# Patient Record
Sex: FEMALE | Race: BLACK OR AFRICAN AMERICAN | Employment: OTHER | URBAN - METROPOLITAN AREA
[De-identification: names, ages, dates, MRNs, and addresses within clinical notes are randomized per-mention and may not be internally consistent; named-entity substitution may affect disease eponyms.]

---

## 2019-01-07 ENCOUNTER — HOSPITAL ENCOUNTER (EMERGENCY)
Age: 69
Discharge: HOME | End: 2019-01-07
Attending: EMERGENCY MEDICINE | Admitting: EMERGENCY MEDICINE
Payer: MEDICARE

## 2019-01-07 VITALS
WEIGHT: 145 LBS | SYSTOLIC BLOOD PRESSURE: 134 MMHG | HEART RATE: 89 BPM | RESPIRATION RATE: 18 BRPM | DIASTOLIC BLOOD PRESSURE: 52 MMHG | TEMPERATURE: 98.3 F | HEIGHT: 60 IN | OXYGEN SATURATION: 99 % | BODY MASS INDEX: 28.47 KG/M2

## 2019-01-07 DIAGNOSIS — N18.6 END STAGE RENAL DISEASE (HCC): Primary | ICD-10-CM

## 2019-01-07 LAB
ALBUMIN SERPL-MCNC: 2.9 G/DL (ref 3.5–5)
ALBUMIN/GLOB SERPL: 0.5 {RATIO} (ref 1.1–2.2)
ALP SERPL-CCNC: 105 U/L (ref 45–117)
ALT SERPL-CCNC: 14 U/L (ref 12–78)
ANION GAP SERPL CALC-SCNC: 9 MMOL/L (ref 5–15)
AST SERPL-CCNC: 16 U/L (ref 15–37)
BASOPHILS # BLD: 0 K/UL (ref 0–0.1)
BASOPHILS NFR BLD: 0 % (ref 0–1)
BILIRUB SERPL-MCNC: 0.5 MG/DL (ref 0.2–1)
BUN SERPL-MCNC: 59 MG/DL (ref 6–20)
BUN/CREAT SERPL: 11 (ref 12–20)
CALCIUM SERPL-MCNC: 9.4 MG/DL (ref 8.5–10.1)
CHLORIDE SERPL-SCNC: 100 MMOL/L (ref 97–108)
CO2 SERPL-SCNC: 30 MMOL/L (ref 21–32)
CREAT SERPL-MCNC: 5.52 MG/DL (ref 0.55–1.02)
DIFFERENTIAL METHOD BLD: ABNORMAL
EOSINOPHIL # BLD: 0.5 K/UL (ref 0–0.4)
EOSINOPHIL NFR BLD: 7 % (ref 0–7)
ERYTHROCYTE [DISTWIDTH] IN BLOOD BY AUTOMATED COUNT: 16.8 % (ref 11.5–14.5)
GLOBULIN SER CALC-MCNC: 5.4 G/DL (ref 2–4)
GLUCOSE SERPL-MCNC: 123 MG/DL (ref 65–100)
HCT VFR BLD AUTO: 43.7 % (ref 35–47)
HGB BLD-MCNC: 13.3 G/DL (ref 11.5–16)
IMM GRANULOCYTES # BLD: 0 K/UL (ref 0–0.04)
IMM GRANULOCYTES NFR BLD AUTO: 0 % (ref 0–0.5)
LYMPHOCYTES # BLD: 1.6 K/UL (ref 0.8–3.5)
LYMPHOCYTES NFR BLD: 25 % (ref 12–49)
MCH RBC QN AUTO: 26.8 PG (ref 26–34)
MCHC RBC AUTO-ENTMCNC: 30.4 G/DL (ref 30–36.5)
MCV RBC AUTO: 87.9 FL (ref 80–99)
MONOCYTES # BLD: 0.5 K/UL (ref 0–1)
MONOCYTES NFR BLD: 7 % (ref 5–13)
NEUTS SEG # BLD: 3.9 K/UL (ref 1.8–8)
NEUTS SEG NFR BLD: 60 % (ref 32–75)
NRBC # BLD: 0 K/UL (ref 0–0.01)
NRBC BLD-RTO: 0 PER 100 WBC
PLATELET # BLD AUTO: 210 K/UL (ref 150–400)
PMV BLD AUTO: 10 FL (ref 8.9–12.9)
POTASSIUM SERPL-SCNC: 5.1 MMOL/L (ref 3.5–5.1)
PROT SERPL-MCNC: 8.3 G/DL (ref 6.4–8.2)
RBC # BLD AUTO: 4.97 M/UL (ref 3.8–5.2)
SODIUM SERPL-SCNC: 139 MMOL/L (ref 136–145)
WBC # BLD AUTO: 6.4 K/UL (ref 3.6–11)

## 2019-01-07 PROCEDURE — 36415 COLL VENOUS BLD VENIPUNCTURE: CPT

## 2019-01-07 PROCEDURE — 87077 CULTURE AEROBIC IDENTIFY: CPT

## 2019-01-07 PROCEDURE — 99283 EMERGENCY DEPT VISIT LOW MDM: CPT

## 2019-01-07 PROCEDURE — 87205 SMEAR GRAM STAIN: CPT

## 2019-01-07 PROCEDURE — 87186 SC STD MICRODIL/AGAR DIL: CPT

## 2019-01-07 PROCEDURE — 80053 COMPREHEN METABOLIC PANEL: CPT

## 2019-01-07 PROCEDURE — 93005 ELECTROCARDIOGRAM TRACING: CPT

## 2019-01-07 PROCEDURE — 85025 COMPLETE CBC W/AUTO DIFF WBC: CPT

## 2019-01-07 NOTE — ED NOTES
PIV removed intact. Plan of care accepted by pt and family. Pt left unit steady gait. Patient (s)  given copy of dc instructions and 0 script(s). Patient (s)  verbalized understanding of instructions and script (s). Patient given a current medication reconciliation form and verbalized understanding of their medications. Patient (s) verbalized understanding of the importance of discussing medications with  his or her physician or clinic they will be following up with. Patient alert and oriented and in no acute distress. Patient discharged home ambulatory with family.

## 2019-01-07 NOTE — ED PROVIDER NOTES
EMERGENCY DEPARTMENT HISTORY AND PHYSICAL EXAM      Date: 1/7/2019  Patient Name: Nicole Monk    History of Presenting Illness     Chief Complaint   Patient presents with    Vascular Access Problem     History Provided By: Patient and Patient's Daughter    HPI: Nicole Monk, 70 y.o. female with PMHx significant for HTN, CKD, and DM, presents herself w/ daughter to the ED with cc of possible clogged dialysis port x few days. Pt states she receives dialysis M/W/F. Pt states she last had dialysis on Friday (1/4/19), but pt's daughter states that pt has not. Pt's daughter reports that her family received a call from pt's nephrologist that pt is in need of dialysis as she did not receive dialysis due to clogged port. Pt additionally complains of ongoing draining surgical scar  x months. She notes of having a cardiac bypass 2 years ago. Her daughter states that her mother came from [place] for the weekend. Pt is compliant with all her medications. Pt specifically denies leg swelling and SOB. There are no other complaints, changes, or physical findings at this time. PCP: None    Past History     Past Medical History:  No past medical history on file. Past Surgical History:  No past surgical history on file. Family History:  No family history on file. Social History:  Social History     Tobacco Use    Smoking status: Never Smoker    Smokeless tobacco: Never Used   Substance Use Topics    Alcohol use: Not on file    Drug use: No       Allergies:  Not on File      Review of Systems   Review of Systems   Constitutional: Negative for chills and fever. HENT: Negative for congestion, rhinorrhea, sneezing and sore throat. Eyes: Negative for redness and visual disturbance. Respiratory: Negative for shortness of breath. Cardiovascular: Negative for leg swelling. Gastrointestinal: Negative for abdominal pain, nausea and vomiting. Genitourinary: Negative for difficulty urinating and frequency. Musculoskeletal: Negative for back pain, myalgias and neck stiffness. Skin: Negative for rash and wound. +draining surgical scar   Neurological: Negative for dizziness, syncope, weakness and headaches. Hematological: Negative for adenopathy. All other systems reviewed and are negative. Physical Exam   Physical Exam   Constitutional: She is oriented to person, place, and time. She appears well-developed and well-nourished. No distress. HENT:   Head: Normocephalic and atraumatic. Mouth/Throat: No oropharyngeal exudate. Eyes: Conjunctivae and EOM are normal. Pupils are equal, round, and reactive to light. No scleral icterus. Neck: Normal range of motion. Neck supple. Cardiovascular: Normal rate, regular rhythm, normal heart sounds and intact distal pulses. Exam reveals no gallop and no friction rub. No murmur heard. Pulmonary/Chest: Effort normal and breath sounds normal.   Well healed sternal scar. 2 small draining on lower sternal incision w/ mild purulent drainage. Abdominal: Soft. Bowel sounds are normal. There is no tenderness. There is no rebound and no guarding. Musculoskeletal: Normal range of motion. She exhibits edema (trace left hand). Dialysis fistula over the LUE w/ positive thrill. Neurological: She is alert and oriented to person, place, and time. She has normal reflexes. No cranial nerve deficit. She exhibits normal muscle tone. Coordination normal.   Skin: Skin is warm and dry. She is not diaphoretic. Psychiatric: She has a normal mood and affect.  Her behavior is normal. Judgment and thought content normal.     Diagnostic Study Results     Labs -     Recent Results (from the past 12 hour(s))   CBC WITH AUTOMATED DIFF    Collection Time: 01/07/19 10:30 AM   Result Value Ref Range    WBC 6.4 3.6 - 11.0 K/uL    RBC 4.97 3.80 - 5.20 M/uL    HGB 13.3 11.5 - 16.0 g/dL    HCT 43.7 35.0 - 47.0 %    MCV 87.9 80.0 - 99.0 FL    MCH 26.8 26.0 - 34.0 PG    MCHC 30.4 30.0 - 36.5 g/dL    RDW 16.8 (H) 11.5 - 14.5 %    PLATELET 528 530 - 095 K/uL    MPV 10.0 8.9 - 12.9 FL    NRBC 0.0 0  WBC    ABSOLUTE NRBC 0.00 0.00 - 0.01 K/uL    NEUTROPHILS 60 32 - 75 %    LYMPHOCYTES 25 12 - 49 %    MONOCYTES 7 5 - 13 %    EOSINOPHILS 7 0 - 7 %    BASOPHILS 0 0 - 1 %    IMMATURE GRANULOCYTES 0 0.0 - 0.5 %    ABS. NEUTROPHILS 3.9 1.8 - 8.0 K/UL    ABS. LYMPHOCYTES 1.6 0.8 - 3.5 K/UL    ABS. MONOCYTES 0.5 0.0 - 1.0 K/UL    ABS. EOSINOPHILS 0.5 (H) 0.0 - 0.4 K/UL    ABS. BASOPHILS 0.0 0.0 - 0.1 K/UL    ABS. IMM. GRANS. 0.0 0.00 - 0.04 K/UL    DF AUTOMATED     METABOLIC PANEL, COMPREHENSIVE    Collection Time: 01/07/19 10:30 AM   Result Value Ref Range    Sodium 139 136 - 145 mmol/L    Potassium 5.1 3.5 - 5.1 mmol/L    Chloride 100 97 - 108 mmol/L    CO2 30 21 - 32 mmol/L    Anion gap 9 5 - 15 mmol/L    Glucose 123 (H) 65 - 100 mg/dL    BUN 59 (H) 6 - 20 MG/DL    Creatinine 5.52 (H) 0.55 - 1.02 MG/DL    BUN/Creatinine ratio 11 (L) 12 - 20      GFR est AA 9 (L) >60 ml/min/1.73m2    GFR est non-AA 8 (L) >60 ml/min/1.73m2    Calcium 9.4 8.5 - 10.1 MG/DL    Bilirubin, total 0.5 0.2 - 1.0 MG/DL    ALT (SGPT) 14 12 - 78 U/L    AST (SGOT) 16 15 - 37 U/L    Alk. phosphatase 105 45 - 117 U/L    Protein, total 8.3 (H) 6.4 - 8.2 g/dL    Albumin 2.9 (L) 3.5 - 5.0 g/dL    Globulin 5.4 (H) 2.0 - 4.0 g/dL    A-G Ratio 0.5 (L) 1.1 - 2.2       Medical Decision Making   I am the first provider for this patient. I reviewed the vital signs, available nursing notes, past medical history, past surgical history, family history and social history. Vital Signs-Reviewed the patient's vital signs. Patient Vitals for the past 12 hrs:   Temp Pulse Resp BP SpO2   01/07/19 0913 98.3 °F (36.8 °C) 89 18 134/52 99 %       ED EKG interpretation: 10:28  Rhythm: normal sinus rhythm and 1st degree block; and regular . Rate (approx.): 60; Axis: normal; P wave: prolonged; QRS interval: normal ; ST/T wave: T wave inverted;  Other findings: abnormal ekg. This EKG was interpreted by Tia Jaeger MD,ED Provider. Records Reviewed: Nursing Notes and Old Medical Records    Provider Notes (Medical Decision Making):   DDx: electrolyte abnormality, volume overload, need for dialysis    ED Course:   Initial assessment performed. The patients presenting problems have been discussed, and they are in agreement with the care plan formulated and outlined with them. I have encouraged them to ask questions as they arise throughout their visit. MEDICATIONS GIVEN:  Medications - No data to display     Pt with thrill on exam and K+ of 5.1 without EKG changes. Will not dialyze today, but will discharge to [place] for her scheduled dialysis on Wednesday. PROGRESS NOTE:   11:43 AM  Pt has been re-examined by Tia Jaeger MD. Reviewed lab work w/ pt and family. There is no need for emergent dialysis at this time. Pt is to have dialysis as scheduled on Wednesday (1/9/19) at [place]. Will discharge pt soon. Critical Care Time: 0 min    Discharge Note:  11:45 AM  The pt is ready for discharge. The pt's signs, symptoms, diagnosis, and discharge instructions have been discussed and pt has conveyed their understanding. The pt is to follow up as recommended or return to ER should their symptoms worsen. Plan has been discussed and pt is in agreement. PLAN:  1. There are no discharge medications for this patient. 2.   Follow-up Information     Follow up With Specialties Details Why Contact Info    Your Dialysis facility  In 2 days      Your PCP and Kidney Doctor  Call      [facility] EMERGENCY DEPT Emergency Medicine  As needed, If symptoms worsen [place]  [phone]        Return to ED if worse     Diagnosis     Clinical Impression:   1. End stage renal disease ([facility] [address]        Attestations:   This note is prepared by [employer], acting as Scribe for Tia Jaeger MD.    Tia Jaeger MD: The scribe's documentation has been prepared under my direction and personally reviewed by me in its entirety. I confirm that the note above accurately reflects all work, treatment, procedures, and medical decision making performed by me.

## 2019-01-07 NOTE — DISCHARGE INSTRUCTIONS
Patient Education        Hemodialysis Vascular Access: Care Instructions  Your Care Instructions  Hemodialysis, or dialysis, is the use of a machine to remove wastes from your blood. You need it if your kidneys are not able to remove wastes on their own. A dialysis access is the place in your arm, or sometimes in your leg, where a doctor creates a blood vessel that carries a large flow of blood. When you have dialysis, two needles are placed in this blood vessel and are connected to the dialysis machine. Your blood flows out of one needle and into the machine to be cleaned. Then your cleaned blood flows back into your body through the other needle. Sometimes, a doctor makes a short-term access through a tube, called a catheter, placed in your neck, upper chest, or groin. Your doctor creates an access during a minor surgery. You need to take care of your access to keep it working and to prevent infection. Follow-up care is a key part of your treatment and safety. Be sure to make and go to all appointments, and call your doctor if you are having problems. It's also a good idea to know your test results and keep a list of the medicines you take. How can you care for yourself at home? · After your doctor creates an access, keep it dry for at least 2 days. · Squeeze a soft ball or other object as instructed after the access is placed. This will help blood flow through the access and help prevent blood clots. · After you have dialysis, check to see whether the access bleeds or swells. Let your doctor know if your arm bleeds or swells. · Do not lift anything heavy with the arm that has the access. · Do not bump your arm. · Do not wear tight clothing or jewelry over the access. · Do not sleep with your access arm under your body. · Have blood drawn or blood pressure taken from your other arm. · Keep the access clean and dry. · Do not put cream or lotion on or near the access.   When should you call for help?  Call your doctor now or seek immediate medical care if:    · You have signs of infection, such as:  ? Increased pain, swelling, warmth, or redness around the access. ? Red streaks leading from the access. ? Pus draining from the access. ? A fever.     · You do not feel a pulse in your access.    Watch closely for changes in your health, and be sure to contact your doctor if:    · You do not get better as expected. Where can you learn more? Go to http://nasra-rolf.info/. Enter L169 in the search box to learn more about \"Hemodialysis Vascular Access: Care Instructions. \"  Current as of: March 15, 2018  Content Version: 11.8  © 0123-5314 [employer]. Care instructions adapted under license by "[employer] (which disclaims liability or warranty for this information). If you have questions about a medical condition or this instruction, always ask your healthcare professional. [place] [address] any warranty or liability for your use of this information. Low Sodium Diet (2,000 Milligram): Care Instructions  Your Care Instructions    Too much sodium causes your body to hold on to extra water. This can raise your blood pressure and force your heart and kidneys to work harder. In very serious cases, this could cause you to be put in the hospital. It might even be life-threatening. By limiting sodium, you will feel better and lower your risk of serious problems. The most common source of sodium is salt. People get most of the salt in their diet from canned, prepared, and packaged foods. Fast food and restaurant meals also are very high in sodium. Your doctor will probably limit your sodium to less than 2,000 milligrams (mg) a day. This limit counts all the sodium in prepared and packaged foods and any salt you add to your food. Follow-up care is a key part of your treatment and safety.  Be sure to make and go to all appointments, and call your doctor if you are having problems. It's also a good idea to know your test results and keep a list of the medicines you take. How can you care for yourself at home? Read food labels  · Read labels on cans and food packages. The labels tell you how much sodium is in each serving. Make sure that you look at the serving size. If you eat more than the serving size, you have eaten more sodium. · Food labels also tell you the Percent Daily Value for sodium. Choose products with low Percent Daily Values for sodium. · Be aware that sodium can come in forms other than salt, including monosodium glutamate (MSG), sodium citrate, and sodium bicarbonate (baking soda). MSG is often added to Asian food. When you eat out, you can sometimes ask for food without MSG or added salt. Buy low-sodium foods  · Buy foods that are labeled \"unsalted\" (no salt added), \"sodium-free\" (less than 5 mg of sodium per serving), or \"low-sodium\" (less than 140 mg of sodium per serving). Foods labeled \"reduced-sodium\" and \"light sodium\" may still have too much sodium. Be sure to read the label to see how much sodium you are getting. · Buy fresh vegetables, or frozen vegetables without added sauces. Buy low-sodium versions of canned vegetables, soups, and other canned goods. Prepare low-sodium meals  · Cut back on the amount of salt you use in cooking. This will help you adjust to the taste. Do not add salt after cooking. One teaspoon of salt has about 2,300 mg of sodium. · Take the salt shaker off the table. · Flavor your food with garlic, lemon juice, onion, vinegar, herbs, and spices. Do not use soy sauce, lite soy sauce, steak sauce, onion salt, garlic salt, celery salt, mustard, or ketchup on your food. · Use low-sodium salad dressings, sauces, and ketchup. Or make your own salad dressings and sauces without adding salt. · Use less salt (or none) when recipes call for it.  You can often use half the salt a recipe calls for without losing flavor. Other foods such as rice, pasta, and grains do not need added salt. · Rinse canned vegetables, and cook them in fresh water. This removes some--but not all--of the salt. · Avoid water that is naturally high in sodium or that has been treated with water softeners, which add sodium. Call your local water company to find out the sodium content of your water supply. If you buy bottled water, read the label and choose a sodium-free brand. Avoid high-sodium foods  · Avoid eating:  ? Smoked, cured, salted, and canned meat, fish, and poultry. ? Ham, aden, hot dogs, and luncheon meats. ? Regular, hard, and processed cheese and regular peanut butter. ? Crackers with salted tops, and other salted snack foods such as pretzels, chips, and salted popcorn. ? Frozen prepared meals, unless labeled low-sodium. ? Canned and dried soups, broths, and bouillon, unless labeled sodium-free or low-sodium. ? Canned vegetables, unless labeled sodium-free or low-sodium. ? [place] fries, pizza, tacos, and other fast foods. ? Pickles, olives, ketchup, and other condiments, especially soy sauce, unless labeled sodium-free or low-sodium. Where can you learn more? Go to http://nasra-rolf.info/. Enter W340 in the search box to learn more about \"Low Sodium Diet (2,000 Milligram): Care Instructions. \"  Current as of: March 29, 2018  Content Version: 11.8  © 2822-6671 [employer]. Care instructions adapted under license by [employer] (which disclaims liability or warranty for this information). If you have questions about a medical condition or this instruction, always ask your healthcare professional. [place] [address] any warranty or liability for your use of this information.

## 2019-01-07 NOTE — ED NOTES
Pt here for evaluation of left arm AV graft. + bruit & Thrill noted. Pt traveling back to [address] [place] [address] and passing through. Emergency Department Nursing Plan of Care       The Nursing Plan of Care is developed from the Nursing assessment and Emergency Department Attending provider initial evaluation. The plan of care may be reviewed in the ED Provider note.     The Plan of Care was developed with the following considerations:   Patient / Family readiness to learn indicated by:verbalized understanding  Persons(s) to be included in education: patient and family  Barriers to Learning/Limitations:No    Signed     Brynn Ghosh RN    1/7/2019   10:10 AM

## 2019-01-07 NOTE — LETTER
1/9/2019 [place] [address] Mariam Almanzar 
[facility] [address] Dear Ms. Chandler Carpenter You were seen in the Emergency Department of [address] [place] [address] on 1/7/2019 and had lab and/or radiology tests performed. We would like to discuss these results with you . Please call the Emergency Department at your earliest convenience at [phone], to speak with one of our providers. The MRSA culture from your Emergency Department visit on 1/7/2019 was positive. If you have not improved or are worsening, please follow up with your primary care doctor or Emergency department as soon as possible. Please [facility] or health department. Your antibiotic may need to be changed. If you have any questions please contact the Emergency Department at [phone]. Sincerely, Gilmar Redman, [address] [place] [address] - [place] EMERGENCY DEPT 
[address] [place] [address] [phone]

## 2019-01-07 NOTE — PROGRESS NOTES
Spiritual Care Partner Volunteer visited patient in [place] [address] on Jan. 7, 2019.     Documented by:  MARGUERITE Sanchez, [facility], Chaplain TIGRE ODEN [facility] Paging Service  [phone])

## 2019-01-07 NOTE — ED TRIAGE NOTES
Pt's daughter explains that pt has dialysis M,W and F.  Pt's son reported to sister that pt did not have her dialysis Fri but pt states that she had a her full 3 hours. Pt's son reports that pt's ports is clogged. Pt's daughter brought pt to the ED for dialysis.

## 2019-01-09 LAB
BACTERIA SPEC CULT: ABNORMAL
GRAM STN SPEC: ABNORMAL
GRAM STN SPEC: ABNORMAL
SERVICE CMNT-IMP: ABNORMAL

## 2019-01-11 LAB
ATRIAL RATE: 60 BPM
CALCULATED P AXIS, ECG09: 63 DEGREES
CALCULATED R AXIS, ECG10: -14 DEGREES
CALCULATED T AXIS, ECG11: -81 DEGREES
DIAGNOSIS, 93000: NORMAL
P-R INTERVAL, ECG05: 214 MS
Q-T INTERVAL, ECG07: 474 MS
QRS DURATION, ECG06: 84 MS
QTC CALCULATION (BEZET), ECG08: 474 MS
VENTRICULAR RATE, ECG03: 60 BPM

## 2020-11-02 ENCOUNTER — HOSPITAL ENCOUNTER (OUTPATIENT)
Dept: LAB | Age: 70
Discharge: HOME | End: 2020-11-02

## 2020-11-02 LAB
ERYTHROCYTE [DISTWIDTH] IN BLOOD BY AUTOMATED COUNT: 14 % (ref 11.5–14.5)
HCT VFR BLD AUTO: 34.5 % (ref 35–47)
HGB BLD-MCNC: 10.8 G/DL (ref 11.5–16)
MCH RBC QN AUTO: 27.7 PG (ref 26–34)
MCHC RBC AUTO-ENTMCNC: 31.3 G/DL (ref 30–36.5)
MCV RBC AUTO: 88.5 FL (ref 80–99)
NRBC # BLD: 0 K/UL (ref 0–0.01)
NRBC BLD-RTO: 0 PER 100 WBC
PLATELET # BLD AUTO: 165 K/UL (ref 150–400)
PMV BLD AUTO: 11 FL (ref 8.9–12.9)
RBC # BLD AUTO: 3.9 M/UL (ref 3.8–5.2)
WBC # BLD AUTO: 7.6 K/UL (ref 3.6–11)

## 2020-11-02 PROCEDURE — 85027 COMPLETE CBC AUTOMATED: CPT

## 2021-02-09 ENCOUNTER — OFFICE VISIT (OUTPATIENT)
Dept: INTERNAL MEDICINE CLINIC | Age: 71
End: 2021-02-09
Payer: COMMERCIAL

## 2021-02-09 VITALS
SYSTOLIC BLOOD PRESSURE: 120 MMHG | WEIGHT: 150 LBS | TEMPERATURE: 97.8 F | DIASTOLIC BLOOD PRESSURE: 45 MMHG | RESPIRATION RATE: 18 BRPM | HEART RATE: 51 BPM | BODY MASS INDEX: 29.45 KG/M2 | OXYGEN SATURATION: 98 % | HEIGHT: 60 IN

## 2021-02-09 DIAGNOSIS — Z12.11 COLON CANCER SCREENING: ICD-10-CM

## 2021-02-09 DIAGNOSIS — R41.3 MEMORY IMPAIRMENT: ICD-10-CM

## 2021-02-09 DIAGNOSIS — Z99.2 CONTROLLED TYPE 2 DIABETES MELLITUS WITH CHRONIC KIDNEY DISEASE ON CHRONIC DIALYSIS, WITH LONG-TERM CURRENT USE OF INSULIN (HCC): ICD-10-CM

## 2021-02-09 DIAGNOSIS — I10 ESSENTIAL HYPERTENSION: ICD-10-CM

## 2021-02-09 DIAGNOSIS — E03.9 ACQUIRED HYPOTHYROIDISM: ICD-10-CM

## 2021-02-09 DIAGNOSIS — N18.6 CONTROLLED TYPE 2 DIABETES MELLITUS WITH CHRONIC KIDNEY DISEASE ON CHRONIC DIALYSIS, WITH LONG-TERM CURRENT USE OF INSULIN (HCC): ICD-10-CM

## 2021-02-09 DIAGNOSIS — E78.2 MIXED HYPERLIPIDEMIA: ICD-10-CM

## 2021-02-09 DIAGNOSIS — Z79.4 CONTROLLED TYPE 2 DIABETES MELLITUS WITH CHRONIC KIDNEY DISEASE ON CHRONIC DIALYSIS, WITH LONG-TERM CURRENT USE OF INSULIN (HCC): ICD-10-CM

## 2021-02-09 DIAGNOSIS — Z12.31 SCREENING MAMMOGRAM, ENCOUNTER FOR: ICD-10-CM

## 2021-02-09 DIAGNOSIS — E11.22 CONTROLLED TYPE 2 DIABETES MELLITUS WITH CHRONIC KIDNEY DISEASE ON CHRONIC DIALYSIS, WITH LONG-TERM CURRENT USE OF INSULIN (HCC): ICD-10-CM

## 2021-02-09 DIAGNOSIS — Z76.89 ESTABLISHING CARE WITH NEW DOCTOR, ENCOUNTER FOR: Primary | ICD-10-CM

## 2021-02-09 DIAGNOSIS — N18.6 ESRD (END STAGE RENAL DISEASE) (HCC): ICD-10-CM

## 2021-02-09 LAB
CHOLEST SERPL-MCNC: 127 MG/DL
GLUCOSE POC: 189 MG/DL
HBA1C MFR BLD HPLC: 8 %
HDLC SERPL-MCNC: 45 MG/DL
LDL CHOLESTEROL POC: 57 MG/DL
NON-HDL GOAL (POC): 82
TCHOL/HDL RATIO (POC): 1.3
TRIGL SERPL-MCNC: 128 MG/DL

## 2021-02-09 PROCEDURE — 3052F HG A1C>EQUAL 8.0%<EQUAL 9.0%: CPT | Performed by: NURSE PRACTITIONER

## 2021-02-09 PROCEDURE — 1101F PT FALLS ASSESS-DOCD LE1/YR: CPT | Performed by: NURSE PRACTITIONER

## 2021-02-09 PROCEDURE — G8427 DOCREV CUR MEDS BY ELIG CLIN: HCPCS | Performed by: NURSE PRACTITIONER

## 2021-02-09 PROCEDURE — 99204 OFFICE O/P NEW MOD 45 MIN: CPT | Performed by: NURSE PRACTITIONER

## 2021-02-09 PROCEDURE — G8536 NO DOC ELDER MAL SCRN: HCPCS | Performed by: NURSE PRACTITIONER

## 2021-02-09 PROCEDURE — G8400 PT W/DXA NO RESULTS DOC: HCPCS | Performed by: NURSE PRACTITIONER

## 2021-02-09 PROCEDURE — 82962 GLUCOSE BLOOD TEST: CPT | Performed by: NURSE PRACTITIONER

## 2021-02-09 PROCEDURE — G8419 CALC BMI OUT NRM PARAM NOF/U: HCPCS | Performed by: NURSE PRACTITIONER

## 2021-02-09 PROCEDURE — 2022F DILAT RTA XM EVC RTNOPTHY: CPT | Performed by: NURSE PRACTITIONER

## 2021-02-09 PROCEDURE — 3017F COLORECTAL CA SCREEN DOC REV: CPT | Performed by: NURSE PRACTITIONER

## 2021-02-09 PROCEDURE — 83036 HEMOGLOBIN GLYCOSYLATED A1C: CPT | Performed by: NURSE PRACTITIONER

## 2021-02-09 PROCEDURE — G9231 DOC ESRD DIA TRANS PREG: HCPCS | Performed by: NURSE PRACTITIONER

## 2021-02-09 PROCEDURE — G8432 DEP SCR NOT DOC, RNG: HCPCS | Performed by: NURSE PRACTITIONER

## 2021-02-09 PROCEDURE — 80061 LIPID PANEL: CPT | Performed by: NURSE PRACTITIONER

## 2021-02-09 PROCEDURE — G9899 SCRN MAM PERF RSLTS DOC: HCPCS | Performed by: NURSE PRACTITIONER

## 2021-02-09 PROCEDURE — 1090F PRES/ABSN URINE INCON ASSESS: CPT | Performed by: NURSE PRACTITIONER

## 2021-02-09 RX ORDER — ASPIRIN 81 MG/1
TABLET ORAL DAILY
COMMUNITY

## 2021-02-09 RX ORDER — INSULIN PUMP SYRINGE, 3 ML
EACH MISCELLANEOUS
Qty: 1 KIT | Refills: 0 | Status: SHIPPED | OUTPATIENT
Start: 2021-02-09 | End: 2022-01-20 | Stop reason: SDUPTHER

## 2021-02-09 RX ORDER — LANCETS
EACH MISCELLANEOUS
Qty: 1 EACH | Refills: 11 | Status: SHIPPED | OUTPATIENT
Start: 2021-02-09 | End: 2022-01-20 | Stop reason: SDUPTHER

## 2021-02-09 RX ORDER — PEN NEEDLE, DIABETIC 31 GX3/16"
1 NEEDLE, DISPOSABLE MISCELLANEOUS DAILY
Qty: 100 PEN NEEDLE | Refills: 11 | Status: SHIPPED | OUTPATIENT
Start: 2021-02-09 | End: 2022-11-03

## 2021-02-09 RX ORDER — BRIMONIDINE TARTRATE, TIMOLOL MALEATE 2; 5 MG/ML; MG/ML
1 SOLUTION/ DROPS OPHTHALMIC EVERY 12 HOURS
COMMUNITY

## 2021-02-09 RX ORDER — AMLODIPINE BESYLATE 5 MG/1
5 TABLET ORAL DAILY
COMMUNITY
End: 2022-10-19

## 2021-02-09 RX ORDER — METOPROLOL SUCCINATE 50 MG/1
TABLET, EXTENDED RELEASE ORAL DAILY
COMMUNITY
End: 2021-08-06

## 2021-02-09 RX ORDER — INSULIN LISPRO 200 [IU]/ML
INJECTION, SOLUTION SUBCUTANEOUS
COMMUNITY
End: 2021-05-11

## 2021-02-09 RX ORDER — INSULIN GLARGINE 100 [IU]/ML
10 INJECTION, SOLUTION SUBCUTANEOUS
Qty: 5 PEN | Refills: 1 | Status: SHIPPED | OUTPATIENT
Start: 2021-02-09 | End: 2022-10-19

## 2021-02-09 RX ORDER — ATORVASTATIN CALCIUM 40 MG/1
TABLET, FILM COATED ORAL DAILY
COMMUNITY
End: 2021-10-28 | Stop reason: SDUPTHER

## 2021-02-09 RX ORDER — NIFEDIPINE 30 MG/1
30 TABLET, FILM COATED, EXTENDED RELEASE ORAL DAILY
COMMUNITY
End: 2021-02-09

## 2021-02-09 RX ORDER — FAMOTIDINE 20 MG/1
20 TABLET, FILM COATED ORAL 2 TIMES DAILY
COMMUNITY
End: 2021-05-11 | Stop reason: SDUPTHER

## 2021-02-09 RX ORDER — LEVOTHYROXINE SODIUM 50 UG/1
TABLET ORAL
COMMUNITY
End: 2021-05-12 | Stop reason: SDUPTHER

## 2021-02-09 RX ORDER — ISOSORBIDE MONONITRATE 60 MG/1
60 TABLET, EXTENDED RELEASE ORAL DAILY
COMMUNITY
End: 2021-05-11

## 2021-02-09 RX ORDER — ISOPROPYL ALCOHOL 70 ML/100ML
1 SWAB TOPICAL 4 TIMES DAILY
Qty: 100 PAD | Refills: 11 | Status: SHIPPED | OUTPATIENT
Start: 2021-02-09 | End: 2022-10-03

## 2021-02-09 NOTE — PROGRESS NOTES
Chief Complaint   Patient presents with   [facility].    Request For New Medication     glucometer and supplies, pen needles, incontinence       1. Have you been to the ER, urgent care clinic since your last visit? Hospitalized since your last visit? No    2. Have you seen or consulted any other health care providers outside of the [address] [place] [address] since your last visit? Include any pap smears or colon screening.  No

## 2021-02-09 NOTE — PATIENT INSTRUCTIONS

## 2021-02-09 NOTE — PROGRESS NOTES
Subjective: (As above and below)     Chief Complaint   Patient presents with   • Establish Care   • Request For New Medication     glucometer and supplies, pen needles, incontinence     Sanna Steiner is a 70 y.o. year old female who presents for est care- moved from [place], lives w/ daughter  Moved bc her son whom lived w/ her in [place] passed      ESRD: dialysis via [facility] on [address] MWF via [facility]    Hypertension ROS:  taking medications as instructed, no medication side effects noted, no TIAs, no chest pain on exertion, no dyspnea on exertion, no swelling of ankles    Diabetic Review of Systems - insulin has been held due to no meter, has not taken in several months  Eye exam: scheduled soon      Depression: due to death of son, moving. Overall feels okay, is sleeping, eating, has support from family here      Hypothyroid: med adherent, had labs done thru dialysis, unsure if this included    Colonoscopy: due. Denies an bowel concerns    Hx of CABG: has established w/ cardiology    Memory: daughter notes some memory impairments, has had to double check doors, stoves. Pt forgets medications at times if not prompted        Reviewed PmHx, RxHx, FmHx, SocHx, AllgHx and updated in chart.  History reviewed. No pertinent family history.    Past Medical History:   Diagnosis Date   • Diabetes (HCC)    • Hypertension       Social History     Socioeconomic History   • Marital status:      Spouse name: Not on file   • Number of children: Not on file   • Years of education: Not on file   • Highest education level: Not on file   Tobacco Use   • Smoking status: Never Smoker   • Smokeless tobacco: Never Used   Substance and Sexual Activity   • Alcohol use: Not Currently   • Drug use: No   • Sexual activity: Never          Current Outpatient Medications   Medication Sig   • atorvastatin (LIPITOR) 40 mg tablet Take  by mouth daily.   • amLODIPine (NORVASC) 5 mg tablet Take 5 mg by mouth daily.   • aspirin delayed-release 81 mg  tablet Take  by mouth daily.  famotidine (PEPCID) 20 mg tablet Take 20 mg by mouth two (2) times a day.  metoprolol succinate (TOPROL-XL) 50 mg XL tablet Take  by mouth daily.  folic acid/vit B complex and C (DIALYVITE 800 PO) Take  by mouth.  levothyroxine (SYNTHROID) 50 mcg tablet Take  by mouth Daily (before breakfast).  insulin lispro (HumaLOG KwikPen Insulin) 200 unit/mL (3 mL) inpn by SubCUTAneous route.  brimonidine-timoloL (Combigan) 0.2-0.5 % drop ophthalmic solution 1 Drop every twelve (12) hours.  Blood-Glucose Meter monitoring kit Use as directed. Dx: E11.65 check sugar twice daily    lancets misc Check sugar twice daily. E11.65    glucose blood VI test strips (ASCENSIA AUTODISC VI, ONE TOUCH ULTRA TEST VI) strip Check sugar twice daily    alcohol swabs padm 1 Each by Apply Externally route four (4) times daily. For use with insulin and glucometer    insulin glargine (LANTUS,BASAGLAR) 100 unit/mL (3 mL) inpn 10 Units by SubCUTAneous route daily (with dinner).  Insulin Needles, Disposable, 32 gauge x 5/32\" ndle 1 Each by Does Not Apply route daily.  isosorbide mononitrate ER (IMDUR) 60 mg CR tablet Take 60 mg by mouth daily. No current facility-administered medications for this visit. Review of Systems:   Constitutional:    Negative for fever and chills, negative diaphoresis. HEENT:              Negative for neck pain and stiffness. Eyes:                  Negative for visual disturbance, itching, redness or discharge. Respiratory:        Negative for cough and shortness of breath. Cardiovascular:  Negative for chest pain and palpitations. Gastrointestinal: Negative for nausea, vomiting, abdominal pain, diarrhea or constipation. Genitourinary:     Negative for dysuria and frequency. +mostly anuric d/t ESRD  Musculoskeletal: Negative for falls, tenderness and swelling. Skin:                    Negative for rash, masses or lesions.    Neurological: Negative for dizzyness, seizure, loss of consciousness, weakness and numbness. Objective:     Vitals:    02/09/21 0859   BP: (!) 120/45   Pulse: (!) 51   Resp: 18   Temp: 97.8 °F (36.6 °C)   TempSrc: Temporal   SpO2: 98%   Weight: 150 lb (68 kg)   Height: 5' (1.524 m)     Results for orders placed or performed in visit on 02/09/21   AMB POC GLUCOSE BLOOD, BY GLUCOSE MONITORING DEVICE   Result Value Ref Range    Glucose  mg/dL   AMB POC HEMOGLOBIN A1C   Result Value Ref Range    Hemoglobin A1c (POC) 8.0 %   AMB POC LIPID PROFILE   Result Value Ref Range    Cholesterol (POC) 127     Triglycerides (POC) 128     HDL Cholesterol (POC) 45     LDL Cholesterol (POC) 57 MG/DL    Non-HDL Goal (POC) 82     TChol/HDL Ratio (POC) 1.3            Gen: Oriented to person, place and time and well-developed, well-nourished and in no distress. HEENT:    Head: normocephalic and atraumatic. Eyes:  EOM are normal. Pupils equal and round. Neck:  Normal range of motion. Neck supple. Cardiovascular: normal rate, regular rhythm and normal heart sounds. Pulmonary/Chest:  Effort normal and breath sounds normal.  No respiratory distress. No wheezes, no rales. Abdominal: soft, normal  bowel sounds. Musculoskeletal:  No edema, no tenderness. No calf tenderness or edema. Neurological:  Alert, oriented to person, place and time. Skin: skin is warm and dry. Assessment/ Plan:     Follow-up and Dispositions    · Return in about 3 months (around 5/9/2021). Will get recent labs from dialysis      1. Establishing care with new doctor, encounter for      2. ESRD (end stage renal disease) ([facility] [address]      3. Mixed hyperlipidemia    4. Essential hypertension      5.  Controlled type 2 diabetes mellitus with chronic kidney disease on chronic dialysis, with long-term current use of insulin (HCC)  Change to lantus from sliding scale  - AMB POC GLUCOSE BLOOD, BY GLUCOSE MONITORING DEVICE  - AMB POC HEMOGLOBIN A1C  - AMB POC LIPID PROFILE  - Blood-Glucose Meter monitoring kit; Use as directed. Dx: E11.65 check sugar twice daily  Dispense: 1 Kit; Refill: 0  - lancets misc; Check sugar twice daily. E11.65  Dispense: 1 Each; Refill: 11  - glucose blood VI test strips (ASCENSIA AUTODISC VI, ONE TOUCH ULTRA TEST VI) strip; Check sugar twice daily  Dispense: 100 Strip; Refill: 11  - alcohol swabs padm; 1 Each by Apply Externally route four (4) times daily. For use with insulin and glucometer  Dispense: 100 Pad; Refill: 11    6. Acquired hypothyroidism      7. Screening mammogram, encounter for    - [facility] MAMMO BI SCREENING INCL CAD; Future    8. Colon cancer screening    - COLOGUARD TEST (FECAL DNA COLORECTAL CANCER SCREENING)      9. Memory impairment    - REFERRAL TO NEUROLOGY          I have discussed the diagnosis with the patient and the intended plan as seen in the above orders. The patient has received an after-visit summary and questions were answered concerning future plans. Pt conveyed understanding of plan. Medication Side Effects and Warnings were discussed with patient: yes  Patient Labs were reviewed: yes  Patient Past Records were reviewed:  yes    Drake Aguirre.  Chao Murrell NP

## 2021-05-11 ENCOUNTER — OFFICE VISIT (OUTPATIENT)
Dept: INTERNAL MEDICINE CLINIC | Age: 71
End: 2021-05-11
Payer: MEDICARE

## 2021-05-11 VITALS
BODY MASS INDEX: 28.86 KG/M2 | HEIGHT: 60 IN | RESPIRATION RATE: 18 BRPM | TEMPERATURE: 97.6 F | HEART RATE: 58 BPM | SYSTOLIC BLOOD PRESSURE: 155 MMHG | DIASTOLIC BLOOD PRESSURE: 57 MMHG | OXYGEN SATURATION: 98 % | WEIGHT: 147 LBS

## 2021-05-11 DIAGNOSIS — Z79.4 TYPE 2 DIABETES MELLITUS WITH HYPERGLYCEMIA, WITH LONG-TERM CURRENT USE OF INSULIN (HCC): ICD-10-CM

## 2021-05-11 DIAGNOSIS — E78.2 MIXED HYPERLIPIDEMIA: ICD-10-CM

## 2021-05-11 DIAGNOSIS — I10 ESSENTIAL HYPERTENSION: Primary | ICD-10-CM

## 2021-05-11 DIAGNOSIS — N18.6 ESRD (END STAGE RENAL DISEASE) (HCC): ICD-10-CM

## 2021-05-11 DIAGNOSIS — E03.9 ACQUIRED HYPOTHYROIDISM: ICD-10-CM

## 2021-05-11 DIAGNOSIS — E11.65 TYPE 2 DIABETES MELLITUS WITH HYPERGLYCEMIA, WITH LONG-TERM CURRENT USE OF INSULIN (HCC): ICD-10-CM

## 2021-05-11 DIAGNOSIS — Z12.11 COLON CANCER SCREENING: ICD-10-CM

## 2021-05-11 DIAGNOSIS — H61.22 IMPACTED CERUMEN OF LEFT EAR: ICD-10-CM

## 2021-05-11 DIAGNOSIS — Z01.818 PREOP EXAMINATION: ICD-10-CM

## 2021-05-11 DIAGNOSIS — Z00.00 MEDICARE ANNUAL WELLNESS VISIT, SUBSEQUENT: ICD-10-CM

## 2021-05-11 LAB
GLUCOSE POC: 178 MG/DL
HBA1C MFR BLD HPLC: 8.1 %

## 2021-05-11 PROCEDURE — 82962 GLUCOSE BLOOD TEST: CPT | Performed by: NURSE PRACTITIONER

## 2021-05-11 PROCEDURE — G8427 DOCREV CUR MEDS BY ELIG CLIN: HCPCS | Performed by: NURSE PRACTITIONER

## 2021-05-11 PROCEDURE — G0439 PPPS, SUBSEQ VISIT: HCPCS | Performed by: NURSE PRACTITIONER

## 2021-05-11 PROCEDURE — G9899 SCRN MAM PERF RSLTS DOC: HCPCS | Performed by: NURSE PRACTITIONER

## 2021-05-11 PROCEDURE — 1101F PT FALLS ASSESS-DOCD LE1/YR: CPT | Performed by: NURSE PRACTITIONER

## 2021-05-11 PROCEDURE — 3017F COLORECTAL CA SCREEN DOC REV: CPT | Performed by: NURSE PRACTITIONER

## 2021-05-11 PROCEDURE — 2022F DILAT RTA XM EVC RTNOPTHY: CPT | Performed by: NURSE PRACTITIONER

## 2021-05-11 PROCEDURE — G8536 NO DOC ELDER MAL SCRN: HCPCS | Performed by: NURSE PRACTITIONER

## 2021-05-11 PROCEDURE — G8432 DEP SCR NOT DOC, RNG: HCPCS | Performed by: NURSE PRACTITIONER

## 2021-05-11 PROCEDURE — 1090F PRES/ABSN URINE INCON ASSESS: CPT | Performed by: NURSE PRACTITIONER

## 2021-05-11 PROCEDURE — G8400 PT W/DXA NO RESULTS DOC: HCPCS | Performed by: NURSE PRACTITIONER

## 2021-05-11 PROCEDURE — 99214 OFFICE O/P EST MOD 30 MIN: CPT | Performed by: NURSE PRACTITIONER

## 2021-05-11 PROCEDURE — 69209 REMOVE IMPACTED EAR WAX UNI: CPT | Performed by: NURSE PRACTITIONER

## 2021-05-11 PROCEDURE — G9231 DOC ESRD DIA TRANS PREG: HCPCS | Performed by: NURSE PRACTITIONER

## 2021-05-11 PROCEDURE — 83036 HEMOGLOBIN GLYCOSYLATED A1C: CPT | Performed by: NURSE PRACTITIONER

## 2021-05-11 PROCEDURE — G8419 CALC BMI OUT NRM PARAM NOF/U: HCPCS | Performed by: NURSE PRACTITIONER

## 2021-05-11 RX ORDER — FOLIC ACID/VIT B COMPLEX AND C 0.8 MG
1 TABLET ORAL DAILY
Qty: 90 TAB | Refills: 0 | Status: ON HOLD | OUTPATIENT
Start: 2021-05-11 | End: 2021-08-03

## 2021-05-11 RX ORDER — BRIMONIDINE TARTRATE, TIMOLOL MALEATE 2; 5 MG/ML; MG/ML
1 SOLUTION/ DROPS OPHTHALMIC EVERY 12 HOURS
Status: CANCELLED | OUTPATIENT
Start: 2021-05-11

## 2021-05-11 RX ORDER — ISOSORBIDE MONONITRATE 20 MG/1
20 TABLET ORAL 2 TIMES DAILY
Qty: 60 TAB | Refills: 1 | Status: SHIPPED | OUTPATIENT
Start: 2021-05-11 | End: 2021-08-06

## 2021-05-11 RX ORDER — FAMOTIDINE 20 MG/1
20 TABLET, FILM COATED ORAL
Qty: 60 TAB | Refills: 1 | Status: SHIPPED | OUTPATIENT
Start: 2021-05-11 | End: 2021-06-04

## 2021-05-11 RX ORDER — VIT B COMP NO.3/FOLIC/C/BIOTIN 1 MG-60 MG
1 TABLET ORAL DAILY
Qty: 90 TAB | Refills: 0 | Status: CANCELLED | OUTPATIENT
Start: 2021-05-11

## 2021-05-11 NOTE — PROGRESS NOTES
Chief Complaint   Patient presents with    Follow-up     3 month    Form Completion     eye surgery        1. Have you been to the ER, urgent care clinic since your last visit? Hospitalized since your last visit? No    2. Have you seen or consulted any other health care providers outside of the [address] [place] [address] since your last visit? Include any pap smears or colon screening.  No

## 2021-05-11 NOTE — PROGRESS NOTES
Subjective: (As above and below)     Chief Complaint   Patient presents with    Follow-up     3 month    Form Completion     eye surgery      John Yadav is a 66y.o. year old female who presents for     Hypertension ROS:  taking medications as instructed, no medication side effects noted, no TIAs, no chest pain on exertion, no dyspnea on exertion, no swelling of ankles  She has amlodipine and metoprolol, she does not have imdur    Diabetic Review of Systems - medication compliance: daughter admits to some missed doses of lantus, diabetic diet compliance: noncompliant some of the time, home glucose monitoring: is performed sporadically. Eye exam    Hyperlipidemia: tolerating statin    Hypothyroidism: adherent w/ synthroid    Mammo: ordered but not done  Colonoscopy: got cologuard but they state they made a mistake when opening the box and need it resent    Pre-op: she is having a procedure to L eye for glaucoma this month under local anesthesia    No hx of latex allergy or problems w/ anesthesia    ESRD: dialysis MWF    Hx of CABG, followed by cardiology- Q6mo- next appt 6/29      Reviewed PmHx, RxHx, FmHx, SocHx, AllgHx and updated in chart. History reviewed. No pertinent family history. Past Medical History:   Diagnosis Date    Diabetes ([facility] [address]     Hypertension       Social History     Socioeconomic History    Marital status:      Spouse name: Not on file    Number of children: Not on file    Years of education: Not on file    Highest education level: Not on file   Tobacco Use    Smoking status: Never Smoker    Smokeless tobacco: Never Used   Substance and Sexual Activity    Alcohol use: Not Currently    Drug use: No    Sexual activity: Never          Current Outpatient Medications   Medication Sig    famotidine (PEPCID) 20 mg tablet Take 1 Tab by mouth two (2) times daily as needed for Heartburn.     b complex-vitamin c-folic acid 0.8 mg (Dialyvite 800) 0.8 mg tab tablet Take 1 Tab by mouth daily.  isosorbide mononitrate (ISMO, MONOKET) 20 mg tablet Take 1 Tab by mouth two (2) times a day.  atorvastatin (LIPITOR) 40 mg tablet Take  by mouth daily.  amLODIPine (NORVASC) 5 mg tablet Take 5 mg by mouth daily.  aspirin delayed-release 81 mg tablet Take  by mouth daily.  metoprolol succinate (TOPROL-XL) 50 mg XL tablet Take  by mouth daily.  levothyroxine (SYNTHROID) 50 mcg tablet Take  by mouth Daily (before breakfast).  brimonidine-timoloL (Combigan) 0.2-0.5 % drop ophthalmic solution 1 Drop every twelve (12) hours.  insulin glargine (LANTUS,BASAGLAR) 100 unit/mL (3 mL) inpn 10 Units by SubCUTAneous route daily (with dinner).  Blood-Glucose Meter monitoring kit Use as directed. Dx: E11.65 check sugar twice daily    lancets misc Check sugar twice daily. E11.65    glucose blood VI test strips (ASCENSIA AUTODISC VI, ONE TOUCH ULTRA TEST VI) strip Check sugar twice daily    alcohol swabs padm 1 Each by Apply Externally route four (4) times daily. For use with insulin and glucometer    Insulin Needles, Disposable, 32 gauge x 5/32\" ndle 1 Each by Does Not Apply route daily. No current facility-administered medications for this visit. Review of Systems:   Constitutional:    Negative for fever and chills, negative diaphoresis. HEENT:              Negative for neck pain and stiffness. Eyes:                  Negative for visual disturbance, itching, redness or discharge. Respiratory:        Negative for cough and shortness of breath. Cardiovascular:  Negative for chest pain and palpitations. Gastrointestinal: Negative for nausea, vomiting, abdominal pain, diarrhea or constipation. Genitourinary:     Negative for dysuria and frequency. Musculoskeletal: Negative for falls, tenderness and swelling. Skin:                    Negative for rash, masses or lesions.    Neurological:       Negative for dizzyness, seizure, loss of consciousness, weakness and numbness. Objective:     Vitals:    05/11/21 0817 05/11/21 0856 05/11/21 0911   BP: (!) 164/59 (!) 155/57    Pulse: (!) 47 (!) 46 (!) 58   Resp: 18     Temp: 97.6 °F (36.4 °C)     TempSrc: Temporal     SpO2: 98%     Weight: 147 lb (66.7 kg)     Height: 5' (1.524 m)         Gen: Oriented to person, place and time and well-developed, well-nourished and in no distress. HEENT:    Head: normocephalic and atraumatic. Eyes:  EOM are normal. Pupils equal and round. Ears left ear impacted, R ear normal  Neck:  Normal range of motion. Neck supple. Cardiovascular: normal rate, regular rhythm and normal heart sounds. Pulmonary/Chest:  Effort normal and breath sounds normal.  No respiratory distress. No wheezes, no rales. Abdominal: soft, normal  bowel sounds. Musculoskeletal:  No edema, no tenderness. No calf tenderness or edema. Neurological:  Alert, oriented to person, place and time. Skin: skin is warm and dry. Diabetic foot exam:     Left Foot:   Visual Exam: normal    Pulse DP: 2+ (normal)   Filament test: normal sensation    Vibratory sensation: normal      Right Foot:   Visual Exam: normal    Pulse DP: 2+ (normal)   Filament test: normal sensation    Vibratory sensation: normal    Feet are dry and nails overgrown,she does not have a podiatrist here yet      Results for orders placed or performed in visit on 05/11/21   AMB POC HEMOGLOBIN A1C   Result Value Ref Range    Hemoglobin A1c (POC) 8.1 %   AMB POC GLUCOSE BLOOD, BY GLUCOSE MONITORING DEVICE   Result Value Ref Range    Glucose  MG/DL         Assessment/ Plan:     Follow-up and Dispositions    · Return in about 1 week (around 5/18/2021) for 1 week NV bp check and then 3 mo w/ me for DM. Spoke w/ nurse Kevin Cotton from Dr. Marie Uribe office (cardio), who states that she is supposed to be on imdur 20mg BID  Will refill    1. Essential hypertension  Resume imbudr, BP check in 1 week  - METABOLIC PANEL, COMPREHENSIVE;  Future  - CBC W/O DIFF; Future  - VITAMIN D, 25 HYDROXY; Future  - isosorbide mononitrate (ISMO, MONOKET) 20 mg tablet; Take 1 Tab by mouth two (2) times a day. Dispense: 60 Tab; Refill: 1    2. Type 2 diabetes mellitus with hyperglycemia, with long-term current use of insulin ([facility])  Discussed need to take lantus daily  - AMB POC HEMOGLOBIN A1C  - AMB POC GLUCOSE BLOOD, BY GLUCOSE MONITORING DEVICE  -  DIABETES FOOT EXAM  - METABOLIC PANEL, COMPREHENSIVE; Future  - CBC W/O DIFF; Future  - VITAMIN D, 25 HYDROXY; Future  - REFERRAL TO PODIATRY    3. Mixed hyperlipidemia      4. Acquired hypothyroidism    - TSH REFLEX TO T4; Future  - TSH REFLEX TO T4    5. ESRD (end stage renal disease) ([facility])    - METABOLIC PANEL, COMPREHENSIVE; Future  - CBC W/O DIFF; Future  - VITAMIN D, 25 HYDROXY; Future  - b complex-vitamin c-folic acid 0.8 mg (Dialyvite 800) 0.8 mg tab tablet; Take 1 Tab by mouth daily. Dispense: 90 Tab; Refill: 0    6. Impacted cerumen of left ear  Clear after, discussed avoiding qtips, use debrox  - REMOVAL IMPACTED CERUMEN IRRIGATION/LVG UNILAT    7. Colon cancer screening    - COLOGUARD TEST (FECAL DNA COLORECTAL CANCER SCREENING)    8. Medicare annual wellness visit, subsequent    9. Pre-op  Cleared after BP check next week      I have discussed the diagnosis with the patient and the intended plan as seen in the above orders. The patient has received an after-visit summary and questions were answered concerning future plans. Pt conveyed understanding of plan. Medication Side Effects and Warnings were discussed with patient: yes  Patient Labs were reviewed: yes  Patient Past Records were reviewed:  yes    Andrae Terry. Placido Ott NP     This is the Subsequent Medicare Annual Wellness Exam, performed 12 months or more after the Initial AWV or the last Subsequent AWV    I have reviewed the patient's medical history in detail and updated the computerized patient record.        Assessment/Plan   Education and counseling provided:  Are appropriate based on today's review and evaluation    1. Essential hypertension  -     METABOLIC PANEL, COMPREHENSIVE; Future  -     CBC W/O DIFF; Future  -     VITAMIN D, 25 HYDROXY; Future  -     isosorbide mononitrate (ISMO, MONOKET) 20 mg tablet; Take 1 Tab by mouth two (2) times a day., Normal, Disp-60 Tab, R-1  2. Type 2 diabetes mellitus with hyperglycemia, with long-term current use of insulin ([facility])  -     AMB POC HEMOGLOBIN A1C  -     AMB POC GLUCOSE BLOOD, BY GLUCOSE MONITORING DEVICE  -      DIABETES FOOT EXAM  -     METABOLIC PANEL, COMPREHENSIVE; Future  -     CBC W/O DIFF; Future  -     VITAMIN D, 25 HYDROXY; Future  -     REFERRAL TO PODIATRY  3. Mixed hyperlipidemia  4. Acquired hypothyroidism  -     TSH REFLEX TO T4; Future  5. ESRD (end stage renal disease) ([facility])  -     METABOLIC PANEL, COMPREHENSIVE; Future  -     CBC W/O DIFF; Future  -     VITAMIN D, 25 HYDROXY; Future  -     b complex-vitamin c-folic acid 0.8 mg (Dialyvite 800) 0.8 mg tab tablet; Take 1 Tab by mouth daily. , Normal, Disp-90 Tab, R-0  6. Impacted cerumen of left ear  -     REMOVAL IMPACTED CERUMEN IRRIGATION/LVG UNILAT  7. Colon cancer screening  -     COLOGUARD TEST (FECAL DNA COLORECTAL CANCER SCREENING)  8. Medicare annual wellness visit, subsequent  9.  Preop examination       Depression Risk Factor Screening     3 most recent PHQ Screens 2/9/2021   Little interest or pleasure in doing things Nearly every day   Feeling down, depressed, irritable, or hopeless Nearly every day   Total Score PHQ 2 6   Trouble falling or staying asleep, or sleeping too much Nearly every day   Feeling tired or having little energy Nearly every day   Poor appetite, weight loss, or overeating Not at all   Feeling bad about yourself - or that you are a failure or have let yourself or your family down Not at all   Trouble concentrating on things such as school, work, reading, or watching TV Not at all   Moving or speaking so slowly that other people could have noticed; or the opposite being so fidgety that others notice Not at all   Thoughts of being better off dead, or hurting yourself in some way Not at all   PHQ 9 Score 12   How difficult have these problems made it for you to do your work, take care of your home and get along with others Not difficult at all       Alcohol Risk Screen    Do you average more than 1 drink per night or more than 7 drinks a week:  No    On any one occasion in the past three months have you have had more than 3 drinks containing alcohol:  No        Functional Ability and Level of Safety    Hearing: Hearing is good. Activities of Daily Living: The home contains: no safety equipment. Patient does total self care      Ambulation: with no difficulty     Fall Risk:  Fall Risk Assessment, last 12 mths 2/9/2021   Able to walk? Yes   Fall in past 12 months? 0   Do you feel unsteady?  1   Are you worried about falling 1   Is the gait abnormal? 1      Abuse Screen:  Patient is not abused       Cognitive Screening    Has your family/caregiver stated any concerns about your memory: no     Cognitive Screening: Normal - based on H&P    Health Maintenance Due     Health Maintenance Due   Topic Date Due    Hepatitis C Screening  Never done    MICROALBUMIN Q1  Never done    Eye Exam Retinal or Dilated  Never done    COVID-19 Vaccine (1) Never done    DTaP/Tdap/Td series (1 - Tdap) Never done    Shingrix Vaccine Age 50> (1 of 2) Never done    Breast Cancer Screen Mammogram  Never done    Bone Densitometry (Dexa) Screening  Never done    Pneumococcal 65+ years (1 of 1 - PPSV23) Never done    Pneumococcal 65+ yrs at Risk Vaccine (1 of 2 - PCV13) Never done    Medicare Yearly Exam  Never done    Colorectal Cancer Screening Combo  05/31/2019       Patient Care Team   Patient Care Team:  Ciera Wong NP as PCP - General (Nurse Practitioner)  Ciera Wong NP as PCP - [facility] Provider    History   There is no problem list on file for this patient. Past Medical History:   Diagnosis Date    Diabetes ([address]     Hypertension       Past Surgical History:   Procedure Laterality Date    VT CARDIAC SURG PROCEDURE UNLIST       Current Outpatient Medications   Medication Sig Dispense Refill    famotidine (PEPCID) 20 mg tablet Take 1 Tab by mouth two (2) times daily as needed for Heartburn. 60 Tab 1    b complex-vitamin c-folic acid 0.8 mg (Dialyvite 800) 0.8 mg tab tablet Take 1 Tab by mouth daily. 90 Tab 0    isosorbide mononitrate (ISMO, MONOKET) 20 mg tablet Take 1 Tab by mouth two (2) times a day. 60 Tab 1    atorvastatin (LIPITOR) 40 mg tablet Take  by mouth daily.  amLODIPine (NORVASC) 5 mg tablet Take 5 mg by mouth daily.  aspirin delayed-release 81 mg tablet Take  by mouth daily.  metoprolol succinate (TOPROL-XL) 50 mg XL tablet Take  by mouth daily.  levothyroxine (SYNTHROID) 50 mcg tablet Take  by mouth Daily (before breakfast).  brimonidine-timoloL (Combigan) 0.2-0.5 % drop ophthalmic solution 1 Drop every twelve (12) hours.  insulin glargine (LANTUS,BASAGLAR) 100 unit/mL (3 mL) inpn 10 Units by SubCUTAneous route daily (with dinner). 5 Pen 1    Blood-Glucose Meter monitoring kit Use as directed. Dx: E11.65 check sugar twice daily 1 Kit 0    lancets misc Check sugar twice daily. E11.65 1 Each 11    glucose blood VI test strips (ASCENSIA AUTODISC VI, ONE TOUCH ULTRA TEST VI) strip Check sugar twice daily 100 Strip 11    alcohol swabs padm 1 Each by Apply Externally route four (4) times daily. For use with insulin and glucometer 100 Pad 11    Insulin Needles, Disposable, 32 gauge x 5/32\" ndle 1 Each by Does Not Apply route daily. 100 Pen Needle 11     No Known Allergies    History reviewed. No pertinent family history.   Social History     Tobacco Use    Smoking status: Never Smoker    Smokeless tobacco: Never Used   Substance Use Topics    Alcohol use: Not Currently         Nicki Yi, NP

## 2021-05-12 ENCOUNTER — TELEPHONE (OUTPATIENT)
Dept: INTERNAL MEDICINE CLINIC | Age: 71
End: 2021-05-12

## 2021-05-12 LAB
T4 SERPL-MCNC: 9 UG/DL (ref 4.5–12)
TSH SERPL DL<=0.005 MIU/L-ACNC: 12.4 UIU/ML (ref 0.45–4.5)

## 2021-05-12 RX ORDER — LEVOTHYROXINE SODIUM 50 UG/1
50 TABLET ORAL
Qty: 30 TAB | Refills: 1 | Status: SHIPPED | OUTPATIENT
Start: 2021-05-12 | End: 2021-06-04

## 2021-05-12 NOTE — TELEPHONE ENCOUNTER
----- Message from Jessica Casarez. Nubia Vizcaino NP sent at 5/12/2021 10:47 AM EDT -----  Her tsh is very high please see if she has missed any doses of her synthroid and verify she has the correct dose  ----- Message -----  From: Carissa Dickey  Sent: 5/11/2021   8:43 AM EDT  To: Jessica Casarez.  Nubia Vizcaino NP

## 2021-05-20 ENCOUNTER — CLINICAL SUPPORT (OUTPATIENT)
Dept: INTERNAL MEDICINE CLINIC | Age: 71
End: 2021-05-20
Payer: MEDICARE

## 2021-05-20 VITALS
OXYGEN SATURATION: 98 % | WEIGHT: 152 LBS | TEMPERATURE: 98.1 F | SYSTOLIC BLOOD PRESSURE: 152 MMHG | RESPIRATION RATE: 18 BRPM | DIASTOLIC BLOOD PRESSURE: 63 MMHG | HEIGHT: 60 IN | HEART RATE: 49 BPM | BODY MASS INDEX: 29.84 KG/M2

## 2021-05-20 DIAGNOSIS — Z01.30 BP CHECK: Primary | ICD-10-CM

## 2021-05-20 DIAGNOSIS — R00.1 BRADYCARDIA: ICD-10-CM

## 2021-05-20 PROCEDURE — 93000 ELECTROCARDIOGRAM COMPLETE: CPT | Performed by: NURSE PRACTITIONER

## 2021-05-20 NOTE — PROGRESS NOTES
Chief Complaint   Patient presents with    Blood Pressure Check       Visit Vitals  BP (!) 152/63 (BP 1 Location: Left arm, BP Patient Position: Sitting, BP Cuff Size: Adult)   Pulse (!) 49   Temp 98.1 °F (36.7 °C) (Temporal)   Resp 18   Ht 5' (1.524 m)   Wt 152 lb (68.9 kg)   SpO2 98%   BMI 29.69 kg/m²      Provider notified,  No changes at this time, pre op form will be faxed

## 2021-06-04 RX ORDER — FAMOTIDINE 20 MG/1
20 TABLET, FILM COATED ORAL
Qty: 60 TABLET | Refills: 1 | Status: SHIPPED | OUTPATIENT
Start: 2021-06-04 | End: 2021-07-05

## 2021-06-04 RX ORDER — LEVOTHYROXINE SODIUM 50 UG/1
TABLET ORAL
Qty: 30 TABLET | Refills: 1 | Status: SHIPPED | OUTPATIENT
Start: 2021-06-04 | End: 2021-07-05

## 2021-07-05 RX ORDER — LEVOTHYROXINE SODIUM 50 UG/1
TABLET ORAL
Qty: 30 TABLET | Refills: 1 | Status: SHIPPED | OUTPATIENT
Start: 2021-07-05 | End: 2021-12-16 | Stop reason: SDUPTHER

## 2021-07-05 RX ORDER — FAMOTIDINE 20 MG/1
20 TABLET, FILM COATED ORAL
Qty: 60 TABLET | Refills: 1 | Status: SHIPPED | OUTPATIENT
Start: 2021-07-05 | End: 2022-01-20 | Stop reason: SDUPTHER

## 2021-07-16 ENCOUNTER — APPOINTMENT (OUTPATIENT)
Dept: CT IMAGING | Age: 71
End: 2021-07-16
Attending: EMERGENCY MEDICINE
Payer: MEDICARE

## 2021-07-16 ENCOUNTER — HOSPITAL ENCOUNTER (INPATIENT)
Age: 71
LOS: 21 days | Discharge: NURSING, REHAB OR LONG-TERM CARE | DRG: 853 | End: 2021-08-06
Attending: INTERNAL MEDICINE | Admitting: GENERAL ACUTE CARE HOSPITAL
Payer: MEDICARE

## 2021-07-16 ENCOUNTER — HOSPITAL ENCOUNTER (EMERGENCY)
Age: 71
Discharge: ANOTHER HOSPITAL | End: 2021-07-16
Attending: EMERGENCY MEDICINE
Payer: MEDICARE

## 2021-07-16 ENCOUNTER — APPOINTMENT (OUTPATIENT)
Dept: GENERAL RADIOLOGY | Age: 71
DRG: 853 | End: 2021-07-16
Attending: GENERAL ACUTE CARE HOSPITAL
Payer: MEDICARE

## 2021-07-16 ENCOUNTER — APPOINTMENT (OUTPATIENT)
Dept: GENERAL RADIOLOGY | Age: 71
End: 2021-07-16
Attending: EMERGENCY MEDICINE
Payer: MEDICARE

## 2021-07-16 VITALS
OXYGEN SATURATION: 97 % | HEART RATE: 62 BPM | DIASTOLIC BLOOD PRESSURE: 50 MMHG | SYSTOLIC BLOOD PRESSURE: 175 MMHG | TEMPERATURE: 100.9 F | RESPIRATION RATE: 13 BRPM

## 2021-07-16 DIAGNOSIS — G93.40 ACUTE ENCEPHALOPATHY: ICD-10-CM

## 2021-07-16 DIAGNOSIS — R56.9 SEIZURE (HCC): ICD-10-CM

## 2021-07-16 DIAGNOSIS — N18.6 ESRD (END STAGE RENAL DISEASE) (HCC): ICD-10-CM

## 2021-07-16 DIAGNOSIS — H54.7 VISUAL IMPAIRMENT: ICD-10-CM

## 2021-07-16 DIAGNOSIS — R53.81 PHYSICAL DEBILITY: ICD-10-CM

## 2021-07-16 DIAGNOSIS — G93.40 ENCEPHALOPATHY: Primary | ICD-10-CM

## 2021-07-16 DIAGNOSIS — Z71.89 COUNSELING REGARDING ADVANCE CARE PLANNING AND GOALS OF CARE: ICD-10-CM

## 2021-07-16 LAB
ALBUMIN SERPL-MCNC: 4.2 G/DL (ref 3.5–5)
ALBUMIN/GLOB SERPL: 0.8 {RATIO} (ref 1.1–2.2)
ALP SERPL-CCNC: 97 U/L (ref 45–117)
ALT SERPL-CCNC: 19 U/L (ref 12–78)
ANION GAP SERPL CALC-SCNC: 8 MMOL/L (ref 5–15)
ARTERIAL PATENCY WRIST A: ABNORMAL
AST SERPL-CCNC: 39 U/L (ref 15–37)
BASE EXCESS BLDA CALC-SCNC: 8.4 MMOL/L
BASOPHILS # BLD: 0 K/UL (ref 0–0.1)
BASOPHILS NFR BLD: 0 % (ref 0–1)
BDY SITE: ABNORMAL
BILIRUB SERPL-MCNC: 1.1 MG/DL (ref 0.2–1)
BUN SERPL-MCNC: 6 MG/DL (ref 6–20)
BUN/CREAT SERPL: 2 (ref 12–20)
CALCIUM SERPL-MCNC: 10 MG/DL (ref 8.5–10.1)
CHLORIDE SERPL-SCNC: 98 MMOL/L (ref 97–108)
CO2 SERPL-SCNC: 34 MMOL/L (ref 21–32)
COVID-19 RAPID TEST, COVR: NOT DETECTED
CREAT SERPL-MCNC: 3.33 MG/DL (ref 0.55–1.02)
DIFFERENTIAL METHOD BLD: ABNORMAL
EOSINOPHIL # BLD: 0.1 K/UL (ref 0–0.4)
EOSINOPHIL NFR BLD: 1 % (ref 0–7)
ERYTHROCYTE [DISTWIDTH] IN BLOOD BY AUTOMATED COUNT: 16.8 % (ref 11.5–14.5)
ETHANOL SERPL-MCNC: <10 MG/DL
FLUAV RNA SPEC QL NAA+PROBE: NOT DETECTED
FLUBV RNA SPEC QL NAA+PROBE: NOT DETECTED
GLOBULIN SER CALC-MCNC: 5 G/DL (ref 2–4)
GLUCOSE BLD STRIP.AUTO-MCNC: 137 MG/DL (ref 65–117)
GLUCOSE SERPL-MCNC: 137 MG/DL (ref 65–100)
HCO3 BLDA-SCNC: 33 MMOL/L (ref 22–26)
HCT VFR BLD AUTO: 41.6 % (ref 35–47)
HGB BLD-MCNC: 12.9 G/DL (ref 11.5–16)
IMM GRANULOCYTES # BLD AUTO: 0 K/UL (ref 0–0.04)
IMM GRANULOCYTES NFR BLD AUTO: 0 % (ref 0–0.5)
LYMPHOCYTES # BLD: 1.1 K/UL (ref 0.8–3.5)
LYMPHOCYTES NFR BLD: 15 % (ref 12–49)
MCH RBC QN AUTO: 27.2 PG (ref 26–34)
MCHC RBC AUTO-ENTMCNC: 31 G/DL (ref 30–36.5)
MCV RBC AUTO: 87.8 FL (ref 80–99)
MONOCYTES # BLD: 0.4 K/UL (ref 0–1)
MONOCYTES NFR BLD: 6 % (ref 5–13)
NEUTS SEG # BLD: 5.7 K/UL (ref 1.8–8)
NEUTS SEG NFR BLD: 78 % (ref 32–75)
NRBC # BLD: 0 K/UL (ref 0–0.01)
NRBC BLD-RTO: 0 PER 100 WBC
PCO2 BLDA: 43 MMHG (ref 35–45)
PH BLDA: 7.5 [PH] (ref 7.35–7.45)
PLATELET # BLD AUTO: 183 K/UL (ref 150–400)
PMV BLD AUTO: 11.8 FL (ref 8.9–12.9)
PO2 BLDA: 62 MMHG (ref 80–100)
POTASSIUM SERPL-SCNC: 4.1 MMOL/L (ref 3.5–5.1)
PROT SERPL-MCNC: 9.2 G/DL (ref 6.4–8.2)
RBC # BLD AUTO: 4.74 M/UL (ref 3.8–5.2)
SAO2 % BLD: 93 % (ref 92–97)
SAO2% DEVICE SAO2% SENSOR NAME: ABNORMAL
SARS-COV-2, COV2: NOT DETECTED
SERVICE CMNT-IMP: ABNORMAL
SODIUM SERPL-SCNC: 140 MMOL/L (ref 136–145)
SOURCE, COVRS: NORMAL
SPECIMEN SITE: ABNORMAL
TSH SERPL DL<=0.05 MIU/L-ACNC: 2.64 UIU/ML (ref 0.36–3.74)
WBC # BLD AUTO: 7.3 K/UL (ref 3.6–11)

## 2021-07-16 PROCEDURE — 82962 GLUCOSE BLOOD TEST: CPT

## 2021-07-16 PROCEDURE — 84100 ASSAY OF PHOSPHORUS: CPT

## 2021-07-16 PROCEDURE — 70450 CT HEAD/BRAIN W/O DYE: CPT

## 2021-07-16 PROCEDURE — 99285 EMERGENCY DEPT VISIT HI MDM: CPT

## 2021-07-16 PROCEDURE — 85025 COMPLETE CBC W/AUTO DIFF WBC: CPT

## 2021-07-16 PROCEDURE — 84145 PROCALCITONIN (PCT): CPT

## 2021-07-16 PROCEDURE — 80053 COMPREHEN METABOLIC PANEL: CPT

## 2021-07-16 PROCEDURE — 82077 ASSAY SPEC XCP UR&BREATH IA: CPT

## 2021-07-16 PROCEDURE — 84443 ASSAY THYROID STIM HORMONE: CPT

## 2021-07-16 PROCEDURE — 87040 BLOOD CULTURE FOR BACTERIA: CPT

## 2021-07-16 PROCEDURE — 83735 ASSAY OF MAGNESIUM: CPT

## 2021-07-16 PROCEDURE — 65660000000 HC RM CCU STEPDOWN

## 2021-07-16 PROCEDURE — 36415 COLL VENOUS BLD VENIPUNCTURE: CPT

## 2021-07-16 PROCEDURE — 87636 SARSCOV2 & INF A&B AMP PRB: CPT

## 2021-07-16 PROCEDURE — 36600 WITHDRAWAL OF ARTERIAL BLOOD: CPT

## 2021-07-16 PROCEDURE — 87635 SARS-COV-2 COVID-19 AMP PRB: CPT

## 2021-07-16 PROCEDURE — 74176 CT ABD & PELVIS W/O CONTRAST: CPT

## 2021-07-16 PROCEDURE — 71045 X-RAY EXAM CHEST 1 VIEW: CPT

## 2021-07-16 PROCEDURE — 93005 ELECTROCARDIOGRAM TRACING: CPT

## 2021-07-16 PROCEDURE — 82803 BLOOD GASES ANY COMBINATION: CPT

## 2021-07-16 RX ORDER — VANCOMYCIN/0.9 % SOD CHLORIDE 1.5G/250ML
1500 PLASTIC BAG, INJECTION (ML) INTRAVENOUS
Status: COMPLETED | OUTPATIENT
Start: 2021-07-17 | End: 2021-07-17

## 2021-07-16 RX ORDER — ACETAMINOPHEN 650 MG/1
650 SUPPOSITORY RECTAL
Status: DISCONTINUED | OUTPATIENT
Start: 2021-07-16 | End: 2021-07-17 | Stop reason: SDUPTHER

## 2021-07-16 NOTE — ED NOTES
Emergency Department Nursing Plan of Care       The Nursing Plan of Care is developed from the Nursing assessment and Emergency Department Attending provider initial evaluation. The plan of care may be reviewed in the ED Provider note.     The Plan of Care was developed with the following considerations:   Patient / Family readiness to learn indicated by:verbalized understanding  Persons(s) to be included in education: family  Barriers to Learning/Limitations:No    Signed     Sobeida Mckeon RN    7/16/2021   12:37 PM

## 2021-07-16 NOTE — ED NOTES
Pt presents via EMS from dialysis center. Per EMS, the call came in as pt not responsive. Pt is awake upon arrival to ED. Pt does not answer in words only sounds used to designate either affirmations or negations. Assessment limited due to pt presentation. Pt presents repeatedly covering up her head, refusing to open her eyes or mouth. Pt arms are crossed tightly in front of her and she is not allowing ED staff to uncurl them. Pt also has her legs tucked up in a fetal position. Pt has her daughter at bedside who reports her mother usually walks and talks without complications. Daughter reports pt appeared more tired than usual this morning. According to EMS, pt received full dialysis treatment.  Side rails x 2 for pt safety

## 2021-07-16 NOTE — ED PROVIDER NOTES
EMERGENCY DEPARTMENT HISTORY AND PHYSICAL EXAM      Date: 7/16/2021  Patient Name: Cornell Suárez    History of Presenting Illness     Chief Complaint   Patient presents with    Altered mental status       History Provided By: Patient's Daughter and EMS    HPI: Cornell Suárez, 66 y.o. female with history of end-stage renal disease on MWF hemodialysis, hypertension, diabetes, coronary artery disease status post CABG, hypothyroidism presents to the ED with cc of altered mental status and encephalopathy. Patient comes by [facility] from dialysis for altered mental status. Patient was doing well and was at her baseline just prior to dialysis per daughter at bedside. She became confused and started complaining of a headache during dialysis and then became very confused shortly after dialysis, she did complete a full session. By the time patient arrives to our facility she is completely encephalopathic and appears very somnolent, she is not responding to questions and will only grunt or mumble when stimulated. Patient's daughter is at bedside and states this is an acute change for her. She was in her normal state of health earlier this morning and patient is normally ambulatory and conversant and able to care for self at home. Daughter denies any alcohol or drug use, denies any recent changes to her medications. Daughter denies any recent fevers, chills, recent illness. Remainder of history and review of systems limited given patient's altered mental status. There are no other complaints, changes, or physical findings at this time. PCP: Aranza Valencia, NP    No current facility-administered medications on file prior to encounter.      Current Outpatient Medications on File Prior to Encounter   Medication Sig Dispense Refill    levothyroxine (SYNTHROID) 50 mcg tablet TAKE 1 TABLET BY MOUTH EVERY DAY BEFORE BREAKFAST 30 Tablet 1    famotidine (PEPCID) 20 mg tablet TAKE 1 TAB BY MOUTH TWO (2) TIMES DAILY AS NEEDED FOR HEARTBURN. 60 Tablet 1    b complex-vitamin c-folic acid 0.8 mg (Dialyvite 800) 0.8 mg tab tablet Take 1 Tab by mouth daily. 90 Tab 0    atorvastatin (LIPITOR) 40 mg tablet Take  by mouth daily.  amLODIPine (NORVASC) 5 mg tablet Take 5 mg by mouth daily.  aspirin delayed-release 81 mg tablet Take  by mouth daily.  metoprolol succinate (TOPROL-XL) 50 mg XL tablet Take  by mouth daily.  Blood-Glucose Meter monitoring kit Use as directed. Dx: E11.65 check sugar twice daily 1 Kit 0    lancets misc Check sugar twice daily. E11.65 1 Each 11    glucose blood VI test strips (ASCENSIA AUTODISC VI, ONE TOUCH ULTRA TEST VI) strip Check sugar twice daily 100 Strip 11    alcohol swabs padm 1 Each by Apply Externally route four (4) times daily. For use with insulin and glucometer 100 Pad 11    insulin glargine (LANTUS,BASAGLAR) 100 unit/mL (3 mL) inpn 10 Units by SubCUTAneous route daily (with dinner). 5 Pen 1    Insulin Needles, Disposable, 32 gauge x 5/32\" ndle 1 Each by Does Not Apply route daily. 100 Pen Needle 11    isosorbide mononitrate (ISMO, MONOKET) 20 mg tablet Take 1 Tab by mouth two (2) times a day. (Patient not taking: Reported on 7/16/2021) 60 Tab 1    brimonidine-timoloL (Combigan) 0.2-0.5 % drop ophthalmic solution 1 Drop every twelve (12) hours. (Patient not taking: Reported on 7/16/2021)         Past History     Past Medical History:  Past Medical History:   Diagnosis Date    Diabetes ([address]     Hypertension        Past Surgical History:  Past Surgical History:   Procedure Laterality Date    CO CARDIAC SURG PROCEDURE UNLIST         Family History:  History reviewed. No pertinent family history.     Social History:  Social History     Tobacco Use    Smoking status: Never Smoker    Smokeless tobacco: Never Used   Vaping Use    Vaping Use: Never used   Substance Use Topics    Alcohol use: Not Currently    Drug use: Not Currently       Allergies:  No Known Allergies      Review of Systems   Review of Systems   Unable to perform ROS: Mental status change       Physical Exam   Physical Exam  Vitals and nursing note reviewed. Constitutional:       General: She is not in acute distress. Appearance: Normal appearance. Comments: Patient is somnolent, lying with eyes closed, will grunt or mumble in response to stimuli but is not conversant and does not follow commands. HENT:      Head: Normocephalic and atraumatic. Nose: Nose normal.      Mouth/Throat:      Mouth: Mucous membranes are moist.   Eyes:      Extraocular Movements: Extraocular movements intact. Pupils: Pupils are equal, round, and reactive to light. Cardiovascular:      Rate and Rhythm: Normal rate and regular rhythm. Heart sounds: No murmur heard. Pulmonary:      Effort: Pulmonary effort is normal. No respiratory distress. Breath sounds: Normal breath sounds. No wheezing. Abdominal:      General: There is no distension. Palpations: Abdomen is soft. Tenderness: There is no rebound. Comments: Patient grimaces with palpation of abdomen   Musculoskeletal:         General: No swelling or tenderness. Normal range of motion. Cervical back: Normal range of motion and neck supple. Right lower leg: No edema. Left lower leg: No edema. Skin:     General: Skin is warm and dry. Coloration: Skin is not pale. Findings: No erythema. Neurological:      Comments: Patient is lying with eyes closed, moans when stimulated and will occasionally say words such as \"ouch\" but she is not conversant, does not follow commands.          Diagnostic Study Results     Labs -     Recent Results (from the past 12 hour(s))   EKG, 12 LEAD, INITIAL    Collection Time: 07/16/21  2:23 PM   Result Value Ref Range    Ventricular Rate 58 BPM    Atrial Rate 58 BPM    P-R Interval 204 ms    QRS Duration 88 ms    Q-T Interval 408 ms    QTC Calculation (Bezet) 400 ms Calculated P Axis 63 degrees    Calculated R Axis -14 degrees    Calculated T Axis -140 degrees    Diagnosis       Sinus bradycardia  ST & T wave abnormality, consider inferolateral ischemia  Abnormal ECG  When compared with ECG of 07-JAN-2019 10:28,  Inverted T waves have replaced nonspecific T wave abnormality in   Anterolateral leads  QT has shortened     COVID-19 WITH INFLUENZA A/B    Collection Time: 07/16/21  4:14 PM   Result Value Ref Range    SARS-CoV-2 Not detected      Influenza A by PCR Not detected      Influenza B by PCR Not detected     CBC WITH AUTOMATED DIFF    Collection Time: 07/16/21  4:15 PM   Result Value Ref Range    WBC 7.3 3.6 - 11.0 K/uL    RBC 4.74 3.80 - 5.20 M/uL    HGB 12.9 11.5 - 16.0 g/dL    HCT 41.6 35.0 - 47.0 %    MCV 87.8 80.0 - 99.0 FL    MCH 27.2 26.0 - 34.0 PG    MCHC 31.0 30.0 - 36.5 g/dL    RDW 16.8 (H) 11.5 - 14.5 %    PLATELET 158 663 - 377 K/uL    MPV 11.8 8.9 - 12.9 FL    NRBC 0.0 0  WBC    ABSOLUTE NRBC 0.00 0.00 - 0.01 K/uL    NEUTROPHILS 78 (H) 32 - 75 %    LYMPHOCYTES 15 12 - 49 %    MONOCYTES 6 5 - 13 %    EOSINOPHILS 1 0 - 7 %    BASOPHILS 0 0 - 1 %    IMMATURE GRANULOCYTES 0 0.0 - 0.5 %    ABS. NEUTROPHILS 5.7 1.8 - 8.0 K/UL    ABS. LYMPHOCYTES 1.1 0.8 - 3.5 K/UL    ABS. MONOCYTES 0.4 0.0 - 1.0 K/UL    ABS. EOSINOPHILS 0.1 0.0 - 0.4 K/UL    ABS. BASOPHILS 0.0 0.0 - 0.1 K/UL    ABS. IMM.  GRANS. 0.0 0.00 - 0.04 K/UL    DF AUTOMATED     METABOLIC PANEL, COMPREHENSIVE    Collection Time: 07/16/21  4:15 PM   Result Value Ref Range    Sodium 140 136 - 145 mmol/L    Potassium 4.1 3.5 - 5.1 mmol/L    Chloride 98 97 - 108 mmol/L    CO2 34 (H) 21 - 32 mmol/L    Anion gap 8 5 - 15 mmol/L    Glucose 137 (H) 65 - 100 mg/dL    BUN 6 6 - 20 MG/DL    Creatinine 3.33 (H) 0.55 - 1.02 MG/DL    BUN/Creatinine ratio 2 (L) 12 - 20      GFR est AA 16 (L) >60 ml/min/1.73m2    GFR est non-AA 14 (L) >60 ml/min/1.73m2    Calcium 10.0 8.5 - 10.1 MG/DL    Bilirubin, total 1.1 (H) 0.2 - 1.0 MG/DL    ALT (SGPT) 19 12 - 78 U/L    AST (SGOT) 39 (H) 15 - 37 U/L    Alk. phosphatase 97 45 - 117 U/L    Protein, total 9.2 (H) 6.4 - 8.2 g/dL    Albumin 4.2 3.5 - 5.0 g/dL    Globulin 5.0 (H) 2.0 - 4.0 g/dL    A-G Ratio 0.8 (L) 1.1 - 2.2     ETHYL ALCOHOL    Collection Time: 07/16/21  4:15 PM   Result Value Ref Range    ALCOHOL(ETHYL),SERUM <10 <10 MG/DL   BLOOD GAS, ARTERIAL    Collection Time: 07/16/21  5:52 PM   Result Value Ref Range    pH 7.50 (H) 7.35 - 7.45      PCO2 43 35 - 45 mmHg    PO2 62 (L) 80 - 100 mmHg    O2 SAT 93 92 - 97 %    BICARBONATE 33 (H) 22 - 26 mmol/L    BASE EXCESS 8.4 mmol/L    O2 METHOD ROOM AIR      Sample source ARTERIAL      SITE RIGHT BRACHIAL      JOSE FRANCISCO'S TEST NOT APPLICABLE         Radiologic Studies -   CT ABD PELV WO CONT   Final Result   1. Circumferential rectal wall thickening is suspicious for malignancy. No bowel   obstruction. 2. No other acute abnormality in the abdomen or pelvis. Uterine fibroids are   noted. 3. Moderate cardiomegaly. CT HEAD WO CONT   Final Result   1. No acute intracranial hemorrhage. 2. 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 Age-indeterminate microvascular ischemic disease in the periventricular white   matter. XR CHEST PORT   Final Result   Mild cardiac silhouette enlargement. Mild diffuse interstitial   opacities can be seen with pulmonary edema or infection, including   atypical/viral etiologies. CT Results  (Last 48 hours)               07/16/21 1527  CT ABD PELV WO CONT Final result    Impression:  1. Circumferential rectal wall thickening is suspicious for malignancy. No bowel   obstruction. 2. No other acute abnormality in the abdomen or pelvis. Uterine fibroids are   noted. 3. Moderate cardiomegaly. Narrative:  EXAM:  CT abdomen pelvis without contrast       INDICATION: Abdominal pain       COMPARISON: None.        TECHNIQUE: Helical CT of the abdomen  and pelvis  without contrast. Coronal and   sagittal reformats are performed. CT dose reduction was achieved through use of   a standardized protocol tailored for this examination and automatic exposure   control for dose modulation. FINDINGS:    Solid organ evaluation is limited without contrast.        The visualized lung bases demonstrate no mass or consolidation. There is   moderate cardiomegaly. There is no pericardial or pleural effusion. There is no renal, ureteral, or bladder calculus. The kidneys are symmetric   without hydronephrosis. There is no perinephric fluid or fat stranding. The liver, spleen, pancreas, and adrenal glands are normal.  The gall bladder is   present  without intra- or extra-hepatic biliary dilatation. There is circumferential rectal wall thickening (series 3, image 86). There are   no dilated bowel loops. The appendix is normal.         There are no enlarged lymph nodes. There is no free fluid or free air. The   aorta tapers without aneurysm. There is aortoiliac atherosclerosis. The urinary bladder is unremarkable. Uterine fibroids are noted. There is no   adnexal mass. There are degenerative changes in the spine with multilevel Schmorl's nodes. There is no aggressive bony lesion. 07/16/21 1526  CT HEAD WO CONT Final result    Impression:  1. No acute intracranial hemorrhage. 2. 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 Age-indeterminate microvascular ischemic disease in the periventricular white   matter. Narrative:  EXAM:  CT head without contrast       INDICATION: Altered mental status       COMPARISON: None. TECHNIQUE: Axial noncontrast head CT from foramen magnum to vertex. Coronal and   sagittal reformatted images were obtained.  CT dose reduction was achieved   through use of a standardized protocol tailored for this examination and   automatic exposure control for dose modulation. FINDINGS:  There is diffuse age-related parenchymal volume loss. The ventricles   and sulci are age-appropriate without hydrocephalus. There is no mass effect or   midline shift. There is no intracranial hemorrhage or extra-axial fluid   collection. Scattered foci of low attenuation in the periventricular white   matter most likely represent age-indeterminate microvascular ischemic changes. 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 The basal cisterns are patent. The osseous structures are intact. A round radiodensity is noted superolateral   to the right globe. The visualized paranasal sinuses and mastoid air cells are   clear. CXR Results  (Last 48 hours)               07/16/21 1421  XR CHEST PORT Final result    Impression:  Mild cardiac silhouette enlargement. Mild diffuse interstitial   opacities can be seen with pulmonary edema or infection, including   atypical/viral etiologies. Narrative:  EXAM:  CR chest portable       INDICATION: Altered mental status       COMPARISON: None. TECHNIQUE: Portable AP semiupright chest view at 1413 hours       FINDINGS: There is mild cardiac silhouette enlargement. There are mild diffuse   interstitial opacities. There is no pleural effusion or pneumothorax. The bones   and upper abdomen are unremarkable. Medical Decision Making   I am the first provider for this patient. I reviewed the vital signs, available nursing notes, past medical history, past surgical history, family history and social history. Vital Signs-Reviewed the patient's vital signs.   Patient Vitals for the past 12 hrs:   Temp Pulse Resp BP SpO2   07/16/21 1330 -- -- -- (!) 172/68 --   07/16/21 1300 -- -- -- (!) 176/57 --   07/16/21 1230 -- -- -- (!) 171/61 --   07/16/21 1215 -- -- -- -- 97 %   07/16/21 1200 -- -- -- (!) 168/57 97 %   07/16/21 1159 98.6 °F (37 °C) -- -- -- -- 07/16/21 1148 -- (!) 57 18 (!) 189/67 97 %       Records Reviewed: Nursing Notes and Old Medical Records  I personally reviewed PCP office visit records from May 2021. Provider Notes (Medical Decision Making):   79-year-old female here with encephalopathy and altered mental status after dialysis session today. She is afebrile and vital signs are stable. Exam as above. She appears encephalopathic and does not follow any commands. Patient has been evaluated with laboratory evaluation which is largely unremarkable, no significant leukocytosis. Blood alcohol level is negative. Influenza negative, Covid negative. ABG negative for acute hypercapnia. CT imaging of head and abdomen pelvis negative for acute process. Chest x-ray with mild pulmonary edema versus atypical infection. Patient has no fever or white count and I will hold on treating with antibiotics at this time. On reassessment patient remains grossly encephalopathic. She is not producing urine as she has end-stage renal disease and cannot check for urinary tract infection or UDS. Patient is unable to return home given her encephalopathic state and acute altered mental status. I suspect this could be medication related but also consider CVA which would benefit from MRI. Patient withdraws to noxious stimuli and I do not feel she is in subclinical status epilepticus. We are unable to admit patient to this facility because she requires hemodialysis MWF, will arrange for transfer to another facility for work-up of her encephalopathy. ED Course:   Initial assessment performed. The patients presenting problems have been discussed, and they are in agreement with the care plan formulated and outlined with them. I have encouraged them to ask questions as they arise throughout their visit.     ED Course as of Jul 16 1837 Fri Jul 16, 2021   1434 EKG per my interpretation sinus bradycardia, rate 58 bpm, normal axis, nonspecific ST-T wave abnormality, no acute ischemic changes, no interval changes. [AK]      ED Course User Index  [AK] Demar Rosado MD         Cardiac Monitoring: The cardiac monitor revealed the following rhythm as interpreted by me: Normal Sinus Rhythm rate 65 bpm  The cardiac monitor was ordered secondary to the patient's reported complaint of her altered mental status and to monitor the patient for dysrhythmia. Heri Grady MD      Transfer Note:   Patient is being transferred to [facility]. Transfer was accepted by Dr. Jorge Bynum. The reasons for their transfer have been discussed with them and available family. They convey agreement and understanding for the need to be transferred as explained to them by me. Critical Care Documentation  I have spent 82 minutes of critical care time in evaluating and treating this patient. This includes time spent at bedside, time with family and decision makers, documentation, review of labs and imaging, and/or consultation with specialists. It does not include time spent on separately billed procedures. This patient presents with a critical illness or injury that acutely impairs one or more vital organ systems such that there is a high probability of imminent or life threatening deterioration in the patient's condition. This case involved decision making of high complexity to assess, manipulate, and support vital organ system failure and/or to prevent further life threatening deterioration of the patient's condition. Failure to initiate these interventions on an urgent basis would likely result in sudden, clinically significant or life threatening deterioration in the patient's condition. This case required my direct attention, intervention, and personal management for the time documented above. This time does not include separately billed interventions/procedures which are separately documented.      Abnormal findings supporting critical care: Acute encephalopathy after hemodialysis and end-stage renal disease patient  Interventions to support critical care: Frequent neuro reassessment, frequent airway reassessment, arranged for transfer to another facility for higher level of care  Failure to intervene may result in: CNS depression, respiratory depression, electrolyte abnormality, dehydration, missed diagnosis of seizure versus stroke versus infection. Boston Reid MD      Disposition:  Transfer to [facility]      Diagnosis     Clinical Impression:   1. Encephalopathy    2. ESRD (end stage renal disease) ([facility] [address]        Attestations:  I am the first and primary provider of record for this patient's ED encounter. I personally performed the services described above in this documentation. Boston Reid MD    Please note that this dictation was completed with Supercell, the computer voice recognition software. Quite often unanticipated grammatical, syntax, homophones, and other interpretive errors are inadvertently transcribed by the computer software. Please disregard these errors. Please excuse any errors that have escaped final proofreading. Thank you.

## 2021-07-16 NOTE — ED NOTES
Pt noted difficult stick. Labs sent and IV finally placed. Pt on monitor x 4 with family at bedside and side rails x 2 for safety. Pt continues to sleep soundly. Bladder scanner shows 2mL of urine. Provider to be made aware.

## 2021-07-17 LAB
ALBUMIN SERPL-MCNC: 4 G/DL (ref 3.5–5)
ALBUMIN/GLOB SERPL: 0.9 {RATIO} (ref 1.1–2.2)
ALP SERPL-CCNC: 93 U/L (ref 45–117)
ALT SERPL-CCNC: 12 U/L (ref 12–78)
ANION GAP SERPL CALC-SCNC: 7 MMOL/L (ref 5–15)
AST SERPL-CCNC: 12 U/L (ref 15–37)
BASOPHILS # BLD: 0 K/UL (ref 0–0.1)
BASOPHILS NFR BLD: 0 % (ref 0–1)
BILIRUB SERPL-MCNC: 1.1 MG/DL (ref 0.2–1)
BUN SERPL-MCNC: 9 MG/DL (ref 6–20)
BUN/CREAT SERPL: 2 (ref 12–20)
CALCIUM SERPL-MCNC: 9.4 MG/DL (ref 8.5–10.1)
CHLORIDE SERPL-SCNC: 99 MMOL/L (ref 97–108)
CO2 SERPL-SCNC: 31 MMOL/L (ref 21–32)
COMMENT, HOLDF: NORMAL
CREAT SERPL-MCNC: 4.01 MG/DL (ref 0.55–1.02)
DIFFERENTIAL METHOD BLD: ABNORMAL
EOSINOPHIL # BLD: 0 K/UL (ref 0–0.4)
EOSINOPHIL NFR BLD: 0 % (ref 0–7)
ERYTHROCYTE [DISTWIDTH] IN BLOOD BY AUTOMATED COUNT: 16.8 % (ref 11.5–14.5)
GLOBULIN SER CALC-MCNC: 4.4 G/DL (ref 2–4)
GLUCOSE BLD STRIP.AUTO-MCNC: 114 MG/DL (ref 65–117)
GLUCOSE BLD STRIP.AUTO-MCNC: 138 MG/DL (ref 65–117)
GLUCOSE BLD STRIP.AUTO-MCNC: 169 MG/DL (ref 65–117)
GLUCOSE SERPL-MCNC: 131 MG/DL (ref 65–100)
HCT VFR BLD AUTO: 40.4 % (ref 35–47)
HGB BLD-MCNC: 12.3 G/DL (ref 11.5–16)
IMM GRANULOCYTES # BLD AUTO: 0 K/UL (ref 0–0.04)
IMM GRANULOCYTES NFR BLD AUTO: 0 % (ref 0–0.5)
LYMPHOCYTES # BLD: 1.5 K/UL (ref 0.8–3.5)
LYMPHOCYTES NFR BLD: 21 % (ref 12–49)
MAGNESIUM SERPL-MCNC: 2.1 MG/DL (ref 1.6–2.4)
MCH RBC QN AUTO: 26.9 PG (ref 26–34)
MCHC RBC AUTO-ENTMCNC: 30.4 G/DL (ref 30–36.5)
MCV RBC AUTO: 88.2 FL (ref 80–99)
MONOCYTES # BLD: 0.5 K/UL (ref 0–1)
MONOCYTES NFR BLD: 7 % (ref 5–13)
NEUTS SEG # BLD: 5.2 K/UL (ref 1.8–8)
NEUTS SEG NFR BLD: 72 % (ref 32–75)
NRBC # BLD: 0 K/UL (ref 0–0.01)
NRBC BLD-RTO: 0 PER 100 WBC
PHOSPHATE SERPL-MCNC: 2.4 MG/DL (ref 2.6–4.7)
PLATELET # BLD AUTO: 147 K/UL (ref 150–400)
PMV BLD AUTO: 10.5 FL (ref 8.9–12.9)
POTASSIUM SERPL-SCNC: 3.4 MMOL/L (ref 3.5–5.1)
PROCALCITONIN SERPL-MCNC: 0.67 NG/ML
PROT SERPL-MCNC: 8.4 G/DL (ref 6.4–8.2)
RBC # BLD AUTO: 4.58 M/UL (ref 3.8–5.2)
SAMPLES BEING HELD,HOLD: NORMAL
SERVICE CMNT-IMP: ABNORMAL
SERVICE CMNT-IMP: ABNORMAL
SERVICE CMNT-IMP: NORMAL
SODIUM SERPL-SCNC: 137 MMOL/L (ref 136–145)
WBC # BLD AUTO: 7.3 K/UL (ref 3.6–11)

## 2021-07-17 PROCEDURE — 82962 GLUCOSE BLOOD TEST: CPT

## 2021-07-17 PROCEDURE — 87070 CULTURE OTHR SPECIMN AEROBIC: CPT

## 2021-07-17 PROCEDURE — 86617 LYME DISEASE ANTIBODY: CPT

## 2021-07-17 PROCEDURE — 74011250637 HC RX REV CODE- 250/637: Performed by: INTERNAL MEDICINE

## 2021-07-17 PROCEDURE — 87015 SPECIMEN INFECT AGNT CONCNTJ: CPT

## 2021-07-17 PROCEDURE — 87798 DETECT AGENT NOS DNA AMP: CPT

## 2021-07-17 PROCEDURE — 99223 1ST HOSP IP/OBS HIGH 75: CPT | Performed by: PSYCHIATRY & NEUROLOGY

## 2021-07-17 PROCEDURE — 74011250636 HC RX REV CODE- 250/636: Performed by: GENERAL ACUTE CARE HOSPITAL

## 2021-07-17 PROCEDURE — 74011250637 HC RX REV CODE- 250/637: Performed by: GENERAL ACUTE CARE HOSPITAL

## 2021-07-17 PROCEDURE — 74011000250 HC RX REV CODE- 250: Performed by: INTERNAL MEDICINE

## 2021-07-17 PROCEDURE — 74011000258 HC RX REV CODE- 258: Performed by: GENERAL ACUTE CARE HOSPITAL

## 2021-07-17 PROCEDURE — 86695 HERPES SIMPLEX TYPE 1 TEST: CPT

## 2021-07-17 PROCEDURE — 65660000000 HC RM CCU STEPDOWN

## 2021-07-17 RX ORDER — BRIMONIDINE TARTRATE 2 MG/ML
1 SOLUTION/ DROPS OPHTHALMIC 2 TIMES DAILY
Status: DISCONTINUED | OUTPATIENT
Start: 2021-07-17 | End: 2021-08-07 | Stop reason: HOSPADM

## 2021-07-17 RX ORDER — SODIUM CHLORIDE 0.9 % (FLUSH) 0.9 %
5-40 SYRINGE (ML) INJECTION AS NEEDED
Status: DISCONTINUED | OUTPATIENT
Start: 2021-07-17 | End: 2021-08-07 | Stop reason: HOSPADM

## 2021-07-17 RX ORDER — DEXTROSE 50 % IN WATER (D50W) INTRAVENOUS SYRINGE
12.5-25 AS NEEDED
Status: DISCONTINUED | OUTPATIENT
Start: 2021-07-17 | End: 2021-08-07 | Stop reason: HOSPADM

## 2021-07-17 RX ORDER — ONDANSETRON 2 MG/ML
4 INJECTION INTRAMUSCULAR; INTRAVENOUS
Status: DISCONTINUED | OUTPATIENT
Start: 2021-07-17 | End: 2021-08-07 | Stop reason: HOSPADM

## 2021-07-17 RX ORDER — SODIUM CHLORIDE 0.9 % (FLUSH) 0.9 %
5-40 SYRINGE (ML) INJECTION EVERY 8 HOURS
Status: DISCONTINUED | OUTPATIENT
Start: 2021-07-17 | End: 2021-08-07 | Stop reason: HOSPADM

## 2021-07-17 RX ORDER — MAGNESIUM SULFATE 100 %
4 CRYSTALS MISCELLANEOUS AS NEEDED
Status: DISCONTINUED | OUTPATIENT
Start: 2021-07-17 | End: 2021-08-07 | Stop reason: HOSPADM

## 2021-07-17 RX ORDER — PREDNISOLONE ACETATE 10 MG/ML
1 SUSPENSION/ DROPS OPHTHALMIC 3 TIMES DAILY
Status: DISCONTINUED | OUTPATIENT
Start: 2021-07-17 | End: 2021-08-07 | Stop reason: HOSPADM

## 2021-07-17 RX ORDER — INSULIN LISPRO 100 [IU]/ML
INJECTION, SOLUTION INTRAVENOUS; SUBCUTANEOUS EVERY 6 HOURS
Status: DISCONTINUED | OUTPATIENT
Start: 2021-07-17 | End: 2021-07-24

## 2021-07-17 RX ORDER — ACETAMINOPHEN 650 MG/1
650 SUPPOSITORY RECTAL
Status: DISCONTINUED | OUTPATIENT
Start: 2021-07-17 | End: 2021-08-07 | Stop reason: HOSPADM

## 2021-07-17 RX ORDER — ONDANSETRON 4 MG/1
4 TABLET, ORALLY DISINTEGRATING ORAL
Status: DISCONTINUED | OUTPATIENT
Start: 2021-07-17 | End: 2021-08-07 | Stop reason: HOSPADM

## 2021-07-17 RX ORDER — TIMOLOL MALEATE 2.5 MG/ML
1 SOLUTION/ DROPS OPHTHALMIC DAILY
Status: DISCONTINUED | OUTPATIENT
Start: 2021-07-18 | End: 2021-08-07 | Stop reason: HOSPADM

## 2021-07-17 RX ORDER — INSULIN LISPRO 100 [IU]/ML
INJECTION, SOLUTION INTRAVENOUS; SUBCUTANEOUS
Status: DISCONTINUED | OUTPATIENT
Start: 2021-07-17 | End: 2021-07-17

## 2021-07-17 RX ORDER — POLYETHYLENE GLYCOL 3350 17 G/17G
17 POWDER, FOR SOLUTION ORAL DAILY PRN
Status: DISCONTINUED | OUTPATIENT
Start: 2021-07-17 | End: 2021-07-25

## 2021-07-17 RX ORDER — HEPARIN SODIUM 5000 [USP'U]/ML
5000 INJECTION, SOLUTION INTRAVENOUS; SUBCUTANEOUS EVERY 8 HOURS
Status: DISCONTINUED | OUTPATIENT
Start: 2021-07-17 | End: 2021-07-31

## 2021-07-17 RX ORDER — HYDRALAZINE HYDROCHLORIDE 20 MG/ML
10 INJECTION INTRAMUSCULAR; INTRAVENOUS
Status: DISCONTINUED | OUTPATIENT
Start: 2021-07-17 | End: 2021-07-18

## 2021-07-17 RX ORDER — ACETAMINOPHEN 650 MG/1
650 SUPPOSITORY RECTAL
Status: DISCONTINUED | OUTPATIENT
Start: 2021-07-17 | End: 2021-07-17

## 2021-07-17 RX ORDER — LATANOPROST 50 UG/ML
1 SOLUTION/ DROPS OPHTHALMIC
Status: DISCONTINUED | OUTPATIENT
Start: 2021-07-17 | End: 2021-08-07 | Stop reason: HOSPADM

## 2021-07-17 RX ORDER — ACETAMINOPHEN 325 MG/1
650 TABLET ORAL
Status: DISCONTINUED | OUTPATIENT
Start: 2021-07-17 | End: 2021-08-07 | Stop reason: HOSPADM

## 2021-07-17 RX ADMIN — AMPICILLIN SODIUM 2 G: 2 INJECTION, POWDER, FOR SOLUTION INTRAVENOUS at 00:22

## 2021-07-17 RX ADMIN — Medication 10 ML: at 22:01

## 2021-07-17 RX ADMIN — ACETAMINOPHEN 650 MG: 650 SUPPOSITORY RECTAL at 15:25

## 2021-07-17 RX ADMIN — CEFEPIME HYDROCHLORIDE 2 G: 2 INJECTION, POWDER, FOR SOLUTION INTRAVENOUS at 00:21

## 2021-07-17 RX ADMIN — LATANOPROST 1 DROP: 50 SOLUTION OPHTHALMIC at 22:01

## 2021-07-17 RX ADMIN — BRIMONIDINE TARTRATE 1 DROP: 2 SOLUTION OPHTHALMIC at 18:29

## 2021-07-17 RX ADMIN — AMPICILLIN SODIUM 2 G: 2 INJECTION, POWDER, FOR SOLUTION INTRAVENOUS at 12:40

## 2021-07-17 RX ADMIN — PREDNISOLONE ACETATE 1 DROP: 10 SUSPENSION/ DROPS OPHTHALMIC at 22:02

## 2021-07-17 RX ADMIN — Medication 10 ML: at 08:00

## 2021-07-17 RX ADMIN — PREDNISOLONE ACETATE 1 DROP: 10 SUSPENSION/ DROPS OPHTHALMIC at 16:00

## 2021-07-17 RX ADMIN — VANCOMYCIN HYDROCHLORIDE 1500 MG: 10 INJECTION, POWDER, LYOPHILIZED, FOR SOLUTION INTRAVENOUS at 00:52

## 2021-07-17 RX ADMIN — ACETAMINOPHEN 650 MG: 650 SUPPOSITORY RECTAL at 00:28

## 2021-07-17 RX ADMIN — Medication 10 ML: at 14:00

## 2021-07-17 RX ADMIN — ACYCLOVIR SODIUM 600 MG: 50 INJECTION, SOLUTION INTRAVENOUS at 03:00

## 2021-07-17 NOTE — PROGRESS NOTES
Problem: Impaired Skin Integrity/Pressure Injury Treatment  Goal: *Improvement of Existing Pressure Injury  Outcome: Progressing Towards Goal  Goal: *Prevention of pressure injury  Description: Document Mick Scale and appropriate interventions in the flowsheet.   Outcome: Progressing Towards Goal  Note: Pressure Injury Interventions:  Sensory Interventions: Assess changes in LOC    Moisture Interventions: Absorbent underpads, Apply protective barrier, creams and emollients    Activity Interventions: Pressure redistribution bed/mattress(bed type)    Mobility Interventions: HOB 30 degrees or less

## 2021-07-17 NOTE — ED NOTES
TRANSFER - OUT REPORT:    Verbal report given to Estuardo Burnette RN (name) on Beverly Nicholas  being transferred to ED [facility], Room 226 (unit) for routine progression of care       Report consisted of patients Situation, Background, Assessment and   Recommendations(SBAR). Information from the following report(s) SBAR, ED Summary and Recent Results was reviewed with the receiving nurse. Lines:   Peripheral IV 07/16/21 Left Hand (Active)   Site Assessment Clean, dry, & intact 07/16/21 1610   Phlebitis Assessment 0 07/16/21 1610   Infiltration Assessment 0 07/16/21 1610   Dressing Status Clean, dry, & intact 07/16/21 1610   Dressing Type 4 X 4;Tape;Transparent 07/16/21 1610   Hub Color/Line Status Blue;Flushed;Patent 07/16/21 1610   Action Taken Blood drawn 07/16/21 1610   Alcohol Cap Used Yes 07/16/21 1610        Opportunity for questions and clarification was provided. Patient transported with:  Cardiac monitor, o2 monitor.

## 2021-07-17 NOTE — H&P
Hospitalist Admission Note    NAME: Omar Aguilar   :  1950   MRN:  [id number]     Date/Time:  2021 10:44 PM    Patient PCP: Sarah Woody, NP  ______________________________________________________________________  Given the patient's current clinical presentation, I have a high level of concern for decompensation if discharged from the emergency department. Complex decision making was performed, which includes reviewing the patient's available past medical records, laboratory results, and x-ray films. My assessment of this patient's clinical condition and my plan of care is as follows. Assessment / Plan:  Sepsis  Acute encephalopathy  ESRD on HD MWF  Admit to PCU  Anti-pyretics PRN - T 102  Given no reliable source of pt's significant encephalopathy and fever, there is a concern for meninigitis. Pt is immunocompromised. Have started her on the IV Cefepime, Vancomycin, Ampicillin and Acylovir. Repeating all labs now, including Procalcitonin  Send rapid COVID-19  Sending Blood CX  If another source of infection can be found, then would deescalate meningitis treatment. CXR results indicate there may be an atypical infectious process there, so repeat CXR has been ordered. Neurology Consult for LP evaluation in AM  Nephrology Consult  CT Abdo reveals rectal wall thickening that is suspicious for malignancy. More information regarding this, as in is the family aware of any such finding previously, can be done in the AM.    Head CT:    IMPRESSION  1. No acute intracranial hemorrhage. 2. 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 Age-indeterminate microvascular ischemic disease in the periventricular white  matter. CT Abdo/Pelv: IMPRESSION  1. Circumferential rectal wall thickening is suspicious for malignancy. No bowel  obstruction. 2. No other acute abnormality in the abdomen or pelvis. Uterine fibroids are  noted.   3. Moderate cardiomegaly. DMII  HTN  CAD  Have not resumed her home meds tonight as she cannot reliably take PO.   FS monitoring, SS    Code Status: Full  Surrogate Decision Maker: Daughter  DVT Prophylaxis: SCD  GI Prophylaxis: not indicated  Baseline: Independent      Subjective:   CHIEF COMPLAINT: AMS    HISTORY OF PRESENT ILLNESS:     Tiana Ivan is a 66 y.o.  AA female who presents with CC listed above. Pt is encephalopathic and unable to provide any history at this time. EMR review indicates that the pt was feeling well yesterday, appeared to be more drowsy this morning and then was altered during and after dialysis. As per ED Attending Note, pt complained of a headache. She did complete her HD session. She was then taken to [facility] and then transferred to [facility]. We were asked to admit for work up and evaluation of the above problems. Past Medical History:   Diagnosis Date    Diabetes ([address]     Hypertension         Past Surgical History:   Procedure Laterality Date    [facility] CARDIAC SURG PROCEDURE UNLIST         Social History     Tobacco Use    Smoking status: Never Smoker    Smokeless tobacco: Never Used   Substance Use Topics    Alcohol use: Not Currently        No family history on file. No Known Allergies     Prior to Admission medications    Medication Sig Start Date End Date Taking? Authorizing Provider   levothyroxine (SYNTHROID) 50 mcg tablet TAKE 1 TABLET BY MOUTH EVERY DAY BEFORE BREAKFAST 7/5/21   Carlos QUACH NP   famotidine (PEPCID) 20 mg tablet TAKE 1 TAB BY MOUTH TWO (2) TIMES DAILY AS NEEDED FOR HEARTBURN. 7/5/21   Faye Muhammad NP   b complex-vitamin c-folic acid 0.8 mg (Dialyvite 800) 0.8 mg tab tablet Take 1 Tab by mouth daily. 5/11/21   Faye Muhammad NP   isosorbide mononitrate (ISMO, MONOKET) 20 mg tablet Take 1 Tab by mouth two (2) times a day.   Patient not taking: Reported on 7/16/2021 5/11/21   Faye Muhammad NP   atorvastatin (LIPITOR) 40 mg tablet Take  by mouth daily. Provider, Historical   amLODIPine (NORVASC) 5 mg tablet Take 5 mg by mouth daily. Provider, Historical   aspirin delayed-release 81 mg tablet Take  by mouth daily. Provider, Historical   metoprolol succinate (TOPROL-XL) 50 mg XL tablet Take  by mouth daily. Provider, Historical   brimonidine-timoloL (Combigan) 0.2-0.5 % drop ophthalmic solution 1 Drop every twelve (12) hours. Patient not taking: Reported on 7/16/2021    Provider, Historical   Blood-Glucose Meter monitoring kit Use as directed. Dx: E11.65 check sugar twice daily 2/9/21   Austyn Goode, FRANCK   lancets misc Check sugar twice daily. E11.65 2/9/21   Austyn Goode NP   glucose blood VI test strips (ASCENSIA AUTODISC VI, ONE TOUCH ULTRA TEST VI) strip Check sugar twice daily 2/9/21   Austyn Goode NP   alcohol swabs padm 1 Each by Apply Externally route four (4) times daily. For use with insulin and glucometer 2/9/21   Jocelin QUACH NP   insulin glargine (LANTUS,BASAGLAR) 100 unit/mL (3 mL) inpn 10 Units by SubCUTAneous route daily (with dinner). 2/9/21   Austyn Goode NP   Insulin Needles, Disposable, 32 gauge x 5/32\" ndle 1 Each by Does Not Apply route daily. 2/9/21   Austyn Goode NP       REVIEW OF SYSTEMS:     I am not able to complete the review of systems because:    The patient is intubated and sedated    The patient has altered mental status due to his acute medical problems    The patient has baseline aphasia from prior stroke(s)    The patient has baseline dementia and is not reliable historian    The patient is in acute medical distress and unable to provide information           Total of 12 systems reviewed as follows:       POSITIVE= underlined text  Negative = text not underlined  General:  fever, chills, sweats, generalized weakness, weight loss/gain,      loss of appetite   Eyes:    blurred vision, eye pain, loss of vision, double vision  ENT: rhinorrhea, pharyngitis   Respiratory:   cough, sputum production, SOB, CHURCH, wheezing, pleuritic pain   Cardiology:   chest pain, palpitations, orthopnea, PND, edema, syncope   Gastrointestinal:  abdominal pain , N/V, diarrhea, dysphagia, constipation, bleeding   Genitourinary:  frequency, urgency, dysuria, hematuria, incontinence   Muskuloskeletal :  arthralgia, myalgia, back pain  Hematology:  easy bruising, nose or gum bleeding, lymphadenopathy   Dermatological: rash, ulceration, pruritis, color change / jaundice  Endocrine:   hot flashes or polydipsia   Neurological:  headache, dizziness, confusion, focal weakness, paresthesia,     Speech difficulties, memory loss, gait difficulty  Psychological: Feelings of anxiety, depression, agitation    Objective:   VITALS:    Visit Vitals  Ht 5' 2\" (1.575 m)   Wt 62.2 kg (137 lb 2 oz)   BMI 25.08 kg/m²       PHYSICAL EXAM:    General:    Uncooperative, ill appearing  HEENT: Atraumatic, anicteric sclerae, pink conjunctivae  Neck:  Supple, symmetrical  Lungs:   Clear to auscultation bilaterally. No Wheezing or Rhonchi. No rales. Chest wall:  No tenderness  No Accessory muscle use. Heart:   Regular  rhythm,  No  murmur   No edema  Abdomen:   Soft, non-tender. Not distended. Bowel sounds normal  Extremities: No cyanosis. No clubbing,      Skin turgor normal, Capillary refill normal, Radial dial pulse 2+    RUE fistula  Skin:     Not pale.   Not Jaundiced  No rashes   Psych:  No insight  Neurologic: Does not follow commands, grunts occasionally, withdraws to pain, moving all extremities     _______________________________________________________________________  Care Plan discussed with:    Comments   Patient x    Family      RN x    Care Manager                    Consultant:      _______________________________________________________________________  Expected  Disposition:   Home with Family    HH/PT/OT/RN    SNF/LTC    [facility] ________________________________________________________________________  TOTAL TIME:  54 Minutes    Critical Care Provided     Minutes non procedure based      Comments    x Reviewed previous records   >50% of visit spent in counseling and coordination of care  Discussion with patient and/or family and questions answered       ________________________________________________________________________  Signed: Carlos A Motta MD    Procedures: see electronic medical records for all procedures/Xrays and details which were not copied into this note but were reviewed prior to creation of Plan.     LAB DATA REVIEWED:    Recent Results (from the past 24 hour(s))   EKG, 12 LEAD, INITIAL    Collection Time: 07/16/21  2:23 PM   Result Value Ref Range    Ventricular Rate 58 BPM    Atrial Rate 58 BPM    P-R Interval 204 ms    QRS Duration 88 ms    Q-T Interval 408 ms    QTC Calculation (Bezet) 400 ms    Calculated P Axis 63 degrees    Calculated R Axis -14 degrees    Calculated T Axis -140 degrees    Diagnosis       Sinus bradycardia  ST & T wave abnormality, consider inferolateral ischemia  Abnormal ECG  When compared with ECG of 07-JAN-2019 10:28,  Inverted T waves have replaced nonspecific T wave abnormality in   Anterolateral leads  QT has shortened     COVID-19 WITH INFLUENZA A/B    Collection Time: 07/16/21  4:14 PM   Result Value Ref Range    SARS-CoV-2 Not detected      Influenza A by PCR Not detected      Influenza B by PCR Not detected     CBC WITH AUTOMATED DIFF    Collection Time: 07/16/21  4:15 PM   Result Value Ref Range    WBC 7.3 3.6 - 11.0 K/uL    RBC 4.74 3.80 - 5.20 M/uL    HGB 12.9 11.5 - 16.0 g/dL    HCT 41.6 35.0 - 47.0 %    MCV 87.8 80.0 - 99.0 FL    MCH 27.2 26.0 - 34.0 PG    MCHC 31.0 30.0 - 36.5 g/dL    RDW 16.8 (H) 11.5 - 14.5 %    PLATELET 607 369 - 314 K/uL    MPV 11.8 8.9 - 12.9 FL    NRBC 0.0 0  WBC    ABSOLUTE NRBC 0.00 0.00 - 0.01 K/uL    NEUTROPHILS 78 (H) 32 - 75 %    LYMPHOCYTES 15 12 - 49 %    MONOCYTES 6 5 - 13 %    EOSINOPHILS 1 0 - 7 %    BASOPHILS 0 0 - 1 %    IMMATURE GRANULOCYTES 0 0.0 - 0.5 %    ABS. NEUTROPHILS 5.7 1.8 - 8.0 K/UL    ABS. LYMPHOCYTES 1.1 0.8 - 3.5 K/UL    ABS. MONOCYTES 0.4 0.0 - 1.0 K/UL    ABS. EOSINOPHILS 0.1 0.0 - 0.4 K/UL    ABS. BASOPHILS 0.0 0.0 - 0.1 K/UL    ABS. IMM. GRANS. 0.0 0.00 - 0.04 K/UL    DF AUTOMATED     METABOLIC PANEL, COMPREHENSIVE    Collection Time: 07/16/21  4:15 PM   Result Value Ref Range    Sodium 140 136 - 145 mmol/L    Potassium 4.1 3.5 - 5.1 mmol/L    Chloride 98 97 - 108 mmol/L    CO2 34 (H) 21 - 32 mmol/L    Anion gap 8 5 - 15 mmol/L    Glucose 137 (H) 65 - 100 mg/dL    BUN 6 6 - 20 MG/DL    Creatinine 3.33 (H) 0.55 - 1.02 MG/DL    BUN/Creatinine ratio 2 (L) 12 - 20      GFR est AA 16 (L) >60 ml/min/1.73m2    GFR est non-AA 14 (L) >60 ml/min/1.73m2    Calcium 10.0 8.5 - 10.1 MG/DL    Bilirubin, total 1.1 (H) 0.2 - 1.0 MG/DL    ALT (SGPT) 19 12 - 78 U/L    AST (SGOT) 39 (H) 15 - 37 U/L    Alk.  phosphatase 97 45 - 117 U/L    Protein, total 9.2 (H) 6.4 - 8.2 g/dL    Albumin 4.2 3.5 - 5.0 g/dL    Globulin 5.0 (H) 2.0 - 4.0 g/dL    A-G Ratio 0.8 (L) 1.1 - 2.2     ETHYL ALCOHOL    Collection Time: 07/16/21  4:15 PM   Result Value Ref Range    ALCOHOL(ETHYL),SERUM <10 <10 MG/DL   BLOOD GAS, ARTERIAL    Collection Time: 07/16/21  5:52 PM   Result Value Ref Range    pH 7.50 (H) 7.35 - 7.45      PCO2 43 35 - 45 mmHg    PO2 62 (L) 80 - 100 mmHg    O2 SAT 93 92 - 97 %    BICARBONATE 33 (H) 22 - 26 mmol/L    BASE EXCESS 8.4 mmol/L    O2 METHOD ROOM AIR      Sample source ARTERIAL      SITE RIGHT BRACHIAL      JOSE FRANCISCO'S TEST NOT APPLICABLE     TSH 3RD GENERATION    Collection Time: 07/16/21  6:55 PM   Result Value Ref Range    TSH 2.64 0.36 - 3.74 uIU/mL   GLUCOSE, POC    Collection Time: 07/16/21  9:31 PM   Result Value Ref Range    Glucose (POC) 137 (H) 65 - 117 mg/dL    Performed by Carolin Medel RN

## 2021-07-17 NOTE — PROGRESS NOTES
-Please complete MRI History and Safety Screening Form   - Patient cannot be scanned until this form is completed and reviewed in MRI to ensure patient is SAFE and eligible for MRI. - CALL MRI when this has been successfully completed at [phone].

## 2021-07-17 NOTE — PROGRESS NOTES
End of Shift Note    Bedside shift change report given to Cyrus Wei (oncoming nurse) by Demetrio Li RN (offgoing nurse). Report included the following information SBAR, Kardex, ED Summary, Procedure Summary, Intake/Output, MAR, Recent Results and Cardiac Rhythm . Shift worked:  Day     Shift summary and any significant changes:    Transferred to back brito, 3 side rails up, not needed restraints, A/W MRI, Spinal puncture, and EEG   Concerns for physician to address: none   Zone phone for oncoming shift:       Activity:  Activity Level: Bed Rest  Number times ambulated in hallways past shift: 0  Number of times OOB to chair past shift: 0    Cardiac:   Cardiac Monitoring: Yes      Cardiac Rhythm: Sinus Rhythm    Access:   Current line(s): PIV and HD access     Genitourinary:   Urinary status: anuric    Respiratory:   O2 Device: None (Room air)  Chronic home O2 use?: NO  Incentive spirometer at bedside: NO     GI:  Last Bowel Movement Date:  (HA)  Current diet:  DIET NPO  Passing flatus: YES  Tolerating current diet: YES       Pain Management:   Patient states pain is manageable on current regimen: YES    Skin:  Mick Score: 15  Interventions: turn team, speciality bed, float heels, PT/OT consult and nutritional support     Patient Safety:  Fall Score:  Total Score: 2  Interventions: bed/chair alarm, assistive device (walker, cane, etc), gripper socks and pt to call before getting OOB  High Fall Risk: Yes    Length of Stay:  Expected LOS: - - -  Actual LOS: 1      Demetrio Li RN

## 2021-07-17 NOTE — CONSULTS
NEUROLOGY NOTE     Chief complaint: Altered mental status    Reason for Consult  I have been asked by Luisana Au MD to see the patient in neurological consultation to render advice and opinion regarding altered mental status    HPI  The patient is a 65-year-old woman was brought to the ER as the patient is encephalopathic. Patient started having symptom onset the day prior and became increasingly drowsy in the morning and then had altered mental status after dialysis. The patient did complain of headache. On presentation patient's temperature was 101.9 and has not spiked any temperature since last night. Patient's Covid test is negative. Creatinine is 3.33. 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 Patient has been started on empiric antibiotics for possible meningitis. ROS  A ten system review of constitutional, cardiovascular, respiratory, musculoskeletal, endocrine, skin, SHEENT, genitourinary, psychiatric and neurologic systems was obtained and is unremarkable except as stated in HPI     PMH  Past Medical History:   Diagnosis Date    Diabetes ([facility] [address]     Hypertension        FH  No family history on file.     SH  Social History     Socioeconomic History    Marital status:      Spouse name: Not on file    Number of children: Not on file    Years of education: Not on file    Highest education level: Not on file   Tobacco Use    Smoking status: Never Smoker    Smokeless tobacco: Never Used   Vaping Use    Vaping Use: Never used   Substance and Sexual Activity    Alcohol use: Not Currently    Drug use: Not Currently    Sexual activity: Yes     Partners: Female     Social Determinants of Health     Financial Resource Strain:     Difficulty of Paying Living Expenses:    Food Insecurity:     Worried About Running Out of Food in the Last Year:     [address] in the Last Year: Transportation Needs:     Lack of Transportation (Medical):  Lack of Transportation (Non-Medical):    Physical Activity:     Days of Exercise per Week:     Minutes of Exercise per Session:    Stress:     Feeling of Stress :    Social Connections:     Frequency of Communication with Friends and Family:     Frequency of Social Gatherings with Friends and Family:     Attends Nondenominational Services:     Active Member of Clubs or Organizations:     Attends Club or Organization Meetings:     Marital Status:        ALLERGIES  No Known Allergies    PHYSICAL EXAMINATION:   Patient Vitals for the past 24 hrs:   Temp Pulse Resp BP SpO2   07/17/21 1534 99.1 °F (37.3 °C) 66 18 (!) 169/62 97 %   07/17/21 1526 99.1 °F (37.3 °C) -- -- -- --   07/17/21 1239 99.7 °F (37.6 °C) 67 20 109/74 95 %   07/17/21 1200 -- 68 -- -- --   07/17/21 0800 -- 66 -- -- --   07/17/21 0724 98.5 °F (36.9 °C) 68 17 116/66 99 %   07/17/21 0400 -- 65 19 -- 98 %   07/17/21 0330 -- 63 18 (!) 141/52 --   07/17/21 0030 (!) 102.4 °F (39.1 °C) 63 15 (!) 153/67 98 %   07/16/21 2145 (!) 101.9 °F (38.8 °C) 61 18 (!) 172/79 98 %        General:   General appearance: Pt is in no acute distress   Distal pulses are preserved  Fundoscopic exam: attempted    Neurological Examination:   Mental Status: Stuporous. Non verbal. Grimaces to pain. Cranial Nerves: PERRL, Extraocular movements are full. Facial sensation intact. Facial movement intact. Motor: Cannot do formal strength testing. Symmetric. Normal tone. No atrophy. Sensation: Intact to pain    Skin: No significant bruising or lacerations.     LAB DATA REVIEWED:    Recent Results (from the past 24 hour(s))   BLOOD GAS, ARTERIAL    Collection Time: 07/16/21  5:52 PM   Result Value Ref Range    pH 7.50 (H) 7.35 - 7.45      PCO2 43 35 - 45 mmHg    PO2 62 (L) 80 - 100 mmHg    O2 SAT 93 92 - 97 %    BICARBONATE 33 (H) 22 - 26 mmol/L    BASE EXCESS 8.4 mmol/L    O2 METHOD ROOM AIR      Sample source ARTERIAL      SITE RIGHT BRACHIAL      JOSE FRANCISCO'S TEST NOT APPLICABLE     TSH 3RD GENERATION    Collection Time: 07/16/21  6:55 PM   Result Value Ref Range    TSH 2.64 0.36 - 3.74 uIU/mL   GLUCOSE, POC    Collection Time: 07/16/21  9:31 PM   Result Value Ref Range    Glucose (POC) 137 (H) 65 - 117 mg/dL    Performed by Samantha Mukherjee RN    COVID-19 RAPID TEST    Collection Time: 07/16/21 11:24 PM   Result Value Ref Range    Specimen source Please find results under separate order      COVID-19 rapid test Not detected NOTD     METABOLIC PANEL, COMPREHENSIVE    Collection Time: 07/16/21 11:45 PM   Result Value Ref Range    Sodium 137 136 - 145 mmol/L    Potassium 3.4 (L) 3.5 - 5.1 mmol/L    Chloride 99 97 - 108 mmol/L    CO2 31 21 - 32 mmol/L    Anion gap 7 5 - 15 mmol/L    Glucose 131 (H) 65 - 100 mg/dL    BUN 9 6 - 20 MG/DL    Creatinine 4.01 (H) 0.55 - 1.02 MG/DL    BUN/Creatinine ratio 2 (L) 12 - 20      GFR est AA 13 (L) >60 ml/min/1.73m2    GFR est non-AA 11 (L) >60 ml/min/1.73m2    Calcium 9.4 8.5 - 10.1 MG/DL    Bilirubin, total 1.1 (H) 0.2 - 1.0 MG/DL    ALT (SGPT) 12 12 - 78 U/L    AST (SGOT) 12 (L) 15 - 37 U/L    Alk.  phosphatase 93 45 - 117 U/L    Protein, total 8.4 (H) 6.4 - 8.2 g/dL    Albumin 4.0 3.5 - 5.0 g/dL    Globulin 4.4 (H) 2.0 - 4.0 g/dL    A-G Ratio 0.9 (L) 1.1 - 2.2     MAGNESIUM    Collection Time: 07/16/21 11:45 PM   Result Value Ref Range    Magnesium 2.1 1.6 - 2.4 mg/dL   PHOSPHORUS    Collection Time: 07/16/21 11:45 PM   Result Value Ref Range    Phosphorus 2.4 (L) 2.6 - 4.7 MG/DL   CBC WITH AUTOMATED DIFF    Collection Time: 07/16/21 11:45 PM   Result Value Ref Range    WBC 7.3 3.6 - 11.0 K/uL    RBC 4.58 3.80 - 5.20 M/uL    HGB 12.3 11.5 - 16.0 g/dL    HCT 40.4 35.0 - 47.0 %    MCV 88.2 80.0 - 99.0 FL    MCH 26.9 26.0 - 34.0 PG    MCHC 30.4 30.0 - 36.5 g/dL    RDW 16.8 (H) 11.5 - 14.5 %    PLATELET 213 (L) 180 - 400 K/uL    MPV 10.5 8.9 - 12.9 FL    NRBC 0.0 0  WBC    ABSOLUTE NRBC 0.00 0.00 - 0.01 K/uL    NEUTROPHILS 72 32 - 75 %    LYMPHOCYTES 21 12 - 49 %    MONOCYTES 7 5 - 13 %    EOSINOPHILS 0 0 - 7 %    BASOPHILS 0 0 - 1 %    IMMATURE GRANULOCYTES 0 0.0 - 0.5 %    ABS. NEUTROPHILS 5.2 1.8 - 8.0 K/UL    ABS. LYMPHOCYTES 1.5 0.8 - 3.5 K/UL    ABS. MONOCYTES 0.5 0.0 - 1.0 K/UL    ABS. EOSINOPHILS 0.0 0.0 - 0.4 K/UL    ABS. BASOPHILS 0.0 0.0 - 0.1 K/UL    ABS. IMM. GRANS. 0.0 0.00 - 0.04 K/UL    DF AUTOMATED     PROCALCITONIN    Collection Time: 21 11:45 PM   Result Value Ref Range    Procalcitonin 0.67 ng/mL   CULTURE, BLOOD    Collection Time: 21 11:45 PM    Specimen: Blood   Result Value Ref Range    Special Requests: NO SPECIAL REQUESTS      Culture result: NO GROWTH AFTER 8 HOURS     SAMPLES BEING HELD    Collection Time: 21 11:45 PM   Result Value Ref Range    SAMPLES BEING HELD PST     COMMENT        Add-on orders for these samples will be processed based on acceptable specimen integrity and analyte stability, which may vary by analyte. GLUCOSE, POC    Collection Time: 21  8:03 AM   Result Value Ref Range    Glucose (POC) 169 (H) 65 - 117 mg/dL    Performed by Oli Pagan RN    GLUCOSE, POC    Collection Time: 21 11:20 AM   Result Value Ref Range    Glucose (POC) 138 (H) 65 - 117 mg/dL    Performed by Michael Vásquez RN         Imaging review:  CT Head  Normal. No acute changes. HOME MEDS  Prior to Admission Medications   Prescriptions Last Dose Informant Patient Reported? Taking? Blood-Glucose Meter monitoring kit   No No   Sig: Use as directed. Dx: E11.65 check sugar twice daily   Insulin Needles, Disposable, 32 gauge x 32\" ndle   No No   Si Each by Does Not Apply route daily. alcohol swabs padm Not Taking at Unknown time  No No   Si Each by Apply Externally route four (4) times daily.  For use with insulin and glucometer   Patient not taking: Reported on 2021   amLODIPine (NORVASC) 5 mg tablet 2021 at Unknown time  Yes Yes   Sig: Take 5 mg by mouth daily. aspirin delayed-release 81 mg tablet 2021 at Unknown time  Yes Yes   Sig: Take  by mouth daily. atorvastatin (LIPITOR) 40 mg tablet 2021 at Unknown time  Yes Yes   Sig: Take  by mouth daily. b complex-vitamin c-folic acid 0.8 mg (Dialyvite 800) 0.8 mg tab tablet Not Taking at Unknown time  No No   Sig: Take 1 Tab by mouth daily. Patient not taking: Reported on 2021   brimonidine-timoloL (Combigan) 0.2-0.5 % drop ophthalmic solution 2021 at Unknown time  Yes Yes   Si Drop every twelve (12) hours. famotidine (PEPCID) 20 mg tablet 2021 at Unknown time  No Yes   Sig: TAKE 1 TAB BY MOUTH TWO (2) TIMES DAILY AS NEEDED FOR HEARTBURN.   glucose blood VI test strips (ASCENSIA AUTODISC VI, ONE TOUCH ULTRA TEST VI) strip   No No   Sig: Check sugar twice daily   insulin glargine (LANTUS,BASAGLAR) 100 unit/mL (3 mL) inpn   No No   Sig: 10 Units by SubCUTAneous route daily (with dinner). isosorbide mononitrate (ISMO, MONOKET) 20 mg tablet Not Taking at Unknown time  No No   Sig: Take 1 Tab by mouth two (2) times a day. Patient not taking: Reported on 2021   lancets misc   No No   Sig: Check sugar twice daily. E11.65   levothyroxine (SYNTHROID) 50 mcg tablet 2021 at Unknown time  No Yes   Sig: TAKE 1 TABLET BY MOUTH EVERY DAY BEFORE BREAKFAST   metoprolol succinate (TOPROL-XL) 50 mg XL tablet 2021 at Unknown time  Yes Yes   Sig: Take  by mouth daily.       Facility-Administered Medications: None       CURRENT MEDS  Current Facility-Administered Medications   Medication Dose Route Frequency    sodium chloride (NS) flush 5-40 mL  5-40 mL IntraVENous Q8H    insulin lispro (HUMALOG) injection   SubCUTAneous Q6H    [START ON 2021] acyclovir (ZOVIRAX) 300 mg in 0.9% sodium chloride 100 mL IVPB  5 mg/kg IntraVENous Q24H    brimonidine (ALPHAGAN) 0.2 % ophthalmic solution 1 Drop  1 Drop Both Eyes BID    [START ON 2021] timolol (TIMOPTIC) 0.25% ophthalmic solution  1 Drop Both Eyes DAILY    prednisoLONE acetate (PRED FORTE) 1 % ophthalmic suspension 1 Drop  1 Drop Right Eye TID    latanoprost (XALATAN) 0.005 % ophthalmic solution 1 Drop  1 Drop Both Eyes QHS    VANCOMYCIN INFORMATION NOTE   Other Rx Dosing/Monitoring    ampicillin (OMNIPEN) 2 g in 0.9% sodium chloride (MBP/ADV) 100 mL MBP  2 g IntraVENous Q12H    cefepime (MAXIPIME) 1 g in 0.9% sodium chloride (MBP/ADV) 50 mL MBP  1 g IntraVENous Q24H       IMPRESSION/RECOMMENDATIONS:  Svetlana Ryder is a 66 y.o. female who presents with altered mental status in the setting of fever. +neck stiffness. She possibily does have meningitis. Will proceed with LP    1. LP with IR  2. MRI brain  3. EEG  4. Cont abx. Thank you very much for this consultation.      Alejandro Duff MD  Neurologist

## 2021-07-17 NOTE — CONSULTS
..                              [address]  YOB: 1950          Assessment & Plan:   1. ESRD M/W/F  - NO IND FOR HD  - NEXT HD WILL BE ON Monday  - WILL CHECK CHART AND LABS IN AM  - PLEASE CALL US WITH ?'S  2. AMS  3. SEPSIS  4. ANEMIA  5. CA/P/PTH/VITD  6. H/O DM  7. H/O HTN       Subjective:   CC: ESRD M/W/F   HPI: PT OF OURS AT [facility]. RUNS M/W/F. RAN YESTERDAY. CAME IN WITH AMS AND FEVER. SHE COMPLETED HER TX YESTERDAY. CONCERN FOR MENINGITIS. CONCERN FOR MALIG BASED ON CT OF SCAN OF ABDOMEN. CONCERN FOR ATYPICAL PNA. ROS: PT IS UNABLE TO PROVIDE ANY HX AT THIS TIME  Current Facility-Administered Medications   Medication Dose Route Frequency    glucose chewable tablet 16 g  4 Tablet Oral PRN    dextrose (D50W) injection syrg 12.5-25 g  12.5-25 g IntraVENous PRN    glucagon (GLUCAGEN) injection 1 mg  1 mg IntraMUSCular PRN    sodium chloride (NS) flush 5-40 mL  5-40 mL IntraVENous Q8H    sodium chloride (NS) flush 5-40 mL  5-40 mL IntraVENous PRN    acetaminophen (TYLENOL) tablet 650 mg  650 mg Oral Q6H PRN    Or    acetaminophen (TYLENOL) suppository 650 mg  650 mg Rectal Q6H PRN    polyethylene glycol (MIRALAX) packet 17 g  17 g Oral DAILY PRN    ondansetron (ZOFRAN ODT) tablet 4 mg  4 mg Oral Q8H PRN    Or    ondansetron (ZOFRAN) injection 4 mg  4 mg IntraVENous Q6H PRN    insulin lispro (HUMALOG) injection   SubCUTAneous Q6H    VANCOMYCIN INFORMATION NOTE   Other Rx Dosing/Monitoring    [START ON 7/20/2021] acyclovir (ZOVIRAX) 250 mg in 0.9% sodium chloride 100 mL IVPB  5 mg/kg (Ideal) IntraVENous Once per day on Tue Thu Sat    ampicillin (OMNIPEN) 2 g in 0.9% sodium chloride (MBP/ADV) 100 mL MBP  2 g IntraVENous Q12H    cefepime (MAXIPIME) 1 g in 0.9% sodium chloride (MBP/ADV) 50 mL MBP  1 g IntraVENous Q24H          Objective:     Vitals:  Blood pressure 116/66, pulse 66, temperature 98.5 °F (36.9 °C), resp.  rate 17, height 5' 2\" (1.575 m), weight 62.2 kg (137 lb 2 oz), SpO2 99 %. Temp (24hrs), Av.5 °F (38.1 °C), Min:98.5 °F (36.9 °C), Max:102.4 °F (39.1 °C)      Intake and Output:  No intake/output data recorded. No intake/output data recorded. Physical Exam:                Patient is intubated: NO    Physical Examination:   GENERAL ASSESSMENT: AMS  HEENT:Nontraumatic   CHEST: SYMMETRIC LUNG EXP  HEART: DELPHINE  :Lopez: NO  NEURO: AMS          ECG/rhythm:    Data Review      No results for input(s): TNIPOC in the last 72 hours. No lab exists for component: ITNL   No results for input(s): CPK, CKMB, TROIQ in the last 72 hours. Recent Labs     21  2345 21  1615    140   K 3.4* 4.1   CL 99 98   CO2 31 34*   BUN 9 6   CREA 4.01* 3.33*   * 137*   PHOS 2.4*  --    MG 2.1  --    CA 9.4 10.0   ALB 4.0 4.2   WBC 7.3 7.3   HGB 12.3 12.9   HCT 40.4 41.6   * 183      No results for input(s): INR, PTP, APTT, INREXT in the last 72 hours. Needs: urine analysis, urine sodium, protein and creatinine  No results found for: MAGGI KINSEY        : Jeniffer Ibrahim MD  2021        [facility]:  [email or web address]  Mayra Donahue office:  [address] [place] [address], [address]  [place], [address] [facility]  Phone: [phone]  Fax :     [phone]    [facility] office:  [address] [facility], [address]  Phone - [phone]  Fax - [phone]

## 2021-07-17 NOTE — PROGRESS NOTES
Hospitalist Progress Note    NAME: Quincy Marmolejo   :  1950   MRN:  [id number]       Assessment / Plan:    Sepsis  Acute encephalopathy  ESRD on HD MWF  Anti-pyretics PRN - T 102  Given no reliable source of pt's significant encephalopathy and fever, there is a concern for meninigitis. Pt is immunocompromised. C/w  IV Cefepime, Vancomycin, Ampicillin and Acylovir. Procalcitonin 0.67   rapid COVID-19 negative   Blood CX negative to date  Neurology Consult appreciated, need LP with IR and MRI of the brain  Nephrology Consult  CT Abdo reveals rectal wall thickening that is suspicious for malignancy. CTS  consulted,    Head CT:    IMPRESSION  1. No acute intracranial hemorrhage. 2. 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 Age-indeterminate microvascular ischemic disease in the periventricular white  matter. CT Abdo/Pelv: IMPRESSION  1. Circumferential rectal wall thickening is suspicious for malignancy. No bowel  obstruction. 2. No other acute abnormality in the abdomen or pelvis. Uterine fibroids are  noted. 3. Moderate cardiomegaly. Patient still lethargic, keep n.p.o. Hypokalemia  Potassium 3.4, will replete  DMII  HTN  CAD  FS monitoring, SS   updated daughter   Eye surgery in may : glaucoma   Daughter that she has a cup in her R eye     Code Status: Full  Surrogate Decision Maker: Daughter  DVT Prophylaxis: SCD  GI Prophylaxis: not indicated  Baseline: Independent    25.0 - 29.9 Overweight / Body mass index is 25.08 kg/m². Estimated discharge date:   Barriers: Currently very sick    Code status: Full  Prophylaxis: Hep SQ  Recommended Disposition: TBD     Subjective:     Chief Complaint / Reason for Physician Visit  Follow-up sepsis. Patient spiked fever at 102.4, still lethargic  discussed with RN events overnight.      Review of Systems:  Symptom Y/N Comments  Symptom Y/N Comments   Fever/Chills    Chest Pain     Poor Appetite    Edema     Cough Abdominal Pain     Sputum    Joint Pain     SOB/CHURCH    Pruritis/Rash     Nausea/vomit    Tolerating PT/OT     Diarrhea    Tolerating Diet     Constipation    Other       Could NOT obtain due to:  Lethargic     Objective:     VITALS:   Last 24hrs VS reviewed since prior progress note. Most recent are:  Patient Vitals for the past 24 hrs:   Temp Pulse Resp BP SpO2   07/17/21 0800 -- 66 -- -- --   07/17/21 0724 98.5 °F (36.9 °C) 68 17 116/66 99 %   07/17/21 0400 -- 65 19 -- 98 %   07/17/21 0330 -- 63 18 (!) 141/52 --   07/17/21 0030 (!) 102.4 °F (39.1 °C) 63 15 (!) 153/67 98 %   07/16/21 2145 (!) 101.9 °F (38.8 °C) 61 18 (!) 172/79 98 %     No intake or output data in the 24 hours ending 07/17/21 0905     I had a face to face encounter and independently examined this patient on 7/17/2021, as outlined below:  PHYSICAL EXAM:  General: WD, WN. Alert, cooperative, no acute distress    EENT:  EOMI. Anicteric sclerae. MMM  Resp:  CTA bilaterally, no wheezing or rales. No accessory muscle use  CV:  Regular  rhythm,  No edema  GI:  Soft, Non distended, Non tender. +Bowel sounds  Neurologic:  Lethargic   psych:   Unable to assess   Skin:  Blister dorsal area left arm     Reviewed most current lab test results and cultures  YES  Reviewed most current radiology test results   YES  Review and summation of old records today    NO  Reviewed patient's current orders and MAR    YES  PMH/ reviewed - no change compared to H&P  ________________________________________________________________________  Care Plan discussed with:    Comments   Patient x    Family      RN x    Care Manager     Consultant                        Multidiciplinary team rounds were held today with , nursing, pharmacist and clinical coordinator. Patient's plan of care was discussed; medications were reviewed and discharge planning was addressed.      ________________________________________________________________________  Total NON critical care TIME:  40 Minutes    Total CRITICAL CARE TIME Spent:   Minutes non procedure based      Comments   >50% of visit spent in counseling and coordination of care     ________________________________________________________________________  Sin Bates MD     Procedures: see electronic medical records for all procedures/Xrays and details which were not copied into this note but were reviewed prior to creation of Plan. LABS:  I reviewed today's most current labs and imaging studies.   Pertinent labs include:  Recent Labs     07/16/21  2345 07/16/21  1615   WBC 7.3 7.3   HGB 12.3 12.9   HCT 40.4 41.6   * 183     Recent Labs     07/16/21  2345 07/16/21  1615    140   K 3.4* 4.1   CL 99 98   CO2 31 34*   * 137*   BUN 9 6   CREA 4.01* 3.33*   CA 9.4 10.0   MG 2.1  --    PHOS 2.4*  --    ALB 4.0 4.2   TBILI 1.1* 1.1*   ALT 12 19       Signed: Sin Bates MD

## 2021-07-17 NOTE — ED NOTES
DONY here to transport pt to [facility], report given. EMTALA completed and given to medics. Pt in no apparent distress.

## 2021-07-17 NOTE — PROGRESS NOTES
Patients family was contacted and patient's daughter stated patient had surgery May 26 on ian ight eye and drain cup was inserted behind eye. Patients daughter also stated she had  complained of  severe head pain prior to confusion. Pt is also blind.   [facility]  [phone]  Dr. Elana Shaikh  ( right eye)

## 2021-07-17 NOTE — ED NOTES
Spoke w/ pt daughter Carli Appiah confirms pt dialysis schedule is Monday, Wednesday, Friday and that she did successfully complete dialysis today. This information was relayed to Mely Ledezma RN @ [facility].

## 2021-07-17 NOTE — PROGRESS NOTES
1900- Bedside shift change report given to Pb Hayes RN (oncoming nurse) by Akshat Blackwood RN (offgoing nurse). Report included the following information SBAR, Kardex, ED Summary, Intake/Output, MAR, Recent Results, Med Rec Status and Cardiac Rhythm NSR.     2000- Pt known to have IV infiltrate in L hand from previous night, upon assessment the hand is swollen and blisters have formed. Will place wound care consult. 2030- Pt unable to answer questions for MRI screening form. 0130- Multiple RN's attempting to get labs on pt unsuccessfully. Pt is ESRD pt with fistula in upper R arm, old fistula in upper L arm. Pt also had IV infiltrate on 7/17 with abx, L hand swollen w/ blisters. Pt would benefit from central line. One IV remaining. 0700- End of Shift Note    Bedside shift change report given to Sena Duenas (oncoming nurse) by Gris Hoover RN (offgoing nurse). Report included the following information SBAR, Kardex, ED Summary, Intake/Output, MAR, Recent Results, Med Rec Status and Cardiac Rhythm NSR    Shift worked:  7p-7a     Shift summary and any significant changes:     Pt remains with AMS overnight. Pt w/ periods of screaming and inappropriate words. Wound care consult placed for L hand blisters from abx infiltrates. Concerns for physician to address:  Pt could benefit from central line, very limited access for PIV and lab draws.      Zone phone for oncoming shift:          Activity:  Activity Level: Bed Rest  Number times ambulated in hallways past shift: 0  Number of times OOB to chair past shift: 0    Cardiac:   Cardiac Monitoring: Yes      Cardiac Rhythm: Sinus Rhythm    Access:   Current line(s): PIV     Genitourinary:   Urinary status: anuric    Respiratory:   O2 Device: None (Room air)  Chronic home O2 use?: NO  Incentive spirometer at bedside: NO     GI:  Last Bowel Movement Date:  (HA)  Current diet:  DIET NPO  Passing flatus: YES  Tolerating current diet: YES       Pain Management: Patient states pain is manageable on current regimen: YES    Skin:  Mick Score: 15  Interventions: turn team, speciality bed, increase time out of bed and PT/OT consult    Patient Safety:  Fall Score:  Total Score: 2  Interventions: bed/chair alarm, assistive device (walker, cane, etc), gripper socks, pt to call before getting OOB and stay with me (per policy)  High Fall Risk: Yes    Length of Stay:  Expected LOS: - - -  Actual LOS: [facility], RN

## 2021-07-17 NOTE — PROGRESS NOTES
During 0000 assessment patient was found with IV infiltrate to left hand and wrist.  Pharmacy and charge nurse notified. Hand elevated and ice applied per protocol.

## 2021-07-17 NOTE — PROGRESS NOTES
0700 Bedside shift change report given to Cara (oncoming nurse) by Tyler Zaman RN (offgoing nurse). Report included the following information SBAR, Kardex, ED Summary, Intake/Output, MAR, Recent Results and Cardiac Rhythm NSR.     0930 Dr. Edgar Abreu at the bedside. Asked to placed on airborne precautions for R/O out for meningitis. Inform MD about left hand IV infiltrate due to vancomycin, cefepime, and acyclovir during night shift. MD placed order for airborne precautions. [address] Truong Victoria about new consult for  Acute encephalopathy, febrile, complained of HA during HD, rapid decline yesterday, no other clear source of infection - initiated abx for possible. Read [id number] Mes[address] Dr. Edgar Abreu about the daughter informing staff that patient had right eye surgery on may 26. Patient got a cuff placed behind the right eye. Spoke Dr. Edgar Abreu about information at the bedside. 1415 Updated daughter on plan of care. Inform this RN about patient's eye drops. [address] with Dr. Edgar Abreu at the bedside. Verbal order given to order patient eyes drops. Brimonidine, Latanoprost, Prednisolone, and Timolol. Verbal order given acetaminophen (TYLENOL) suppository 650 mg for fever prn. Orders placed. See MAR.        1518 TRANSFER - OUT REPORT:    Verbal report given to Aylin Fairchild (name) on [facility]  being transferred to pcu resp side(unit) for routine progression of care       Report consisted of patients Situation, Background, Assessment and   Recommendations(SBAR). Information from the following report(s) SBAR, Kardex, ED Summary, Intake/Output, MAR, Recent Results and Cardiac Rhythm NSR was reviewed with the receiving nurse. Lines:   Peripheral IV 07/17/21 Posterior; Left Forearm (Active)   Site Assessment Clean, dry, & intact 07/17/21 1239   Phlebitis Assessment 0 07/17/21 1239   Infiltration Assessment 0 07/17/21 1239   Dressing Status Clean, dry, & intact 07/17/21 1239   Dressing Type Tape;Transparent 07/17/21 [address]; Infusing 07/17/21 1239   Alcohol Cap Used Yes 07/17/21 1239       Peripheral IV 07/17/21 Anterior;Left;Proximal Forearm (Active)   Site Assessment Clean, dry, & intact 07/17/21 1239   Phlebitis Assessment 0 07/17/21 1239   Infiltration Assessment 0 07/17/21 1239   Dressing Status Clean, dry, & intact 07/17/21 1239   Dressing Type Tape;Transparent 07/17/21 1239   Hub Color/Line Status Blue;Flushed;Capped 07/17/21 1239   Alcohol Cap Used Yes 07/17/21 1239        Opportunity for questions and clarification was provided.       Patient transported with:   Registered Nurse

## 2021-07-18 ENCOUNTER — APPOINTMENT (OUTPATIENT)
Dept: CT IMAGING | Age: 71
DRG: 853 | End: 2021-07-18
Attending: INTERNAL MEDICINE
Payer: MEDICARE

## 2021-07-18 ENCOUNTER — APPOINTMENT (OUTPATIENT)
Dept: MRI IMAGING | Age: 71
DRG: 853 | End: 2021-07-18
Attending: INTERNAL MEDICINE
Payer: MEDICARE

## 2021-07-18 ENCOUNTER — APPOINTMENT (OUTPATIENT)
Dept: GENERAL RADIOLOGY | Age: 71
DRG: 853 | End: 2021-07-18
Attending: INTERNAL MEDICINE
Payer: MEDICARE

## 2021-07-18 LAB
ALBUMIN SERPL-MCNC: 4 G/DL (ref 3.5–5)
ALBUMIN/GLOB SERPL: 0.7 {RATIO} (ref 1.1–2.2)
ALP SERPL-CCNC: 90 U/L (ref 45–117)
ALT SERPL-CCNC: 18 U/L (ref 12–78)
AMMONIA PLAS-SCNC: 36 UMOL/L
ANION GAP SERPL CALC-SCNC: 11 MMOL/L (ref 5–15)
AST SERPL-CCNC: 43 U/L (ref 15–37)
BASOPHILS # BLD: 0 K/UL (ref 0–0.1)
BASOPHILS NFR BLD: 0 % (ref 0–1)
BILIRUB SERPL-MCNC: 1.6 MG/DL (ref 0.2–1)
BUN SERPL-MCNC: 28 MG/DL (ref 6–20)
BUN/CREAT SERPL: 4 (ref 12–20)
CALCIUM SERPL-MCNC: 10.1 MG/DL (ref 8.5–10.1)
CHLORIDE SERPL-SCNC: 100 MMOL/L (ref 97–108)
CK SERPL-CCNC: 114 U/L (ref 26–192)
CO2 SERPL-SCNC: 27 MMOL/L (ref 21–32)
COMMENT, HOLDF: NORMAL
CREAT SERPL-MCNC: 7.15 MG/DL (ref 0.55–1.02)
DIFFERENTIAL METHOD BLD: ABNORMAL
EOSINOPHIL # BLD: 0 K/UL (ref 0–0.4)
EOSINOPHIL NFR BLD: 0 % (ref 0–7)
ERYTHROCYTE [DISTWIDTH] IN BLOOD BY AUTOMATED COUNT: 17.1 % (ref 11.5–14.5)
GLOBULIN SER CALC-MCNC: 5.6 G/DL (ref 2–4)
GLUCOSE BLD STRIP.AUTO-MCNC: 104 MG/DL (ref 65–117)
GLUCOSE BLD STRIP.AUTO-MCNC: 113 MG/DL (ref 65–117)
GLUCOSE BLD STRIP.AUTO-MCNC: 131 MG/DL (ref 65–117)
GLUCOSE BLD STRIP.AUTO-MCNC: 132 MG/DL (ref 65–117)
GLUCOSE BLD STRIP.AUTO-MCNC: 206 MG/DL (ref 65–117)
GLUCOSE SERPL-MCNC: 102 MG/DL (ref 65–100)
HCT VFR BLD AUTO: 41 % (ref 35–47)
HGB BLD-MCNC: 12.4 G/DL (ref 11.5–16)
IMM GRANULOCYTES # BLD AUTO: 0 K/UL (ref 0–0.04)
IMM GRANULOCYTES NFR BLD AUTO: 0 % (ref 0–0.5)
LACTATE SERPL-SCNC: 5.9 MMOL/L (ref 0.4–2)
LYMPHOCYTES # BLD: 1.7 K/UL (ref 0.8–3.5)
LYMPHOCYTES NFR BLD: 17 % (ref 12–49)
MCH RBC QN AUTO: 26.7 PG (ref 26–34)
MCHC RBC AUTO-ENTMCNC: 30.2 G/DL (ref 30–36.5)
MCV RBC AUTO: 88.2 FL (ref 80–99)
MONOCYTES # BLD: 0.9 K/UL (ref 0–1)
MONOCYTES NFR BLD: 8 % (ref 5–13)
NEUTS SEG # BLD: 7.8 K/UL (ref 1.8–8)
NEUTS SEG NFR BLD: 75 % (ref 32–75)
NRBC # BLD: 0 K/UL (ref 0–0.01)
NRBC BLD-RTO: 0 PER 100 WBC
PLATELET # BLD AUTO: 194 K/UL (ref 150–400)
PMV BLD AUTO: 11.5 FL (ref 8.9–12.9)
POTASSIUM SERPL-SCNC: 4.7 MMOL/L (ref 3.5–5.1)
PROT SERPL-MCNC: 9.6 G/DL (ref 6.4–8.2)
RBC # BLD AUTO: 4.65 M/UL (ref 3.8–5.2)
SAMPLES BEING HELD,HOLD: NORMAL
SERVICE CMNT-IMP: ABNORMAL
SERVICE CMNT-IMP: NORMAL
SERVICE CMNT-IMP: NORMAL
SODIUM SERPL-SCNC: 138 MMOL/L (ref 136–145)
WBC # BLD AUTO: 10.4 K/UL (ref 3.6–11)

## 2021-07-18 PROCEDURE — 70551 MRI BRAIN STEM W/O DYE: CPT

## 2021-07-18 PROCEDURE — 71045 X-RAY EXAM CHEST 1 VIEW: CPT

## 2021-07-18 PROCEDURE — 74011000258 HC RX REV CODE- 258: Performed by: GENERAL ACUTE CARE HOSPITAL

## 2021-07-18 PROCEDURE — 36415 COLL VENOUS BLD VENIPUNCTURE: CPT

## 2021-07-18 PROCEDURE — 74011250636 HC RX REV CODE- 250/636: Performed by: INTERNAL MEDICINE

## 2021-07-18 PROCEDURE — 65660000000 HC RM CCU STEPDOWN

## 2021-07-18 PROCEDURE — 80053 COMPREHEN METABOLIC PANEL: CPT

## 2021-07-18 PROCEDURE — 74011000250 HC RX REV CODE- 250: Performed by: INTERNAL MEDICINE

## 2021-07-18 PROCEDURE — 74011250636 HC RX REV CODE- 250/636: Performed by: GENERAL ACUTE CARE HOSPITAL

## 2021-07-18 PROCEDURE — 82140 ASSAY OF AMMONIA: CPT

## 2021-07-18 PROCEDURE — 82550 ASSAY OF CK (CPK): CPT

## 2021-07-18 PROCEDURE — 74011000258 HC RX REV CODE- 258: Performed by: INTERNAL MEDICINE

## 2021-07-18 PROCEDURE — 74011636637 HC RX REV CODE- 636/637: Performed by: INTERNAL MEDICINE

## 2021-07-18 PROCEDURE — 82962 GLUCOSE BLOOD TEST: CPT

## 2021-07-18 PROCEDURE — 83605 ASSAY OF LACTIC ACID: CPT

## 2021-07-18 PROCEDURE — 85025 COMPLETE CBC W/AUTO DIFF WBC: CPT

## 2021-07-18 PROCEDURE — 99233 SBSQ HOSP IP/OBS HIGH 50: CPT | Performed by: PSYCHIATRY & NEUROLOGY

## 2021-07-18 RX ORDER — LORAZEPAM 2 MG/ML
1 INJECTION INTRAMUSCULAR
Status: DISCONTINUED | OUTPATIENT
Start: 2021-07-18 | End: 2021-07-22

## 2021-07-18 RX ORDER — LABETALOL HYDROCHLORIDE 5 MG/ML
10 INJECTION, SOLUTION INTRAVENOUS
Status: DISCONTINUED | OUTPATIENT
Start: 2021-07-18 | End: 2021-07-18

## 2021-07-18 RX ORDER — LABETALOL HYDROCHLORIDE 5 MG/ML
20 INJECTION, SOLUTION INTRAVENOUS
Status: DISCONTINUED | OUTPATIENT
Start: 2021-07-18 | End: 2021-08-07 | Stop reason: HOSPADM

## 2021-07-18 RX ORDER — SODIUM CHLORIDE 9 MG/ML
50 INJECTION, SOLUTION INTRAVENOUS CONTINUOUS
Status: DISCONTINUED | OUTPATIENT
Start: 2021-07-18 | End: 2021-07-19

## 2021-07-18 RX ADMIN — BRIMONIDINE TARTRATE 1 DROP: 2 SOLUTION OPHTHALMIC at 17:53

## 2021-07-18 RX ADMIN — AMPICILLIN SODIUM 2 G: 2 INJECTION, POWDER, FOR SOLUTION INTRAVENOUS at 12:51

## 2021-07-18 RX ADMIN — PREDNISOLONE ACETATE 1 DROP: 10 SUSPENSION/ DROPS OPHTHALMIC at 10:49

## 2021-07-18 RX ADMIN — CEFEPIME HYDROCHLORIDE 1 G: 1 INJECTION, POWDER, FOR SOLUTION INTRAMUSCULAR; INTRAVENOUS at 23:57

## 2021-07-18 RX ADMIN — Medication 10 ML: at 06:24

## 2021-07-18 RX ADMIN — Medication 10 ML: at 22:00

## 2021-07-18 RX ADMIN — TIMOLOL MALEATE 1 DROP: 2.5 SOLUTION/ DROPS OPHTHALMIC at 10:49

## 2021-07-18 RX ADMIN — CEFEPIME HYDROCHLORIDE 1 G: 1 INJECTION, POWDER, FOR SOLUTION INTRAMUSCULAR; INTRAVENOUS at 00:01

## 2021-07-18 RX ADMIN — AMPICILLIN SODIUM 2 G: 2 INJECTION, POWDER, FOR SOLUTION INTRAVENOUS at 23:57

## 2021-07-18 RX ADMIN — PREDNISOLONE ACETATE 1 DROP: 10 SUSPENSION/ DROPS OPHTHALMIC at 15:30

## 2021-07-18 RX ADMIN — LEVETIRACETAM 500 MG: 100 INJECTION, SOLUTION INTRAVENOUS at 17:52

## 2021-07-18 RX ADMIN — PREDNISOLONE ACETATE 1 DROP: 10 SUSPENSION/ DROPS OPHTHALMIC at 23:59

## 2021-07-18 RX ADMIN — SODIUM CHLORIDE 50 ML/HR: 9 INJECTION, SOLUTION INTRAVENOUS at 16:44

## 2021-07-18 RX ADMIN — HEPARIN SODIUM 5000 UNITS: 5000 INJECTION INTRAVENOUS; SUBCUTANEOUS at 15:30

## 2021-07-18 RX ADMIN — BRIMONIDINE TARTRATE 1 DROP: 2 SOLUTION OPHTHALMIC at 10:48

## 2021-07-18 RX ADMIN — HEPARIN SODIUM 5000 UNITS: 5000 INJECTION INTRAVENOUS; SUBCUTANEOUS at 23:58

## 2021-07-18 RX ADMIN — LATANOPROST 1 DROP: 50 SOLUTION OPHTHALMIC at 23:59

## 2021-07-18 RX ADMIN — HEPARIN SODIUM 5000 UNITS: 5000 INJECTION INTRAVENOUS; SUBCUTANEOUS at 00:00

## 2021-07-18 RX ADMIN — ACYCLOVIR SODIUM 300 MG: 50 INJECTION, SOLUTION INTRAVENOUS at 04:22

## 2021-07-18 RX ADMIN — INSULIN LISPRO 3 UNITS: 100 INJECTION, SOLUTION INTRAVENOUS; SUBCUTANEOUS at 06:19

## 2021-07-18 RX ADMIN — AMPICILLIN SODIUM 2 G: 2 INJECTION, POWDER, FOR SOLUTION INTRAVENOUS at 00:01

## 2021-07-18 NOTE — PROGRESS NOTES
Hospitalist Progress Note    NAME: Corrinne Lins   :  1950   MRN:  [id number]       Assessment / Plan:  Rapid response : seizure activity   Rapid response was called by the nurse for seizure-like activity with generalized tonicoclonic movement associated with unresponsiveness and hypoxia. Per nursing it lasted few minutes. By the time of my evaluation, the seizure had resolved and patient was just having abnormal movement on her lower extremity but was awake and vocalizing . patient was put on nonrebreather with improvement of her sats to 100%  Vital signs showed normal blood pressure  On neurological exam, pupils were not reactive to light, was not  following command  Ordered a stat vbg  lactic acid and ammonia level  Labs reviewed showed normal CO2, elevated lactic acid at 5.9 most likely due to seizure activity. Will hold off on IVF bolus , lactic acid should trend down    Caution with fluid overload as patient is end-stage renal disease on hemodialysis  Will start low rate maintenance IVF   Her ammonia level was slightly elevated, CPK was within normal limit  As needed Ativan ordered, seizure precaution. EEG ordered  Spoke with neurology who will evaluate the patient  Update: Patient was evaluated by neurology, who started the patient on Keppra     I have spent critical care time involved in lab review, consultations with specialist, family decision-making, and documentation. During this entire length of time I was immediately available to the patient. The reason for providing this level of medical care for this critically ill patient was due to a critical illness that impaired one or more vital organ systems such that there was a high probability of imminent or life threatening deterioration in the patients condition.  This care involved high complexity decision making to assess, manipulate, and support vital system functions, to treat this degreee vital organ system failure and to prevent further life threatening deterioration of the patients condition. Sepsis  Acute encephalopathy  ESRD on HD MWF  Anti-pyretics PRN - T 102  Given no reliable source of pt's significant encephalopathy and fever, there is a concern for meninigitis. Pt is immunocompromised. C/w  IV Cefepime, Vancomycin, Ampicillin and Acylovir. Procalcitonin 0.67   rapid COVID-19 negative   Blood CX negative to date  Neurology Consult appreciated, need LP with IR and MRI of the brain  Nephrology Consult  CT Abdo reveals rectal wall thickening that is suspicious for malignancy. CTS  consulted,    Head CT:    IMPRESSION  1. No acute intracranial hemorrhage. 2. 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 Age-indeterminate microvascular ischemic disease in the periventricular white  matter. CT Abdo/Pelv: IMPRESSION  1. Circumferential rectal wall thickening is suspicious for malignancy. No bowel  obstruction. 2. No other acute abnormality in the abdomen or pelvis. Uterine fibroids are  noted. 3. Moderate cardiomegaly. Patient still lethargic, keep n.p.o. we will start maintenance fluid if okay with nephrology  MRI of the brain showed:  Encephalomalacia and white matter disease. No acute intracranial abnormality    Hypokalemia  Resolved    DMII  HTN  CAD  FS monitoring, SS    Eye surgery in may : glaucoma   Daughter that she has a cup in her R eye   Will call ophthalmologist on Monday to discuss more about type of right eye surgery    Code Status: Full  Surrogate Decision Maker: Daughter  DVT Prophylaxis: SCD  GI Prophylaxis: not indicated  Baseline: Independent    25.0 - 29.9 Overweight / Body mass index is 25.08 kg/m². Estimated discharge date: July 22  Barriers: Currently very sick    Code status: Full  Prophylaxis: Hep SQ  Recommended Disposition: TBD     Subjective:     Chief Complaint / Reason for Physician Visit  Rapid response for seizure-like activity.   Patient had an episode of generalized tonicoclonic seizures lasted few minutes with unresponsiveness    discussed with RN events overnight. Review of Systems:  Symptom Y/N Comments  Symptom Y/N Comments   Fever/Chills    Chest Pain     Poor Appetite    Edema     Cough    Abdominal Pain     Sputum    Joint Pain     SOB/CHURCH    Pruritis/Rash     Nausea/vomit    Tolerating PT/OT     Diarrhea    Tolerating Diet     Constipation    Other       Could NOT obtain due to:  Lethargic     Objective:     VITALS:   Last 24hrs VS reviewed since prior progress note. Most recent are:  Patient Vitals for the past 24 hrs:   Temp Pulse Resp BP SpO2   07/18/21 0845 -- 99 27 124/87 97 %   07/18/21 0826 -- (!) 104 27 -- (!) 59 %   07/18/21 0756 97.9 °F (36.6 °C) 98 27 (!) 153/96 99 %   07/18/21 0448 98.1 °F (36.7 °C) 84 23 (!) 163/67 100 %   07/17/21 2250 98.3 °F (36.8 °C) 73 25 (!) 172/54 99 %   07/17/21 1924 98.6 °F (37 °C) 65 22 (!) 164/65 99 %   07/17/21 1534 99.1 °F (37.3 °C) 66 18 (!) 169/62 97 %   07/17/21 1526 99.1 °F (37.3 °C) -- -- -- --   07/17/21 1239 99.7 °F (37.6 °C) 67 20 109/74 95 %   07/17/21 1200 -- 68 -- -- --       Intake/Output Summary (Last 24 hours) at 7/18/2021 0945  Last data filed at 7/17/2021 1534  Gross per 24 hour   Intake 0 ml   Output --   Net 0 ml        I had a face to face encounter and independently examined this patient on 7/18/2021, as outlined below:  PHYSICAL EXAM:  General: WD, WN. Awake but not following commands cooperative, no acute distress    EENT:  EOMI. Anicteric sclerae. MMM  Eye : Right eye surgery   resp:  CTA bilaterally, no wheezing or rales. No accessory muscle use  CV:  Regular  rhythm,  No edema  GI:  Soft, Non distended, Non tender. +Bowel sounds  Neurologic:  Not following commands, jerking movement on her lower extremity and right upper extremity  Not following commands, bilateral pupil not reactive  psych:   Unable to assess   Skin:  No rashes.   No jaundice    Reviewed most current lab test results and cultures YES  Reviewed most current radiology test results   YES  Review and summation of old records today    NO  Reviewed patient's current orders and MAR    YES  PMH/SH reviewed - no change compared to H&P  ________________________________________________________________________  Care Plan discussed with:    Comments   Patient x    Family      RN x    Care Manager     Consultant                        Multidiciplinary team rounds were held today with , nursing, pharmacist and clinical coordinator. Patient's plan of care was discussed; medications were reviewed and discharge planning was addressed. ________________________________________________________________________  Total NON critical care TIME: Minutes    Total CRITICAL CARE TIME Spent: 65  Minutes non procedure based      Comments   >50% of visit spent in counseling and coordination of care     ________________________________________________________________________  Liz Fields MD     Procedures: see electronic medical records for all procedures/Xrays and details which were not copied into this note but were reviewed prior to creation of Plan. LABS:  I reviewed today's most current labs and imaging studies.   Pertinent labs include:  Recent Labs     07/18/21  0907 07/16/21  2345 07/16/21  1615   WBC 10.4 7.3 7.3   HGB 12.4 12.3 12.9   HCT 41.0 40.4 41.6    147* 183     Recent Labs     07/16/21  2345 07/16/21  1615    140   K 3.4* 4.1   CL 99 98   CO2 31 34*   * 137*   BUN 9 6   CREA 4.01* 3.33*   CA 9.4 10.0   MG 2.1  --    PHOS 2.4*  --    ALB 4.0 4.2   TBILI 1.1* 1.1*   ALT 12 19       Signed: Liz Fields MD

## 2021-07-18 NOTE — PROGRESS NOTES
Spiritual Care Assessment/Progress Note  [facility]      NAME: Prince Cooney      MRN: [id number]  AGE: 66 y.o. SEX: female  Shinto Affiliation: Unknown   Language: English     7/18/2021     Total Time (in minutes): 11     Spiritual Assessment begun in [facility] PROGRESSIVE CARE through conversation with:         []Patient        [] Family    [] Friend(s)        Reason for Consult: Rapid response team     Spiritual beliefs: (Please include comment if needed)     [] Identifies with a nicolas tradition:         [] Supported by a nicolas community:            [] Claims no spiritual orientation:           [] Seeking spiritual identity:                [] Adheres to an individual form of spirituality:           [x] Not able to assess:                           Identified resources for coping:      [] Prayer                               [] Music                  [] Guided Imagery     [] Family/friends                 [] Pet visits     [] Devotional reading                         [x] Unknown     [] Other:                                              Interventions offered during this visit: (See comments for more details)    Patient Interventions: Crisis, Initial visit           Plan of Care:     [x] Support spiritual and/or cultural needs    [] Support AMD and/or advance care planning process      [] Support grieving process   [] Coordinate Rites and/or Rituals    [] Coordination with community clergy   [] No spiritual needs identified at this time   [] Detailed Plan of Care below (See Comments)  [] Make referral to Music Therapy  [] Make referral to Pet Therapy     [] Make referral to Addiction services  [] Make referral to [facility]  [] Make referral to Spiritual Care Partner  [] No future visits requested        [x] Follow up upon further referrals     Comments:   Responded to RRT on PCU. No family/friends present.  Patient is on strict isolation precautions and was being evaluated by clinical staff.  Unable to assess for spiritual needs or concerns at this time.        Franklin Nayak, MPS, [address] [facility], Staff [address] [place] [address] Avenue    [address] Paging Service  [phone]

## 2021-07-18 NOTE — PROGRESS NOTES
End of Shift Note    Bedside shift change report given to Terrell (oncoming nurse) by Bobby Nieto RN (offgoing nurse). Report included the following information SBAR, Intake/Output, MAR and Recent Results    Shift worked:  [id number]     Shift summary and any significant changes:    Initiated RRT for patient for seizure-like activity which spontaneously resolved prior to arrival of RRT nurse (see separate note filed at 6386). MRI of the brain completed showing encephalomalacia. On bedside exam has occasional downward gaze deviation, weak cough. She continues to be minimally responsive. Her lactic acid level is elevated, possibly due to ?muscle tremors/jerking  vs. ?infection. She is being treated empirically for bacterial/viral meningitis. LP planned for tomorrow. No IV boluses given for elevated lactate due to incomplete HD treatment on Friday and risk for overload. Started on maintenance fluids and IV Keppra per Neurology recommendations. PRN Ativan for breakthrough seizures. CVC to be placed by Anesthesia due to need for reliable access and labs. Concerns for physician to address:  Central line     Zone phone for oncoming shift:   n/a       Activity:  Activity Level: Bed Rest  Number times ambulated in hallways past shift: 0  Number of times OOB to chair past shift: 0    Cardiac:   Cardiac Monitoring: Yes      Cardiac Rhythm: Sinus Rhythm    Access:   Current line(s): PIV     Genitourinary:   Urinary status: anuric    Respiratory:   O2 Device: None (Room air)  Chronic home O2 use?: NO  Incentive spirometer at bedside: NO     GI:  Last Bowel Movement Date:  (HA)  Current diet:  DIET NPO  Passing flatus: YES  Tolerating current diet: NO       Pain Management:   Patient states pain is manageable on current regimen: N/A    Skin:  Mick Score: 15  Interventions: float heels, limit briefs and internal/external urinary devices    Patient Safety:  Fall Score:  Total Score: 2  Interventions: bed/chair alarm  High Fall Risk: Yes    Length of Stay:  Expected LOS: - - -  Actual LOS: 2      Darlyn Ross RN

## 2021-07-18 NOTE — PROGRESS NOTES
Spiritual Care Assessment/Progress Note  [facility]      NAME: Marcial Pritchett      MRN: [id number]  AGE: 66 y.o. SEX: female  Hindu Affiliation: Unknown   Language: English     7/18/2021     Total Time (in minutes): 10     Spiritual Assessment begun in [facility] PROGRESSIVE CARE through conversation with:         []Patient        [] Family    [] Friend(s)        Reason for Consult: Rapid response team     Spiritual beliefs: (Please include comment if needed)     [] Identifies with a nicolas tradition:         [] Supported by a nicolas community:            [] Claims no spiritual orientation:           [] Seeking spiritual identity:                [] Adheres to an individual form of spirituality:           [x] Not able to assess:                           Identified resources for coping:      [] Prayer                               [] Music                  [] Guided Imagery     [] Family/friends                 [] Pet visits     [] Devotional reading                         [x] Unknown     [] Other:                                            Interventions offered during this visit: (See comments for more details)    Patient Interventions: Crisis           Plan of Care:     [x] Support spiritual and/or cultural needs    [] Support AMD and/or advance care planning process      [] Support grieving process   [] Coordinate Rites and/or Rituals    [] Coordination with community clergy   [] No spiritual needs identified at this time   [] Detailed Plan of Care below (See Comments)  [] Make referral to Music Therapy  [] Make referral to Pet Therapy     [] Make referral to Addiction services  [] Make referral to [facility]  [] Make referral to Spiritual Care Partner  [] No future visits requested        [x] Follow up upon further referrals     Comments:   Responded to RRT in PCU. No family/friends present. Patient is on strict isolation precautions. She was being evaluated by clinical staff.   Unable to assess for spiritual needs/concerns at this time.      MARGUERITE Concepcion, [facility], Staff [address]    [address] Paging Service  [phone]

## 2021-07-18 NOTE — CONSULTS
NEUROLOGY NOTE     Chief complaint: Altered mental status    SUBJECTIVE:  Pt did have an episode of worsening MS and jerking and stiffness. Ativan helped. HPI  The patient is a 27-year-old woman was brought to the ER as the patient is encephalopathic. Patient started having symptom onset the day prior and became increasingly drowsy in the morning and then had altered mental status after dialysis. The patient did complain of headache. On presentation patient's temperature was 101.9 and has not spiked any temperature since last night. Patient's Covid test is negative. Creatinine is 3.33. 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 Patient has been started on empiric antibiotics for possible meningitis. ROS  A ten system review of constitutional, cardiovascular, respiratory, musculoskeletal, endocrine, skin, SHEENT, genitourinary, psychiatric and neurologic systems was obtained and is unremarkable except as stated in HPI     PMH  Past Medical History:   Diagnosis Date    Diabetes ([facility] [address]     Hypertension        FH  No family history on file. SH  Social History     Socioeconomic History    Marital status:      Spouse name: Not on file    Number of children: Not on file    Years of education: Not on file    Highest education level: Not on file   Tobacco Use    Smoking status: Never Smoker    Smokeless tobacco: Never Used   Vaping Use    Vaping Use: Never used   Substance and Sexual Activity    Alcohol use: Not Currently    Drug use: Not Currently    Sexual activity: Yes     Partners: Female     Social Determinants of Health     Financial Resource Strain:     Difficulty of Paying Living Expenses:    Food Insecurity:     Worried About Running Out of Food in the Last Year:     [address] in the Last Year:    Transportation Needs:     Lack of Transportation (Medical):      Lack of Transportation (Non-Medical):    Physical Activity:     Days of Exercise per Week:     Minutes of Exercise per Session:    Stress:     Feeling of Stress :    Social Connections:     Frequency of Communication with Friends and Family:     Frequency of Social Gatherings with Friends and Family:     Attends Gnosticist Services:     Active Member of Clubs or Organizations:     Attends Club or Organization Meetings:     Marital Status:        ALLERGIES  No Known Allergies    PHYSICAL EXAMINATION:   Patient Vitals for the past 24 hrs:   Temp Pulse Resp BP SpO2   07/18/21 1300 98.3 °F (36.8 °C) (!) 102 20 (!) 149/94 --   07/18/21 1030 -- 78 27 116/78 100 %   07/18/21 0845 -- 99 27 124/87 97 %   07/18/21 0826 -- (!) 104 27 -- (!) 59 %   07/18/21 0756 97.9 °F (36.6 °C) 98 27 (!) 153/96 99 %   07/18/21 0448 98.1 °F (36.7 °C) 84 23 (!) 163/67 100 %   07/17/21 2250 98.3 °F (36.8 °C) 73 25 (!) 172/54 99 %   07/17/21 1924 98.6 °F (37 °C) 65 22 (!) 164/65 99 %   07/17/21 1534 99.1 °F (37.3 °C) 66 18 (!) 169/62 97 %   07/17/21 1526 99.1 °F (37.3 °C) -- -- -- --        General:   General appearance: Pt is in no acute distress   Distal pulses are preserved  Fundoscopic exam: attempted    Neurological Examination:   Mental Status: Stuporous. Non verbal. Grimaces to pain. Cranial Nerves: PERRL, Extraocular movements are full. Facial sensation intact. Facial movement intact. Motor: Cannot do formal strength testing. Symmetric. Normal tone. No atrophy. Sensation: Intact to pain    Skin: No significant bruising or lacerations.     LAB DATA REVIEWED:    Recent Results (from the past 24 hour(s))   GLUCOSE, POC    Collection Time: 07/17/21  5:00 PM   Result Value Ref Range    Glucose (POC) 114 65 - 117 mg/dL    Performed by Bettye GIL    GLUCOSE, POC    Collection Time: 07/18/21 12:09 AM   Result Value Ref Range    Glucose (POC) 131 (H) 65 - 117 mg/dL    Performed by PRAKASH Browne    Collection Time: 07/18/21  6:03 AM   Result Value Ref Range    Glucose (POC) 206 (H) 65 - 117 mg/dL    Performed by Sabine Hanson    CBC WITH AUTOMATED DIFF    Collection Time: 07/18/21  9:07 AM   Result Value Ref Range    WBC 10.4 3.6 - 11.0 K/uL    RBC 4.65 3.80 - 5.20 M/uL    HGB 12.4 11.5 - 16.0 g/dL    HCT 41.0 35.0 - 47.0 %    MCV 88.2 80.0 - 99.0 FL    MCH 26.7 26.0 - 34.0 PG    MCHC 30.2 30.0 - 36.5 g/dL    RDW 17.1 (H) 11.5 - 14.5 %    PLATELET 825 183 - 599 K/uL    MPV 11.5 8.9 - 12.9 FL    NRBC 0.0 0  WBC    ABSOLUTE NRBC 0.00 0.00 - 0.01 K/uL    NEUTROPHILS 75 32 - 75 %    LYMPHOCYTES 17 12 - 49 %    MONOCYTES 8 5 - 13 %    EOSINOPHILS 0 0 - 7 %    BASOPHILS 0 0 - 1 %    IMMATURE GRANULOCYTES 0 0.0 - 0.5 %    ABS. NEUTROPHILS 7.8 1.8 - 8.0 K/UL    ABS. LYMPHOCYTES 1.7 0.8 - 3.5 K/UL    ABS. MONOCYTES 0.9 0.0 - 1.0 K/UL    ABS. EOSINOPHILS 0.0 0.0 - 0.4 K/UL    ABS. BASOPHILS 0.0 0.0 - 0.1 K/UL    ABS. IMM. GRANS. 0.0 0.00 - 0.04 K/UL    DF AUTOMATED     AMMONIA    Collection Time: 07/18/21  9:07 AM   Result Value Ref Range    Ammonia 36 (H) <00 UMOL/L   METABOLIC PANEL, COMPREHENSIVE    Collection Time: 07/18/21  9:07 AM   Result Value Ref Range    Sodium 138 136 - 145 mmol/L    Potassium 4.7 3.5 - 5.1 mmol/L    Chloride 100 97 - 108 mmol/L    CO2 27 21 - 32 mmol/L    Anion gap 11 5 - 15 mmol/L    Glucose 102 (H) 65 - 100 mg/dL    BUN 28 (H) 6 - 20 MG/DL    Creatinine 7.15 (H) 0.55 - 1.02 MG/DL    BUN/Creatinine ratio 4 (L) 12 - 20      GFR est AA 7 (L) >60 ml/min/1.73m2    GFR est non-AA 6 (L) >60 ml/min/1.73m2    Calcium 10.1 8.5 - 10.1 MG/DL    Bilirubin, total 1.6 (H) 0.2 - 1.0 MG/DL    ALT (SGPT) 18 12 - 78 U/L    AST (SGOT) 43 (H) 15 - 37 U/L    Alk.  phosphatase 90 45 - 117 U/L    Protein, total 9.6 (H) 6.4 - 8.2 g/dL    Albumin 4.0 3.5 - 5.0 g/dL    Globulin 5.6 (H) 2.0 - 4.0 g/dL    A-G Ratio 0.7 (L) 1.1 - 2.2     LACTIC ACID    Collection Time: 07/18/21  9:07 AM   Result Value Ref Range    Lactic acid 5.9 (HH) 0.4 - 2.0 MMOL/L   CK    Collection Time: 21  9:07 AM   Result Value Ref Range     26 - 192 U/L   SAMPLES BEING HELD    Collection Time: 21  9:07 AM   Result Value Ref Range    SAMPLES BEING HELD  PST     COMMENT        Add-on orders for these samples will be processed based on acceptable specimen integrity and analyte stability, which may vary by analyte. Imaging review:  CT Head  Normal. No acute changes. HOME MEDS  Prior to Admission Medications   Prescriptions Last Dose Informant Patient Reported? Taking? Blood-Glucose Meter monitoring kit   No No   Sig: Use as directed. Dx: E11.65 check sugar twice daily   Insulin Needles, Disposable, 32 gauge x \" ndle   No No   Si Each by Does Not Apply route daily. alcohol swabs padm Not Taking at Unknown time  No No   Si Each by Apply Externally route four (4) times daily. For use with insulin and glucometer   Patient not taking: Reported on 2021   amLODIPine (NORVASC) 5 mg tablet 2021 at Unknown time  Yes Yes   Sig: Take 5 mg by mouth daily. aspirin delayed-release 81 mg tablet 2021 at Unknown time  Yes Yes   Sig: Take  by mouth daily. atorvastatin (LIPITOR) 40 mg tablet 2021 at Unknown time  Yes Yes   Sig: Take  by mouth daily. b complex-vitamin c-folic acid 0.8 mg (Dialyvite 800) 0.8 mg tab tablet Not Taking at Unknown time  No No   Sig: Take 1 Tab by mouth daily. Patient not taking: Reported on 2021   brimonidine-timoloL (Combigan) 0.2-0.5 % drop ophthalmic solution 2021 at Unknown time  Yes Yes   Si Drop every twelve (12) hours.    famotidine (PEPCID) 20 mg tablet 2021 at Unknown time  No Yes   Sig: TAKE 1 TAB BY MOUTH TWO (2) TIMES DAILY AS NEEDED FOR HEARTBURN.   glucose blood VI test strips (ASCENSIA AUTODISC VI, ONE TOUCH ULTRA TEST VI) strip   No No   Sig: Check sugar twice daily   insulin glargine (LANTUS,BASAGLAR) 100 unit/mL (3 mL) inpn   No No   Sig: 10 Units by SubCUTAneous route daily (with dinner). isosorbide mononitrate (ISMO, MONOKET) 20 mg tablet Not Taking at Unknown time  No No   Sig: Take 1 Tab by mouth two (2) times a day. Patient not taking: Reported on 7/16/2021   lancets misc   No No   Sig: Check sugar twice daily. E11.65   levothyroxine (SYNTHROID) 50 mcg tablet 7/16/2021 at Unknown time  No Yes   Sig: TAKE 1 TABLET BY MOUTH EVERY DAY BEFORE BREAKFAST   metoprolol succinate (TOPROL-XL) 50 mg XL tablet 7/16/2021 at Unknown time  Yes Yes   Sig: Take  by mouth daily. Facility-Administered Medications: None       CURRENT MEDS  Current Facility-Administered Medications   Medication Dose Route Frequency    [Held by provider] sodium chloride 0.9 % bolus infusion 500 mL  500 mL IntraVENous ONCE    sodium chloride (NS) flush 5-40 mL  5-40 mL IntraVENous Q8H    insulin lispro (HUMALOG) injection   SubCUTAneous Q6H    acyclovir (ZOVIRAX) 300 mg in 0.9% sodium chloride 100 mL IVPB  5 mg/kg IntraVENous Q24H    brimonidine (ALPHAGAN) 0.2 % ophthalmic solution 1 Drop  1 Drop Both Eyes BID    timolol (TIMOPTIC) 0.25% ophthalmic solution  1 Drop Both Eyes DAILY    prednisoLONE acetate (PRED FORTE) 1 % ophthalmic suspension 1 Drop  1 Drop Right Eye TID    latanoprost (XALATAN) 0.005 % ophthalmic solution 1 Drop  1 Drop Both Eyes QHS    heparin (porcine) injection 5,000 Units  5,000 Units SubCUTAneous Q8H    VANCOMYCIN INFORMATION NOTE   Other Rx Dosing/Monitoring    ampicillin (OMNIPEN) 2 g in 0.9% sodium chloride (MBP/ADV) 100 mL MBP  2 g IntraVENous Q12H    cefepime (MAXIPIME) 1 g in 0.9% sodium chloride (MBP/ADV) 50 mL MBP  1 g IntraVENous Q24H       IMPRESSION/RECOMMENDATIONS:  Marcia Macias is a 66 y.o. female who presents with altered mental status in the setting of fever. +neck stiffness. She possibily does have meningitis. Will proceed with LP. She did have an episode of jerking and stiffness. ?seizure. Will start keppra.  The present jerking on the right side of body is not rhythmic in etiology. 1. LP with IR  2. MRI brain  3. EEG  4.  Cont abx.  5. Start keppra 500 mg iv bid      Marv Starr MD  Neurologist

## 2021-07-18 NOTE — PROGRESS NOTES
At approximately 0826 patient's saturation dropped to 28% with good pleth and HR was tachy in 110's. Patient was not responsive or vocalizing and RUE was rigid and jerking. This RN responded and applied NRB 15L. Oral suction established. Called MD and called PICC. Current IV access appears to be infiltrated. Called RRT. Seizure-like activity resolved prior to RRT arrival and patient began vocalizing and writhing, and HR returned to baseline in 80's. O2 saturation recovered to 100%. NRB removed. PIV established. Labs drawn and PRN Ativan ordered by RRT RN.

## 2021-07-18 NOTE — PROGRESS NOTES
Received message from patient's nurse stating:  Pt minorly hypertensive, 172/54 last BP. Was 595 systolic at start of shift. Can I get something PRN for her? Discussion / orders:     Ordered hydralazine 10 mg IV every 6 hours as needed for systolic blood pressure more than 897 or diastolic blood pressure more than 100           Please note that this note was dictated using Dragon computer voice recognition software. Quite often unanticipated grammatical, syntax, homophones, and other interpretive errors are inadvertently transcribed by the computer software. Please disregard these errors. Please excuse any errors that have escaped final proofreading.

## 2021-07-18 NOTE — PROGRESS NOTES
-Please complete MRI History and Safety Screening Form   - Patient cannot be scanned until this form is completed and reviewed in MRI to ensure patient is SAFE and eligible for MRI. - CALL MRI when this has been successfully completed at [phone].

## 2021-07-18 NOTE — PROGRESS NOTES
RAPID RESPONSE TEAM    Responded to overhead rapid response to 2250 for seizurelike activity    On arrival patient is in bed laying on left side, making various sounds, with generalized shaking/jerking movements noted. , oxygen saturation 98%, skin warm to touch. No incontinence noted. Patient withdraws BUE to sternal rub. Pupils sluggish and minimally reactive to light. +visual threat +cough/gag +nuchal rigidity    Dr. Cristiana Méndez at bedside. STAT CBC/CMP/AMMONIA/VBG/LA/CK collected during new PIV placement in L-AC. NS 500cc bolus, Ativan 1 mg q4h prn and labetalol 20 mg q4h prn orders placed. Neurology following. LP, MRI BRAIN and EEG pending      Patient Vitals for the past 4 hrs:   Temp Pulse Resp BP SpO2   07/18/21 0845 -- 99 27 124/87 97 %   07/18/21 0826 -- (!) 104 27 -- (!) 59 %   07/18/21 0756 97.9 °F (36.6 °C) 98 27 (!) 153/96 99 %       Patient to remain in 2250.      Ricardo Willson RN  RRT, [id number]

## 2021-07-19 ENCOUNTER — APPOINTMENT (OUTPATIENT)
Dept: GENERAL RADIOLOGY | Age: 71
DRG: 853 | End: 2021-07-19
Attending: INTERNAL MEDICINE
Payer: MEDICARE

## 2021-07-19 LAB
AMMONIA PLAS-SCNC: <10 UMOL/L
ANION GAP SERPL CALC-SCNC: 12 MMOL/L (ref 5–15)
APPEARANCE CSF: CLEAR
APPEARANCE CSF: CLEAR
ATRIAL RATE: 58 BPM
BASOPHILS # BLD: 0 K/UL (ref 0–0.1)
BASOPHILS NFR BLD: 0 % (ref 0–1)
BUN SERPL-MCNC: 39 MG/DL (ref 6–20)
BUN/CREAT SERPL: 5 (ref 12–20)
CALCIUM SERPL-MCNC: 9.4 MG/DL (ref 8.5–10.1)
CALCULATED P AXIS, ECG09: 63 DEGREES
CALCULATED R AXIS, ECG10: -14 DEGREES
CALCULATED T AXIS, ECG11: -140 DEGREES
CHLORIDE SERPL-SCNC: 103 MMOL/L (ref 97–108)
CO2 SERPL-SCNC: 24 MMOL/L (ref 21–32)
COLOR CSF: COLORLESS
COLOR CSF: COLORLESS
CREAT SERPL-MCNC: 7.83 MG/DL (ref 0.55–1.02)
CRYPTOC AG CSF QL IA: NEGATIVE
CRYPTOCOCCUS NEOFORMANS/GATTII, CRNEOG: NOT DETECTED
CYTOMEGALOVIRUS, CYMEG: NOT DETECTED
DIAGNOSIS, 93000: NORMAL
DIFFERENTIAL METHOD BLD: ABNORMAL
ENTEROVIRUS, ENTVIR: NOT DETECTED
EOSINOPHIL # BLD: 0 K/UL (ref 0–0.4)
EOSINOPHIL NFR BLD: 0 % (ref 0–7)
ERYTHROCYTE [DISTWIDTH] IN BLOOD BY AUTOMATED COUNT: 16.8 % (ref 11.5–14.5)
ESCHERICHIA COLI K1, ECK1: NOT DETECTED
GLUCOSE BLD STRIP.AUTO-MCNC: 129 MG/DL (ref 65–117)
GLUCOSE BLD STRIP.AUTO-MCNC: 76 MG/DL (ref 65–117)
GLUCOSE BLD STRIP.AUTO-MCNC: 88 MG/DL (ref 65–117)
GLUCOSE CSF-MCNC: 79 MG/DL (ref 40–70)
GLUCOSE SERPL-MCNC: 122 MG/DL (ref 65–100)
HAEMOPHILUS INFLUENZAE, HAEFLU: NOT DETECTED
HCT VFR BLD AUTO: 36.8 % (ref 35–47)
HERPES SIMPLEX VIRUS 2, HSIMV2: NOT DETECTED
HGB BLD-MCNC: 11.1 G/DL (ref 11.5–16)
HSV1 DNA CSF QL NAA+PROBE: NOT DETECTED
HUMAN HERPESVIRUS 6, HUHV6: NOT DETECTED
HUMAN PARECHOVIRUS, HUPARV: NOT DETECTED
IMM GRANULOCYTES # BLD AUTO: 0 K/UL (ref 0–0.04)
IMM GRANULOCYTES NFR BLD AUTO: 0 % (ref 0–0.5)
LACTATE SERPL-SCNC: 1.3 MMOL/L (ref 0.4–2)
LISTERIA MONOCYTOGENES, LISMON: NOT DETECTED
LYMPHOCYTES # BLD: 1.6 K/UL (ref 0.8–3.5)
LYMPHOCYTES NFR BLD: 12 % (ref 12–49)
MCH RBC QN AUTO: 26.4 PG (ref 26–34)
MCHC RBC AUTO-ENTMCNC: 30.2 G/DL (ref 30–36.5)
MCV RBC AUTO: 87.4 FL (ref 80–99)
MONOCYTES # BLD: 1.1 K/UL (ref 0–1)
MONOCYTES NFR BLD: 8 % (ref 5–13)
NEISSERIA MENINGITIDIS, NEIMEN: NOT DETECTED
NEUTS SEG # BLD: 10.5 K/UL (ref 1.8–8)
NEUTS SEG NFR BLD: 80 % (ref 32–75)
NRBC # BLD: 0 K/UL (ref 0–0.01)
NRBC BLD-RTO: 0 PER 100 WBC
P-R INTERVAL, ECG05: 204 MS
PLATELET # BLD AUTO: 157 K/UL (ref 150–400)
PMV BLD AUTO: 12.1 FL (ref 8.9–12.9)
POTASSIUM SERPL-SCNC: 3.6 MMOL/L (ref 3.5–5.1)
PROT CSF-MCNC: 47 MG/DL (ref 15–45)
Q-T INTERVAL, ECG07: 408 MS
QRS DURATION, ECG06: 88 MS
QTC CALCULATION (BEZET), ECG08: 400 MS
RBC # BLD AUTO: 4.21 M/UL (ref 3.8–5.2)
RBC # CSF: 1 /CU MM
RBC # CSF: 108 /CU MM
SERVICE CMNT-IMP: ABNORMAL
SERVICE CMNT-IMP: NORMAL
SERVICE CMNT-IMP: NORMAL
SODIUM SERPL-SCNC: 139 MMOL/L (ref 136–145)
STREPTOCOCCUS AGALACTIAE, SAGA: NOT DETECTED
STREPTOCOCCUS PNEUMONIAE, STRPNE: NOT DETECTED
TUBE # CSF: 1
TUBE # CSF: 2
TUBE # CSF: 2
TUBE # CSF: 4
VARICELLA ZOSTER VIRUS, VAZOVI: NOT DETECTED
VENTRICULAR RATE, ECG03: 58 BPM
WBC # BLD AUTO: 13.2 K/UL (ref 3.6–11)
WBC # CSF: 2 /CU MM (ref 0–5)
WBC # CSF: 2 /CU MM (ref 0–5)

## 2021-07-19 PROCEDURE — 77003 FLUOROGUIDE FOR SPINE INJECT: CPT

## 2021-07-19 PROCEDURE — 87327 CRYPTOCOCCUS NEOFORM AG IA: CPT

## 2021-07-19 PROCEDURE — 80048 BASIC METABOLIC PNL TOTAL CA: CPT

## 2021-07-19 PROCEDURE — 5A1D70Z PERFORMANCE OF URINARY FILTRATION, INTERMITTENT, LESS THAN 6 HOURS PER DAY: ICD-10-PCS | Performed by: STUDENT IN AN ORGANIZED HEALTH CARE EDUCATION/TRAINING PROGRAM

## 2021-07-19 PROCEDURE — 74011000258 HC RX REV CODE- 258: Performed by: GENERAL ACUTE CARE HOSPITAL

## 2021-07-19 PROCEDURE — 90935 HEMODIALYSIS ONE EVALUATION: CPT

## 2021-07-19 PROCEDURE — 95816 EEG AWAKE AND DROWSY: CPT | Performed by: PSYCHIATRY & NEUROLOGY

## 2021-07-19 PROCEDURE — 82140 ASSAY OF AMMONIA: CPT

## 2021-07-19 PROCEDURE — 83605 ASSAY OF LACTIC ACID: CPT

## 2021-07-19 PROCEDURE — 2709999900 HC NON-CHARGEABLE SUPPLY

## 2021-07-19 PROCEDURE — 74011250636 HC RX REV CODE- 250/636: Performed by: GENERAL ACUTE CARE HOSPITAL

## 2021-07-19 PROCEDURE — 87483 CNS DNA AMP PROBE TYPE 12-25: CPT

## 2021-07-19 PROCEDURE — 89050 BODY FLUID CELL COUNT: CPT

## 2021-07-19 PROCEDURE — 009U3ZX DRAINAGE OF SPINAL CANAL, PERCUTANEOUS APPROACH, DIAGNOSTIC: ICD-10-PCS | Performed by: RADIOLOGY

## 2021-07-19 PROCEDURE — 65660000000 HC RM CCU STEPDOWN

## 2021-07-19 PROCEDURE — 85025 COMPLETE CBC W/AUTO DIFF WBC: CPT

## 2021-07-19 PROCEDURE — 74011250636 HC RX REV CODE- 250/636: Performed by: INTERNAL MEDICINE

## 2021-07-19 PROCEDURE — 74011000258 HC RX REV CODE- 258: Performed by: INTERNAL MEDICINE

## 2021-07-19 PROCEDURE — 82962 GLUCOSE BLOOD TEST: CPT

## 2021-07-19 PROCEDURE — 82945 GLUCOSE OTHER FLUID: CPT

## 2021-07-19 PROCEDURE — 36415 COLL VENOUS BLD VENIPUNCTURE: CPT

## 2021-07-19 PROCEDURE — 84157 ASSAY OF PROTEIN OTHER: CPT

## 2021-07-19 PROCEDURE — 99233 SBSQ HOSP IP/OBS HIGH 50: CPT | Performed by: PSYCHIATRY & NEUROLOGY

## 2021-07-19 RX ORDER — HYDRALAZINE HYDROCHLORIDE 20 MG/ML
20 INJECTION INTRAMUSCULAR; INTRAVENOUS
Status: DISCONTINUED | OUTPATIENT
Start: 2021-07-19 | End: 2021-08-07 | Stop reason: HOSPADM

## 2021-07-19 RX ORDER — HYDRALAZINE HYDROCHLORIDE 20 MG/ML
20 INJECTION INTRAMUSCULAR; INTRAVENOUS ONCE
Status: DISPENSED | OUTPATIENT
Start: 2021-07-19 | End: 2021-07-19

## 2021-07-19 RX ADMIN — LATANOPROST 1 DROP: 50 SOLUTION OPHTHALMIC at 23:41

## 2021-07-19 RX ADMIN — BRIMONIDINE TARTRATE 1 DROP: 2 SOLUTION OPHTHALMIC at 17:13

## 2021-07-19 RX ADMIN — AMPICILLIN SODIUM 2 G: 2 INJECTION, POWDER, FOR SOLUTION INTRAVENOUS at 13:35

## 2021-07-19 RX ADMIN — TIMOLOL MALEATE 1 DROP: 2.5 SOLUTION/ DROPS OPHTHALMIC at 08:02

## 2021-07-19 RX ADMIN — Medication 10 ML: at 23:41

## 2021-07-19 RX ADMIN — ACYCLOVIR SODIUM 300 MG: 50 INJECTION, SOLUTION INTRAVENOUS at 03:50

## 2021-07-19 RX ADMIN — CEFEPIME HYDROCHLORIDE 1 G: 1 INJECTION, POWDER, FOR SOLUTION INTRAMUSCULAR; INTRAVENOUS at 23:36

## 2021-07-19 RX ADMIN — VANCOMYCIN HYDROCHLORIDE 500 MG: 500 INJECTION, POWDER, LYOPHILIZED, FOR SOLUTION INTRAVENOUS at 19:54

## 2021-07-19 RX ADMIN — HEPARIN SODIUM 5000 UNITS: 5000 INJECTION INTRAVENOUS; SUBCUTANEOUS at 15:11

## 2021-07-19 RX ADMIN — LEVETIRACETAM 500 MG: 100 INJECTION, SOLUTION INTRAVENOUS at 05:45

## 2021-07-19 RX ADMIN — BRIMONIDINE TARTRATE 1 DROP: 2 SOLUTION OPHTHALMIC at 08:02

## 2021-07-19 RX ADMIN — Medication 10 ML: at 05:45

## 2021-07-19 RX ADMIN — AMPICILLIN SODIUM 2 G: 2 INJECTION, POWDER, FOR SOLUTION INTRAVENOUS at 23:38

## 2021-07-19 RX ADMIN — HEPARIN SODIUM 5000 UNITS: 5000 INJECTION INTRAVENOUS; SUBCUTANEOUS at 23:41

## 2021-07-19 RX ADMIN — PREDNISOLONE ACETATE 1 DROP: 10 SUSPENSION/ DROPS OPHTHALMIC at 08:00

## 2021-07-19 RX ADMIN — Medication 10 ML: at 13:37

## 2021-07-19 RX ADMIN — SODIUM CHLORIDE 50 ML/HR: 9 INJECTION, SOLUTION INTRAVENOUS at 00:04

## 2021-07-19 RX ADMIN — PREDNISOLONE ACETATE 1 DROP: 10 SUSPENSION/ DROPS OPHTHALMIC at 17:13

## 2021-07-19 RX ADMIN — PREDNISOLONE ACETATE 1 DROP: 10 SUSPENSION/ DROPS OPHTHALMIC at 23:42

## 2021-07-19 NOTE — WOUND CARE
Wound care Nurse consult: consult placed for \"Abx infiltrate in R hand- blisters forming\". Patient is a 67 y/o female adx for sepsis, MRSA and airborne precautions. Patient off floor for lumbar puncture for suspected meningitis. Past Medical History:   Diagnosis Date    Diabetes ([facility] [address]     Hypertension      Patient's nurse states that Vancomycin was running in left hand when it infiltrated and she has swelling and serous blisters to left hand. Patient now has a central line. Recommend:    Left hand IV infiltrate sight: keep hand elevated above the heart to reduce edema. Cleanse with wound cleanser spray, gently wipe clean with 4x4. Cover blister/bullae with Xeroform gauze, dry 4x4's/ABD pad and secure with justin.     Garland Tracey RN, [employer]

## 2021-07-19 NOTE — PROGRESS NOTES
1900- Bedside shift change report given to Jaylin Marti RN (oncoming nurse) by Ila Dixon RN (offgoing nurse). Report included the following information SBAR, Kardex, ED Summary, Intake/Output, MAR, Recent Results, Med Rec Status and Cardiac Rhythm NSR.    2000- This RN and Alis Amin RN witnessed telephone consent from daughter for placement of CVC. Anesthesia bedside for placement. Trip lument LIJ placed, blood return from all ports. CXR order entered. 0230- CXR report not optimal positioning. Reached out to NP Purveyor on how to proceed. 0400- Spoke to Dr. Tova Lane on utilization of CVC, was told this line is acceptable to use. 0700- End of Shift Note    Bedside shift change report given to Aristides Bhatt (oncoming nurse) by Tata Patiño RN (offgoing nurse). Report included the following information SBAR, Kardex, ED Summary, Intake/Output, MAR, Recent Results, Med Rec Status and Cardiac Rhythm NSR    Shift worked:  7p-7a     Shift summary and any significant changes:     Pt minimally responsive to stimuli, does withdraw to pain. Pt is able to protect airway. Pt is still AMS, has not been able to verbalize anything.  CVC placed and acceptable to use     Concerns for physician to address:       Zone phone for oncoming shift:          Activity:  Activity Level: Bed Rest  Number times ambulated in hallways past shift: 0  Number of times OOB to chair past shift: 0    Cardiac:   Cardiac Monitoring: Yes      Cardiac Rhythm: Sinus Rhythm    Access:   Current line(s): central line     Genitourinary:   Urinary status: anuric    Respiratory:   O2 Device: None (Room air)  Chronic home O2 use?: NO  Incentive spirometer at bedside: NO     GI:  Last Bowel Movement Date:  (HA)  Current diet:  DIET NPO  Passing flatus: YES  Tolerating current diet: YES       Pain Management:   Patient states pain is manageable on current regimen: YES    Skin:  Mick Score: 15  Interventions: turn team, speciality bed, increase time out of bed and PT/OT consult    Patient Safety:  Fall Score:  Total Score: 2  Interventions: bed/chair alarm, assistive device (walker, cane, etc), pt to call before getting OOB and stay with me (per policy)  High Fall Risk: Yes    Length of Stay:  Expected LOS: - - -  Actual LOS: 81 Daquan Raya RN

## 2021-07-19 NOTE — PROGRESS NOTES
Lumbar puncture was completed by Dr. Dimitri Rodriguez and collected fluid was taken to the lab for testing. Please keep the patient recumbent for one hour to avoid headache.

## 2021-07-19 NOTE — PROGRESS NOTES
Hospitalist Progress Note    NAME: Prince Cooney   :  1950   MRN:  [id number]       Assessment / Plan:     Sepsis  Acute encephalopathy concern for meningoencephalitis  ESRD on HD MWF  New onset seizure disorder   Anti-pyretics PRN - T 102  Given no reliable source of pt's significant encephalopathy and fever, there is a concern for meninigitis. Pt is immunocompromised. C/w  IV Cefepime, Vancomycin, Ampicillin and Acylovir. Procalcitonin 0.67   rapid COVID-19 negative   Blood CX negative to date  Neurology Consult appreciated, need LP with IR and MRI of the brain  Nephrology Consult  CT Abdo reveals rectal wall thickening that is suspicious for malignancy. CTS  consulted,    Head CT:    IMPRESSION  1. No acute intracranial hemorrhage. 2. 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 Age-indeterminate microvascular ischemic disease in the periventricular white  matter. CT Abdo/Pelv: IMPRESSION  1. Circumferential rectal wall thickening is suspicious for malignancy. No bowel  obstruction. 2. No other acute abnormality in the abdomen or pelvis. Uterine fibroids are  noted. 3. Moderate cardiomegaly. Patient still lethargic, keep n.p.o. we will start maintenance fluid if okay with nephrology  MRI of the brain showed:  Encephalomalacia and white matter disease. No acute intracranial abnormality      Patient is afebrile, white blood cell count elevated 13 K  Patient still altered  EEG done today , results reviewed  Lactic acid was elevated on 0 718 after seizure activity, down to within normal limit  Continue with IV antibiotics  LP done today, follow-up meningitis panel    Rapid response : seizure activity on   Rapid response was called by the nurse for seizure-like activity with generalized tonicoclonic movement associated with unresponsiveness and hypoxia. Per nursing it lasted few minutes.   By the time of my evaluation, the seizure had resolved and patient was just having abnormal movement on her lower extremity but was awake and vocalizing . patient was put on nonrebreather with improvement of her sats to 100%  Vital signs showed normal blood pressure  On neurological exam, pupils were not reactive to light, was not  following command  Ordered a stat vbg  lactic acid and ammonia level  Labs reviewed showed normal CO2, elevated lactic acid at 5.9 most likely due to seizure activity. Will hold off on IVF bolus , lactic acid should trend down    Caution with fluid overload as patient is end-stage renal disease on hemodialysis  Will start low rate maintenance IVF   Her ammonia level was slightly elevated, CPK was within normal limit  As needed Ativan ordered, seizure precaution. EEG ordered  Spoke with neurology who will evaluate the patient  Update: Patient was evaluated by neurology, who started the patient on [address]  Patient is on Keppra renally dosed 5oomg daily    Difficult IV access: Central line placed on 07/18  Hypokalemia  Resolved    DMII  HTN  CAD  FS monitoring, SS    Eye surgery in may : glaucoma   Daughter that she has a cup in her R eye   Ophthalmologist is Dr. Rina Payne at [facility]    Code Status: Full  Surrogate Decision Maker: Daughter  DVT Prophylaxis: SCD  GI Prophylaxis: not indicated  Baseline: Independent    25.0 - 29.9 Overweight / Body mass index is 25.08 kg/m². Estimated discharge date: July 22  Barriers: Currently very sick  Updated daughter at bedside on 07/19  Updated daughter over the phone on 07/17 and 07/18  Code status: Full  Prophylaxis: Hep SQ  Recommended Disposition: TBD     Subjective:     Chief Complaint / Reason for Physician Visit  Follow-up encephalopathy  Altered this morning, still having jerking movement on the right side of her body  discussed with RN events overnight.      Review of Systems:  Symptom Y/N Comments  Symptom Y/N Comments   Fever/Chills    Chest Pain     Poor Appetite    Edema     Cough Abdominal Pain     Sputum    Joint Pain     SOB/CHURCH    Pruritis/Rash     Nausea/vomit    Tolerating PT/OT     Diarrhea    Tolerating Diet     Constipation    Other       Could NOT obtain due to:  Lethargic     Objective:     VITALS:   Last 24hrs VS reviewed since prior progress note. Most recent are:  Patient Vitals for the past 24 hrs:   Temp Pulse Resp BP SpO2   07/19/21 0800 -- 84 -- -- --   07/19/21 0735 98.3 °F (36.8 °C) 76 20 (!) 172/71 100 %   07/19/21 0303 98.2 °F (36.8 °C) 66 15 (!) 156/65 100 %   07/19/21 0004 98.7 °F (37.1 °C) 72 19 138/80 100 %   07/18/21 2000 98.3 °F (36.8 °C) 76 20 (!) 169/66 100 %   07/18/21 1600 -- 75 25 (!) 156/79 100 %   07/18/21 1403 -- 77 22 124/77 94 %   07/18/21 1300 98.3 °F (36.8 °C) (!) 102 20 (!) 149/94 --   07/18/21 1030 -- 78 27 116/78 100 %   07/18/21 0845 -- 99 27 124/87 97 %     No intake or output data in the 24 hours ending 07/19/21 0832     I had a face to face encounter and independently examined this patient on 7/19/2021, as outlined below:  PHYSICAL EXAM:  General: WD, WN. Awake but not following commands cooperative, no acute distress    EENT:  EOMI. Anicteric sclerae. MMM  Eye : Right eye surgery   resp:  CTA bilaterally, no wheezing or rales. No accessory muscle use  CV:  Regular  rhythm,  No edema  GI:  Soft, Non distended, Non tender. +Bowel sounds  Neurologic:  Not following commands, jerking movement on her lower extremity and right upper extremity  Not following commands, bilateral pupil not reactive  psych:   Unable to assess   Skin:  No rashes.   No jaundice    Reviewed most current lab test results and cultures  YES  Reviewed most current radiology test results   YES  Review and summation of old records today    NO  Reviewed patient's current orders and MAR    YES  PMH/SH reviewed - no change compared to H&P  ________________________________________________________________________  Care Plan discussed with:    Comments   Patient x    Family  x daughter   RN x    Care Manager     Consultant                       x Multidiciplinary team rounds were held today with , nursing, pharmacist and clinical coordinator. Patient's plan of care was discussed; medications were reviewed and discharge planning was addressed. ________________________________________________________________________  Total NON critical care TIME:45 Minutes    Total CRITICAL CARE TIME Spent: Minutes non procedure based      Comments   >50% of visit spent in counseling and coordination of care     ________________________________________________________________________  Gladis Mendiola MD     Procedures: see electronic medical records for all procedures/Xrays and details which were not copied into this note but were reviewed prior to creation of Plan. LABS:  I reviewed today's most current labs and imaging studies. Pertinent labs include:  Recent Labs     07/19/21 0437 07/18/21  0907 07/16/21  2345   WBC 13.2* 10.4 7.3   HGB 11.1* 12.4 12.3   HCT 36.8 41.0 40.4    194 147*     Recent Labs     07/19/21  0437 07/18/21  0907 07/16/21  2345 07/16/21  1615 07/16/21  1615    138 137   < > 140   K 3.6 4.7 3.4*   < > 4.1    100 99   < > 98   CO2 24 27 31   < > 34*   * 102* 131*   < > 137*   BUN 39* 28* 9   < > 6   CREA 7.83* 7.15* 4.01*   < > 3.33*   CA 9.4 10.1 9.4   < > 10.0   MG  --   --  2.1  --   --    PHOS  --   --  2.4*  --   --    ALB  --  4.0 4.0  --  4.2   TBILI  --  1.6* 1.1*  --  1.1*   ALT  --  18 12  --  19    < > = values in this interval not displayed.        Signed: Gladis Mendiola MD

## 2021-07-19 NOTE — PROGRESS NOTES
NEUROLOGY NOTE     Chief complaint: Altered mental status    SUBJECTIVE:  Pt continues to be stuporous. HPI  The patient is a 78-year-old woman was brought to the ER as the patient is encephalopathic. Patient started having symptom onset the day prior and became increasingly drowsy in the morning and then had altered mental status after dialysis. The patient did complain of headache. On presentation patient's temperature was 101.9 and has not spiked any temperature since last night. Patient's Covid test is negative. Creatinine is 3.33. 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 Patient has been started on empiric antibiotics for possible meningitis. ROS  A ten system review of constitutional, cardiovascular, respiratory, musculoskeletal, endocrine, skin, SHEENT, genitourinary, psychiatric and neurologic systems was obtained and is unremarkable except as stated in HPI     PMH  Past Medical History:   Diagnosis Date    Diabetes ([facility] [address]     Hypertension        FH  No family history on file. SH  Social History     Socioeconomic History    Marital status:      Spouse name: Not on file    Number of children: Not on file    Years of education: Not on file    Highest education level: Not on file   Tobacco Use    Smoking status: Never Smoker    Smokeless tobacco: Never Used   Vaping Use    Vaping Use: Never used   Substance and Sexual Activity    Alcohol use: Not Currently    Drug use: Not Currently    Sexual activity: Yes     Partners: Female     Social Determinants of Health     Financial Resource Strain:     Difficulty of Paying Living Expenses:    Food Insecurity:     Worried About Running Out of Food in the Last Year:     [address] in the Last Year:    Transportation Needs:     Lack of Transportation (Medical):      Lack of Transportation (Non-Medical):    Physical Activity:     Days of Exercise per Week:     Minutes of Exercise per Session:    Stress:     Feeling of Stress :    Social Connections:     Frequency of Communication with Friends and Family:     Frequency of Social Gatherings with Friends and Family:     Attends Shinto Services:     Active Member of Clubs or Organizations:     Attends Club or Organization Meetings:     Marital Status:        ALLERGIES  No Known Allergies    PHYSICAL EXAMINATION:   Patient Vitals for the past 24 hrs:   Temp Pulse Resp BP SpO2   07/19/21 1326 -- 70 23 (!) 171/66 100 %   07/19/21 1200 -- (!) 55 -- -- --   07/19/21 1105 98.3 °F (36.8 °C) 65 23 (!) 161/59 99 %   07/19/21 1101 -- 65 25 (!) 161/59 99 %   07/19/21 0800 -- 84 -- -- --   07/19/21 0735 98.3 °F (36.8 °C) 76 20 (!) 172/71 100 %   07/19/21 0303 98.2 °F (36.8 °C) 66 15 (!) 156/65 100 %   07/19/21 0004 98.7 °F (37.1 °C) 72 19 138/80 100 %   07/18/21 2000 98.3 °F (36.8 °C) 76 20 (!) 169/66 100 %   07/18/21 1600 -- 75 25 (!) 156/79 100 %        General:   General appearance: Pt is in no acute distress   Distal pulses are preserved    Neurological Examination:   Mental Status: Stuporous. Non verbal. Grimaces to pain. Cranial Nerves: PERRL, Extraocular movements are full. Facial sensation intact. Facial movement intact. Motor: Cannot do formal strength testing. Symmetric. Normal tone. No atrophy. Sensation: Intact to pain    Skin: No significant bruising or lacerations.     LAB DATA REVIEWED:    Recent Results (from the past 24 hour(s))   GLUCOSE, POC    Collection Time: 07/18/21  5:51 PM   Result Value Ref Range    Glucose (POC) 113 65 - 117 mg/dL    Performed by Russel Villatoro (KRYSTLE RN)    GLUCOSE, POC    Collection Time: 07/18/21 11:56 PM   Result Value Ref Range    Glucose (POC) 104 65 - 117 mg/dL    Performed by Willie Go RN    LACTIC ACID    Collection Time: 07/19/21  4:37 AM   Result Value Ref Range    Lactic acid 1.3 0.4 - 2.0 MMOL/L   CBC WITH AUTOMATED DIFF Collection Time: 07/19/21  4:37 AM   Result Value Ref Range    WBC 13.2 (H) 3.6 - 11.0 K/uL    RBC 4.21 3.80 - 5.20 M/uL    HGB 11.1 (L) 11.5 - 16.0 g/dL    HCT 36.8 35.0 - 47.0 %    MCV 87.4 80.0 - 99.0 FL    MCH 26.4 26.0 - 34.0 PG    MCHC 30.2 30.0 - 36.5 g/dL    RDW 16.8 (H) 11.5 - 14.5 %    PLATELET 766 079 - 260 K/uL    MPV 12.1 8.9 - 12.9 FL    NRBC 0.0 0  WBC    ABSOLUTE NRBC 0.00 0.00 - 0.01 K/uL    NEUTROPHILS 80 (H) 32 - 75 %    LYMPHOCYTES 12 12 - 49 %    MONOCYTES 8 5 - 13 %    EOSINOPHILS 0 0 - 7 %    BASOPHILS 0 0 - 1 %    IMMATURE GRANULOCYTES 0 0.0 - 0.5 %    ABS. NEUTROPHILS 10.5 (H) 1.8 - 8.0 K/UL    ABS. LYMPHOCYTES 1.6 0.8 - 3.5 K/UL    ABS. MONOCYTES 1.1 (H) 0.0 - 1.0 K/UL    ABS. EOSINOPHILS 0.0 0.0 - 0.4 K/UL    ABS. BASOPHILS 0.0 0.0 - 0.1 K/UL    ABS. IMM. GRANS. 0.0 0.00 - 0.04 K/UL    DF AUTOMATED     METABOLIC PANEL, BASIC    Collection Time: 07/19/21  4:37 AM   Result Value Ref Range    Sodium 139 136 - 145 mmol/L    Potassium 3.6 3.5 - 5.1 mmol/L    Chloride 103 97 - 108 mmol/L    CO2 24 21 - 32 mmol/L    Anion gap 12 5 - 15 mmol/L    Glucose 122 (H) 65 - 100 mg/dL    BUN 39 (H) 6 - 20 MG/DL    Creatinine 7.83 (H) 0.55 - 1.02 MG/DL    BUN/Creatinine ratio 5 (L) 12 - 20      GFR est AA 6 (L) >60 ml/min/1.73m2    GFR est non-AA 5 (L) >60 ml/min/1.73m2    Calcium 9.4 8.5 - 10.1 MG/DL   AMMONIA    Collection Time: 07/19/21  4:37 AM   Result Value Ref Range    Ammonia <10 <32 UMOL/L   GLUCOSE, POC    Collection Time: 07/19/21  6:24 AM   Result Value Ref Range    Glucose (POC) 129 (H) 65 - 117 mg/dL    Performed by Kaela Kim RN         Imaging review:  CT Head  Normal. No acute changes. MRI brain  Encephalomalacia and white matter disease. No acute intracranial abnormality    EEG  Triphasic waves    HOME MEDS  Prior to Admission Medications   Prescriptions Last Dose Informant Patient Reported? Taking? Blood-Glucose Meter monitoring kit   No No   Sig: Use as directed.  Dx: E11.65 check sugar twice daily   Insulin Needles, Disposable, 32 gauge x \" ndle   No No   Si Each by Does Not Apply route daily. alcohol swabs padm Not Taking at Unknown time  No No   Si Each by Apply Externally route four (4) times daily. For use with insulin and glucometer   Patient not taking: Reported on 2021   amLODIPine (NORVASC) 5 mg tablet 2021 at Unknown time  Yes Yes   Sig: Take 5 mg by mouth daily. aspirin delayed-release 81 mg tablet 2021 at Unknown time  Yes Yes   Sig: Take  by mouth daily. atorvastatin (LIPITOR) 40 mg tablet 2021 at Unknown time  Yes Yes   Sig: Take  by mouth daily. b complex-vitamin c-folic acid 0.8 mg (Dialyvite 800) 0.8 mg tab tablet Not Taking at Unknown time  No No   Sig: Take 1 Tab by mouth daily. Patient not taking: Reported on 2021   brimonidine-timoloL (Combigan) 0.2-0.5 % drop ophthalmic solution 2021 at Unknown time  Yes Yes   Si Drop every twelve (12) hours. famotidine (PEPCID) 20 mg tablet 2021 at Unknown time  No Yes   Sig: TAKE 1 TAB BY MOUTH TWO (2) TIMES DAILY AS NEEDED FOR HEARTBURN.   glucose blood VI test strips (ASCENSIA AUTODISC VI, ONE TOUCH ULTRA TEST VI) strip   No No   Sig: Check sugar twice daily   insulin glargine (LANTUS,BASAGLAR) 100 unit/mL (3 mL) inpn   No No   Sig: 10 Units by SubCUTAneous route daily (with dinner). isosorbide mononitrate (ISMO, MONOKET) 20 mg tablet Not Taking at Unknown time  No No   Sig: Take 1 Tab by mouth two (2) times a day. Patient not taking: Reported on 2021   lancets misc   No No   Sig: Check sugar twice daily. E11.65   levothyroxine (SYNTHROID) 50 mcg tablet 2021 at Unknown time  No Yes   Sig: TAKE 1 TABLET BY MOUTH EVERY DAY BEFORE BREAKFAST   metoprolol succinate (TOPROL-XL) 50 mg XL tablet 2021 at Unknown time  Yes Yes   Sig: Take  by mouth daily.       Facility-Administered Medications: None       CURRENT MEDS  Current Facility-Administered Medications   Medication Dose Route Frequency    hydrALAZINE (APRESOLINE) 20 mg/mL injection 20 mg  20 mg IntraVENous ONCE    levETIRAcetam (KEPPRA) 500 mg in 0.9% sodium chloride 100 mL IVPB  500 mg IntraVENous Q12H    0.9% sodium chloride infusion  50 mL/hr IntraVENous CONTINUOUS    sodium chloride (NS) flush 5-40 mL  5-40 mL IntraVENous Q8H    insulin lispro (HUMALOG) injection   SubCUTAneous Q6H    acyclovir (ZOVIRAX) 300 mg in 0.9% sodium chloride 100 mL IVPB  5 mg/kg IntraVENous Q24H    brimonidine (ALPHAGAN) 0.2 % ophthalmic solution 1 Drop  1 Drop Both Eyes BID    timolol (TIMOPTIC) 0.25% ophthalmic solution  1 Drop Both Eyes DAILY    prednisoLONE acetate (PRED FORTE) 1 % ophthalmic suspension 1 Drop  1 Drop Right Eye TID    latanoprost (XALATAN) 0.005 % ophthalmic solution 1 Drop  1 Drop Both Eyes QHS    heparin (porcine) injection 5,000 Units  5,000 Units SubCUTAneous Q8H    VANCOMYCIN INFORMATION NOTE   Other Rx Dosing/Monitoring    ampicillin (OMNIPEN) 2 g in 0.9% sodium chloride (MBP/ADV) 100 mL MBP  2 g IntraVENous Q12H    cefepime (MAXIPIME) 1 g in 0.9% sodium chloride (MBP/ADV) 50 mL MBP  1 g IntraVENous Q24H       IMPRESSION/RECOMMENDATIONS:  Derik Crow is a 66 y.o. female who presents with altered mental status in the setting of fever. +neck stiffness. LP is negative for LP. Will wait for meningitis panel to result. Will order anti TPO antibodies. Cont keppra for possible seizures. 1.  Meningitis panel is pending. 2.  Cont abx.   3.  Decrease Keppra to 500 mg daily (renal dosing)      Meme Mitchell MD  Neurologist

## 2021-07-19 NOTE — PROGRESS NOTES
TRANSFER - IN REPORT:    Verbal report received from Lukasz Limon RN on Marcia Macias  being received from [facility](unit) for ordered procedure      Report consisted of patients Situation, Background, Assessment and   Recommendations(SBAR). Information from the following report(s) Kardex was reviewed with the receiving nurse. Opportunity for questions and clarification was provided. Assessment completed upon patients arrival to unit and care assumed.

## 2021-07-19 NOTE — PROCEDURES
Central Line Procedure Note    Indication: Inadequate venous access    Risks, benefits, alternatives explained and family agrees to proceed. Patient positioned in Trendelenburg. 7-Step Sterility Protocol followed. (cap, mask sterile gown, sterile gloves, large sterile sheet, hand hygiene, 2% chlorhexidine for cutaneous antisepsis)  5 mL 1% Lidocaine placed at insertion site. Right IJ cannulated but unable to advance guidewire. Left internal jugular cannulated x 1 attempt(s) utilizing the Seldinger technique. Catheter secured & Biopatch applied. Sterile Tegaderm placed. CXR pending.     Care turned over to covering Attending MD.

## 2021-07-19 NOTE — PROGRESS NOTES
TRANSFER - OUT REPORT:    Verbal report given to JUAN FRANCISCO Bragg on Marcia Macias  being transferred to [facility](unit) for ordered procedure       Report consisted of patients Situation, Background, Assessment and   Recommendations(SBAR). Information from the following report(s) Kardex was reviewed with the receiving nurse. Opportunity for questions and clarification was provided.       Patient transported with:   China Rider at bedside

## 2021-07-19 NOTE — PROGRESS NOTES
Nephrology Progress Note  Piter Perez     [email or web address]  Phone - [phone]   Patient: Bahman Quintero    YOB: 1950        Date- 7/19/2021   Admit Date: 7/16/2021  CC: Follow up for esrd        IMPRESSION & PLAN:    ESRD- [facility]- davita laburnum  · AMS  · SEPSIS  · ANEMIA  · CA/P/PTH/VITD  · DM  ·  HTN    PLAN-   Hd today   Prn hydralazine   Resume po meds. norvasc when able to    Stop ivf   Plan for LP noted     Subjective: Interval History:   -  Patient is confused. She is under meningitis isolation  Patient seen from out side of the room  D/w nurse    Objective:   Vitals:    07/19/21 0004 07/19/21 0303 07/19/21 0735 07/19/21 0800   BP: 138/80 (!) 156/65 (!) 172/71    Pulse: 72 66 76 84   Resp: 19 15 20    Temp: 98.7 °F (37.1 °C) 98.2 °F (36.8 °C) 98.3 °F (36.8 °C)    SpO2: 100% 100% 100%    Weight:       Height:          No intake/output data recorded. Last 3 Recorded Weights in this Encounter    07/16/21 2236   Weight: 62.2 kg (137 lb 2 oz)      Physical exam:   GEN: NAD  RESP: No wheezing  NEURO: Can't access due to patient's current condition   PSYCH: Can't access due to patient's current condition       Chart reviewed. Pertinent Notes reviewed. Data Review :  Recent Labs     07/19/21 0437 07/18/21  0907 07/16/21  2345    138 137   K 3.6 4.7 3.4*    100 99   CO2 24 27 31   BUN 39* 28* 9   CREA 7.83* 7.15* 4.01*   * 102* 131*   CA 9.4 10.1 9.4   MG  --   --  2.1   PHOS  --   --  2.4*     Recent Labs     07/19/21  0437 07/18/21  0907 07/16/21  2345   WBC 13.2* 10.4 7.3   HGB 11.1* 12.4 12.3   HCT 36.8 41.0 40.4    194 147*     No results for input(s): FE, TIBC, PSAT, FERR in the last 72 hours.    Medication list  reviewed  Current Facility-Administered Medications   Medication Dose Route Frequency    LORazepam (ATIVAN) injection 1 mg  1 mg IntraVENous Q4H PRN    labetaloL (NORMODYNE;TRANDATE) injection 20 mg  20 mg IntraVENous Q4H PRN    levETIRAcetam (KEPPRA) 500 mg in 0.9% sodium chloride 100 mL IVPB  500 mg IntraVENous Q12H    0.9% sodium chloride infusion  50 mL/hr IntraVENous CONTINUOUS    glucose chewable tablet 16 g  4 Tablet Oral PRN    dextrose (D50W) injection syrg 12.5-25 g  12.5-25 g IntraVENous PRN    glucagon (GLUCAGEN) injection 1 mg  1 mg IntraMUSCular PRN    sodium chloride (NS) flush 5-40 mL  5-40 mL IntraVENous Q8H    sodium chloride (NS) flush 5-40 mL  5-40 mL IntraVENous PRN    acetaminophen (TYLENOL) tablet 650 mg  650 mg Oral Q6H PRN    polyethylene glycol (MIRALAX) packet 17 g  17 g Oral DAILY PRN    ondansetron (ZOFRAN ODT) tablet 4 mg  4 mg Oral Q8H PRN    Or    ondansetron (ZOFRAN) injection 4 mg  4 mg IntraVENous Q6H PRN    insulin lispro (HUMALOG) injection   SubCUTAneous Q6H    acyclovir (ZOVIRAX) 300 mg in 0.9% sodium chloride 100 mL IVPB  5 mg/kg IntraVENous Q24H    acetaminophen (TYLENOL) suppository 650 mg  650 mg Rectal Q4H PRN    brimonidine (ALPHAGAN) 0.2 % ophthalmic solution 1 Drop  1 Drop Both Eyes BID    timolol (TIMOPTIC) 0.25% ophthalmic solution  1 Drop Both Eyes DAILY    prednisoLONE acetate (PRED FORTE) 1 % ophthalmic suspension 1 Drop  1 Drop Right Eye TID    latanoprost (XALATAN) 0.005 % ophthalmic solution 1 Drop  1 Drop Both Eyes QHS    heparin (porcine) injection 5,000 Units  5,000 Units SubCUTAneous Q8H    VANCOMYCIN INFORMATION NOTE   Other Rx Dosing/Monitoring    ampicillin (OMNIPEN) 2 g in 0.9% sodium chloride (MBP/ADV) 100 mL MBP  2 g IntraVENous Q12H    cefepime (MAXIPIME) 1 g in 0.9% sodium chloride (MBP/ADV) 50 mL MBP  1 g IntraVENous Q24H          Javi Hernández MD              [facility]  [facility], [address]  [place], [address]  Phone - [phone]               Fax - [phone]

## 2021-07-19 NOTE — PROCEDURES
[facility]  [phone]        Vitals   Pre   Post   Assessment   Pre   Post     Temp  Temp: 97 °F (36.1 °C) (07/19/21 1345)  98.2 ax LOC  Does not respond to pain No change   HR   Pulse (Heart Rate): 69 (07/19/21 1400) 91 Lungs   Fine crackles LL  no change   B/P   BP: (!) 164/87 (07/19/21 1400) 122/57 Cardiac   Irreg rate & rhyt  no change   Resp   Resp Rate: 23 (07/19/21 1400) 20 Skin   Warm, dry & intact  no change   Pain level  Pain Intensity 1: 0 (07/19/21 1105) 0 Edema    None noted   No change   Orders:    Duration:   Start:  1089 Procedure Start Time: 0662 End:  0823 Procedure End Time: 1300 Total:   3.0   Dialyzer:   Dialyzer/Set Up Inspection: Kaleen Finely (07/19/21 1345)   K Bath:   Dialysate K (mEq/L): 3 (07/19/21 1345)   Ca Bath:   Dialysate CA (mEq/L): 2.5 (07/19/21 1345)   Na/Bicarb:   Dialysate NA (mEq/L): 140 (07/19/21 1345)   Target Fluid Removal:   Goal/Amount of Fluid to Remove (mL): 1000 mL (07/19/21 1345)   Access  AVG   Type & Location:   Right upper AVG   Labs     Obtained/Reviewed   Critical Results Called   Date when labs were drawn-  Hgb-    HGB   Date Value Ref Range Status   07/19/2021 11.1 (L) 11.5 - 16.0 g/dL Final     K-    Potassium   Date Value Ref Range Status   07/19/2021 3.6 3.5 - 5.1 mmol/L Final     Comment:     INVESTIGATED PER DELTA CHECK PROTOCOL     Ca-   Calcium   Date Value Ref Range Status   07/19/2021 9.4 8.5 - 10.1 MG/DL Final     Bun-   BUN   Date Value Ref Range Status   07/19/2021 39 (H) 6 - 20 MG/DL Final     Creat-   Creatinine   Date Value Ref Range Status   07/19/2021 7.83 (H) 0.55 - 1.02 MG/DL Final        Medications/ Blood Products Given     Name   Dose   Route and Time                     Blood Volume Processed (BVP):    64.0 Net Fluid   Removed:  1500ml   Comments RN reviewed LPN assessment and completed RN assessment. RN completed patient assessment. RN reviewed technicians vital signs and procedure note. Tx completed. Reviewed by JUAN FRANCISCO Barboza Handler  Time Out Done: 56  Primary Nurse Rpt Pre:JUAN FRANCISCO Farias  Primary Nurse Rpt Post:JUAN FRANCISCO Farias  Pt Education:access care  Care Plan:continue HD  Tx Summary:NANCI AVG skin CDI. No s/s of infection. + B/T. No issues with cannulation or hemostasis. Running well at . I arrived to pt's room non responsive to pain stimuli. Consent signed & on file. SBAR received from Primary RN. 1345: Pt cannulated with 49D needles per policy & without issue. Labs drawn per request/ order. VSS. Dialysis Tx initiated. 1345: Pt resting quietly. lines secure and visible. Venous pressure runs high. Site aspirates and flushes well.  1400: Pt. Stable,lines secure  1415: Pt. Stable,lines secure  1430: pt. Resting well. Lines secure  1445: pt. Stable, lines secure  1500: pt. Stable  1515: Pt. Stable, lines secure and visible  1530: pt. Stable, lines secire  1545: pt. Jean Marie. Hd well  1600: pt. Stable, lines secure  1615: pt, stable, lines secure  1630: pt. Stable, venous pressure increasing. Flushed w/100ns  1776: Tx ended. VSS. All possible blood returned to patient. Hemostasis achieved without issue. Bed locked and in the lowest position, call bell and belongings in reach. SBAR given to Primary, RN. Patient is stable at time of their/ my departure. All Dialysis related medications have been reviewed. · Admiting Diagnosis:AMS  · SEPSIS  ANEMIA  Pt's previous clinic- [facility]  Consent signed - Informed Consent Verified: Yes (07/19/21 1345)  [facility] Consent - yes  Hepatitis Status- neg 06/28/21 imm 07/07/21  Machine #- Machine Number: [id number] (07/19/21 1345)  Telemetry status- yes  Pre-dialysis wt. -

## 2021-07-19 NOTE — PROGRESS NOTES
0700- report received from Varinder, 1 [address]- patient taken down for lumbar puncture    1320- patient back in room from LP     1837- PerfectServed Dr. Weinstein Born to inform of blood sugar of 76 and concern of it continuing to drop due to patient not eating    1900- End of Shift Note    Bedside shift change report given to JUAN FRANCISCO Reeder (oncoming nurse) by Cecy Garay RN (offgoing nurse). Report included the following information SBAR, Kardex, Med Rec Status and Cardiac Rhythm NSR    Shift worked:  7a-7p     Shift summary and any significant changes:     patient responds to pain. EEG and LP completed. Dialysis removed 1.5L      Concerns for physician to address:       Zone phone for oncoming shift:          Activity:  Activity Level: Bed Rest  Number times ambulated in hallways past shift: 0  Number of times OOB to chair past shift: 0    Cardiac:   Cardiac Monitoring: Yes      Cardiac Rhythm: Sinus Rhythm    Access:   Current line(s): central line     Genitourinary:   Urinary status: incontinent and anuric    Respiratory:   O2 Device: None (Room air)  Chronic home O2 use?: NO  Incentive spirometer at bedside: NO     GI:  Last Bowel Movement Date: 07/18/21  Current diet:  DIET NPO  Passing flatus: YES  Tolerating current diet: NO       Pain Management:   Patient states pain is manageable on current regimen: YES    Skin:  Mick Score: 14  Interventions: PT/OT consult, limit briefs and nutritional support     Patient Safety:  Fall Score:  Total Score: 4  Interventions: bed/chair alarm and pt to call before getting OOB  High Fall Risk: Yes    Length of Stay:  Expected LOS: 4d 19h  Actual LOS: 3      Cecy Garay RN

## 2021-07-19 NOTE — PROCEDURES
Patient Name: Kurtis Mane  : 1950  Age: 66 y.o. Ordering physician: No ref. provider found  Date of EE2021  EEG procedure number: [id number]  Diagnosis: seizure  Interpreting physician: Nabil Enamorado MD    ELECTROENCEPHALOGRAM REPORT     PROCEDURE: EEG. CLINICAL INDICATION: The patient is a 66 y.o. female with a history of possible seizures. EEG to rule out seizures, rule out stroke, rule out cortical abnormality. EEG CLASSIFICATION: Abnormal     DESCRIPTION OF THE RECORD:   The background of this recording contains a posteriorly-located occipital alpha rhythm of 4 Hz that attenuates with eye opening. Throughout the recording, there were no clear areas of focal slowing. Generalized triphasic waves seen. Hyperventilation was not performed. Photic stimulation produced no response. During the recording the patient did not achieve stage II sleep    INTERPRETATION:   Abnormal. Moderate diffuse cerebral dysfunction. Presence of triphasic waves points to a hepatic/renal encephalopathy. No seizures. Clinical correlation recommended.       Nabil Enamorado MD  Neurologist

## 2021-07-20 LAB
ANION GAP SERPL CALC-SCNC: 12 MMOL/L (ref 5–15)
BASOPHILS # BLD: 0 K/UL (ref 0–0.1)
BASOPHILS NFR BLD: 0 % (ref 0–1)
BUN SERPL-MCNC: 20 MG/DL (ref 6–20)
BUN/CREAT SERPL: 4 (ref 12–20)
CALCIUM SERPL-MCNC: 9 MG/DL (ref 8.5–10.1)
CHLORIDE SERPL-SCNC: 98 MMOL/L (ref 97–108)
CO2 SERPL-SCNC: 28 MMOL/L (ref 21–32)
CREAT SERPL-MCNC: 4.75 MG/DL (ref 0.55–1.02)
DIFFERENTIAL METHOD BLD: ABNORMAL
EOSINOPHIL # BLD: 0.2 K/UL (ref 0–0.4)
EOSINOPHIL NFR BLD: 2 % (ref 0–7)
ERYTHROCYTE [DISTWIDTH] IN BLOOD BY AUTOMATED COUNT: 16.9 % (ref 11.5–14.5)
GLUCOSE BLD STRIP.AUTO-MCNC: 100 MG/DL (ref 65–117)
GLUCOSE BLD STRIP.AUTO-MCNC: 100 MG/DL (ref 65–117)
GLUCOSE BLD STRIP.AUTO-MCNC: 113 MG/DL (ref 65–117)
GLUCOSE BLD STRIP.AUTO-MCNC: 117 MG/DL (ref 65–117)
GLUCOSE SERPL-MCNC: 82 MG/DL (ref 65–100)
HCT VFR BLD AUTO: 35.2 % (ref 35–47)
HGB BLD-MCNC: 11 G/DL (ref 11.5–16)
IMM GRANULOCYTES # BLD AUTO: 0 K/UL (ref 0–0.04)
IMM GRANULOCYTES NFR BLD AUTO: 0 % (ref 0–0.5)
LYMPHOCYTES # BLD: 2 K/UL (ref 0.8–3.5)
LYMPHOCYTES NFR BLD: 21 % (ref 12–49)
MCH RBC QN AUTO: 26.7 PG (ref 26–34)
MCHC RBC AUTO-ENTMCNC: 31.3 G/DL (ref 30–36.5)
MCV RBC AUTO: 85.4 FL (ref 80–99)
MONOCYTES # BLD: 1 K/UL (ref 0–1)
MONOCYTES NFR BLD: 11 % (ref 5–13)
NEUTS SEG # BLD: 6.4 K/UL (ref 1.8–8)
NEUTS SEG NFR BLD: 66 % (ref 32–75)
NRBC # BLD: 0 K/UL (ref 0–0.01)
NRBC BLD-RTO: 0 PER 100 WBC
PLATELET # BLD AUTO: 157 K/UL (ref 150–400)
PMV BLD AUTO: 11.6 FL (ref 8.9–12.9)
POTASSIUM SERPL-SCNC: 3.2 MMOL/L (ref 3.5–5.1)
RBC # BLD AUTO: 4.12 M/UL (ref 3.8–5.2)
SERVICE CMNT-IMP: NORMAL
SODIUM SERPL-SCNC: 138 MMOL/L (ref 136–145)
WBC # BLD AUTO: 9.6 K/UL (ref 3.6–11)

## 2021-07-20 PROCEDURE — 85025 COMPLETE CBC W/AUTO DIFF WBC: CPT

## 2021-07-20 PROCEDURE — 82962 GLUCOSE BLOOD TEST: CPT

## 2021-07-20 PROCEDURE — 74011250636 HC RX REV CODE- 250/636: Performed by: INTERNAL MEDICINE

## 2021-07-20 PROCEDURE — 74011000258 HC RX REV CODE- 258: Performed by: INTERNAL MEDICINE

## 2021-07-20 PROCEDURE — 80048 BASIC METABOLIC PNL TOTAL CA: CPT

## 2021-07-20 PROCEDURE — 36415 COLL VENOUS BLD VENIPUNCTURE: CPT

## 2021-07-20 PROCEDURE — 74011000250 HC RX REV CODE- 250: Performed by: INTERNAL MEDICINE

## 2021-07-20 PROCEDURE — 99233 SBSQ HOSP IP/OBS HIGH 50: CPT | Performed by: PSYCHIATRY & NEUROLOGY

## 2021-07-20 PROCEDURE — 74011250636 HC RX REV CODE- 250/636: Performed by: PSYCHIATRY & NEUROLOGY

## 2021-07-20 PROCEDURE — 74011000258 HC RX REV CODE- 258: Performed by: GENERAL ACUTE CARE HOSPITAL

## 2021-07-20 PROCEDURE — 65660000000 HC RM CCU STEPDOWN

## 2021-07-20 PROCEDURE — 74011250636 HC RX REV CODE- 250/636: Performed by: GENERAL ACUTE CARE HOSPITAL

## 2021-07-20 PROCEDURE — 74011000258 HC RX REV CODE- 258: Performed by: PSYCHIATRY & NEUROLOGY

## 2021-07-20 RX ORDER — LABETALOL HYDROCHLORIDE 5 MG/ML
10 INJECTION, SOLUTION INTRAVENOUS 3 TIMES DAILY
Status: DISCONTINUED | OUTPATIENT
Start: 2021-07-20 | End: 2021-07-26

## 2021-07-20 RX ORDER — POTASSIUM CHLORIDE 7.45 MG/ML
10 INJECTION INTRAVENOUS ONCE
Status: COMPLETED | OUTPATIENT
Start: 2021-07-20 | End: 2021-07-20

## 2021-07-20 RX ORDER — POTASSIUM CHLORIDE 29.8 MG/ML
20 INJECTION INTRAVENOUS ONCE
Status: COMPLETED | OUTPATIENT
Start: 2021-07-20 | End: 2021-07-20

## 2021-07-20 RX ORDER — POTASSIUM CHLORIDE 7.45 MG/ML
10 INJECTION INTRAVENOUS
Status: DISCONTINUED | OUTPATIENT
Start: 2021-07-20 | End: 2021-07-20

## 2021-07-20 RX ADMIN — POTASSIUM CHLORIDE 20 MEQ: 400 INJECTION, SOLUTION INTRAVENOUS at 13:06

## 2021-07-20 RX ADMIN — LORAZEPAM 1 MG: 2 INJECTION INTRAMUSCULAR; INTRAVENOUS at 17:47

## 2021-07-20 RX ADMIN — PREDNISOLONE ACETATE 1 DROP: 10 SUSPENSION/ DROPS OPHTHALMIC at 15:33

## 2021-07-20 RX ADMIN — PREDNISOLONE ACETATE 1 DROP: 10 SUSPENSION/ DROPS OPHTHALMIC at 21:45

## 2021-07-20 RX ADMIN — CEFEPIME HYDROCHLORIDE 1 G: 1 INJECTION, POWDER, FOR SOLUTION INTRAMUSCULAR; INTRAVENOUS at 22:26

## 2021-07-20 RX ADMIN — LABETALOL HYDROCHLORIDE 10 MG: 5 INJECTION INTRAVENOUS at 12:13

## 2021-07-20 RX ADMIN — BRIMONIDINE TARTRATE 1 DROP: 2 SOLUTION OPHTHALMIC at 17:49

## 2021-07-20 RX ADMIN — BRIMONIDINE TARTRATE 1 DROP: 2 SOLUTION OPHTHALMIC at 09:10

## 2021-07-20 RX ADMIN — POTASSIUM CHLORIDE 10 MEQ: 10 INJECTION, SOLUTION INTRAVENOUS at 16:15

## 2021-07-20 RX ADMIN — Medication 30 ML: at 21:46

## 2021-07-20 RX ADMIN — TIMOLOL MALEATE 1 DROP: 2.5 SOLUTION/ DROPS OPHTHALMIC at 09:10

## 2021-07-20 RX ADMIN — LATANOPROST 1 DROP: 50 SOLUTION OPHTHALMIC at 21:45

## 2021-07-20 RX ADMIN — AMPICILLIN SODIUM 2 G: 2 INJECTION, POWDER, FOR SOLUTION INTRAVENOUS at 12:12

## 2021-07-20 RX ADMIN — HYDRALAZINE HYDROCHLORIDE 20 MG: 20 INJECTION INTRAMUSCULAR; INTRAVENOUS at 03:26

## 2021-07-20 RX ADMIN — HEPARIN SODIUM 5000 UNITS: 5000 INJECTION INTRAVENOUS; SUBCUTANEOUS at 15:29

## 2021-07-20 RX ADMIN — HYDRALAZINE HYDROCHLORIDE 20 MG: 20 INJECTION INTRAMUSCULAR; INTRAVENOUS at 18:12

## 2021-07-20 RX ADMIN — HEPARIN SODIUM 5000 UNITS: 5000 INJECTION INTRAVENOUS; SUBCUTANEOUS at 09:09

## 2021-07-20 RX ADMIN — AMPICILLIN SODIUM 2 G: 2 INJECTION, POWDER, FOR SOLUTION INTRAVENOUS at 23:11

## 2021-07-20 RX ADMIN — ACYCLOVIR SODIUM 300 MG: 50 INJECTION, SOLUTION INTRAVENOUS at 01:11

## 2021-07-20 RX ADMIN — LABETALOL HYDROCHLORIDE 10 MG: 5 INJECTION INTRAVENOUS at 21:47

## 2021-07-20 RX ADMIN — Medication 10 ML: at 06:45

## 2021-07-20 RX ADMIN — HEPARIN SODIUM 5000 UNITS: 5000 INJECTION INTRAVENOUS; SUBCUTANEOUS at 22:29

## 2021-07-20 RX ADMIN — LABETALOL HYDROCHLORIDE 10 MG: 5 INJECTION INTRAVENOUS at 15:28

## 2021-07-20 RX ADMIN — PREDNISOLONE ACETATE 1 DROP: 10 SUSPENSION/ DROPS OPHTHALMIC at 09:10

## 2021-07-20 RX ADMIN — LEVETIRACETAM 500 MG: 100 INJECTION, SOLUTION INTRAVENOUS at 10:50

## 2021-07-20 NOTE — PROGRESS NOTES
Transition of Care Plan:    RUR: 18  Disposition: Home with [facility]  Follow up appointments: PCP, OP HD Fatoumata Pérez MWF at 6.30 AM  DME needed:Pt has access to cane and RW  Transportation at Discharge: Daughter will transport   Letha Mccollumr or means to access home:      Yes  IM Medicare letter:NA  Caregiver Contact: Daughter Magalene Prader [phone]  Discharge Caregiver contacted prior to discharge? Yes    Reason for Admission:  Acute Encephalopathy                    RUR Score:   18                 Plan for utilizing home health:      Yes if needed    PCP: First and Last name:  Jennifer Vance NP     Name of Practice:    Are you a current patient: Yes/No: Yes    Approximate date of last visit: 1 month ago   Can you participate in a virtual visit with your PCP:                     Current Advanced Directive/Advance Care Plan: Full Code      Healthcare Decision Maker:   Click here to complete [address] including selection of the Healthcare Decision Maker Relationship (ie \"Primary\")                             Transition of Care Plan:        Home with [address] Management Interventions  PCP Verified by CM: Yes  Mode of Transport at Discharge: Self  Transition of Care Consult (CM Consult): Home Health, Discharge Planning (Pt had previous IP rehab experiance)  Discharge Durable Medical Equipment: No (Pt owns cane and RW)  Current Support Network: Lives with Caregiver (Pt is living with her daughter, family is very supportive.)  Confirm Follow Up Transport: Family  Discharge Location  Discharge Placement: Home with home health    Pharmacy- [facility] [address]      CM spoke to pt's daughter Gregg Hughes and completed CM intial assessment. Pt is living with her daughter in a single level home has 4 stps. Pt is dx with DM and she is on OP HD. Pt need assistance with ADLs and IADLs.     Advance Care Planning     General Advance Care Planning (ACP) Conversation      Date of Conversation: 7/16/2021  Conducted with: Patient with [address]:     Click here to complete [address] including [address] Relationship (ie \"Primary\")      Content/Action Overview:   DECLINED ACP conversation - will revisit periodically   Reviewed DNR/DNI and patient elects Full Code (Attempt Resuscitation)      Length of Voluntary ACP Conversation in minutes:  12 minutes      Calin Vargas MSW  ED Case Manager   Ext [phone]

## 2021-07-20 NOTE — PROGRESS NOTES
1500- Report given to Lucas Gilbert RN by off going nurse. 1747- Pt having jerking/twitching movements in RUE and RLE. PRN ativan given. Movements stopped. Dr. Chiqui Poon made aware. 1900- Report given to oncoming nurse by Lucas Gilbert RN.

## 2021-07-20 NOTE — PROGRESS NOTES
NEUROLOGY NOTE     Chief complaint: Altered mental status    SUBJECTIVE:  Pt continues to be stuporous. Meningitis panel is negative    HPI  The patient is a 78-year-old woman was brought to the ER as the patient is encephalopathic. Patient started having symptom onset the day prior and became increasingly drowsy in the morning and then had altered mental status after dialysis. The patient did complain of headache. On presentation patient's temperature was 101.9 and has not spiked any temperature since last night. Patient's Covid test is negative. Creatinine is 3.33. 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 Patient has been started on empiric antibiotics for possible meningitis. ROS  A ten system review of constitutional, cardiovascular, respiratory, musculoskeletal, endocrine, skin, SHEENT, genitourinary, psychiatric and neurologic systems was obtained and is unremarkable except as stated in HPI     PMH  Past Medical History:   Diagnosis Date    Diabetes ([facility] [address]     Hypertension        FH  No family history on file. SH  Social History     Socioeconomic History    Marital status:      Spouse name: Not on file    Number of children: Not on file    Years of education: Not on file    Highest education level: Not on file   Tobacco Use    Smoking status: Never Smoker    Smokeless tobacco: Never Used   Vaping Use    Vaping Use: Never used   Substance and Sexual Activity    Alcohol use: Not Currently    Drug use: Not Currently    Sexual activity: Yes     Partners: Female     Social Determinants of Health     Financial Resource Strain:     Difficulty of Paying Living Expenses:    Food Insecurity:     Worried About Running Out of Food in the Last Year:     [address] in the Last Year:    Transportation Needs:     Lack of Transportation (Medical):      Lack of Transportation (Non-Medical):    Physical Activity:     Days of Exercise per Week:     Minutes of Exercise per Session:    Stress:     Feeling of Stress :    Social Connections:     Frequency of Communication with Friends and Family:     Frequency of Social Gatherings with Friends and Family:     Attends Yazidism Services:     Active Member of Clubs or Organizations:     Attends Club or Organization Meetings:     Marital Status:        ALLERGIES  No Known Allergies    PHYSICAL EXAMINATION:   Patient Vitals for the past 24 hrs:   Temp Pulse Resp BP SpO2   07/20/21 1103 98.1 °F (36.7 °C) 91 18 (!) 170/62 99 %   07/20/21 0900 -- 96 21 (!) 152/68 100 %   07/20/21 0800 99.4 °F (37.4 °C) (!) 104 27 (!) 165/85 100 %   07/20/21 0400 98 °F (36.7 °C) 88 (!) 33 (!) 111/52 99 %   07/19/21 2300 97.8 °F (36.6 °C) 77 17 (!) 155/62 100 %   07/19/21 1900 98.3 °F (36.8 °C) 81 16 (!) 158/69 100 %   07/19/21 1700 -- 86 17 (!) 153/77 100 %   07/19/21 1645 98.4 °F (36.9 °C) 91 14 (!) 122/57 96 %   07/19/21 1630 -- 94 22 (!) 141/110 100 %   07/19/21 1615 -- 87 16 (!) 153/51 100 %   07/19/21 1600 -- 87 16 (!) 150/60 100 %   07/19/21 1545 -- 83 14 135/64 100 %   07/19/21 1530 -- 78 14 (!) 157/64 100 %   07/19/21 1515 -- 84 18 (!) 179/71 100 %   07/19/21 1500 -- 79 12 (!) 169/63 100 %        General:   General appearance: Pt is in no acute distress   Distal pulses are preserved    Neurological Examination:   Mental Status: Stuporous. Non verbal. Grimaces to pain. Cranial Nerves: PERRL, Extraocular movements are full. Facial sensation intact. Facial movement intact. Motor: Cannot do formal strength testing. Symmetric. Normal tone. No atrophy. Sensation: Intact to pain    Skin: No significant bruising or lacerations.     LAB DATA REVIEWED:    Recent Results (from the past 24 hour(s))   GLUCOSE, POC    Collection Time: 07/19/21  5:57 PM   Result Value Ref Range    Glucose (POC) 76 65 - 117 mg/dL    Performed by Mackenzie Haq RN GLUCOSE, POC    Collection Time: 07/19/21 11:34 PM   Result Value Ref Range    Glucose (POC) 88 65 - 117 mg/dL    Performed by [facility]    CBC WITH AUTOMATED DIFF    Collection Time: 07/20/21  3:20 AM   Result Value Ref Range    WBC 9.6 3.6 - 11.0 K/uL    RBC 4.12 3.80 - 5.20 M/uL    HGB 11.0 (L) 11.5 - 16.0 g/dL    HCT 35.2 35.0 - 47.0 %    MCV 85.4 80.0 - 99.0 FL    MCH 26.7 26.0 - 34.0 PG    MCHC 31.3 30.0 - 36.5 g/dL    RDW 16.9 (H) 11.5 - 14.5 %    PLATELET 240 935 - 974 K/uL    MPV 11.6 8.9 - 12.9 FL    NRBC 0.0 0  WBC    ABSOLUTE NRBC 0.00 0.00 - 0.01 K/uL    NEUTROPHILS 66 32 - 75 %    LYMPHOCYTES 21 12 - 49 %    MONOCYTES 11 5 - 13 %    EOSINOPHILS 2 0 - 7 %    BASOPHILS 0 0 - 1 %    IMMATURE GRANULOCYTES 0 0.0 - 0.5 %    ABS. NEUTROPHILS 6.4 1.8 - 8.0 K/UL    ABS. LYMPHOCYTES 2.0 0.8 - 3.5 K/UL    ABS. MONOCYTES 1.0 0.0 - 1.0 K/UL    ABS. EOSINOPHILS 0.2 0.0 - 0.4 K/UL    ABS. BASOPHILS 0.0 0.0 - 0.1 K/UL    ABS. IMM.  GRANS. 0.0 0.00 - 0.04 K/UL    DF AUTOMATED     METABOLIC PANEL, BASIC    Collection Time: 07/20/21  3:20 AM   Result Value Ref Range    Sodium 138 136 - 145 mmol/L    Potassium 3.2 (L) 3.5 - 5.1 mmol/L    Chloride 98 97 - 108 mmol/L    CO2 28 21 - 32 mmol/L    Anion gap 12 5 - 15 mmol/L    Glucose 82 65 - 100 mg/dL    BUN 20 6 - 20 MG/DL    Creatinine 4.75 (H) 0.55 - 1.02 MG/DL    BUN/Creatinine ratio 4 (L) 12 - 20      GFR est AA 11 (L) >60 ml/min/1.73m2    GFR est non-AA 9 (L) >60 ml/min/1.73m2    Calcium 9.0 8.5 - 10.1 MG/DL   GLUCOSE, POC    Collection Time: 07/20/21  6:26 AM   Result Value Ref Range    Glucose (POC) 100 65 - 117 mg/dL    [facility]    GLUCOSE, POC    Collection Time: 07/20/21  7:48 AM   Result Value Ref Range    Glucose (POC) 117 65 - 117 mg/dL    Performed by Maynor Immaculata PCT    GLUCOSE, POC    Collection Time: 07/20/21 11:12 AM   Result Value Ref Range    Glucose (POC) 100 65 - 117 mg/dL    Performed by Maynor Immaculata PCT Imaging review:  CT Head  Normal. No acute changes. MRI brain  Encephalomalacia and white matter disease. No acute intracranial abnormality    EEG  Triphasic waves    HOME MEDS  Prior to Admission Medications   Prescriptions Last Dose Informant Patient Reported? Taking? Blood-Glucose Meter monitoring kit   No No   Sig: Use as directed. Dx: E11.65 check sugar twice daily   Insulin Needles, Disposable, 32 gauge x 5/32\" ndle   No No   Si Each by Does Not Apply route daily. alcohol swabs padm Not Taking at Unknown time  No No   Si Each by Apply Externally route four (4) times daily. For use with insulin and glucometer   Patient not taking: Reported on 2021   amLODIPine (NORVASC) 5 mg tablet 2021 at Unknown time  Yes Yes   Sig: Take 5 mg by mouth daily. aspirin delayed-release 81 mg tablet 2021 at Unknown time  Yes Yes   Sig: Take  by mouth daily. atorvastatin (LIPITOR) 40 mg tablet 2021 at Unknown time  Yes Yes   Sig: Take  by mouth daily. b complex-vitamin c-folic acid 0.8 mg (Dialyvite 800) 0.8 mg tab tablet Not Taking at Unknown time  No No   Sig: Take 1 Tab by mouth daily. Patient not taking: Reported on 2021   brimonidine-timoloL (Combigan) 0.2-0.5 % drop ophthalmic solution 2021 at Unknown time  Yes Yes   Si Drop every twelve (12) hours. famotidine (PEPCID) 20 mg tablet 2021 at Unknown time  No Yes   Sig: TAKE 1 TAB BY MOUTH TWO (2) TIMES DAILY AS NEEDED FOR HEARTBURN.   glucose blood VI test strips (ASCENSIA AUTODISC VI, ONE TOUCH ULTRA TEST VI) strip   No No   Sig: Check sugar twice daily   insulin glargine (LANTUS,BASAGLAR) 100 unit/mL (3 mL) inpn   No No   Sig: 10 Units by SubCUTAneous route daily (with dinner). isosorbide mononitrate (ISMO, MONOKET) 20 mg tablet Not Taking at Unknown time  No No   Sig: Take 1 Tab by mouth two (2) times a day. Patient not taking: Reported on 2021   lancets misc   No No   Sig: Check sugar twice daily. E11.65   levothyroxine (SYNTHROID) 50 mcg tablet 7/16/2021 at Unknown time  No Yes   Sig: TAKE 1 TABLET BY MOUTH EVERY DAY BEFORE BREAKFAST   metoprolol succinate (TOPROL-XL) 50 mg XL tablet 7/16/2021 at Unknown time  Yes Yes   Sig: Take  by mouth daily. Facility-Administered Medications: None       CURRENT MEDS  Current Facility-Administered Medications   Medication Dose Route Frequency    [START ON 7/21/2021] VANCOMYCIN INFORMATION NOTE   Other ONCE    labetaloL (NORMODYNE;TRANDATE) injection 10 mg  10 mg IntraVENous TID    potassium chloride 20 mEq in 50 ml IVPB  20 mEq IntraVENous ONCE    potassium chloride 10 mEq in 100 ml IVPB  10 mEq IntraVENous ONCE    levETIRAcetam (KEPPRA) 500 mg in 0.9% sodium chloride 100 mL IVPB  500 mg IntraVENous DAILY    sodium chloride (NS) flush 5-40 mL  5-40 mL IntraVENous Q8H    insulin lispro (HUMALOG) injection   SubCUTAneous Q6H    acyclovir (ZOVIRAX) 300 mg in 0.9% sodium chloride 100 mL IVPB  5 mg/kg IntraVENous Q24H    brimonidine (ALPHAGAN) 0.2 % ophthalmic solution 1 Drop  1 Drop Both Eyes BID    timolol (TIMOPTIC) 0.25% ophthalmic solution  1 Drop Both Eyes DAILY    prednisoLONE acetate (PRED FORTE) 1 % ophthalmic suspension 1 Drop  1 Drop Right Eye TID    latanoprost (XALATAN) 0.005 % ophthalmic solution 1 Drop  1 Drop Both Eyes QHS    heparin (porcine) injection 5,000 Units  5,000 Units SubCUTAneous Q8H    VANCOMYCIN INFORMATION NOTE   Other Rx Dosing/Monitoring    ampicillin (OMNIPEN) 2 g in 0.9% sodium chloride (MBP/ADV) 100 mL MBP  2 g IntraVENous Q12H    cefepime (MAXIPIME) 1 g in 0.9% sodium chloride (MBP/ADV) 50 mL MBP  1 g IntraVENous Q24H       IMPRESSION/RECOMMENDATIONS:  Anny Wise is a 66 y.o. female who presents with altered mental status in the setting of fever. +neck stiffness. LP is negative for meningitis. Unclear etiology. Could not find a neurological etiology. 1.  Meningitis panel is negative. Can d/c abx for meningitis. 2. Cont Keppra to 500 mg daily (renal dosing)  3. Will order anti TPO antibodies.        Carlos Moore MD  Neurologist

## 2021-07-20 NOTE — PROGRESS NOTES
Comprehensive Nutrition Assessment    Type and Reason for Visit: Reassess    Nutrition Recommendations/Plan:  Diet advancement as medically feasible     Nutrition Assessment:     Chart reviewed; medically noted for acute encephalopathy, new seizure like activity, and rule out meningitis. PMH ESRD on HD, DM, and HTN. Patient remains NPO at this time. Minimally responsive per notes. If unable to advance diet next few days, would consider NGT placement and TF for nutrition support. Will continue to follow progress and provide further recommendations pending plan of care. Estimated Daily Nutrient Needs:  Energy (kcal): 1410 (BMR 1085 x 1. 3AF); Weight Used for Energy Requirements: Current  Protein (g): 62-74g (1.0-1.2 g/kg bw); Weight Used for Protein Requirements: Current  Fluid (ml/day): 1400 ml/day; Method Used for Fluid Requirements: 1 ml/kcal    Nutrition Related Findings:    K+ 3.2, -[id number]  1-2+ edema  BM 7/19  Acyclovir, Cefepime, Humalog, keppra       Wounds:    None       Current Nutrition Therapies:  DIET NPO    Anthropometric Measures:  · Height:  5' 2\" (157.5 cm)  · Current Body Wt:  62.5 kg (137 lb 12.6 oz)    · BMI Category: Overweight (BMI 25.0-29. 9)       Nutrition Diagnosis:   · Inadequate protein-energy intake related to cognitive or neurological impairment as evidenced by NPO or clear liquid status due to medical condition    Nutrition Interventions:   Food and/or Nutrient Delivery: Start oral diet  Nutrition Education and Counseling: No recommendations at this time  Coordination of Nutrition Care: No recommendation at this time    Goals:  Diet advanced vs nutrition support next 1-3 days       Nutrition Monitoring and Evaluation:   Behavioral-Environmental Outcomes: None identified  Food/Nutrient Intake Outcomes: Diet advancement/tolerance  Physical Signs/Symptoms Outcomes: Biochemical data, Chewing or swallowing, Hemodynamic status, Weight    Discharge Planning:     Too soon to determine     Electronically signed by Kt Nunes RD on 7/20/2021 at 11:32 AM    Contact: ext [phone]

## 2021-07-20 NOTE — PROGRESS NOTES
1900- Bedside shift change report given to Cristina Butterfield RN (oncoming nurse) by Nicki Artis RN (offgoing nurse). Report included the following information SBAR, Kardex, ED Summary, Intake/Output, MAR, Recent Results, Med Rec Status and Cardiac Rhythm NSR.     0700- End of Shift Note    Bedside shift change report given to Vadim Piedra (oncoming nurse) by Екатерина Ziegler RN (offgoing nurse). Report included the following information SBAR, Kardex, ED Summary, Intake/Output, MAR, Recent Results, Med Rec Status and Cardiac Rhythm NSR    Shift worked:  7p-7a     Shift summary and any significant changes:     Pt remains minimally responsive overnight. Responsive to deep tissue pain and has cough/gag. No other changes. Concerns for physician to address:       Zone phone for oncoming shift:          Activity:  Activity Level: Bed Rest  Number times ambulated in hallways past shift: 0  Number of times OOB to chair past shift: 0    Cardiac:   Cardiac Monitoring: Yes      Cardiac Rhythm: Sinus Rhythm    Access:   Current line(s): central line     Genitourinary:   Urinary status: anuric    Respiratory:   O2 Device: None (Room air)  Chronic home O2 use?: NO  Incentive spirometer at bedside: NO     GI:  Last Bowel Movement Date: 07/19/21  Current diet:  DIET NPO  Passing flatus: YES  Tolerating current diet: YES       Pain Management:   Patient states pain is manageable on current regimen: YES    Skin:  Mick Score: 14  Interventions: turn team, speciality bed, increase time out of bed and PT/OT consult    Patient Safety:  Fall Score:  Total Score: 4  Interventions: bed/chair alarm, assistive device (walker, cane, etc), gripper socks, pt to call before getting OOB and stay with me (per policy)  High Fall Risk: Yes    Length of Stay:  Expected LOS: 4d 19h  Actual LOS: [address] JUAN FRANCISCO Garzon

## 2021-07-20 NOTE — PROGRESS NOTES
Nephrology Progress Note  Piter Perez     [facility]. [facility]  Phone - [phone]   Patient: Dell Fothergill    YOB: 1950        Date- 7/20/2021   Admit Date: 7/16/2021  CC: Follow up for end-stage renal disease        IMPRESSION & PLAN:    ESRD- mwf- davita laburnum  · Hypokalemia   · AMS  · SEPSIS  · ANEMIA  · CA/P/PTH/VITD  · DM  ·  HTN    PLAN-   No dialysis today   IV labetalol 10 mg every 8 hours   KCl 30 M EQ IV   Change to 4K bath for dialysis tomorrow   Prn hydralazine   Discussed with the nurse     Subjective: Interval History:   -  Patient is confused. She is under meningitis isolation  Patient seen from out side of the room  She had dialysis done yesterday  Blood pressure is on high side  D/w nurse    Objective:   Vitals:    07/19/21 2300 07/20/21 0400 07/20/21 0800 07/20/21 0900   BP: (!) 155/62 (!) 111/52 (!) 165/85 (!) 152/68   Pulse: 77 88 (!) 104 96   Resp: 17 (!) 33 27 21   Temp: 97.8 °F (36.6 °C) 98 °F (36.7 °C) 99.4 °F (37.4 °C)    TempSrc:       SpO2: 100% 99% 100% 100%   Weight:       Height:          07/19 0701 - 07/20 0700  In: 0   Out: 1400   Last 3 Recorded Weights in this Encounter    07/16/21 2236 07/19/21 1334   Weight: 62.2 kg (137 lb 2 oz) 62.5 kg (137 lb 12.6 oz)      Physical exam:   GEN: NAD  RESP: No wheezing  NEURO: Can't access due to patient's current condition   PSYCH: Can't access due to patient's current condition       Chart reviewed. Pertinent Notes reviewed. Data Review :  Recent Labs     07/20/21  0320 07/19/21  0437 07/18/21  0907    139 138   K 3.2* 3.6 4.7   CL 98 103 100   CO2 28 24 27   BUN 20 39* 28*   CREA 4.75* 7.83* 7.15*   GLU 82 122* 102*   CA 9.0 9.4 10.1     Recent Labs     07/20/21  0320 07/19/21  0437 07/18/21  0907   WBC 9.6 13.2* 10.4   HGB 11.0* 11.1* 12.4   HCT 35.2 36.8 41.0    157 194     No results for input(s): FE, TIBC, PSAT, FERR in the last 72 hours.    Medication list reviewed  Current Facility-Administered Medications   Medication Dose Route Frequency    [START ON 7/21/2021] VANCOMYCIN INFORMATION NOTE   Other ONCE    labetaloL (NORMODYNE;TRANDATE) injection 10 mg  10 mg IntraVENous TID    potassium chloride 10 mEq in 100 ml IVPB  10 mEq IntraVENous Q1H    hydrALAZINE (APRESOLINE) 20 mg/mL injection 20 mg  20 mg IntraVENous Q6H PRN    levETIRAcetam (KEPPRA) 500 mg in 0.9% sodium chloride 100 mL IVPB  500 mg IntraVENous DAILY    LORazepam (ATIVAN) injection 1 mg  1 mg IntraVENous Q4H PRN    labetaloL (NORMODYNE;TRANDATE) injection 20 mg  20 mg IntraVENous Q4H PRN    glucose chewable tablet 16 g  4 Tablet Oral PRN    dextrose (D50W) injection syrg 12.5-25 g  12.5-25 g IntraVENous PRN    glucagon (GLUCAGEN) injection 1 mg  1 mg IntraMUSCular PRN    sodium chloride (NS) flush 5-40 mL  5-40 mL IntraVENous Q8H    sodium chloride (NS) flush 5-40 mL  5-40 mL IntraVENous PRN    acetaminophen (TYLENOL) tablet 650 mg  650 mg Oral Q6H PRN    polyethylene glycol (MIRALAX) packet 17 g  17 g Oral DAILY PRN    ondansetron (ZOFRAN ODT) tablet 4 mg  4 mg Oral Q8H PRN    Or    ondansetron (ZOFRAN) injection 4 mg  4 mg IntraVENous Q6H PRN    insulin lispro (HUMALOG) injection   SubCUTAneous Q6H    acyclovir (ZOVIRAX) 300 mg in 0.9% sodium chloride 100 mL IVPB  5 mg/kg IntraVENous Q24H    acetaminophen (TYLENOL) suppository 650 mg  650 mg Rectal Q4H PRN    brimonidine (ALPHAGAN) 0.2 % ophthalmic solution 1 Drop  1 Drop Both Eyes BID    timolol (TIMOPTIC) 0.25% ophthalmic solution  1 Drop Both Eyes DAILY    prednisoLONE acetate (PRED FORTE) 1 % ophthalmic suspension 1 Drop  1 Drop Right Eye TID    latanoprost (XALATAN) 0.005 % ophthalmic solution 1 Drop  1 Drop Both Eyes QHS    heparin (porcine) injection 5,000 Units  5,000 Units SubCUTAneous Q8H    VANCOMYCIN INFORMATION NOTE   Other Rx Dosing/Monitoring    ampicillin (OMNIPEN) 2 g in 0.9% sodium chloride (MBP/ADV) 100 mL MBP  2 g IntraVENous Q12H    cefepime (MAXIPIME) 1 g in 0.9% sodium chloride (MBP/ADV) 50 mL MBP  1 g IntraVENous Q24H          Tabitha Salgado MD              [facility]  [facility], [address]  [place], [address]  Phone - [phone]               Fax - [phone]

## 2021-07-20 NOTE — PROGRESS NOTES
RAPID RESPONSE TEAM- Follow Up     Rounded on patient due to recent rapid response for seizure like activity. Neurology following; EEG today showed moderate diffuse cerebral dysfunction; triphasic waves concern for hepatic/renal encephalopathy, no seizures. R/o meningitis; LP results pending. Positive cough and gag. SpO2 stable. Withdrawals from pain in all extremities but does not respond to verbal stimuli. BP stable. Spoke to primary [employer]; no acute concerns at this time. Last BG 76; primary RN to recheck. No RRT interventions currently indicated. Please call with any questions or concerns.   Simon Navarro RN  Ext. [phone]    Visit Vitals  BP (!) 158/69 (BP 1 Location: Left leg, BP Patient Position: At rest)   Pulse 81   Temp 98.3 °F (36.8 °C)   Resp 16   Ht 5' 2\" (1.575 m)   Wt 62.5 kg (137 lb 12.6 oz)   SpO2 100%   BMI 25.20 kg/m²

## 2021-07-20 NOTE — PROGRESS NOTES
Physical Therapy Screening:    An [facility] screening referral was triggered for physical therapy based on results obtained during the nursing admission assessment. The patients chart was reviewed and the patient is appropriate for a skilled therapy evaluation if there is a decline in functional mobility from baseline. Please order a consult for physical therapy if you are in agreement and would like an evaluation to be completed. Thank you.     Leigha Bueno, PT

## 2021-07-21 LAB
ANION GAP SERPL CALC-SCNC: 15 MMOL/L (ref 5–15)
BASOPHILS # BLD: 0 K/UL (ref 0–0.1)
BASOPHILS NFR BLD: 0 % (ref 0–1)
BUN SERPL-MCNC: 34 MG/DL (ref 6–20)
BUN/CREAT SERPL: 5 (ref 12–20)
CALCIUM SERPL-MCNC: 8.5 MG/DL (ref 8.5–10.1)
CHLORIDE SERPL-SCNC: 102 MMOL/L (ref 97–108)
CO2 SERPL-SCNC: 24 MMOL/L (ref 21–32)
CREAT SERPL-MCNC: 6.58 MG/DL (ref 0.55–1.02)
DATE LAST DOSE: ABNORMAL
DIFFERENTIAL METHOD BLD: ABNORMAL
EOSINOPHIL # BLD: 0.1 K/UL (ref 0–0.4)
EOSINOPHIL NFR BLD: 2 % (ref 0–7)
ERYTHROCYTE [DISTWIDTH] IN BLOOD BY AUTOMATED COUNT: 17.4 % (ref 11.5–14.5)
GLUCOSE BLD STRIP.AUTO-MCNC: 87 MG/DL (ref 65–117)
GLUCOSE BLD STRIP.AUTO-MCNC: 89 MG/DL (ref 65–117)
GLUCOSE BLD STRIP.AUTO-MCNC: 90 MG/DL (ref 65–117)
GLUCOSE BLD STRIP.AUTO-MCNC: 92 MG/DL (ref 65–117)
GLUCOSE BLD STRIP.AUTO-MCNC: 97 MG/DL (ref 65–117)
GLUCOSE SERPL-MCNC: 102 MG/DL (ref 65–100)
HCT VFR BLD AUTO: 32 % (ref 35–47)
HGB BLD-MCNC: 9.8 G/DL (ref 11.5–16)
IMM GRANULOCYTES # BLD AUTO: 0 K/UL (ref 0–0.04)
IMM GRANULOCYTES NFR BLD AUTO: 1 % (ref 0–0.5)
LYMPHOCYTES # BLD: 1.5 K/UL (ref 0.8–3.5)
LYMPHOCYTES NFR BLD: 20 % (ref 12–49)
MCH RBC QN AUTO: 26.4 PG (ref 26–34)
MCHC RBC AUTO-ENTMCNC: 30.6 G/DL (ref 30–36.5)
MCV RBC AUTO: 86.3 FL (ref 80–99)
MONOCYTES # BLD: 0.9 K/UL (ref 0–1)
MONOCYTES NFR BLD: 12 % (ref 5–13)
NEUTS SEG # BLD: 4.9 K/UL (ref 1.8–8)
NEUTS SEG NFR BLD: 65 % (ref 32–75)
NRBC # BLD: 0 K/UL (ref 0–0.01)
NRBC BLD-RTO: 0 PER 100 WBC
PLATELET # BLD AUTO: 153 K/UL (ref 150–400)
PMV BLD AUTO: 11.7 FL (ref 8.9–12.9)
POTASSIUM SERPL-SCNC: 3.7 MMOL/L (ref 3.5–5.1)
RBC # BLD AUTO: 3.71 M/UL (ref 3.8–5.2)
REPORTED DOSE,DOSE: ABNORMAL UNITS
REPORTED DOSE/TIME,TMG: ABNORMAL
SERVICE CMNT-IMP: NORMAL
SODIUM SERPL-SCNC: 141 MMOL/L (ref 136–145)
VANCOMYCIN TROUGH SERPL-MCNC: 22.6 UG/ML (ref 5–10)
WBC # BLD AUTO: 7.4 K/UL (ref 3.6–11)

## 2021-07-21 PROCEDURE — 80202 ASSAY OF VANCOMYCIN: CPT

## 2021-07-21 PROCEDURE — 82962 GLUCOSE BLOOD TEST: CPT

## 2021-07-21 PROCEDURE — 90935 HEMODIALYSIS ONE EVALUATION: CPT

## 2021-07-21 PROCEDURE — 86376 MICROSOMAL ANTIBODY EACH: CPT

## 2021-07-21 PROCEDURE — 74011000250 HC RX REV CODE- 250: Performed by: INTERNAL MEDICINE

## 2021-07-21 PROCEDURE — 74011000258 HC RX REV CODE- 258: Performed by: PSYCHIATRY & NEUROLOGY

## 2021-07-21 PROCEDURE — 99233 SBSQ HOSP IP/OBS HIGH 50: CPT | Performed by: PSYCHIATRY & NEUROLOGY

## 2021-07-21 PROCEDURE — 74011000258 HC RX REV CODE- 258: Performed by: INTERNAL MEDICINE

## 2021-07-21 PROCEDURE — 74011250636 HC RX REV CODE- 250/636: Performed by: INTERNAL MEDICINE

## 2021-07-21 PROCEDURE — 65660000000 HC RM CCU STEPDOWN

## 2021-07-21 PROCEDURE — 80048 BASIC METABOLIC PNL TOTAL CA: CPT

## 2021-07-21 PROCEDURE — 85025 COMPLETE CBC W/AUTO DIFF WBC: CPT

## 2021-07-21 PROCEDURE — 36415 COLL VENOUS BLD VENIPUNCTURE: CPT

## 2021-07-21 PROCEDURE — 74011250636 HC RX REV CODE- 250/636: Performed by: PSYCHIATRY & NEUROLOGY

## 2021-07-21 RX ADMIN — PREDNISOLONE ACETATE 1 DROP: 10 SUSPENSION/ DROPS OPHTHALMIC at 21:26

## 2021-07-21 RX ADMIN — TIMOLOL MALEATE 1 DROP: 2.5 SOLUTION/ DROPS OPHTHALMIC at 09:16

## 2021-07-21 RX ADMIN — LABETALOL HYDROCHLORIDE 10 MG: 5 INJECTION INTRAVENOUS at 21:39

## 2021-07-21 RX ADMIN — BRIMONIDINE TARTRATE 1 DROP: 2 SOLUTION OPHTHALMIC at 17:41

## 2021-07-21 RX ADMIN — Medication 10 ML: at 06:05

## 2021-07-21 RX ADMIN — PREDNISOLONE ACETATE 1 DROP: 10 SUSPENSION/ DROPS OPHTHALMIC at 15:32

## 2021-07-21 RX ADMIN — BRIMONIDINE TARTRATE 1 DROP: 2 SOLUTION OPHTHALMIC at 09:16

## 2021-07-21 RX ADMIN — ACYCLOVIR SODIUM 300 MG: 50 INJECTION, SOLUTION INTRAVENOUS at 00:11

## 2021-07-21 RX ADMIN — LEVETIRACETAM 500 MG: 100 INJECTION, SOLUTION INTRAVENOUS at 09:00

## 2021-07-21 RX ADMIN — Medication 10 ML: at 21:40

## 2021-07-21 RX ADMIN — HEPARIN SODIUM 5000 UNITS: 5000 INJECTION INTRAVENOUS; SUBCUTANEOUS at 23:49

## 2021-07-21 RX ADMIN — PREDNISOLONE ACETATE 1 DROP: 10 SUSPENSION/ DROPS OPHTHALMIC at 09:16

## 2021-07-21 RX ADMIN — LATANOPROST 1 DROP: 50 SOLUTION OPHTHALMIC at 21:26

## 2021-07-21 RX ADMIN — HEPARIN SODIUM 5000 UNITS: 5000 INJECTION INTRAVENOUS; SUBCUTANEOUS at 15:32

## 2021-07-21 RX ADMIN — LORAZEPAM 1 MG: 2 INJECTION INTRAMUSCULAR; INTRAVENOUS at 02:11

## 2021-07-21 RX ADMIN — LABETALOL HYDROCHLORIDE 10 MG: 5 INJECTION INTRAVENOUS at 09:13

## 2021-07-21 RX ADMIN — HEPARIN SODIUM 5000 UNITS: 5000 INJECTION INTRAVENOUS; SUBCUTANEOUS at 07:20

## 2021-07-21 RX ADMIN — EPOETIN ALFA-EPBX 4000 UNITS: 4000 INJECTION, SOLUTION INTRAVENOUS; SUBCUTANEOUS at 21:38

## 2021-07-21 RX ADMIN — LABETALOL HYDROCHLORIDE 10 MG: 5 INJECTION INTRAVENOUS at 15:33

## 2021-07-21 NOTE — PROGRESS NOTES
End of Shift Note     Bedside shift change report given to Al (oncoming nurse) by Cristino Martin RN (offgoing nurse). Report included the following information SBAR, Intake/Output, MAR and Recent Results     Shift worked:  [id number]      Shift summary and any significant changes:     Neurological status not improving. No drips. No adverse events. Family updated at bedside. HD this AM with 1 L off. Concerns for physician to address:  Central line      Zone phone for oncoming shift:   n/a         Activity:  Activity Level: Bed Rest  Number times ambulated in hallways past shift: 0  Number of times OOB to chair past shift: 0     Cardiac:   Cardiac Monitoring: Yes      Cardiac Rhythm: Sinus Rhythm     Access:   Current line(s): CVC      Genitourinary:   Urinary status: anuric     Respiratory:   O2 Device: None (Room air)  Chronic home O2 use?: NO  Incentive spirometer at bedside: NO  GI:  Last Bowel Movement Date:  (HA)  Current diet:  DIET NPO  Passing flatus: YES  Tolerating current diet: NO     Pain Management:   Patient states pain is manageable on current regimen: N/A     Skin:  Mick Score: 15  Interventions: float heels, limit briefs and internal/external urinary devices    Patient Safety:  Fall Score: Total Score: 2  Interventions: bed/chair alarm  High Fall Risk:  Yes     Length of Stay:  Expected LOS: - - -  Actual LOS: 2        Cristino Martin RN

## 2021-07-21 NOTE — PROGRESS NOTES
Pharmacy Levothyroxine IV Dosing Protocol    PTA Levothyroxine PO dose: 50 mcg daily - last dose given 7/16     Approved by [facility], [facility] and [facility]:  Seven (7) day hold unless patient meets listed exception  75% bioavailability will be used when converting patients to and from IV/PO levothyroxine therapy per 2014 Guidelines for Treatment of Hypothyroidism recommendation   Patients will be changed to PO as soon as enteral route is available  Following 7-day gary period, daily IV levothyroxine therapy will commence unless specifically stated to start earlier from a prescriber     Impression/Plan:   Levothyroxine 37.5 mcg IV ordered to start 7/23.       Thank you,  Julieth Guerrero, PHARMD

## 2021-07-21 NOTE — PROGRESS NOTES
Hospitalist Progress Note    NAME: Dell Fothergill   :  1950   MRN:  [id number]       Assessment / Plan:  Sepsis unknown source POA  Acute encephalopathy   Anti-pyretics PRN  No fevers last 48hrs  Fever 102F on admission  Meningitis panel negative, DC empiric coverage for meningitis  Appreciate neuro consult  No clear source of patient's encephalopathy so far  procal 0.67  Covid testing negative  S/p LP, clear CSF, no growth on cultures, procedure   As per daughter patient able to walk and carry on conversation prior to admission and had been in normal state of health until HD prior to admission where she became unresponsive    MRI brain:  Encephalomalacia and white matter disease.  No acute intracranial abnormality    Possible seizures  -no seizure captured on EEG, triphasic waves point to hepatic/renal encephalopathy as per neuro  -cont keppra    ESRD  -appreciate nephro following  -HD yesterday     Incidental finding of rectal wall thickening on CT A/P  -appreciate CRS eval  -patient can f/u outpatient, no intervention needed during this admission     Difficult IV access: Central line placed on   Hypokalemia  Resolved    DMII  HTN  CAD  FS monitoring, SS     Eye surgery in may : glaucoma   Daughter that she has a cup in her R eye   Ophthalmologist is Dr. Chris Wilson at [facility]     Code Status: Full  Will ask palliative care consult re discussion of goals of care    Surrogate Decision Maker: Daughter  DVT Prophylaxis: SCD  GI Prophylaxis: not indicated  Baseline: Independent     25.0 - 29.9 Overweight / Body mass index is 25.08 kg/m².     Estimated discharge date:   Barriers: Currently very sick  Updated daughter at bedside on   Updated daughter over the phone on  and   Code status: Full  Prophylaxis: Hep SQ  Recommended Disposition: TBD     Subjective:     Chief Complaint / Reason for Physician Visit  Pt remains obtunded, discussed care with daughter over the phone    Discussed with RN events overnight. Review of Systems:  Symptom Y/N Comments  Symptom Y/N Comments   Fever/Chills    Chest Pain     Poor Appetite    Edema     Cough    Abdominal Pain     Sputum    Joint Pain     SOB/CHURCH    Pruritis/Rash     Nausea/vomit    Tolerating PT/OT     Diarrhea    Tolerating Diet     Constipation    Other       Could NOT obtain due to: AMS     Objective:     VITALS:   Last 24hrs VS reviewed since prior progress note.  Most recent are:  Patient Vitals for the past 24 hrs:   Temp Pulse Resp BP SpO2   07/21/21 1037 -- 60 20 (!) 92/36 95 %   07/21/21 1034 98.2 °F (36.8 °C) (!) 59 18 (!) 92/36 99 %   07/21/21 1030 -- 65 20 (!) 92/40 100 %   07/21/21 0815 -- 77 18 (!) 168/55 100 %   07/21/21 0810 98.2 °F (36.8 °C) 76 16 (!) 168/54 100 %   07/21/21 0800 -- 83 13 (!) 156/52 100 %   07/21/21 0745 -- 72 18 (!) 140/49 99 %   07/21/21 0730 -- 69 20 (!) 160/54 100 %   07/21/21 0715 -- 76 20 (!) 146/56 99 %   07/21/21 0700 -- 73 18 (!) 153/58 100 %   07/21/21 0645 -- 76 20 (!) 154/53 100 %   07/21/21 0630 -- 78 18 (!) 152/63 99 %   07/21/21 0615 -- 74 20 (!) 155/80 100 %   07/21/21 0600 -- 73 20 (!) 150/64 99 %   07/21/21 0545 -- 70 18 (!) 126/57 100 %   07/21/21 0530 -- 73 20 (!) 140/63 99 %   07/21/21 0510 -- 68 22 (!) 121/48 100 %   07/21/21 0505 98.5 °F (36.9 °C) 67 22 (!) 128/48 100 %   07/21/21 0239 98.8 °F (37.1 °C) 75 25 (!) 114/45 99 %   07/20/21 2244 98.5 °F (36.9 °C) 72 22 (!) 111/46 99 %   07/20/21 1935 98.8 °F (37.1 °C) 76 21 (!) 114/52 98 %   07/20/21 1900 -- 75 22 (!) 105/40 99 %   07/20/21 1800 -- 63 16 (!) 162/57 99 %   07/20/21 1700 -- 68 22 (!) 157/64 99 %   07/20/21 1600 -- 71 22 (!) 190/62 99 %   07/20/21 1528 98.6 °F (37 °C) 80 24 (!) 163/61 98 %       Intake/Output Summary (Last 24 hours) at 7/21/2021 1207  Last data filed at 7/21/2021 0810  Gross per 24 hour   Intake 150 ml   Output 1000 ml   Net -850 ml        I had a face to face encounter and independently examined this patient on 7/21/2021, as outlined below:  PHYSICAL EXAM:  General: Adult  AA female, stuperous, minimally responsive  EENT:  EOMI. Anicteric sclerae. MMM  Resp:  CTA bilaterally, no wheezing or rales. No accessory muscle use  CV:  Regular  rhythm,  No edema  GI:  Soft, Non distended, Non tender. +Bowel sounds  Neurologic:  Stuperous, non verbal, PERRL, localizes to noxious stimulus  Psych:   deferred  Skin:  No rashes. No jaundice    Reviewed most current lab test results and cultures  YES  Reviewed most current radiology test results   YES  Review and summation of old records today    NO  Reviewed patient's current orders and MAR    YES  PMH/SH reviewed - no change compared to H&P  ________________________________________________________________________  Care Plan discussed with:    Comments   Patient     Family      RN x    Care Manager x    Consultant                       x Multidiciplinary team rounds were held today with , nursing, pharmacist and clinical coordinator. Patient's plan of care was discussed; medications were reviewed and discharge planning was addressed. ________________________________________________________________________  Total NON critical care TIME:  40   Minutes    Total CRITICAL CARE TIME Spent:   Minutes non procedure based      Comments   >50% of visit spent in counseling and coordination of care     ________________________________________________________________________  Ximena Cancer, DO     Procedures: see electronic medical records for all procedures/Xrays and details which were not copied into this note but were reviewed prior to creation of Plan. LABS:  I reviewed today's most current labs and imaging studies.   Pertinent labs include:  Recent Labs     07/21/21  0440 07/20/21  0320 07/19/21  0437   WBC 7.4 9.6 13.2*   HGB 9.8* 11.0* 11.1*   HCT 32.0* 35.2 36.8    157 157     Recent Labs     07/21/21  0440 07/20/21  0320 07/19/21  0437  138 139   K 3.7 3.2* 3.6    98 103   CO2 24 28 24   * 82 122*   BUN 34* 20 39*   CREA 6.58* 4.75* 7.83*   CA 8.5 9.0 9.4       Signed: Melina Singh DO

## 2021-07-21 NOTE — PROGRESS NOTES
Pharmacy Antimicrobial Kinetic Dosing    Indication for Antimicrobials: Acute encephalopathy      Current Regimen of Each Antimicrobial:  Vancomycin 500 mg IV after HD (Start Date ; Day # 6    Previous Antimicrobial Therapy:  Acyclovir 300 mg IV every 24 hours (Start Date ; Day # 5  Ampicillin 2 g IV every 12 hours (Start Date ; Day # 5  Cefepime 1 g IV every 24 hours (Start Date ; Day # 5    Goal Level: VANCOMYCIN TROUGH GOAL RANGE  Vancomycin Trough: 20 - 25 mcg/mL    Date Dose & Interval Measured (mcg/mL) Extrapolated (mcg/mL)    500 mg after HD 22.6                  Date & time of next level: Wednesday morning    Significant Positive Cultures:    Blood: NG - pending    Conditions for Dosing Consideration: Hemodialysis    Labs:  Recent Labs     21   CREA 6.58* 4.75* 7.83*   BUN 34* 20 39*     Recent Labs     21  0907   WBC 7.4 9.6 13.2* 10.4     Temp (24hrs), Av.6 °F (37 °C), Min:98.1 °F (36.7 °C), Max:98.8 °F (37.1 °C)    Creatinine Clearance (mL/min):   CrCl (Ideal Body Weight): 6.1   If actual weight < IBW: CrCl (Actual Body Weight) 7.6    Impression/Plan:   Will continue current regimen as appropriate for indication and renal function. Vancomycin level within the goal range. Continue with the current dose. Antimicrobial stop date pending     Pharmacy will follow daily and adjust medications as appropriate for renal function and/or serum levels.     Thank you,  Madiha Arenas, SUSIE    Vancomycin Dosing Document    Documents located on pharmacy Teams site: Clinical Practice -> Antimicrobial Stewardship -> Antibiotics_Vancomycin     Aminoglycoside Dosing Document    Documents located on pharmacy Teams site: Clinical Practice -> Antimicrobial Stewardship -> Antibiotics_Aminoglycosides

## 2021-07-21 NOTE — PROGRESS NOTES
Problem: Impaired Skin Integrity/Pressure Injury Treatment  Goal: *Improvement of Existing Pressure Injury  Outcome: Progressing Towards Goal     Problem: Impaired Skin Integrity/Pressure Injury Treatment  Goal: *Prevention of pressure injury  Description: Document Mick Scale and appropriate interventions in the flowsheet. Outcome: Progressing Towards Goal  Note: Pressure Injury Interventions:  Sensory Interventions: Assess changes in LOC, Assess need for specialty bed, Float heels    Moisture Interventions: Absorbent underpads, Apply protective barrier, creams and emollients    Activity Interventions: Assess need for specialty bed    Mobility Interventions: Assess need for specialty bed, Float heels    Nutrition Interventions: Document food/fluid/supplement intake    Friction and Shear Interventions: Apply protective barrier, creams and emollients                Problem: Falls - Risk of  Goal: *Absence of Falls  Description: Document Stalin Fall Risk and appropriate interventions in the flowsheet. Outcome: Progressing Towards Goal  Note: Fall Risk Interventions:  Mobility Interventions: Bed/chair exit alarm, Communicate number of staff needed for ambulation/transfer    Mentation Interventions: Bed/chair exit alarm, Adequate sleep, hydration, pain control, Room close to nurse's station    Medication Interventions: Bed/chair exit alarm, Evaluate medications/consider consulting pharmacy    Elimination Interventions: Call light in reach, Bed/chair exit alarm    History of Falls Interventions: Bed/chair exit alarm, Evaluate medications/consider consulting pharmacy         Problem: Pressure Injury - Risk of  Goal: *Prevention of pressure injury  Description: Document Mick Scale and appropriate interventions in the flowsheet.   Outcome: Progressing Towards Goal  Note: Pressure Injury Interventions:  Sensory Interventions: Assess changes in LOC, Assess need for specialty bed, Float heels    Moisture Interventions: Absorbent underpads, Apply protective barrier, creams and emollients    Activity Interventions: Assess need for specialty bed    Mobility Interventions: Assess need for specialty bed, Float heels    Nutrition Interventions: Document food/fluid/supplement intake    Friction and Shear Interventions: Apply protective barrier, creams and emollients

## 2021-07-21 NOTE — CONSULTS
Consult    Patient: Marcial Pritchett MRN: [id number]  SSN: [id number]    YOB: 1950  Age: 66 y.o. Sex: female      Subjective: Marcial Pritchett is a 66 y.o. female who is being seen for rectal wall thickening on CT scan. Patient has altered mental status (?meningitis) so unable to obtain any history      Past Medical History:   Diagnosis Date    Diabetes ([address]     Hypertension      Past Surgical History:   Procedure Laterality Date    [place] CARDIAC SURG PROCEDURE UNLIST        No family history on file.   Social History     Tobacco Use    Smoking status: Never Smoker    Smokeless tobacco: Never Used   Substance Use Topics    Alcohol use: Not Currently      Current Facility-Administered Medications   Medication Dose Route Frequency Provider Last Rate Last Admin    labetaloL (NORMODYNE;TRANDATE) injection 10 mg  10 mg IntraVENous TID Alexandra Almaguer MD   10 mg at 07/20/21 2147    hydrALAZINE (APRESOLINE) 20 mg/mL injection 20 mg  20 mg IntraVENous Q6H PRN Alexandra Almaguer MD   20 mg at 07/20/21 1812    levETIRAcetam (KEPPRA) 500 mg in 0.9% sodium chloride 100 mL IVPB  500 mg IntraVENous DAILY Marlon Gonzalez MD   500 mg at 07/20/21 1050    LORazepam (ATIVAN) injection 1 mg  1 mg IntraVENous Q4H PRN Roberta West MD   1 mg at 07/21/21 0211    labetaloL (NORMODYNE;TRANDATE) injection 20 mg  20 mg IntraVENous Q4H PRN Roberta West MD        glucose chewable tablet 16 g  4 Tablet Oral PRN Joseph Martinez MD        dextrose (D50W) injection syrg 12.5-25 g  12.5-25 g IntraVENous PRN Joseph Martinez MD        glucagon (GLUCAGEN) injection 1 mg  1 mg IntraMUSCular PRN Joseph Martinez MD        sodium chloride (NS) flush 5-40 mL  5-40 mL IntraVENous Q8H Joseph Martinez MD   10 mL at 07/21/21 4071    sodium chloride (NS) flush 5-40 mL  5-40 mL IntraVENous PRN Joseph Martinez MD        acetaminophen (TYLENOL) tablet 650 mg  650 mg Oral Q6H PRN Joseph Martinez MD        polyethylene glycol (MIRALAX) packet 17 g  17 g Oral DAILY PRN Carmen Ac MD        ondansetron (ZOFRAN ODT) tablet 4 mg  4 mg Oral Q8H PRN Carmen Ac MD        Or    ondansetron [facility] injection 4 mg  4 mg IntraVENous Q6H PRN Carmen Ac MD        insulin lispro (HUMALOG) injection   SubCUTAneous Q6H Barbara Esquivel MD   3 Units at 07/18/21 3929    acetaminophen (TYLENOL) suppository 650 mg  650 mg Rectal Q4H PRN Barbara Esquivel MD   650 mg at 07/17/21 1525    brimonidine (ALPHAGAN) 0.2 % ophthalmic solution 1 Drop  1 Drop Both Eyes BID Barbara Esquivel MD   1 Drop at 07/20/21 1749    timolol (TIMOPTIC) 0.25% ophthalmic solution  1 Drop Both Eyes DAILY Barbara Esquivel MD   1 Drop at 07/20/21 0910    prednisoLONE acetate (PRED FORTE) 1 % ophthalmic suspension 1 Drop  1 Drop Right Eye TID Barbara Esquivel MD   1 Drop at 07/20/21 2145    latanoprost (XALATAN) 0.005 % ophthalmic solution 1 Drop  1 Drop Both Eyes QHS Barbara Esquivel MD   1 Drop at 07/20/21 2145    heparin (porcine) injection 5,000 Units  5,000 Units SubCUTAneous Q8H Barbara Esquivel MD   5,000 Units at 07/21/21 0720    VANCOMYCIN INFORMATION NOTE   Other Rx Dosing/Monitoring Carmen Ac MD            No Known Allergies    Review of Systems:  Review of systems not obtained due to patient factors. Objective:     Vitals:    07/21/21 0630 07/21/21 0645 07/21/21 0700 07/21/21 0715   BP: (!) 152/63 (!) 154/53 (!) 153/58 (!) 146/56   Pulse: 78 76 73 76   Resp: 18 20 18 20   Temp:       TempSrc:       SpO2: 99% 100% 100% 99%   Weight:       Height:            Physical Exam:  General:  Alert, cooperative, no distress, appears stated age. Eyes:  Conjunctivae/corneas clear. PERRL, EOMs intact. Fundi benign   Ears:  Normal TMs and external ear canals both ears. Nose: Nares normal. Septum midline. Mucosa normal. No drainage or sinus tenderness.    Mouth/Throat: Lips, mucosa, and tongue normal. Teeth and gums normal.   Neck: Supple, symmetrical, trachea midline, no adenopathy, thyroid: no enlargment/tenderness/nodules, no carotid bruit and no JVD. Back:   Symmetric, no curvature. ROM normal. No CVA tenderness. Lungs:   Clear to auscultation bilaterally. Heart:  Regular rate and rhythm, S1, S2 normal, no murmur, click, rub or gallop. Abdomen:   Soft, non-tender. Bowel sounds normal. No masses,  No organomegaly. Extremities: Extremities normal, atraumatic, no cyanosis or edema. Pulses: 2+ and symmetric all extremities. Skin: Skin color, texture, turgor normal. No rashes or lesions   Lymph nodes: Cervical, supraclavicular, and axillary nodes normal.   Neurologic: CNII-XII intact. Normal strength, sensation and reflexes throughout. Rectal exam - large amount of firm stool in rectum. Unable to feel any masses    Assessment:     Hospital Problems  Date Reviewed: 5/20/2021        Codes Class Noted POA    Acute encephalopathy ICD-10-CM: G93.40  ICD-9-CM: 348.30  7/16/2021 Unknown              Plan:     71F with acute encephalopathy. The rectal findings on CT do not seem to be causing any acute issues (no evidence of obstruction or massive bleeding). Recommend follow up as outpatient when she recovers from this event.         Signed By: Deirdre Alfonso MD     July 21, 2021

## 2021-07-21 NOTE — DIALYSIS
[facility]  [phone]    Vitals   Pre   Post   Assessment   Pre   Post     Temp  Temp: 98.5 °F (36.9 °C) (07/21/21 0505)  98.2 LOC   A&O x 1 to name A&O x 1 to name   HR   Pulse (Heart Rate): 73 (resting, eyes closed) (07/21/21 0700) 76 Lungs   congestion  congestion   B/P   BP: (!) 153/58 (07/21/21 0700) 168/54 Cardiac   regular  regular   Resp   Resp Rate: 18 (07/21/21 0700) 16 Skin   warm  warm   Pain level  Pain Intensity 1: 0 (07/20/21 0400) No pain Edema  generalized     generalized   Orders:    Duration:   Start:   0510 End:   0810 Total:   3 hr   Dialyzer:   Dialyzer/Set Up Inspection: Randy Siddiqi ([id number]) (07/21/21 0505)   K Bath:   Dialysate K (mEq/L): 4 (07/21/21 0505)   Ca Bath:   Dialysate CA (mEq/L): 2.5 (07/21/21 0505)   Na/Bicarb:   Dialysate NA (mEq/L): 140 (07/21/21 0505)   Target Fluid Removal:   Goal/Amount of Fluid to Remove (mL): 1000 mL (07/21/21 0505)   Access     Type & Location:   NANCI AVF patent, B&T positive, cannulated with 15 g needles x 2   Labs     Obtained/Reviewed   Critical Results Called   Date when labs were drawn-  Hgb-    HGB   Date Value Ref Range Status   07/21/2021 9.8 (L) 11.5 - 16.0 g/dL Final     K-    Potassium   Date Value Ref Range Status   07/21/2021 3.7 3.5 - 5.1 mmol/L Final     Ca-   Calcium   Date Value Ref Range Status   07/21/2021 8.5 8.5 - 10.1 MG/DL Final     Bun-   BUN   Date Value Ref Range Status   07/21/2021 34 (H) 6 - 20 MG/DL Final     Creat-   Creatinine   Date Value Ref Range Status   07/21/2021 6.58 (H) 0.55 - 1.02 MG/DL Final     Comment:     INVESTIGATED PER DELTA CHECK PROTOCOL        Medications/ Blood Products Given     Name   Dose   Route and Time                     Blood Volume Processed (BVP):    73 L Net Fluid   Removed:  1000 ml   Comments   Time Out Done:  Yes, 0505  Primary Nurse Rpt Pre: Rome Mendenhall RN  Primary Nurse Rpt Kelli Gardner RN  Pt Education: yes, r/t dialysis process  Care Plan: continue HD as ordered  Tx Summary: tiolerated tx well, at end, all blood in circuit returned with 300 ml NS, NANCI AVF patent, B&T positive, bleeding stopped at both sites, pressure dressing in place. Admiting Diagnosis: renal failure  Pt's previous clinic-  Consent signed - Informed Consent Verified: Yes (07/19/21 7814)  Treita Consent -   Hepatitis Status- 06/28/21 Negative   07/07/21  Immune  Machine #- Machine Number: [id number] (07/21/21 9542)  Telemetry status- bedside  Pre-dialysis wt. - Pre-Dialysis Weight: 63.6 kg (140 lb 3.4 oz) (07/21/21 7840)

## 2021-07-21 NOTE — PROGRESS NOTES
Nephrology Progress Note  Piter Perez     [email or web address]. [email or web address]  Phone - [phone]   Patient: Marcial Pritchett    YOB: 1950        Date- 7/21/2021   Admit Date: 7/16/2021  CC: Follow up for end-stage renal disease        IMPRESSION & PLAN:    ESRD- mwf- davita laburnum  · AMS  · SEPSIS  · ANEMIA  · CA/P/PTH/VITD  · DM  ·  HTN    PLAN-   Hemodialysis today   Epogen for anemia    Prn hydralazine   Resume po meds. norvasc when able to      Subjective: Interval History:   -Blood pressure is improved  She is confused not able to answer any questions    Objective:   Vitals:    07/21/21 0715 07/21/21 0730 07/21/21 0745 07/21/21 0800   BP: (!) 146/56 (!) (P) 160/54 (!) (P) 140/49 (!) 156/52   Pulse: 76 (P) 69 (P) 72 83   Resp: 20 (P) 20 (P) 18 13   Temp:       TempSrc:       SpO2: 99% (P) 100% (P) 99% 100%   Weight:       Height:          07/20 0701 - 07/21 0700  In: 250 [I.V.:250]  Out: -   Last 3 Recorded Weights in this Encounter    07/16/21 2236 07/19/21 1334 07/21/21 0243   Weight: 62.2 kg (137 lb 2 oz) 62.5 kg (137 lb 12.6 oz) 62.5 kg (137 lb 12.6 oz)      Physical exam:   GEN: Confused  RESP: CTA  b/l, no  wheezing,   CVS: RRR,S1,S2   ABDO:  soft , non tender, No mass  NEURO: Can't access due to patient's current condition     EXT: Right arm AV fistula present        Chart reviewed. Pertinent Notes reviewed. Data Review :  Recent Labs     07/21/21  0440 07/20/21  0320 07/19/21  0437    138 139   K 3.7 3.2* 3.6    98 103   CO2 24 28 24   BUN 34* 20 39*   CREA 6.58* 4.75* 7.83*   * 82 122*   CA 8.5 9.0 9.4     Recent Labs     07/21/21  0440 07/20/21  0320 07/19/21  0437   WBC 7.4 9.6 13.2*   HGB 9.8* 11.0* 11.1*   HCT 32.0* 35.2 36.8    157 157     No results for input(s): FE, TIBC, PSAT, FERR in the last 72 hours.    Medication list  reviewed  Current Facility-Administered Medications   Medication Dose Route Frequency    labetaloL (NORMODYNE;TRANDATE) injection 10 mg  10 mg IntraVENous TID    hydrALAZINE (APRESOLINE) 20 mg/mL injection 20 mg  20 mg IntraVENous Q6H PRN    levETIRAcetam (KEPPRA) 500 mg in 0.9% sodium chloride 100 mL IVPB  500 mg IntraVENous DAILY    LORazepam (ATIVAN) injection 1 mg  1 mg IntraVENous Q4H PRN    labetaloL (NORMODYNE;TRANDATE) injection 20 mg  20 mg IntraVENous Q4H PRN    glucose chewable tablet 16 g  4 Tablet Oral PRN    dextrose (D50W) injection syrg 12.5-25 g  12.5-25 g IntraVENous PRN    glucagon (GLUCAGEN) injection 1 mg  1 mg IntraMUSCular PRN    sodium chloride (NS) flush 5-40 mL  5-40 mL IntraVENous Q8H    sodium chloride (NS) flush 5-40 mL  5-40 mL IntraVENous PRN    acetaminophen (TYLENOL) tablet 650 mg  650 mg Oral Q6H PRN    polyethylene glycol (MIRALAX) packet 17 g  17 g Oral DAILY PRN    ondansetron (ZOFRAN ODT) tablet 4 mg  4 mg Oral Q8H PRN    Or    ondansetron (ZOFRAN) injection 4 mg  4 mg IntraVENous Q6H PRN    insulin lispro (HUMALOG) injection   SubCUTAneous Q6H    acetaminophen (TYLENOL) suppository 650 mg  650 mg Rectal Q4H PRN    brimonidine (ALPHAGAN) 0.2 % ophthalmic solution 1 Drop  1 Drop Both Eyes BID    timolol (TIMOPTIC) 0.25% ophthalmic solution  1 Drop Both Eyes DAILY    prednisoLONE acetate (PRED FORTE) 1 % ophthalmic suspension 1 Drop  1 Drop Right Eye TID    latanoprost (XALATAN) 0.005 % ophthalmic solution 1 Drop  1 Drop Both Eyes QHS    heparin (porcine) injection 5,000 Units  5,000 Units SubCUTAneous Q8H    VANCOMYCIN INFORMATION NOTE   Other Rx Dosing/Monitoring          Magda Childers MD              [facility]  Formerly [facility], [address]  [place], [address]  Phone - [phone]               Fax - [phone]

## 2021-07-21 NOTE — PROGRESS NOTES
Hospitalist Progress Note    NAME: Laurence Dixon   :  1950   MRN:  [id number]       Assessment / Plan:  Sepsis  Acute encephalopathy   Anti-pyretics PRN - T 102  Meningitis panel negative, DC empiric coverage for meningitis  Appreciate neuro consult  No clear source of patient's encephalopathy so far  procal 0.67  Covid testing negative  S/p LP, clear CSF, no growth on cultures, procedure     MRI brain:  Encephalomalacia and white matter disease. No acute intracranial abnormality    Possible seizures  -no seizure captured on EEG, triphasic waves point to hepatic/renal encephalopathy as per neuro  -cont keppra    ESRD  -appreciate nephro following  -HD yesterday     Incidental finding of rectal wall thickening on CT A/P  -appreciate CRS eval  -patient can f/u outpatient, no intervention needed during this admission     Difficult IV access: Central line placed on   Hypokalemia  Resolved    DMII  HTN  CAD  FS monitoring, SS     Eye surgery in may : glaucoma   Daughter that she has a cup in her R eye   Ophthalmologist is  [facility]     Code Status: Full  Surrogate Decision Maker: Daughter  DVT Prophylaxis: SCD  GI Prophylaxis: not indicated  Baseline: Independent     25.0 - 29.9 Overweight / Body mass index is 25.08 kg/m².     Estimated discharge date:   Barriers: Currently very sick  Updated daughter at bedside on   Updated daughter over the phone on  and   Code status: Full  Prophylaxis: Hep SQ  Recommended Disposition: TBD     Subjective:     Chief Complaint / Reason for Physician Visit  Pt stuperous, can't give history    Discussed with RN events overnight.      Review of Systems:  Symptom Y/N Comments  Symptom Y/N Comments   Fever/Chills    Chest Pain     Poor Appetite    Edema     Cough    Abdominal Pain     Sputum    Joint Pain     SOB/CHURCH    Pruritis/Rash     Nausea/vomit    Tolerating PT/OT     Diarrhea    Tolerating Diet     Constipation The patient called back to let Dr Willingham office know she is unable to keep her appt today.  She has a new transportation service and they went to the wrong house.  She is rescheduled for 10/22, and on the wait list, however, she is diabetic and she is wondering if she could possibly get in sooner.    Please call her back to discuss.     Other       Could NOT obtain due to: AMS     Objective:     VITALS:   Last 24hrs VS reviewed since prior progress note. Most recent are:  Patient Vitals for the past 24 hrs:   Temp Pulse Resp BP SpO2   07/20/21 2244 98.5 °F (36.9 °C) 72 22 (!) 111/46 99 %   07/20/21 1935 98.8 °F (37.1 °C) 76 21 (!) 114/52 98 %   07/20/21 1900 -- 75 22 (!) 105/40 99 %   07/20/21 1800 -- 63 16 (!) 162/57 99 %   07/20/21 1700 -- 68 22 (!) 157/64 99 %   07/20/21 1600 -- 71 22 (!) 190/62 99 %   07/20/21 1528 98.6 °F (37 °C) 80 24 (!) 163/61 98 %   07/20/21 1103 98.1 °F (36.7 °C) 91 18 (!) 170/62 99 %   07/20/21 0900 -- 96 21 (!) 152/68 100 %   07/20/21 0800 99.4 °F (37.4 °C) (!) 104 27 (!) 165/85 100 %   07/20/21 0400 98 °F (36.7 °C) 88 (!) 33 (!) 111/52 99 %       Intake/Output Summary (Last 24 hours) at 7/20/2021 2326  Last data filed at 7/20/2021 1803  Gross per 24 hour   Intake 250 ml   Output --   Net 250 ml        I had a face to face encounter and independently examined this patient on 7/20/2021, as outlined below:  PHYSICAL EXAM:  General: Adult  AA female, stuperous, minimally responsive  EENT:  EOMI. Anicteric sclerae. MMM  Resp:  CTA bilaterally, no wheezing or rales. No accessory muscle use  CV:  Regular  rhythm,  No edema  GI:  Soft, Non distended, Non tender. +Bowel sounds  Neurologic:  Stuperous, non verbal, PERRL, localizes to noxious stimulus  Psych:   deferred  Skin:  No rashes.   No jaundice    Reviewed most current lab test results and cultures  YES  Reviewed most current radiology test results   YES  Review and summation of old records today    NO  Reviewed patient's current orders and MAR    YES  PMH/SH reviewed - no change compared to H&P  ________________________________________________________________________  Care Plan discussed with:    Comments   Patient     Family      RN x    Care Manager x    Consultant                       x Multidiciplinary team rounds were held today with , nursing, pharmacist and clinical coordinator. Patient's plan of care was discussed; medications were reviewed and discharge planning was addressed. ________________________________________________________________________  Total NON critical care TIME:  40   Minutes    Total CRITICAL CARE TIME Spent:   Minutes non procedure based      Comments   >50% of visit spent in counseling and coordination of care     ________________________________________________________________________  Aby Prior, DO     Procedures: see electronic medical records for all procedures/Xrays and details which were not copied into this note but were reviewed prior to creation of Plan. LABS:  I reviewed today's most current labs and imaging studies.   Pertinent labs include:  Recent Labs     07/20/21  0320 07/19/21  0437 07/18/21  0907   WBC 9.6 13.2* 10.4   HGB 11.0* 11.1* 12.4   HCT 35.2 36.8 41.0    157 194     Recent Labs     07/20/21  0320 07/19/21  0437 07/18/21  0907    139 138   K 3.2* 3.6 4.7   CL 98 103 100   CO2 28 24 27   GLU 82 122* 102*   BUN 20 39* 28*   CREA 4.75* 7.83* 7.15*   CA 9.0 9.4 10.1   ALB  --   --  4.0   TBILI  --   --  1.6*   ALT  --   --  18       Signed: Aby Prior, DO

## 2021-07-21 NOTE — PROGRESS NOTES
NEUROLOGY NOTE     Chief complaint: Altered mental status    SUBJECTIVE:  Pt continues to be stuporous. Meningitis panel is negative    HPI  The patient is a 69-year-old woman was brought to the ER as the patient is encephalopathic. Patient started having symptom onset the day prior and became increasingly drowsy in the morning and then had altered mental status after dialysis. The patient did complain of headache. On presentation patient's temperature was 101.9 and has not spiked any temperature since last night. Patient's Covid test is negative. Creatinine is 3.33. 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 Patient has been started on empiric antibiotics for possible meningitis. ROS  A ten system review of constitutional, cardiovascular, respiratory, musculoskeletal, endocrine, skin, SHEENT, genitourinary, psychiatric and neurologic systems was obtained and is unremarkable except as stated in HPI     PMH  Past Medical History:   Diagnosis Date    Diabetes ([facility] [address]     Hypertension        FH  No family history on file. SH  Social History     Socioeconomic History    Marital status:      Spouse name: Not on file    Number of children: Not on file    Years of education: Not on file    Highest education level: Not on file   Tobacco Use    Smoking status: Never Smoker    Smokeless tobacco: Never Used   Vaping Use    Vaping Use: Never used   Substance and Sexual Activity    Alcohol use: Not Currently    Drug use: Not Currently    Sexual activity: Yes     Partners: Female     Social Determinants of Health     Financial Resource Strain:     Difficulty of Paying Living Expenses:    Food Insecurity:     Worried About Running Out of Food in the Last Year:     [address] in the Last Year:    Transportation Needs:     Lack of Transportation (Medical):      Lack of Transportation (Non-Medical):    Physical Activity:     Days of Exercise per Week:     Minutes of Exercise per Session:    Stress:     Feeling of Stress :    Social Connections:     Frequency of Communication with Friends and Family:     Frequency of Social Gatherings with Friends and Family:     Attends Faith Services:     Active Member of Clubs or Organizations:     Attends Club or Organization Meetings:     Marital Status:        ALLERGIES  No Known Allergies    PHYSICAL EXAMINATION:   Patient Vitals for the past 24 hrs:   Temp Pulse Resp BP SpO2   07/21/21 1037 -- 60 20 (!) 92/36 95 %   07/21/21 1034 98.2 °F (36.8 °C) (!) 59 18 (!) 92/36 99 %   07/21/21 1030 -- 65 20 (!) 92/40 100 %   07/21/21 0815 -- 77 18 (!) 168/55 100 %   07/21/21 0810 98.2 °F (36.8 °C) 76 16 (!) 168/54 100 %   07/21/21 0800 -- 83 13 (!) 156/52 100 %   07/21/21 0745 -- 72 18 (!) 140/49 99 %   07/21/21 0730 -- 69 20 (!) 160/54 100 %   07/21/21 0715 -- 76 20 (!) 146/56 99 %   07/21/21 0700 -- 73 18 (!) 153/58 100 %   07/21/21 0645 -- 76 20 (!) 154/53 100 %   07/21/21 0630 -- 78 18 (!) 152/63 99 %   07/21/21 0615 -- 74 20 (!) 155/80 100 %   07/21/21 0600 -- 73 20 (!) 150/64 99 %   07/21/21 0545 -- 70 18 (!) 126/57 100 %   07/21/21 0530 -- 73 20 (!) 140/63 99 %   07/21/21 0510 -- 68 22 (!) 121/48 100 %   07/21/21 0505 98.5 °F (36.9 °C) 67 22 (!) 128/48 100 %   07/21/21 0239 98.8 °F (37.1 °C) 75 25 (!) 114/45 99 %   07/20/21 2244 98.5 °F (36.9 °C) 72 22 (!) 111/46 99 %   07/20/21 1935 98.8 °F (37.1 °C) 76 21 (!) 114/52 98 %   07/20/21 1900 -- 75 22 (!) 105/40 99 %   07/20/21 1800 -- 63 16 (!) 162/57 99 %   07/20/21 1700 -- 68 22 (!) 157/64 99 %   07/20/21 1600 -- 71 22 (!) 190/62 99 %   07/20/21 1528 98.6 °F (37 °C) 80 24 (!) 163/61 98 %        General:   General appearance: Pt is in no acute distress   Distal pulses are preserved    Neurological Examination:   Mental Status: Stuporous. Non verbal. Grimaces to pain.       Cranial Nerves: PERRL, Extraocular movements are full. Facial sensation intact. Facial movement intact. Motor: Cannot do formal strength testing. Symmetric. Normal tone. No atrophy. Sensation: Intact to pain    Skin: No significant bruising or lacerations. LAB DATA REVIEWED:    Recent Results (from the past 24 hour(s))   GLUCOSE, POC    Collection Time: 07/20/21  5:49 PM   Result Value Ref Range    Glucose (POC) 113 65 - 117 mg/dL    Performed by Carrol GIL    GLUCOSE, POC    Collection Time: 07/21/21 12:01 AM   Result Value Ref Range    Glucose (POC) 90 65 - 117 mg/dL    Performed by Rachel Ballesteros, TROUGH    Collection Time: 07/21/21  4:40 AM   Result Value Ref Range    Vancomycin,trough 22.6 (HH) 5.0 - 10.0 ug/mL    Reported dose date NOT PROVIDED      Reported dose time: NOT PROVIDED      Reported dose: NOT PROVIDED UNITS   CBC WITH AUTOMATED DIFF    Collection Time: 07/21/21  4:40 AM   Result Value Ref Range    WBC 7.4 3.6 - 11.0 K/uL    RBC 3.71 (L) 3.80 - 5.20 M/uL    HGB 9.8 (L) 11.5 - 16.0 g/dL    HCT 32.0 (L) 35.0 - 47.0 %    MCV 86.3 80.0 - 99.0 FL    MCH 26.4 26.0 - 34.0 PG    MCHC 30.6 30.0 - 36.5 g/dL    RDW 17.4 (H) 11.5 - 14.5 %    PLATELET 977 270 - 691 K/uL    MPV 11.7 8.9 - 12.9 FL    NRBC 0.0 0  WBC    ABSOLUTE NRBC 0.00 0.00 - 0.01 K/uL    NEUTROPHILS 65 32 - 75 %    LYMPHOCYTES 20 12 - 49 %    MONOCYTES 12 5 - 13 %    EOSINOPHILS 2 0 - 7 %    BASOPHILS 0 0 - 1 %    IMMATURE GRANULOCYTES 1 (H) 0.0 - 0.5 %    ABS. NEUTROPHILS 4.9 1.8 - 8.0 K/UL    ABS. LYMPHOCYTES 1.5 0.8 - 3.5 K/UL    ABS. MONOCYTES 0.9 0.0 - 1.0 K/UL    ABS. EOSINOPHILS 0.1 0.0 - 0.4 K/UL    ABS. BASOPHILS 0.0 0.0 - 0.1 K/UL    ABS. IMM.  GRANS. 0.0 0.00 - 0.04 K/UL    DF AUTOMATED     METABOLIC PANEL, BASIC    Collection Time: 07/21/21  4:40 AM   Result Value Ref Range    Sodium 141 136 - 145 mmol/L    Potassium 3.7 3.5 - 5.1 mmol/L    Chloride 102 97 - 108 mmol/L    CO2 24 21 - 32 mmol/L    Anion gap 15 5 - 15 mmol/L    Glucose 102 (H) 65 - 100 mg/dL    BUN 34 (H) 6 - 20 MG/DL    Creatinine 6.58 (H) 0.55 - 1.02 MG/DL    BUN/Creatinine ratio 5 (L) 12 - 20      GFR est AA 8 (L) >60 ml/min/1.73m2    GFR est non-AA 6 (L) >60 ml/min/1.73m2    Calcium 8.5 8.5 - 10.1 MG/DL   GLUCOSE, POC    Collection Time: 21  6:02 AM   Result Value Ref Range    Glucose (POC) 97 65 - 117 mg/dL    Performed by Kavitha Rudolph RN    GLUCOSE, POC    Collection Time: 21 11:54 AM   Result Value Ref Range    Glucose (POC) 89 65 - 117 mg/dL    Performed by Raquel Banegas PCT         Imaging review:  CT Head  Normal. No acute changes. MRI brain  Encephalomalacia and white matter disease. No acute intracranial abnormality    EEG  Triphasic waves    HOME MEDS  Prior to Admission Medications   Prescriptions Last Dose Informant Patient Reported? Taking? Blood-Glucose Meter monitoring kit   No No   Sig: Use as directed. Dx: E11.65 check sugar twice daily   Insulin Needles, Disposable, 32 gauge x \" ndle   No No   Si Each by Does Not Apply route daily. alcohol swabs padm Not Taking at Unknown time  No No   Si Each by Apply Externally route four (4) times daily. For use with insulin and glucometer   Patient not taking: Reported on 2021   amLODIPine (NORVASC) 5 mg tablet 2021 at Unknown time  Yes Yes   Sig: Take 5 mg by mouth daily. aspirin delayed-release 81 mg tablet 2021 at Unknown time  Yes Yes   Sig: Take  by mouth daily. atorvastatin (LIPITOR) 40 mg tablet 2021 at Unknown time  Yes Yes   Sig: Take  by mouth daily. b complex-vitamin c-folic acid 0.8 mg (Dialyvite 800) 0.8 mg tab tablet Not Taking at Unknown time  No No   Sig: Take 1 Tab by mouth daily. Patient not taking: Reported on 2021   brimonidine-timoloL (Combigan) 0.2-0.5 % drop ophthalmic solution 2021 at Unknown time  Yes Yes   Si Drop every twelve (12) hours.    famotidine (PEPCID) 20 mg tablet 2021 at Unknown time  No Yes Sig: TAKE 1 TAB BY MOUTH TWO (2) TIMES DAILY AS NEEDED FOR HEARTBURN.   glucose blood VI test strips (ASCENSIA AUTODISC VI, ONE TOUCH ULTRA TEST VI) strip   No No   Sig: Check sugar twice daily   insulin glargine (LANTUS,BASAGLAR) 100 unit/mL (3 mL) inpn   No No   Sig: 10 Units by SubCUTAneous route daily (with dinner). isosorbide mononitrate (ISMO, MONOKET) 20 mg tablet Not Taking at Unknown time  No No   Sig: Take 1 Tab by mouth two (2) times a day. Patient not taking: Reported on 7/16/2021   lancets misc   No No   Sig: Check sugar twice daily. E11.65   levothyroxine (SYNTHROID) 50 mcg tablet 7/16/2021 at Unknown time  No Yes   Sig: TAKE 1 TABLET BY MOUTH EVERY DAY BEFORE BREAKFAST   metoprolol succinate (TOPROL-XL) 50 mg XL tablet 7/16/2021 at Unknown time  Yes Yes   Sig: Take  by mouth daily.       Facility-Administered Medications: None       CURRENT MEDS  Current Facility-Administered Medications   Medication Dose Route Frequency    epoetin bia-epbx (RETACRIT) injection 4,000 Units  4,000 Units SubCUTAneous Q MON, WED & FRI    levothyroxine (SYNTHROID) injection 25 mcg  25 mcg IntraVENous Q24H    labetaloL (NORMODYNE;TRANDATE) injection 10 mg  10 mg IntraVENous TID    levETIRAcetam (KEPPRA) 500 mg in 0.9% sodium chloride 100 mL IVPB  500 mg IntraVENous DAILY    sodium chloride (NS) flush 5-40 mL  5-40 mL IntraVENous Q8H    insulin lispro (HUMALOG) injection   SubCUTAneous Q6H    brimonidine (ALPHAGAN) 0.2 % ophthalmic solution 1 Drop  1 Drop Both Eyes BID    timolol (TIMOPTIC) 0.25% ophthalmic solution  1 Drop Both Eyes DAILY    prednisoLONE acetate (PRED FORTE) 1 % ophthalmic suspension 1 Drop  1 Drop Right Eye TID    latanoprost (XALATAN) 0.005 % ophthalmic solution 1 Drop  1 Drop Both Eyes QHS    heparin (porcine) injection 5,000 Units  5,000 Units SubCUTAneous Q8H       IMPRESSION/RECOMMENDATIONS:  Lex Glez is a 66 y.o. female who presents with altered mental status in the setting of fever. +neck stiffness. LP is negative for meningitis. Unclear etiology. Could not find a neurological etiology. 1.  Meningitis panel is negative. 2. Cont Keppra to 500 mg daily (renal dosing)  3.  Anti TPO antibodies  - pending      Naresh Mcclellan MD  Neurologist

## 2021-07-22 PROBLEM — H54.7 VISUAL IMPAIRMENT: Status: ACTIVE | Noted: 2021-07-22

## 2021-07-22 PROBLEM — R53.81 PHYSICAL DEBILITY: Status: ACTIVE | Noted: 2021-07-22

## 2021-07-22 PROBLEM — Z71.89 COUNSELING REGARDING ADVANCE CARE PLANNING AND GOALS OF CARE: Status: ACTIVE | Noted: 2021-07-22

## 2021-07-22 LAB
ANION GAP SERPL CALC-SCNC: 12 MMOL/L (ref 5–15)
BUN SERPL-MCNC: 21 MG/DL (ref 6–20)
BUN/CREAT SERPL: 5 (ref 12–20)
CALCIUM SERPL-MCNC: 9.2 MG/DL (ref 8.5–10.1)
CHLORIDE SERPL-SCNC: 99 MMOL/L (ref 97–108)
CO2 SERPL-SCNC: 28 MMOL/L (ref 21–32)
CREAT SERPL-MCNC: 4.53 MG/DL (ref 0.55–1.02)
EBV DNA SPEC QL NAA+PROBE: NEGATIVE
GLUCOSE BLD STRIP.AUTO-MCNC: 102 MG/DL (ref 65–117)
GLUCOSE BLD STRIP.AUTO-MCNC: 103 MG/DL (ref 65–117)
GLUCOSE BLD STRIP.AUTO-MCNC: 87 MG/DL (ref 65–117)
GLUCOSE BLD STRIP.AUTO-MCNC: 92 MG/DL (ref 65–117)
GLUCOSE SERPL-MCNC: 98 MG/DL (ref 65–100)
POTASSIUM SERPL-SCNC: 3.9 MMOL/L (ref 3.5–5.1)
SERVICE CMNT-IMP: NORMAL
SODIUM SERPL-SCNC: 139 MMOL/L (ref 136–145)
SPECIMEN SOURCE: NORMAL
THYROPEROXIDASE AB SERPL-ACNC: 24 IU/ML (ref 0–34)

## 2021-07-22 PROCEDURE — 74011250636 HC RX REV CODE- 250/636: Performed by: INTERNAL MEDICINE

## 2021-07-22 PROCEDURE — 74011250636 HC RX REV CODE- 250/636: Performed by: PSYCHIATRY & NEUROLOGY

## 2021-07-22 PROCEDURE — 82962 GLUCOSE BLOOD TEST: CPT

## 2021-07-22 PROCEDURE — 80048 BASIC METABOLIC PNL TOTAL CA: CPT

## 2021-07-22 PROCEDURE — 65660000000 HC RM CCU STEPDOWN

## 2021-07-22 PROCEDURE — 74011000250 HC RX REV CODE- 250: Performed by: INTERNAL MEDICINE

## 2021-07-22 PROCEDURE — 36415 COLL VENOUS BLD VENIPUNCTURE: CPT

## 2021-07-22 PROCEDURE — 99223 1ST HOSP IP/OBS HIGH 75: CPT | Performed by: NURSE PRACTITIONER

## 2021-07-22 PROCEDURE — 74011000258 HC RX REV CODE- 258: Performed by: PSYCHIATRY & NEUROLOGY

## 2021-07-22 RX ADMIN — HEPARIN SODIUM 5000 UNITS: 5000 INJECTION INTRAVENOUS; SUBCUTANEOUS at 06:29

## 2021-07-22 RX ADMIN — LABETALOL HYDROCHLORIDE 10 MG: 5 INJECTION INTRAVENOUS at 16:52

## 2021-07-22 RX ADMIN — LORAZEPAM 1 MG: 2 INJECTION INTRAMUSCULAR; INTRAVENOUS at 04:58

## 2021-07-22 RX ADMIN — LEVETIRACETAM 500 MG: 100 INJECTION, SOLUTION INTRAVENOUS at 08:51

## 2021-07-22 RX ADMIN — HEPARIN SODIUM 5000 UNITS: 5000 INJECTION INTRAVENOUS; SUBCUTANEOUS at 16:51

## 2021-07-22 RX ADMIN — Medication 10 ML: at 23:46

## 2021-07-22 RX ADMIN — BRIMONIDINE TARTRATE 1 DROP: 2 SOLUTION OPHTHALMIC at 17:04

## 2021-07-22 RX ADMIN — BRIMONIDINE TARTRATE 1 DROP: 2 SOLUTION OPHTHALMIC at 08:15

## 2021-07-22 RX ADMIN — LABETALOL HYDROCHLORIDE 10 MG: 5 INJECTION INTRAVENOUS at 08:15

## 2021-07-22 RX ADMIN — PREDNISOLONE ACETATE 1 DROP: 10 SUSPENSION/ DROPS OPHTHALMIC at 17:03

## 2021-07-22 RX ADMIN — Medication 10 ML: at 16:52

## 2021-07-22 RX ADMIN — HYDRALAZINE HYDROCHLORIDE 20 MG: 20 INJECTION INTRAMUSCULAR; INTRAVENOUS at 03:17

## 2021-07-22 RX ADMIN — LATANOPROST 1 DROP: 50 SOLUTION OPHTHALMIC at 22:03

## 2021-07-22 RX ADMIN — PREDNISOLONE ACETATE 1 DROP: 10 SUSPENSION/ DROPS OPHTHALMIC at 22:04

## 2021-07-22 RX ADMIN — PREDNISOLONE ACETATE 1 DROP: 10 SUSPENSION/ DROPS OPHTHALMIC at 08:17

## 2021-07-22 RX ADMIN — HEPARIN SODIUM 5000 UNITS: 5000 INJECTION INTRAVENOUS; SUBCUTANEOUS at 22:02

## 2021-07-22 RX ADMIN — TIMOLOL MALEATE 1 DROP: 2.5 SOLUTION/ DROPS OPHTHALMIC at 08:17

## 2021-07-22 RX ADMIN — Medication 30 ML: at 05:50

## 2021-07-22 RX ADMIN — LABETALOL HYDROCHLORIDE 10 MG: 5 INJECTION INTRAVENOUS at 22:09

## 2021-07-22 NOTE — PROGRESS NOTES
Participated in Palliative Care IDT for this pt in 2250. CH will remain available to provide pastoral support through compassionate presence in the hope to gain rapport and assess any other support needs that may arise for this pt. Gibson Sutherland MDiv.  Staff   Request  Support/Spiritual Care Services via [address] [place] [address]

## 2021-07-22 NOTE — PROGRESS NOTES
Problem: Impaired Skin Integrity/Pressure Injury Treatment  Goal: *Prevention of pressure injury  Description: Document Mick Scale and appropriate interventions in the flowsheet. Outcome: Progressing Towards Goal  Note: Pressure Injury Interventions:  Sensory Interventions: Assess changes in LOC, Float heels, Keep linens dry and wrinkle-free, Maintain/enhance activity level, Minimize linen layers    Moisture Interventions: Absorbent underpads    Activity Interventions: Increase time out of bed, Pressure redistribution bed/mattress(bed type)    Mobility Interventions: Assess need for specialty bed    Nutrition Interventions: Document food/fluid/supplement intake    Friction and Shear Interventions: Lift sheet                Problem: Falls - Risk of  Goal: *Absence of Falls  Description: Document Stalin Fall Risk and appropriate interventions in the flowsheet.   Outcome: Progressing Towards Goal  Note: Fall Risk Interventions:  Mobility Interventions: Bed/chair exit alarm    Mentation Interventions: Bed/chair exit alarm, Toileting rounds    Medication Interventions: Bed/chair exit alarm, Assess postural VS orthostatic hypotension    Elimination Interventions: Call light in reach, Bed/chair exit alarm    History of Falls Interventions: Bed/chair exit alarm

## 2021-07-22 NOTE — PROGRESS NOTES
1300: Family at bedside, updated on patients condition today. Requesting to speak to neurologist regarding patients status. Messaged MD to notify. 1620: NOted patient to have foam around sides of mouth. No changes in neuro status, patient remains altered/lethargic with minimal responsiveness. MD notified. End of Shift Note    Bedside shift change report given to Joshua De La Paz (oncoming nurse) by Kelvin Yan RN (offgoing nurse). Report included the following information SBAR, Kardex, Intake/Output, MAR and Recent Results    Shift worked:  7a-7p     Shift summary and any significant changes:    Patient lethargic throughout shift, seems to be more responsive to voice throughout. Palliative care following. Concerns for physician to address:       Zone phone for oncoming shift:          Activity:  Activity Level: Bed Rest  Number times ambulated in hallways past shift: 0  Number of times OOB to chair past shift: 0    Cardiac:   Cardiac Monitoring: Yes      Cardiac Rhythm: Sinus Rhythm    Access:   Current line(s): central line     Genitourinary:   Urinary status: anuric    Respiratory:   O2 Device: None (Room air)  Chronic home O2 use?: NO  Incentive spirometer at bedside: NO     GI:  Last Bowel Movement Date: 07/22/21  Current diet:  DIET NPO  Passing flatus: YES  Tolerating current diet: NO       Pain Management:   Patient states pain is manageable on current regimen: YES    Skin:  Mick Score: 10  Interventions: float heels    Patient Safety:  Fall Score:  Total Score: 3  Interventions: bed/chair alarm  High Fall Risk: Yes    Length of Stay:  Expected LOS: 4d 19h  Actual LOS: Gunjan Montano 130, RN

## 2021-07-22 NOTE — CONSULTS
Palliative Medicine Consult  Colin: [phone])    Patient Name: Kurtis Mane  YOB: 1950    Date of Initial Consult: 7/22/21  Reason for Consult: care decisions  Requesting Provider: Lula Sutton MD   Primary Care Physician: Alona Bassett., NP     SUMMARY:   Kurtis Mane is a 66 y.o. with a past history of end-stage renal disease on MWF hemodialysis, hypertension, diabetes, coronary artery disease status post CABG, hypothyroidism, who was admitted on 7/16/2021 from dialysis with a diagnosis of acute encephalopathy. Current medical issues leading to Palliative Medicine involvement include: continued unresponsiveness of unknown etiology. CT Head Normal. No acute changes. MRI brain shows Encephalomalacia and white matter disease. No acute intracranial abnormality, meningitis panel is neg. Psychosocial: was independent PTA   PALLIATIVE DIAGNOSES:   1. Altered mental status etiology unknown  2. Dysphagia   3. Physical debility   4. Sepsis   5. ESRD on HD  6. Visually impaired  7. Goals of care   PLAN:   1. Prior to visit, I completed an extensive review of patient's medical records, including medical documentation, vital signs, MARs, and results of various labs and other diagnostics. I also spoke with patient's nurse Naida Samaniego,  and : per Laura White all neurological etiologies have been ruled out. 2. Met with dtr and other family members in pt's room:  Family was eager to show me that pt is responding to them, she opened her eyes on command after a bit of coaxing, turned her head towards their calling, and verbalized (more or less grunting) in response to their encouraging her. We talked about how we don't know at this time the cause of her AMS, however, the fact that she is improving is encouraging.   We talked about different causes including anoxia (doubtful), infection, medications, etc.  She did get a dose of 1mg IV ativan early this am, given her renal function and lack of exposure to sedatives, this is a high dose and could be affecting her mental status. Family is encouraged by her improvement today so we agreed to reassess daily. We also discussed code status, why it would not be a good idea to do CPR on her in her current condition, if they decided to make her DNR now, her code status could be changed back to full code if she improves; family will think about this. 1. Discontinue ativan (done by ). 2. Consider stopping Keppra if pt remains sedated. 3. Will meet with family tomorrow to reassess. 3. Initial consult note routed to primary continuity provider and/or primary health care team members  4. Communicated plan of care with: Palliative [facility] Team     GOALS OF CARE / TREATMENT PREFERENCES:     GOALS OF CARE:  Patient/Health Care Proxy Stated Goals: Prolong life    TREATMENT PREFERENCES:   Code Status: Full Code    Advance Care Planning:  [x] [facility] Interdisciplinary Team has updated the ACP Navigator with Health Care Decision Maker and Patient Capacity      Primary Decision Maker: Rosie Evans - Daughter - [phone]    Primary Decision Maker (Active): Kiah Chen - [phone]  Advance Care Planning 7/22/2021   Patient's Healthcare Decision Maker is: Legal Next of Kin   Confirm Advance Directive None   Patient Would Like to Complete Advance Directive Unable     Medical Interventions: Full interventions             Other:    As far as possible, the palliative care team has discussed with patient / health care proxy about goals of care / treatment preferences for patient.      HISTORY:     History obtained from: chart, family    CHIEF COMPLAINT: AMS    HPI/SUBJECTIVE:    The patient is:   [] Verbal and participatory  [x] Non-participatory due to: unresponsive    7/16:  BIBA from dialysis for AMS: family reports pt was well and at baseline prior to dialysis, complained of a headache and became altered during dialysis and her treatment was completed despite these changes. Clinical Pain Assessment (nonverbal scale for severity on nonverbal patients):   Clinical Pain Assessment  Severity: 0     Activity (Movement): Lying quietly, normal position    Duration: for how long has pt been experiencing pain (e.g., 2 days, 1 month, years)  Frequency: how often pain is an issue (e.g., several times per day, once every few days, constant)     FUNCTIONAL ASSESSMENT:     Palliative Performance Scale (PPS):  PPS: 10       PSYCHOSOCIAL/SPIRITUAL SCREENING:     Palliative IDT has assessed this patient for cultural preferences / practices and a referral made as appropriate to needs (Cultural Services, Patient Advocacy, Ethics, etc.)    Any spiritual / Moravian concerns:  [] Yes /  [x] No    Caregiver Burnout:  [] Yes /  [x] No /  [] No Caregiver Present      Anticipatory grief assessment:   [x] Normal  / [] Maladaptive       ESAS Anxiety: Anxiety: 0    ESAS Depression:   unable to assess due to pt factors       REVIEW OF SYSTEMS:     Positive and pertinent negative findings in ROS are noted above in HPI. The following systems were [x] reviewed / [] unable to be reviewed as noted in HPI  Other findings are noted below. Systems: constitutional, ears/nose/mouth/throat, respiratory, gastrointestinal, genitourinary, musculoskeletal, integumentary, neurologic, psychiatric, endocrine. Positive findings noted below. Modified ESAS Completed by: provider           Pain: 0   Anxiety: 0       Dyspnea: 0           Stool Occurrence(s): 1        PHYSICAL EXAM:     From RN flowsheet:  Wt Readings from Last 3 Encounters:   07/22/21 136 lb 7.4 oz (61.9 kg)   05/20/21 152 lb (68.9 kg)   05/11/21 147 lb (66.7 kg)     Blood pressure (!) 130/48, pulse 65, temperature 99 °F (37.2 °C), resp. rate 24, height 5' 2\" (1.575 m), weight 136 lb 7.4 oz (61.9 kg), SpO2 98 %.     Pain Scale 1: Adult Nonverbal Pain Scale  Pain Intensity 1: 0 Last bowel movement, if known:     Constitutional: unresponsive for me on initial visit, however, responsive to family, opened eyes, verbalized  Eyes: pupils equal, anicteric  ENMT: no nasal discharge, moist mucous membranes  Cardiovascular: regular rhythm, distal pulses intact  Respiratory: breathing not labored, symmetric  Gastrointestinal: soft non-tender, +bowel sounds  Musculoskeletal: left arm flaccid  Skin: warm, dry  Neurologic: not following commands, not moving all extremities,withdraws feet to stim  Psychiatric: unable to assess due to pt factors     HISTORY:     Active Problems:    Acute encephalopathy (7/16/2021)      Past Medical History:   Diagnosis Date    Diabetes ([facility] [address]     Hypertension       Past Surgical History:   Procedure Laterality Date    [place] CARDIAC SURG PROCEDURE UNLIST        No family history on file. History reviewed, no pertinent family history.   Social History     Tobacco Use    Smoking status: Never Smoker    Smokeless tobacco: Never Used   Substance Use Topics    Alcohol use: Not Currently     No Known Allergies   Current Facility-Administered Medications   Medication Dose Route Frequency    epoetin bia-epbx (RETACRIT) injection 4,000 Units  4,000 Units SubCUTAneous Q MON, WED & FRI    [START ON 7/23/2021] levothyroxine (SYNTHROID) injection 37.5 mcg  37.5 mcg IntraVENous Q24H    labetaloL (NORMODYNE;TRANDATE) injection 10 mg  10 mg IntraVENous TID    hydrALAZINE (APRESOLINE) 20 mg/mL injection 20 mg  20 mg IntraVENous Q6H PRN    levETIRAcetam (KEPPRA) 500 mg in 0.9% sodium chloride 100 mL IVPB  500 mg IntraVENous DAILY    labetaloL (NORMODYNE;TRANDATE) injection 20 mg  20 mg IntraVENous Q4H PRN    glucose chewable tablet 16 g  4 Tablet Oral PRN    dextrose (D50W) injection syrg 12.5-25 g  12.5-25 g IntraVENous PRN    glucagon (GLUCAGEN) injection 1 mg  1 mg IntraMUSCular PRN    sodium chloride (NS) flush 5-40 mL  5-40 mL IntraVENous Q8H    sodium chloride (NS) flush 5-40 mL  5-40 mL IntraVENous PRN    acetaminophen (TYLENOL) tablet 650 mg  650 mg Oral Q6H PRN    polyethylene glycol (MIRALAX) packet 17 g  17 g Oral DAILY PRN    ondansetron (ZOFRAN ODT) tablet 4 mg  4 mg Oral Q8H PRN    Or    ondansetron (ZOFRAN) injection 4 mg  4 mg IntraVENous Q6H PRN    insulin lispro (HUMALOG) injection   SubCUTAneous Q6H    acetaminophen (TYLENOL) suppository 650 mg  650 mg Rectal Q4H PRN    brimonidine (ALPHAGAN) 0.2 % ophthalmic solution 1 Drop  1 Drop Both Eyes BID    timolol (TIMOPTIC) 0.25% ophthalmic solution  1 Drop Both Eyes DAILY    prednisoLONE acetate (PRED FORTE) 1 % ophthalmic suspension 1 Drop  1 Drop Right Eye TID    latanoprost (XALATAN) 0.005 % ophthalmic solution 1 Drop  1 Drop Both Eyes QHS    heparin (porcine) injection 5,000 Units  5,000 Units SubCUTAneous Q8H          LAB AND IMAGING FINDINGS:     Lab Results   Component Value Date/Time    WBC 7.4 07/21/2021 04:40 AM    HGB 9.8 (L) 07/21/2021 04:40 AM    PLATELET 467 78/02/5006 04:40 AM     Lab Results   Component Value Date/Time    Sodium 139 07/22/2021 03:20 AM    Potassium 3.9 07/22/2021 03:20 AM    Chloride 99 07/22/2021 03:20 AM    CO2 28 07/22/2021 03:20 AM    BUN 21 (H) 07/22/2021 03:20 AM    Creatinine 4.53 (H) 07/22/2021 03:20 AM    Calcium 9.2 07/22/2021 03:20 AM    Magnesium 2.1 07/16/2021 11:45 PM    Phosphorus 2.4 (L) 07/16/2021 11:45 PM      Lab Results   Component Value Date/Time    Alk.  phosphatase 90 07/18/2021 09:07 AM    Protein, total 9.6 (H) 07/18/2021 09:07 AM    Albumin 4.0 07/18/2021 09:07 AM    Globulin 5.6 (H) 07/18/2021 09:07 AM     No results found for: INR, PTMR, PTP, PT1, PT2, APTT, INREXT, INREXT   No results found for: IRON, FE, TIBC, IBCT, PSAT, FERR   Lab Results   Component Value Date/Time    pH 7.50 (H) 07/16/2021 05:52 PM    PCO2 43 07/16/2021 05:52 PM    PO2 62 (L) 07/16/2021 05:52 PM     No components found for: [place]   Lab Results   Component Value Date/Time  07/18/2021 09:07 AM                Total time: 75  Counseling / coordination time, spent as noted above: 65  > 50% counseling / coordination?: y  Prolonged service was provided for  []30 min   []75 min in face to face time in the presence of the patient, spent as noted above. Time Start:   Time End:   Note: this can only be billed with 30320 (initial) or 82353 (follow up). If multiple start / stop times, list each separately.

## 2021-07-22 NOTE — PROGRESS NOTES
Spiritual Care Assessment/Progress Note  [facility]      NAME: Dianne Swift      MRN: [id number]  AGE: 66 y.o. SEX: female  Pentecostal Affiliation: Unknown   Language: English     7/22/2021     Total Time (in minutes): 7     Spiritual Assessment begun in [facility] PROGRESSIVE CARE through conversation with:         []Patient        [] Family    [] Friend(s)        Reason for Consult: Palliative Care, Initial/Spiritual Assessment     Spiritual beliefs: (Please include comment if needed)     [] Identifies with a nicolas tradition:         [] Supported by a nicolas community:            [] Claims no spiritual orientation:           [] Seeking spiritual identity:                [] Adheres to an individual form of spirituality:           [x] Not able to assess:                           Identified resources for coping:      [] Prayer                               [] Music                  [] Guided Imagery     [] Family/friends                 [] Pet visits     [] Devotional reading                         [x] Unknown     [] Other:                                             Interventions offered during this visit: (See comments for more details)    Patient Interventions: Crisis           Plan of Care:     [] Support spiritual and/or cultural needs    [] Support AMD and/or advance care planning process      [] Support grieving process   [] Coordinate Rites and/or Rituals    [] Coordination with community clergy   [] No spiritual needs identified at this time   [] Detailed Plan of Care below (See Comments)  [] Make referral to Music Therapy  [] Make referral to Pet Therapy     [] Make referral to Addiction services  [] Make referral to [facility]  [] Make referral to Spiritual Care Partner  [] No future visits requested        [] Follow up upon further referrals     Comments: Attempted Palliative Initial Spiritual Assessment for this pt in [facility] 0820. Pt was unable to be assessed at this time.   No family/friends present at time of visit. Contact Spiritual Care Services for any spiritual or emotional support needs. Do Pearson MDiv.  Staff   Request  Support/Spiritual Care Services via [facility]

## 2021-07-22 NOTE — PROGRESS NOTES
Nephrology Progress Note  Piter Perez     [email or web address]  Phone - [phone]   Patient: Anu River    YOB: 1950        Date- 7/22/2021   Admit Date: 7/16/2021  CC: Follow up for end-stage renal disease        IMPRESSION & PLAN:    ESRD- mwf- davita laburnum  · AMS  · SEPSIS  · ANEMIA  · CA/P/PTH/VITD  · DM  ·  HTN    PLAN-   Hemodialysis tomorrow   Epogen for anemia    Prn hydralazine   Resume po meds. norvasc when able to      Subjective: Interval History:   -Remains confused  -No issues overnight    Objective:   Vitals:    07/22/21 0800 07/22/21 0900 07/22/21 1034 07/22/21 1100   BP: (!) 134/49 (!) 143/49 (!) 135/56 (!) 139/52   Pulse: 70 67 67 65   Resp: 24 29 26 20   Temp:   98.7 °F (37.1 °C)    TempSrc:       SpO2: 98% 100% 99% 98%   Weight:       Height:          07/21 0701 - 07/22 0700  In: -   Out: 1000   Last 3 Recorded Weights in this Encounter    07/19/21 1334 07/21/21 0243 07/22/21 0548   Weight: 62.5 kg (137 lb 12.6 oz) 62.5 kg (137 lb 12.6 oz) 61.9 kg (136 lb 7.4 oz)      Physical exam:   GEN: Confused  RESP: CTA  b/l, no  wheezing,   CVS: RRR,S1,S2   ABDO:  soft , non tender, No mass  NEURO: Can't access due to patient's current condition   EXT: Right arm AV fistula present        Chart reviewed. Pertinent Notes reviewed. Data Review :  Recent Labs     07/22/21  0320 07/21/21  0440 07/20/21  0320    141 138   K 3.9 3.7 3.2*   CL 99 102 98   CO2 28 24 28   BUN 21* 34* 20   CREA 4.53* 6.58* 4.75*   GLU 98 102* 82   CA 9.2 8.5 9.0     Recent Labs     07/21/21  0440 07/20/21  0320   WBC 7.4 9.6   HGB 9.8* 11.0*   HCT 32.0* 35.2    157     No results for input(s): FE, TIBC, PSAT, FERR in the last 72 hours.    Medication list  reviewed  Current Facility-Administered Medications   Medication Dose Route Frequency    epoetin bia-epbx (RETACRIT) injection 4,000 Units  4,000 Units SubCUTAneous Q MON, WED & FRI    [START ON 7/23/2021] levothyroxine (SYNTHROID) injection 37.5 mcg  37.5 mcg IntraVENous Q24H    labetaloL (NORMODYNE;TRANDATE) injection 10 mg  10 mg IntraVENous TID    hydrALAZINE (APRESOLINE) 20 mg/mL injection 20 mg  20 mg IntraVENous Q6H PRN    levETIRAcetam (KEPPRA) 500 mg in 0.9% sodium chloride 100 mL IVPB  500 mg IntraVENous DAILY    LORazepam (ATIVAN) injection 1 mg  1 mg IntraVENous Q4H PRN    labetaloL (NORMODYNE;TRANDATE) injection 20 mg  20 mg IntraVENous Q4H PRN    glucose chewable tablet 16 g  4 Tablet Oral PRN    dextrose (D50W) injection syrg 12.5-25 g  12.5-25 g IntraVENous PRN    glucagon (GLUCAGEN) injection 1 mg  1 mg IntraMUSCular PRN    sodium chloride (NS) flush 5-40 mL  5-40 mL IntraVENous Q8H    sodium chloride (NS) flush 5-40 mL  5-40 mL IntraVENous PRN    acetaminophen (TYLENOL) tablet 650 mg  650 mg Oral Q6H PRN    polyethylene glycol (MIRALAX) packet 17 g  17 g Oral DAILY PRN    ondansetron (ZOFRAN ODT) tablet 4 mg  4 mg Oral Q8H PRN    Or    ondansetron (ZOFRAN) injection 4 mg  4 mg IntraVENous Q6H PRN    insulin lispro (HUMALOG) injection   SubCUTAneous Q6H    acetaminophen (TYLENOL) suppository 650 mg  650 mg Rectal Q4H PRN    brimonidine (ALPHAGAN) 0.2 % ophthalmic solution 1 Drop  1 Drop Both Eyes BID    timolol (TIMOPTIC) 0.25% ophthalmic solution  1 Drop Both Eyes DAILY    prednisoLONE acetate (PRED FORTE) 1 % ophthalmic suspension 1 Drop  1 Drop Right Eye TID    latanoprost (XALATAN) 0.005 % ophthalmic solution 1 Drop  1 Drop Both Eyes QHS    heparin (porcine) injection 5,000 Units  5,000 Units SubCUTAneous Q8H          MD [facility]  [facility] [address]  [place], [address]  Phone - [phone]               Fax - [phone]

## 2021-07-22 NOTE — PROGRESS NOTES
Hospitalist Progress Note    NAME: Verónica Freedman   :  1950   MRN:  [id number]       Assessment / Plan:  Sepsis unknown source POA  Acute encephalopathy   Anti-pyretics PRN  Febrile to 102 on admission but no fevers since  Started on empiric coverage for meningitis given, fevers/AMS/neck stiffness  -meningitis panel negative and abx discontinued  Appreciate neuro consult  -slight improvement in encephalopathy, arouses a little to loud voice/shaking  procal 0.67  Covid testing negative  S/p LP, clear CSF, no growth on cultures, procedure   As per daughter patient able to walk and carry on conversation prior to admission and had been in normal state of health until HD prior to admission where she became unresponsive  -still no clear explanation for encephalopathy  -elevated tbili and triphasic waves on EEG (?hepatic/renal encephalopathy)  -check abd US    MRI brain:  Encephalomalacia and white matter disease.  No acute intracranial abnormality    Possible seizures  -no seizure captured on EEG, triphasic waves point to hepatic/renal encephalopathy as per neuro  -cont keppra for now  -DC PRN ativan, avoid sedatives as able    ESRD  -appreciate nephro following  -HD per nephro    Incidental finding of rectal wall thickening on CT A/P  -appreciate CRS eval  -patient can f/u outpatient, no intervention needed during this admission     Difficult IV access: Central line placed on   Hypokalemia  Resolved    DMII  HTN  CAD  FS monitoring, SS     Eye surgery in may : glaucoma   Daughter that she has a cup in her R eye   Ophthalmologist is Dr. Tomas Palacios at [facility]     Code Status: Full  Appreciate palliative team following    Surrogate Decision Maker: Daughter  DVT Prophylaxis: SCD  GI Prophylaxis: not indicated  Baseline: Independent     25.0 - 29.9 Overweight / Body mass index is 25.08 kg/m².     Estimated discharge date:   Barriers: Currently very sick  Updated daughter at bedside on 07/19  Updated daughter over the phone on 07/17 and 07/18  Code status: Full  Prophylaxis: Hep SQ  Recommended Disposition: TBD     Subjective:     Chief Complaint / Reason for Physician Visit  Slight improvement in neuro status, still somnolent, arouses a little more readily to loud voice/noxious stimulus/shaking    Discussed with RN events overnight. Review of Systems:  Symptom Y/N Comments  Symptom Y/N Comments   Fever/Chills    Chest Pain     Poor Appetite    Edema     Cough    Abdominal Pain     Sputum    Joint Pain     SOB/CHURCH    Pruritis/Rash     Nausea/vomit    Tolerating PT/OT     Diarrhea    Tolerating Diet     Constipation    Other       Could NOT obtain due to: AMS     Objective:     VITALS:   Last 24hrs VS reviewed since prior progress note. Most recent are:  Patient Vitals for the past 24 hrs:   Temp Pulse Resp BP SpO2   07/22/21 1525 99 °F (37.2 °C) 65 24 (!) 130/48 98 %   07/22/21 1400 -- 71 27 (!) 140/73 98 %   07/22/21 1300 -- 72 22 (!) 121/99 99 %   07/22/21 1200 -- 63 22 (!) 142/50 95 %   07/22/21 1100 -- 65 20 (!) 139/52 98 %   07/22/21 1034 98.7 °F (37.1 °C) 67 26 (!) 135/56 99 %   07/22/21 1000 -- 63 20 (!) 123/45 98 %   07/22/21 0900 -- 67 29 (!) 143/49 100 %   07/22/21 0800 -- 70 24 (!) 134/49 98 %   07/22/21 0721 98.6 °F (37 °C) 68 19 (!) 126/91 98 %   07/22/21 0333 98.7 °F (37.1 °C) 73 26 105/64 99 %   07/22/21 0317 -- -- -- (!) 178/75 --   07/21/21 2311 98.7 °F (37.1 °C) 64 21 106/64 97 %   07/21/21 1901 98.9 °F (37.2 °C) 69 24 (!) 157/66 98 %       Intake/Output Summary (Last 24 hours) at 7/22/2021 1719  Last data filed at 7/22/2021 9715  Gross per 24 hour   Intake 0 ml   Output --   Net 0 ml        I had a face to face encounter and independently examined this patient on 7/22/2021, as outlined below:  PHYSICAL EXAM:  General: Adult  AA female, stuperous, minimally responsive  EENT:  EOMI. Anicteric sclerae. MMM  Resp:  CTA bilaterally, no wheezing or rales.   No accessory muscle use  CV:  Regular  rhythm,  No edema  GI:  Soft, Non distended, Non tender. +Bowel sounds  Neurologic:  Stuperous, non verbal, PERRL, localizes to noxious stimulus  Psych:   deferred  Skin:  No rashes. No jaundice    Reviewed most current lab test results and cultures  YES  Reviewed most current radiology test results   YES  Review and summation of old records today    NO  Reviewed patient's current orders and MAR    YES  PMH/SH reviewed - no change compared to H&P  ________________________________________________________________________  Care Plan discussed with:    Comments   Patient     Family      RN x    Care Manager x    Consultant                       x Multidiciplinary team rounds were held today with , nursing, pharmacist and clinical coordinator. Patient's plan of care was discussed; medications were reviewed and discharge planning was addressed. ________________________________________________________________________  Total NON critical care TIME:  35   Minutes    Total CRITICAL CARE TIME Spent:   Minutes non procedure based      Comments   >50% of visit spent in counseling and coordination of care     ________________________________________________________________________  Belkys Mann,      Procedures: see electronic medical records for all procedures/Xrays and details which were not copied into this note but were reviewed prior to creation of Plan. LABS:  I reviewed today's most current labs and imaging studies.   Pertinent labs include:  Recent Labs     07/21/21  0440 07/20/21  0320   WBC 7.4 9.6   HGB 9.8* 11.0*   HCT 32.0* 35.2    157     Recent Labs     07/22/21  0320 07/21/21  0440 07/20/21  0320    141 138   K 3.9 3.7 3.2*   CL 99 102 98   CO2 28 24 28   GLU 98 102* 82   BUN 21* 34* 20   CREA 4.53* 6.58* 4.75*   CA 9.2 8.5 9.0       Signed: Belkys Mann DO

## 2021-07-23 ENCOUNTER — APPOINTMENT (OUTPATIENT)
Dept: ULTRASOUND IMAGING | Age: 71
DRG: 853 | End: 2021-07-23
Attending: INTERNAL MEDICINE
Payer: MEDICARE

## 2021-07-23 LAB
ALBUMIN SERPL-MCNC: 2.7 G/DL (ref 3.5–5)
ALBUMIN/GLOB SERPL: 0.6 {RATIO} (ref 1.1–2.2)
ALP SERPL-CCNC: 61 U/L (ref 45–117)
ALT SERPL-CCNC: 11 U/L (ref 12–78)
ANION GAP SERPL CALC-SCNC: 9 MMOL/L (ref 5–15)
AST SERPL-CCNC: 24 U/L (ref 15–37)
B BURGDOR IGG PATRN CSF IB-IMP: NEGATIVE
B BURGDOR IGM PATRN CSF IB-IMP: NEGATIVE
B BURGDOR18KD IGG CSF QL IB: NORMAL
B BURGDOR23KD IGG CSF QL IB: NORMAL
B BURGDOR23KD IGM CSF QL IB: NORMAL
B BURGDOR28KD IGG CSF QL IB: NORMAL
B BURGDOR30KD IGG CSF QL IB: NORMAL
B BURGDOR39KD IGG CSF QL IB: NORMAL
B BURGDOR39KD IGM CSF QL IB: NORMAL
B BURGDOR41KD IGG CSF QL IB: NORMAL
B BURGDOR41KD IGM CSF QL IB: NORMAL
B BURGDOR45KD IGG CSF QL IB: NORMAL
B BURGDOR58KD IGG CSF QL IB: NORMAL
B BURGDOR66KD IGG CSF QL IB: NORMAL
B BURGDOR93KD IGG CSF QL IB: NORMAL
BACTERIA SPEC CULT: NORMAL
BASOPHILS # BLD: 0 K/UL (ref 0–0.1)
BASOPHILS NFR BLD: 0 % (ref 0–1)
BILIRUB SERPL-MCNC: 0.7 MG/DL (ref 0.2–1)
BUN SERPL-MCNC: 35 MG/DL (ref 6–20)
BUN/CREAT SERPL: 5 (ref 12–20)
CALCIUM SERPL-MCNC: 8.7 MG/DL (ref 8.5–10.1)
CHLORIDE SERPL-SCNC: 102 MMOL/L (ref 97–108)
CO2 SERPL-SCNC: 28 MMOL/L (ref 21–32)
COMMENT, HOLDF: NORMAL
CREAT SERPL-MCNC: 6.49 MG/DL (ref 0.55–1.02)
DIFFERENTIAL METHOD BLD: ABNORMAL
EOSINOPHIL # BLD: 0.4 K/UL (ref 0–0.4)
EOSINOPHIL NFR BLD: 5 % (ref 0–7)
ERYTHROCYTE [DISTWIDTH] IN BLOOD BY AUTOMATED COUNT: 18.3 % (ref 11.5–14.5)
GLOBULIN SER CALC-MCNC: 4.5 G/DL (ref 2–4)
GLUCOSE BLD STRIP.AUTO-MCNC: 78 MG/DL (ref 65–117)
GLUCOSE BLD STRIP.AUTO-MCNC: 86 MG/DL (ref 65–117)
GLUCOSE SERPL-MCNC: 81 MG/DL (ref 65–100)
HCT VFR BLD AUTO: 32.9 % (ref 35–47)
HGB BLD-MCNC: 10.2 G/DL (ref 11.5–16)
IMM GRANULOCYTES # BLD AUTO: 0.1 K/UL (ref 0–0.04)
IMM GRANULOCYTES NFR BLD AUTO: 1 % (ref 0–0.5)
LYMPHOCYTES # BLD: 2.1 K/UL (ref 0.8–3.5)
LYMPHOCYTES NFR BLD: 27 % (ref 12–49)
MCH RBC QN AUTO: 27.3 PG (ref 26–34)
MCHC RBC AUTO-ENTMCNC: 31 G/DL (ref 30–36.5)
MCV RBC AUTO: 88.2 FL (ref 80–99)
MONOCYTES # BLD: 1.1 K/UL (ref 0–1)
MONOCYTES NFR BLD: 14 % (ref 5–13)
NEUTS SEG # BLD: 4.2 K/UL (ref 1.8–8)
NEUTS SEG NFR BLD: 53 % (ref 32–75)
NRBC # BLD: 0 K/UL (ref 0–0.01)
NRBC BLD-RTO: 0 PER 100 WBC
PLATELET # BLD AUTO: 159 K/UL (ref 150–400)
PMV BLD AUTO: 11.8 FL (ref 8.9–12.9)
POTASSIUM SERPL-SCNC: 4.6 MMOL/L (ref 3.5–5.1)
PROT SERPL-MCNC: 7.2 G/DL (ref 6.4–8.2)
RBC # BLD AUTO: 3.73 M/UL (ref 3.8–5.2)
SAMPLES BEING HELD,HOLD: NORMAL
SERVICE CMNT-IMP: NORMAL
SODIUM SERPL-SCNC: 139 MMOL/L (ref 136–145)
WBC # BLD AUTO: 7.8 K/UL (ref 3.6–11)

## 2021-07-23 PROCEDURE — 82962 GLUCOSE BLOOD TEST: CPT

## 2021-07-23 PROCEDURE — 74011000250 HC RX REV CODE- 250: Performed by: INTERNAL MEDICINE

## 2021-07-23 PROCEDURE — 80053 COMPREHEN METABOLIC PANEL: CPT

## 2021-07-23 PROCEDURE — 99233 SBSQ HOSP IP/OBS HIGH 50: CPT | Performed by: NURSE PRACTITIONER

## 2021-07-23 PROCEDURE — 36415 COLL VENOUS BLD VENIPUNCTURE: CPT

## 2021-07-23 PROCEDURE — 65660000000 HC RM CCU STEPDOWN

## 2021-07-23 PROCEDURE — 85025 COMPLETE CBC W/AUTO DIFF WBC: CPT

## 2021-07-23 PROCEDURE — 74011250636 HC RX REV CODE- 250/636: Performed by: INTERNAL MEDICINE

## 2021-07-23 PROCEDURE — 90935 HEMODIALYSIS ONE EVALUATION: CPT

## 2021-07-23 RX ADMIN — HEPARIN SODIUM 5000 UNITS: 5000 INJECTION INTRAVENOUS; SUBCUTANEOUS at 06:01

## 2021-07-23 RX ADMIN — HEPARIN SODIUM 5000 UNITS: 5000 INJECTION INTRAVENOUS; SUBCUTANEOUS at 14:48

## 2021-07-23 RX ADMIN — BRIMONIDINE TARTRATE 1 DROP: 2 SOLUTION OPHTHALMIC at 08:41

## 2021-07-23 RX ADMIN — TIMOLOL MALEATE 1 DROP: 2.5 SOLUTION/ DROPS OPHTHALMIC at 08:42

## 2021-07-23 RX ADMIN — LATANOPROST 1 DROP: 50 SOLUTION OPHTHALMIC at 23:01

## 2021-07-23 RX ADMIN — Medication 10 ML: at 14:48

## 2021-07-23 RX ADMIN — Medication 10 ML: at 23:04

## 2021-07-23 RX ADMIN — LABETALOL HYDROCHLORIDE 10 MG: 5 INJECTION INTRAVENOUS at 16:22

## 2021-07-23 RX ADMIN — LEVOTHYROXINE SODIUM ANHYDROUS 37.5 MCG: 100 INJECTION, POWDER, LYOPHILIZED, FOR SOLUTION INTRAVENOUS at 14:49

## 2021-07-23 RX ADMIN — LABETALOL HYDROCHLORIDE 10 MG: 5 INJECTION INTRAVENOUS at 23:02

## 2021-07-23 RX ADMIN — PREDNISOLONE ACETATE 1 DROP: 10 SUSPENSION/ DROPS OPHTHALMIC at 23:02

## 2021-07-23 RX ADMIN — PREDNISOLONE ACETATE 1 DROP: 10 SUSPENSION/ DROPS OPHTHALMIC at 08:42

## 2021-07-23 RX ADMIN — Medication 10 ML: at 05:51

## 2021-07-23 NOTE — PROGRESS NOTES
Nephrology Progress Note  Piter Perez     www. [facility]  Phone - [phone]   Patient: Kurtis Mane    YOB: 1950        Date- 7/23/2021   Admit Date: 7/16/2021  CC: Follow up for ESRD       IMPRESSION & PLAN:    ESRD- [facility]- davita laburnum  · AMS  · SEPSIS  · ANEMIA  · CA/P/PTH/VITD  · DM  ·  HTN    PLAN-   Seen on hd  today   Epogen for anemia    REMOVE 1 kg fluid today  D/W HD NURSE   Subjective: Interval History:   -seen on hd  She is not following any command    Objective:   Vitals:    07/23/21 0746 07/23/21 0900 07/23/21 0915 07/23/21 0930   BP: 105/76 (!) 143/57 (P) 90/71 (!) (P) 155/65   Pulse: 75 63 (P) 62 (P) 66   Resp: 25 24 (P) 21 (P) 20   Temp: 98.7 °F (37.1 °C) 98.7 °F (37.1 °C)     TempSrc:  Axillary     SpO2: 99% 98% (P) 98% (P) 98%   Weight:       Height:          No intake/output data recorded. Last 3 Recorded Weights in this Encounter    07/21/21 0243 07/22/21 0548 07/23/21 0550   Weight: 62.5 kg (137 lb 12.6 oz) 61.9 kg (136 lb 7.4 oz) 60.7 kg (133 lb 13.1 oz)      Physical exam:   GEN: Confused  RESP:CLEAR  b/l, no  wheezing,   CVS: RRR, S1,S2  ABDO:  soft , non tender, No mass  NEURO: Can't access due to patient's current condition   EXT: Right arm AV fistula present  NO EDEMA        Chart reviewed. Pertinent Notes reviewed. Data Review :  Recent Labs     07/23/21  0157 07/22/21  0320 07/21/21  0440    139 141   K 4.6 3.9 3.7    99 102   CO2 28 28 24   BUN 35* 21* 34*   CREA 6.49* 4.53* 6.58*   GLU 81 98 102*   CA 8.7 9.2 8.5     Recent Labs     07/23/21  0157 07/21/21  0440   WBC 7.8 7.4   HGB 10.2* 9.8*   HCT 32.9* 32.0*    153     No results for input(s): FE, TIBC, PSAT, FERR in the last 72 hours.    Medication list  reviewed  Current Facility-Administered Medications   Medication Dose Route Frequency    epoetin bia-epbx (RETACRIT) injection 4,000 Units  4,000 Units SubCUTAneous Q MON, WED & FRI    levothyroxine (SYNTHROID) injection 37.5 mcg  37.5 mcg IntraVENous Q24H    labetaloL (NORMODYNE;TRANDATE) injection 10 mg  10 mg IntraVENous TID    hydrALAZINE (APRESOLINE) 20 mg/mL injection 20 mg  20 mg IntraVENous Q6H PRN    levETIRAcetam (KEPPRA) 500 mg in 0.9% sodium chloride 100 mL IVPB  500 mg IntraVENous DAILY    labetaloL (NORMODYNE;TRANDATE) injection 20 mg  20 mg IntraVENous Q4H PRN    glucose chewable tablet 16 g  4 Tablet Oral PRN    dextrose (D50W) injection syrg 12.5-25 g  12.5-25 g IntraVENous PRN    glucagon (GLUCAGEN) injection 1 mg  1 mg IntraMUSCular PRN    sodium chloride (NS) flush 5-40 mL  5-40 mL IntraVENous Q8H    sodium chloride (NS) flush 5-40 mL  5-40 mL IntraVENous PRN    acetaminophen (TYLENOL) tablet 650 mg  650 mg Oral Q6H PRN    polyethylene glycol (MIRALAX) packet 17 g  17 g Oral DAILY PRN    ondansetron (ZOFRAN ODT) tablet 4 mg  4 mg Oral Q8H PRN    Or    ondansetron (ZOFRAN) injection 4 mg  4 mg IntraVENous Q6H PRN    insulin lispro (HUMALOG) injection   SubCUTAneous Q6H    acetaminophen (TYLENOL) suppository 650 mg  650 mg Rectal Q4H PRN    brimonidine (ALPHAGAN) 0.2 % ophthalmic solution 1 Drop  1 Drop Both Eyes BID    timolol (TIMOPTIC) 0.25% ophthalmic solution  1 Drop Both Eyes DAILY    prednisoLONE acetate (PRED FORTE) 1 % ophthalmic suspension 1 Drop  1 Drop Right Eye TID    latanoprost (XALATAN) 0.005 % ophthalmic solution 1 Drop  1 Drop Both Eyes QHS    heparin (porcine) injection 5,000 Units  5,000 Units SubCUTAneous Q8H          MD [facility]  [facility] [place] [address], [address]  [place], [address]  Phone - [phone]               Fax - [phone]

## 2021-07-23 NOTE — PROGRESS NOTES
Palliative Medicine Consult  Colin: [phone]    Patient Name: Bina Wheeler  YOB: 1950    Date of Initial Consult: 7/22/21  Reason for Consult: care decisions  Requesting Provider: Fabián Robins MD   Primary Care Physician: Mena Lagos, FRANCK     SUMMARY:   Bina Wheeler is a 66 y.o. with a past history of end-stage renal disease on MWF hemodialysis, hypertension, diabetes, coronary artery disease status post CABG, hypothyroidism, who was admitted on 7/16/2021 from dialysis with a diagnosis of acute encephalopathy. Current medical issues leading to Palliative Medicine involvement include: continued unresponsiveness of unknown etiology. CT Head Normal. No acute changes. MRI brain shows Encephalomalacia and white matter disease. No acute intracranial abnormality, meningitis panel is neg. Psychosocial: was independent PTA   PALLIATIVE DIAGNOSES:   1. Altered mental status etiology unknown  2. Dysphagia   3. Physical debility   4. Sepsis   5. ESRD on HD  6. Visually impaired  7. Goals of care   PLAN:   1. Prior to visit, I completed a  review of patient's medical records, including medical documentation, vital signs, MARs, and results of various labs and other diagnostics. I also spoke with patient's nurse Lidia Chaparro. 2. Met with dtr and patient (who was on HD):  Pt is awake, alert, tries to speak but unintelligible. Does not like to be touched. Discussed with dtr that pt is better today, but has a ways to go, plan is to continue reducing sedating medications, give more time for her to recover. Again discussed code status with family, they want \"to try CPR one time, then stop. \"  I tried to explain that there is no such thing as one time, however, I don't think they understand. 3. Will check back on Monday. 4. Initial consult note routed to primary continuity provider and/or primary health care team members  5.  Communicated plan of care with: Palliative IDT, Hospital Health Care Team     GOALS OF CARE / TREATMENT PREFERENCES:     GOALS OF CARE:  Patient/Health Care Proxy Stated Goals: Prolong life    TREATMENT PREFERENCES:   Code Status: Full Code    Advance Care Planning:  [x] [facility] Interdisciplinary Team has updated the ACP Navigator with Health Care Decision Maker and Patient Capacity      Primary Decision Maker: Brisa Loredo - [phone]    Primary Decision Maker (Active): Renny Matos - [phone]  Advance Care Planning 7/22/2021   Patient's Healthcare Decision Maker is: Legal Next of Kin   Confirm Advance Directive None   Patient Would Like to Complete Advance Directive Unable     Medical Interventions: Full interventions             Other:    As far as possible, the palliative care team has discussed with patient / health care proxy about goals of care / treatment preferences for patient. HISTORY:     History obtained from: chart, family    CHIEF COMPLAINT: AMS    HPI/SUBJECTIVE:    The patient is:   [] Verbal and participatory  [x] Non-participatory due to: unresponsive    7/16:  BIBA from dialysis for AMS: family reports pt was well and at baseline prior to dialysis, complained of a headache and became altered during dialysis and her treatment was completed despite these changes.     Clinical Pain Assessment (nonverbal scale for severity on nonverbal patients):   Clinical Pain Assessment  Severity: 0     Activity (Movement): Lying quietly, normal position    Duration: for how long has pt been experiencing pain (e.g., 2 days, 1 month, years)  Frequency: how often pain is an issue (e.g., several times per day, once every few days, constant)     FUNCTIONAL ASSESSMENT:     Palliative Performance Scale (PPS):  PPS: 10       PSYCHOSOCIAL/SPIRITUAL SCREENING:     Palliative IDT has assessed this patient for cultural preferences / practices and a referral made as appropriate to needs (Cultural Services, Patient Advocacy, Ethics, etc.)    Any spiritual / Rastafari concerns:  [] Yes /  [x] No    Caregiver Burnout:  [] Yes /  [x] No /  [] No Caregiver Present      Anticipatory grief assessment:   [x] Normal  / [] Maladaptive       ESAS Anxiety: Anxiety: 0    ESAS Depression:   unable to assess due to pt factors       REVIEW OF SYSTEMS:     Positive and pertinent negative findings in ROS are noted above in HPI. The following systems were [x] reviewed / [] unable to be reviewed as noted in HPI  Other findings are noted below. Systems: constitutional, ears/nose/mouth/throat, respiratory, gastrointestinal, genitourinary, musculoskeletal, integumentary, neurologic, psychiatric, endocrine. Positive findings noted below. Modified ESAS Completed by: provider           Pain: 0   Anxiety: 0       Dyspnea: 0           Stool Occurrence(s): 1        PHYSICAL EXAM:     From RN flowsheet:  Wt Readings from Last 3 Encounters:   07/23/21 133 lb 13.1 oz (60.7 kg)   05/20/21 152 lb (68.9 kg)   05/11/21 147 lb (66.7 kg)     Blood pressure (!) (P) 141/53, pulse (!) (P) 59, temperature 98.7 °F (37.1 °C), temperature source Axillary, resp. rate (P) 16, height 5' 2\" (1.575 m), weight 133 lb 13.1 oz (60.7 kg), SpO2 (P) 99 %.     Pain Scale 1: Numeric (0 - 10)  Pain Intensity 1: 0                 Last bowel movement, if known:     Constitutional: awake, alert, agitated when touched, calm when left alone  Eyes: pupils equal, anicteric  ENMT: no nasal discharge, moist mucous membranes  Cardiovascular: regular rhythm, distal pulses intact  Respiratory: breathing not labored, symmetric  Gastrointestinal: soft non-tender, +bowel sounds  Musculoskeletal: left arm flaccid  Skin: warm, dry  Neurologic: not following commands,  moving all extremities   Psychiatric: unable to assess due to pt factors     HISTORY:     Active Problems:    Acute encephalopathy (7/16/2021)      Visual impairment (7/22/2021)      Physical debility (7/22/2021)      Counseling regarding advance care planning and goals of care (7/22/2021)      Past Medical History:   Diagnosis Date    Diabetes ([facility] [address]     Hypertension       Past Surgical History:   Procedure Laterality Date    [place] CARDIAC SURG PROCEDURE UNLIST        No family history on file. History reviewed, no pertinent family history.   Social History     Tobacco Use    Smoking status: Never Smoker    Smokeless tobacco: Never Used   Substance Use Topics    Alcohol use: Not Currently     No Known Allergies   Current Facility-Administered Medications   Medication Dose Route Frequency    epoetin bia-epbx (RETACRIT) injection 4,000 Units  4,000 Units SubCUTAneous Q MON, WED & FRI    levothyroxine (SYNTHROID) injection 37.5 mcg  37.5 mcg IntraVENous Q24H    labetaloL (NORMODYNE;TRANDATE) injection 10 mg  10 mg IntraVENous TID    hydrALAZINE (APRESOLINE) 20 mg/mL injection 20 mg  20 mg IntraVENous Q6H PRN    levETIRAcetam (KEPPRA) 500 mg in 0.9% sodium chloride 100 mL IVPB  500 mg IntraVENous DAILY    labetaloL (NORMODYNE;TRANDATE) injection 20 mg  20 mg IntraVENous Q4H PRN    glucose chewable tablet 16 g  4 Tablet Oral PRN    dextrose (D50W) injection syrg 12.5-25 g  12.5-25 g IntraVENous PRN    glucagon (GLUCAGEN) injection 1 mg  1 mg IntraMUSCular PRN    sodium chloride (NS) flush 5-40 mL  5-40 mL IntraVENous Q8H    sodium chloride (NS) flush 5-40 mL  5-40 mL IntraVENous PRN    acetaminophen (TYLENOL) tablet 650 mg  650 mg Oral Q6H PRN    polyethylene glycol (MIRALAX) packet 17 g  17 g Oral DAILY PRN    ondansetron (ZOFRAN ODT) tablet 4 mg  4 mg Oral Q8H PRN    Or    ondansetron (ZOFRAN) injection 4 mg  4 mg IntraVENous Q6H PRN    insulin lispro (HUMALOG) injection   SubCUTAneous Q6H    acetaminophen (TYLENOL) suppository 650 mg  650 mg Rectal Q4H PRN    brimonidine (ALPHAGAN) 0.2 % ophthalmic solution 1 Drop  1 Drop Both Eyes BID    timolol (TIMOPTIC) 0.25% ophthalmic solution  1 Drop Both Eyes DAILY    prednisoLONE acetate (PRED FORTE) 1 % ophthalmic suspension 1 Drop  1 Drop Right Eye TID    latanoprost (XALATAN) 0.005 % ophthalmic solution 1 Drop  1 Drop Both Eyes QHS    heparin (porcine) injection 5,000 Units  5,000 Units SubCUTAneous Q8H          LAB AND IMAGING FINDINGS:     Lab Results   Component Value Date/Time    WBC 7.8 07/23/2021 01:57 AM    HGB 10.2 (L) 07/23/2021 01:57 AM    PLATELET 622 21/30/0345 01:57 AM     Lab Results   Component Value Date/Time    Sodium 139 07/23/2021 01:57 AM    Potassium 4.6 07/23/2021 01:57 AM    Chloride 102 07/23/2021 01:57 AM    CO2 28 07/23/2021 01:57 AM    BUN 35 (H) 07/23/2021 01:57 AM    Creatinine 6.49 (H) 07/23/2021 01:57 AM    Calcium 8.7 07/23/2021 01:57 AM    Magnesium 2.1 07/16/2021 11:45 PM    Phosphorus 2.4 (L) 07/16/2021 11:45 PM      Lab Results   Component Value Date/Time    Alk. phosphatase 61 07/23/2021 01:57 AM    Protein, total 7.2 07/23/2021 01:57 AM    Albumin 2.7 (L) 07/23/2021 01:57 AM    Globulin 4.5 (H) 07/23/2021 01:57 AM     No results found for: INR, PTMR, PTP, PT1, PT2, APTT, INREXT, INREXT   No results found for: IRON, FE, TIBC, IBCT, PSAT, FERR   Lab Results   Component Value Date/Time    pH 7.50 (H) 07/16/2021 05:52 PM    PCO2 43 07/16/2021 05:52 PM    PO2 62 (L) 07/16/2021 05:52 PM     No components found for: [place]   Lab Results   Component Value Date/Time     07/18/2021 09:07 AM                Total time: 40  Counseling / coordination time, spent as noted above: 35  > 50% counseling / coordination?: y  Prolonged service was provided for  []30 min   []75 min in face to face time in the presence of the patient, spent as noted above. Time Start:   Time End:   Note: this can only be billed with 11684 (initial) or 79396 (follow up). If multiple start / stop times, list each separately.

## 2021-07-23 NOTE — PROCEDURES
[facility]  [phone]    Vitals   Pre   Post   Assessment   Pre   Post     Temp  Temp: 98.7 °F (37.1 °C) (07/23/21 0900)  97.5 LOC  A & O x 1 No change   HR   Pulse (Heart Rate): 63 (07/23/21 0900)  Lungs   clear  no change   B/P   BP: (!) 143/57 (07/23/21 0900)  Cardiac   irreg rate & rhy  No change   Resp   Resp Rate: 24 (07/23/21 0900)  Skin   Warm dry & intact  No change   Pain level  Pain Intensity 1: 0 (07/22/21 1945)  Edema  none     No change   Orders:    Duration:   Start:  0900 Procedure Start Time: 1442 End:  1200 Procedure End Time: 1300 Total:   3.0   Dialyzer:   Dialyzer/Set Up Inspection: Revaclear (07/23/21 0900)   K Bath:   Dialysate K (mEq/L): 4 (07/23/21 0900)   Ca Bath:   Dialysate CA (mEq/L): 2.5 (07/23/21 0900)   Na/Bicarb:   Dialysate NA (mEq/L): 138 (07/23/21 0900)   Target Fluid Removal:   Goal/Amount of Fluid to Remove (mL): 1000 mL (07/23/21 0900)   Access  AVG   Type & Location:   Right upper arm AVG   Labs     Obtained/Reviewed   Critical Results Called   Date when labs were drawn-  Hgb-    HGB   Date Value Ref Range Status   07/23/2021 10.2 (L) 11.5 - 16.0 g/dL Final     K-    Potassium   Date Value Ref Range Status   07/23/2021 4.6 3.5 - 5.1 mmol/L Final     Ca-   Calcium   Date Value Ref Range Status   07/23/2021 8.7 8.5 - 10.1 MG/DL Final     Bun-   BUN   Date Value Ref Range Status   07/23/2021 35 (H) 6 - 20 MG/DL Final     Creat-   Creatinine   Date Value Ref Range Status   07/23/2021 6.49 (H) 0.55 - 1.02 MG/DL Final     Comment:     INVESTIGATED PER DELTA CHECK PROTOCOL   RN completed patient assessment. RN reviewed technicians vital signs and procedure note. Tx completed. Reviewed by JUAN FRANCISCO Gleason     Medications/ Blood Products Given     Name   Dose   Route and Time     none                Blood Volume Processed (BVP):    68.0 Net Fluid   Removed:  1000ml   Comments RN reviewed LPN assessment and completed RN assessment. RN completed patient assessment. RN reviewed technicians vital signs and procedure note. Tx completed. Reviewed by RN C. Wyline Rubinstein  Time Out Done: 0830  Primary Nurse Rpt Pre:Jase Mcnally, RN  Primary Nurse Rpt Jon Diaz, JUAN FRANCISCO  Pt Education:access care  Care Plan:continue hd  Tx Summary:DOM AVF: skin CDI. No s/s of infection. + B/T. No issues with cannulation or hemostasis. Running well at . I arrived to pt's room A&Ox4. Consent signed & on file. SBAR received from Primary RN. 0800: Pt cannulated with 26B needles per policy & without issue. Labs drawn per request/ order. VSS. Dialysis Tx initiated. 0815: pt.stable, lines secure  0830: pt. Stable, lines secure and visible  0845: pt. Stable, lines secure  0900: Pt resting quietly. 0915: pt. Resting well. Lines secure  0930: Dr. Claribel Cage rounding changed to 3k bath. 0945: pt resting well.   1000: pt. Stable, lines secure  1015: pt. Stable, lines secure  1030: Pt. Resting well. Lines secure  1045: pt. Stable, lines secure and visible  1100: pt. Stable, lines secure  1115: pt.stable, lines secure and visible  1130: Tx ended. VSS. All possible blood returned to patient. Hemostasis achieved without issue. Bed locked and in the lowest position, call bell and belongings in reach. SBAR given to Primary, RN. Patient is stable at time of their/ my departure. All Dialysis related medications have been reviewed. Admiting Diagnosis:Sepsis  Pt's previous clinic- [facility]  Consent signed - Informed Consent Verified: Yes (07/23/21 0900)  [facility] Consent - yes  Hepatitis Status-  06/28/21 Negative   07/07/21  Immune  Machine #- Machine Number: [id number] (07/23/21 0900)  Telemetry status-yes  Pre-dialysis wt. - Pre-Dialysis Weight: 63.6 kg (140 lb 3.4 oz) (07/21/21 0505)

## 2021-07-23 NOTE — PROGRESS NOTES
Comprehensive Nutrition Assessment    Type and Reason for Visit: Reassess    Nutrition Recommendations/Plan:   · NGT placement and initiation of nutrition support - if there are concerns regarding patient pulling tube out, consider DHT placement with bridle  · Jevity 1.5 @ 20 mL/hr and advance as tolerated by 10mL q 8 hours to goal rate of 40 mL/hr + Prosource BID + 50 mL water flushes q 4 hours (1560 kcal, 91g protein, 1030 mL water, 207g CHO)    Nutrition Assessment:     Chart reviewed; medically noted for acute encephalopathy. PMH ESRD on HD, DM, and HTN. Patient has remained NPO ~7 days. Discussed during PCU rounds. Patient slightly more alert but still not following commands. Not appropriate for SLP consult. Plan to place NGT and start enteral feedings for nutrition support. TF recommendations provided above to meet 100% of estimated kcal/protein needs. K+ WNL; patient getting HD today. Will monitor lytes and adjust TF as needed. Estimated Daily Nutrient Needs:  Energy (kcal): 1382 kcal (BMR 1063 x 1. 3AF); Weight Used for Energy Requirements: Current  Protein (g): 78-90g (1.3-1.5 g/kg bw); Weight Used for Protein Requirements: Current  Fluid (ml/day): 1400 mL; Method Used for Fluid Requirements: 1 ml/kcal    Nutrition Related Findings:    K+ 4.6, BG 78-86-81-87  BM 7/23  Humalog, Labetalol, Keppra, Synthroid       Wounds:    None       Current Nutrition Therapies:  DIET NPO    Anthropometric Measures:  · Height:  5' 2\" (157.5 cm)  · Current Body Wt:  60.7 kg (133 lb 13.1 oz)     · BMI Category:  Normal weight (BMI 18.5-24. 9)       Nutrition Diagnosis:   · Inadequate protein-energy intake related to cognitive or neurological impairment as evidenced by NPO or clear liquid status due to medical condition    Nutrition Interventions:   Food and/or Nutrient Delivery: Start tube feeding  Nutrition Education and Counseling: No recommendations at this time  Coordination of Nutrition Care: No recommendation at this time    Goals:  TF started and advanced to goal rate next 2-4 days       Nutrition Monitoring and Evaluation:   Behavioral-Environmental Outcomes: None identified  Food/Nutrient Intake Outcomes: Enteral nutrition intake/tolerance  Physical Signs/Symptoms Outcomes: Biochemical data, Chewing or swallowing, Weight, Hemodynamic status    Discharge Planning:     Too soon to determine     Electronically signed by Kathleen Olivarez RD on 7/23/2021 at 11:58 AM    Contact: ext [phone]

## 2021-07-23 NOTE — PROGRESS NOTES
TRANSFER - IN REPORT:    Verbal report received from Ondina Almonte RN on [facility]  being received from PCU (unit) for ordered procedure      Report consisted of patients Situation, Background, Assessment and   Recommendations(SBAR). Information from the following report(s) Kardex was reviewed with the receiving nurse. Opportunity for questions and clarification was provided. Assessment completed upon patients arrival to unit and care assumed.

## 2021-07-23 NOTE — PROGRESS NOTES
1445: Dr. Tate Hinduism and palliative care team at bedside discussing nutrition options for patient. Patient is more alert throughout shift  today and following some commands, but majority of the time patient is uncooperative. Patients daughter at bedside as well. Speech consult placed to attempt PO intake for patient. 1530: SLP attempted to see patient for swallow eval but patient agitated so unable to assess. 1620: Attempting to give patient eyedrops, patient yelling and verbally abusive and combative, swinging and cussing for me to get out of her room. MD notified. 1715:  Ultrasound tech at bedside, patient remains combative and verbally abusive, unable to do ultrasound at this time. 1730: PCT and RN attempted to get patients blood sugar. Patient remains combative, cussing, and yelling. MD notified. 1809: Tele reported patient had 19 beats of vtach. Patient appears asymptomatic, resting quietly when staff is not present, but immediately becomes agitated when staff enters room. MD notified of vtach. End of Shift Note    Bedside shift change report given to Joshua De La Paz (oncoming nurse) by Cherry Orellana RN (offgoing nurse). Report included the following information SBAR, Kardex, Intake/Output, MAR and Recent Results    Shift worked:  7a-7p     Shift summary and any significant changes:    See above. Dialysis done today. Patient more alert today but now agitated and combative. Ativan order dc yesterday as patient was stuporous during admission. May need NGT or TPN for nutrition. May need some sort of sedative for patient to allow care. Concerns for physician to address:  [facility]   Zone phone for oncoming shift:          Activity:  Activity Level: Bed Rest  Number times ambulated in hallways past shift: 0  Number of times OOB to chair past shift: 0    Cardiac:   Cardiac Monitoring: Yes      Cardiac Rhythm: Sinus Rhythm    Access:   Current line(s): central line     Genitourinary:   Urinary status: anuric    Respiratory:   O2 Device: None (Room air)  Chronic home O2 use?: NO  Incentive spirometer at bedside: NO     GI:  Last Bowel Movement Date: 07/23/21  Current diet:  DIET NPO  ADULT TUBE FEEDING Nasogastric; Standard with Fiber; Delivery Method: Continuous; Continuous Initial Rate (mL/hr): 20; Continuous Advance Tube Feeding: Yes; Advancement Volume (mL/hr): 10; Advancement Frequency: Q 8 hours; Continuous Goal Rate (mL. .. Passing flatus: YES  Tolerating current diet: NO       Pain Management:   Patient states pain is manageable on current regimen: N/A    Skin:  Mick Score: 11  Interventions: float heels    Patient Safety:  Fall Score:  Total Score: 3  Interventions: bed/chair alarm  High Fall Risk: Yes    Length of Stay:  Expected LOS: 4d 19h  Actual LOS: [address], RN

## 2021-07-23 NOTE — PROGRESS NOTES
Speech path   Asked to evaluate this patient for po but she was agitated and refusing po. We will reeval Monday. Explained to her daughter that her agitation puts her at increased risk to aspirate. If she is calmer over the weekend please complete the STAND. She may be able to tolerate po.    Tay Sanchez, SLP sores in mouth as per father

## 2021-07-23 NOTE — PROGRESS NOTES
Bedside shift change report given to [address] [place] [address] (oncoming nurse) by Keiry Mari RN (offgoing nurse).   Report included the following information SBAR, Kardex, Recent Results and Cardiac Rhythm NSR

## 2021-07-24 LAB
ANION GAP SERPL CALC-SCNC: 9 MMOL/L (ref 5–15)
BASOPHILS # BLD: 0 K/UL (ref 0–0.1)
BASOPHILS NFR BLD: 0 % (ref 0–1)
BUN SERPL-MCNC: 21 MG/DL (ref 6–20)
BUN/CREAT SERPL: 5 (ref 12–20)
CALCIUM SERPL-MCNC: 9.3 MG/DL (ref 8.5–10.1)
CHLORIDE SERPL-SCNC: 97 MMOL/L (ref 97–108)
CO2 SERPL-SCNC: 32 MMOL/L (ref 21–32)
CREAT SERPL-MCNC: 4.29 MG/DL (ref 0.55–1.02)
DIFFERENTIAL METHOD BLD: ABNORMAL
EOSINOPHIL # BLD: 0.6 K/UL (ref 0–0.4)
EOSINOPHIL NFR BLD: 8 % (ref 0–7)
ERYTHROCYTE [DISTWIDTH] IN BLOOD BY AUTOMATED COUNT: 18.4 % (ref 11.5–14.5)
GLUCOSE BLD STRIP.AUTO-MCNC: 127 MG/DL (ref 65–117)
GLUCOSE BLD STRIP.AUTO-MCNC: 147 MG/DL (ref 65–117)
GLUCOSE BLD STRIP.AUTO-MCNC: 202 MG/DL (ref 65–117)
GLUCOSE BLD STRIP.AUTO-MCNC: 206 MG/DL (ref 65–117)
GLUCOSE BLD STRIP.AUTO-MCNC: 60 MG/DL (ref 65–117)
GLUCOSE BLD STRIP.AUTO-MCNC: 68 MG/DL (ref 65–117)
GLUCOSE BLD STRIP.AUTO-MCNC: 69 MG/DL (ref 65–117)
GLUCOSE BLD STRIP.AUTO-MCNC: 76 MG/DL (ref 65–117)
GLUCOSE SERPL-MCNC: 63 MG/DL (ref 65–100)
HCT VFR BLD AUTO: 36.8 % (ref 35–47)
HERPES SIMPLEX VIRUS, CSF, UHSPT: NORMAL
HGB BLD-MCNC: 11.2 G/DL (ref 11.5–16)
IMM GRANULOCYTES # BLD AUTO: 0.1 K/UL (ref 0–0.04)
IMM GRANULOCYTES NFR BLD AUTO: 1 % (ref 0–0.5)
LYMPHOCYTES # BLD: 2.3 K/UL (ref 0.8–3.5)
LYMPHOCYTES NFR BLD: 29 % (ref 12–49)
MCH RBC QN AUTO: 27.3 PG (ref 26–34)
MCHC RBC AUTO-ENTMCNC: 30.4 G/DL (ref 30–36.5)
MCV RBC AUTO: 89.5 FL (ref 80–99)
MONOCYTES # BLD: 1.2 K/UL (ref 0–1)
MONOCYTES NFR BLD: 16 % (ref 5–13)
NEUTS SEG # BLD: 3.7 K/UL (ref 1.8–8)
NEUTS SEG NFR BLD: 46 % (ref 32–75)
NRBC # BLD: 0 K/UL (ref 0–0.01)
NRBC BLD-RTO: 0 PER 100 WBC
PLATELET # BLD AUTO: 149 K/UL (ref 150–400)
PMV BLD AUTO: 10.9 FL (ref 8.9–12.9)
POTASSIUM SERPL-SCNC: 3.7 MMOL/L (ref 3.5–5.1)
RBC # BLD AUTO: 4.11 M/UL (ref 3.8–5.2)
SERVICE CMNT-IMP: ABNORMAL
SERVICE CMNT-IMP: NORMAL
SODIUM SERPL-SCNC: 138 MMOL/L (ref 136–145)
WBC # BLD AUTO: 7.9 K/UL (ref 3.6–11)

## 2021-07-24 PROCEDURE — 74011000258 HC RX REV CODE- 258: Performed by: PSYCHIATRY & NEUROLOGY

## 2021-07-24 PROCEDURE — 65660000000 HC RM CCU STEPDOWN

## 2021-07-24 PROCEDURE — 74011250636 HC RX REV CODE- 250/636: Performed by: PSYCHIATRY & NEUROLOGY

## 2021-07-24 PROCEDURE — 80048 BASIC METABOLIC PNL TOTAL CA: CPT

## 2021-07-24 PROCEDURE — 82962 GLUCOSE BLOOD TEST: CPT

## 2021-07-24 PROCEDURE — 85025 COMPLETE CBC W/AUTO DIFF WBC: CPT

## 2021-07-24 PROCEDURE — 74011000250 HC RX REV CODE- 250: Performed by: INTERNAL MEDICINE

## 2021-07-24 PROCEDURE — 74011250636 HC RX REV CODE- 250/636: Performed by: INTERNAL MEDICINE

## 2021-07-24 PROCEDURE — 74011000250 HC RX REV CODE- 250: Performed by: GENERAL ACUTE CARE HOSPITAL

## 2021-07-24 PROCEDURE — 74011636637 HC RX REV CODE- 636/637: Performed by: INTERNAL MEDICINE

## 2021-07-24 PROCEDURE — 36415 COLL VENOUS BLD VENIPUNCTURE: CPT

## 2021-07-24 RX ORDER — INSULIN LISPRO 100 [IU]/ML
INJECTION, SOLUTION INTRAVENOUS; SUBCUTANEOUS
Status: DISCONTINUED | OUTPATIENT
Start: 2021-07-24 | End: 2021-08-03

## 2021-07-24 RX ORDER — HEPARIN 100 UNIT/ML
300 SYRINGE INTRAVENOUS AS NEEDED
Status: DISCONTINUED | OUTPATIENT
Start: 2021-07-24 | End: 2021-08-07 | Stop reason: HOSPADM

## 2021-07-24 RX ADMIN — HEPARIN SODIUM 5000 UNITS: 5000 INJECTION INTRAVENOUS; SUBCUTANEOUS at 06:05

## 2021-07-24 RX ADMIN — BRIMONIDINE TARTRATE 1 DROP: 2 SOLUTION OPHTHALMIC at 17:02

## 2021-07-24 RX ADMIN — DEXTROSE MONOHYDRATE 12.5 G: 25 INJECTION, SOLUTION INTRAVENOUS at 05:32

## 2021-07-24 RX ADMIN — Medication 10 ML: at 16:52

## 2021-07-24 RX ADMIN — Medication 300 UNITS: at 10:15

## 2021-07-24 RX ADMIN — BRIMONIDINE TARTRATE 1 DROP: 2 SOLUTION OPHTHALMIC at 08:44

## 2021-07-24 RX ADMIN — INSULIN LISPRO 3 UNITS: 100 INJECTION, SOLUTION INTRAVENOUS; SUBCUTANEOUS at 17:01

## 2021-07-24 RX ADMIN — ALTEPLASE 2 MG: 2.2 INJECTION, POWDER, LYOPHILIZED, FOR SOLUTION INTRAVENOUS at 13:59

## 2021-07-24 RX ADMIN — DEXTROSE MONOHYDRATE 25 G: 25 INJECTION, SOLUTION INTRAVENOUS at 21:42

## 2021-07-24 RX ADMIN — PREDNISOLONE ACETATE 1 DROP: 10 SUSPENSION/ DROPS OPHTHALMIC at 08:44

## 2021-07-24 RX ADMIN — HEPARIN SODIUM 5000 UNITS: 5000 INJECTION INTRAVENOUS; SUBCUTANEOUS at 23:17

## 2021-07-24 RX ADMIN — DEXTROSE MONOHYDRATE 12 G: 25 INJECTION, SOLUTION INTRAVENOUS at 01:24

## 2021-07-24 RX ADMIN — TIMOLOL MALEATE 1 DROP: 2.5 SOLUTION/ DROPS OPHTHALMIC at 08:44

## 2021-07-24 RX ADMIN — LEVETIRACETAM 500 MG: 100 INJECTION, SOLUTION INTRAVENOUS at 09:09

## 2021-07-24 RX ADMIN — LEVOTHYROXINE SODIUM ANHYDROUS 37.5 MCG: 100 INJECTION, POWDER, LYOPHILIZED, FOR SOLUTION INTRAVENOUS at 09:09

## 2021-07-24 RX ADMIN — LATANOPROST 1 DROP: 50 SOLUTION OPHTHALMIC at 21:52

## 2021-07-24 RX ADMIN — PREDNISOLONE ACETATE 1 DROP: 10 SUSPENSION/ DROPS OPHTHALMIC at 16:52

## 2021-07-24 RX ADMIN — PREDNISOLONE ACETATE 1 DROP: 10 SUSPENSION/ DROPS OPHTHALMIC at 21:53

## 2021-07-24 RX ADMIN — HEPARIN SODIUM 5000 UNITS: 5000 INJECTION INTRAVENOUS; SUBCUTANEOUS at 16:49

## 2021-07-24 RX ADMIN — Medication 10 ML: at 21:57

## 2021-07-24 RX ADMIN — Medication 10 ML: at 06:09

## 2021-07-24 NOTE — PROGRESS NOTES
Lt I J triple lumen Central line has  positive blood return from white lumen only. No blood return form blue and brown lumen. Both lumens declotted using Cathflo. Instilled 1mg Cathflo into each port with dwell time of 2 hours    . Both ports now have positive blood return. Notified to primary nurse. Darnell Washburn 50. [id number] [facility] Nurse, Vascular Access Team

## 2021-07-24 NOTE — PROGRESS NOTES
Hospitalist Progress Note    NAME: Verónica Freedman   :  1950   MRN:  [id number]       Assessment / Plan:  Sepsis unknown source POA  Acute encephalopathy   -Febrile and altered mental status on admission, some neck stiffness noted and patient was started on empiric coverage for meningitis  -Appreciate neurology consult  -Also had lumbar puncture, CSF was clear, cultures negative  -Antibiotics discontinued  -Patient now agitated more awake  -EEG captured no seizures, triphasic waves suggest possible hepatic/renal encephalopathy  -T bili 1.6, trending down  -Abdomen soft, nontender  -Check abdominal ultrasound for completeness  -Covid testing negative  -Per daughter, patient went to dialysis prior to admission in her normal state of health, able to carry on a conversation, ambulatory but became less responsive during dialysis  -Patient more awake, alert. Now agitated  -passed bedside swallow, start soft diet    MRI brain:  Encephalomalacia and white matter disease.  No acute intracranial abnormality    ?seizures  -no seizure captured on EEG, triphasic waves point to hepatic/renal encephalopathy as per neuro  -on lose dose keppra, hold and observe    Diarrhea  -some loose/watery stools last 24hrs  -no leukocytosis  -monitor    ESRD  -appreciate nephro following  -HD per nephro    Incidental finding of rectal wall thickening on CT A/P  -appreciate CRS eval  -patient can f/u outpatient, no intervention needed during this admission     Difficult IV access: Central line placed on   Hypokalemia  Resolved    DMII  HTN  CAD  FS monitoring, SS     Eye surgery in may : glaucoma   Daughter that she has a cup in her R eye   Ophthalmologist is  [facility]     Code Status: Full  Appreciate palliative team following    Surrogate Decision Maker: Daughter  DVT Prophylaxis: SCD  GI Prophylaxis: not indicated  Baseline: Independent     25.0 - 29.9 Overweight / Body mass index is 25.08 kg/m².     Estimated discharge date: July 26  Barriers: Currently very sick  Updated daughter at bedside on 07/19  Updated daughter over the phone on 07/17 and 07/18  Code status: Full  Prophylaxis: Hep SQ  Recommended Disposition: TBD     Subjective:     Chief Complaint / Reason for Physician Visit  More awake and less agitated today, follows some commands    Discussed with RN events overnight. Review of Systems:  Symptom Y/N Comments  Symptom Y/N Comments   Fever/Chills    Chest Pain     Poor Appetite    Edema     Cough    Abdominal Pain     Sputum    Joint Pain     SOB/CHURCH    Pruritis/Rash     Nausea/vomit    Tolerating PT/OT     Diarrhea    Tolerating Diet     Constipation    Other       Could NOT obtain due to: AMS     Objective:     VITALS:   Last 24hrs VS reviewed since prior progress note. Most recent are:  Patient Vitals for the past 24 hrs:   Temp Pulse Resp BP SpO2   07/24/21 1023 98.1 °F (36.7 °C) 60 19 (!) 130/37 100 %   07/24/21 1021 -- 60 19 -- 100 %   07/24/21 0843 -- 62 11 (!) 137/46 --   07/24/21 0751 -- (!) 55 13 -- 99 %   07/24/21 0715 98.5 °F (36.9 °C) 63 12 (!) 106/45 97 %   07/24/21 0315 98.5 °F (36.9 °C) (!) 57 19 (!) 137/52 98 %   07/23/21 2300 98.3 °F (36.8 °C) (!) 59 17 (!) 134/53 99 %   07/23/21 2000 -- 61 16 (!) 141/52 100 %   07/23/21 1500 98.4 °F (36.9 °C) 76 21 (!) 160/48 99 %       Intake/Output Summary (Last 24 hours) at 7/24/2021 1203  Last data filed at 7/24/2021 6345  Gross per 24 hour   Intake 100 ml   Output --   Net 100 ml        I had a face to face encounter and independently examined this patient on 7/24/2021, as outlined below:  PHYSICAL EXAM:  General: Adult  AA female, awake, alert  EENT:  EOMI. Anicteric sclerae. MMM  Resp:  CTA bilaterally, no wheezing or rales. No accessory muscle use  CV:  Regular  rhythm,  No edema  GI:  Soft, Non distended, Non tender. +Bowel sounds  Neurologic:  Awake, alert, oriented x 1, moves all extremities and follows some commands.  No obvious focal deficits   Psych:   deferred  Skin:  No rashes. No jaundice    Reviewed most current lab test results and cultures  YES  Reviewed most current radiology test results   YES  Review and summation of old records today    NO  Reviewed patient's current orders and MAR    YES  PMH/SH reviewed - no change compared to H&P  ________________________________________________________________________  Care Plan discussed with:    Comments   Patient     Family      RN x    Care Manager x    Consultant                       x Multidiciplinary team rounds were held today with , nursing, pharmacist and clinical coordinator. Patient's plan of care was discussed; medications were reviewed and discharge planning was addressed. ________________________________________________________________________  Total NON critical care TIME:  35   Minutes    Total CRITICAL CARE TIME Spent:   Minutes non procedure based      Comments   >50% of visit spent in counseling and coordination of care     ________________________________________________________________________  Mikey Hy, DO     Procedures: see electronic medical records for all procedures/Xrays and details which were not copied into this note but were reviewed prior to creation of Plan. LABS:  I reviewed today's most current labs and imaging studies.   Pertinent labs include:  Recent Labs     07/24/21 0529 07/23/21  0157   WBC 7.9 7.8   HGB 11.2* 10.2*   HCT 36.8 32.9*   * 159     Recent Labs     07/24/21  0529 07/23/21  0157 07/22/21  0320    139 139   K 3.7 4.6 3.9   CL 97 102 99   CO2 32 28 28   GLU 63* 81 98   BUN 21* 35* 21*   CREA 4.29* 6.49* 4.53*   CA 9.3 8.7 9.2   ALB  --  2.7*  --    TBILI  --  0.7  --    ALT  --  11*  --        Signed: Mikey Pagan, DO

## 2021-07-24 NOTE — PROGRESS NOTES
End of Shift Note      01:19 Glucose 60     01:24 12g Dextrose given     01:40: Repeat glucose 206     05:28 Glucose 68    05:32 12 g Dextrose given     06:03 repeat glucose 147       Bedside shift change report given to Beatriz Stone, [facility] (oncoming nurse) by Kirby Fry RN (offgoing nurse). Report included the following information SBAR, Kardex, Recent Results and Cardiac Rhythm NSR     Shift worked:  7p - 7a      Shift summary and any significant changes:    Periodic agitation and combative, but calm and cooperative most of the night. Able to give all meds, except 2300 heparin. Pt remain NPO   Concerns for physician to address: AMA      Zone phone for oncoming shift:          Activity:  Activity Level: Bed Rest  Number times ambulated in hallways past shift: 0  Number of times OOB to chair past shift: 0    Cardiac:   Cardiac Monitoring: Yes      Cardiac Rhythm: Sinus Rhythm    Access:   Current line(s): central line     Genitourinary:   Urinary status: anuric    Respiratory:   O2 Device: None (Room air)  Chronic home O2 use?: NO  Incentive spirometer at bedside: NO     GI:  Last Bowel Movement Date: 07/23/21  Current diet:  DIET NPO  ADULT TUBE FEEDING Nasogastric; Standard with Fiber; Delivery Method: Continuous; Continuous Initial Rate (mL/hr): 20; Continuous Advance Tube Feeding: Yes; Advancement Volume (mL/hr): 10; Advancement Frequency: Q 8 hours; Continuous Goal Rate (mL. .. Passing flatus: YES  Tolerating current diet: NPO        Pain Management:   Patient states pain is manageable on current regimen: YES    Skin:  Mick Score: 11  Interventions: turn team and limit briefs    Patient Safety:  Fall Score:  Total Score: 3  Interventions: bed/chair alarm and pt to call before getting OOB  High Fall Risk: Yes    Length of Stay:  Expected LOS: 4d 19h  Actual LOS: [address], [place]

## 2021-07-24 NOTE — PROGRESS NOTES
Prefect serve with  Dr. Chato Wolf as follows:   7/24/21 11:35 AM   [phone] Hospital or Facility: [facility] From: Glorious Phoenix RE: [place] 1950 RM: [id number] Patient needs a order of cath flow, heparin was unsuccessful in to lumens. Per PICC team Cathflow 2mg total ? Need Callback: NO CALLBACK REQ PCU  Read 11:52 AM   0'\"   7/24/21 4:33 PM   Patient is having loose foul stools just FYI I do see in the [facility] she receive IV antibiotics during this abd so may not technically qualify for CDiff check  Read 4:33 PM   0'\"   7/24/21 5:10 PM   Patient last HR 60 /98 would you like me to give or hold labetalol ? Read 5:10 PM   0'\"   7/24/21 5:11 PM   hold it thx. can send stool for c diff    5:30pm Spoke with Dr. Chato Wolf, per MD hold off on C-Diff test cont. To monitor    End of Shift Note        Bedside shift change report given to Al (oncoming nurse) by Glorious Phoenix (offgoing nurse).   Report included the following information SBAR, Kardex, Intake/Output and MAR    Shift worked: 0700 - 1900     Shift summary and any significant changes:    Patient A & O 1, confused but following commands    0900 Labetalol held due to HR/ BP MD aware     1015 Heparin given for not blood return in lumen on left IJ, unsuccessful     1200 Swallow test completed  Diet ordered, patient is a feed    1359 CathFlow given by PICC team    1650 labetalol held per MD     Concerns for physician to address:  Loose stools cont to monitor MD aware      Zone phone for oncoming shift:   [phone]       Activity:  Activity Level: Bed Rest  Number times ambulated in hallways past shift: 0  Number of times OOB to chair past shift: 0    Cardiac:   Cardiac Monitoring: Yes      Cardiac Rhythm: Sinus Rhythm    Access:   Current line(s): central line and HD access     Genitourinary:   Urinary status: anuric    Respiratory:   O2 Device: None (Room air)  Chronic home O2 use?: NO  Incentive spirometer at bedside: NO     GI:  Last Bowel Movement Date: 07/24/21 (2nd stool)  Current diet:  ADULT DIET Dysphagia - Soft & Bite Sized; 4 carb choices (60 gm/meal); Low Fat/Low Chol/High Fiber/MELISSA  ADULT ORAL NUTRITION SUPPLEMENT Breakfast, Lunch, Dinner; Renal Supplement  Passing flatus: YES  Tolerating current diet: YES       Pain Management:   Patient states pain is manageable on current regimen: YES    Skin:  Mick Score: 12  Interventions: turn team and PT/OT consult    Patient Safety:  Fall Score:  Total Score: 3  Interventions: pt to call before getting OOB  High Fall Risk: Yes    Length of Stay:  Expected LOS: 4d 19h  Actual LOS: 8      Alex Easton

## 2021-07-24 NOTE — PROGRESS NOTES
Hospitalist Progress Note    NAME: Dianne Swift   :  1950   MRN:  [id number]       Assessment / Plan:  Sepsis unknown source POA  Acute encephalopathy   -Febrile and altered mental status on admission, some neck stiffness noted and patient was started on empiric coverage for meningitis  -Appreciate neurology consult  -Also had lumbar puncture, CSF was clear, cultures negative  -Antibiotics discontinued  -Patient now agitated more awake  -EEG captured no seizures, triphasic waves suggest possible hepatic/renal encephalopathy  -T bili 1.6, trending down  -Abdomen soft, nontender  -Check abdominal ultrasound for completeness  -Covid testing negative  -Per daughter, patient went to dialysis prior to admission in her normal state of health, able to carry on a conversation, ambulatory but became less responsive during dialysis  -Patient more awake, alert. Now agitated    MRI brain:  Encephalomalacia and white matter disease.  No acute intracranial abnormality    Possible seizures  -no seizure captured on EEG, triphasic waves point to hepatic/renal encephalopathy as per neuro  -cont keppra for now  -DC PRN ativan, avoid sedatives as able    ESRD  -appreciate nephro following  -HD per nephro    Incidental finding of rectal wall thickening on CT A/P  -appreciate CRS eval  -patient can f/u outpatient, no intervention needed during this admission     Difficult IV access: Central line placed on   Hypokalemia  Resolved    DMII  HTN  CAD  FS monitoring, SS     Eye surgery in may : glaucoma   Daughter that she has a cup in her R eye   Ophthalmologist is Dr. April Segura at [facility]     Code Status: Full  Appreciate palliative team following    Surrogate Decision Maker: Daughter  DVT Prophylaxis: SCD  GI Prophylaxis: not indicated  Baseline: Independent     25.0 - 29.9 Overweight / Body mass index is 25.08 kg/m².     Estimated discharge date:   Barriers: Currently very sick  Updated daughter at bedside on 07/19  Updated daughter over the phone on 07/17 and 07/18  Code status: Full  Prophylaxis: Hep SQ  Recommended Disposition: TBD     Subjective:     Chief Complaint / Reason for Physician Visit  Awake, not alert, agitated    Discussed with RN events overnight. Review of Systems:  Symptom Y/N Comments  Symptom Y/N Comments   Fever/Chills    Chest Pain     Poor Appetite    Edema     Cough    Abdominal Pain     Sputum    Joint Pain     SOB/CHURCH    Pruritis/Rash     Nausea/vomit    Tolerating PT/OT     Diarrhea    Tolerating Diet     Constipation    Other       Could NOT obtain due to: AMS     Objective:     VITALS:   Last 24hrs VS reviewed since prior progress note.  Most recent are:  Patient Vitals for the past 24 hrs:   Temp Pulse Resp BP SpO2   07/23/21 1500 98.4 °F (36.9 °C) 76 21 (!) 160/48 99 %   07/23/21 1200 97.5 °F (36.4 °C) 83 20 (!) 132/47 99 %   07/23/21 1145 -- 78 19 (!) 125/36 98 %   07/23/21 1130 -- 85 20 (!) 140/48 99 %   07/23/21 1115 97.5 °F (36.4 °C) 72 20 (!) 148/48 97 %   07/23/21 1100 -- 76 20 137/61 99 %   07/23/21 1045 -- 68 19 (!) 146/71 99 %   07/23/21 1032 -- 64 23 (!) 152/66 99 %   07/23/21 1031 -- 63 19 (!) 152/66 100 %   07/23/21 1015 -- 67 20 (!) 156/60 99 %   07/23/21 1000 -- 64 16 (!) 153/55 99 %   07/23/21 0945 -- (!) 59 16 (!) 141/53 99 %   07/23/21 0930 -- 66 20 (!) 155/65 98 %   07/23/21 0915 -- 62 21 90/71 98 %   07/23/21 0900 98.7 °F (37.1 °C) 63 24 (!) 143/57 98 %   07/23/21 0746 98.7 °F (37.1 °C) 75 25 105/76 99 %   07/23/21 0400 98.5 °F (36.9 °C) 64 15 136/79 95 %   07/22/21 2314 98.8 °F (37.1 °C) 63 21 (!) 123/41 99 %   07/22/21 2215 -- 63 24 (!) 130/42 98 %   07/22/21 2200 -- 66 22 (!) 104/49 97 %       Intake/Output Summary (Last 24 hours) at 7/23/2021 2148  Last data filed at 7/23/2021 1654  Gross per 24 hour   Intake 0 ml   Output 1000 ml   Net -1000 ml        I had a face to face encounter and independently examined this patient on 7/23/2021, as outlined below:  PHYSICAL EXAM:  General: Adult  AA female, stuperous, minimally responsive  EENT:  EOMI. Anicteric sclerae. MMM  Resp:  CTA bilaterally, no wheezing or rales. No accessory muscle use  CV:  Regular  rhythm,  No edema  GI:  Soft, Non distended, Non tender. +Bowel sounds  Neurologic:  Stuperous, non verbal, PERRL, localizes to noxious stimulus  Psych:   deferred  Skin:  No rashes. No jaundice    Reviewed most current lab test results and cultures  YES  Reviewed most current radiology test results   YES  Review and summation of old records today    NO  Reviewed patient's current orders and MAR    YES  PMH/SH reviewed - no change compared to H&P  ________________________________________________________________________  Care Plan discussed with:    Comments   Patient     Family      RN x    Care Manager x    Consultant                       x Multidiciplinary team rounds were held today with , nursing, pharmacist and clinical coordinator. Patient's plan of care was discussed; medications were reviewed and discharge planning was addressed. ________________________________________________________________________  Total NON critical care TIME:  35   Minutes    Total CRITICAL CARE TIME Spent:   Minutes non procedure based      Comments   >50% of visit spent in counseling and coordination of care     ________________________________________________________________________  Joseph Bars, DO     Procedures: see electronic medical records for all procedures/Xrays and details which were not copied into this note but were reviewed prior to creation of Plan. LABS:  I reviewed today's most current labs and imaging studies.   Pertinent labs include:  Recent Labs     07/23/21  0157 07/21/21  0440   WBC 7.8 7.4   HGB 10.2* 9.8*   HCT 32.9* 32.0*    153     Recent Labs     07/23/21  0157 07/22/21  0320 07/21/21  0440    139 141   K 4.6 3.9 3.7    99 102   CO2 28 28 24   GLU 81 98 102*   BUN 35* 21* 34*   CREA 6.49* 4.53* 6.58*   CA 8.7 9.2 8.5   ALB 2.7*  --   --    TBILI 0.7  --   --    ALT 11*  --   --        Signed: Geo Otoole DO

## 2021-07-25 LAB
ANION GAP SERPL CALC-SCNC: 9 MMOL/L (ref 5–15)
BASOPHILS # BLD: 0 K/UL (ref 0–0.1)
BASOPHILS NFR BLD: 0 % (ref 0–1)
BUN SERPL-MCNC: 31 MG/DL (ref 6–20)
BUN/CREAT SERPL: 5 (ref 12–20)
CALCIUM SERPL-MCNC: 8.7 MG/DL (ref 8.5–10.1)
CHLORIDE SERPL-SCNC: 97 MMOL/L (ref 97–108)
CO2 SERPL-SCNC: 32 MMOL/L (ref 21–32)
CREAT SERPL-MCNC: 5.71 MG/DL (ref 0.55–1.02)
DIFFERENTIAL METHOD BLD: ABNORMAL
EOSINOPHIL # BLD: 0.5 K/UL (ref 0–0.4)
EOSINOPHIL NFR BLD: 6 % (ref 0–7)
ERYTHROCYTE [DISTWIDTH] IN BLOOD BY AUTOMATED COUNT: 18.6 % (ref 11.5–14.5)
GLUCOSE BLD STRIP.AUTO-MCNC: 121 MG/DL (ref 65–117)
GLUCOSE BLD STRIP.AUTO-MCNC: 138 MG/DL (ref 65–117)
GLUCOSE BLD STRIP.AUTO-MCNC: 142 MG/DL (ref 65–117)
GLUCOSE BLD STRIP.AUTO-MCNC: 74 MG/DL (ref 65–117)
GLUCOSE SERPL-MCNC: 80 MG/DL (ref 65–100)
HCT VFR BLD AUTO: 35.5 % (ref 35–47)
HGB BLD-MCNC: 11 G/DL (ref 11.5–16)
IMM GRANULOCYTES # BLD AUTO: 0.1 K/UL (ref 0–0.04)
IMM GRANULOCYTES NFR BLD AUTO: 1 % (ref 0–0.5)
LYMPHOCYTES # BLD: 2.3 K/UL (ref 0.8–3.5)
LYMPHOCYTES NFR BLD: 31 % (ref 12–49)
MCH RBC QN AUTO: 27.4 PG (ref 26–34)
MCHC RBC AUTO-ENTMCNC: 31 G/DL (ref 30–36.5)
MCV RBC AUTO: 88.5 FL (ref 80–99)
MONOCYTES # BLD: 1.2 K/UL (ref 0–1)
MONOCYTES NFR BLD: 16 % (ref 5–13)
NEUTS SEG # BLD: 3.4 K/UL (ref 1.8–8)
NEUTS SEG NFR BLD: 46 % (ref 32–75)
NRBC # BLD: 0 K/UL (ref 0–0.01)
NRBC BLD-RTO: 0 PER 100 WBC
PLATELET # BLD AUTO: 153 K/UL (ref 150–400)
PMV BLD AUTO: 12 FL (ref 8.9–12.9)
POTASSIUM SERPL-SCNC: 3.4 MMOL/L (ref 3.5–5.1)
RBC # BLD AUTO: 4.01 M/UL (ref 3.8–5.2)
SERVICE CMNT-IMP: ABNORMAL
SERVICE CMNT-IMP: NORMAL
SODIUM SERPL-SCNC: 138 MMOL/L (ref 136–145)
WBC # BLD AUTO: 7.5 K/UL (ref 3.6–11)

## 2021-07-25 PROCEDURE — 85025 COMPLETE CBC W/AUTO DIFF WBC: CPT

## 2021-07-25 PROCEDURE — 74011250636 HC RX REV CODE- 250/636: Performed by: INTERNAL MEDICINE

## 2021-07-25 PROCEDURE — 82962 GLUCOSE BLOOD TEST: CPT

## 2021-07-25 PROCEDURE — 36415 COLL VENOUS BLD VENIPUNCTURE: CPT

## 2021-07-25 PROCEDURE — 74011000250 HC RX REV CODE- 250: Performed by: INTERNAL MEDICINE

## 2021-07-25 PROCEDURE — 65660000000 HC RM CCU STEPDOWN

## 2021-07-25 PROCEDURE — 80048 BASIC METABOLIC PNL TOTAL CA: CPT

## 2021-07-25 PROCEDURE — 74011250637 HC RX REV CODE- 250/637: Performed by: NURSE PRACTITIONER

## 2021-07-25 RX ORDER — LEVOTHYROXINE SODIUM 50 UG/1
50 TABLET ORAL
Status: DISCONTINUED | OUTPATIENT
Start: 2021-07-26 | End: 2021-08-07 | Stop reason: HOSPADM

## 2021-07-25 RX ADMIN — PREDNISOLONE ACETATE 1 DROP: 10 SUSPENSION/ DROPS OPHTHALMIC at 22:24

## 2021-07-25 RX ADMIN — LATANOPROST 1 DROP: 50 SOLUTION OPHTHALMIC at 22:24

## 2021-07-25 RX ADMIN — POTASSIUM BICARBONATE 10 MEQ: 391 TABLET, EFFERVESCENT ORAL at 06:37

## 2021-07-25 RX ADMIN — HEPARIN SODIUM 5000 UNITS: 5000 INJECTION INTRAVENOUS; SUBCUTANEOUS at 22:24

## 2021-07-25 RX ADMIN — Medication 10 ML: at 06:37

## 2021-07-25 RX ADMIN — Medication 10 ML: at 17:12

## 2021-07-25 RX ADMIN — HEPARIN SODIUM 5000 UNITS: 5000 INJECTION INTRAVENOUS; SUBCUTANEOUS at 06:37

## 2021-07-25 RX ADMIN — HEPARIN SODIUM 5000 UNITS: 5000 INJECTION INTRAVENOUS; SUBCUTANEOUS at 17:09

## 2021-07-25 RX ADMIN — TIMOLOL MALEATE 1 DROP: 2.5 SOLUTION/ DROPS OPHTHALMIC at 09:33

## 2021-07-25 RX ADMIN — Medication 10 ML: at 22:27

## 2021-07-25 RX ADMIN — BRIMONIDINE TARTRATE 1 DROP: 2 SOLUTION OPHTHALMIC at 09:31

## 2021-07-25 RX ADMIN — PREDNISOLONE ACETATE 1 DROP: 10 SUSPENSION/ DROPS OPHTHALMIC at 09:34

## 2021-07-25 RX ADMIN — LEVOTHYROXINE SODIUM ANHYDROUS 37.5 MCG: 100 INJECTION, POWDER, LYOPHILIZED, FOR SOLUTION INTRAVENOUS at 09:58

## 2021-07-25 RX ADMIN — PREDNISOLONE ACETATE 1 DROP: 10 SUSPENSION/ DROPS OPHTHALMIC at 17:08

## 2021-07-25 RX ADMIN — BRIMONIDINE TARTRATE 1 DROP: 2 SOLUTION OPHTHALMIC at 17:13

## 2021-07-25 NOTE — PROGRESS NOTES
Pharmacy IV to PO Conversion Program    Medication: levothyroxine  Indication: hypothroidism      Impression/Plan: diet ordered, PO potassium taken this AM  Change IV to PO, dose 50 mcg      Thanks  Fide Shay, [address]

## 2021-07-25 NOTE — PROGRESS NOTES
Hospitalist Progress Note    NAME: Svetlana Ryder   :  1950   MRN:  [id number]       Assessment / Plan:  Sepsis unknown source POA  Acute encephalopathy   -Febrile and altered mental status on admission, some neck stiffness noted and patient was started on empiric coverage for meningitis  -Appreciate neurology consult  -Also had lumbar puncture, CSF was clear, cultures negative  -Antibiotics discontinued  -Patient now agitated more awake  -EEG captured no seizures, triphasic waves suggest possible hepatic/renal encephalopathy  -T bili 1.6, normalized  -watery diarrhea, check C diff, enteric panel  -Covid testing negative  -encephalopathy improving, more alert awake  -passed bedside swallow, start soft diet    MRI brain:  Encephalomalacia and white matter disease.  No acute intracranial abnormality    ?seizures  -no seizure captured on EEG, triphasic waves point to hepatic/renal encephalopathy as per neuro  -on lose dose keppra, hold and observe    Diarrhea  -some loose/watery stools last 24hrs  -no leukocytosis  -monitor    ESRD  -appreciate nephro following  -HD per nephro    Incidental finding of rectal wall thickening on CT A/P  -appreciate CRS eval  -patient can f/u outpatient, no intervention needed during this admission     Difficult IV access: Central line placed on   Hypokalemia  Resolved    DMII  HTN  CAD  FS monitoring, SS     Eye surgery in may : glaucoma      Code Status: Full  Appreciate palliative team following    Surrogate Decision Maker: Daughter  DVT Prophylaxis: SCD  GI Prophylaxis: not indicated  Baseline: Independent     25.0 - 29.9 Overweight / Body mass index is 25.08 kg/m².     Estimated discharge date:   Barriers: Currently very sick  Updated daughter at bedside on   Updated daughter over the phone on  and   Code status: Full  Prophylaxis: Hep SQ  Recommended Disposition: TBD     Subjective:     Chief Complaint / Reason for Physician Visit  More awake, alert, still mildly confused. No abd pain. Denies n/v. +watery diarrhea    Discussed with RN events overnight. Review of Systems:  Symptom Y/N Comments  Symptom Y/N Comments   Fever/Chills n   Chest Pain n    Poor Appetite n   Edema     Cough n   Abdominal Pain n    Sputum n   Joint Pain     SOB/CHURCH n   Pruritis/Rash     Nausea/vomit n   Tolerating PT/OT     Diarrhea y   Tolerating Diet     Constipation n   Other       Could NOT obtain due to: AMS     Objective:     VITALS:   Last 24hrs VS reviewed since prior progress note. Most recent are:  Patient Vitals for the past 24 hrs:   Temp Pulse Resp BP SpO2   07/25/21 0924 -- 64 21 (!) 95/37 --   07/25/21 0709 99.1 °F (37.3 °C) (!) 58 13 (!) 129/49 99 %   07/25/21 0309 98.1 °F (36.7 °C) (!) 57 12 (!) 132/54 97 %   07/25/21 0000 98.3 °F (36.8 °C) 60 19 (!) 122/53 100 %   07/24/21 2306 97.6 °F (36.4 °C) (!) 56 16 (!) 90/36 96 %   07/24/21 1916 98.5 °F (36.9 °C) 63 17 (!) 126/57 100 %   07/24/21 1700 -- 60 21 (!) 117/98 100 %   07/24/21 1654 -- 61 14 (!) 144/46 99 %   07/24/21 1615 -- (!) 58 14 -- 100 %   07/24/21 1614 99.1 °F (37.3 °C) 70 18 (!) 140/58 100 %   07/24/21 1023 98.1 °F (36.7 °C) 60 19 (!) 130/37 100 %   07/24/21 1021 -- 60 19 -- 100 %       Intake/Output Summary (Last 24 hours) at 7/25/2021 0955  Last data filed at 7/25/2021 0836  Gross per 24 hour   Intake 680 ml   Output --   Net 680 ml        I had a face to face encounter and independently examined this patient on 7/25/2021, as outlined below:  PHYSICAL EXAM:  General: Adult  AA female, awake, alert  EENT:  EOMI. Anicteric sclerae. MMM  Resp:  CTA bilaterally, no wheezing or rales. No accessory muscle use  CV:  Regular  rhythm,  No edema  GI:  Soft, Non distended, Non tender. +Bowel sounds  Neurologic:  Awake, alert, oriented x 1, moves all extremities and follows some commands. No obvious focal deficits   Psych:   deferred  Skin:  No rashes.   No jaundice    Reviewed most current lab test results and cultures  YES  Reviewed most current radiology test results   YES  Review and summation of old records today    NO  Reviewed patient's current orders and MAR    YES  PMH/SH reviewed - no change compared to H&P  ________________________________________________________________________  Care Plan discussed with:    Comments   Patient     Family      RN x    Care Manager x    Consultant                       x Multidiciplinary team rounds were held today with , nursing, pharmacist and clinical coordinator. Patient's plan of care was discussed; medications were reviewed and discharge planning was addressed. ________________________________________________________________________  Total NON critical care TIME:  30  Minutes    Total CRITICAL CARE TIME Spent:   Minutes non procedure based      Comments   >50% of visit spent in counseling and coordination of care x    ________________________________________________________________________  Caitlin Thomas DO     Procedures: see electronic medical records for all procedures/Xrays and details which were not copied into this note but were reviewed prior to creation of Plan. LABS:  I reviewed today's most current labs and imaging studies.   Pertinent labs include:  Recent Labs     07/25/21 0403 07/24/21 0529 07/23/21  0157   WBC 7.5 7.9 7.8   HGB 11.0* 11.2* 10.2*   HCT 35.5 36.8 32.9*    149* 159     Recent Labs     07/25/21 0403 07/24/21  0529 07/23/21  0157    138 139   K 3.4* 3.7 4.6   CL 97 97 102   CO2 32 32 28   GLU 80 63* 81   BUN 31* 21* 35*   CREA 5.71* 4.29* 6.49*   CA 8.7 9.3 8.7   ALB  --   --  2.7*   TBILI  --   --  0.7   ALT  --   --  11*       Signed: Caitlin Thomas DO

## 2021-07-25 NOTE — ROUTINE PROCESS
Bedside and Verbal shift change report given to AL  (oncoming nurse) by Beatriz Stone (offgoing nurse). Report included the following information SBAR, Kardex, Intake/Output, MAR, Recent Results, Med Rec Status, Cardiac Rhythm Sinus Rhythm and Quality Measures. 2126:   [facility]    Patient with hypoglycemic episode(s) at 2126 (time) on 7/24/21 (date). BG value(s) pre-treatment 71    Was patient symptomatic?  [] yes, [] no  Patient was treated with the following rescue medications/treatments: [x] D50                [] Glucose tablets                [] Glucagon                [] 4oz juice                [] 6oz reg soda                [] 8oz low fat milk  BG value post-treatment: 127  Once BG treated and value greater than 80mg/dl, pt was provided with the following:  [] snack  [] meal  Name of MD notified:None  The following orders were received: [facility]

## 2021-07-25 NOTE — PROGRESS NOTES
Perfect serve as follows with Dr. Jet Bolden     7/25/21 10:05 AM   Patient received potassium bicarb 10 mEq at 0637am from night shift did you still want her to have the 40 mEq you just ordered as well ? Read 10:06 AM      7/25/21 10:06 AM   ok hx, will cancel didnt see that     7/25/21 10:09 AM   but she only had 10meq so wasn't sure if you wanted more  Read 10:30 AM     7/25/21 10:31 AM   ok will look    7/25/21 11:24 AM   Hi Dr. Jet Bolden patient last two BP 99/44 map 62, we have been taking her BPs in her leg. Her left arm has old fissula and right arm has new fisula  Read 11:47 AM     7/25/21 11:26 AM   I checked her left arm 112/42 , held her beta blocker today, she is a dialysis patient so what BPs are we comfortable with ? Read 11:47 AM     5:35PM Nursing Supervior Jeanne informed Jerrod Slater RN that per Infection Disease patient does not meet C-Diff protocol and request that MD discontinue order. Jerrod Slater RN will forward this information to Dr. Jet Boldne. Perfect serve as follows with Dr. Jet Bolden  7/25/21 5:42 PM   Hi Dr. Jet Bolden, Completed the new C-Diff form, spoke with nursing supervisors and they forward the form results to Infections Disease (ID). Per ID they request you cancel the C-Diff order. Unread     6:46PM  Spoke with Dr. Jet Bolden he requested contact information for ID    Perfect serve as followers:  7/25/21 6:50 PM   Infection Control on-call can be reached via NGRAIN. The oncall today is Uday Wing  Read 6:50 PM      7/25/21 6:51 PM   got it thanks ? ?     7/25/21 6:53 PM   Your welcome  Unread       End of Shift Note    Bedside shift change report given to Al (oncoming nurse) by Daija Oconnell (offgoing nurse).   Report included the following information SBAR, Kardex, Intake/Output and MAR    Shift worked:  0700 - 1900     Shift summary and any significant changes:     above     Concerns for physician to address:  above     Zone phone for oncoming shift:   [phone] Activity:  Activity Level: Bed Rest  Number times ambulated in hallways past shift: 0  Number of times OOB to chair past shift: 0    Cardiac:   Cardiac Monitoring: Yes      Cardiac Rhythm: Sinus Jabari    Access:   Current line(s): central line and HD access     Genitourinary:   Urinary status: anuric    Respiratory:   O2 Device: None (Room air)  Chronic home O2 use?: NO  Incentive spirometer at bedside: NO     GI:  Last Bowel Movement Date: 07/25/21  Current diet:  ADULT DIET Dysphagia - Soft & Bite Sized; 4 carb choices (60 gm/meal); Low Fat/Low Chol/High Fiber/MELISSA  ADULT ORAL NUTRITION SUPPLEMENT Breakfast, Lunch, Dinner; Renal Supplement  Passing flatus: YES  Tolerating current diet: YES       Pain Management:   Patient states pain is manageable on current regimen: NO    Skin:  Mick Score: 13  Interventions: speciality bed, float heels and PT/OT consult    Patient Safety:  Fall Score:  Total Score: 3  Interventions: bed/chair alarm and gripper socks  High Fall Risk: Yes    Length of Stay:  Expected LOS: 4d 19h  Actual LOS: 9      [facility]

## 2021-07-25 NOTE — PROGRESS NOTES
Received notification from bedside RN about patient with regards to: K+ 3.4  VS: /54, HR 57, RR 12, O2 sat 97% on RA    Intervention given: Effer K+ 10 meq x 1 dose ordered

## 2021-07-26 ENCOUNTER — APPOINTMENT (OUTPATIENT)
Dept: ULTRASOUND IMAGING | Age: 71
DRG: 853 | End: 2021-07-26
Attending: NURSE PRACTITIONER
Payer: MEDICARE

## 2021-07-26 LAB
ANION GAP SERPL CALC-SCNC: 9 MMOL/L (ref 5–15)
BACTERIA SPEC CULT: NORMAL
BASOPHILS # BLD: 0 K/UL (ref 0–0.1)
BASOPHILS NFR BLD: 0 % (ref 0–1)
BUN SERPL-MCNC: 36 MG/DL (ref 6–20)
BUN/CREAT SERPL: 5 (ref 12–20)
CALCIUM SERPL-MCNC: 8.2 MG/DL (ref 8.5–10.1)
CHLORIDE SERPL-SCNC: 98 MMOL/L (ref 97–108)
CO2 SERPL-SCNC: 29 MMOL/L (ref 21–32)
CREAT SERPL-MCNC: 7.38 MG/DL (ref 0.55–1.02)
DIFFERENTIAL METHOD BLD: ABNORMAL
EOSINOPHIL # BLD: 0.5 K/UL (ref 0–0.4)
EOSINOPHIL NFR BLD: 7 % (ref 0–7)
ERYTHROCYTE [DISTWIDTH] IN BLOOD BY AUTOMATED COUNT: 18.3 % (ref 11.5–14.5)
GLUCOSE BLD STRIP.AUTO-MCNC: 198 MG/DL (ref 65–117)
GLUCOSE BLD STRIP.AUTO-MCNC: 83 MG/DL (ref 65–117)
GLUCOSE BLD STRIP.AUTO-MCNC: 87 MG/DL (ref 65–117)
GLUCOSE BLD STRIP.AUTO-MCNC: 91 MG/DL (ref 65–117)
GLUCOSE SERPL-MCNC: 83 MG/DL (ref 65–100)
GRAM STN SPEC: NORMAL
GRAM STN SPEC: NORMAL
HCT VFR BLD AUTO: 33 % (ref 35–47)
HGB BLD-MCNC: 10 G/DL (ref 11.5–16)
IMM GRANULOCYTES # BLD AUTO: 0.1 K/UL (ref 0–0.04)
IMM GRANULOCYTES NFR BLD AUTO: 1 % (ref 0–0.5)
INR PPP: 1.1 (ref 0.9–1.1)
LYMPHOCYTES # BLD: 2.7 K/UL (ref 0.8–3.5)
LYMPHOCYTES NFR BLD: 35 % (ref 12–49)
MCH RBC QN AUTO: 27.1 PG (ref 26–34)
MCHC RBC AUTO-ENTMCNC: 30.3 G/DL (ref 30–36.5)
MCV RBC AUTO: 89.4 FL (ref 80–99)
MONOCYTES # BLD: 1.1 K/UL (ref 0–1)
MONOCYTES NFR BLD: 14 % (ref 5–13)
NEUTS SEG # BLD: 3.4 K/UL (ref 1.8–8)
NEUTS SEG NFR BLD: 43 % (ref 32–75)
NRBC # BLD: 0 K/UL (ref 0–0.01)
NRBC BLD-RTO: 0 PER 100 WBC
PLATELET # BLD AUTO: 146 K/UL (ref 150–400)
PMV BLD AUTO: 12 FL (ref 8.9–12.9)
POTASSIUM SERPL-SCNC: 4 MMOL/L (ref 3.5–5.1)
PROTHROMBIN TIME: 11.8 SEC (ref 9–11.1)
RBC # BLD AUTO: 3.69 M/UL (ref 3.8–5.2)
SERVICE CMNT-IMP: ABNORMAL
SERVICE CMNT-IMP: NORMAL
SODIUM SERPL-SCNC: 136 MMOL/L (ref 136–145)
WBC # BLD AUTO: 7.8 K/UL (ref 3.6–11)

## 2021-07-26 PROCEDURE — 92610 EVALUATE SWALLOWING FUNCTION: CPT

## 2021-07-26 PROCEDURE — 97530 THERAPEUTIC ACTIVITIES: CPT

## 2021-07-26 PROCEDURE — 74011250636 HC RX REV CODE- 250/636: Performed by: INTERNAL MEDICINE

## 2021-07-26 PROCEDURE — 76981 USE PARENCHYMA: CPT

## 2021-07-26 PROCEDURE — 82962 GLUCOSE BLOOD TEST: CPT

## 2021-07-26 PROCEDURE — 65660000000 HC RM CCU STEPDOWN

## 2021-07-26 PROCEDURE — 80048 BASIC METABOLIC PNL TOTAL CA: CPT

## 2021-07-26 PROCEDURE — 85025 COMPLETE CBC W/AUTO DIFF WBC: CPT

## 2021-07-26 PROCEDURE — 87209 SMEAR COMPLEX STAIN: CPT

## 2021-07-26 PROCEDURE — 74011250637 HC RX REV CODE- 250/637: Performed by: INTERNAL MEDICINE

## 2021-07-26 PROCEDURE — 85610 PROTHROMBIN TIME: CPT

## 2021-07-26 PROCEDURE — 97162 PT EVAL MOD COMPLEX 30 MIN: CPT

## 2021-07-26 PROCEDURE — 90935 HEMODIALYSIS ONE EVALUATION: CPT

## 2021-07-26 PROCEDURE — 36415 COLL VENOUS BLD VENIPUNCTURE: CPT

## 2021-07-26 PROCEDURE — 76982 USE 1ST TARGET LESION: CPT

## 2021-07-26 PROCEDURE — 74011636637 HC RX REV CODE- 636/637: Performed by: INTERNAL MEDICINE

## 2021-07-26 RX ADMIN — INSULIN LISPRO 2 UNITS: 100 INJECTION, SOLUTION INTRAVENOUS; SUBCUTANEOUS at 17:34

## 2021-07-26 RX ADMIN — BRIMONIDINE TARTRATE 1 DROP: 2 SOLUTION OPHTHALMIC at 17:35

## 2021-07-26 RX ADMIN — BRIMONIDINE TARTRATE 1 DROP: 2 SOLUTION OPHTHALMIC at 11:27

## 2021-07-26 RX ADMIN — LEVOTHYROXINE SODIUM 50 MCG: 0.05 TABLET ORAL at 06:45

## 2021-07-26 RX ADMIN — Medication 10 ML: at 21:33

## 2021-07-26 RX ADMIN — TIMOLOL MALEATE 1 DROP: 2.5 SOLUTION/ DROPS OPHTHALMIC at 11:28

## 2021-07-26 RX ADMIN — EPOETIN ALFA-EPBX 4000 UNITS: 4000 INJECTION, SOLUTION INTRAVENOUS; SUBCUTANEOUS at 21:27

## 2021-07-26 RX ADMIN — Medication: at 17:34

## 2021-07-26 RX ADMIN — HEPARIN SODIUM 5000 UNITS: 5000 INJECTION INTRAVENOUS; SUBCUTANEOUS at 06:45

## 2021-07-26 RX ADMIN — PREDNISOLONE ACETATE 1 DROP: 10 SUSPENSION/ DROPS OPHTHALMIC at 17:35

## 2021-07-26 RX ADMIN — HEPARIN SODIUM 5000 UNITS: 5000 INJECTION INTRAVENOUS; SUBCUTANEOUS at 15:46

## 2021-07-26 RX ADMIN — Medication 10 ML: at 15:46

## 2021-07-26 RX ADMIN — Medication: at 21:33

## 2021-07-26 RX ADMIN — PREDNISOLONE ACETATE 1 DROP: 10 SUSPENSION/ DROPS OPHTHALMIC at 11:28

## 2021-07-26 RX ADMIN — HEPARIN SODIUM 5000 UNITS: 5000 INJECTION INTRAVENOUS; SUBCUTANEOUS at 23:46

## 2021-07-26 RX ADMIN — LATANOPROST 1 DROP: 50 SOLUTION OPHTHALMIC at 21:27

## 2021-07-26 RX ADMIN — PREDNISOLONE ACETATE 1 DROP: 10 SUSPENSION/ DROPS OPHTHALMIC at 21:27

## 2021-07-26 RX ADMIN — Medication 10 ML: at 06:45

## 2021-07-26 NOTE — PROGRESS NOTES
Problem: Impaired Skin Integrity/Pressure Injury Treatment  Goal: *Improvement of Existing Pressure Injury  Outcome: Progressing Towards Goal  Goal: *Prevention of pressure injury  Description: Document Mick Scale and appropriate interventions in the flowsheet. Outcome: Progressing Towards Goal  Note: Pressure Injury Interventions:  Sensory Interventions: Assess changes in LOC    Moisture Interventions: Absorbent underpads    Activity Interventions: Assess need for specialty bed, PT/OT evaluation    Mobility Interventions: Assess need for specialty bed, Float heels, HOB 30 degrees or less    Nutrition Interventions: Document food/fluid/supplement intake    Friction and Shear Interventions: Apply protective barrier, creams and emollients                Problem: Patient Education: Go to Patient Education Activity  Goal: Patient/Family Education  Outcome: Progressing Towards Goal     Problem: Falls - Risk of  Goal: *Absence of Falls  Description: Document Stalin Fall Risk and appropriate interventions in the flowsheet. Outcome: Progressing Towards Goal  Note: Fall Risk Interventions:  Mobility Interventions: Bed/chair exit alarm    Mentation Interventions: Adequate sleep, hydration, pain control    Medication Interventions: Bed/chair exit alarm    Elimination Interventions:  Toileting schedule/hourly rounds    History of Falls Interventions: Bed/chair exit alarm         Problem: Patient Education: Go to Patient Education Activity  Goal: Patient/Family Education  Outcome: Progressing Towards Goal     Problem: Altered Thought Process (Adult/Pediatric)  Goal: *STG: Participates in treatment plan  Outcome: Progressing Towards Goal  Goal: *STG: Remains safe in hospital  Outcome: Progressing Towards Goal  Goal: *STG: Seeks staff when feelings of anxiety and fear arise  Outcome: Progressing Towards Goal  Goal: *STG: Complies with medication therapy  Outcome: Progressing Towards Goal  Goal: *STG: Attends activities and groups  Outcome: Progressing Towards Goal  Goal: *STG: Decreased delusional thinking  Outcome: Progressing Towards Goal  Goal: *STG: Decreased hallucinations  Outcome: Progressing Towards Goal  Goal: *STG: Absence of lethality  Outcome: Progressing Towards Goal  Goal: *STG: Demonstrates ability to understand and use improved judgment in daily activities and relationships  Outcome: Progressing Towards Goal  Goal: *LTG: Returns to baseline functioning  Outcome: Progressing Towards Goal  Goal: Interventions  Outcome: Progressing Towards Goal     Problem: Patient Education: Go to Patient Education Activity  Goal: Patient/Family Education  Outcome: Progressing Towards Goal     Problem: Non-Violent Restraints  Goal: Removal from restraints as soon as assessed to be safe  Outcome: Progressing Towards Goal  Goal: No harm/injury to patient while restraints in use  Outcome: Progressing Towards Goal  Goal: Patient's dignity will be maintained  Outcome: Progressing Towards Goal  Goal: Patient Interventions  Outcome: Progressing Towards Goal     Problem: Risk for Spread of Infection  Goal: Prevent transmission of infectious organism to others  Description: Prevent the transmission of infectious organisms to other patients, staff members, and visitors. Outcome: Progressing Towards Goal     Problem: Patient Education:  Go to Education Activity  Goal: Patient/Family Education  Outcome: Progressing Towards Goal     Problem: Pressure Injury - Risk of  Goal: *Prevention of pressure injury  Description: Document Mick Scale and appropriate interventions in the flowsheet.   Outcome: Progressing Towards Goal     Problem: Patient Education: Go to Patient Education Activity  Goal: Patient/Family Education  Outcome: Progressing Towards Goal     Problem: Infection - Risk of, Central Venous Catheter-Associated Bloodstream Infection  Goal: *Absence of infection signs and symptoms  Outcome: Progressing Towards Goal     Problem: Patient Education: Go to Patient Education Activity  Goal: Patient/Family Education  Outcome: Progressing Towards Goal

## 2021-07-26 NOTE — PROGRESS NOTES
1400: Patient working with PT, ambulating in room. Able to ambulate to stretcher to go [facility] for ultrasound.

## 2021-07-26 NOTE — PROGRESS NOTES
End of Shift Note    Bedside shift change report given to Leticia Cruz 43 (oncoming nurse) by Rupinder Vega RN (offgoing nurse). Report included the following information SBAR, Kardex, Intake/Output, MAR and Recent Results    Shift worked:  7a-7p     Shift summary and any significant changes:    Patient A&Ox2 today. Dialysis completed. Ultrasound elastography done today. Diet advanced by SLP. Patient worked with PT, ambulated in room. Did have loose BM today in toilet, unable to obtain stool sample. C diff order previously [facility] this am.     Concerns for physician to address: follow up with family regarding HH vs Rehab at [facility]. Zone phone for oncoming shift:     [phone]     Activity:  Activity Level: Up with Assistance  Number times ambulated in hallways past shift: 0  Number of times OOB to chair past shift: 0    Cardiac:   Cardiac Monitoring: Yes      Cardiac Rhythm: Sinus Rhythm    Access:   Current line(s): central line     Genitourinary:   Urinary status: anuric    Respiratory:   O2 Device: None (Room air)  Chronic home O2 use?: NO  Incentive spirometer at bedside: NO     GI:  Last Bowel Movement Date: 07/26/21  Current diet:  ADULT ORAL NUTRITION SUPPLEMENT Breakfast, Lunch, Dinner; Renal Supplement  ADULT DIET Regular; 4 carb choices (60 gm/meal); Low Fat/Low Chol/High Fiber/MELISSA  Passing flatus: YES  Tolerating current diet: YES       Pain Management:   Patient states pain is manageable on current regimen: YES    Skin:  Mick Score: 17  Interventions: increase time out of bed and PT/OT consult    Patient Safety:  Fall Score:  Total Score: 4  Interventions: bed/chair alarm and assistive device (walker, cane, etc)  High Fall Risk: Yes    Length of Stay:  Expected LOS: 4d 19h  Actual LOS: [address]

## 2021-07-26 NOTE — DIALYSIS
[facility] Ac[facility]  [phone]    Vitals   Pre   Post   Assessment   Pre   Post     Temp  Temp: 98.3 °F (36.8 °C) (07/26/21 0810) 98.1  LOC  AxO x2 person & place AxO x2 person & place   HR   Pulse (Heart Rate): 60 (07/26/21 0810) 63 Lungs   Unlabored, RA Unlabored, RA    B/P   BP: (!) 160/72 (07/26/21 0810) 113/48 Cardiac   Bedside Tele Bedside Tele    Resp   Resp Rate: 10 (07/26/21 0810) 14 Skin   Warm, dry Warm, dry    Pain level  Pain Intensity 1: 0 (07/26/21 0740) 0 Edema  None     None   Orders:    Duration:   Start:   0810 End:   1110 Total:   3 hrs   Dialyzer:   Dialyzer/Set Up Inspection: Revaclear (07/26/21 0810)   K Bath:   Dialysate K (mEq/L): 4 (07/26/21 0810)   Ca Bath:   Dialysate CA (mEq/L): 2.5 (07/26/21 0810)   Na/Bicarb:   Dialysate NA (mEq/L): 138 (07/26/21 0810)   Target Fluid Removal:   Goal/Amount of Fluid to Remove (mL): 1000 mL (07/26/21 0810)   Access     Type & Location:   NANCI AVF with no signs or symptoms of infection. +Bruit/+thrill present. Cleansed with CleanPrep per policy & procedure. Cannulated with two 15 gauge needles. +aspiration/+flush x2.   Labs     Obtained/Reviewed   Critical Results Called   Date when labs were drawn-  Hgb-    HGB   Date Value Ref Range Status   07/26/2021 10.0 (L) 11.5 - 16.0 g/dL Final     K-    Potassium   Date Value Ref Range Status   07/26/2021 4.0 3.5 - 5.1 mmol/L Final     Comment:     Sample is hemolyzed (hemoglobin is  likely >50 mg/dL) and thus the  potassium, AST, ammonia, and  phosphorus results may be adversely  affected. If the clinical situation  warrants, recommend recollection of  a new specimen with attention to  preventing hemolysis.        Ca-   Calcium   Date Value Ref Range Status   07/26/2021 8.2 (L) 8.5 - 10.1 MG/DL Final     Bun-   BUN   Date Value Ref Range Status   07/26/2021 36 (H) 6 - 20 MG/DL Final     Creat-   Creatinine   Date Value Ref Range Status   07/26/2021 7.38 (H) 0.55 - 1.02 MG/DL Final Medications/ Blood Products Given     Name   Dose   Route and Time     None                Blood Volume Processed (BVP):    66.5 L Net Fluid   Removed:  1500 ml   Comments   Time Out Done: 9086  Primary Nurse Rpt Pre: SANDI Arizmendi RN  Primary Nurse Rpt Post: SANDI Arizmendi RN  Pt Education: AVF infection prevention & control. Care Plan: Continue current hemodialysis plan of care. Tx Summary: 0810 Hemodialysis initiated. All dialysis medications reviewed. 1010 Dr. Luis Alfredo Frank in to see pt. Increased net fluid removal to 1500 ml.  1110 Completed hemodialysis. All possible blood returned. Both lines flushed with normal saline. Hemostasis achieved <20 minutes. Gauze dressing & paper tape applied, CDI. +Bruit/+thrill present. SBAR report given to primary RN. Admiting Diagnosis: sepsis, acute encephalopathy  Pt's [facility]- [facility]  Consent signed - Informed Consent Verified: Yes (07/26/21 0810)  Hepatitis Status- Ag Negative on 6/28/21, Ab 81 Immune on 7/7/21 obtained from Physician Mauro Marion #- Machine Number: [id number] (07/26/21 6354)  Telemetry status- Bedside Tele  Pre-dialysis wt. - Pre-Dialysis Weight: 63.6 kg (140 lb 3.4 oz) (07/21/21 4772)

## 2021-07-26 NOTE — PROGRESS NOTES
Problem: Infection - Risk of, Central Venous Catheter-Associated Bloodstream Infection  Goal: *Absence of infection signs and symptoms  Outcome: Progressing Towards Goal     Problem: Impaired Skin Integrity/Pressure Injury Treatment  Goal: *Improvement of Existing Pressure Injury  Outcome: Progressing Towards Goal

## 2021-07-26 NOTE — PROGRESS NOTES
Problem: Mobility Impaired (Adult and Pediatric)  Goal: *Acute Goals and Plan of Care (Insert Text)  Description: FUNCTIONAL STATUS PRIOR TO ADMISSION: Patient was modified independent using a rollator for functional mobility. Independent with dressing and bathing. Used rollator when going to HD.    HOME SUPPORT PRIOR TO ADMISSION: The patient lived with family who assisted with cooking and meals. 1 story home with 4 steps and bilateral rails. Physical Therapy Goals  Initiated 7/26/2021  1. Patient will move from supine to sit and sit to supine , scoot up and down, and roll side to side in bed with modified independence within 7 day(s). 2.  Patient will transfer from bed to chair and chair to bed with supervision/set-up using the least restrictive device within 7 day(s). 3.  Patient will perform sit to stand with modified independence within 7 day(s). 4.  Patient will ambulate with supervision/set-up for 250 feet with the least restrictive device within 7 day(s). 5.  Patient will ascend/descend 4 stairs with 1 handrail(s) with minimal assistance/contact guard assist within 7 day(s). Outcome: Progressing Towards Goal  PHYSICAL THERAPY EVALUATION  Patient: Lorri James (66 y.o. female)  Date: 7/26/2021  Primary Diagnosis: Acute encephalopathy [G93.40]       Precautions:  Bed Alarm, Fall (legally blind)    ASSESSMENT  Based on the objective data described below, the patient presents with generalized weakness, decreased standing balance with frequent lob backwards, decreased mobility skills with increased risk for falls at this time. She was able to come to sitting on the edge of bed with very little assistance but needed extra time. Sitting balance was good and midline. Sit to stand needed additional assistance to stabilize balance as she was losing in posteriorly initially. Gait assessed initially with hand hold assistance, then switched to a RW due to poor standing balance.  Gait with RW was min a vs mod a without it. Pattern was slow, frequent lob posteriorly, with narrow base of support foot placement. Transferred onto [facility] chair for US procedure. Expect patient to be able to return home with family assistance and [facility] PT/OT. Current Level of Function Impacting Discharge (mobility/balance): cg supine to sit; min a sit to stand; min a bed to chair with RW and to ambulate 30 feet. Functional Outcome Measure: The patient scored 50/100 on the Barthel outcome measure which is indicative of 50% functional impairment. Other factors to consider for discharge: modified independent with rollator prior level of function; supportive family; ESRD/HD MWF     Patient will benefit from skilled therapy intervention to address the above noted impairments. PLAN :  Recommendations and Planned Interventions: bed mobility training, transfer training, gait training, therapeutic exercises, orthotic/prosthetic training, patient and family training/education and therapeutic activities      Frequency/Duration: Patient will be followed by physical therapy:  4 times a week to address goals. Recommendation for discharge: (in order for the patient to meet his/her long term goals)  Physical therapy at least 2 days/week in the home AND ensure assist and/or supervision for safety with mobility and ADLs. This discharge recommendation:  Has not yet been discussed the attending provider and/or case management    IF patient discharges home will need the following DME: wheelchair       SUBJECTIVE:   Patient stated I feel better, but I'm tired from dialysis.     OBJECTIVE DATA SUMMARY:   HISTORY:    Past Medical History:   Diagnosis Date    Diabetes ([facility] [place] [address]     Hypertension      Past Surgical History:   Procedure Laterality Date    [place] CARDIAC SURG PROCEDURE UNLIST         Personal factors and/or comorbidities impacting plan of care: dm, HD 3 x week    Home Situation  Home Environment: Private residence  # Steps to Enter: 4  Rails to Enter: Yes  Hand Rails : Right  Wheelchair Ramp: No  One/Two Story Residence: One story  Living Alone: No  Support Systems: Child(russell)  Patient Expects to be Discharged to[de-identified] House  Current DME Used/Available at Home: Walker, rollator, Cane, straight  Tub or Shower Type: Tub/Shower combination    EXAMINATION/PRESENTATION/DECISION MAKING:   Critical Behavior:  Neurologic State: Alert, Confused  Orientation Level: Oriented to person, Disoriented to place, Oriented to time, Disoriented to situation  Cognition: Decreased attention/concentration, Follows commands  Safety/Judgement: Decreased insight into deficits, Fall prevention, Awareness of environment  Hearing: Auditory  Auditory Impairment: None  Skin:  Skin dry  Edema: mild B feet  Range Of Motion:  AROM: Within functional limits           PROM: Within functional limits           Strength:    Strength: Generally decreased, functional                    Tone & Sensation:   Tone: Normal              Sensation:  (NT)               Coordination:  Coordination: Within functional limits  Vision:   Acuity:  (poor vision/legal blindness)  Functional Mobility:  Bed Mobility:  Rolling: Stand-by assistance (Rail)  Supine to Sit: Contact guard assistance     Scooting: Contact guard assistance  Transfers:  Sit to Stand: Minimum assistance  Stand to Sit: Contact guard assistance        Bed to Chair: Minimum assistance; Adaptive equipment; Additional time (RW)              Balance:   Sitting: Intact  Standing: Impaired  Standing - Static: Constant support; Fair  Standing - Dynamic : Poor;Constant support  Ambulation/Gait Training:  Distance (ft): 40 Feet (ft)  Assistive Device: Gait belt;Walker, rolling  Ambulation - Level of Assistance: Moderate assistance;Minimal assistance (mod a no RW; min a w/RW)     Gait Description (WDL): Exceptions to WDL  Gait Abnormalities: Altered arm swing;Decreased step clearance; Path deviations;Trunk sway increased; Step to gait        Base of Support: Widened     Speed/Ramona: Slow  Step Length: Right shortened;Left shortened        Interventions: Verbal cues; Safety awareness training; Tactile cues        Functional Measure:  Barthel Index:    Bathin  Bladder: 5  Bowels: 5  Groomin  Dressin  Feedin  Mobility: 10 (less than 150 feet)  Stairs: 0  Toilet Use: 5  Transfer (Bed to Chair and Back): 10  Total: 50/100       The Barthel ADL Index: Guidelines  1. The index should be used as a record of what a patient does, not as a record of what a patient could do. 2. The main aim is to establish degree of independence from any help, physical or verbal, however minor and for whatever reason. 3. The need for supervision renders the patient not independent. 4. A patient's performance should be established using the best available evidence. Asking the patient, friends/relatives and nurses are the usual sources, but direct observation and common sense are also important. However direct testing is not needed. 5. Usually the patient's performance over the preceding 24-48 hours is important, but occasionally longer periods will be relevant. 6. Middle categories imply that the patient supplies over 50 per cent of the effort. 7. Use of aids to be independent is allowed. Chaya Fritz., Barthel, DPedroW. (2426). Functional evaluation: the Barthel Index. [address] [facility] (14)2. Gabi Farooq shannon Pablo, DARINEL, Stu Kay., Indigo [address]., [place], [address] [place] [address] (). Measuring the change indisability after inpatient rehabilitation; comparison of the responsiveness of the Barthel Index and Functional Marion Measure. Journal of Neurology, Neurosurgery, and Psychiatry, 66(4), 242-410. Korina Medina, N.J.A, ANTIONE Quiroga, & Uzma Benitez, MPedroA. (2004.) Assessment of post-stroke quality of life in cost-effectiveness studies: The usefulness of the Barthel Index and the EuroQoL-5D.  Quality of Life Research, 15, 364-81            Physical Therapy Evaluation Charge Determination   History Examination Presentation Decision-Making   HIGH Complexity :3+ comorbidities / personal factors will impact the outcome/ POC  HIGH Complexity : 4+ Standardized tests and measures addressing body structure, function, activity limitation and / or participation in recreation  MEDIUM Complexity : Evolving with changing characteristics  Other outcome measures Barthel  MEDIUM      Based on the above components, the patient evaluation is determined to be of the following complexity level: MEDIUM    Pain Rating:  No complaints    Activity Tolerance:   Fair, SpO2 stable on RA and requires rest breaks    After treatment patient left in no apparent distress:   being transported by gurney to [address] [facility]:   The patients plan of care was discussed with: Registered nurse. Fall prevention education was provided and the patient/caregiver indicated understanding., Patient/family have participated as able in goal setting and plan of care. and Patient/family agree to work toward stated goals and plan of care.     Thank you for this referral.  Merlin Dias, PT   Time Calculation: 20 mins

## 2021-07-26 NOTE — WOUND CARE
Wound care consult for the skin breakdown to the skin under the breasts and to the buttocks caused by moisture.    Pt. Examined as nursing turned her for brief exam.   Orders written for this skin issue - Desitin cream.   Gabby Philippe RN, BSN, [facility]

## 2021-07-26 NOTE — PROGRESS NOTES
End of Shift Note    Bedside shift change report given to Severo Howell (oncoming nurse) by Parker Cadena RN (offgoing nurse). Report included the following information SBAR, Kardex, Intake/Output, MAR, Recent Results and Cardiac Rhythm sinus jabari    Shift worked:  7p-7a     Shift summary and any significant changes:     alert, taking PO no difficulty, incontinent of loose stool x1 overnight. Concerns for physician to address:  needs PT     Zone phone for oncoming shift:   [phone]       Activity:  Activity Level: Bed Rest  Number times ambulated in hallways past shift: 0  Number of times OOB to chair past shift: 0    Cardiac:   Cardiac Monitoring: Yes      Cardiac Rhythm: Sinus Jabari    Access:   Current line(s): central line, HD access     Genitourinary:   Urinary status: anuric    Respiratory:   O2 Device: None (Room air)  Chronic home O2 use?: NO  Incentive spirometer at bedside: NO     GI:  Last Bowel Movement Date: 07/26/21  Current diet:  ADULT DIET Dysphagia - Soft & Bite Sized; 4 carb choices (60 gm/meal); Low Fat/Low Chol/High Fiber/MELISSA  ADULT ORAL NUTRITION SUPPLEMENT Breakfast, Lunch, Dinner; Renal Supplement  Passing flatus: YES  Tolerating current diet: YES       Pain Management:   Patient states pain is manageable on current regimen: N/A    Skin:  Mick Score: 13  Interventions: turn team, float heels, increase time out of bed, PT/OT consult, limit briefs and nutritional support     Patient Safety:  Fall Score:  Total Score: 3  Interventions: bed/chair alarm, gripper socks, pt to call before getting OOB and stay with me (per policy)  High Fall Risk: Yes    Length of Stay:  Expected LOS: 4d 19h  Actual LOS: Va Qiu RN

## 2021-07-26 NOTE — PROGRESS NOTES
Comprehensive Nutrition Assessment    Type and Reason for Visit: Reassess    Nutrition Recommendations/Plan:   Continue current diet  Continue nepro TID    Nutrition Assessment:     Chart reviewed; patient passed bedside swallow screen over the weekend. Diet advanced and ONS started TID. NGT/TF never placed/started. Good intake noted yesterday. Patient sleeping and receiving HD at time of attempted visit. K+ WNL; no phos. BG under control. GI consulted for diarrhea/possible c-diff. Mental status has improved. Continue current nutrition plan of care; encourage intake of meals/supplements. Patient Vitals for the past 168 hrs:   % Diet Eaten   07/25/21 1832 51 - 75%   07/25/21 1124 76 - 100%   07/25/21 0836 76 - 100%   07/24/21 1825 26 - 50%   07/24/21 1430 26 - 50%     Estimated Daily Nutrient Needs:  Energy (kcal): 1525 kcal (25 kcal/kg); Weight Used for Energy Requirements: Current  Protein (g): 79-92g (1.3-1.5 g/kg bw); Weight Used for Protein Requirements: Current  Fluid (ml/day): 1500 mL; Method Used for Fluid Requirements: 1 ml/kcal    Nutrition Related Findings:    K+ 4.0, BG 11--121-138  BM 7/26  Humalog, Synthroid       Wounds:    Wound consult pending      Current Nutrition Therapies:  ADULT DIET Dysphagia - Soft & Bite Sized; 4 carb choices (60 gm/meal); Low Fat/Low Chol/High Fiber/MELISSA  ADULT ORAL NUTRITION SUPPLEMENT Breakfast, Lunch, Dinner; Renal Supplement    Anthropometric Measures:  · Height:  5' 2\" (157.5 cm)  · Current Body Wt:  61.5 kg (135 lb 9.3 oz)   · BMI Category:  Normal weight (BMI 18.5-24. 9)       Nutrition Diagnosis:   · Inadequate protein-energy intake related to cognitive or neurological impairment as evidenced by NPO or clear liquid status due to medical condition  Previous diagnosis resolved; mental status improving, diet advanced, and tolerating PO    Nutrition Interventions:   Food and/or Nutrient Delivery: Continue current diet, Continue oral nutrition supplement  Nutrition Education and Counseling: No recommendations at this time  Coordination of Nutrition Care: No recommendation at this time    Goals:  PO intake >50% of meals/supplement next 3-5 days       Nutrition Monitoring and Evaluation:   Behavioral-Environmental Outcomes: None identified  Food/Nutrient Intake Outcomes: Food and nutrient intake, Supplement intake  Physical Signs/Symptoms Outcomes: Biochemical data, Weight, Diarrhea    Discharge Planning:    Continue current diet, Continue oral nutrition supplement     Electronically signed by Adelita Winter RD on 7/26/2021 at 10:49 AM    Contact: ext [phone]

## 2021-07-26 NOTE — PROGRESS NOTES
Transition of Care Plan:     RUR: 18  Disposition: Home with [facility]  Follow up appointments: PCP, OP HD [facility] MWF at 6.30 AM  DME needed:Pt has access to cane and RW  Transportation at Discharge: Daughter will transport   [address] or means to access home:      Yes  IM Medicare letter:NA  Caregiver Contact: Daughter Nicky Moran [phone]  Discharge Caregiver contacted prior to discharge? Yes      Message left for daughter to return call re dcp. Anabell Stevenson  RN BSN CRM        [phone]

## 2021-07-26 NOTE — PROGRESS NOTES
PT consult acknowledged. Patient was on HD earlier this morning. PT will see in the afternoon for assessment.      Anali Garay, PT

## 2021-07-26 NOTE — PROGRESS NOTES
Nephrology Progress Note  Piter Perez     [email or web address]. [email or web address]  Phone - [phone]   Patient: Mushtaq Foster    YOB: 1950        Date- 7/26/2021   Admit Date: 7/16/2021  CC: Follow up for ESRD       IMPRESSION & PLAN:    ESRD- [facility]- [facility]  · AMS  · SEPSIS  · ANEMIA  · CA/P/PTH/VITD  · DM  ·  HTN    PLAN-   SEEN ON HD   Epogen for anemia    REMOVE 1.5 KG fluid today  D/W HD NURSE   Subjective: Interval History:   -seen on hd  She awake, alert  bp stable  She is taking po welll    Objective:   Vitals:    07/26/21 0915 07/26/21 0930 07/26/21 0945 07/26/21 1000   BP: (!) 171/64 (!) 165/62 (!) 173/63 (!) 144/56   Pulse: (!) 58 (!) 58 62 (!) 58   Resp: 14 13 14 23   Temp:       TempSrc:       SpO2:       Weight:       Height:          07/25 0701 - 07/26 0700  In: 600 [P.O.:600]  Out: -   Last 3 Recorded Weights in this Encounter    07/24/21 0600 07/25/21 0625 07/26/21 0457   Weight: 60.4 kg (133 lb 2.5 oz) 59.8 kg (131 lb 13.4 oz) 61.5 kg (135 lb 9.3 oz)      Physical exam:   GEN:  NAD  NECK:  Supple, no thyromegaly  RESP: CTA  b/l, no  wheezing,   CVS: RRR,S1,S2   ABDO:  soft , non tender, No mass  NEURO: non focal, normal speech  EXT:no Edema +nt     EXT: Right arm AV fistula present        Chart reviewed. Pertinent Notes reviewed. Data Review :  Recent Labs     07/26/21  0440 07/25/21  0403 07/24/21  0529    138 138   K 4.0 3.4* 3.7   CL 98 97 97   CO2 29 32 32   BUN 36* 31* 21*   CREA 7.38* 5.71* 4.29*   GLU 83 80 63*   CA 8.2* 8.7 9.3     Recent Labs     07/26/21  0440 07/25/21  0403 07/24/21  0529   WBC 7.8 7.5 7.9   HGB 10.0* 11.0* 11.2*   HCT 33.0* 35.5 36.8   * 153 149*     No results for input(s): FE, TIBC, PSAT, FERR in the last 72 hours.    Medication list  reviewed  Current Facility-Administered Medications   Medication Dose Route Frequency    levothyroxine (SYNTHROID) tablet 50 mcg  50 mcg Oral 6am    heparin (porcine) pf 300 Units  300 Units InterCATHeter PRN    insulin lispro (HUMALOG) injection   SubCUTAneous AC&HS    epoetin bia-epbx (RETACRIT) injection 4,000 Units  4,000 Units SubCUTAneous Q MON, WED & FRI    [Held by provider] labetaloL (NORMODYNE;TRANDATE) injection 10 mg  10 mg IntraVENous TID    hydrALAZINE (APRESOLINE) 20 mg/mL injection 20 mg  20 mg IntraVENous Q6H PRN    [Held by provider] levETIRAcetam (KEPPRA) 500 mg in 0.9% sodium chloride 100 mL IVPB  500 mg IntraVENous DAILY    labetaloL (NORMODYNE;TRANDATE) injection 20 mg  20 mg IntraVENous Q4H PRN    glucose chewable tablet 16 g  4 Tablet Oral PRN    dextrose (D50W) injection syrg 12.5-25 g  12.5-25 g IntraVENous PRN    glucagon (GLUCAGEN) injection 1 mg  1 mg IntraMUSCular PRN    sodium chloride (NS) flush 5-40 mL  5-40 mL IntraVENous Q8H    sodium chloride (NS) flush 5-40 mL  5-40 mL IntraVENous PRN    acetaminophen (TYLENOL) tablet 650 mg  650 mg Oral Q6H PRN    ondansetron (ZOFRAN ODT) tablet 4 mg  4 mg Oral Q8H PRN    Or    ondansetron (ZOFRAN) injection 4 mg  4 mg IntraVENous Q6H PRN    acetaminophen (TYLENOL) suppository 650 mg  650 mg Rectal Q4H PRN    brimonidine (ALPHAGAN) 0.2 % ophthalmic solution 1 Drop  1 Drop Both Eyes BID    timolol (TIMOPTIC) 0.25% ophthalmic solution  1 Drop Both Eyes DAILY    prednisoLONE acetate (PRED FORTE) 1 % ophthalmic suspension 1 Drop  1 Drop Right Eye TID    latanoprost (XALATAN) 0.005 % ophthalmic solution 1 Drop  1 Drop Both Eyes QHS    heparin (porcine) injection 5,000 Units  5,000 Units SubCUTAneous Q8H          MD [facility]  [facility], [address]  [place], [address]  Phone - [phone]               Fax - [phone]

## 2021-07-26 NOTE — PROGRESS NOTES
Speech pathology note  Attempted to see patient x2 this morning, however patient initially on HD then PO being held for US at 1300 today. SLP will follow for swallowing evaluation as patient cleared for PO intake. Thank you. Addendum [phone]: Attempted to see patient for swallowing evaluation again, however she is off the floor for testing.     Spencer Mendez., CCC-SLP

## 2021-07-26 NOTE — PROGRESS NOTES
Orders received, chart reviewed and patient evaluated by physical therapy. Pending progression with skilled acute physical therapy, recommend:  Physical therapy at least 2 days/week in the home AND ensure assist and/or supervision for safety with mobility and ADLs. Recommend with nursing OOB to chair 3x/day and walking daily with 1 person assist and walker. Thank you for completing as able in order to maintain patient strength, endurance and independence. Full evaluation to follow.      Mychal Chawla, PT

## 2021-07-26 NOTE — CONSULTS
GI CONSULTATION NOTE  Bhavana Del Castillo NP  [phone] NP in-hospital cell phone M-F until 4:30  After 5pm or on weekends, please call  for physician on call    NAME: Marcial Pritchett   :  1950   MRN:  [id number]   Attending:  Dr. Liz Taylor  Primary GI:  Dr. Maikol Lemon   Date/Time:  2021 11:32 AM  Assessment:   ? Hepatic encephalopathy   Diarrhea  · Alert and oriented x 3 today  · No longer complaining of diarrhea   · 2021: CT abd/pel showed circumferential rectal wall thickening is suspicious for malignancy. No bowel obstruction. CRS consulted for these findings. · EEG- triphasic waves point to hepatic/renal encephalopathy as per neuro  · Ammonia elevated on admission now WNL     Sepsis  ESRD on HD   · Treatment per primary team    Plan:   · US with elastography ordered   · Check coags   · Follow labs   · Appreciate CRS input; rectal findings on CT do not seem to be causing any acute issues. Recommend outpatient follow-up once she recovers from this event   · Symptomatic care per primary team  Plan discussed with Dr. Madeleine Noguera:     HISTORY OF PRESENT ILLNESS:     Marcial Pritchett is an 66 y.o.  female who we are asked to see for complaint of encephalopathy. Patient presented to [facility] for complaints of headache and AMS after HD. GI was consulted for new onset diarrhea and possible hepatic encephalopathy. Patient is no longer complaining of diarrhea and she is alert and oriented x 3. She does not have any complaints at this time. Currently on HD. Past Medical History:   Diagnosis Date    Diabetes ([address]     Hypertension       Past Surgical History:   Procedure Laterality Date    DE CARDIAC SURG PROCEDURE UNLIST       Social History     Tobacco Use    Smoking status: Never Smoker    Smokeless tobacco: Never Used   Substance Use Topics    Alcohol use: Not Currently      No family history on file.    No Known Allergies Prior to Admission medications    Medication Sig Start Date End Date Taking? Authorizing Provider   levothyroxine (SYNTHROID) 50 mcg tablet TAKE 1 TABLET BY MOUTH EVERY DAY BEFORE BREAKFAST 7/5/21  Yes Rosalio Brock NP   famotidine (PEPCID) 20 mg tablet TAKE 1 TAB BY MOUTH TWO (2) TIMES DAILY AS NEEDED FOR HEARTBURN. 7/5/21  Yes Rosalio Brock NP   atorvastatin (LIPITOR) 40 mg tablet Take  by mouth daily. Yes Provider, Historical   amLODIPine (NORVASC) 5 mg tablet Take 5 mg by mouth daily. Yes Provider, Historical   aspirin delayed-release 81 mg tablet Take  by mouth daily. Yes Provider, Historical   metoprolol succinate (TOPROL-XL) 50 mg XL tablet Take  by mouth daily. Yes Provider, Historical   brimonidine-timoloL (Combigan) 0.2-0.5 % drop ophthalmic solution 1 Drop every twelve (12) hours. Yes Provider, Historical   b complex-vitamin c-folic acid 0.8 mg (Dialyvite 800) 0.8 mg tab tablet Take 1 Tab by mouth daily. Patient not taking: Reported on 7/17/2021 5/11/21   Rosalio Brock NP   isosorbide mononitrate (ISMO, MONOKET) 20 mg tablet Take 1 Tab by mouth two (2) times a day. Patient not taking: Reported on 7/16/2021 5/11/21   Rosalio Brock NP   Blood-Glucose Meter monitoring kit Use as directed. Dx: E11.65 check sugar twice daily 2/9/21   Rosalio Brock NP   lancets misc Check sugar twice daily. E11.65 2/9/21   Rosalio Brock NP   glucose blood VI test strips (ASCENSIA AUTODISC VI, ONE TOUCH ULTRA TEST VI) strip Check sugar twice daily 2/9/21   Rosalio Brock NP   alcohol swabs padm 1 Each by Apply Externally route four (4) times daily. For use with insulin and glucometer  Patient not taking: Reported on 7/17/2021 2/9/21   Rosalio Brock NP   insulin glargine (LANTUS,BASAGLAR) 100 unit/mL (3 mL) inpn 10 Units by SubCUTAneous route daily (with dinner).  2/9/21   Rosalio Brock NP   Insulin Needles, Disposable, 32 gauge x 5/32\" ndle 1 Each by Does Not Apply route daily. 2/9/21   Jerman Sanders, NP       Patient Active Problem List   Diagnosis Code    Acute encephalopathy G93.40    Visual impairment H54.7    Physical debility R53.81    Counseling regarding advance care planning and goals of care Z71.89       REVIEW OF SYSTEMS:    Constitutional: negative fever, negative chills, negative weight loss  Eyes:   negative visual changes  ENT:   negative sore throat, tongue or lip swelling   Respiratory:  negative cough, negative dyspnea  Cards:  negative for chest pain, palpitations, lower extremity edema  GI:   See HPI  :  negative for frequency, dysuria  Integument:  negative for rash and pruritus  Heme:  negative for easy bruising and gum/nose bleeding  Musculoskel: negative for myalgias,  back pain and muscle weakness  Neuro: negative for headaches, dizziness, vertigo  Psych: negative for feelings of anxiety, depression     Objective:   VITALS:    Visit Vitals  BP (!) 113/48 (BP 1 Location: Left upper arm, BP Patient Position: At rest)   Pulse 63   Temp 98.1 °F (36.7 °C) (Oral)   Resp 14   Ht 5' 2\" (1.575 m)   Wt 61.5 kg (135 lb 9.3 oz)   SpO2 98%   BMI 24.80 kg/m²       PHYSICAL EXAM:   General:           Pleasant AA female. NAD    Head:               Normocephalic, without obvious abnormality, atraumatic. Eyes:               Conjunctivae clear and pale, anicteric sclerae. Pupils are equal  Nose:               Nares normal. No drainage or sinus tenderness. Throat:             Lips, mucosa, and tongue normal.  No Thrush  Neck:               Supple, symmetrical,  no adenopathy, thyroid: non tender  Back:               Symmetric,  No CVA tenderness. Lungs:             CTA bilaterally. No wheezing/rhonchi/rales. Chest wall:      No tenderness or deformity. No Accessory muscle use. Heart:              Regular rate and rhythm,  no murmur, rub or gallop. Abdomen:        Soft, non-tender. Not distended.   Bowel sounds normal. No masses  Extremities:     Atraumatic, No cyanosis. No edema. No clubbing  Skin:                Texture, turgor normal. No rashes/lesions/jaundice  Psych:             Good insight. Not depressed. Not anxious or agitated. Neurologic:      EOMs intact. No facial asymmetry. No aphasia or slurred speech. Normal                        strength, A/O X 3. LAB DATA REVIEWED:    Recent Results (from the past 24 hour(s))   GLUCOSE, POC    Collection Time: 07/25/21  5:04 PM   Result Value Ref Range    Glucose (POC) 121 (H) 65 - 117 mg/dL    Performed by Shalini Wan (Float)    GLUCOSE, POC    Collection Time: 07/25/21  9:02 PM   Result Value Ref Range    Glucose (POC) 142 (H) 65 - 117 mg/dL    Performed by Ligia Manriquez PCT    CBC WITH AUTOMATED DIFF    Collection Time: 07/26/21  4:40 AM   Result Value Ref Range    WBC 7.8 3.6 - 11.0 K/uL    RBC 3.69 (L) 3.80 - 5.20 M/uL    HGB 10.0 (L) 11.5 - 16.0 g/dL    HCT 33.0 (L) 35.0 - 47.0 %    MCV 89.4 80.0 - 99.0 FL    MCH 27.1 26.0 - 34.0 PG    MCHC 30.3 30.0 - 36.5 g/dL    RDW 18.3 (H) 11.5 - 14.5 %    PLATELET 044 (L) 890 - 400 K/uL    MPV 12.0 8.9 - 12.9 FL    NRBC 0.0 0  WBC    ABSOLUTE NRBC 0.00 0.00 - 0.01 K/uL    NEUTROPHILS 43 32 - 75 %    LYMPHOCYTES 35 12 - 49 %    MONOCYTES 14 (H) 5 - 13 %    EOSINOPHILS 7 0 - 7 %    BASOPHILS 0 0 - 1 %    IMMATURE GRANULOCYTES 1 (H) 0.0 - 0.5 %    ABS. NEUTROPHILS 3.4 1.8 - 8.0 K/UL    ABS. LYMPHOCYTES 2.7 0.8 - 3.5 K/UL    ABS. MONOCYTES 1.1 (H) 0.0 - 1.0 K/UL    ABS. EOSINOPHILS 0.5 (H) 0.0 - 0.4 K/UL    ABS. BASOPHILS 0.0 0.0 - 0.1 K/UL    ABS. IMM.  GRANS. 0.1 (H) 0.00 - 0.04 K/UL    DF AUTOMATED     METABOLIC PANEL, BASIC    Collection Time: 07/26/21  4:40 AM   Result Value Ref Range    Sodium 136 136 - 145 mmol/L    Potassium 4.0 3.5 - 5.1 mmol/L    Chloride 98 97 - 108 mmol/L    CO2 29 21 - 32 mmol/L    Anion gap 9 5 - 15 mmol/L    Glucose 83 65 - 100 mg/dL    BUN 36 (H) 6 - 20 MG/DL    Creatinine 7.38 (H) 0.55 - 1.02 MG/DL    BUN/Creatinine ratio 5 (L) 12 - 20      GFR est AA 7 (L) >60 ml/min/1.73m2    GFR est non-AA 5 (L) >60 ml/min/1.73m2    Calcium 8.2 (L) 8.5 - 10.1 MG/DL   GLUCOSE, POC    Collection Time: 07/26/21  7:42 AM   Result Value Ref Range    Glucose (POC) 87 65 - 117 mg/dL    Performed by Fatou RESENDIZ (Float)    GLUCOSE, POC    Collection Time: 07/26/21 10:58 AM   Result Value Ref Range    Glucose (POC) 83 65 - 117 mg/dL    Performed by Shelley Bonilla (Float)        IMAGING RESULTS:  I have personally reviewed the imaging reports      Total time spent with patient: 50 minutes ________________________________________________________________________  Care Plan discussed with:  Patient x   Family     RN x              Consultant:       CT  7/26/2021:  ________________________________________________________________________    ___________________________________________________  Consulting Provider: Rickey Holland NP      7/26/2021  11:32 AM

## 2021-07-26 NOTE — PROGRESS NOTES
Problem: Dysphagia (Adult)  Goal: *Acute Goals and Plan of Care (Insert Text)  Description: Speech pathology goals  Initiated 7/26/2021  1. Patient will tolerate regular/thin liquid diet with no overt s/s aspiration within 7 days  Outcome: Progressing Towards Goal     SPEECH LANGUAGE PATHOLOGY BEDSIDE SWALLOW EVALUATION  Patient: Marcial Pritchett (66 y.o. female)  Date: 7/26/2021  Primary Diagnosis: Acute encephalopathy [G93.40]        Precautions:   Bed Alarm, Fall (legally blind)    ASSESSMENT :  Based on the objective data described below, the patient presents with [facility] oral/pharyngeal swallow, and no overt s/s aspiration observed. Note patient with AMS prior, however mental status has improved today. Patient will benefit from skilled intervention to address the above impairments. Patients rehabilitation potential is considered to be Good     PLAN :  Recommendations and Planned Interventions:  -Regular/thin liquid diet  -SLP to follow x1-2 for diet tolerance  Frequency/Duration: Patient will be followed by speech-language pathology 2 times a week to address goals. Discharge Recommendations: None     SUBJECTIVE:   Patient stated That's real good.     OBJECTIVE:     Past Medical History:   Diagnosis Date    Diabetes ([facility] [address]     Hypertension      Past Surgical History:   Procedure Laterality Date    [place] CARDIAC SURG PROCEDURE UNLIST       Prior Level of Function/Home Situation:   Home Situation  Home Environment: Private residence  # Steps to Enter: 4  Rails to Enter: Yes  Hand Rails : Right  Wheelchair Ramp: No  One/Two Story Residence: One story  Living Alone: No  Support Systems: Child(russell)  Patient Expects to be Discharged to[de-identified] House  Current DME Used/Available at Home: Geryl Aldo, rollator, Cane, straight  Tub or Shower Type: Tub/Shower combination  Diet prior to admission: regular/thin  Current Diet:  Soft and bite sized/thin liquids   Cognitive and Communication Status:  Neurologic State: Alert, Confused  Orientation Level: Oriented to person, Disoriented to place, Oriented to time, Disoriented to situation  Cognition: Decreased attention/concentration, Follows commands  Perception: Appears intact  Perseveration: Perseverates during conversation  Safety/Judgement: Decreased insight into deficits, Fall prevention, Awareness of environment  Oral Assessment:  Oral Assessment  Labial: No impairment  Dentition: Natural  Oral Hygiene: moist oral mucosa  P.O. Trials:  Patient Position: upright in bed  Vocal quality prior to P.O.: No impairment  Consistency Presented: Thin liquid; Solid  How Presented: Self-fed/presented;SLP-fed/presented;Straw;Successive swallows     Bolus Acceptance: No impairment  Bolus Formation/Control: No impairment     Propulsion: No impairment  Oral Residue: None  Initiation of Swallow: No impairment  Laryngeal Elevation: Functional  Aspiration Signs/Symptoms: None  Pharyngeal Phase Characteristics: No impairment, issues, or problems              Oral Phase Severity: No impairment  Pharyngeal Phase Severity : No impairment    NOMS:   The NOMS functional outcome measure was used to quantify this patient's level of swallowing impairment. Based on the NOMS, the patient was determined to be at level 7 for swallow function       NOMS Swallowing Levels:  Level 1 (CN): NPO  Level 2 (CM): NPO but takes consistency in therapy  Level 3 (CL): Takes less than 50% of nutrition p.o. and continues with nonoral feedings; and/or safe with mod cues; and/or max diet restriction  Level 4 (CK): Safe swallow but needs mod cues; and/or mod diet restriction; and/or still requires some nonoral feeding/supplements  Level 5 (CJ): Safe swallow with min diet restriction; and/or needs min cues  Level 6 (CI): Independent with p.o.; rare cues; usually self cues; may need to avoid some foods or needs extra time  Level 7 ([address] [place] [address]): Independent for all p.o.  CAMILLA. (2003).  National Outcomes Measurement System (NOMS): Adult Speech-Language Pathology User's Guide. Pain:  Pain Scale 1: Numeric (0 - 10)  Pain Intensity 1: 0       After treatment:   Patient left in no apparent distress in bed, Call bell within reach and Nursing notified    COMMUNICATION/EDUCATION:     The patient's plan of care including recommendations, planned interventions, and recommended diet changes were discussed with: Registered nurse. Patient/family have participated as able in goal setting and plan of care. Patient/family agree to work toward stated goals and plan of care.     Thank you for this referral.  BRODIE Olivas  Time Calculation: 15 mins

## 2021-07-26 NOTE — PROGRESS NOTES
1745: Spoke to patients daughter who called to follow up after talking to CM. CM reported patient may be dc tomorrow and that home health would be set up. Daughter reports concerns for patient coming home as family is planning to be out of the country and returning next Tuesday, therefore patient would not have supervision. Family asking if rehab would be an option for patient upon discharge rather than home. Left voicemail with CM to review options for patients discharge.

## 2021-07-26 NOTE — PROGRESS NOTES
10:00 AM Spoke with Dr. Joe Valenzuela and 2605 MARY Davis NP, with GI. C diff testing not recommended at this time. Spoke with Dr. Chato Wolf regarding the above. Dr. Chato Wolf states to dc cdiff orders and continue to monitor. Primary RN, oDrie Villa, updated on POC.

## 2021-07-27 LAB
ALBUMIN SERPL-MCNC: 2.9 G/DL (ref 3.5–5)
ALBUMIN/GLOB SERPL: 0.7 {RATIO} (ref 1.1–2.2)
ALP SERPL-CCNC: 59 U/L (ref 45–117)
ALT SERPL-CCNC: 14 U/L (ref 12–78)
ANION GAP SERPL CALC-SCNC: 6 MMOL/L (ref 5–15)
AST SERPL-CCNC: 17 U/L (ref 15–37)
BILIRUB DIRECT SERPL-MCNC: 0.2 MG/DL (ref 0–0.2)
BILIRUB SERPL-MCNC: 0.7 MG/DL (ref 0.2–1)
BUN SERPL-MCNC: 22 MG/DL (ref 6–20)
BUN/CREAT SERPL: 4 (ref 12–20)
CALCIUM SERPL-MCNC: 8.5 MG/DL (ref 8.5–10.1)
CHLORIDE SERPL-SCNC: 97 MMOL/L (ref 97–108)
CO2 SERPL-SCNC: 33 MMOL/L (ref 21–32)
CREAT SERPL-MCNC: 5.12 MG/DL (ref 0.55–1.02)
GLOBULIN SER CALC-MCNC: 4.2 G/DL (ref 2–4)
GLUCOSE BLD STRIP.AUTO-MCNC: 103 MG/DL (ref 65–117)
GLUCOSE BLD STRIP.AUTO-MCNC: 191 MG/DL (ref 65–117)
GLUCOSE BLD STRIP.AUTO-MCNC: 264 MG/DL (ref 65–117)
GLUCOSE BLD STRIP.AUTO-MCNC: 92 MG/DL (ref 65–117)
GLUCOSE SERPL-MCNC: 82 MG/DL (ref 65–100)
POTASSIUM SERPL-SCNC: 3.9 MMOL/L (ref 3.5–5.1)
PROT SERPL-MCNC: 7.1 G/DL (ref 6.4–8.2)
SERVICE CMNT-IMP: ABNORMAL
SERVICE CMNT-IMP: ABNORMAL
SERVICE CMNT-IMP: NORMAL
SERVICE CMNT-IMP: NORMAL
SODIUM SERPL-SCNC: 136 MMOL/L (ref 136–145)

## 2021-07-27 PROCEDURE — 74011636637 HC RX REV CODE- 636/637: Performed by: INTERNAL MEDICINE

## 2021-07-27 PROCEDURE — 65660000000 HC RM CCU STEPDOWN

## 2021-07-27 PROCEDURE — 36415 COLL VENOUS BLD VENIPUNCTURE: CPT

## 2021-07-27 PROCEDURE — 97530 THERAPEUTIC ACTIVITIES: CPT | Performed by: OCCUPATIONAL THERAPIST

## 2021-07-27 PROCEDURE — 80048 BASIC METABOLIC PNL TOTAL CA: CPT

## 2021-07-27 PROCEDURE — 97530 THERAPEUTIC ACTIVITIES: CPT

## 2021-07-27 PROCEDURE — 74011250637 HC RX REV CODE- 250/637: Performed by: INTERNAL MEDICINE

## 2021-07-27 PROCEDURE — 80076 HEPATIC FUNCTION PANEL: CPT

## 2021-07-27 PROCEDURE — 82962 GLUCOSE BLOOD TEST: CPT

## 2021-07-27 PROCEDURE — 97535 SELF CARE MNGMENT TRAINING: CPT | Performed by: OCCUPATIONAL THERAPIST

## 2021-07-27 PROCEDURE — 97165 OT EVAL LOW COMPLEX 30 MIN: CPT | Performed by: OCCUPATIONAL THERAPIST

## 2021-07-27 PROCEDURE — 74011250636 HC RX REV CODE- 250/636: Performed by: INTERNAL MEDICINE

## 2021-07-27 PROCEDURE — 92526 ORAL FUNCTION THERAPY: CPT

## 2021-07-27 RX ADMIN — Medication 10 ML: at 06:34

## 2021-07-27 RX ADMIN — BRIMONIDINE TARTRATE 1 DROP: 2 SOLUTION OPHTHALMIC at 17:49

## 2021-07-27 RX ADMIN — PREDNISOLONE ACETATE 1 DROP: 10 SUSPENSION/ DROPS OPHTHALMIC at 09:44

## 2021-07-27 RX ADMIN — Medication: at 09:44

## 2021-07-27 RX ADMIN — LATANOPROST 1 DROP: 50 SOLUTION OPHTHALMIC at 22:07

## 2021-07-27 RX ADMIN — INSULIN LISPRO 5 UNITS: 100 INJECTION, SOLUTION INTRAVENOUS; SUBCUTANEOUS at 12:07

## 2021-07-27 RX ADMIN — HEPARIN SODIUM 5000 UNITS: 5000 INJECTION INTRAVENOUS; SUBCUTANEOUS at 22:07

## 2021-07-27 RX ADMIN — BRIMONIDINE TARTRATE 1 DROP: 2 SOLUTION OPHTHALMIC at 09:44

## 2021-07-27 RX ADMIN — PREDNISOLONE ACETATE 1 DROP: 10 SUSPENSION/ DROPS OPHTHALMIC at 22:07

## 2021-07-27 RX ADMIN — TIMOLOL MALEATE 1 DROP: 2.5 SOLUTION/ DROPS OPHTHALMIC at 09:44

## 2021-07-27 RX ADMIN — LEVOTHYROXINE SODIUM 50 MCG: 0.05 TABLET ORAL at 06:30

## 2021-07-27 RX ADMIN — Medication: at 22:07

## 2021-07-27 RX ADMIN — HEPARIN SODIUM 5000 UNITS: 5000 INJECTION INTRAVENOUS; SUBCUTANEOUS at 06:32

## 2021-07-27 RX ADMIN — Medication 10 ML: at 16:06

## 2021-07-27 RX ADMIN — PREDNISOLONE ACETATE 1 DROP: 10 SUSPENSION/ DROPS OPHTHALMIC at 16:06

## 2021-07-27 RX ADMIN — Medication: at 16:06

## 2021-07-27 RX ADMIN — HEPARIN SODIUM 5000 UNITS: 5000 INJECTION INTRAVENOUS; SUBCUTANEOUS at 16:09

## 2021-07-27 NOTE — PROGRESS NOTES
Nephrology Progress Note  Piter Perez     www. [email or web address]  Phone - [phone]   Patient: Prince Cooney    YOB: 1950        Date- 7/27/2021   Admit Date: 7/16/2021  CC: Follow up for ESRD       IMPRESSION & PLAN:    ESRD- [facility]- davita laburnum  · AMS  · SEPSIS  · ANEMIA  · CA/P/PTH/VITD  · DM  ·  HTN    PLAN-   NO DIALYSIS TODAY   Epogen for anemia    Continue hd MWF   Subjective: Interval History:   -s/p hd yesterday  bp stable    No c/o sob,  No c/o chest pain,   No c/o nausea or vomiting, No c/o  fever. Objective:   Vitals:    07/26/21 2300 07/27/21 0400 07/27/21 0502 07/27/21 0722   BP: (!) 133/53 (!) 140/54  (!) 144/54   Pulse: (!) 59 64  64   Resp: 16 17 19   Temp: 99 °F (37.2 °C) 98.9 °F (37.2 °C)  99.3 °F (37.4 °C)   TempSrc:       SpO2: 100% 97%  100%   Weight:   58.8 kg (129 lb 10.1 oz)    Height:          07/26 0701 - 07/27 0700  In: 240 [P.O.:240]  Out: 1500   Last 3 Recorded Weights in this Encounter    07/25/21 0625 07/26/21 0457 07/27/21 0502   Weight: 59.8 kg (131 lb 13.4 oz) 61.5 kg (135 lb 9.3 oz) 58.8 kg (129 lb 10.1 oz)      Physical exam:   GEN:  NAD  NECK:  Supple, no thyromegaly  RESP: clear  b/l, no  wheezing,   CVS: RRR,S1,S2   ABDO:  soft , non tender  NEURO: non focal, normal speech  EXT:no Edema +nt     EXT: Right arm AV fistula present        Chart reviewed. Pertinent Notes reviewed. Data Review :  Recent Labs     07/27/21  0457 07/26/21  0440 07/25/21  0403    136 138   K 3.9 4.0 3.4*   CL 97 98 97   CO2 33* 29 32   BUN 22* 36* 31*   CREA 5.12* 7.38* 5.71*   GLU 82 83 80   CA 8.5 8.2* 8.7     Recent Labs     07/26/21  0440 07/25/21  0403   WBC 7.8 7.5   HGB 10.0* 11.0*   HCT 33.0* 35.5   * 153     No results for input(s): FE, TIBC, PSAT, FERR in the last 72 hours.    Medication list  reviewed  Current Facility-Administered Medications   Medication Dose Route Frequency    zinc oxide-cod liver oil (DESITIN) 40 % paste   Topical TID    levothyroxine (SYNTHROID) tablet 50 mcg  50 mcg Oral 6am    heparin (porcine) pf 300 Units  300 Units InterCATHeter PRN    insulin lispro (HUMALOG) injection   SubCUTAneous AC&HS    epoetin bia-epbx (RETACRIT) injection 4,000 Units  4,000 Units SubCUTAneous Q MON, WED & FRI    hydrALAZINE (APRESOLINE) 20 mg/mL injection 20 mg  20 mg IntraVENous Q6H PRN    labetaloL (NORMODYNE;TRANDATE) injection 20 mg  20 mg IntraVENous Q4H PRN    glucose chewable tablet 16 g  4 Tablet Oral PRN    dextrose (D50W) injection syrg 12.5-25 g  12.5-25 g IntraVENous PRN    glucagon (GLUCAGEN) injection 1 mg  1 mg IntraMUSCular PRN    sodium chloride (NS) flush 5-40 mL  5-40 mL IntraVENous Q8H    sodium chloride (NS) flush 5-40 mL  5-40 mL IntraVENous PRN    acetaminophen (TYLENOL) tablet 650 mg  650 mg Oral Q6H PRN    ondansetron (ZOFRAN ODT) tablet 4 mg  4 mg Oral Q8H PRN    Or    ondansetron (ZOFRAN) injection 4 mg  4 mg IntraVENous Q6H PRN    acetaminophen (TYLENOL) suppository 650 mg  650 mg Rectal Q4H PRN    brimonidine (ALPHAGAN) 0.2 % ophthalmic solution 1 Drop  1 Drop Both Eyes BID    timolol (TIMOPTIC) 0.25% ophthalmic solution  1 Drop Both Eyes DAILY    prednisoLONE acetate (PRED FORTE) 1 % ophthalmic suspension 1 Drop  1 Drop Right Eye TID    latanoprost (XALATAN) 0.005 % ophthalmic solution 1 Drop  1 Drop Both Eyes QHS    heparin (porcine) injection 5,000 Units  5,000 Units SubCUTAneous Q8H          Fabricio Province, [address] [facility]  [facility]  [place], [address]  Phone - [phone]               Fax - [phone]

## 2021-07-27 NOTE — PROGRESS NOTES
End of Shift Note    Bedside shift change report given to Severo Howell (oncoming nurse) by Parker Cadena RN (offgoing nurse). Report included the following information SBAR, Kardex, Intake/Output, MAR, Recent Results and Cardiac Rhythm NSR    Shift worked:  7p-7a     Shift summary and any significant changes:     rested well     Concerns for physician to address:  SNF vs [address] phone for oncoming shift:   [phone]       Activity:  Activity Level: Up with Assistance  Number times ambulated in hallways past shift: 0  Number of times OOB to chair past shift: 0    Cardiac:   Cardiac Monitoring: Yes      Cardiac Rhythm: Sinus Rhythm    Access:   Current line(s): central line     Genitourinary:   Urinary status: anuric    Respiratory:   O2 Device: None (Room air)  Chronic home O2 use?: NO  Incentive spirometer at bedside: NO     GI:  Last Bowel Movement Date: 07/26/21  Current diet:  ADULT ORAL NUTRITION SUPPLEMENT Breakfast, Lunch, Dinner; Renal Supplement  ADULT DIET Regular; 4 carb choices (60 gm/meal); Low Fat/Low Chol/High Fiber/MELISSA  Passing flatus: YES  Tolerating current diet: YES       Pain Management:   Patient states pain is manageable on current regimen: YES    Skin:  Mick Score: 17  Interventions: increase time out of bed, PT/OT consult, limit briefs and nutritional support     Patient Safety:  Fall Score:  Total Score: 4  Interventions: bed/chair alarm, assistive device (walker, cane, etc), gripper socks, pt to call before getting OOB and stay with me (per policy)  High Fall Risk: Yes    Length of Stay:  Expected LOS: 4d 19h  Actual LOS: 11      Parker Cadena RN

## 2021-07-27 NOTE — PROGRESS NOTES
1430: Consulted PICC team to assess patients central line as 2 of the 3 ports do not have blood return. Spoke to Dr. Sharon Rowe about the line as well. PICC RN and MD agreed the line is not placed correctly according to the Xray. At this time, Dr. Sharon Rowe would like to leave the line in place as patient has limited access, but not to use the line at this time. End of Shift Note    Bedside shift change report given to [facility] (oncoming nurse) by Ramirez Huntley RN (offgoing nurse). Report included the following information SBAR, Kardex, Intake/Output, MAR and Med Rec Status    Shift worked:  7a-7p     Shift summary and any significant changes:    Patients orientation level fluctuated today from A&Ox1-3. Worked with PT/OT. Sat up in chair for few hours. 1 BM today. Hold on use of central line at this time d/t incorrect placement. Concerns for physician to address:  dc placement     [facility] phone for oncoming shift:          Activity:  Activity Level: Up with Assistance  Number times ambulated in hallways past shift: 0  Number of times OOB to chair past shift: 1    Cardiac:   Cardiac Monitoring: Yes      Cardiac Rhythm: Sinus Rhythm    Access:   Current line(s): central line     Genitourinary:   Urinary status: anuric    Respiratory:   O2 Device: None (Room air)  Chronic home O2 use?: NO  Incentive spirometer at bedside: NO     GI:  Last Bowel Movement Date: 07/27/21  Current diet:  ADULT ORAL NUTRITION SUPPLEMENT Breakfast, Lunch, Dinner; Renal Supplement  ADULT DIET Regular; 4 carb choices (60 gm/meal); Low Fat/Low Chol/High Fiber/MELISSA  Passing flatus: YES  Tolerating current diet: YES       Pain Management:   Patient states pain is manageable on current regimen: YES    Skin:  Mick Score: 17  Interventions: increase time out of bed and PT/OT consult    Patient Safety:  Fall Score:  Total Score: 4  Interventions: bed/chair alarm  High Fall Risk: Yes    Length of Stay:  Expected LOS: 4d 19h  Actual LOS: [address], RN

## 2021-07-27 NOTE — PROGRESS NOTES
Problem: Dysphagia (Adult)  Goal: *Acute Goals and Plan of Care (Insert Text)  Description: Speech pathology goals  Initiated 7/26/2021  1. Patient will tolerate regular/thin liquid diet with no overt s/s aspiration within 7 days  Outcome: Progressing Towards Goal     SPEECH LANGUAGE PATHOLOGY DYSPHAGIA TREATMENT/DISCHARGE  Patient: Leah Messer (66 y.o. female)  Date: 7/27/2021  Diagnosis: Acute encephalopathy [G93.40] <principal problem not specified>       Precautions: Bed Alarm, Fall (legally blind)    ASSESSMENT:  Patient is being followed by SLP and initial evaluation on 7/26 revealed WFL oral/pharyngeal swallow and Regular/Thin Liquids was recommended. On this date, patient observed with breakfast tray. Patient tolerated sequential sips of thin liquid via straw and bites of solid without signs of aspiration. Patient and RN reported patient did well with dinner. Due to patient's functional oral/pharyngeal swallow function, continue to recommend Regular/Thin Liquids with general aspiration precautions outlined below. Patient and RN were updated on SLP session. No further acute SLP needs at this time. PLAN:  -- Continue to recommend Regular/Thin Liquids  -- While Alert/Awake  -- Sitting Upright  -- Small Bites/Sips  -- No further acute SLP needs at this time    Patient will be discharged from acute skilled speech therapy at this time. Rationale for discharge:  Goals achieved    Discharge Recommendations:  None     SUBJECTIVE:   Different SLP completed initial evaluation yet patient stated I think I told you I'm going to [address] [place] [address]; Patient is confused.      OBJECTIVE:   Cognitive and Communication Status:  Neurologic State: Alert, Confused  Orientation Level: Oriented to person, Disoriented to place, Disoriented to situation, Disoriented to time  Cognition: Follows commands, Decreased attention/concentration    Perception: Appears intact    Perseveration: Perseverates during conversation Safety/Judgement: Decreased insight into deficits, Decreased awareness of environment    Dysphagia Treatment:    P.O. Trials:  Patient Position: Upright in bed  Vocal quality prior to P.O.: No impairment  Consistency Presented: Thin liquid; Solid  How Presented: SLP-fed/presented;Straw;Successive swallows;Spoon     Bolus Acceptance: No impairment  Bolus Formation/Control: No impairment     Propulsion: No impairment  Oral Residue: None  Initiation of Swallow: No impairment  Laryngeal Elevation: Functional  Aspiration Signs/Symptoms: None  Pharyngeal Phase Characteristics: No impairment, issues, or problems            Oral Phase Severity: No impairment  Pharyngeal Phase Severity : No impairment    NOMS:   The NOMS functional outcome measure was used to quantify this patient's level of swallowing impairment. Based on the NOMS, the patient was determined to be at level 7 for swallow function     NOMS Swallowing Levels:  Level 1 (CN): NPO  Level 2 (CM): NPO but takes consistency in therapy  Level 3 (CL): Takes less than 50% of nutrition p.o. and continues with nonoral feedings; and/or safe with mod cues; and/or max diet restriction  Level 4 (CK): Safe swallow but needs mod cues; and/or mod diet restriction; and/or still requires some nonoral feeding/supplements  Level 5 (CJ): Safe swallow with min diet restriction; and/or needs min cues  Level 6 (CI): Independent with p.o.; rare cues; usually self cues; may need to avoid some foods or needs extra time  Level 7 ([address] [place] [address]): Independent for all p.o.  CAMILLA. (2003). National Outcomes Measurement System (NOMS): Adult Speech-Language Pathology User's Guide. Pain:  Pain Scale 1: Numeric (0 - 10)  Pain Intensity 1: 0       After treatment:   Patient left in no apparent distress in bed, Call bell within reach, and Nursing notified    COMMUNICATION/EDUCATION:   Patient was educated regarding her deficit(s) of WFL swallow.   She demonstrated fair understanding due to mental status. The patient's plan of care including recommendations, planned interventions, and recommended diet changes were discussed with: Registered nurse.      BRODIE Soni  Time Calculation: 15 mins

## 2021-07-27 NOTE — PROGRESS NOTES
Hospitalist Progress Note    NAME: Lex Glez   :  1950   MRN:  [id number]       Assessment / Plan:  Sepsis unknown source POA  Acute encephalopathy   -Febrile and altered mental status on admission, some neck stiffness noted and patient was started on empiric coverage for meningitis  -Appreciate neurology consult  -ammonia mildly elevated 38 on admision, normalized  -Also had lumbar puncture, CSF was clear, cultures negative  -Antibiotics discontinued  -Patient now agitated more awake  -EEG captured no seizures, triphasic waves suggest possible hepatic/renal encephalopathy  -T bili 1.6, normalized, LFTs normal   -watery diarrhea, check C diff, enteric panel  -Covid testing negative  -encephalopathy improving, more alert awake  -passed bedside swallow, regular diet  -appreciate GI consult  -Ultrasound with elastography's reveals mild stages of liver fibrosis. Outpatient follow-up with GI as per the recommendations. CT a/p  (admission):  1. Circumferential rectal wall thickening is suspicious for malignancy. No bowel  obstruction. 2. No other acute abnormality in the abdomen or pelvis. Uterine fibroids are  noted. 3. Moderate cardiomegaly. MRI brain:  Encephalomalacia and white matter disease. No acute intracranial abnormality    ?seizures  -no seizure captured on EEG, triphasic waves point to hepatic/renal encephalopathy as per neuro  -on lose dose keppra, hold and observe    Diarrhea  -some loose/watery stools last 24hrs  -no leukocytosis  -monitor    ESRD  -appreciate nephro following  -HD per nephro    Incidental finding of rectal wall thickening on CT A/P  -appreciate CRS eval  -patient can f/u outpatient, no intervention needed during this admission     Difficult IV access: Central line placed on   Hypokalemia  Resolved    DMII  HTN  CAD  FS monitoring, SS     Eye surgery in may : glaucoma    Disposition-patient is medically stable for discharge, pending placement to SNF.   Case management notified.     Code Status: Full  Appreciate palliative team following    Surrogate Decision Maker: Daughter  DVT Prophylaxis: SCD  GI Prophylaxis: not indicated  Baseline: Independent     25.0 - 29.9 Overweight / Body mass index is 25.08 kg/m².     Estimated discharge date: July 26  Barriers: Currently very sick  Updated daughter at bedside on 07/19  Updated daughter over the phone on 07/17 and 07/18  Code status: Full  Prophylaxis: Hep SQ  Recommended Disposition: TBD     Subjective:     Chief Complaint / Reason for Physician Visit  Patient seen today, awake alert but feeling tired having no energy. Afebrile, no new complaints today. Discussed with RN events overnight. Review of Systems:  Symptom Y/N Comments  Symptom Y/N Comments   Fever/Chills n   Chest Pain n    Poor Appetite n   Edema     Cough n   Abdominal Pain n    Sputum n   Joint Pain     SOB/CHURCH n   Pruritis/Rash     Nausea/vomit n   Tolerating PT/OT     Diarrhea y   Tolerating Diet     Constipation n   Other       Could NOT obtain due to:      Objective:     VITALS:   Last 24hrs VS reviewed since prior progress note. Most recent are:  Patient Vitals for the past 24 hrs:   Temp Pulse Resp BP SpO2   07/27/21 1132 98.4 °F (36.9 °C) 65 12 (!) 113/47 95 %   07/27/21 0722 99.3 °F (37.4 °C) 64 19 (!) 144/54 100 %   07/27/21 0400 98.9 °F (37.2 °C) 64 17 (!) 140/54 97 %   07/26/21 2300 99 °F (37.2 °C) (!) 59 16 (!) 133/53 100 %   07/26/21 2000 98.7 °F (37.1 °C) (!) 59 14 (!) 120/47 98 %   07/26/21 1600 -- 61 15 (!) 113/41 100 %   07/26/21 1544 98 °F (36.7 °C) 64 12 100/86 100 %   07/26/21 1353 -- 62 -- 132/63 --       Intake/Output Summary (Last 24 hours) at 7/27/2021 1327  Last data filed at 7/27/2021 0936  Gross per 24 hour   Intake 360 ml   Output --   Net 360 ml        I had a face to face encounter and independently examined this patient on 7/27/2021, as outlined below:  PHYSICAL EXAM:  General: Adult  AA female, awake, alert  EENT:  EOMI. Anicteric sclerae. MMM  Resp:  CTA bilaterally, no wheezing or rales. No accessory muscle use  CV:  Regular  rhythm,  No edema  GI:  Soft, Non distended, Non tender. +Bowel sounds  Neurologic:  Awake, alert, oriented x 1, moves all extremities and follows some commands. No obvious focal deficits   Psych:   deferred  Skin:  No rashes. No jaundice    Reviewed most current lab test results and cultures  YES  Reviewed most current radiology test results   YES  Review and summation of old records today    NO  Reviewed patient's current orders and MAR    YES  PMH/SH reviewed - no change compared to H&P  ________________________________________________________________________  Care Plan discussed with:    Comments   Patient x    Family      RN x    Care Manager x    Consultant                       x Multidiciplinary team rounds were held today with , nursing, pharmacist and clinical coordinator. Patient's plan of care was discussed; medications were reviewed and discharge planning was addressed. ________________________________________________________________________  Total NON critical care TIME:  36  Minutes    Total CRITICAL CARE TIME Spent:   Minutes non procedure based      Comments   >50% of visit spent in counseling and coordination of care x    ________________________________________________________________________  Juan Hill MD     Procedures: see electronic medical records for all procedures/Xrays and details which were not copied into this note but were reviewed prior to creation of Plan. LABS:  I reviewed today's most current labs and imaging studies.   Pertinent labs include:  Recent Labs     07/26/21  0440 07/25/21  0403   WBC 7.8 7.5   HGB 10.0* 11.0*   HCT 33.0* 35.5   * 153     Recent Labs     07/27/21  0457 07/26/21  1255 07/26/21  0440 07/25/21  0403     --  136 138   K 3.9  --  4.0 3.4*   CL 97  --  98 97   CO2 33*  --  29 32   GLU 82  --  83 80   BUN 22* --  36* 31*   CREA 5.12*  --  7.38* 5.71*   CA 8.5  --  8.2* 8.7   ALB 2.9*  --   --   --    TBILI 0.7  --   --   --    ALT 14  --   --   --    INR  --  1.1  --   --        Signed: Ashwin Silva MD

## 2021-07-27 NOTE — PROGRESS NOTES
Transition of Care Plan:     RUR: 18  Disposition: SNF  Follow up appointments: PCP, OP HD Fatoumata Beto MWF at 6.30 AM  DME needed:Pt has access to cane and RW  Transportation at [address] will transport   [address] or means to access home:      Yes  IM Medicare letter:NA  Caregiver Contact: Daughter Magalene Prader [phone]  Discharge Caregiver contacted prior to [facility] with daughter re dcp and they are looking at short term skilled nursing facility. She is at work presently however will be by the hospital when she gets off from work to look at the list of snf left in the patient's room for them to review. Anabell Stevenson  RN BSN CRM        [phone]

## 2021-07-27 NOTE — PROGRESS NOTES
Problem: Mobility Impaired (Adult and Pediatric)  Goal: *Acute Goals and Plan of Care (Insert Text)  Description: FUNCTIONAL STATUS PRIOR TO ADMISSION: Patient was modified independent using a rollator for functional mobility. Independent with dressing and bathing. Used rollator when going to HD.    HOME SUPPORT PRIOR TO ADMISSION: The patient lived with family who assisted with cooking and meals. 1 story home with 4 steps and bilateral rails. Physical Therapy Goals  Initiated 7/26/2021  1. Patient will move from supine to sit and sit to supine , scoot up and down, and roll side to side in bed with modified independence within 7 day(s). 2.  Patient will transfer from bed to chair and chair to bed with supervision/set-up using the least restrictive device within 7 day(s). 3.  Patient will perform sit to stand with modified independence within 7 day(s). 4.  Patient will ambulate with supervision/set-up for 250 feet with the least restrictive device within 7 day(s). 5.  Patient will ascend/descend 4 stairs with 1 handrail(s) with minimal assistance/contact guard assist within 7 day(s). Outcome: Progressing Towards Goal   PHYSICAL THERAPY TREATMENT  Patient: Lynnann Aase (66 y.o. female)  Date: 7/27/2021  Diagnosis: Acute encephalopathy [G93.40] <principal problem not specified>       Precautions: Bed Alarm, Fall (legally blind)  Chart, physical therapy assessment, plan of care and goals were reviewed. ASSESSMENT  Patient continues with skilled PT services and is progressing towards goals. Standing balance is improved with no posterior losses of balance as noted yesterday. She continues to need guidance due to blindness in unfamiliar setting. Transferred out of bed with light contact assist and cues. Ambulated to bathroom toilet using RW and then to brito door and back. Frequent cues needed for steering walker and orientation of patient to room space.  Vision is greatly impaired with inability to make out bed, toilet and doorways. She was left in bedside chair when the session ended. Demonstrated understanding and ability to use call bell for notifying her nurse if needed. Would benefit from short-term stay in rehab prior to returning home due to weakness, decreased standing balance, limited activity tolerance and decreased mobility skills. Current Level of Function Impacting Discharge (mobility/balance): cg supine to sit with extra time needed; cg sit to stand and cg/min a bed to chair transfers; cg/min a ambulation with RW 15+30 feet. Other factors to consider for discharge: severe blindness; high risk for falls; modified independent with rollator walker for mobility and ADLs. PLAN :  Patient continues to benefit from skilled intervention to address the above impairments. Continue treatment per established plan of care. to address goals. Recommendation for discharge: (in order for the patient to meet his/her long term goals)  Therapy up to 5 days/week in SNF setting    This discharge recommendation:  Has not yet been discussed the attending provider and/or case management    IF patient discharges home will need the following DME: patient owns DME required for discharge       SUBJECTIVE:   Patient stated I'm feeling more tired today.     OBJECTIVE DATA SUMMARY:   Critical Behavior:  Neurologic State: Alert, Confused  Orientation Level: Oriented to person, Disoriented to place, Disoriented to situation, Disoriented to time  Cognition: Follows commands, Decreased attention/concentration  Safety/Judgement: Decreased insight into deficits, Decreased awareness of environment  Functional Mobility Training:  Bed Mobility:  Rolling: Supervision; Additional time (cues for motor planning)  Supine to Sit: Additional time;Contact guard assistance     Scooting: Contact guard assistance;Stand-by assistance        Transfers:  Sit to Stand: Contact guard assistance  Stand to Sit: Contact guard assistance Bed to Chair: Contact guard assistance;Minimum assistance (intermittent steering/cues due to blindness)                    Balance:  Sitting: Intact  Standing: Impaired  Standing - Static: Good;Constant support  Standing - Dynamic : Fair;Constant support  Ambulation/Gait Training:  Distance (ft): 45 Feet (ft) (15 + 30)  Assistive Device: Gait belt;Walker, rolling  Ambulation - Level of Assistance: Contact guard assistance;Minimal assistance        Gait Abnormalities: Path deviations; Step to gait        Base of Support: Widened     Speed/Ramona: Pace decreased (<100 feet/min)  Step Length: Right shortened;Left shortened        Interventions: Verbal cues; Tactile cues; Safety awareness training     Pain Rating:  No complaints during session    Activity Tolerance:   Fair, SpO2 stable on RA, and requires frequent rest breaks    After treatment patient left in no apparent distress:   Sitting in chair and Call bell within reach    COMMUNICATION/COLLABORATION:   The patients plan of care was discussed with: Occupational therapist and Registered nurse.      Nicky Kamara, PT   Time Calculation: 19 mins

## 2021-07-27 NOTE — PROGRESS NOTES
Problem: Impaired Skin Integrity/Pressure Injury Treatment  Goal: *Improvement of Existing Pressure Injury  Outcome: Progressing Towards Goal  Goal: *Prevention of pressure injury  Description: Document Mick Scale and appropriate interventions in the flowsheet. Outcome: Progressing Towards Goal  Note: Pressure Injury Interventions:  Sensory Interventions: Assess changes in LOC, Discuss PT/OT consult with provider, Keep linens dry and wrinkle-free, Maintain/enhance activity level, Minimize linen layers, Pressure redistribution bed/mattress (bed type)    Moisture Interventions: Absorbent underpads, Apply protective barrier, creams and emollients, Check for incontinence Q2 hours and as needed, Limit adult briefs, Maintain skin hydration (lotion/cream), Minimize layers, Offer toileting Q_hr    Activity Interventions: Increase time out of bed, Pressure redistribution bed/mattress(bed type)    Mobility Interventions: Float heels, HOB 30 degrees or less, Pressure redistribution bed/mattress (bed type), PT/OT evaluation    Nutrition Interventions: Document food/fluid/supplement intake, Offer support with meals,snacks and hydration    Friction and Shear Interventions: Apply protective barrier, creams and emollients, Foam dressings/transparent film/skin sealants, HOB 30 degrees or less, Minimize layers                Problem: Patient Education: Go to Patient Education Activity  Goal: Patient/Family Education  Outcome: Progressing Towards Goal     Problem: Falls - Risk of  Goal: *Absence of Falls  Description: Document Stalin Fall Risk and appropriate interventions in the flowsheet.   Outcome: Progressing Towards Goal  Note: Fall Risk Interventions:  Mobility Interventions: Bed/chair exit alarm, Communicate number of staff needed for ambulation/transfer, OT consult for ADLs, Patient to call before getting OOB, PT Consult for mobility concerns, PT Consult for assist device competence, Strengthening exercises (ROM-active/passive), Utilize walker, cane, or other assistive device    Mentation Interventions: Adequate sleep, hydration, pain control, Bed/chair exit alarm, Door open when patient unattended, Increase mobility, More frequent rounding, Reorient patient, Toileting rounds, Update white board    Medication Interventions: Bed/chair exit alarm, Patient to call before getting OOB, Teach patient to arise slowly    Elimination Interventions: Bed/chair exit alarm, Call light in reach, Patient to call for help with toileting needs, Stay With Me (per policy), Toileting schedule/hourly rounds    History of Falls Interventions: Bed/chair exit alarm         Problem: Patient Education: Go to Patient Education Activity  Goal: Patient/Family Education  Outcome: Progressing Towards Goal     Problem: Altered Thought Process (Adult/Pediatric)  Goal: *STG: Participates in treatment plan  Outcome: Progressing Towards Goal  Goal: *STG: Remains safe in hospital  Outcome: Progressing Towards Goal  Goal: *STG: Seeks staff when feelings of anxiety and fear arise  Outcome: Progressing Towards Goal  Goal: *STG: Complies with medication therapy  Outcome: Progressing Towards Goal  Goal: *STG: Attends activities and groups  Outcome: Progressing Towards Goal  Goal: *STG: Decreased delusional thinking  Outcome: Progressing Towards Goal  Goal: *STG: Decreased hallucinations  Outcome: Progressing Towards Goal  Goal: *STG: Absence of lethality  Outcome: Progressing Towards Goal  Goal: *STG: Demonstrates ability to understand and use improved judgment in daily activities and relationships  Outcome: Progressing Towards Goal  Goal: *LTG: Returns to baseline functioning  Outcome: Progressing Towards Goal  Goal: Interventions  Outcome: Progressing Towards Goal     Problem: Patient Education: Go to Patient Education Activity  Goal: Patient/Family Education  Outcome: Progressing Towards Goal     Problem: Non-Violent Restraints  Goal: Removal from restraints as soon as assessed to be safe  Outcome: Progressing Towards Goal  Goal: No harm/injury to patient while restraints in use  Outcome: Progressing Towards Goal  Goal: Patient's dignity will be maintained  Outcome: Progressing Towards Goal  Goal: Patient Interventions  Outcome: Progressing Towards Goal     Problem: Risk for Spread of Infection  Goal: Prevent transmission of infectious organism to others  Description: Prevent the transmission of infectious organisms to other patients, staff members, and visitors. Outcome: Progressing Towards Goal     Problem: Patient Education:  Go to Education Activity  Goal: Patient/Family Education  Outcome: Progressing Towards Goal     Problem: Pressure Injury - Risk of  Goal: *Prevention of pressure injury  Description: Document Mick Scale and appropriate interventions in the flowsheet.   Outcome: Progressing Towards Goal     Problem: Patient Education: Go to Patient Education Activity  Goal: Patient/Family Education  Outcome: Progressing Towards Goal     Problem: Infection - Risk of, Central Venous Catheter-Associated Bloodstream Infection  Goal: *Absence of infection signs and symptoms  Outcome: Progressing Towards Goal     Problem: Patient Education: Go to Patient Education Activity  Goal: Patient/Family Education  Outcome: Progressing Towards Goal     Problem: Patient Education: Go to Patient Education Activity  Goal: Patient/Family Education  Outcome: Progressing Towards Goal

## 2021-07-27 NOTE — PROGRESS NOTES
GI PROGRESS NOTE  Khari Hong NP  [phone] NP in-hospital cell phone M-F until 4:30  After 5pm or on weekends, please call  for physician on call    NAME:Sanna Steiner :1950 OBM:[id number]   ATTG: Dr. Kalyan Nazario   PCP: Lauren Duval NP  Date/Time:  2021 11:26 AM   Primary GI: Dr. Fausto Riley  Reason for following: Encephalopathy     Assessment:     Encephalopathy- improving    Diarrhea- improving   · Alert and oriented x 3 today  · No longer complaining of diarrhea; nursing reports soft stools    · 2021: CT abd/pel showed circumferential rectal wall thickening is suspicious for malignancy. No bowel obstruction. CRS consulted for these findings. · EEG- triphasic waves point to hepatic/renal encephalopathy as per neuro  · Ammonia elevated on admission now WNL   · INR 1.1  · Elastography:  No significant hepatic fibrosis. Measurement indicates a stage of normal to mild fibrosis, Metavir score F1     Sepsis  ESRD on HD   · Treatment per primary team       Plan:     · Elastography shows normal to mild fibrosis, encephalopathy less likely hepatic and more likely metabolic in nature   · Encephalopathy seems to be improving   · Appreciate CRS input; rectal findings on CT do not seem to be causing any acute issues. If diarrhea returns, would re-consult CRS  · Symptomatic care per primary team   · Nothing further to add from a GI standpoint. GI signing-off. Please call with any questions. Thank you for this consult. *Plan of care discussed with Dr. Fausto Riley      Subjective:   Discussed with RN events overnight. Patient on bedside commode with nursing at bedside. No new complaints. Reports feeling well.   Nursing states stools are soft and formed     Review of Systems:  Symptom Y/N Comments  Symptom Y/N Comments   Fever/Chills n   Chest Pain n    Cough n   Headaches n    Sputum n   Joint Pain n    SOB/CHURCH n   Pruritis/Rash n    Tolerating Diet y   Other       Could NOT obtain due to:      Objective:   VITALS:   Last 24hrs VS reviewed since prior progress note. Most recent are:  Visit Vitals  BP (!) 144/54 (BP 1 Location: Left upper arm, BP Patient Position: At rest)   Pulse 64   Temp 99.3 °F (37.4 °C)   Resp 19   Ht 5' 2\" (1.575 m)   Wt 58.8 kg (129 lb 10.1 oz)   SpO2 100%   BMI 23.71 kg/m²       Intake/Output Summary (Last 24 hours) at 7/27/2021 1126  Last data filed at 7/27/2021 0936  Gross per 24 hour   Intake 360 ml   Output --   Net 360 ml     PHYSICAL EXAM:  General: Pleasant elderly AA female. NAD    HEENT: NC, Atraumatic. Anicteric sclerae. Lungs:  CTA Bilaterally. No Wheezing/Rhonchi/Rales. Heart:  Regular rate rhythm,  No murmur, No Rubs, No Gallops  Abdomen: Soft, Non distended, Non tender. +Bowel sounds, no HSM  Extremities: No c/c/e  Neurologic:  Alert and oriented X 3. No acute neurological distress   Psych:   Good insight. Not anxious nor agitated. Lab and Radiology Data Reviewed: (see below)    Medications Reviewed: (see below)  PMH/SH reviewed - no change compared to H&P  ________________________________________________________________________  Total time spent with patient: 20 minutes ________________________________________________________________________  Care Plan discussed with:  Patient x   Family     RN x              Consultant:       Giancarlo Barber NP     Procedures: see electronic medical records for all procedures/Xrays and details which were not copied into this note but were reviewed prior to creation of Plan.       LABS:  Recent Labs     07/26/21  0440 07/25/21  0403   WBC 7.8 7.5   HGB 10.0* 11.0*   HCT 33.0* 35.5   * 153     Recent Labs     07/27/21  0457 07/26/21  0440 07/25/21  0403    136 138   K 3.9 4.0 3.4*   CL 97 98 97   CO2 33* 29 32   BUN 22* 36* 31*   CREA 5.12* 7.38* 5.71*   GLU 82 83 80   CA 8.5 8.2* 8.7     Recent Labs     07/27/21 0457   AP 59   TP 7.1   ALB 2.9*   GLOB 4.2*     Recent Labs     07/26/21  1255 INR 1.1   PTP 11.8*      No results for input(s): FE, TIBC, PSAT, FERR in the last 72 hours. No results found for: FOL, RBCF  No results for input(s): PH, PCO2, PO2 in the last 72 hours. No results for input(s): CPK, CKMB in the last 72 hours.     No lab exists for component: TROPONINI  No results found for: COLOR, APPRN, SPGRU, REFSG, FABY, PROTU, GLUCU, KETU, BILU, UROU, ZO, LEUKU, GLUKE, EPSU, BACTU, WBCU, RBCU, CASTS, UCRY    MEDICATIONS:  Current Facility-Administered Medications   Medication Dose Route Frequency    zinc oxide-cod liver oil (DESITIN) 40 % paste   Topical TID    levothyroxine (SYNTHROID) tablet 50 mcg  50 mcg Oral 6am    heparin (porcine) pf 300 Units  300 Units InterCATHeter PRN    insulin lispro (HUMALOG) injection   SubCUTAneous AC&HS    epoetin bia-epbx (RETACRIT) injection 4,000 Units  4,000 Units SubCUTAneous Q MON, WED & FRI    hydrALAZINE (APRESOLINE) 20 mg/mL injection 20 mg  20 mg IntraVENous Q6H PRN    labetaloL (NORMODYNE;TRANDATE) injection 20 mg  20 mg IntraVENous Q4H PRN    glucose chewable tablet 16 g  4 Tablet Oral PRN    dextrose (D50W) injection syrg 12.5-25 g  12.5-25 g IntraVENous PRN    glucagon (GLUCAGEN) injection 1 mg  1 mg IntraMUSCular PRN    sodium chloride (NS) flush 5-40 mL  5-40 mL IntraVENous Q8H    sodium chloride (NS) flush 5-40 mL  5-40 mL IntraVENous PRN    acetaminophen (TYLENOL) tablet 650 mg  650 mg Oral Q6H PRN    ondansetron (ZOFRAN ODT) tablet 4 mg  4 mg Oral Q8H PRN    Or    ondansetron (ZOFRAN) injection 4 mg  4 mg IntraVENous Q6H PRN    acetaminophen (TYLENOL) suppository 650 mg  650 mg Rectal Q4H PRN    brimonidine (ALPHAGAN) 0.2 % ophthalmic solution 1 Drop  1 Drop Both Eyes BID    timolol (TIMOPTIC) 0.25% ophthalmic solution  1 Drop Both Eyes DAILY    prednisoLONE acetate (PRED FORTE) 1 % ophthalmic suspension 1 Drop  1 Drop Right Eye TID    latanoprost (XALATAN) 0.005 % ophthalmic solution 1 Drop  1 Drop Both Eyes QHS    heparin (porcine) injection 5,000 Units  5,000 Units SubCUTAneous Q8H

## 2021-07-27 NOTE — PROGRESS NOTES
Problem: Impaired Skin Integrity/Pressure Injury Treatment  Goal: *Improvement of Existing Pressure Injury  Outcome: Progressing Towards Goal     Problem: Falls - Risk of  Goal: *Absence of Falls  Description: Document Stalin Fall Risk and appropriate interventions in the flowsheet.   Outcome: Progressing Towards Goal  Note: Fall Risk Interventions:  Mobility Interventions: Bed/chair exit alarm, Communicate number of staff needed for ambulation/transfer, OT consult for ADLs, Patient to call before getting OOB, PT Consult for mobility concerns, PT Consult for assist device competence, Utilize walker, cane, or other assistive device    Mentation Interventions: Adequate sleep, hydration, pain control, Bed/chair exit alarm, Door open when patient unattended, Increase mobility, More frequent rounding, Reorient patient    Medication Interventions: Bed/chair exit alarm, Patient to call before getting OOB    Elimination Interventions: Bed/chair exit alarm, Call light in reach, Patient to call for help with toileting needs, Toileting schedule/hourly rounds, Stay With Me (per policy)    History of Falls Interventions: Bed/chair exit alarm, Consult care management for discharge planning, Door open when patient unattended         Problem: Infection - Risk of, Central Venous Catheter-Associated Bloodstream Infection  Goal: *Absence of infection signs and symptoms  Outcome: Progressing Towards Goal

## 2021-07-27 NOTE — ACP (ADVANCE CARE PLANNING)
Primary Decision Maker: Angelica Mcdaniels Daughter - [phone]    Secondary Decision Maker: Stacy Otoole - Daughter - [phone]  Advance Care Planning 2021   Patient's Healthcare Decision Maker is: Named in scanned ACP document   Confirm Advance Directive Yes, on file   Patient Would Like to Complete Advance Directive -     Patient was seen by LCSW to discuss her goals of care for the future and to assign mPOA as patient has five surviving children. (Two are ). LCSW contacted Steph Nurse via phone to see if she is coming to the hospital today, she won't be present until after 6 pm.     Patient is sitting up in her chair in room, but appears tired, somewhat sleepy at times, not able to participate consistently in a way to facilitate informed consent in terms of medical decision making. She is oriented X 3, able to tell me about her family and names of all her children but not able to discuss her wishes for the future in terms of her health care goals. Patient able to name the above named children as her medical decision makers. Her daughter Steph Nurse is the primary mPOA, only local child. All others live in other states ([place] for several of them). After much thought and naming all her children, she named her daughter Kash Schreiber as secondary mPOA. Patient wasn't able to remember Kimi's phone number so this was obtained from Loco. LCSW did not discuss the health care portion of AMD with patient due to her lethargy and difficulty with making concrete decisions. Patient remains Full Code at this time, patient voiced ambivalence in terms of what she would want at EOL. \"I'm not sure\". Her daughter Loco noted that she has spoken to her siblings and that they would want an attempt made at CPR, but they would not want patient to be on life support \"long term\".  Explained to Loco that with all of patient's health issues, it is likely that the outcome would be poor and that she would not have the same quality of life that she has now. Donnie Johnson does understand this. Copies of AMD left in room for family, copy placed in chart for scanning.

## 2021-07-27 NOTE — PROGRESS NOTES
Problem: Self Care Deficits Care Plan (Adult)  Goal: *Acute Goals and Plan of Care (Insert Text)  Description:   FUNCTIONAL STATUS PRIOR TO ADMISSION: Patient was modified independent using a rollator for functional mobility. Patient was modified independent for basic and instrumental ADLs. Patient questionable historian secondary to mild confusion. HOME SUPPORT: The patient lived with family. Occupational Therapy Goals  Initiated 7/27/2021  1. Patient will perform grooming standing at sink with supervision/set-up within 7 day(s). 2.  Patient will perform sponge bath with supervision/set-up within 7 day(s). 3.  Patient will perform lower body dressing with supervision/set-up within 7 day(s). 4.  Patient will perform toilet transfers with supervision/set-up within 7 day(s). 5.  Patient will perform all aspects of toileting with supervision/set-up within 7 day(s). Outcome: Not Met   OCCUPATIONAL THERAPY EVALUATION  Patient: Steph Moon (66 y.o. female)  Date: 7/27/2021  Primary Diagnosis: Acute encephalopathy [G93.40]        Precautions: Bed Alarm, Fall (legally blind)    ASSESSMENT  Based on the objective data described below, the patient presents with decreased independence in self-care and functional mobility secondary to confusion, general weakness, impaired balance, visual impairment, and decreased activity tolerance. Patient is functioning below her mod I baseline for self-care and functional mobility, now completing self-care with set-up/supervision to min assist and functional mobility with supervision to contact guard assist using RW. Patient received supine in bed and agreeable for therapy. Patient oriented to self-only this date, unable to recall date of birth or location without options. Multimodal cueing was provided for safety awareness and device management while using RW in room.  Patient tolerated session fair with rest breaks between tasks and was left sitting in chair with all needs met. Patient would benefit from skilled OT services during acute hospital stay. Anticipate patient would benefit from SNF rehab at discharge, pending progress. Current Level of Function Impacting Discharge (ADLs/self-care): set-up/supervision to min assist for self-care, supervision to contact guard assist for functional mobility    Functional Outcome Measure: The patient scored 50 on the Barthel Index outcome measure which is indicative of 50% functional impairment in ADLs. Other factors to consider for discharge: fall risk, legally blind      Patient will benefit from skilled therapy intervention to address the above noted impairments. PLAN :  Recommendations and Planned Interventions: self care training, functional mobility training, therapeutic exercise, balance training, therapeutic activities, endurance activities, patient education, home safety training and family training/education    Frequency/Duration: Patient will be followed by occupational therapy 4 times a week to address goals. Recommendation for discharge: (in order for the patient to meet his/her long term goals)  Therapy up to 5 days/week in SNF setting    This discharge recommendation:  Has not yet been discussed the attending provider and/or case management    IF patient discharges home will need the following DME: TBD pending progress       SUBJECTIVE:   Patient stated I do my own dressing and bathing at home.     OBJECTIVE DATA SUMMARY:   HISTORY:   Past Medical History:   Diagnosis Date    Diabetes ([address]     Hypertension      Past Surgical History:   Procedure Laterality Date    MI CARDIAC SURG PROCEDURE UNLIST         Expanded or extensive additional review of patient history:     Home Situation  Home Environment: Private residence  # Steps to Enter: 4  Rails to Enter: Yes  Hand Rails : Right  Wheelchair Ramp: No  One/Two Story Residence: One story  Living Alone: No  Support Systems: Child(russell)  Patient Expects to be Discharged to[de-identified] House  Current DME Used/Available at Home: Kelley Asper, rollator, Cane, straight  Tub or Shower Type: Tub/Shower combination    Hand dominance: Right    EXAMINATION OF PERFORMANCE DEFICITS:  Cognitive/Behavioral Status:  Neurologic State: Alert;Confused  Orientation Level: Oriented to person;Disoriented to place; Disoriented to situation;Disoriented to time  Cognition: Follows commands;Decreased attention/concentration  Perception: Appears intact  Perseveration: No perseveration noted  Safety/Judgement: Decreased awareness of environment;Decreased insight into deficits    Hearing: Auditory  Auditory Impairment: None    Vision/Perceptual:    Acuity: Impaired near vision; Impaired far vision (poor vision/legally blind)      Range of Motion:  AROM: Within functional limits  PROM: Within functional limits    Strength:  Strength: Generally decreased, functional    Coordination:  Coordination: Generally decreased, functional  Fine Motor Skills-Upper: Left Intact; Right Intact    Gross Motor Skills-Upper: Left Intact; Right Intact    Tone & Sensation:  Tone: Normal  Sensation: Intact    Balance:  Sitting: Intact  Standing: Impaired  Standing - Static: Good;Constant support  Standing - Dynamic : Fair;Constant support    Functional Mobility and Transfers for ADLs:  Bed Mobility:  Rolling: Supervision; Additional time (cues for motor planning)  Supine to Sit: Additional time;Contact guard assistance  Scooting: Contact guard assistance;Stand-by assistance    Transfers:  Sit to Stand: Contact guard assistance  Stand to Sit: Contact guard assistance  Bed to Chair: Contact guard assistance;Minimum assistance (intermittent steering/cues due to blindness)  Bathroom Mobility: Contact guard assistance  Toilet Transfer : Contact guard assistance    ADL Assessment:  Feeding: Setup;Supervision  Oral Facial Hygiene/Grooming: Contact guard assistance  Bathing: Minimum assistance  Upper Body Dressing: Setup;Supervision  Lower Body Dressing: Contact guard assistance  Toileting: Minimum assistance    ADL Intervention and task modifications:    Grooming  Position Performed: Standing (at sink)  Washing Hands: Contact guard assistance  Cues: Tactile cues provided;Verbal cues provided    Lower Body Dressing Assistance  Socks: Contact guard assistance  Leg Crossed Method Used: Yes  Position Performed: Seated edge of bed  Cues: Doff;Don;Tactile cues provided;Verbal cues provided    Cognitive Retraining  Safety/Judgement: Decreased awareness of environment;Decreased insight into deficits     Functional Measure:  Barthel Index:    Bathin  Bladder: 5  Bowels: 5  Groomin  Dressin  Feedin  Mobility: 10  Stairs: 0  Toilet Use: 5  Transfer (Bed to Chair and Back): 10  Total: 50/100        The Barthel ADL Index: Guidelines  1. The index should be used as a record of what a patient does, not as a record of what a patient could do. 2. The main aim is to establish degree of independence from any help, physical or verbal, however minor and for whatever reason. 3. The need for supervision renders the patient not independent. 4. A patient's performance should be established using the best available evidence. Asking the patient, friends/relatives and nurses are the usual sources, but direct observation and common sense are also important. However direct testing is not needed. 5. Usually the patient's performance over the preceding 24-48 hours is important, but occasionally longer periods will be relevant. 6. Middle categories imply that the patient supplies over 50 per cent of the effort. 7. Use of aids to be independent is allowed. Byron Moncada., Barthel, D.W. ([id number]). Functional evaluation: the Barthel Index. [address] [facility] (14)2. Yusuf Blanca shannon DARINEL Shah, Lila Jeong., Jacek Lagunas., [place], [facility] ().  Measuring the change indisability after inpatient rehabilitation; comparison of the responsiveness of the Barthel Index and Functional [place] Measure. Journal of Neurology, Neurosurgery, and Psychiatry, 66(4), 332-770. KATARINA Uribe, ANTIONE Quiroga, & Tameka Burt M.A. (2004.) Assessment of post-stroke quality of life in cost-effectiveness studies: The usefulness of the Barthel Index and the EuroQoL-5D. Quality of Life Research, 13, 871-97      Based on the above components, the patient evaluation is determined to be of the following complexity level: LOW   Pain Rating:  Patient c/o back pain. Activity Tolerance:   Fair, SpO2 stable on RA, and requires rest breaks    After treatment patient left in no apparent distress:    Sitting in chair and Call bell within reach    COMMUNICATION/EDUCATION:   The patients plan of care was discussed with: Physical therapist and Registered nurse. Home safety education was provided and the patient/caregiver indicated understanding., Patient/family have participated as able in goal setting and plan of care. , and Patient/family agree to work toward stated goals and plan of care. This patients plan of care is appropriate for delegation to \[facility] [place] [facility]\"".     Thank you for this referral.  Sindi Landeros OTR/L  Time Calculation: 33 mins

## 2021-07-28 ENCOUNTER — APPOINTMENT (OUTPATIENT)
Dept: GENERAL RADIOLOGY | Age: 71
DRG: 853 | End: 2021-07-28
Attending: STUDENT IN AN ORGANIZED HEALTH CARE EDUCATION/TRAINING PROGRAM
Payer: MEDICARE

## 2021-07-28 ENCOUNTER — APPOINTMENT (OUTPATIENT)
Dept: CT IMAGING | Age: 71
DRG: 853 | End: 2021-07-28
Attending: STUDENT IN AN ORGANIZED HEALTH CARE EDUCATION/TRAINING PROGRAM
Payer: MEDICARE

## 2021-07-28 LAB
ALBUMIN SERPL-MCNC: 2.7 G/DL (ref 3.5–5)
ALBUMIN SERPL-MCNC: 2.7 G/DL (ref 3.5–5)
ALBUMIN/GLOB SERPL: 0.6 {RATIO} (ref 1.1–2.2)
ALP SERPL-CCNC: 66 U/L (ref 45–117)
ALT SERPL-CCNC: 12 U/L (ref 12–78)
ANION GAP SERPL CALC-SCNC: 7 MMOL/L (ref 5–15)
ANION GAP SERPL CALC-SCNC: 8 MMOL/L (ref 5–15)
AST SERPL-CCNC: 11 U/L (ref 15–37)
BASOPHILS # BLD: 0 K/UL (ref 0–0.1)
BASOPHILS NFR BLD: 0 % (ref 0–1)
BILIRUB SERPL-MCNC: 0.8 MG/DL (ref 0.2–1)
BUN SERPL-MCNC: 24 MG/DL (ref 6–20)
BUN SERPL-MCNC: 45 MG/DL (ref 6–20)
BUN/CREAT SERPL: 5 (ref 12–20)
BUN/CREAT SERPL: 6 (ref 12–20)
CALCIUM SERPL-MCNC: 8.9 MG/DL (ref 8.5–10.1)
CALCIUM SERPL-MCNC: 8.9 MG/DL (ref 8.5–10.1)
CHLORIDE SERPL-SCNC: 94 MMOL/L (ref 97–108)
CHLORIDE SERPL-SCNC: 97 MMOL/L (ref 97–108)
CO2 SERPL-SCNC: 30 MMOL/L (ref 21–32)
CO2 SERPL-SCNC: 31 MMOL/L (ref 21–32)
CREAT SERPL-MCNC: 4.79 MG/DL (ref 0.55–1.02)
CREAT SERPL-MCNC: 7.49 MG/DL (ref 0.55–1.02)
DIFFERENTIAL METHOD BLD: ABNORMAL
EOSINOPHIL # BLD: 0 K/UL (ref 0–0.4)
EOSINOPHIL NFR BLD: 0 % (ref 0–7)
ERYTHROCYTE [DISTWIDTH] IN BLOOD BY AUTOMATED COUNT: 18.3 % (ref 11.5–14.5)
ERYTHROCYTE [DISTWIDTH] IN BLOOD BY AUTOMATED COUNT: 18.5 % (ref 11.5–14.5)
GLOBULIN SER CALC-MCNC: 4.6 G/DL (ref 2–4)
GLUCOSE BLD STRIP.AUTO-MCNC: 105 MG/DL (ref 65–117)
GLUCOSE BLD STRIP.AUTO-MCNC: 127 MG/DL (ref 65–117)
GLUCOSE BLD STRIP.AUTO-MCNC: 167 MG/DL (ref 65–117)
GLUCOSE BLD STRIP.AUTO-MCNC: 173 MG/DL (ref 65–117)
GLUCOSE SERPL-MCNC: 156 MG/DL (ref 65–100)
GLUCOSE SERPL-MCNC: 192 MG/DL (ref 65–100)
HCT VFR BLD AUTO: 35.1 % (ref 35–47)
HCT VFR BLD AUTO: 35.1 % (ref 35–47)
HGB BLD-MCNC: 10.8 G/DL (ref 11.5–16)
HGB BLD-MCNC: 10.9 G/DL (ref 11.5–16)
IMM GRANULOCYTES # BLD AUTO: 0 K/UL (ref 0–0.04)
IMM GRANULOCYTES NFR BLD AUTO: 0 % (ref 0–0.5)
LACTATE SERPL-SCNC: 1.5 MMOL/L (ref 0.4–2)
LYMPHOCYTES # BLD: 0.7 K/UL (ref 0.8–3.5)
LYMPHOCYTES NFR BLD: 5 % (ref 12–49)
MCH RBC QN AUTO: 27.1 PG (ref 26–34)
MCH RBC QN AUTO: 27.4 PG (ref 26–34)
MCHC RBC AUTO-ENTMCNC: 30.8 G/DL (ref 30–36.5)
MCHC RBC AUTO-ENTMCNC: 31.1 G/DL (ref 30–36.5)
MCV RBC AUTO: 88.2 FL (ref 80–99)
MCV RBC AUTO: 88.2 FL (ref 80–99)
MONOCYTES # BLD: 0.7 K/UL (ref 0–1)
MONOCYTES NFR BLD: 5 % (ref 5–13)
NEUTS SEG # BLD: 12.3 K/UL (ref 1.8–8)
NEUTS SEG NFR BLD: 90 % (ref 32–75)
NRBC # BLD: 0 K/UL (ref 0–0.01)
NRBC # BLD: 0 K/UL (ref 0–0.01)
NRBC BLD-RTO: 0 PER 100 WBC
NRBC BLD-RTO: 0 PER 100 WBC
PHOSPHATE SERPL-MCNC: 2.4 MG/DL (ref 2.6–4.7)
PLATELET # BLD AUTO: 121 K/UL (ref 150–400)
PLATELET # BLD AUTO: 150 K/UL (ref 150–400)
PLATELET COMMENTS,PCOM: ABNORMAL
PMV BLD AUTO: 12.1 FL (ref 8.9–12.9)
PMV BLD AUTO: 12.7 FL (ref 8.9–12.9)
POTASSIUM SERPL-SCNC: 4.2 MMOL/L (ref 3.5–5.1)
POTASSIUM SERPL-SCNC: 4.3 MMOL/L (ref 3.5–5.1)
PROT SERPL-MCNC: 7.3 G/DL (ref 6.4–8.2)
RBC # BLD AUTO: 3.98 M/UL (ref 3.8–5.2)
RBC # BLD AUTO: 3.98 M/UL (ref 3.8–5.2)
RBC MORPH BLD: ABNORMAL
SERVICE CMNT-IMP: ABNORMAL
SERVICE CMNT-IMP: NORMAL
SODIUM SERPL-SCNC: 133 MMOL/L (ref 136–145)
SODIUM SERPL-SCNC: 134 MMOL/L (ref 136–145)
WBC # BLD AUTO: 13.7 K/UL (ref 3.6–11)
WBC # BLD AUTO: 14.4 K/UL (ref 3.6–11)

## 2021-07-28 PROCEDURE — 85025 COMPLETE CBC W/AUTO DIFF WBC: CPT

## 2021-07-28 PROCEDURE — 74018 RADEX ABDOMEN 1 VIEW: CPT

## 2021-07-28 PROCEDURE — 74011250636 HC RX REV CODE- 250/636: Performed by: INTERNAL MEDICINE

## 2021-07-28 PROCEDURE — C9113 INJ PANTOPRAZOLE SODIUM, VIA: HCPCS | Performed by: STUDENT IN AN ORGANIZED HEALTH CARE EDUCATION/TRAINING PROGRAM

## 2021-07-28 PROCEDURE — 74011250637 HC RX REV CODE- 250/637: Performed by: INTERNAL MEDICINE

## 2021-07-28 PROCEDURE — 74011636637 HC RX REV CODE- 636/637: Performed by: INTERNAL MEDICINE

## 2021-07-28 PROCEDURE — 82962 GLUCOSE BLOOD TEST: CPT

## 2021-07-28 PROCEDURE — 90935 HEMODIALYSIS ONE EVALUATION: CPT

## 2021-07-28 PROCEDURE — 80053 COMPREHEN METABOLIC PANEL: CPT

## 2021-07-28 PROCEDURE — 97116 GAIT TRAINING THERAPY: CPT

## 2021-07-28 PROCEDURE — 83605 ASSAY OF LACTIC ACID: CPT

## 2021-07-28 PROCEDURE — 71045 X-RAY EXAM CHEST 1 VIEW: CPT

## 2021-07-28 PROCEDURE — 74011000258 HC RX REV CODE- 258: Performed by: STUDENT IN AN ORGANIZED HEALTH CARE EDUCATION/TRAINING PROGRAM

## 2021-07-28 PROCEDURE — 85027 COMPLETE CBC AUTOMATED: CPT

## 2021-07-28 PROCEDURE — 74011250637 HC RX REV CODE- 250/637: Performed by: NURSE PRACTITIONER

## 2021-07-28 PROCEDURE — 36415 COLL VENOUS BLD VENIPUNCTURE: CPT

## 2021-07-28 PROCEDURE — 74011250636 HC RX REV CODE- 250/636: Performed by: GENERAL ACUTE CARE HOSPITAL

## 2021-07-28 PROCEDURE — 97530 THERAPEUTIC ACTIVITIES: CPT

## 2021-07-28 PROCEDURE — 87040 BLOOD CULTURE FOR BACTERIA: CPT

## 2021-07-28 PROCEDURE — 74011250636 HC RX REV CODE- 250/636: Performed by: STUDENT IN AN ORGANIZED HEALTH CARE EDUCATION/TRAINING PROGRAM

## 2021-07-28 PROCEDURE — 65660000000 HC RM CCU STEPDOWN

## 2021-07-28 PROCEDURE — 74011250637 HC RX REV CODE- 250/637: Performed by: GENERAL ACUTE CARE HOSPITAL

## 2021-07-28 PROCEDURE — 74011250637 HC RX REV CODE- 250/637: Performed by: STUDENT IN AN ORGANIZED HEALTH CARE EDUCATION/TRAINING PROGRAM

## 2021-07-28 PROCEDURE — 74011000250 HC RX REV CODE- 250: Performed by: STUDENT IN AN ORGANIZED HEALTH CARE EDUCATION/TRAINING PROGRAM

## 2021-07-28 PROCEDURE — 74176 CT ABD & PELVIS W/O CONTRAST: CPT

## 2021-07-28 PROCEDURE — 80069 RENAL FUNCTION PANEL: CPT

## 2021-07-28 RX ORDER — PANTOPRAZOLE SODIUM 40 MG/1
40 TABLET, DELAYED RELEASE ORAL
Status: DISCONTINUED | OUTPATIENT
Start: 2021-07-29 | End: 2021-07-28

## 2021-07-28 RX ORDER — SIMETHICONE 80 MG
80 TABLET,CHEWABLE ORAL
Status: DISCONTINUED | OUTPATIENT
Start: 2021-07-28 | End: 2021-08-07 | Stop reason: HOSPADM

## 2021-07-28 RX ORDER — MORPHINE SULFATE 2 MG/ML
1 INJECTION, SOLUTION INTRAMUSCULAR; INTRAVENOUS ONCE
Status: COMPLETED | OUTPATIENT
Start: 2021-07-28 | End: 2021-07-28

## 2021-07-28 RX ADMIN — INSULIN LISPRO 2 UNITS: 100 INJECTION, SOLUTION INTRAVENOUS; SUBCUTANEOUS at 12:34

## 2021-07-28 RX ADMIN — PREDNISOLONE ACETATE 1 DROP: 10 SUSPENSION/ DROPS OPHTHALMIC at 16:00

## 2021-07-28 RX ADMIN — Medication: at 14:06

## 2021-07-28 RX ADMIN — ONDANSETRON 4 MG: 2 INJECTION INTRAMUSCULAR; INTRAVENOUS at 18:12

## 2021-07-28 RX ADMIN — HEPARIN SODIUM 5000 UNITS: 5000 INJECTION INTRAVENOUS; SUBCUTANEOUS at 06:34

## 2021-07-28 RX ADMIN — SODIUM CHLORIDE 40 MG: 9 INJECTION, SOLUTION INTRAMUSCULAR; INTRAVENOUS; SUBCUTANEOUS at 19:11

## 2021-07-28 RX ADMIN — ACETAMINOPHEN 650 MG: 650 SUPPOSITORY RECTAL at 19:11

## 2021-07-28 RX ADMIN — ACETAMINOPHEN 650 MG: 325 TABLET ORAL at 16:17

## 2021-07-28 RX ADMIN — PIPERACILLIN AND TAZOBACTAM 3.38 G: 3; .375 INJECTION, POWDER, LYOPHILIZED, FOR SOLUTION INTRAVENOUS at 20:27

## 2021-07-28 RX ADMIN — BRIMONIDINE TARTRATE 1 DROP: 2 SOLUTION OPHTHALMIC at 09:24

## 2021-07-28 RX ADMIN — TIMOLOL MALEATE 1 DROP: 2.5 SOLUTION/ DROPS OPHTHALMIC at 09:24

## 2021-07-28 RX ADMIN — Medication: at 20:28

## 2021-07-28 RX ADMIN — Medication: at 09:26

## 2021-07-28 RX ADMIN — Medication 10 ML: at 22:34

## 2021-07-28 RX ADMIN — SIMETHICONE 80 MG: 80 TABLET, CHEWABLE ORAL at 06:34

## 2021-07-28 RX ADMIN — ALUMINUM HYDROXIDE AND MAGNESIUM HYDROXIDE 40 ML: 200; 200 SUSPENSION ORAL at 16:17

## 2021-07-28 RX ADMIN — Medication 10 ML: at 14:03

## 2021-07-28 RX ADMIN — HEPARIN SODIUM 5000 UNITS: 5000 INJECTION INTRAVENOUS; SUBCUTANEOUS at 23:48

## 2021-07-28 RX ADMIN — LATANOPROST 1 DROP: 50 SOLUTION OPHTHALMIC at 20:28

## 2021-07-28 RX ADMIN — Medication 10 ML: at 06:34

## 2021-07-28 RX ADMIN — PREDNISOLONE ACETATE 1 DROP: 10 SUSPENSION/ DROPS OPHTHALMIC at 22:33

## 2021-07-28 RX ADMIN — BRIMONIDINE TARTRATE 1 DROP: 2 SOLUTION OPHTHALMIC at 18:00

## 2021-07-28 RX ADMIN — ACETAMINOPHEN 650 MG: 650 SUPPOSITORY RECTAL at 23:48

## 2021-07-28 RX ADMIN — ACETAMINOPHEN 650 MG: 325 TABLET ORAL at 09:24

## 2021-07-28 RX ADMIN — LEVOTHYROXINE SODIUM 50 MCG: 0.05 TABLET ORAL at 06:34

## 2021-07-28 RX ADMIN — PREDNISOLONE ACETATE 1 DROP: 10 SUSPENSION/ DROPS OPHTHALMIC at 09:24

## 2021-07-28 RX ADMIN — MORPHINE SULFATE 1 MG: 2 INJECTION, SOLUTION INTRAMUSCULAR; INTRAVENOUS at 14:51

## 2021-07-28 RX ADMIN — HEPARIN SODIUM 5000 UNITS: 5000 INJECTION INTRAVENOUS; SUBCUTANEOUS at 14:05

## 2021-07-28 NOTE — PROGRESS NOTES
Occupational Therapy note:    Chart reviewed. Per RN, patient transferring [facility] for HD when attempting to initiate OT tx session. Will defer and continue to follow.     Jennifer Vega OTR/L

## 2021-07-28 NOTE — PROCEDURES
[facility]  [phone]    Vitals   Pre   Post   Assessment   Pre   Post     Temp  Temp: 98.2 °F (36.8 °C) (07/28/21 1523)  101.3, Axilla LOC  A&Ox3 - periodic confusion A&Ox2- more frequent confusion   HR   Pulse (Heart Rate): 72 (07/28/21 1523) 90 Lungs   Dim bases Dim bases   B/P   BP: (!) 169/79 (07/28/21 1523) 140/89 Cardiac   Tele, NSR  Tele, NSR   Resp   Resp Rate: 16 (07/28/21 1523) 16 Skin   Stage 2 sacrum Stage 2 sacrum   Pain level  8/10 abd pain, already had pain medication 3/10 after Zofran and vomiting Edema  none   none   Orders:    Duration:   Start:   0322 End:   3174 Total:   [id number]   Dialyzer:   Dialyzer/Set Up Inspection: Kristen Schreiber (07/28/21 1523)   K Bath:   Dialysate K (mEq/L): 4 (07/28/21 1523)   Ca Bath:   Dialysate CA (mEq/L): 2.5 (07/28/21 1523)   Na/Bicarb:   Dialysate NA (mEq/L): 140 (07/28/21 1523)   Target Fluid Removal:   Goal/Amount of Fluid to Remove (mL): 1000 mL (07/28/21 1523)   Access     Type & Location:   NANCI AVG: skin CDI. No s/s of infection. + B/T. No issues with cannulation or hemostasis. Running well at -400.     Labs     Obtained/Reviewed   Critical Results Called   Date when labs were drawn-  Hgb-    HGB   Date Value Ref Range Status   07/28/2021 10.8 (L) 11.5 - 16.0 g/dL Final     K-    Potassium   Date Value Ref Range Status   07/27/2021 3.9 3.5 - 5.1 mmol/L Final     Ca-   Calcium   Date Value Ref Range Status   07/27/2021 8.5 8.5 - 10.1 MG/DL Final     Bun-   BUN   Date Value Ref Range Status   07/27/2021 22 (H) 6 - 20 MG/DL Final     Creat-   Creatinine   Date Value Ref Range Status   07/27/2021 5.12 (H) 0.55 - 1.02 MG/DL Final     Comment:     INVESTIGATED PER DELTA CHECK PROTOCOL        Medications/ Blood Products Given     Name   Dose   Route and Time     None ordered for HD                Blood Volume Processed (BVP):    46.9L Net Fluid   Removed:  +450ml   Comments   Time Out Done: 1520  Primary Nurse Rpt Pre: Lobo Flakes Dimitri Sanchez, RN  Primary Nurse Rpt Post: Marylene Antes, RN  Pt Education: keep access arm still  Care Plan: ongoing; staying at higher level of care  Tx Summary:  Pt arrived to HD suite A&Ox3. Consent signed & on file. SBAR received from Primary RN.  5722: Pt cannulated with 61K needles per policy & without issue. Labs drawn per request/ order. VSS. Dialysis Tx initiated. Patient complaining continuously of severe abdominal pain. Called primary RN after treatment initiation to inform of need for more medication. Patient in such pain with intermittent confusion, that HD RN had to hold her access hand continuously to prevent infiltration of needles. 1600: BP drop 82/22. UF off, patient laid back, 200ml NS bolus given. MD informed; new order to not pull fluid and give bolus as needed. 1603: /69; Patient states she feels better, but abdominal pain persists. 1615: VSS. Primary RNs at bedside to admin PO Medications for stomach/ pain. Patient was sat up to take medications. It was noted at that time that patient's dressing over L triple lumen CVC was off and site was open to air. HD RNs cleaned site and placed a new CVC dressing for infection control purposes. 1715: What appeared to be GI cocktail that as given previously came back up. Patient was helped with clean up. VSS. Patient states she is ok but Abdomen still hurts. 1730: VSS. Patient began vomiting copious amounts of chunky, foul smelling, tan colored emesis. Emesis bag provided. Patient states that her stomach feels much better. Patient care had been transferred to [facility]; That Primary RN was informed and notified Hospitalist.   1800: After Patient was cleaned up; Venous pressures were climbing and a small knot formed over venous site. Patient has infiltrated her venous needle. 1805: Tx ended 15min early related to infiltration. Primary RN from PCU arrived with Zofran to help nausea. Zofran administered IV. VSS. Patient visibly shivering. Axillary Temp now 101.3. All possible blood returned to patient. Hemostasis achieved without issue. Hospitalist decided to continue PCU level care. SBAR given to Primary, RN including complications and new fever. Patient was transferred via HD RNs x2. Patient is in care of original PCU RN at time of my departure. All Dialysis related medications have been reviewed. Admiting Diagnosis: Acute Encephalopathy  Pt's previous [facility]- [facility]  Consent signed - Informed Consent Verified: Yes (07/28/21 1523)  [facility] Consent - on file  Hepatitis Status- Neg Ag 6/28/21; 81Ab 7/7/21  Machine #- Machine Number: [id number] (07/28/21 2803)  Telemetry status- Tele, NS  Pre-dialysis wt. - Pre-Dialysis Weight: 63.6 kg (140 lb 3.4 oz) (07/21/21 1101)

## 2021-07-28 NOTE — PROGRESS NOTES
Problem: Mobility Impaired (Adult and Pediatric)  Goal: *Acute Goals and Plan of Care (Insert Text)  Description: FUNCTIONAL STATUS PRIOR TO ADMISSION: Patient was modified independent using a rollator for functional mobility. Independent with dressing and bathing. Used rollator when going to HD.    HOME SUPPORT PRIOR TO ADMISSION: The patient lived with family who assisted with cooking and meals. 1 story home with 4 steps and bilateral rails. Physical Therapy Goals  Initiated 7/26/2021  1. Patient will move from supine to sit and sit to supine , scoot up and down, and roll side to side in bed with modified independence within 7 day(s). 2.  Patient will transfer from bed to chair and chair to bed with supervision/set-up using the least restrictive device within 7 day(s). 3.  Patient will perform sit to stand with modified independence within 7 day(s). 4.  Patient will ambulate with supervision/set-up for 250 feet with the least restrictive device within 7 day(s). 5.  Patient will ascend/descend 4 stairs with 1 handrail(s) with minimal assistance/contact guard assist within 7 day(s). Outcome: Progressing Towards Goal  PHYSICAL THERAPY TREATMENT  Patient: Steph Moon (66 y.o. female)  Date: 7/28/2021  Diagnosis: Acute encephalopathy [G93.40] <principal problem not specified>       Precautions: Bed Alarm, Fall (legally blind)  Chart, physical therapy assessment, plan of care and goals were reviewed. ASSESSMENT  Patient continues with skilled PT services and is progressing towards goals. She was sleeping in bed when PT arrived but easy to wake up. Agreed to therapy. Provided warm up exercises to BLEs with good tolerance and patient participation. She needed assistance to come to the edge of bed with tactile and manual guidance due to blindness. Progressed ambulation to 85 feet in room using RW and min a for steering walker due to blindness. Gait speed and step lengths improved.  Transferred to chair, heated breakfast tray and set up with manual cues to orient patient to items on tray. Will need SNF level rehab due to still functioning well below independent baseline with rollator. Current Level of Function Impacting Discharge (mobility/balance): min a supine to sit; cg sit to standing; min a ambulation with RW. Other factors to consider for discharge: severe blindness; modified independent with rollator PLOF; supportive family          PLAN :  Patient continues to benefit from skilled intervention to address the above impairments. Continue treatment per established plan of care. to address goals. Recommendation for discharge: (in order for the patient to meet his/her long term goals)  Therapy up to 5 days/week in SNF setting    This discharge recommendation:  Has been made in collaboration with the attending provider and/or case management    IF patient discharges home will need the following DME: owns all needed DME       SUBJECTIVE:   Patient stated  Thank ou very much.     OBJECTIVE DATA SUMMARY:   Critical Behavior:  Neurologic State: Alert, Confused  Orientation Level: Oriented to person, Oriented to place, Oriented to time, Disoriented to situation  Cognition: Appropriate decision making, Appropriate for age attention/concentration, Appropriate safety awareness, Follows commands  Safety/Judgement: Decreased awareness of environment, Decreased insight into deficits  Functional Mobility Training:  Bed Mobility:  Rolling: Contact guard assistance  Supine to Sit: Minimum assistance; Additional time (log roll technique)     Scooting: Contact guard assistance        Transfers:  Sit to Stand: Contact guard assistance  Stand to Sit: Contact guard assistance        Bed to Chair: Minimum assistance; Adaptive equipment (RW)                    Balance:  Sitting: Intact  Standing: Impaired  Standing - Static: Good;Occasional  Standing - Dynamic : Fair;Constant support  Ambulation/Gait Training:  Distance (ft): 85 Feet (ft)  Assistive Device: Gait belt;Walker, rolling  Ambulation - Level of Assistance: Minimal assistance (85)        Gait Abnormalities: Path deviations;Decreased step clearance        Base of Support: Widened     Speed/Ramona: Pace decreased (<100 feet/min)  Step Length: Right shortened;Left shortened        Interventions: Verbal cues; Tactile cues;Manual cues (for steering due to blindness)           Therapeutic Exercises: Ankle pumps, heel slides and hip abduction    Pain Rating:  No complaints this session    Activity Tolerance:   Fair, SpO2 stable on RA, and requires rest breaks    After treatment patient left in no apparent distress:   Sitting in chair and Call bell within reach    COMMUNICATION/COLLABORATION:   The patients plan of care was discussed with: Registered nurse.      Kelley Jeffries, PT   Time Calculation: 25 mins

## 2021-07-28 NOTE — PROGRESS NOTES
Hospitalist Progress Note    NAME: Lorri James   :  1950   MRN:  [id number]       Assessment / Plan:  Sepsis unknown source POA  Acute encephalopathy   -Febrile and altered mental status on admission, some neck stiffness noted and patient was started on empiric coverage for meningitis  -Appreciate neurology consult  -ammonia mildly elevated 38 on admision, normalized  -Also had lumbar puncture, CSF was clear, cultures negative  -Antibiotics discontinued  -Patient now agitated more awake  -EEG captured no seizures, triphasic waves suggest possible hepatic/renal encephalopathy  -T bili 1.6, normalized, LFTs normal   -watery diarrhea, check C diff, enteric panel  -Covid testing negative  -encephalopathy improving, more alert awake  -passed bedside swallow, regular diet  -appreciate GI consult  -Ultrasound with elastography's reveals mild stages of liver fibrosis. Outpatient follow-up with GI as per the recommendations. CT a/p  (admission):  1. Circumferential rectal wall thickening is suspicious for malignancy. No bowel  obstruction. 2. No other acute abnormality in the abdomen or pelvis. Uterine fibroids are  noted. 3. Moderate cardiomegaly. MRI brain:  Encephalomalacia and white matter disease. No acute intracranial abnormality    ?seizures  -no seizure captured on EEG, triphasic waves point to hepatic/renal encephalopathy as per neuro  -on lose dose keppra, hold and observe    Diarrhea  -some loose/watery stools last 24hrs  -no leukocytosis  -monitor    ESRD  -appreciate nephro following  -HD per nephro    Incidental finding of rectal wall thickening on CT A/P  -appreciate CRS eval  -patient can f/u outpatient, no intervention needed during this admission     Difficult IV access: Central line placed on   Hypokalemia  Resolved    DMII  HTN  CAD  FS monitoring, SS     Eye surgery in may : glaucoma    Disposition-patient is medically stable for discharge, pending placement to SNF.   Case management on [facility] with pending insurance authorization.     Code Status: Full  Appreciate palliative team following    Surrogate Decision Maker: Daughter  DVT Prophylaxis: SCD  GI Prophylaxis: not indicated  Baseline: Independent     25.0 - 29.9 Overweight / Body mass index is 25.08 kg/m².     Estimated discharge date: July 26  Barriers: Currently very sick  Updated daughter at bedside on 07/19  Updated daughter over the phone on 07/17 and 07/18  Code status: Full  Prophylaxis: Hep SQ  Recommended Disposition: TBD     Subjective:     Chief Complaint / Reason for Physician Visit  Patient seen today, awake and alert, no complaints. No acute events overnight. Discussed with RN events overnight. Review of Systems:  Symptom Y/N Comments  Symptom Y/N Comments   Fever/Chills n   Chest Pain n    Poor Appetite n   Edema     Cough n   Abdominal Pain n    Sputum n   Joint Pain     SOB/CHURCH n   Pruritis/Rash     Nausea/vomit n   Tolerating PT/OT     Diarrhea n   Tolerating Diet     Constipation n   Other       Could NOT obtain due to:      Objective:     VITALS:   Last 24hrs VS reviewed since prior progress note. Most recent are:  Patient Vitals for the past 24 hrs:   Temp Pulse Resp BP SpO2   07/28/21 1026 98 °F (36.7 °C) 64 18 (!) 134/91 --   07/28/21 0726 98.2 °F (36.8 °C) 67 9 (!) 142/66 --   07/28/21 0401 99.3 °F (37.4 °C) 71 16 (!) 115/43 94 %   07/27/21 2327 99.9 °F (37.7 °C) 74 18 (!) 140/57 95 %   07/27/21 1912 98.4 °F (36.9 °C) 69 19 (!) 133/59 94 %   07/27/21 1508 100 °F (37.8 °C) 75 12 (!) 112/46 95 %       Intake/Output Summary (Last 24 hours) at 7/28/2021 1323  Last data filed at 7/28/2021 1026  Gross per 24 hour   Intake 160 ml   Output --   Net 160 ml        I had a face to face encounter and independently examined this patient on 7/28/2021, as outlined below:  PHYSICAL EXAM:  General: Adult  AA female, awake, alert  EENT:  EOMI. Anicteric sclerae.  MMM  Resp:  CTA bilaterally, no wheezing or rales. No accessory muscle use  CV:  Regular  rhythm,  No edema  GI:  Soft, Non distended, Non tender. +Bowel sounds  Neurologic:  Awake, alert, oriented x 2, moves all extremities and follows some commands. No obvious focal deficits   Psych:   deferred  Skin:  No rashes. No jaundice    Reviewed most current lab test results and cultures  YES  Reviewed most current radiology test results   YES  Review and summation of old records today    NO  Reviewed patient's current orders and MAR    YES  PMH/SH reviewed - no change compared to H&P  ________________________________________________________________________  Care Plan discussed with:    Comments   Patient x    Family      RN x    Care Manager x    Consultant                       x Multidiciplinary team rounds were held today with , nursing, pharmacist and clinical coordinator. Patient's plan of care was discussed; medications were reviewed and discharge planning was addressed. ________________________________________________________________________  Total NON critical care TIME:  36  Minutes    Total CRITICAL CARE TIME Spent:   Minutes non procedure based      Comments   >50% of visit spent in counseling and coordination of care x    ________________________________________________________________________  Sabino Allred MD     Procedures: see electronic medical records for all procedures/Xrays and details which were not copied into this note but were reviewed prior to creation of Plan. LABS:  I reviewed today's most current labs and imaging studies.   Pertinent labs include:  Recent Labs     07/26/21  0440   WBC 7.8   HGB 10.0*   HCT 33.0*   *     Recent Labs     07/27/21  0457 07/26/21  1255 07/26/21  4830     --  136   K 3.9  --  4.0   CL 97  --  98   CO2 33*  --  29   GLU 82  --  83   BUN 22*  --  36*   CREA 5.12*  --  7.38*   CA 8.5  --  8.2*   ALB 2.9*  --   --    TBILI 0.7  --   --    ALT 14  --   --    INR  --  1.1 --        Signed: Anabel Mayes MD

## 2021-07-28 NOTE — PROGRESS NOTES
TRANSFER - IN REPORT:    Verbal report received from Karsten Turner on [place]  being received from  PCU for ordered procedure      Report consisted of patients Situation, Background, Assessment and   Recommendations(SBAR). Information from the following report(s) SBAR, Kardex and Cardiac Rhythm NS-SB was reviewed with the receiving nurse. Opportunity for questions and clarification was provided. Assessment completed upon patients arrival to unit and care assumed.

## 2021-07-28 NOTE — PROGRESS NOTES
Pt c/o abdominal cramping described as \"gas pain\". Paged nocturnist for tums or simethicone    End of Shift Note    Bedside shift change report given to Sarthak Hackett RN (oncoming nurse) by Mike Fong RN (offgoing nurse). Report included the following information SBAR, Kardex, Intake/Output, MAR, Recent Results and Cardiac Rhythm NSR    Shift worked:  7p-7a     Shift summary and any significant changes:     c/o gas pain this AM     Concerns for physician to address:  pull L IJ? Access? Zone phone for oncoming shift:   [phone]       Activity:  Activity Level: Up with Assistance  Number times ambulated in hallways past shift: 0  Number of times OOB to chair past shift: 0    Cardiac:   Cardiac Monitoring: Yes      Cardiac Rhythm: Sinus Rhythm    Access:   Current line(s): central line  (not in use)    Genitourinary:   Urinary status: anuric    Respiratory:   O2 Device: None (Room air)  Chronic home O2 use?: NO  Incentive spirometer at bedside: NO     GI:  Last Bowel Movement Date: 07/27/21  Current diet:  ADULT ORAL NUTRITION SUPPLEMENT Breakfast, Lunch, Dinner; Renal Supplement  ADULT DIET Regular; 4 carb choices (60 gm/meal); Low Fat/Low Chol/High Fiber/MELISSA  Passing flatus: YES  Tolerating current diet: YES       Pain Management:   Patient states pain is manageable on current regimen: YES    Skin:  Mick Score: 18  Interventions: float heels, increase time out of bed, PT/OT consult and limit briefs    Patient Safety:  Fall Score:  Total Score: 4  Interventions: bed/chair alarm, assistive device (walker, cane, etc), gripper socks, pt to call before getting OOB and stay with me (per policy)  High Fall Risk: Yes    Length of Stay:  Expected LOS: 4d 19h  Actual LOS: 12      Mike Fong RN

## 2021-07-28 NOTE — PROGRESS NOTES
Nephrology Progress Note  Piter Perez     [email or web address]  Phone - [phone]   Patient: Lynnann Aase    YOB: 1950        Date- 7/28/2021   Admit Date: 7/16/2021  CC: Follow up for esrd       IMPRESSION & PLAN:    ESRD- [facility]- davita laburnum  · AMS  · SEPSIS  · ANEMIA  · CA/P/PTH/VITD  · DM  ·  HTN    PLAN-   DIALYSIS  TODAY   Epogen for anemia    Continue hd MWF   Subjective: Interval History:   -s/p hd yesterday  bp stable    No c/o sob,  No c/o chest pain,   No c/o nausea or vomiting, No c/o  fever. Objective:   Vitals:    07/28/21 0401 07/28/21 0700 07/28/21 0726 07/28/21 1026   BP: (!) 115/43  (!) 142/66 (!) 134/91   Pulse: 71  67 64   Resp: 16  9 18   Temp: 99.3 °F (37.4 °C)  98.2 °F (36.8 °C) 98 °F (36.7 °C)   TempSrc:       SpO2: 94%      Weight:  59.4 kg (130 lb 15.3 oz)     Height:          07/27 0701 - 07/28 0700  In: 180 [P.O.:180]  Out: -   Last 3 Recorded Weights in this Encounter    07/26/21 0457 07/27/21 0502 07/28/21 0700   Weight: 61.5 kg (135 lb 9.3 oz) 58.8 kg (129 lb 10.1 oz) 59.4 kg (130 lb 15.3 oz)      Physical exam:   GEN:  NAD  NECK:  Supple, no thyromegaly  RESP: CLEAR  b/l, no  wheezing,   CVS: RRR,S1,S2   ABDO:  Soft, NT  NEURO: non focal, normal speech  EXT:no Edema +nt     EXT: Right arm AV fistula present        Chart reviewed. Pertinent Notes reviewed. Data Review :  Recent Labs     07/27/21  0457 07/26/21  0440    136   K 3.9 4.0   CL 97 98   CO2 33* 29   BUN 22* 36*   CREA 5.12* 7.38*   GLU 82 83   CA 8.5 8.2*     Recent Labs     07/26/21  0440   WBC 7.8   HGB 10.0*   HCT 33.0*   *     No results for input(s): FE, TIBC, PSAT, FERR in the last 72 hours.    Medication list  reviewed  Current Facility-Administered Medications   Medication Dose Route Frequency    simethicone (MYLICON) tablet 80 mg  80 mg Oral QID PRN    zinc oxide-cod liver oil (DESITIN) 40 % paste   Topical TID    levothyroxine (SYNTHROID) tablet 50 mcg  50 mcg Oral 6am    heparin (porcine) pf 300 Units  300 Units InterCATHeter PRN    insulin lispro (HUMALOG) injection   SubCUTAneous AC&HS    epoetin bia-epbx (RETACRIT) injection 4,000 Units  4,000 Units SubCUTAneous Q MON, WED & FRI    hydrALAZINE (APRESOLINE) 20 mg/mL injection 20 mg  20 mg IntraVENous Q6H PRN    labetaloL (NORMODYNE;TRANDATE) injection 20 mg  20 mg IntraVENous Q4H PRN    glucose chewable tablet 16 g  4 Tablet Oral PRN    dextrose (D50W) injection syrg 12.5-25 g  12.5-25 g IntraVENous PRN    glucagon (GLUCAGEN) injection 1 mg  1 mg IntraMUSCular PRN    sodium chloride (NS) flush 5-40 mL  5-40 mL IntraVENous Q8H    sodium chloride (NS) flush 5-40 mL  5-40 mL IntraVENous PRN    acetaminophen (TYLENOL) tablet 650 mg  650 mg Oral Q6H PRN    ondansetron (ZOFRAN ODT) tablet 4 mg  4 mg Oral Q8H PRN    Or    ondansetron (ZOFRAN) injection 4 mg  4 mg IntraVENous Q6H PRN    acetaminophen (TYLENOL) suppository 650 mg  650 mg Rectal Q4H PRN    brimonidine (ALPHAGAN) 0.2 % ophthalmic solution 1 Drop  1 Drop Both Eyes BID    timolol (TIMOPTIC) 0.25% ophthalmic solution  1 Drop Both Eyes DAILY    prednisoLONE acetate (PRED FORTE) 1 % ophthalmic suspension 1 Drop  1 Drop Right Eye TID    latanoprost (XALATAN) 0.005 % ophthalmic solution 1 Drop  1 Drop Both Eyes QHS    heparin (porcine) injection 5,000 Units  5,000 Units SubCUTAneous Q8H          Jailene Shirley MD              [facility]  [facility] / [place] [facility] [place] [address], [address]  [place], [address]  Phone - [phone]               Fax - [phone]

## 2021-07-28 NOTE — PROGRESS NOTES
TRANSFER - IN REPORT:    Verbal report received from [employer], [address] (name) on Laurence Dixon  being received from PCU (unit) for routine progression of care      Report consisted of patients Situation, Background, Assessment and   Recommendations(SBAR). Information from the following report(s) SBAR, ED Summary and MAR was reviewed with the receiving nurse. Opportunity for questions and clarification was provided. Assessment completed upon patients arrival to unit and care assumed. Per RN - patient currently in dialysis, will be expected to transfer to this unit afterwards. 1745 - Patient had change in condition while in dialysis and China Tillman MD and pt. will be going back to PCU per MD for another night and to be re-assessed for step down in the am.     Dialysis nurse and PCU RN notified.

## 2021-07-28 NOTE — PROGRESS NOTES
Problem: Impaired Skin Integrity/Pressure Injury Treatment  Goal: *Improvement of Existing Pressure Injury  Outcome: Progressing Towards Goal  Goal: *Prevention of pressure injury  Description: Document Mick Scale and appropriate interventions in the flowsheet.   Outcome: Progressing Towards Goal  Note: Pressure Injury Interventions:  Sensory Interventions: Assess changes in LOC, Check visual cues for pain, Discuss PT/OT consult with provider, Float heels, Keep linens dry and wrinkle-free, Maintain/enhance activity level, Minimize linen layers, Pressure redistribution bed/mattress (bed type)    Moisture Interventions: Absorbent underpads, Apply protective barrier, creams and emollients, Check for incontinence Q2 hours and as needed, Limit adult briefs, Minimize layers, Maintain skin hydration (lotion/cream)    Activity Interventions: Increase time out of bed, Pressure redistribution bed/mattress(bed type), PT/OT evaluation    Mobility Interventions: Float heels, HOB 30 degrees or less, PT/OT evaluation, Pressure redistribution bed/mattress (bed type)    Nutrition Interventions: Document food/fluid/supplement intake, Discuss nutritional consult with provider, Offer support with meals,snacks and hydration    Friction and Shear Interventions: Apply protective barrier, creams and emollients, HOB 30 degrees or less

## 2021-07-28 NOTE — PROGRESS NOTES
Transition of Care Plan:     RUR: 18  Disposition: SNF  Follow up appointments: PCP, OP HD Ronaldo Salgado MWF at 6.30 AM  DME needed:Pt has access to cane and RW  Transportation at [address] will transport   Fayrene Natalie or means to access home:      Yes  IM Medicare letter:NA  Caregiver Contact: Daughter Leanne Jane [phone]  Discharge Caregiver contacted prior to [address] [facility] has accepted patient and they are pursuing authorization today and will keep me updated. FOC obtained and placed with medical records. Anabell Stevenson  RN BSN CRM        [phone]

## 2021-07-28 NOTE — PROGRESS NOTES
HD TRANSFER - OUT REPORT:    Verbal report given to JUAN FRANCISCO Bales on Maria T Lassiter being transferred to PCU for routine progression of care       Report consisted of patient's Situation, Background, Assessment and   Recommendations(SBAR). Information from the following report(s) SBAR, Procedure Summary, Intake/Output, MAR and Recent Results was reviewed with the receiving nurse. Method:  $$ Method: Hemodialysis (07/28/21 1523)    Fluid Removed  NET Fluid Removed (mL): -440 ml (07/28/21 1805)     Patient response to treatment:  Unchanged from a dialysis standpoint    End Time  Hemodialysis End Time: 1481 (07/28/21 1805)  If not documented, dialysis nurse to update post-dialysis row in HD/Filtration flowsheet     Medications /Volume expansion agents or Fluid boluses administered during treatment? yes    Post-dialysis medication administration due?  yes  Remind nurse to administer post-HD medication upon return to unit. Fistula hemostasis? yes    Line heparinization? no    Lines: NANCI AVG    Opportunity for questions and clarification was provided.       Patient transported with: Registered Nurse

## 2021-07-28 NOTE — PROGRESS NOTES
Received message from patient's nurse stating:    Pt c/o gas pain/abdominal cramping         Discussion / orders:    Simethicone 80 mg po qid prn intestinal gas           Please note that this note was dictated using Dragon computer voice recognition software. Quite often unanticipated grammatical, syntax, homophones, and other interpretive errors are inadvertently transcribed by the computer software. Please disregard these errors. Please excuse any errors that have escaped final proofreading.

## 2021-07-28 NOTE — PROGRESS NOTES
0700 Bedside shift change report given to Luke Cornelius RN (oncoming nurse) by Lissett Jones RN (offgoing nurse). Report included the following information SBAR, Kardex, ED Summary, Intake/Output, MAR, Recent Results and Cardiac Rhythm NSR.     1020 Updated daughter Tommie Wright) on the plan of care. 3200 Ashley Joshua Cure about removing left IJ due to incorrect placement. Per MD \" keep it and removed when she get dc. \"    Jesse Severin Dr. Lytle Cure about patient having abdomen pain. Patient states \"i have sharp pain. \" MD order Gi cocktail, one time order of morphine, and Protonix 40 mg daily. 1500 Patient off floor for dialysis at this time. 1000 [facility] Josiane Cure about patient still having abdomen pain. MD ordered KUB and CT abdomen. [address] Josiane Sommers about making patient NPO due to vomiting/abdomen pains. MD agreed. Placed order at this time. 1812 Given Zofran at this time due to patient vomiting while on dialysis. 31 75 62 Notifted by Daniel Morrow RN  Patient is coming back to PCU due to vomiting and stomach pains. [address] Dr. Josiane Sommers about patient having 102.5 oral temp. MD order lactic acid, cbc, blood culture, and bmp. Asked MD for CXR to rule out aspiration. MD agreed. Order placed. Advise MD that I need to use L IJ for antibiotics and blood work. Per MD ok to use at this time. 1900 Bedside shift change report given to Zoe Sandra (oncoming nurse) by Tiffany Nielsen RN (offgoing nurse). Report included the following information SBAR, Kardex, ED Summary, Intake/Output, MAR, Recent Results and Cardiac Rhythm NSR.

## 2021-07-28 NOTE — PROGRESS NOTES
Transition of Care Plan:     RUR: 18  Disposition: SNF  Follow up appointments: PCP, OP HD Randa Cho MWF at 6.30 AM  DME needed:Pt has access to cane and RW  Transportation at [address] transport   [address] or means to access home:      Yes  IM Medicare letter:NA  Caregiver Contact: Daughter Juliocesar Hoyos [phone]  Discharge Caregiver contacted prior to [facility] with daughter re dcp. She has chosen [facility]. Referral sent to them via cc link and will await their response. Anabell Stevenson  RN BSN CRM        [phone]

## 2021-07-29 ENCOUNTER — ANESTHESIA (OUTPATIENT)
Dept: SURGERY | Age: 71
DRG: 853 | End: 2021-07-29
Payer: MEDICARE

## 2021-07-29 ENCOUNTER — ANESTHESIA EVENT (OUTPATIENT)
Dept: SURGERY | Age: 71
DRG: 853 | End: 2021-07-29
Payer: MEDICARE

## 2021-07-29 PROBLEM — N18.6 END STAGE RENAL DISEASE (HCC): Status: ACTIVE | Noted: 2021-07-29

## 2021-07-29 PROBLEM — K35.21 ACUTE APPENDICITIS WITH GENERALIZED PERITONITIS, ABSCESS, AND GANGRENE: Status: ACTIVE | Noted: 2021-07-29

## 2021-07-29 PROBLEM — K35.30 ACUTE APPENDICITIS WITH LOCALIZED PERITONITIS: Status: ACTIVE | Noted: 2021-07-29

## 2021-07-29 PROBLEM — K35.891 ACUTE APPENDICITIS WITH GENERALIZED PERITONITIS, ABSCESS, AND GANGRENE: Status: ACTIVE | Noted: 2021-07-29

## 2021-07-29 LAB
GLUCOSE BLD STRIP.AUTO-MCNC: 159 MG/DL (ref 65–117)
GLUCOSE BLD STRIP.AUTO-MCNC: 179 MG/DL (ref 65–117)
GLUCOSE BLD STRIP.AUTO-MCNC: 226 MG/DL (ref 65–117)
GLUCOSE BLD STRIP.AUTO-MCNC: 74 MG/DL (ref 65–117)
O+P SPEC MICRO: NORMAL
O+P STL CONC: NORMAL
SERVICE CMNT-IMP: ABNORMAL
SERVICE CMNT-IMP: NORMAL
SPECIMEN SOURCE: NORMAL

## 2021-07-29 PROCEDURE — 74011250636 HC RX REV CODE- 250/636

## 2021-07-29 PROCEDURE — 77030008756 HC TU IRR SUC STRY -B: Performed by: SURGERY

## 2021-07-29 PROCEDURE — 77030009963 HC RELD STPLR ECR J&J -C: Performed by: SURGERY

## 2021-07-29 PROCEDURE — 77030019908 HC STETH ESOPH SIMS -A: Performed by: ANESTHESIOLOGY

## 2021-07-29 PROCEDURE — 82962 GLUCOSE BLOOD TEST: CPT

## 2021-07-29 PROCEDURE — 74011000250 HC RX REV CODE- 250: Performed by: NURSE ANESTHETIST, CERTIFIED REGISTERED

## 2021-07-29 PROCEDURE — 74011636637 HC RX REV CODE- 636/637: Performed by: INTERNAL MEDICINE

## 2021-07-29 PROCEDURE — 88304 TISSUE EXAM BY PATHOLOGIST: CPT

## 2021-07-29 PROCEDURE — 77030013567 HC DRN WND RESERV BARD -A: Performed by: SURGERY

## 2021-07-29 PROCEDURE — 77030020829: Performed by: SURGERY

## 2021-07-29 PROCEDURE — 74011000250 HC RX REV CODE- 250: Performed by: SURGERY

## 2021-07-29 PROCEDURE — 77030002895 HC DEV VASC CLOSR COVD -B: Performed by: SURGERY

## 2021-07-29 PROCEDURE — 65660000000 HC RM CCU STEPDOWN

## 2021-07-29 PROCEDURE — 0DTJ4ZZ RESECTION OF APPENDIX, PERCUTANEOUS ENDOSCOPIC APPROACH: ICD-10-PCS | Performed by: SURGERY

## 2021-07-29 PROCEDURE — 77030027876 HC STPLR ENDOSC FLX PWR J&J -G1: Performed by: SURGERY

## 2021-07-29 PROCEDURE — 76060000034 HC ANESTHESIA 1.5 TO 2 HR: Performed by: SURGERY

## 2021-07-29 PROCEDURE — 77030036731 HC STPLR ENDOSC J&J -F: Performed by: SURGERY

## 2021-07-29 PROCEDURE — 74011000258 HC RX REV CODE- 258: Performed by: STUDENT IN AN ORGANIZED HEALTH CARE EDUCATION/TRAINING PROGRAM

## 2021-07-29 PROCEDURE — 77030008606 HC TRCR ENDOSC KII AMR -B: Performed by: SURGERY

## 2021-07-29 PROCEDURE — 77030034628 HC LIGASURE LAP SEAL DIV MD COVD -F: Performed by: SURGERY

## 2021-07-29 PROCEDURE — 74011250636 HC RX REV CODE- 250/636: Performed by: ANESTHESIOLOGY

## 2021-07-29 PROCEDURE — 44970 LAPAROSCOPY APPENDECTOMY: CPT | Performed by: SURGERY

## 2021-07-29 PROCEDURE — 77030008684 HC TU ET CUF COVD -B: Performed by: ANESTHESIOLOGY

## 2021-07-29 PROCEDURE — 77030003029 HC SUT VCRL J&J -B: Performed by: SURGERY

## 2021-07-29 PROCEDURE — 99223 1ST HOSP IP/OBS HIGH 75: CPT | Performed by: SURGERY

## 2021-07-29 PROCEDURE — 77030022703 HC LIGASURE  BLNT LAPSCP SEAL COVD -E: Performed by: SURGERY

## 2021-07-29 PROCEDURE — 77030012407 HC DRN WND BARD -B: Performed by: SURGERY

## 2021-07-29 PROCEDURE — 77030008771 HC TU NG SALEM SUMP -A: Performed by: ANESTHESIOLOGY

## 2021-07-29 PROCEDURE — 77030012770 HC TRCR OPT FX AMR -B: Performed by: SURGERY

## 2021-07-29 PROCEDURE — 77030026438 HC STYL ET INTUB CARD -A: Performed by: ANESTHESIOLOGY

## 2021-07-29 PROCEDURE — 74011250636 HC RX REV CODE- 250/636: Performed by: SURGERY

## 2021-07-29 PROCEDURE — 77030002933 HC SUT MCRYL J&J -A: Performed by: SURGERY

## 2021-07-29 PROCEDURE — 74011250636 HC RX REV CODE- 250/636: Performed by: NURSE ANESTHETIST, CERTIFIED REGISTERED

## 2021-07-29 PROCEDURE — 77030031139 HC SUT VCRL2 J&J -A: Performed by: SURGERY

## 2021-07-29 PROCEDURE — P9047 ALBUMIN (HUMAN), 25%, 50ML: HCPCS | Performed by: NURSE PRACTITIONER

## 2021-07-29 PROCEDURE — 2709999900 HC NON-CHARGEABLE SUPPLY: Performed by: SURGERY

## 2021-07-29 PROCEDURE — 76210000006 HC OR PH I REC 0.5 TO 1 HR: Performed by: SURGERY

## 2021-07-29 PROCEDURE — 74011250636 HC RX REV CODE- 250/636: Performed by: STUDENT IN AN ORGANIZED HEALTH CARE EDUCATION/TRAINING PROGRAM

## 2021-07-29 PROCEDURE — 77030002916 HC SUT ETHLN J&J -A: Performed by: SURGERY

## 2021-07-29 PROCEDURE — 77030033162 HC PCH SPEC RTVR ENDOSC AMR -B: Performed by: SURGERY

## 2021-07-29 PROCEDURE — 76010000153 HC OR TIME 1.5 TO 2 HR: Performed by: SURGERY

## 2021-07-29 PROCEDURE — 74011250636 HC RX REV CODE- 250/636: Performed by: NURSE PRACTITIONER

## 2021-07-29 RX ORDER — DIPHENHYDRAMINE HYDROCHLORIDE 50 MG/ML
12.5 INJECTION, SOLUTION INTRAMUSCULAR; INTRAVENOUS AS NEEDED
Status: DISCONTINUED | OUTPATIENT
Start: 2021-07-29 | End: 2021-07-29 | Stop reason: HOSPADM

## 2021-07-29 RX ORDER — HYDROCODONE BITARTRATE AND ACETAMINOPHEN 5; 325 MG/1; MG/1
1 TABLET ORAL
Status: DISCONTINUED | OUTPATIENT
Start: 2021-07-29 | End: 2021-08-07 | Stop reason: HOSPADM

## 2021-07-29 RX ORDER — NEOSTIGMINE METHYLSULFATE 1 MG/ML
INJECTION, SOLUTION INTRAVENOUS AS NEEDED
Status: DISCONTINUED | OUTPATIENT
Start: 2021-07-29 | End: 2021-07-29 | Stop reason: HOSPADM

## 2021-07-29 RX ORDER — BUPIVACAINE HYDROCHLORIDE AND EPINEPHRINE 2.5; 5 MG/ML; UG/ML
INJECTION, SOLUTION EPIDURAL; INFILTRATION; INTRACAUDAL; PERINEURAL AS NEEDED
Status: DISCONTINUED | OUTPATIENT
Start: 2021-07-29 | End: 2021-07-29 | Stop reason: HOSPADM

## 2021-07-29 RX ORDER — FENTANYL CITRATE 50 UG/ML
INJECTION, SOLUTION INTRAMUSCULAR; INTRAVENOUS AS NEEDED
Status: DISCONTINUED | OUTPATIENT
Start: 2021-07-29 | End: 2021-07-29 | Stop reason: HOSPADM

## 2021-07-29 RX ORDER — OXYCODONE AND ACETAMINOPHEN 5; 325 MG/1; MG/1
1 TABLET ORAL AS NEEDED
Status: DISCONTINUED | OUTPATIENT
Start: 2021-07-29 | End: 2021-07-29 | Stop reason: HOSPADM

## 2021-07-29 RX ORDER — HYDROCODONE BITARTRATE AND ACETAMINOPHEN 10; 325 MG/1; MG/1
1 TABLET ORAL
Status: DISCONTINUED | OUTPATIENT
Start: 2021-07-29 | End: 2021-08-07 | Stop reason: HOSPADM

## 2021-07-29 RX ORDER — FENTANYL CITRATE 50 UG/ML
25 INJECTION, SOLUTION INTRAMUSCULAR; INTRAVENOUS
Status: DISCONTINUED | OUTPATIENT
Start: 2021-07-29 | End: 2021-07-29 | Stop reason: HOSPADM

## 2021-07-29 RX ORDER — MORPHINE SULFATE 2 MG/ML
2 INJECTION, SOLUTION INTRAMUSCULAR; INTRAVENOUS
Status: DISCONTINUED | OUTPATIENT
Start: 2021-07-29 | End: 2021-07-29 | Stop reason: HOSPADM

## 2021-07-29 RX ORDER — SODIUM CHLORIDE 0.9 % (FLUSH) 0.9 %
5-40 SYRINGE (ML) INJECTION AS NEEDED
Status: DISCONTINUED | OUTPATIENT
Start: 2021-07-29 | End: 2021-07-29 | Stop reason: HOSPADM

## 2021-07-29 RX ORDER — ETOMIDATE 2 MG/ML
INJECTION INTRAVENOUS AS NEEDED
Status: DISCONTINUED | OUTPATIENT
Start: 2021-07-29 | End: 2021-07-29 | Stop reason: HOSPADM

## 2021-07-29 RX ORDER — HYDROMORPHONE HYDROCHLORIDE 1 MG/ML
0.5 INJECTION, SOLUTION INTRAMUSCULAR; INTRAVENOUS; SUBCUTANEOUS
Status: DISCONTINUED | OUTPATIENT
Start: 2021-07-29 | End: 2021-08-07 | Stop reason: HOSPADM

## 2021-07-29 RX ORDER — ONDANSETRON 2 MG/ML
INJECTION INTRAMUSCULAR; INTRAVENOUS AS NEEDED
Status: DISCONTINUED | OUTPATIENT
Start: 2021-07-29 | End: 2021-07-29 | Stop reason: HOSPADM

## 2021-07-29 RX ORDER — HYDROMORPHONE HYDROCHLORIDE 1 MG/ML
0.2 INJECTION, SOLUTION INTRAMUSCULAR; INTRAVENOUS; SUBCUTANEOUS
Status: DISCONTINUED | OUTPATIENT
Start: 2021-07-29 | End: 2021-07-29 | Stop reason: HOSPADM

## 2021-07-29 RX ORDER — SODIUM CHLORIDE 0.9 % (FLUSH) 0.9 %
5-40 SYRINGE (ML) INJECTION EVERY 8 HOURS
Status: DISCONTINUED | OUTPATIENT
Start: 2021-07-29 | End: 2021-07-29 | Stop reason: HOSPADM

## 2021-07-29 RX ORDER — GLYCOPYRROLATE 0.2 MG/ML
INJECTION INTRAMUSCULAR; INTRAVENOUS AS NEEDED
Status: DISCONTINUED | OUTPATIENT
Start: 2021-07-29 | End: 2021-07-29 | Stop reason: HOSPADM

## 2021-07-29 RX ORDER — MIDAZOLAM HYDROCHLORIDE 1 MG/ML
0.5 INJECTION, SOLUTION INTRAMUSCULAR; INTRAVENOUS
Status: DISCONTINUED | OUTPATIENT
Start: 2021-07-29 | End: 2021-07-29 | Stop reason: HOSPADM

## 2021-07-29 RX ORDER — SUCCINYLCHOLINE CHLORIDE 20 MG/ML
INJECTION INTRAMUSCULAR; INTRAVENOUS AS NEEDED
Status: DISCONTINUED | OUTPATIENT
Start: 2021-07-29 | End: 2021-07-29 | Stop reason: HOSPADM

## 2021-07-29 RX ORDER — SODIUM CHLORIDE, SODIUM LACTATE, POTASSIUM CHLORIDE, CALCIUM CHLORIDE 600; 310; 30; 20 MG/100ML; MG/100ML; MG/100ML; MG/100ML
75 INJECTION, SOLUTION INTRAVENOUS CONTINUOUS
Status: DISCONTINUED | OUTPATIENT
Start: 2021-07-29 | End: 2021-07-29 | Stop reason: HOSPADM

## 2021-07-29 RX ORDER — ONDANSETRON 2 MG/ML
4 INJECTION INTRAMUSCULAR; INTRAVENOUS AS NEEDED
Status: DISCONTINUED | OUTPATIENT
Start: 2021-07-29 | End: 2021-07-29 | Stop reason: HOSPADM

## 2021-07-29 RX ORDER — SODIUM CHLORIDE 9 MG/ML
INJECTION, SOLUTION INTRAVENOUS
Status: DISCONTINUED | OUTPATIENT
Start: 2021-07-29 | End: 2021-07-29 | Stop reason: HOSPADM

## 2021-07-29 RX ORDER — SODIUM CHLORIDE 9 MG/ML
50 INJECTION, SOLUTION INTRAVENOUS CONTINUOUS
Status: DISCONTINUED | OUTPATIENT
Start: 2021-07-29 | End: 2021-07-29 | Stop reason: HOSPADM

## 2021-07-29 RX ORDER — LIDOCAINE HYDROCHLORIDE 20 MG/ML
INJECTION, SOLUTION EPIDURAL; INFILTRATION; INTRACAUDAL; PERINEURAL AS NEEDED
Status: DISCONTINUED | OUTPATIENT
Start: 2021-07-29 | End: 2021-07-29 | Stop reason: HOSPADM

## 2021-07-29 RX ORDER — ROCURONIUM BROMIDE 10 MG/ML
INJECTION, SOLUTION INTRAVENOUS AS NEEDED
Status: DISCONTINUED | OUTPATIENT
Start: 2021-07-29 | End: 2021-07-29 | Stop reason: HOSPADM

## 2021-07-29 RX ORDER — ALBUMIN HUMAN 250 G/1000ML
25 SOLUTION INTRAVENOUS ONCE
Status: COMPLETED | OUTPATIENT
Start: 2021-07-29 | End: 2021-07-29

## 2021-07-29 RX ORDER — FENTANYL CITRATE 50 UG/ML
INJECTION, SOLUTION INTRAMUSCULAR; INTRAVENOUS
Status: COMPLETED
Start: 2021-07-29 | End: 2021-07-29

## 2021-07-29 RX ADMIN — FENTANYL CITRATE 25 MCG: 50 INJECTION, SOLUTION INTRAMUSCULAR; INTRAVENOUS at 12:00

## 2021-07-29 RX ADMIN — ROCURONIUM BROMIDE 10 MG: 10 INJECTION INTRAVENOUS at 10:03

## 2021-07-29 RX ADMIN — Medication: at 17:00

## 2021-07-29 RX ADMIN — FENTANYL CITRATE 25 MCG: 50 INJECTION, SOLUTION INTRAMUSCULAR; INTRAVENOUS at 11:53

## 2021-07-29 RX ADMIN — FENTANYL CITRATE 25 MCG: 50 INJECTION, SOLUTION INTRAMUSCULAR; INTRAVENOUS at 12:13

## 2021-07-29 RX ADMIN — PREDNISOLONE ACETATE 1 DROP: 10 SUSPENSION/ DROPS OPHTHALMIC at 21:32

## 2021-07-29 RX ADMIN — SODIUM CHLORIDE 250 ML: 9 INJECTION, SOLUTION INTRAVENOUS at 01:02

## 2021-07-29 RX ADMIN — PIPERACILLIN AND TAZOBACTAM 3.38 G: 3; .375 INJECTION, POWDER, LYOPHILIZED, FOR SOLUTION INTRAVENOUS at 10:16

## 2021-07-29 RX ADMIN — LATANOPROST 1 DROP: 50 SOLUTION OPHTHALMIC at 20:57

## 2021-07-29 RX ADMIN — ONDANSETRON HYDROCHLORIDE 4 MG: 2 INJECTION, SOLUTION INTRAMUSCULAR; INTRAVENOUS at 11:00

## 2021-07-29 RX ADMIN — FENTANYL CITRATE 50 MCG: 50 INJECTION, SOLUTION INTRAMUSCULAR; INTRAVENOUS at 10:03

## 2021-07-29 RX ADMIN — LIDOCAINE HYDROCHLORIDE 60 MG: 20 INJECTION, SOLUTION EPIDURAL; INFILTRATION; INTRACAUDAL; PERINEURAL at 10:03

## 2021-07-29 RX ADMIN — ROCURONIUM BROMIDE 15 MG: 10 INJECTION INTRAVENOUS at 10:23

## 2021-07-29 RX ADMIN — HYDROMORPHONE HYDROCHLORIDE 0.5 MG: 1 INJECTION, SOLUTION INTRAMUSCULAR; INTRAVENOUS; SUBCUTANEOUS at 20:55

## 2021-07-29 RX ADMIN — SUCCINYLCHOLINE CHLORIDE 140 MG: 20 INJECTION, SOLUTION INTRAMUSCULAR; INTRAVENOUS at 10:03

## 2021-07-29 RX ADMIN — Medication 10 ML: at 14:00

## 2021-07-29 RX ADMIN — ETOMIDATE 20 MCG: 2 INJECTION, SOLUTION INTRAVENOUS at 10:03

## 2021-07-29 RX ADMIN — GLYCOPYRROLATE 0.4 MG: 0.2 INJECTION, SOLUTION INTRAMUSCULAR; INTRAVENOUS at 11:07

## 2021-07-29 RX ADMIN — FENTANYL CITRATE 50 MCG: 50 INJECTION, SOLUTION INTRAMUSCULAR; INTRAVENOUS at 10:15

## 2021-07-29 RX ADMIN — ALBUMIN (HUMAN) 25 G: 0.25 INJECTION, SOLUTION INTRAVENOUS at 01:20

## 2021-07-29 RX ADMIN — Medication 10 ML: at 22:00

## 2021-07-29 RX ADMIN — NEOSTIGMINE METHYLSULFATE 3 MG: 1 INJECTION, SOLUTION INTRAVENOUS at 11:07

## 2021-07-29 RX ADMIN — HYDROMORPHONE HYDROCHLORIDE 0.5 MG: 1 INJECTION, SOLUTION INTRAMUSCULAR; INTRAVENOUS; SUBCUTANEOUS at 17:06

## 2021-07-29 RX ADMIN — Medication: at 21:33

## 2021-07-29 RX ADMIN — INSULIN LISPRO 3 UNITS: 100 INJECTION, SOLUTION INTRAVENOUS; SUBCUTANEOUS at 16:57

## 2021-07-29 RX ADMIN — SODIUM CHLORIDE: 900 INJECTION, SOLUTION INTRAVENOUS at 09:30

## 2021-07-29 RX ADMIN — BRIMONIDINE TARTRATE 1 DROP: 2 SOLUTION OPHTHALMIC at 17:01

## 2021-07-29 RX ADMIN — PIPERACILLIN AND TAZOBACTAM 3.38 G: 3; .375 INJECTION, POWDER, LYOPHILIZED, FOR SOLUTION INTRAVENOUS at 21:32

## 2021-07-29 RX ADMIN — PREDNISOLONE ACETATE 1 DROP: 10 SUSPENSION/ DROPS OPHTHALMIC at 17:01

## 2021-07-29 NOTE — PERIOP NOTES
Handoff Report from Operating Room to PACU    Report received from Ghanshyam Heaton CRNA and Tiana Goodrich, JUAN FRANCISCO regarding Dell Fothergill. Surgeon(s):  Jean Johnson MD  And Procedure(s) (LRB):  APPENDECTOMY LAPAROSCOPIC (N/A)  confirmed   with allergies, drains and dressings discussed. Anesthesia type, drugs, patient history, complications, estimated blood loss, vital signs, intake and output, and last pain medication, lines, reversal medications and temperature were reviewed.

## 2021-07-29 NOTE — PROGRESS NOTES
Transition of Care Plan:     RUR: 18  Disposition: SNF  Follow up appointments: PCP, OP HD Sabiha Mcguire MWF at 6.30 AM  DME needed:Pt has access to cane and RW  Transportation at [address] will transport   [address] or means to access home:      Yes  IM Medicare letter:NA  Caregiver Contact: Facundo Witt Do [phone]  Discharge Caregiver contacted prior to [facility] call from [facility] from [facility] and she states they received auth . Informed her patient had surgery today and will not be coming today. Per daughter states patient has been vaccinated with both shots. Informed liason at [facility] of this. Contact number in case you cannot reach daughter, Imani Echevarria, from Friday of this week to Tuesday, she states you can reach her sister in [place] with information on their mother. Her sister name is Bryan Cano and her number is [phone]. She has a friend that she states is ok to get in touch with her and her name is Liberty Mueller and her number is [phone]. Anabell Stevenson  RN BSN CRM        [phone]

## 2021-07-29 NOTE — ANESTHESIA POSTPROCEDURE EVALUATION
Procedure(s):  APPENDECTOMY LAPAROSCOPIC.     general    Anesthesia Post Evaluation      Multimodal analgesia: multimodal analgesia used between 6 hours prior to anesthesia start to PACU discharge  Patient location during evaluation: PACU  Patient participation: complete - patient participated  Level of consciousness: awake and alert  Pain management: adequate  Airway patency: patent  Anesthetic complications: no  Cardiovascular status: acceptable  Respiratory status: acceptable  Hydration status: acceptable  Comments: Seen, no complaints   Post anesthesia nausea and vomiting:  none  Final Post Anesthesia Temperature Assessment:  Normothermia (36.0-37.5 degrees C)      INITIAL Post-op Vital signs:   Vitals Value Taken Time   /48 07/29/21 1200   Temp 36.4 °C (97.5 °F) 07/29/21 1137   Pulse 74 07/29/21 1215   Resp 11 07/29/21 1215   SpO2 100 % 07/29/21 1215

## 2021-07-29 NOTE — CONSULTS
[address]     Subjective: Damien Leray is a 66 y.o. female who presents for evaluation of abdominal pain. Patient has been hospitalized for almost 2 weeks primarily admitted with altered mental status and encephalopathy. Patient has multiple medical issues including end-stage renal disease, on Monday Wednesday Friday hemodialysis, has hypothyroidism, hypertension and diabetes mellitus. CT scan abdomen and pelvis on admission was unremarkable regarding the appendix but there was some rectal thickening seen by colorectal surgery with no specific findings. Patient had increasing abdominal pain as her mental status improved although still not normal, and CT scan late last night showed inflammatory changes around the appendix and ascending colon possible appendicitis. Patient is a very poor historian given her altered mental status. Past Medical History:   Diagnosis Date    Diabetes ([facility] [address]     Hypertension      Past Surgical History:   Procedure Laterality Date    MI CARDIAC SURG PROCEDURE UNLIST        No family history on file. Social History     Tobacco Use    Smoking status: Never Smoker    Smokeless tobacco: Never Used   Substance Use Topics    Alcohol use: Not Currently      Prior to Admission medications    Medication Sig Start Date End Date Taking? Authorizing Provider   levothyroxine (SYNTHROID) 50 mcg tablet TAKE 1 TABLET BY MOUTH EVERY DAY BEFORE BREAKFAST 7/5/21  Yes Emilia James., FRANCK   famotidine (PEPCID) 20 mg tablet TAKE 1 TAB BY MOUTH TWO (2) TIMES DAILY AS NEEDED FOR HEARTBURN. 7/5/21  Yes Emilia Georges NP   atorvastatin (LIPITOR) 40 mg tablet Take  by mouth daily. Yes Provider, Historical   amLODIPine (NORVASC) 5 mg tablet Take 5 mg by mouth daily. Yes Provider, Historical   aspirin delayed-release 81 mg tablet Take  by mouth daily. Yes Provider, Historical   metoprolol succinate (TOPROL-XL) 50 mg XL tablet Take  by mouth daily.    Yes Provider, Historical   brimonidine-timoloL (Combigan) 0.2-0.5 % drop ophthalmic solution 1 Drop every twelve (12) hours. Yes Provider, Historical   b complex-vitamin c-folic acid 0.8 mg (Dialyvite 800) 0.8 mg tab tablet Take 1 Tab by mouth daily. Patient not taking: Reported on 7/17/2021 5/11/21   Antonella Moody, FRANCK   isosorbide mononitrate (ISMO, MONOKET) 20 mg tablet Take 1 Tab by mouth two (2) times a day. Patient not taking: Reported on 7/16/2021 5/11/21   Antonella Moody, NP   Blood-Glucose Meter monitoring kit Use as directed. Dx: E11.65 check sugar twice daily 2/9/21   Antonella Moody, FRANCK   lancets misc Check sugar twice daily. E11.65 2/9/21   Antonella Moody, FRANCK   glucose blood VI test strips (ASCENSIA AUTODISC VI, ONE TOUCH ULTRA TEST VI) strip Check sugar twice daily 2/9/21   Antonella Diane., FRANCK   alcohol swabs padm 1 Each by Apply Externally route four (4) times daily. For use with insulin and glucometer  Patient not taking: Reported on 7/17/2021 2/9/21   Antonella Moody, FRANCK   insulin glargine (LANTUS,BASAGLAR) 100 unit/mL (3 mL) inpn 10 Units by SubCUTAneous route daily (with dinner). 2/9/21   Antonella Moody, FRANCK   Insulin Needles, Disposable, 32 gauge x 5/32\" ndle 1 Each by Does Not Apply route daily. 2/9/21   Antonella Moody, NP      No Known Allergies    Review of Systems   Unable to perform ROS: Mental status change   Constitutional:        Difficult historian and unreliable exam given mental status changes and patient confusion   Gastrointestinal: Positive for abdominal pain.        Objective:     Patient Vitals for the past 8 hrs:   BP Temp Pulse Resp SpO2 Weight   07/29/21 0726 (!) 133/57 99.1 °F (37.3 °C) 70 18 97 % --   07/29/21 0630 (!) 119/51 -- 68 -- 95 % --   07/29/21 0530 (!) 109/50 99.4 °F (37.4 °C) 70 -- -- 58.6 kg (129 lb 3 oz)   07/29/21 0330 (!) 101/47 99.4 °F (37.4 °C) 73 18 90 % --   07/29/21 0200 101/70 -- 83 -- -- --   07/29/21 0107 (!) 94/49 -- -- -- -- --       Temp (24hrs), Av °F (37.8 °C), Min:98 °F (36.7 °C), Max:102.5 °F (39.2 °C)      Physical Exam  Vitals and nursing note reviewed. Exam conducted with a chaperone present (Nurse practitioner Elise Kent present during exam and multiple discussions with the family). Constitutional:       Comments: Patient chronically ill-appearing   Cardiovascular:      Rate and Rhythm: Normal rate. Pulmonary:      Effort: Pulmonary effort is normal.   Abdominal:      Comments: Possible low midline scar difficult to tell may be just skin changes, abdomen diffusely tender by patient's response but otherwise soft most of the tenderness in this in the right lower abdomen, could be early peritoneal signs   Neurological:      Mental Status: She is alert. Comments: Patient will open her eyes and verbalizes somewhat coherently but mostly confused, mumbling, apparently was obtunded previously some may be somewhat improved now but still unreliable neurologic exam and consent         Assessment:     Active Problems:    Acute encephalopathy (2021)      Visual impairment (2021)      Physical debility (2021)      Counseling regarding advance care planning and goals of care (2021)      Acute appendicitis with localized peritonitis (2021)      End stage renal disease ([facility] [address] (2021)        Plan:     I had multiple discussions with the patient, 2 daughters, including Karla Alcantara, discussed possible appendicitis and options/recommendations for surgical intervention laparoscopy possible laparotomy possible appendix or other intestinal removal if patient/family wanted everything done but with the expectations that given her current medical condition that there certainly was increased risk of general morbidity mortality and failure to improve even postoperatively including risk of bleeding infection abscess and other poor outcome.   Patient/family also understood options of antibiotic therapy alone to see if this would help her improve understanding she may not, and patient deferred to the daughters and daughters after multiple discussions decided to proceed with surgical intervention as above. Will type and screen patient also. Patient may need to be on ventilator postoperatively in the intensive care unit expectations realistically reviewed with patient/daughter that she may not recover overall from all of this. 2. Discussed aspects of surgical intervention, methods, risks including by not limited to infection, bleeding, hematoma, and perforation of the intestines or solid organs,  reoperation ,  and other risk not limited to above,  and the risks of general anesthetic.   The patient understands the risks; any and all questions were answered to the patient's satisfaction and pt/daughter concurred and wanted to proceed with surgery    FACE TO FACE time including review of any indicated imaging, discussion with patient, and other providers, exam and discussion with patient:  66        Minutes    Signed By: Sully Diaz MD   [facility]    July 29, 2021

## 2021-07-29 NOTE — PROGRESS NOTES
200 Pt off floor to CT.    2240 Spoke with Daughter Joe Castellanos) to give update on pt.    0012 Notified NP Purveyor: Pt admitted with Sepsis and Acute encephalopathy. She had some abd pain during day shift, she hasn't complained of any pain for me. She was given a GI cocktail and morphine during the day. A KUB and CT of the abd were performed. She has also been febrile since 1805. We have been treating the fevers with Tylenol suppositories and Ice packs, it is gradually coming down. Her BP have been Fluctuating throughout the day especially during dialysis which was stopped d/t pt vomiting episode. She has now become hypotensive and current BP is 86/41. What do you recommend? 0028 Dr. Umair Montoya called in regards to pt CT results. CT shows consistent with acute appendicitis. There is no associated abscess or drainable fluid collection. Jose NP Purveyor notified that pt has appendicitis and surgery needs to be consulted. NP confirmed that she with call Surgery and place orders    NP placed orders to hold heparin and pt to be NPO. Albumin and 250ml bolus ordered for hypotension. 5614 Verbal report given to Steve Morgan RN(name) on [facility]  being transferred to OR(unit) for ordered procedure       Report consisted of patients Situation, Background, Assessment and   Recommendations(SBAR). Information from the following report(s) SBAR, Kardex, ED Summary, Intake/Output, MAR and Recent Results was reviewed with the receiving nurse. Informed that CVC has been confirmed via CXR that the catheter tip is in the region of the mid left brachiocephalic  vein. Consider advancing per radiology. White port has been in use (flushed/ positive blood return). [address] pt daughter Joe Castellanos) to give her and update on pt night and to be expecting a call from surgeon in regards to pt needing surgery this morning.          End of Shift Note    Bedside shift change report given to Marija Morales RN (oncoming nurse) by Nimesh Juan (offgoing nurse). Report included the following information SBAR, Kardex, ED Summary, Intake/Output, MAR and Recent Results    Shift worked:  [id number]     Shift summary and any significant changes:     See above note, Pt receiving tylenol/ice packs for elevated temps. CHG bath completed and CVC dressing changed. Concerns for physician to address:  Pt to receive appendectomy this morning     Zone phone for oncoming shift:          Activity:  Activity Level: Up with Assistance  Number times ambulated in hallways past shift: 0  Number of times OOB to chair past shift: 0    Cardiac:   Cardiac Monitoring: Yes      Cardiac Rhythm: Sinus Rhythm    Access:   Current line(s): central line     Genitourinary:   Urinary status: anuric    Respiratory:   O2 Device: None (Room air)  Chronic home O2 use?: NO  Incentive spirometer at bedside: NO     GI:  Last Bowel Movement Date: 07/27/21  Current diet:  DIET NPO  Passing flatus: YES  Tolerating current diet: YES       Pain Management:   Patient states pain is manageable on current regimen: YES    Skin:  Mick Score: 19  Interventions: float heels, increase time out of bed and PT/OT consult    Patient Safety:  Fall Score:  Total Score: 4  Interventions: bed/chair alarm, gripper socks, pt to call before getting OOB and stay with me (per policy)  High Fall Risk: Yes    Length of Stay:  Expected LOS: 4d 19h  Actual LOS: 2115 [employer]

## 2021-07-29 NOTE — PERIOP NOTES
TRANSFER - OUT REPORT:    Verbal report given to Amanda Chavis RN (name) on Ish Gilbert  being transferred to PCU (unit) for routine post - op       Report consisted of patients Situation, Background, Assessment and   Recommendations(SBAR). Information from the following report(s) SBAR, Kardex, OR Summary, Procedure Summary, Intake/Output, MAR and Cardiac Rhythm sinus rhythm was reviewed with the receiving nurse. Lines:   Triple Lumen CVC Triple Lumen 07/18/21 Anterior; Left Neck (Active)   Central Line Being Utilized No 07/29/21 0330   Criteria for Appropriate Use Limited/no vessel suitable for conventional peripheral access 07/29/21 0911   Site Assessment Clean, dry, & intact 07/29/21 1132   Infiltration Assessment 0 07/29/21 1132   Affected Extremity/Extremities Pulses palpable;Color distal to insertion site pink (or appropriate for race) 07/29/21 1132   Date of Last Dressing Change 07/29/21 07/29/21 1132   Dressing Status Clean, dry, & intact 07/29/21 1132   Dressing Type Disk with Chlorhexadine gluconate (CHG); Transparent 07/29/21 1132   Action Taken Open ports on tubing capped 07/29/21 0330   Proximal Hub Color/Line Status White; Infusing;Flushed;Patent 07/29/21 0911   Positive Blood Return (Medial Site) Yes 07/29/21 0911   Medial Hub Color/Line Status Blue;Capped 07/29/21 0911   Positive Blood Return (Lateral Site) No 07/29/21 0330   Distal Hub Color/Line Status Brown;Capped 07/29/21 0911   Positive Blood Return (Site #3) No 07/29/21 0330   Alcohol Cap Used Yes 07/29/21 0330        Opportunity for questions and clarification was provided.       Patient transported with:   Monitor  O2 @ 3 liters  Registered Nurse  [facility]

## 2021-07-29 NOTE — PROGRESS NOTES
Chart reviewed. Patient now off the floor for appendectomy. Will follow up tomorrow.     Gino Huerta, PT

## 2021-07-29 NOTE — PROGRESS NOTES
Received message from patient's nurse stating:    Pt admitted with Sepsis and Acute encephalopathy. She had some abd pain during day shift, she hasn't complained of any pain for me. She was given a GI cocktail and morphine during the day. A KUB and CT of the abd were performed. She has also been febrile since 1805. We have been treating the fevers with Tylenol suppositories and Ice packs, it is gradually coming down. Her BP have been Fluctuating throughout the day especially during dialysis which was stopped d/t pt vomiting episode. She has now become hypotensive and current BP is 86/41. Also CT of abdomen shows acute appendicitis         Discussion / orders:    Patient Vitals for the past 4 hrs:   Temp Pulse Resp BP SpO2   07/28/21 2356 (!) 100.6 °F (38.1 °C) 79 18 (!) 86/41 97 %   07/28/21 2100 (!) 100.6 °F (38.1 °C) -- -- -- --            · Normal saline 250 mL IV bolus  · Recent albumin level 2.7  · Albumin 25% 25 g IV x1  · Paged on-call general surgeon, Dr. Damon Conley with and discussed patient status and CT of abdomen results  Per Dr. Caitlin Ramires recommendation:  · NPO effective now  · Hold heparin subcu  · Continue Zosyn antibiotic  · He will pass on report to Dr. Darryl Pagan who will see patient 1st thing in the morning for surgery. Please note that this note was dictated using Dragon computer voice recognition software. Quite often unanticipated grammatical, syntax, homophones, and other interpretive errors are inadvertently transcribed by the computer software. Please disregard these errors. Please excuse any errors that have escaped final proofreading.

## 2021-07-29 NOTE — PERIOP NOTES
TRANSFER - IN REPORT:    Verbal report received from [facility] on Darroll Jean Pierre  being received from [phone] for ordered procedure      Report consisted of patients Situation, Background, Assessment and   Recommendations(SBAR). Information from the following report(s) SBAR, ED Summary, Procedure Summary, Intake/Output and MAR was reviewed with the receiving nurse. Opportunity for questions and clarification was provided. Assessment completed upon patients arrival to unit and care assumed.

## 2021-07-29 NOTE — PROGRESS NOTES
Piter Ana         NAME:Sanna Gaspar  LSU:[id number]   :1950                 Pt is off the floor for surgery  She had hd yesterday  Plan to continue hd MWF    D/w floor RN            Fabricio Morin MD  [facility]  [facility]  [facility] [address], [place]  [place], [address] [place]  Phone - [phone]         Fax - [phone] [facility] Office  [address] [place] [address]  Phone - [phone]        Fax - [phone]     [email or web address]

## 2021-07-29 NOTE — ANESTHESIA PREPROCEDURE EVALUATION
Relevant Problems   No relevant active problems       Anesthetic History   No history of anesthetic complications            Review of Systems / Medical History  Patient summary reviewed, nursing notes reviewed and pertinent labs reviewed    Pulmonary  Within defined limits                 Neuro/Psych   Within defined limits           Cardiovascular    Hypertension              Exercise tolerance: >4 METS     GI/Hepatic/Renal         Renal disease: dialysis and ESRD       Endo/Other    Diabetes: using insulin  Hypothyroidism       Other Findings   Comments: acute appendicitis         Physical Exam    Airway  Mallampati: III  TM Distance: 4 - 6 cm  Neck ROM: normal range of motion   Mouth opening: Normal     Cardiovascular          Murmur: Grade 1     Dental    Dentition: Poor dentition     Pulmonary  Breath sounds clear to auscultation               Abdominal  GI exam deferred       Other Findings            Anesthetic Plan    ASA: 4, emergent  Anesthesia type: general    Monitoring Plan: BIS        Anesthetic plan and risks discussed with: Patient

## 2021-07-29 NOTE — PROGRESS NOTES
Hospitalist Progress Note    NAME: Yola Smith   :  1950   MRN:  [id number]       Assessment / Plan:    Acute abdominal pain  Acute appendicitis perforated with generalized peritonitis  Right lower quadrant abdomen abscess  -Patient started having abdominal pain last evening, ordered a CT of the abdomen which shows acute appendicitis. -General surgery was consulted, patient was kept n.p.o. yesterday, got surgery today. -S/p laparoscopic appendicectomy with drainage of the right lower quadrant abscess and abdominal washout-.  -General surgery on board-appreciate input. -CT abdomen/pelvis-findings suggestive of acute appendicitis. -Continue Zosyn. -WBC 14.3, will check it again tomorrow in the a.m. Sepsis unknown source POA  Acute encephalopathy   -Febrile and altered mental status on admission, some neck stiffness noted and patient was started on empiric coverage for meningitis  -Appreciate neurology consult  -ammonia mildly elevated 38 on admision, normalized  -Also had lumbar puncture, CSF was clear, cultures negative  -Antibiotics discontinued  -Patient now agitated more awake  -EEG captured no seizures, triphasic waves suggest possible hepatic/renal encephalopathy  -T bili 1.6, normalized, LFTs normal   -Covid testing negative  -encephalopathy improving, more alert awake  -passed bedside swallow, regular diet  -appreciate GI consult  -Ultrasound with elastography's reveals mild stages of liver fibrosis. Outpatient follow-up with GI as per the recommendations. CT a/p  (admission):  1. Circumferential rectal wall thickening is suspicious for malignancy. No bowel  obstruction. 2. No other acute abnormality in the abdomen or pelvis. Uterine fibroids are  noted. 3. Moderate cardiomegaly. MRI brain:  Encephalomalacia and white matter disease.  No acute intracranial abnormality    ?seizures  -no seizure captured on EEG, triphasic waves point to hepatic/renal encephalopathy as per neuro  -on lose dose keppra, hold and observe    Diarrhea  -Resolved. ESRD  -appreciate nephro following  -HD per nephro    Incidental finding of rectal wall thickening on CT A/P  -appreciate CRS eval  -patient can f/u outpatient, no intervention needed during this admission     Difficult IV access: Central line placed on 07/18  Hypokalemia  Resolved    DMII  HTN  CAD  FS monitoring, SS     Eye surgery in may : glaucoma       Code Status: Full  Appreciate palliative team following    Surrogate Decision Maker: Daughter  DVT Prophylaxis: SCD  GI Prophylaxis: not indicated  Baseline: Independent     25.0 - 29.9 Overweight / Body mass index is 25.08 kg/m².     Estimated discharge date: July 26  Barriers: Currently very sick  Updated daughter at bedside on 07/19  Updated daughter over the phone on 07/17 and 07/18  Code status: Full  Prophylaxis: Hep SQ  Recommended Disposition: TBD     Subjective:     Chief Complaint / Reason for Physician Visit  Patient seen today complaining of the abdominal pain, awake and alert, no complaints. Has a low-grade fever today and high-grade last evening. No acute events overnight. Discussed with RN events overnight. Objective:     VITALS:   Last 24hrs VS reviewed since prior progress note.  Most recent are:  Patient Vitals for the past 24 hrs:   Temp Pulse Resp BP SpO2   07/29/21 1251 97.6 °F (36.4 °C) 68 15 (!) 142/54 100 %   07/29/21 1230 97.5 °F (36.4 °C) 73 15 (!) 128/53 100 %   07/29/21 1215 -- 74 11 (!) 141/50 100 %   07/29/21 1200 -- 73 15 (!) 141/48 100 %   07/29/21 1150 -- 75 17 (!) 155/60 100 %   07/29/21 1145 -- 75 12 (!) 127/58 95 %   07/29/21 1140 -- 80 12 (!) 149/56 100 %   07/29/21 1137 97.5 °F (36.4 °C) 78 13 (!) 142/52 100 %   07/29/21 1135 -- 80 17 (!) 142/52 --   07/29/21 1133 -- -- -- (!) 148/79 --   07/29/21 0903 99.7 °F (37.6 °C) 70 18 (!) 126/53 95 %   07/29/21 0726 99.1 °F (37.3 °C) 70 18 (!) 133/57 97 %   07/29/21 0630 -- 68 -- (!) 119/51 95 % 07/29/21 0530 99.4 °F (37.4 °C) 70 -- (!) 109/50 --   07/29/21 0330 99.4 °F (37.4 °C) 73 18 (!) 101/47 90 %   07/29/21 0200 -- 83 -- 101/70 --   07/29/21 0107 -- -- -- (!) 94/49 --   07/29/21 0050 (!) 100.5 °F (38.1 °C) -- -- -- --   07/28/21 2356 (!) 100.6 °F (38.1 °C) 79 18 (!) 86/41 97 %   07/28/21 2100 (!) 100.6 °F (38.1 °C) -- -- -- --   07/28/21 2022 (!) 102 °F (38.9 °C) 77 18 (!) 110/51 92 %   07/28/21 1844 (!) 102.5 °F (39.2 °C) 87 16 (!) 110/41 94 %   07/28/21 1805 (!) 101.3 °F (38.5 °C) 90 16 (!) 140/89 --   07/28/21 1745 -- 95 16 (!) 182/102 --   07/28/21 1730 -- 89 16 (!) 154/74 --   07/28/21 1715 -- 83 16 117/80 --   07/28/21 1700 -- 81 16 (!) 142/60 --   07/28/21 1645 -- 75 16 (!) 137/58 --   07/28/21 1630 -- 73 16 137/65 --   07/28/21 1615 -- 69 16 134/64 --   07/28/21 1603 -- 86 16 107/69 --   07/28/21 1600 -- 69 16 (!) 82/22 --   07/28/21 1545 -- 71 16 (!) 141/69 --   07/28/21 1530 -- 74 16 (!) 159/62 --   07/28/21 1523 98.2 °F (36.8 °C) 72 16 (!) 169/79 --   07/28/21 1424 98.4 °F (36.9 °C) 74 17 (!) 139/57 95 %       Intake/Output Summary (Last 24 hours) at 7/29/2021 1358  Last data filed at 7/29/2021 1116  Gross per 24 hour   Intake 500 ml   Output -415 ml   Net 915 ml        I had a face to face encounter and independently examined this patient on 7/29/2021, as outlined below:  PHYSICAL EXAM:  General: Adult  AA female, awake, alert  EENT:  EOMI. Anicteric sclerae. MMM  Resp:  CTA bilaterally, no wheezing or rales. No accessory muscle use  CV:  Regular  rhythm,  No edema  GI:  Soft,  diffused tenderness on palpation. +Bowel sounds  Neurologic:  Awake, alert, oriented x 2, moves all extremities and follows some commands. No obvious focal deficits   Psych:   deferred  Skin:  No rashes.   No jaundice    Reviewed most current lab test results and cultures  YES  Reviewed most current radiology test results   YES  Review and summation of old records today    NO  Reviewed patient's current orders and MAR    YES  PMH/SH reviewed - no change compared to H&P  ________________________________________________________________________  Care Plan discussed with:    Comments   Patient x    Family      RN x    Care Manager x    Consultant                       x Multidiciplinary team rounds were held today with , nursing, pharmacist and clinical coordinator. Patient's plan of care was discussed; medications were reviewed and discharge planning was addressed. ________________________________________________________________________  Total NON critical care TIME:  36  Minutes    Total CRITICAL CARE TIME Spent:   Minutes non procedure based      Comments   >50% of visit spent in counseling and coordination of care x    ________________________________________________________________________  Marycarmen Ramirez MD     Procedures: see electronic medical records for all procedures/Xrays and details which were not copied into this note but were reviewed prior to creation of Plan. LABS:  I reviewed today's most current labs and imaging studies.   Pertinent labs include:  Recent Labs     07/28/21 1850 07/28/21  1533   WBC 13.7* 14.4*   HGB 10.9* 10.8*   HCT 35.1 35.1   * 150     Recent Labs     07/28/21 1850 07/28/21  1538 07/27/21  0457   * 133* 136   K 4.2 4.3 3.9   CL 97 94* 97   CO2 30 31 33*   * 192* 82   BUN 24* 45* 22*   CREA 4.79* 7.49* 5.12*   CA 8.9 8.9 8.5   PHOS  --  2.4*  --    ALB 2.7* 2.7* 2.9*   TBILI 0.8  --  0.7   ALT 12  --  14       Signed: Marycarmen Ramirez MD

## 2021-07-29 NOTE — OP NOTES
FULL Operative Note    Patient: Lorri aJmes MRN: [id number]  SSN: [id number]    YOB: 1950  Age: 66 y.o. Sex: female      Date of Surgery: 7/29/2021     Preoperative Diagnosis: acute appendicitis     Postoperative Diagnosis: acute appendicitis perforated with generalized peritonitis and right lower quadrant abdomen abscess    Surgeon(s) and Role:     Anabella Escobar MD - Primary    Surgical Staff: Circ-1: Romaine Farm  Scrub Tech-1: Elisabet Jackson  Surg Asst-1: [facility]     Anesthesia: General     Procedure: Procedure(s):  APPENDECTOMY LAPAROSCOPIC with drainage right lower quadrant abdominal abscess and abdominal washout    FINDINGS: Perforated mid to proximal body appendicitis with generalized peritonitis and right lower quadrant abdominal walled off abscess    Indications: The patient was admitted to the hospital with altered mental status several weeks ago but has now developed appendicitis  Discussed with the patient and daughter is caregiver  the risk of surgery including infection, hematoma, bleeding, recurrence or persistence of symptoms or and other risks listed in H and P,  and the risks of general anesthetic including Myocardial Infarction, Cerebrovascular Accident, sudden death, or even reaction to anesthetic medications. The patient understands the risk that the procedure could be an open procedure. The patient understands the risks, any and all questions were answered to the patient's satisfaction, and they freely signed the consent for operation. Procedure in Detail: Patient was under general anesthesia received IV Zosyn abdomen prepped and draped with ChloraPrep and site  verification and consent reviewed with the operative team. In addition tap block and local injection of Marcaine was performed around the trocar sites and under direct visualization.  A 5 mm nonbloody trocar was placed in the infraumbilical area with the 5 mm, 30 degree laparoscope into the peritoneum without difficulty or injury under direct air insufflation. The liver appeared normal. There was generalized pus throughout the pelvis. A 5 mm nonbladed trocar was placed in the left lower abdomen and a 12 mm nonbladed trocar in the right upper abdomen all under direct visualization and later an additional 5 mm trocar for retraction was placed in the left mid abdomen. All trochars placed identically under direct visualization without injury. The visualized pus in the pelvis and peritoneum was irrigated and aspirated away and using the 5 mm LigaSure and gentle retraction the omentum was taken off the right lower abdomen uncovering an abscess and purulent debris around the appendix. The appendix clearly was perforated and divided in the mid to proximal body and the mid and distal aspects of the appendix were resected bluntly and with Hydro dissection and with use of the 5 mm LigaSure removing the entire appendix mid body and distally as much as could be noted but the appendix was clearly already perforated and gangrenous in the perforated disconnected area. What appeared to be the proximal aspect of the appendix was piecemeal removed back to what appeared to be healthy base at the cecum which was ligated and divided using the laparoscopic ELI 45 blue cartridge at the cecum but this was very inflammatory and out of respect and concern of further dissection and potential injury to the cecum and terminal ileum and this 67-year-old patient with multiple medical issues, the appendix was divided as close to the cecum as could be safely and visually performed without further injury and this portion of the resection was all sent with the original appendix specimen.  There was no further visible appendiceal stump or abscess and the operative sites including pelvis were irrigated with copious amounts of saline and evacuated and a 19 round channel drain placed extending from the pelvis to the right lower abdomen periappendiceal area exiting the left mid trocar site and secured to the skin using nylon suture. No evidence of complications on inspection throughout the abdomen no evidence of perforation or intestinal injury,and 12 m trocar site closed with 0 Vicryl suture and suture passer full-thickness under direct visualization without injury. Carbon dioxide evacuated subcutaneous tissue irrigated with saline and skin closed with 4-0 Monocryl subcuticular suture and dressed with Dermabond and patient awakened and taken to recovery in stable condition. I reviewed the operative findings with the patient's daughter Jess Valdivia  and expectations of patient may well appear sicker over the next few days but at risk for abscess and complications. Estimated Blood Loss:  *20 mL    Tourniquet Time: * No tourniquets in log *      Implants: * No implants in log *            Specimens:   ID Type Source Tests Collected by Time Destination   1 : appendix Preservative Appendix  Latrice Lewis MD 7/29/2021 1043 Pathology           Drains: 19 channel drain pelvis right lower abdomen                Complications: None    Counts: Sponge and needle counts were correct times two.     Dionicio Cortes MD

## 2021-07-30 ENCOUNTER — APPOINTMENT (OUTPATIENT)
Dept: INTERVENTIONAL RADIOLOGY/VASCULAR | Age: 71
DRG: 853 | End: 2021-07-30
Attending: INTERNAL MEDICINE
Payer: MEDICARE

## 2021-07-30 ENCOUNTER — APPOINTMENT (OUTPATIENT)
Dept: GENERAL RADIOLOGY | Age: 71
DRG: 853 | End: 2021-07-30
Attending: STUDENT IN AN ORGANIZED HEALTH CARE EDUCATION/TRAINING PROGRAM
Payer: MEDICARE

## 2021-07-30 ENCOUNTER — APPOINTMENT (OUTPATIENT)
Dept: CT IMAGING | Age: 71
DRG: 853 | End: 2021-07-30
Attending: STUDENT IN AN ORGANIZED HEALTH CARE EDUCATION/TRAINING PROGRAM
Payer: MEDICARE

## 2021-07-30 LAB
ABO + RH BLD: NORMAL
ALBUMIN SERPL-MCNC: 2.1 G/DL (ref 3.5–5)
ALBUMIN SERPL-MCNC: 2.3 G/DL (ref 3.5–5)
ALBUMIN/GLOB SERPL: 0.4 {RATIO} (ref 1.1–2.2)
ALBUMIN/GLOB SERPL: 0.5 {RATIO} (ref 1.1–2.2)
ALP SERPL-CCNC: 67 U/L (ref 45–117)
ALP SERPL-CCNC: 70 U/L (ref 45–117)
ALT SERPL-CCNC: 10 U/L (ref 12–78)
ALT SERPL-CCNC: 9 U/L (ref 12–78)
ANION GAP SERPL CALC-SCNC: 6 MMOL/L (ref 5–15)
ANION GAP SERPL CALC-SCNC: 8 MMOL/L (ref 5–15)
AST SERPL-CCNC: 14 U/L (ref 15–37)
AST SERPL-CCNC: 14 U/L (ref 15–37)
BASOPHILS # BLD: 0 K/UL (ref 0–0.1)
BASOPHILS # BLD: 0 K/UL (ref 0–0.1)
BASOPHILS NFR BLD: 0 % (ref 0–1)
BASOPHILS NFR BLD: 0 % (ref 0–1)
BILIRUB SERPL-MCNC: 0.8 MG/DL (ref 0.2–1)
BILIRUB SERPL-MCNC: 1.4 MG/DL (ref 0.2–1)
BLOOD GROUP ANTIBODIES SERPL: NORMAL
BUN SERPL-MCNC: 44 MG/DL (ref 6–20)
BUN SERPL-MCNC: 48 MG/DL (ref 6–20)
BUN/CREAT SERPL: 6 (ref 12–20)
BUN/CREAT SERPL: 6 (ref 12–20)
CALCIUM SERPL-MCNC: 8.6 MG/DL (ref 8.5–10.1)
CALCIUM SERPL-MCNC: 8.7 MG/DL (ref 8.5–10.1)
CHLORIDE SERPL-SCNC: 100 MMOL/L (ref 97–108)
CHLORIDE SERPL-SCNC: 101 MMOL/L (ref 97–108)
CO2 SERPL-SCNC: 27 MMOL/L (ref 21–32)
CO2 SERPL-SCNC: 29 MMOL/L (ref 21–32)
CREAT SERPL-MCNC: 7.02 MG/DL (ref 0.55–1.02)
CREAT SERPL-MCNC: 7.7 MG/DL (ref 0.55–1.02)
DIFFERENTIAL METHOD BLD: ABNORMAL
DIFFERENTIAL METHOD BLD: ABNORMAL
EOSINOPHIL # BLD: 0.1 K/UL (ref 0–0.4)
EOSINOPHIL # BLD: 0.1 K/UL (ref 0–0.4)
EOSINOPHIL NFR BLD: 1 % (ref 0–7)
EOSINOPHIL NFR BLD: 1 % (ref 0–7)
ERYTHROCYTE [DISTWIDTH] IN BLOOD BY AUTOMATED COUNT: 18.7 % (ref 11.5–14.5)
ERYTHROCYTE [DISTWIDTH] IN BLOOD BY AUTOMATED COUNT: 18.9 % (ref 11.5–14.5)
GLOBULIN SER CALC-MCNC: 4.7 G/DL (ref 2–4)
GLOBULIN SER CALC-MCNC: 4.7 G/DL (ref 2–4)
GLUCOSE BLD STRIP.AUTO-MCNC: 126 MG/DL (ref 65–117)
GLUCOSE BLD STRIP.AUTO-MCNC: 139 MG/DL (ref 65–117)
GLUCOSE SERPL-MCNC: 108 MG/DL (ref 65–100)
GLUCOSE SERPL-MCNC: 79 MG/DL (ref 65–100)
HCT VFR BLD AUTO: 30.7 % (ref 35–47)
HCT VFR BLD AUTO: 31.1 % (ref 35–47)
HGB BLD-MCNC: 9.4 G/DL (ref 11.5–16)
HGB BLD-MCNC: 9.6 G/DL (ref 11.5–16)
IMM GRANULOCYTES # BLD AUTO: 0.1 K/UL (ref 0–0.04)
IMM GRANULOCYTES # BLD AUTO: 0.1 K/UL (ref 0–0.04)
IMM GRANULOCYTES NFR BLD AUTO: 1 % (ref 0–0.5)
IMM GRANULOCYTES NFR BLD AUTO: 1 % (ref 0–0.5)
LACTATE SERPL-SCNC: 2.4 MMOL/L (ref 0.4–2)
LYMPHOCYTES # BLD: 1.7 K/UL (ref 0.8–3.5)
LYMPHOCYTES # BLD: 1.9 K/UL (ref 0.8–3.5)
LYMPHOCYTES NFR BLD: 10 % (ref 12–49)
LYMPHOCYTES NFR BLD: 13 % (ref 12–49)
MCH RBC QN AUTO: 27.1 PG (ref 26–34)
MCH RBC QN AUTO: 27.3 PG (ref 26–34)
MCHC RBC AUTO-ENTMCNC: 30.6 G/DL (ref 30–36.5)
MCHC RBC AUTO-ENTMCNC: 30.9 G/DL (ref 30–36.5)
MCV RBC AUTO: 88.4 FL (ref 80–99)
MCV RBC AUTO: 88.5 FL (ref 80–99)
MONOCYTES # BLD: 1 K/UL (ref 0–1)
MONOCYTES # BLD: 1.2 K/UL (ref 0–1)
MONOCYTES NFR BLD: 7 % (ref 5–13)
MONOCYTES NFR BLD: 7 % (ref 5–13)
NEUTS SEG # BLD: 11.6 K/UL (ref 1.8–8)
NEUTS SEG # BLD: 13.3 K/UL (ref 1.8–8)
NEUTS SEG NFR BLD: 78 % (ref 32–75)
NEUTS SEG NFR BLD: 81 % (ref 32–75)
NRBC # BLD: 0 K/UL (ref 0–0.01)
NRBC # BLD: 0 K/UL (ref 0–0.01)
NRBC BLD-RTO: 0 PER 100 WBC
NRBC BLD-RTO: 0 PER 100 WBC
PLATELET # BLD AUTO: 126 K/UL (ref 150–400)
PLATELET # BLD AUTO: 127 K/UL (ref 150–400)
PMV BLD AUTO: 12.1 FL (ref 8.9–12.9)
PMV BLD AUTO: 12.3 FL (ref 8.9–12.9)
POTASSIUM SERPL-SCNC: 5.2 MMOL/L (ref 3.5–5.1)
POTASSIUM SERPL-SCNC: 5.9 MMOL/L (ref 3.5–5.1)
PROT SERPL-MCNC: 6.8 G/DL (ref 6.4–8.2)
PROT SERPL-MCNC: 7 G/DL (ref 6.4–8.2)
RBC # BLD AUTO: 3.47 M/UL (ref 3.8–5.2)
RBC # BLD AUTO: 3.52 M/UL (ref 3.8–5.2)
SERVICE CMNT-IMP: ABNORMAL
SERVICE CMNT-IMP: ABNORMAL
SODIUM SERPL-SCNC: 135 MMOL/L (ref 136–145)
SODIUM SERPL-SCNC: 136 MMOL/L (ref 136–145)
SPECIMEN EXP DATE BLD: NORMAL
WBC # BLD AUTO: 14.6 K/UL (ref 3.6–11)
WBC # BLD AUTO: 16.5 K/UL (ref 3.6–11)

## 2021-07-30 PROCEDURE — 71045 X-RAY EXAM CHEST 1 VIEW: CPT

## 2021-07-30 PROCEDURE — 99024 POSTOP FOLLOW-UP VISIT: CPT | Performed by: SURGERY

## 2021-07-30 PROCEDURE — 80053 COMPREHEN METABOLIC PANEL: CPT

## 2021-07-30 PROCEDURE — 77010033678 HC OXYGEN DAILY

## 2021-07-30 PROCEDURE — C1892 INTRO/SHEATH,FIXED,PEEL-AWAY: HCPCS

## 2021-07-30 PROCEDURE — 95816 EEG AWAKE AND DROWSY: CPT | Performed by: STUDENT IN AN ORGANIZED HEALTH CARE EDUCATION/TRAINING PROGRAM

## 2021-07-30 PROCEDURE — 82962 GLUCOSE BLOOD TEST: CPT

## 2021-07-30 PROCEDURE — 65660000000 HC RM CCU STEPDOWN

## 2021-07-30 PROCEDURE — 97530 THERAPEUTIC ACTIVITIES: CPT

## 2021-07-30 PROCEDURE — 36415 COLL VENOUS BLD VENIPUNCTURE: CPT

## 2021-07-30 PROCEDURE — 74011250636 HC RX REV CODE- 250/636: Performed by: STUDENT IN AN ORGANIZED HEALTH CARE EDUCATION/TRAINING PROGRAM

## 2021-07-30 PROCEDURE — 97164 PT RE-EVAL EST PLAN CARE: CPT

## 2021-07-30 PROCEDURE — 76937 US GUIDE VASCULAR ACCESS: CPT

## 2021-07-30 PROCEDURE — C1752 CATH,HEMODIALYSIS,SHORT-TERM: HCPCS

## 2021-07-30 PROCEDURE — 02HV33Z INSERTION OF INFUSION DEVICE INTO SUPERIOR VENA CAVA, PERCUTANEOUS APPROACH: ICD-10-PCS | Performed by: STUDENT IN AN ORGANIZED HEALTH CARE EDUCATION/TRAINING PROGRAM

## 2021-07-30 PROCEDURE — 74011250636 HC RX REV CODE- 250/636: Performed by: SURGERY

## 2021-07-30 PROCEDURE — 77030002996 HC SUT SLK J&J -A

## 2021-07-30 PROCEDURE — 97168 OT RE-EVAL EST PLAN CARE: CPT | Performed by: OCCUPATIONAL THERAPIST

## 2021-07-30 PROCEDURE — 97530 THERAPEUTIC ACTIVITIES: CPT | Performed by: OCCUPATIONAL THERAPIST

## 2021-07-30 PROCEDURE — C1769 GUIDE WIRE: HCPCS

## 2021-07-30 PROCEDURE — 95819 EEG AWAKE AND ASLEEP: CPT | Performed by: PSYCHIATRY & NEUROLOGY

## 2021-07-30 PROCEDURE — 2709999900 HC NON-CHARGEABLE SUPPLY

## 2021-07-30 PROCEDURE — 74011000250 HC RX REV CODE- 250: Performed by: STUDENT IN AN ORGANIZED HEALTH CARE EDUCATION/TRAINING PROGRAM

## 2021-07-30 PROCEDURE — 85025 COMPLETE CBC W/AUTO DIFF WBC: CPT

## 2021-07-30 PROCEDURE — C9113 INJ PANTOPRAZOLE SODIUM, VIA: HCPCS | Performed by: STUDENT IN AN ORGANIZED HEALTH CARE EDUCATION/TRAINING PROGRAM

## 2021-07-30 PROCEDURE — 74011000258 HC RX REV CODE- 258: Performed by: STUDENT IN AN ORGANIZED HEALTH CARE EDUCATION/TRAINING PROGRAM

## 2021-07-30 PROCEDURE — 83605 ASSAY OF LACTIC ACID: CPT

## 2021-07-30 PROCEDURE — 86901 BLOOD TYPING SEROLOGIC RH(D): CPT

## 2021-07-30 PROCEDURE — 74011250636 HC RX REV CODE- 250/636: Performed by: INTERNAL MEDICINE

## 2021-07-30 RX ORDER — LORAZEPAM 2 MG/ML
2 INJECTION INTRAMUSCULAR
Status: DISCONTINUED | OUTPATIENT
Start: 2021-07-30 | End: 2021-08-07 | Stop reason: HOSPADM

## 2021-07-30 RX ORDER — NOREPINEPHRINE BITARTRATE/D5W 8 MG/250ML
.5-16 PLASTIC BAG, INJECTION (ML) INTRAVENOUS
Status: DISCONTINUED | OUTPATIENT
Start: 2021-07-30 | End: 2021-08-01

## 2021-07-30 RX ORDER — HEPARIN SODIUM 200 [USP'U]/100ML
400 INJECTION, SOLUTION INTRAVENOUS ONCE
Status: DISPENSED | OUTPATIENT
Start: 2021-07-30 | End: 2021-07-30

## 2021-07-30 RX ORDER — LORAZEPAM 2 MG/ML
2 INJECTION INTRAMUSCULAR ONCE
Status: COMPLETED | OUTPATIENT
Start: 2021-07-30 | End: 2021-07-30

## 2021-07-30 RX ORDER — LIDOCAINE HYDROCHLORIDE 20 MG/ML
18 INJECTION, SOLUTION INFILTRATION; PERINEURAL ONCE
Status: COMPLETED | OUTPATIENT
Start: 2021-07-30 | End: 2021-07-30

## 2021-07-30 RX ORDER — FLUCONAZOLE 2 MG/ML
200 INJECTION, SOLUTION INTRAVENOUS EVERY 24 HOURS
Status: DISCONTINUED | OUTPATIENT
Start: 2021-07-30 | End: 2021-08-04

## 2021-07-30 RX ORDER — HEPARIN 100 UNIT/ML
300 SYRINGE INTRAVENOUS ONCE
Status: COMPLETED | OUTPATIENT
Start: 2021-07-30 | End: 2021-07-30

## 2021-07-30 RX ORDER — LORAZEPAM 2 MG/ML
2 INJECTION INTRAMUSCULAR ONCE
Status: DISCONTINUED | OUTPATIENT
Start: 2021-07-30 | End: 2021-07-30 | Stop reason: SDUPTHER

## 2021-07-30 RX ORDER — SODIUM CHLORIDE 9 MG/ML
50 INJECTION, SOLUTION INTRAVENOUS CONTINUOUS
Status: DISCONTINUED | OUTPATIENT
Start: 2021-07-30 | End: 2021-07-31

## 2021-07-30 RX ADMIN — NOREPINEPHRINE BITARTRATE 4 MCG/MIN: 1 INJECTION, SOLUTION, CONCENTRATE INTRAVENOUS at 21:21

## 2021-07-30 RX ADMIN — PIPERACILLIN AND TAZOBACTAM 3.38 G: 3; .375 INJECTION, POWDER, LYOPHILIZED, FOR SOLUTION INTRAVENOUS at 10:48

## 2021-07-30 RX ADMIN — LIDOCAINE HYDROCHLORIDE 360 MG: 20 INJECTION, SOLUTION INFILTRATION; PERINEURAL at 16:35

## 2021-07-30 RX ADMIN — NOREPINEPHRINE BITARTRATE 5 MCG/MIN: 1 INJECTION, SOLUTION, CONCENTRATE INTRAVENOUS at 20:05

## 2021-07-30 RX ADMIN — SODIUM CHLORIDE 50 ML/HR: 9 INJECTION, SOLUTION INTRAVENOUS at 11:01

## 2021-07-30 RX ADMIN — PREDNISOLONE ACETATE 1 DROP: 10 SUSPENSION/ DROPS OPHTHALMIC at 11:01

## 2021-07-30 RX ADMIN — Medication 10 ML: at 17:49

## 2021-07-30 RX ADMIN — NOREPINEPHRINE BITARTRATE 3 MCG/MIN: 1 INJECTION, SOLUTION, CONCENTRATE INTRAVENOUS at 22:36

## 2021-07-30 RX ADMIN — BRIMONIDINE TARTRATE 1 DROP: 2 SOLUTION OPHTHALMIC at 11:36

## 2021-07-30 RX ADMIN — TIMOLOL MALEATE 1 DROP: 2.5 SOLUTION/ DROPS OPHTHALMIC at 11:01

## 2021-07-30 RX ADMIN — NOREPINEPHRINE BITARTRATE 2 MCG/MIN: 1 INJECTION, SOLUTION, CONCENTRATE INTRAVENOUS at 23:38

## 2021-07-30 RX ADMIN — LATANOPROST 1 DROP: 50 SOLUTION OPHTHALMIC at 20:13

## 2021-07-30 RX ADMIN — EPOETIN ALFA-EPBX 4000 UNITS: 4000 INJECTION, SOLUTION INTRAVENOUS; SUBCUTANEOUS at 21:23

## 2021-07-30 RX ADMIN — NOREPINEPHRINE BITARTRATE 3 MCG/MIN: 1 INJECTION, SOLUTION, CONCENTRATE INTRAVENOUS at 20:42

## 2021-07-30 RX ADMIN — LORAZEPAM 2 MG: 2 INJECTION INTRAMUSCULAR; INTRAVENOUS at 14:47

## 2021-07-30 RX ADMIN — LORAZEPAM 2 MG: 2 INJECTION INTRAMUSCULAR; INTRAVENOUS at 16:05

## 2021-07-30 RX ADMIN — Medication: at 11:37

## 2021-07-30 RX ADMIN — HYDROMORPHONE HYDROCHLORIDE 0.5 MG: 1 INJECTION, SOLUTION INTRAMUSCULAR; INTRAVENOUS; SUBCUTANEOUS at 13:06

## 2021-07-30 RX ADMIN — PIPERACILLIN AND TAZOBACTAM 3.38 G: 3; .375 INJECTION, POWDER, LYOPHILIZED, FOR SOLUTION INTRAVENOUS at 18:42

## 2021-07-30 RX ADMIN — Medication 10 ML: at 22:00

## 2021-07-30 RX ADMIN — SODIUM CHLORIDE 250 ML: 9 INJECTION, SOLUTION INTRAVENOUS at 14:05

## 2021-07-30 RX ADMIN — PREDNISOLONE ACETATE 1 DROP: 10 SUSPENSION/ DROPS OPHTHALMIC at 21:25

## 2021-07-30 RX ADMIN — HYDROMORPHONE HYDROCHLORIDE 0.5 MG: 1 INJECTION, SOLUTION INTRAMUSCULAR; INTRAVENOUS; SUBCUTANEOUS at 03:29

## 2021-07-30 RX ADMIN — NOREPINEPHRINE BITARTRATE 4 MCG/MIN: 1 INJECTION, SOLUTION, CONCENTRATE INTRAVENOUS at 20:12

## 2021-07-30 RX ADMIN — SODIUM CHLORIDE, PRESERVATIVE FREE 300 UNITS: 5 INJECTION INTRAVENOUS at 16:56

## 2021-07-30 RX ADMIN — LEVETIRACETAM 500 MG: 100 INJECTION, SOLUTION INTRAVENOUS at 15:56

## 2021-07-30 RX ADMIN — NOREPINEPHRINE BITARTRATE 4 MCG/MIN: 1 INJECTION, SOLUTION, CONCENTRATE INTRAVENOUS at 15:46

## 2021-07-30 RX ADMIN — Medication: at 17:51

## 2021-07-30 RX ADMIN — SODIUM CHLORIDE 40 MG: 9 INJECTION, SOLUTION INTRAMUSCULAR; INTRAVENOUS; SUBCUTANEOUS at 09:00

## 2021-07-30 RX ADMIN — FLUCONAZOLE 200 MG: 200 INJECTION, SOLUTION INTRAVENOUS at 11:36

## 2021-07-30 RX ADMIN — Medication: at 21:26

## 2021-07-30 NOTE — PROGRESS NOTES
Bedside and Verbal shift change report given to trent (oncoming nurse) by Lia (offgoing nurse). Report included the following information SBAR, Kardex, Intake/Output, Recent Results and Quality Measures. Rapid response in IR for hypotension and seizures. Eventually put on a vasopressor and IV keppra. IR placed a kadie and another IJ.

## 2021-07-30 NOTE — PROGRESS NOTES
Problem: Mobility Impaired (Adult and Pediatric)  Goal: *Acute Goals and Plan of Care (Insert Text)  Description: FUNCTIONAL STATUS PRIOR TO ADMISSION: Patient was modified independent using a rollator for functional mobility. Independent with dressing and bathing. Used rollator when going to HD.    HOME SUPPORT PRIOR TO ADMISSION: The patient lived with family who assisted with cooking and meals. 1 story home with 4 steps and bilateral rails. Physical Therapy Goals  Revised 7/30/2021  1. Patient will move from supine to sit and sit to supine , scoot up and down, and roll side to side in bed with minimal assistance/contact guard assist within 7 day(s). 2.  Patient will transfer from bed to chair and chair to bed with minimal assistance/contact guard assist using the least restrictive device within 7 day(s). 3.  Patient will perform sit to stand with minimal assistance/contact guard assist within 7 day(s). 4.  Patient will ambulate with minimal assistance/contact guard assist for 105 feet with the least restrictive device within 7 day(s). Physical Therapy Goals  Initiated 7/26/2021  1. Patient will move from supine to sit and sit to supine , scoot up and down, and roll side to side in bed with modified independence within 7 day(s). 2.  Patient will transfer from bed to chair and chair to bed with supervision/set-up using the least restrictive device within 7 day(s). 3.  Patient will perform sit to stand with modified independence within 7 day(s). 4.  Patient will ambulate with supervision/set-up for 250 feet with the least restrictive device within 7 day(s). 5.  Patient will ascend/descend 4 stairs with 1 handrail(s) with minimal assistance/contact guard assist within 7 day(s). Outcome: Not Met  PHYSICAL THERAPY REEVALUATION  Patient:  Prince Cooney (89 y.o. female)  Date: 7/30/2021  Primary Diagnosis: Acute encephalopathy [G93.40]  Procedure(s) (LRB):  APPENDECTOMY LAPAROSCOPIC (N/A) 1 Day Post-Op   Precautions:  Bed Alarm, Fall, Contact (MRSA)      ASSESSMENT  Based on the objective data described below, the patient presents with generalized weakness, post op abdominal pain, confusion but pleasant/cooperative, decreased activity tolerance and decreased mobility skill below pre-surgery levels. She was able to be assisted to sitting on edge of bed with fair sitting balance; needed assist to stand and side step toward head of bed and then assisted back into bed and repositioned for comfort. Will need SNF level rehab upon discharge. Current Level of Function Impacting Discharge (mobility/balance): max a x 2 supine to sit(log roll), mod a sit to stand and to sidestep; total assist sit to supine(log roll)    Functional Outcome Measure: The patient scored 20/100 on the Barthel outcome measure which is indicative of 80% functional impairment and severe dependence for ADLs and mobility. Other factors to consider for discharge: modified independent PLOF with rollator; lives with family. Patient will benefit from skilled therapy intervention to address the above noted impairments. PLAN :  Recommendations and Planned Interventions: bed mobility training, transfer training, gait training, therapeutic exercises, neuromuscular re-education, patient and family training/education, and therapeutic activities      Frequency/Duration: Patient will be followed by physical therapy:  4 times a week to address goals.     Recommendation for discharge: (in order for the patient to meet his/her long term goals)  Therapy up to 5 days/week in SNF setting    This discharge recommendation:  Has been made in collaboration with the attending provider and/or case management    Equipment recommendations for successful discharge (if) home: bedside commode and wheelchair       OBJECTIVE DATA SUMMARY:   HISTORY:    Past Medical History:   Diagnosis Date    Diabetes ([facility] [address]     Hypertension      Past Surgical History: Procedure Laterality Date      Jadiel  course since last seen and reason for reevaluation: developed appendicitis with peritonitis; s/p lap appendectomy yesterday. Personal factors and/or comorbidities impacting plan of care: dm, htn, increased fall risk, blindness    Home Situation  Home Environment: Private residence  # Steps to Enter: 4  Rails to Enter: Yes  Hand Rails : Right  Wheelchair Ramp: No  One/Two Story Residence: One story  Living Alone: No  Support Systems: Child(russell)  Patient Expects to be Discharged to[de-identified] House  Current DME Used/Available at Home: Jolaine Russ, rollator, Cane, straight  Tub or Shower Type: Tub/Shower combination    EXAMINATION/PRESENTATION/DECISION MAKING:   Critical Behavior:  Neurologic State: Drowsy, Confused  Orientation Level: Disoriented to person  Cognition: Follows commands  Safety/Judgement: Fall prevention, Decreased awareness of need for safety, Decreased insight into deficits  Hearing: Auditory  Auditory Impairment: None  Skin:  dressings CDI on abdomen  Edema: none  Range Of Motion:  AROM: Generally decreased, functional           PROM: Within functional limits           Strength:    Strength: Generally decreased, functional                    Tone & Sensation:   Tone: Normal              Sensation:  (unable to accurately test)               Coordination:  Coordination: Generally decreased, functional (weak)  Vision:   Acuity: Impaired near vision (poor vision, legally blind)  Functional Mobility:  Bed Mobility:  Rolling: Maximum assistance; Additional time;Assist x2 (log roll)  Supine to Sit: Maximum assistance; Additional time;Assist x2;Total assistance (log roll)  Sit to Supine: Total assistance; Additional time;Assist x2  Scooting: Total assistance  Transfers:  Sit to Stand:  Moderate assistance  Stand to Sit: Moderate assistance                       Balance:   Sitting: Impaired  Sitting - Static: Fair (occasional)  Sitting - Dynamic: Fair (occasional)  Standing: Impaired  Standing - Static: Constant support; Fair  Standing - Dynamic : Poor  Ambulation/Gait Training:              Gait Description (WDL):  (side stepped to L toward head of bed)     Functional Measure:  Barthel Index:    Bathin  Bladder: 5  Bowels: 5  Groomin  Dressin  Feedin  Mobility: 0  Stairs: 0  Toilet Use: 0  Transfer (Bed to Chair and Back): 5  Total: 20/100       The Barthel ADL Index: Guidelines  1. The index should be used as a record of what a patient does, not as a record of what a patient could do. 2. The main aim is to establish degree of independence from any help, physical or verbal, however minor and for whatever reason. 3. The need for supervision renders the patient not independent. 4. A patient's performance should be established using the best available evidence. Asking the patient, friends/relatives and nurses are the usual sources, but direct observation and common sense are also important. However direct testing is not needed. 5. Usually the patient's performance over the preceding 24-48 hours is important, but occasionally longer periods will be relevant. 6. Middle categories imply that the patient supplies over 50 per cent of the effort. 7. Use of aids to be independent is allowed. Pleasant [employer]., Barthel, D.W. (0938). Functional evaluation: the Barthel Index. [address] [facility] (14)2. DARINEL Sawant, [employer] Ladonna Thornton., [place], [address] [place] [address] (). Measuring the change indisability after inpatient rehabilitation; comparison of the responsiveness of the Barthel Index and Functional Colstrip Measure. Journal of Neurology, Neurosurgery, and Psychiatry, 66(4), 195-488. CARISSA Taylor.A, ANTIONE Quiroga, & Dinesh Peraza MPedroA. (2004.) Assessment of post-stroke quality of life in cost-effectiveness studies: The usefulness of the Barthel Index and the EuroQoL-5D.  [facility], 13, 714-61 Pain Rating:  No pain at rest in abdomen; 6/10 on FACE scale during transfers. Activity Tolerance:   Poor, SpO2 stable on RA, and limited by pain and confusion    After treatment patient left in no apparent distress:   Supine in bed, Heels elevated for pressure relief, Call bell within reach, Bed / chair alarm activated, Side rails x 3, and bed in semi-durate    COMMUNICATION/EDUCATION:   The patients plan of care was discussed with: Occupational therapist and Registered nurse. Fall prevention education was provided and the patient/caregiver indicated understanding., Patient/family have participated as able in goal setting and plan of care. , and Patient/family agree to work toward stated goals and plan of care.     Thank you for this referral.  Merlin Dias, PT   Time Calculation: 18 mins

## 2021-07-30 NOTE — PROGRESS NOTES
Hospitalist Progress Note    NAME: Lynnann Aase   :  1950   MRN:  [id number]       Assessment / Plan:    Rapid code secondary to hypovolemic shock  Acute seizure  -Gave 500 bolus of fluid, blood pressure still low with map around 50. started on Levophed. -Consulted intensivist-appreciate recommendations.  -Currently the patient is in the IR recovery getting the Liam catheter dialysis along with a new central line. -Brief episode of seizure, gave Ativan 2 mg.  -Reconsulted neurology.  -On Keppra 500 mg twice daily. - EEG stat  - Ct head order and pending     Acute abdominal pain  Acute appendicitis perforated with generalized peritonitis  Right lower quadrant abdomen abscess  -Patient started having abdominal pain last evening, ordered a CT of the abdomen which shows acute appendicitis. -General surgery was consulted, patient was kept n.p.o. yesterday, got surgery today. -S/p laparoscopic appendicectomy with drainage of the right lower quadrant abscess and abdominal washout-.  -General surgery on board-appreciate input. -CT abdomen/pelvis-findings suggestive of acute appendicitis. -Continue Zosyn. -WBC increased to 16.5, general surgery added fluconazole along with Zosyn. Afebrile, will check it again in a.m. Sepsis unknown source POA  Acute encephalopathy   -Febrile and altered mental status on admission, some neck stiffness noted and patient was started on empiric coverage for meningitis  -Appreciate neurology consult  -ammonia mildly elevated 38 on admision, normalized  -Also had lumbar puncture, CSF was clear, cultures negative  -Antibiotics discontinued for the initial sepsis. -EEG captured no seizures, triphasic waves suggest possible hepatic/renal encephalopathy  -T bili 1.6, normalized, LFTs normal   -Covid testing negative  -encephalopathy slightly worsened today because of the surgery diversion yesterday because of the sepsis from the appendicitis.   Probably will need a day or 2 for improvement of the mental status. -passed bedside swallow, regular diet  -appreciate GI consult  -Ultrasound with elastography's reveals mild stages of liver fibrosis. Outpatient follow-up with GI as per the recommendations. CT a/p 7/16 (admission):  1. Circumferential rectal wall thickening is suspicious for malignancy. No bowel  obstruction. 2. No other acute abnormality in the abdomen or pelvis. Uterine fibroids are  noted. 3. Moderate cardiomegaly. MRI brain:  Encephalomalacia and white matter disease. No acute intracranial abnormality    ?seizures  -no seizure captured on EEG, triphasic waves point to hepatic/renal encephalopathy as per neuro  -on lose dose keppra, hold and observe    Diarrhea  -Resolved. ESRD  -appreciate nephro following  -Patient dialysis port is clotted, so vascular surgery is consulted. -Fistulogram ordered by the vascular surgery. -Get in touch with the family who is ready to give the consent for the [facility] dialysis catheter. Notified IR about it. -HD per nephro    Incidental finding of rectal wall thickening on CT A/P  -appreciate CRS eval  -patient can f/u outpatient, no intervention needed during this admission     Difficult IV access: Central line placed on 07/18  Hypokalemia  Resolved    DMII  HTN  CAD  FS monitoring, SS     Eye surgery in may : glaucoma       Code Status: Full  Appreciate palliative team following    Surrogate Decision Maker: Daughter  DVT Prophylaxis: SCD  GI Prophylaxis: not indicated  Baseline: Independent     25.0 - 29.9 Overweight / Body mass index is 25.08 kg/m².     Estimated discharge date: July 26  Barriers: Currently very sick  Updated daughter at bedside on 07/19  Updated daughter over the phone on 07/17 and 07/18  Code status: Full  Prophylaxis: Hep SQ  Recommended Disposition: TBD     Subjective:     Chief Complaint / Reason for Physician Visit  Patient seen today, drowsy, awake but not oriented, slightly confused.   No agitation, afebrile. No acute events overnight. Discussed with RN events overnight. Objective:     VITALS:   Last 24hrs VS reviewed since prior progress note. Most recent are:  Patient Vitals for the past 24 hrs:   Temp Pulse Resp BP SpO2   07/30/21 1406 -- 77 22 (!) 63/54 93 %   07/30/21 1351 -- 76 18 (!) 78/28 93 %   07/30/21 1350 -- 73 16 (!) 68/31 93 %   07/30/21 1300 -- 74 16 131/62 --   07/30/21 1245 -- 71 15 (!) 124/52 --   07/30/21 1230 -- 70 24 (!) 120/58 92 %   07/30/21 1215 -- 69 24 (!) 100/46 94 %   07/30/21 1201 -- 71 18 (!) 115/49 92 %   07/30/21 1042 99 °F (37.2 °C) 71 15 (!) 101/42 90 %   07/30/21 0944 -- 77 -- (!) 122/59 --   07/30/21 0938 -- 73 -- (!) 111/52 94 %   07/30/21 0306 98.4 °F (36.9 °C) -- 16 (!) 113/45 100 %   07/30/21 0126 -- -- -- (!) 110/50 --   07/29/21 2318 98.2 °F (36.8 °C) 80 12 96/74 97 %   07/29/21 2016 97.6 °F (36.4 °C) 73 19 120/60 100 %   07/29/21 1659 97.8 °F (36.6 °C) 71 19 (!) 119/51 99 %       Intake/Output Summary (Last 24 hours) at 7/30/2021 1453  Last data filed at 7/30/2021 0656  Gross per 24 hour   Intake --   Output 120 ml   Net -120 ml        I had a face to face encounter and independently examined this patient on 7/30/2021, as outlined below:  PHYSICAL EXAM:  General: Adult  AA female, awake, but not oriented  EENT:  EOMI. Anicteric sclerae. MMM  Resp:  CTA bilaterally, no wheezing or rales. No accessory muscle use  CV:  Regular  rhythm,  No edema  GI:  Soft,  diffused tenderness on palpation. +Bowel sounds  Neurologic:  Awake, alert, oriented x 1, moves all extremities and follows some commands. No obvious focal deficits   Psych:   deferred  Skin:  No rashes.   No jaundice    Reviewed most current lab test results and cultures  YES  Reviewed most current radiology test results   YES  Review and summation of old records today    NO  Reviewed patient's current orders and MAR    YES  PMH/SH reviewed - no change compared to H&P  ________________________________________________________________________  Care Plan discussed with:    Comments   Patient x    Family      RN x    Care Manager x    Consultant                       x Multidiciplinary team rounds were held today with , nursing, pharmacist and clinical coordinator. Patient's plan of care was discussed; medications were reviewed and discharge planning was addressed. ________________________________________________________________________  Total NON critical care TIME:    Minutes    Total CRITICAL CARE TIME Spent: 50  Minutes non procedure based-measurement is explained above. Comments   >50% of visit spent in counseling and coordination of care x    ________________________________________________________________________  Adali Edmonds MD     Procedures: see electronic medical records for all procedures/Xrays and details which were not copied into this note but were reviewed prior to creation of Plan. LABS:  I reviewed today's most current labs and imaging studies.   Pertinent labs include:  Recent Labs     07/30/21  1422 07/30/21 0332 07/28/21 1850   WBC 14.6* 16.5* 13.7*   HGB 9.4* 9.6* 10.9*   HCT 30.7* 31.1* 35.1   * 126* 121*     Recent Labs     07/30/21  0332 07/28/21  1850 07/28/21  1538   * 134* 133*   K 5.2* 4.2 4.3    97 94*   CO2 29 30 31   GLU 79 156* 192*   BUN 44* 24* 45*   CREA 7.02* 4.79* 7.49*   CA 8.7 8.9 8.9   PHOS  --   --  2.4*   ALB 2.3* 2.7* 2.7*   TBILI 1.4* 0.8  --    ALT 9* 12  --        Signed: Adali Edmonds MD

## 2021-07-30 NOTE — PROGRESS NOTES
Vascular surgery can't do declot today  We will consult IR for kadie cath placement  Hopefully she will get declot on Monday      Dorie Myrick MD

## 2021-07-30 NOTE — PROGRESS NOTES
2:34 PM Orders to transfer patient to medical floor canceled d/t patient hypotensive and requiring vasopressors. Transfer to stepdown order placed, per protocol, d/t patient requiring higher level of care.  Patient to be evaluated by intensivist.

## 2021-07-30 NOTE — PROGRESS NOTES
TRANSFER - OUT REPORT:    Verbal report given to Yvonne Mina RN(name) on Luz Buckley  being transferred to [facility](unit) for routine progression of care       Report consisted of patients Situation, Background, Assessment and   Recommendations(SBAR). Information from the following report(s) Procedure Summary was reviewed with the receiving nurse. Lines:   Triple Lumen CVC Triple Lumen 07/18/21 Anterior; Left Neck (Active)   Central Line Being Utilized Yes 07/30/21 0740   Criteria for Appropriate Use Limited/no vessel suitable for conventional peripheral access 07/30/21 0740   Site Assessment Clean, dry, & intact 07/30/21 0740   Infiltration Assessment 0 07/30/21 0740   Affected Extremity/Extremities Color distal to insertion site pink (or appropriate for race); Pulses palpable;Range of motion performed 07/30/21 0740   Date of Last Dressing Change 07/29/21 07/30/21 0740   Dressing Status Clean, dry, & intact 07/30/21 0740   Dressing Type Disk with Chlorhexadine gluconate (CHG); Tape;Transparent 07/30/21 0740   Action Taken Open ports on tubing capped 07/30/21 0740   Proximal Hub Color/Line Status White;Capped; Patent 07/30/21 0740   Positive Blood Return (Medial Site) Yes 07/30/21 0740   Medial Hub Color/Line Status Blue;Capped 07/30/21 0740   Positive Blood Return (Lateral Site) No 07/30/21 0740   Distal Hub Color/Line Status Brown;Capped 07/30/21 0740   Positive Blood Return (Site #3) No 07/30/21 0740   Alcohol Cap Used Yes 07/30/21 0740        Opportunity for questions and clarification was provided. Patient transported with:   O2 @ 10 liters, non rebreather.

## 2021-07-30 NOTE — PROGRESS NOTES
Admit Date: 7/16/2021      POD 1 Day Post-Op  7/29/2021      Procedure:  Procedure(s):  APPENDECTOMY LAPAROSCOPIC        HOSPITAL DAY:     ANTIBIOTICS: Zosyn, Diflucan  HPI:  Patient with altered mental status as before, difficult to get accurate history or symptom complex, apparently patient was made n.p.o. for clotted vascular graft, otherwise I am okay with her on diet of choice    Review of Systems   Unable to perform ROS: Mental status change   Constitutional:        Patient says her abdominal area hurts but difficult to read or get a good gauge of this based on altered mental status   Patient opens eyes but not very conversant this morning abdomen flat soft      Temp:  [97.5 °F (36.4 °C)-102.5 °F (39.2 °C)]   Pulse (Heart Rate):  [64-95]   BP: ()/()   Resp Rate:  [9-19]   O2 Sat (%):  [90 %-100 %]   Weight:  [58.6 kg (129 lb 3 oz)-59.4 kg (130 lb 15.3 oz)]     Physical Exam  Tenderness actually less than yesterday, incisions clean PRATIK drain serosanguineous nonbilious minimal output    Active Problems:    Acute encephalopathy (7/16/2021)      Visual impairment (7/22/2021)      Physical debility (7/22/2021)      Counseling regarding advance care planning and goals of care (7/22/2021)      Acute appendicitis with localized peritonitis (7/29/2021)      End stage renal disease ([facility] [address] (7/29/2021)      Acute appendicitis with generalized peritonitis, abscess, and gangrene (7/29/2021)          ASSESSMENT/PLAN  Multiple medical issues end-stage renal disease now with perforated abscessed appendicitis status post laparoscopic appendectomy and abscess drainage yesterday, continue IV antibiotics I have added Diflucan given the extensive peritonitis, follow-up white blood cell count, follow exam and symptoms as much as possible but difficult given patient's altered mental status, diet of choice when okay with vascular surgery or other issues, overall prognosis still guarded, I have reviewed with patient/daughter Lorrie  this morning      FACE TO FACE time including review of any indicated imaging, discussion with patient, and other providers, exam and discussion with patient:   20         minutes    END:

## 2021-07-30 NOTE — PROGRESS NOTES
Comprehensive Nutrition Assessment    Type and Reason for Visit: Reassess    Nutrition Recommendations/Plan:   Advance to Consistent carb, 2g Na, low potassium diet  Add Nepro BID    Nutrition Assessment:     Chart reviewed; medically noted for acute encephalopathy and appendicitis s/p appendectomy. PMH ESRD on HD and DM. Currently NPO due to clotted AV graft; plans for kadie placement. Okay for PO per surgery. Diet recommendations above. K+ slightly elevated today. Would resume Nepro supplements given fluctuating PO intake. Patient Vitals for the past 168 hrs:   % Diet Eaten   07/28/21 1234 1 - 25%   07/28/21 1026 1 - 25%   07/27/21 0936 51 - 75%   07/26/21 1734 51 - 75%   07/26/21 1550 76 - 100%   07/26/21 1050 0%   07/25/21 1832 51 - 75%   07/25/21 1124 76 - 100%   07/25/21 0836 76 - 100%   07/24/21 1825 26 - 50%   07/24/21 1430 26 - 50%   07/23/21 1654 0%     Estimated Daily Nutrient Needs:  Energy (kcal): 1465 kcal (25 kcal/kg); Weight Used for Energy Requirements: Current  Protein (g): 89g (1.5 g/kg bw); Weight Used for Protein Requirements: Current  Fluid (ml/day): 1450 mL; Method Used for Fluid Requirements: 1 ml/kcal    Nutrition Related Findings:    Na 135, K+ 5.2, BG 63--159-179  BM 7/29  Humalog, Synthroid, Protonix       Wounds:    Surgical incision       Current Nutrition Therapies:  DIET NPO    Anthropometric Measures:  · Height:  5' 2\" (157.5 cm)  · Current Body Wt:  58.6 kg (129 lb 3 oz)   · BMI Category:  Normal weight (BMI 18.5-24. 9)       Nutrition Diagnosis:   No nutrition diagnosis at this time     Nutrition Interventions:   Food and/or Nutrient Delivery: Start oral diet, Start oral nutrition supplement  Nutrition Education and Counseling: No recommendations at this time  Coordination of Nutrition Care: No recommendation at this time    Goals:  Diet advanced and PO >50% of meals/supplement next 3-5 days       Nutrition Monitoring and Evaluation:   Behavioral-Environmental Outcomes: None identified  Food/Nutrient Intake Outcomes: Food and nutrient intake, Diet advancement/tolerance, Supplement intake  Physical Signs/Symptoms Outcomes: Biochemical data, Weight    Discharge Planning:     Too soon to determine     Electronically signed by Telma Hurtado RD on 7/30/2021 at 12:00 PM    Contact: ext [phone]

## 2021-07-30 NOTE — CONSULTS
Vascular Surgery Consult Note  7/30/2021    Subjective: Svetlana Ryder is a 66 y.o. female with a past medical history significant for carotid stenosis, end-stage renal failure, diabetes mellitus, coronary artery disease, hypertension, hyperlipidemia, paroxysmal atrial fibrillation, thyroid disease, stroke, rheumatoid arthritis, and sleep apnea. She is admitted to the hospital with sepsis secondary to appendicitis. She is status post appendectomy on July 29, 2021. We have been asked to evaluate for a clotted RUE AVG. She was formally receiving care in [place]. Past medical history  Moderate right carotid stenosis  End-stage renal failure  Anemia   Diabetes mellitus   ASHD   -Status post CABG 2017  Hypertension  Hyperlipidemia  Paroxysmal atrial fibrillation   Hypothyroidism  Stroke   Rheumatoid arthritis  Obstructive sleep apnea  Recurrent sternal wound   -developed a chronic track at suture closure site that extended to the sternal wires  -wound cultures were significant for MRSA/Enterococcus/Serratia/E. coli beginning in September 2017 until debridement of wound/muscle/bone and removal of sternal wire in January 2020. Past surgical history in addition to above  Right Ax-Ax AV Loop Graft 2020  Failed left Br-Ax AVG 08/2018  Appendectomy July 29, 2021    Family history  Mother with diabetes and father with hypertension     Social History     Tobacco Use    Smoking status: Never Smoker    Smokeless tobacco: Never Used   Substance Use Topics    Alcohol use: Not Currently       She is . She ambulates with a rolling walker. Pt's daughter Lavonne Beverage number is [phone] or [phone]. She also has a dtr Darren Warren. She has a supportive sister Gail Diane and her number is [phone]. She has a friend that she states is ok to get in touch with her and her name is Kimmy Salazar and her number is [phone].     Prior to Admission medications    Medication Sig Start Date End Date Taking? Authorizing Provider   levothyroxine (SYNTHROID) 50 mcg tablet TAKE 1 TABLET BY MOUTH EVERY DAY BEFORE BREAKFAST 7/5/21  Yes Anuja Manning, FRANCK   famotidine (PEPCID) 20 mg tablet TAKE 1 TAB BY MOUTH TWO (2) TIMES DAILY AS NEEDED FOR HEARTBURN. 7/5/21  Yes Anuja Manning, FRANCK   atorvastatin (LIPITOR) 40 mg tablet Take  by mouth daily. Yes Provider, Historical   amLODIPine (NORVASC) 5 mg tablet Take 5 mg by mouth daily. Yes Provider, Historical   aspirin delayed-release 81 mg tablet Take  by mouth daily. Yes Provider, Historical   metoprolol succinate (TOPROL-XL) 50 mg XL tablet Take  by mouth daily. Yes Provider, Historical   brimonidine-timoloL (Combigan) 0.2-0.5 % drop ophthalmic solution 1 Drop every twelve (12) hours. Yes Provider, Historical   b complex-vitamin c-folic acid 0.8 mg (Dialyvite 800) 0.8 mg tab tablet Take 1 Tab by mouth daily. Patient not taking: Reported on 7/17/2021 5/11/21   Anuja Manning, FRANCK   isosorbide mononitrate (ISMO, MONOKET) 20 mg tablet Take 1 Tab by mouth two (2) times a day. Patient not taking: Reported on 7/16/2021 5/11/21   Anuja Manning, FRANCK   Blood-Glucose Meter monitoring kit Use as directed. Dx: E11.65 check sugar twice daily 2/9/21   Anuja Manning, FRANCK   lancets misc Check sugar twice daily. E11.65 2/9/21   Anuja Manning, FRANCK   glucose blood VI test strips (ASCENSIA AUTODISC VI, ONE TOUCH ULTRA TEST VI) strip Check sugar twice daily 2/9/21   Anuja Manning, NP   alcohol swabs padm 1 Each by Apply Externally route four (4) times daily. For use with insulin and glucometer  Patient not taking: Reported on 7/17/2021 2/9/21   Anuja Manning, NP   insulin glargine (LANTUS,BASAGLAR) 100 unit/mL (3 mL) inpn 10 Units by SubCUTAneous route daily (with dinner). 2/9/21   Anuja Manning, NP   Insulin Needles, Disposable, 32 gauge x 5/32\" ndle 1 Each by Does Not Apply route daily.  2/9/21   Viveca Roxanne MAIN NP     No Known Allergies     Review of Systems   Unable to perform ROS: Mental status change     Objective:       Patient Vitals for the past 24 hrs:   BP Temp Pulse Resp SpO2   07/30/21 1042 (!) 101/42 99 °F (37.2 °C) 71 15 90 %   07/30/21 0944 (!) 122/59 -- 77 -- --   07/30/21 0938 (!) 111/52 -- 73 -- 94 %   07/30/21 0306 (!) 113/45 98.4 °F (36.9 °C) -- 16 100 %   07/30/21 0126 (!) 110/50 -- -- -- --   07/29/21 2318 96/74 98.2 °F (36.8 °C) 80 12 97 %   07/29/21 2016 120/60 97.6 °F (36.4 °C) 73 19 100 %   07/29/21 1659 (!) 119/51 97.8 °F (36.6 °C) 71 19 99 %   07/29/21 1251 (!) 142/54 97.6 °F (36.4 °C) 68 15 100 %   07/29/21 1230 (!) 128/53 97.5 °F (36.4 °C) 73 15 100 %   07/29/21 1215 (!) 141/50 -- 74 11 100 %   07/29/21 1200 (!) 141/48 -- 73 15 100 %   07/29/21 1150 (!) 155/60 -- 75 17 100 %   07/29/21 1145 (!) 127/58 -- 75 12 95 %   07/29/21 1140 (!) 149/56 -- 80 12 100 %   07/29/21 1137 (!) 142/52 97.5 °F (36.4 °C) 78 13 100 %   07/29/21 1135 (!) 142/52 -- 80 17 --   07/29/21 1133 (!) 148/79 -- -- -- --     Physical Exam  Constitutional:       Appearance: She is normal weight. HENT:      Head: Normocephalic. Nose: Nose normal.      Mouth/Throat:      Mouth: Mucous membranes are moist.   Eyes:      Pupils: Pupils are equal, round, and reactive to light. Cardiovascular:      Rate and Rhythm: Normal rate. Rhythm irregular. Arteriovenous access: right arteriovenous access is present. Comments: RUE AVG w/o palpable thrill. Pulmonary:      Effort: Pulmonary effort is normal. No respiratory distress. Abdominal:      General: Abdomen is flat. Tenderness: There is abdominal tenderness (As expected). Comments: Abdominal incision   Musculoskeletal:         General: Normal range of motion. Cervical back: Normal range of motion. Skin:     General: Skin is warm. Neurological:      Mental Status: She is disoriented.    Psychiatric:         Mood and Affect: Mood normal. Behavior: Behavior normal.       Pertinent Test Results:   Recent Results (from the past 24 hour(s))   GLUCOSE, POC    Collection Time: 07/29/21 11:40 AM   Result Value Ref Range    Glucose (POC) 159 (H) 65 - 117 mg/dL    Performed by [address], POC    Collection Time: 07/29/21  4:50 PM   Result Value Ref Range    Glucose (POC) 226 (H) 65 - 117 mg/dL    Performed by Haydee GIL    GLUCOSE, POC    Collection Time: 07/29/21  9:12 PM   Result Value Ref Range    Glucose (POC) 74 65 - 117 mg/dL    Performed by Pola Abreu RN    CBC WITH AUTOMATED DIFF    Collection Time: 07/30/21  3:32 AM   Result Value Ref Range    WBC 16.5 (H) 3.6 - 11.0 K/uL    RBC 3.52 (L) 3.80 - 5.20 M/uL    HGB 9.6 (L) 11.5 - 16.0 g/dL    HCT 31.1 (L) 35.0 - 47.0 %    MCV 88.4 80.0 - 99.0 FL    MCH 27.3 26.0 - 34.0 PG    MCHC 30.9 30.0 - 36.5 g/dL    RDW 18.7 (H) 11.5 - 14.5 %    PLATELET 087 (L) 837 - 400 K/uL    MPV 12.3 8.9 - 12.9 FL    NRBC 0.0 0  WBC    ABSOLUTE NRBC 0.00 0.00 - 0.01 K/uL    NEUTROPHILS 81 (H) 32 - 75 %    LYMPHOCYTES 10 (L) 12 - 49 %    MONOCYTES 7 5 - 13 %    EOSINOPHILS 1 0 - 7 %    BASOPHILS 0 0 - 1 %    IMMATURE GRANULOCYTES 1 (H) 0.0 - 0.5 %    ABS. NEUTROPHILS 13.3 (H) 1.8 - 8.0 K/UL    ABS. LYMPHOCYTES 1.7 0.8 - 3.5 K/UL    ABS. MONOCYTES 1.2 (H) 0.0 - 1.0 K/UL    ABS. EOSINOPHILS 0.1 0.0 - 0.4 K/UL    ABS. BASOPHILS 0.0 0.0 - 0.1 K/UL    ABS. IMM.  GRANS. 0.1 (H) 0.00 - 0.04 K/UL    DF AUTOMATED     METABOLIC PANEL, COMPREHENSIVE    Collection Time: 07/30/21  3:32 AM   Result Value Ref Range    Sodium 135 (L) 136 - 145 mmol/L    Potassium 5.2 (H) 3.5 - 5.1 mmol/L    Chloride 100 97 - 108 mmol/L    CO2 29 21 - 32 mmol/L    Anion gap 6 5 - 15 mmol/L    Glucose 79 65 - 100 mg/dL    BUN 44 (H) 6 - 20 MG/DL    Creatinine 7.02 (H) 0.55 - 1.02 MG/DL    BUN/Creatinine ratio 6 (L) 12 - 20      GFR est AA 7 (L) >60 ml/min/1.73m2    GFR est non-AA 6 (L) >60 ml/min/1.73m2    Calcium 8.7 8.5 - 10.1 MG/DL    Bilirubin, total 1.4 (H) 0.2 - 1.0 MG/DL    ALT (SGPT) 9 (L) 12 - 78 U/L    AST (SGOT) 14 (L) 15 - 37 U/L    Alk. phosphatase 67 45 - 117 U/L    Protein, total 7.0 6.4 - 8.2 g/dL    Albumin 2.3 (L) 3.5 - 5.0 g/dL    Globulin 4.7 (H) 2.0 - 4.0 g/dL    A-G Ratio 0.5 (L) 1.1 - 2.2     TYPE & SCREEN    Collection Time: 07/30/21  5:23 AM   Result Value Ref Range    Crossmatch Expiration 08/02/2021,2359     ABO/Rh(D) B POSITIVE     Antibody screen NEG        Assessmen/Plan:     Complication of hemodialysis access  -Hx of right Ax-Ax AV Loop Graft 2020 and failed left upper extremity graft  · Will tentatively plan for fistulogram.      Carotid stenosis  -Last known carotid duplex was in 2017 the right ICA was at 40 to 59% and the left ICA was at 0 to 39%  · Patient will need to reestablish routine outpatient follow-up.     Encephalopathy  -History of stroke   Seizure  · Plan per nephrology    Sepsis  -blood cx NGTD   Thrombocytopenia  Acute perforated appendicitis/peritonitis/right lower quadrant abdominal abscess  -Status post appendectomy July 29, 2021  Diarrhea  Rectal wall thickening  -History of chronic sternal wound  -From 2017 => 2020  · Plan per general surgery  · Antibiotics per primary team    End-stage renal failure  -MWF  Mild hyperkalemia   Anemia of chronic disease  -Drop as expected post procedure  -On Retacrit  · Plan per nephrology    Diabetes mellitus   -Blood glucose is labile  ASHD   Hypertension  -BP is soft   Hyperlipidemia  Paroxysmal atrial fibrillation   -rate is controlled   Hypothyroidism  -Synthroid   Rheumatoid arthritis  Obstructive sleep apnea    VTE Prophylaxis:  [facility] currently held by primary team    Disposition:  [facility]     Signed By: Kam Augustin NP     July 30, 2021

## 2021-07-30 NOTE — PROGRESS NOTES
Hospitalist Progress Note    NAME: Corrinne Lins   :  1950   MRN:  [id number]       Assessment / Plan:    Rapid code secondary to hypovolemic shock  Gave 500 bolus of fluid, blood pressure still low with map around 50. started on Levophed. Consulted intensivistappreciate recommendations. Currently the patient is in the IR recovery getting the Liam catheter dialysis along with a new central line. Acute abdominal pain  Acute appendicitis perforated with generalized peritonitis  Right lower quadrant abdomen abscess  Patient started having abdominal pain last evening, ordered a CT of the abdomen which shows acute appendicitis. General surgery was consulted, patient was kept n.p.o. yesterday, got surgery today. S/p laparoscopic appendicectomy with drainage of the right lower quadrant abscess and abdominal washout. General surgery on boardappreciate input. CT abdomen/pelvisfindings suggestive of acute appendicitis. Continue Zosyn. WBC increased to 16.5, general surgery added fluconazole along with Zosyn. Afebrile, will check it again in a.m. Sepsis unknown source POA  Acute encephalopathy   -Febrile and altered mental status on admission, some neck stiffness noted and patient was started on empiric coverage for meningitis  -Appreciate neurology consult  -ammonia mildly elevated 38 on admision, normalized  -Also had lumbar puncture, CSF was clear, cultures negative  -Antibiotics discontinued for the initial sepsis. -EEG captured no seizures, triphasic waves suggest possible hepatic/renal encephalopathy  -T bili 1.6, normalized, LFTs normal   -Covid testing negative  -encephalopathy slightly worsened today because of the surgery diversion yesterday because of the sepsis from the appendicitis. Probably will need a day or 2 for improvement of the mental status.   -passed bedside swallow, regular diet  -appreciate GI consult  Ultrasound with elastography's reveals mild stages of liver fibrosis. Outpatient follow-up with GI as per the recommendations. CT a/p 7/16 (admission):  1. Circumferential rectal wall thickening is suspicious for malignancy. No bowel  obstruction. 2. No other acute abnormality in the abdomen or pelvis. Uterine fibroids are  noted. 3. Moderate cardiomegaly. MRI brain:  Encephalomalacia and white matter disease. No acute intracranial abnormality    ?seizures  -no seizure captured on EEG, triphasic waves point to hepatic/renal encephalopathy as per neuro  -on lose dose keppra, hold and observe    Diarrhea  Resolved. ESRD  -appreciate nephro following  Patient dialysis port is clotted, so vascular surgery is consulted. Fistulogram ordered by the vascular surgery. Get in touch with the family who is ready to give the consent for the [facility] dialysis catheter. Notified IR about it. -HD per nephro    Incidental finding of rectal wall thickening on CT A/P  -appreciate CRS eval  -patient can f/u outpatient, no intervention needed during this admission     Difficult IV access: Central line placed on 07/18  Hypokalemia  Resolved    DMII  HTN  CAD  FS monitoring, SS     Eye surgery in may : glaucoma       Code Status: Full  Appreciate palliative team following    Surrogate Decision Maker: Daughter  DVT Prophylaxis: SCD  GI Prophylaxis: not indicated  Baseline: Independent     25.0 - 29.9 Overweight / Body mass index is 25.08 kg/m².     Estimated discharge date: July 26  Barriers: Currently very sick  Updated daughter at bedside on 07/19  Updated daughter over the phone on 07/17 and 07/18  Code status: Full  Prophylaxis: Hep SQ  Recommended Disposition: TBD     Subjective:     Chief Complaint / Reason for Physician Visit  Patient seen today, drowsy, awake but not oriented, slightly confused. No agitation, afebrile. No acute events overnight. Discussed with RN events overnight. Objective:     VITALS:   Last 24hrs VS reviewed since prior progress note. Most recent are:  Patient Vitals for the past 24 hrs:   Temp Pulse Resp BP SpO2   07/30/21 1042 99 °F (37.2 °C) 71 15 (!) 101/42 90 %   07/30/21 0944  77  (!) 122/59    07/30/21 0938  73  (!) 111/52 94 %   07/30/21 0306 98.4 °F (36.9 °C)  16 (!) 113/45 100 %   07/30/21 0126    (!) 110/50    07/29/21 2318 98.2 °F (36.8 °C) 80 12 96/74 97 %   07/29/21 2016 97.6 °F (36.4 °C) 73 19 120/60 100 %   07/29/21 1659 97.8 °F (36.6 °C) 71 19 (!) 119/51 99 %   07/29/21 1251 97.6 °F (36.4 °C) 68 15 (!) 142/54 100 %   07/29/21 1230 97.5 °F (36.4 °C) 73 15 (!) 128/53 100 %   07/29/21 1215  74 11 (!) 141/50 100 %       Intake/Output Summary (Last 24 hours) at 7/30/2021 1202  Last data filed at 7/30/2021 0656  Gross per 24 hour   Intake    Output 120 ml   Net -120 ml        I had a face to face encounter and independently examined this patient on 7/30/2021, as outlined below:  PHYSICAL EXAM:  General: Adult  AA female, awake, but not oriented  EENT:  EOMI. Anicteric sclerae. MMM  Resp:  CTA bilaterally, no wheezing or rales. No accessory muscle use  CV:  Regular  rhythm,  No edema  GI:  Soft,  diffused tenderness on palpation. +Bowel sounds  Neurologic:  Awake, alert, oriented x 1, moves all extremities and follows some commands. No obvious focal deficits   Psych:   deferred  Skin:  No rashes. No jaundice    Reviewed most current lab test results and cultures  YES  Reviewed most current radiology test results   YES  Review and summation of old records today    NO  Reviewed patient's current orders and MAR    YES  PMH/SH reviewed - no change compared to H&P  ________________________________________________________________________  Care Plan discussed with:    Comments   Patient x    Family      RN x    Care Manager x    Consultant                       x Multidiciplinary team rounds were held today with , nursing, pharmacist and clinical coordinator.   Patient's plan of care was discussed; medications were reviewed and discharge planning was addressed. ________________________________________________________________________  Total NON critical care TIME:  36  Minutes    Total CRITICAL CARE TIME Spent:   Minutes non procedure based      Comments   >50% of visit spent in counseling and coordination of care x    ________________________________________________________________________  Rashid Alarcon MD     Procedures: see electronic medical records for all procedures/Xrays and details which were not copied into this note but were reviewed prior to creation of Plan. LABS:  I reviewed today's most current labs and imaging studies.   Pertinent labs include:  Recent Labs     07/30/21  0332 07/28/21  1850 07/28/21  1533   WBC 16.5* 13.7* 14.4*   HGB 9.6* 10.9* 10.8*   HCT 31.1* 35.1 35.1   * 121* 150     Recent Labs     07/30/21  0332 07/28/21  1850 07/28/21  1538   * 134* 133*   K 5.2* 4.2 4.3    97 94*   CO2 29 30 31   GLU 79 156* 192*   BUN 44* 24* 45*   CREA 7.02* 4.79* 7.49*   CA 8.7 8.9 8.9   PHOS  --   --  2.4*   ALB 2.3* 2.7* 2.7*   TBILI 1.4* 0.8  --    ALT 9* 12  --        Signed: Rashid Alarcon MD

## 2021-07-30 NOTE — DIALYSIS
Right upper arm AV graft pulsatile without bruit, Dr. Suarez Hew in to evaluate. Successfully placed one 15 gauge needle which clotted after successfully flushing. Second HD nurse in to assess and attempt without success.  Dr. Geri Valenzuela notified for vascular surgery

## 2021-07-30 NOTE — PROGRESS NOTES
RAPID RESPONSE TEAM    Responded to overhead rapid response to XR recovery for 2250    Patient in XR recovery for quiton line placement. Patient became hypotensive with systolics 44'V and MAP 71-11, minimally responsive to voice requiring painful stimuli at times, abd semi-firm with hypoactive bowel sounds s/p appendectomy, incisions CDI.  cc bolus/STAT CBC/CMP/LA ordered per protocol. Dr. Kalyan Nazario at bedside. BP improved to systolic 93 while bolus was infusing. Then dropped back to 60's. Another  cc bolus ordered. Levophed ordered. Verbal order to administer phenylephrine 200 mcg IVP. Patient had episode of both eyes deviating to upper left, oxygen saturation dropped to 79% on RA, lasting approx 10-15 sec, no other seizure-like activity noted. O2 sats 99% on RA. 15 minutes later patient /81, O2 sats 77%, patient placed on NRB, sats improved to 100%. 2 minutes later /39. Order received from Dr. Kalyan Nazario for ativan 2 mg x1. CCU intensivist consulted and will evaluate. Patient transported back to 2250. Dr. Steven Hernandez evaluated patient at bedside in 2250. Ativan 2 mg q4h prn for seizures ordered. Central line and kadie to be placed at bedside. STAT EEG ordered. Neurology reconsulted.     Patient Vitals for the past 4 hrs:   Pulse Resp BP SpO2   07/30/21 1624 80 19 (!) 200/60 --   07/30/21 1622 80 27 (!) 188/69 --   07/30/21 1616 81 19 (!) 210/96 --   07/30/21 1612 81 22 (!) 172/73 --   07/30/21 1522 70 14 (!) 96/36 94 %   07/30/21 1515 71 14 (!) 109/29 100 %   07/30/21 1502 72 17 (!) 154/48 --   07/30/21 1406 77 22 (!) 63/54 93 %   07/30/21 1351 76 18 (!) 78/28 93 %   07/30/21 1350 73 16 (!) 68/31 93 %   07/30/21 1300 74 16 131/62 --   07/30/21 1245 71 15 (!) 124/52 --         Alma Gomez RN  RRT, [id number]

## 2021-07-30 NOTE — PROGRESS NOTES
Problem: Self Care Deficits Care Plan (Adult)  Goal: *Acute Goals and Plan of Care (Insert Text)  Description:   FUNCTIONAL STATUS PRIOR TO ADMISSION: Patient was modified independent using a rollator for functional mobility. Patient was modified independent for basic and instrumental ADLs. Patient questionable historian secondary to mild confusion. HOME SUPPORT: The patient lived with family. Occupational Therapy Goals  7/30/2021 Re-eval s/p emergent lap appendectomy    1. Patient will perform grooming seated with supervision/set-up within 7 day(s). 2.  Patient will perform sponge bath with moderate assist  within 7 day(s). 3.  Patient will perform lower body dressing with moderate assist within 7 day(s). 4.  Patient will perform toilet transfers with minimal assist within 7 day(s). 5.  Patient will perform all aspects of toileting with moderate assist within 7 day(s). Initiated 7/27/2021 discontinue below goals 7/30/2021   1. Patient will perform grooming standing at sink with supervision/set-up within 7 day(s). 2.  Patient will perform sponge bath with supervision/set-up within 7 day(s). 3.  Patient will perform lower body dressing with supervision/set-up within 7 day(s). 4.  Patient will perform toilet transfers with supervision/set-up within 7 day(s). 5.  Patient will perform all aspects of toileting with supervision/set-up within 7 day(s). Outcome: Not Met   OCCUPATIONAL THERAPY RE-EVALUATION  Patient: Danica Posadas (66 y.o. female)  Date: 7/30/2021  Diagnosis: Acute encephalopathy [G93.40] <principal problem not specified>  Procedure(s) (LRB):  APPENDECTOMY LAPAROSCOPIC (N/A) 1 Day Post-Op  Precautions: Bed Alarm, Fall, Contact (MRSA)  Chart, occupational therapy assessment, plan of care, and goals were reviewed. ASSESSMENT  Pt had emergent appendectomy yesterday and pt needed a bolus this am due to low BP after pain medication administration.   Pts dialysis fistula is also clogged and pt is scheduled for dialysis today. Upon arrival pt was supine in bed and was drowsy. Her mouth was extremely dry and therapist obtained wet swab to moisten pts mouth. Hand over hand assist needed for pt do attempt to swab her mouth and then she was resistive to continuing task. When cued to open her mouth pt did so and therapist swabbed her mouth for her. Poor initiation of activity on command this session. Hand over hand assist needed for basic ROM of UE. Max/total assist x2 for supine to sit and total assist to scoot to EOB. Fair dynamic balance. Pt was able to lift UE against gravity weakly seated EOB. Moderate assist x2 for sit to stand and to side step HOB. All vitals were stable this session. Recommend SNF for rehab at discharge. Current Level of Function Impacting Discharge (ADLs): max/total assist x2 bed mobility, moderate assist x2 sit to stand and to side step    Feeding: Minimum assistance    Oral Facial Hygiene/Grooming: Moderate assistance    Bathing: Maximum assistance    Upper Body Dressing: Maximum assistance    Lower Body Dressing: Total assistance    Toileting: Total assistance    Other factors to consider for discharge: two assist for mobility         PLAN :  Recommendations and Planned Interventions: self care training, functional mobility training, therapeutic exercise, balance training, therapeutic activities, patient education and family training/education    Frequency/Duration: Patient will be followed by occupational therapy 4 times a week to address goals.     Recommend with staff: re-orient pt, assist with mobility and ADLs    Recommend next OT session: transfers, ADLS    Recommendation for discharge: (in order for the patient to meet his/her long term goals)  Therapy up to 5 days/week in SNF setting    This discharge recommendation:  Has been made in collaboration with the attending provider and/or case management    Equipment recommendations for successful discharge (if) home: needs rehab       SUBJECTIVE:   Patient stated Why do I hurt?   Pt was unaware that she had surgery    OBJECTIVE DATA SUMMARY:   Hospital course since last seen and reason for reevaluation: had emergent appendectomy, dialysis fistula is obstructed     Cognitive/Behavioral Status:  Neurologic State: Drowsy; Confused  Orientation Level: Disoriented to person  Cognition: Follows commands  Perception: Cues to maintain midline in sitting;Verbal;Tactile;Cues to maintain midline in standing;Visual  Perseveration: Perseverates during conversation  Safety/Judgement: Fall prevention;Decreased awareness of need for safety;Decreased insight into deficits      Hearing: Auditory  Auditory Impairment: None    Vision/Perceptual:                           Acuity: Impaired near vision (poor vision, legally blind)         Range of Motion:    AROM: Generally decreased, functional  PROM: Within functional limits                      Strength:    Strength: Generally decreased, functional                Coordination:  Coordination: Generally decreased, functional (weak)  Fine Motor Skills-Upper: Left Impaired;Right Impaired (generally weak )    Gross Motor Skills-Upper: Left Impaired;Right Impaired (generally weak)    Tone & Sensation:    Tone: Normal  Sensation:  (unable to accurately test)                        Functional Mobility and Transfers for ADLs:  Bed Mobility:  Rolling: Maximum assistance; Additional time;Assist x2 (log roll)  Supine to Sit: Maximum assistance; Additional time;Assist x2;Total assistance (log roll)  Sit to Supine: Total assistance; Additional time;Assist x2  Scooting: Total assistance    Transfers:  Sit to Stand: Moderate assistance  Functional Transfers  Bathroom Mobility:  (unable)  Toilet Transfer : Moderate assistance; Additional time;Assist x2 (to [facility])       Balance:  Sitting: Impaired  Sitting - Static: Fair (occasional)  Sitting - Dynamic: Fair (occasional)  Standing: Impaired  Standing - Static: Constant support; Fair  Standing - Dynamic : Poor    ADL Assessment:  Feeding: Minimum assistance    Oral Facial Hygiene/Grooming: Moderate assistance    Bathing: Maximum assistance    Upper Body Dressing: Maximum assistance    Lower Body Dressing: Total assistance    Toileting: Total assistance                ADL Intervention and task modifications:     See assessment       Cognitive Retraining  Safety/Judgement: Fall prevention;Decreased awareness of need for safety;Decreased insight into deficits    Therapeutic Exercises:   AAROM BUROZ shoulder flexion and reaching towards face 5 reps at bed level    Functional Measure:  Barthel Index:    Bathin  Bladder: 5  Bowels: 5  Groomin  Dressin  Feedin  Mobility: 0  Stairs: 0  Toilet Use: 0  Transfer (Bed to Chair and Back): 5  Total: 20/100        The Barthel ADL Index: Guidelines  1. The index should be used as a record of what a patient does, not as a record of what a patient could do. 2. The main aim is to establish degree of independence from any help, physical or verbal, however minor and for whatever reason. 3. The need for supervision renders the patient not independent. 4. A patient's performance should be established using the best available evidence. Asking the patient, friends/relatives and nurses are the usual sources, but direct observation and common sense are also important. However direct testing is not needed. 5. Usually the patient's performance over the preceding 24-48 hours is important, but occasionally longer periods will be relevant. 6. Middle categories imply that the patient supplies over 50 per cent of the effort. 7. Use of aids to be independent is allowed. Herman Lu., Barthel, D.W. ([id number]). Functional evaluation: the Barthel Index. [address] [facility] (14)2. Jaiden Mccray shannon DARINEL Shah Danniel Bloodgood., Janelle Tolliver.[place], [address]  Measuring the change indisability after inpatient rehabilitation; comparison of the responsiveness of the Barthel Index and Functional [place] Measure. Journal of Neurology, Neurosurgery, and Psychiatry, 66(4), 649-924. CARISSA Kessler.A, ANTIONE Quiroga, & Paradise Flores M.A. (2004.) Assessment of post-stroke quality of life in cost-effectiveness studies: The usefulness of the Barthel Index and the EuroQoL-5D. Quality of Life Research, 13, 427-43         Pain:  Reports pain and moans with activity but is unable to rate    Activity Tolerance:   Poor    After treatment patient left in no apparent distress:   Supine in bed, Heels elevated for pressure relief, Call bell within reach, Bed / chair alarm activated and Side rails x 3    COMMUNICATION/COLLABORATION:   The patients plan of care was discussed with: Physical therapist, Registered nurse and patient.      MARGARITA Pierson/L  Time Calculation: 17 mins

## 2021-07-30 NOTE — PROGRESS NOTES
Problem: Impaired Skin Integrity/Pressure Injury Treatment  Goal: *Improvement of Existing Pressure Injury  Outcome: Progressing Towards Goal  Goal: *Prevention of pressure injury  Description: Document Mick Scale and appropriate interventions in the flowsheet. Outcome: Progressing Towards Goal  Note: Pressure Injury Interventions:  Sensory Interventions: Assess changes in LOC    Moisture Interventions: Apply protective barrier, creams and emollients    Activity Interventions: Pressure redistribution bed/mattress(bed type)    Mobility Interventions: Float heels    Nutrition Interventions: Document food/fluid/supplement intake    Friction and Shear Interventions: Foam dressings/transparent film/skin sealants                Problem: Patient Education: Go to Patient Education Activity  Goal: Patient/Family Education  Outcome: Progressing Towards Goal     Problem: Falls - Risk of  Goal: *Absence of Falls  Description: Document Stalin Fall Risk and appropriate interventions in the flowsheet.   Outcome: Progressing Towards Goal  Note: Fall Risk Interventions:  Mobility Interventions: Bed/chair exit alarm    Mentation Interventions: Adequate sleep, hydration, pain control    Medication Interventions: Evaluate medications/consider consulting pharmacy    Elimination Interventions: Elevated toilet seat    History of Falls Interventions: Bed/chair exit alarm         Problem: Patient Education: Go to Patient Education Activity  Goal: Patient/Family Education  Outcome: Progressing Towards Goal     Problem: Altered Thought Process (Adult/Pediatric)  Goal: *STG: Participates in treatment plan  Outcome: Progressing Towards Goal  Goal: *STG: Remains safe in hospital  Outcome: Progressing Towards Goal  Goal: *STG: Seeks staff when feelings of anxiety and fear arise  Outcome: Progressing Towards Goal  Goal: *STG: Complies with medication therapy  Outcome: Progressing Towards Goal  Goal: *STG: Attends activities and groups  Outcome: Progressing Towards Goal  Goal: *STG: Decreased delusional thinking  Outcome: Progressing Towards Goal  Goal: *STG: Decreased hallucinations  Outcome: Progressing Towards Goal  Goal: *STG: Absence of lethality  Outcome: Progressing Towards Goal  Goal: *STG: Demonstrates ability to understand and use improved judgment in daily activities and relationships  Outcome: Progressing Towards Goal  Goal: *LTG: Returns to baseline functioning  Outcome: Progressing Towards Goal  Goal: Interventions  Outcome: Progressing Towards Goal     Problem: Patient Education: Go to Patient Education Activity  Goal: Patient/Family Education  Outcome: Progressing Towards Goal     Problem: Non-Violent Restraints  Goal: Removal from restraints as soon as assessed to be safe  Outcome: Progressing Towards Goal  Goal: No harm/injury to patient while restraints in use  Outcome: Progressing Towards Goal  Goal: Patient's dignity will be maintained  Outcome: Progressing Towards Goal  Goal: Patient Interventions  Outcome: Progressing Towards Goal     Problem: Risk for Spread of Infection  Goal: Prevent transmission of infectious organism to others  Description: Prevent the transmission of infectious organisms to other patients, staff members, and visitors. Outcome: Progressing Towards Goal     Problem: Patient Education:  Go to Education Activity  Goal: Patient/Family Education  Outcome: Progressing Towards Goal     Problem: Pressure Injury - Risk of  Goal: *Prevention of pressure injury  Description: Document Mick Scale and appropriate interventions in the flowsheet.   Outcome: Progressing Towards Goal  Note: Pressure Injury Interventions:  Sensory Interventions: Assess changes in LOC    Moisture Interventions: Apply protective barrier, creams and emollients    Activity Interventions: Pressure redistribution bed/mattress(bed type)    Mobility Interventions: Float heels    Nutrition Interventions: Document food/fluid/supplement intake    Friction and Shear Interventions: Foam dressings/transparent film/skin sealants                Problem: Patient Education: Go to Patient Education Activity  Goal: Patient/Family Education  Outcome: Progressing Towards Goal     Problem: Infection - Risk of, Central Venous Catheter-Associated Bloodstream Infection  Goal: *Absence of infection signs and symptoms  Outcome: Progressing Towards Goal     Problem: Patient Education: Go to Patient Education Activity  Goal: Patient/Family Education  Outcome: Progressing Towards Goal     Problem: Patient Education: Go to Patient Education Activity  Goal: Patient/Family Education  Outcome: Progressing Towards Goal     Problem: Patient Education: Go to Patient Education Activity  Goal: Patient/Family Education  Outcome: Progressing Towards Goal

## 2021-07-30 NOTE — PROGRESS NOTES
Spiritual Care Assessment/Progress Note  [facility]      NAME: Dell Fothergill      MRN: [id number]  AGE: 66 y.o. SEX: female  Synagogue Affiliation: Unknown   Language: English     7/30/2021     Total Time (in minutes): 6     Spiritual Assessment begun in [facility] 2 PROGRESSIVE CARE through conversation with:         []Patient        [] Family    [] Friend(s)        Reason for Consult: Rapid response team     Spiritual beliefs: (Please include comment if needed)     [] Identifies with a nicolas tradition:         [] Supported by a nicolas community:            [] Claims no spiritual orientation:           [] Seeking spiritual identity:                [] Adheres to an individual form of spirituality:           [x] Not able to assess:                           Identified resources for coping:      [] Prayer                               [] Music                  [] Guided Imagery     [] Family/friends                 [] Pet visits     [] Devotional reading                         [x] Unknown     [] Other:                                               Interventions offered during this visit: (See comments for more details)    Patient Interventions: Crisis           Plan of Care:     [] Support spiritual and/or cultural needs    [] Support AMD and/or advance care planning process      [] Support grieving process   [] Coordinate Rites and/or Rituals    [] Coordination with community clergy   [] No spiritual needs identified at this time   [] Detailed Plan of Care below (See Comments)  [] Make referral to Music Therapy  [] Make referral to Pet Therapy     [] Make referral to Addiction services  [] Make referral to [facility]  [] Make referral to Spiritual Care Partner  [] No future visits requested        [] Follow up upon further referrals     Comments:      responded to Rapid Response Team alert for Ms. Rebecca Patiño in 2250. Upon arrival, patient was in medical assessment and intervention.  No family was present, unable to assess. Contact  if further needs arise.       [id number] Jordan Weathers, M.S., Th.M.  Spiritual Care Provider   Paging Service [phone])

## 2021-07-30 NOTE — PROGRESS NOTES
Bedside and Verbal shift change report given to trent (oncoming nurse) by Taniya Arthur (offgoing nurse). Report included the following information SBAR, Kardex, Intake/Output, Recent Results and Quality Measures.

## 2021-07-31 ENCOUNTER — APPOINTMENT (OUTPATIENT)
Dept: CT IMAGING | Age: 71
DRG: 853 | End: 2021-07-31
Attending: STUDENT IN AN ORGANIZED HEALTH CARE EDUCATION/TRAINING PROGRAM
Payer: MEDICARE

## 2021-07-31 LAB
ANION GAP SERPL CALC-SCNC: 4 MMOL/L (ref 5–15)
BUN SERPL-MCNC: 16 MG/DL (ref 6–20)
BUN/CREAT SERPL: 5 (ref 12–20)
CALCIUM SERPL-MCNC: 8.4 MG/DL (ref 8.5–10.1)
CHLORIDE SERPL-SCNC: 99 MMOL/L (ref 97–108)
CO2 SERPL-SCNC: 33 MMOL/L (ref 21–32)
CREAT SERPL-MCNC: 3.29 MG/DL (ref 0.55–1.02)
ERYTHROCYTE [DISTWIDTH] IN BLOOD BY AUTOMATED COUNT: 18.5 % (ref 11.5–14.5)
GLUCOSE BLD STRIP.AUTO-MCNC: 124 MG/DL (ref 65–117)
GLUCOSE BLD STRIP.AUTO-MCNC: 131 MG/DL (ref 65–117)
GLUCOSE BLD STRIP.AUTO-MCNC: 136 MG/DL (ref 65–117)
GLUCOSE BLD STRIP.AUTO-MCNC: 69 MG/DL (ref 65–117)
GLUCOSE BLD STRIP.AUTO-MCNC: 79 MG/DL (ref 65–117)
GLUCOSE SERPL-MCNC: 79 MG/DL (ref 65–100)
HCT VFR BLD AUTO: 27.3 % (ref 35–47)
HGB BLD-MCNC: 8.6 G/DL (ref 11.5–16)
MCH RBC QN AUTO: 27.2 PG (ref 26–34)
MCHC RBC AUTO-ENTMCNC: 31.5 G/DL (ref 30–36.5)
MCV RBC AUTO: 86.4 FL (ref 80–99)
NRBC # BLD: 0 K/UL (ref 0–0.01)
NRBC BLD-RTO: 0 PER 100 WBC
PLATELET # BLD AUTO: 132 K/UL (ref 150–400)
PMV BLD AUTO: 12.1 FL (ref 8.9–12.9)
POTASSIUM SERPL-SCNC: 3.5 MMOL/L (ref 3.5–5.1)
RBC # BLD AUTO: 3.16 M/UL (ref 3.8–5.2)
SERVICE CMNT-IMP: ABNORMAL
SERVICE CMNT-IMP: NORMAL
SERVICE CMNT-IMP: NORMAL
SODIUM SERPL-SCNC: 136 MMOL/L (ref 136–145)
WBC # BLD AUTO: 11.5 K/UL (ref 3.6–11)

## 2021-07-31 PROCEDURE — 99233 SBSQ HOSP IP/OBS HIGH 50: CPT | Performed by: PSYCHIATRY & NEUROLOGY

## 2021-07-31 PROCEDURE — 74011000258 HC RX REV CODE- 258: Performed by: STUDENT IN AN ORGANIZED HEALTH CARE EDUCATION/TRAINING PROGRAM

## 2021-07-31 PROCEDURE — 77010033678 HC OXYGEN DAILY

## 2021-07-31 PROCEDURE — 74011000258 HC RX REV CODE- 258: Performed by: SURGERY

## 2021-07-31 PROCEDURE — 74011250636 HC RX REV CODE- 250/636: Performed by: SURGERY

## 2021-07-31 PROCEDURE — 80048 BASIC METABOLIC PNL TOTAL CA: CPT

## 2021-07-31 PROCEDURE — 82962 GLUCOSE BLOOD TEST: CPT

## 2021-07-31 PROCEDURE — 74011000250 HC RX REV CODE- 250: Performed by: SURGERY

## 2021-07-31 PROCEDURE — 90935 HEMODIALYSIS ONE EVALUATION: CPT

## 2021-07-31 PROCEDURE — C9113 INJ PANTOPRAZOLE SODIUM, VIA: HCPCS | Performed by: SURGERY

## 2021-07-31 PROCEDURE — 85027 COMPLETE CBC AUTOMATED: CPT

## 2021-07-31 PROCEDURE — 65660000000 HC RM CCU STEPDOWN

## 2021-07-31 PROCEDURE — 36415 COLL VENOUS BLD VENIPUNCTURE: CPT

## 2021-07-31 PROCEDURE — 74011250637 HC RX REV CODE- 250/637: Performed by: SURGERY

## 2021-07-31 PROCEDURE — 74011000250 HC RX REV CODE- 250: Performed by: STUDENT IN AN ORGANIZED HEALTH CARE EDUCATION/TRAINING PROGRAM

## 2021-07-31 PROCEDURE — 70450 CT HEAD/BRAIN W/O DYE: CPT

## 2021-07-31 PROCEDURE — 74011250636 HC RX REV CODE- 250/636: Performed by: STUDENT IN AN ORGANIZED HEALTH CARE EDUCATION/TRAINING PROGRAM

## 2021-07-31 RX ORDER — DEXTROSE 50 % IN WATER (D50W) INTRAVENOUS SYRINGE
Status: DISPENSED
Start: 2021-07-31 | End: 2021-08-01

## 2021-07-31 RX ORDER — DEXTROSE MONOHYDRATE 50 MG/ML
50 INJECTION, SOLUTION INTRAVENOUS CONTINUOUS
Status: DISCONTINUED | OUTPATIENT
Start: 2021-07-31 | End: 2021-08-02

## 2021-07-31 RX ORDER — HEPARIN SODIUM 5000 [USP'U]/ML
5000 INJECTION, SOLUTION INTRAVENOUS; SUBCUTANEOUS EVERY 8 HOURS
Status: DISCONTINUED | OUTPATIENT
Start: 2021-07-31 | End: 2021-08-04

## 2021-07-31 RX ADMIN — BRIMONIDINE TARTRATE 1 DROP: 2 SOLUTION OPHTHALMIC at 18:38

## 2021-07-31 RX ADMIN — BRIMONIDINE TARTRATE 1 DROP: 2 SOLUTION OPHTHALMIC at 09:07

## 2021-07-31 RX ADMIN — Medication 10 ML: at 15:07

## 2021-07-31 RX ADMIN — SODIUM CHLORIDE 40 MG: 9 INJECTION, SOLUTION INTRAMUSCULAR; INTRAVENOUS; SUBCUTANEOUS at 08:58

## 2021-07-31 RX ADMIN — Medication: at 15:07

## 2021-07-31 RX ADMIN — PREDNISOLONE ACETATE 1 DROP: 10 SUSPENSION/ DROPS OPHTHALMIC at 09:09

## 2021-07-31 RX ADMIN — Medication: at 09:09

## 2021-07-31 RX ADMIN — DEXTROSE MONOHYDRATE 50 ML/HR: 50 INJECTION, SOLUTION INTRAVENOUS at 13:31

## 2021-07-31 RX ADMIN — Medication 10 ML: at 05:02

## 2021-07-31 RX ADMIN — PIPERACILLIN AND TAZOBACTAM 3.38 G: 3; .375 INJECTION, POWDER, LYOPHILIZED, FOR SOLUTION INTRAVENOUS at 05:44

## 2021-07-31 RX ADMIN — NOREPINEPHRINE BITARTRATE 1 MCG/MIN: 1 INJECTION, SOLUTION, CONCENTRATE INTRAVENOUS at 02:07

## 2021-07-31 RX ADMIN — FLUCONAZOLE 200 MG: 200 INJECTION, SOLUTION INTRAVENOUS at 10:47

## 2021-07-31 RX ADMIN — Medication 10 ML: at 22:00

## 2021-07-31 RX ADMIN — LEVETIRACETAM 500 MG: 100 INJECTION, SOLUTION INTRAVENOUS at 16:37

## 2021-07-31 RX ADMIN — PREDNISOLONE ACETATE 1 DROP: 10 SUSPENSION/ DROPS OPHTHALMIC at 21:14

## 2021-07-31 RX ADMIN — PREDNISOLONE ACETATE 1 DROP: 10 SUSPENSION/ DROPS OPHTHALMIC at 16:53

## 2021-07-31 RX ADMIN — NOREPINEPHRINE BITARTRATE 2 MCG/MIN: 1 INJECTION, SOLUTION, CONCENTRATE INTRAVENOUS at 02:02

## 2021-07-31 RX ADMIN — LATANOPROST 1 DROP: 50 SOLUTION OPHTHALMIC at 19:57

## 2021-07-31 RX ADMIN — TIMOLOL MALEATE 1 DROP: 2.5 SOLUTION/ DROPS OPHTHALMIC at 09:08

## 2021-07-31 RX ADMIN — DEXTROSE MONOHYDRATE 25 G: 25 INJECTION, SOLUTION INTRAVENOUS at 12:28

## 2021-07-31 RX ADMIN — ACETAMINOPHEN 650 MG: 325 TABLET ORAL at 19:45

## 2021-07-31 RX ADMIN — PIPERACILLIN AND TAZOBACTAM 3.38 G: 3; .375 INJECTION, POWDER, LYOPHILIZED, FOR SOLUTION INTRAVENOUS at 19:47

## 2021-07-31 RX ADMIN — LEVETIRACETAM 500 MG: 100 INJECTION, SOLUTION INTRAVENOUS at 03:37

## 2021-07-31 RX ADMIN — HEPARIN SODIUM 5000 UNITS: 5000 INJECTION INTRAVENOUS; SUBCUTANEOUS at 08:58

## 2021-07-31 RX ADMIN — HEPARIN SODIUM 5000 UNITS: 5000 INJECTION INTRAVENOUS; SUBCUTANEOUS at 16:49

## 2021-07-31 RX ADMIN — Medication: at 21:00

## 2021-07-31 NOTE — PROGRESS NOTES
Hospitalist Progress Note    NAME: Quincy Marmolejo   :  1950   MRN:  [id number]       Assessment / Plan:    Rapid code secondary to hypovolemic shock  Acute seizure  -The pressors on the fluids, blood pressure stable. -Consulted intensivist-appreciate recommendations.  -Neurology consulted-appreciate recommendations. -EEG shows finding suggestive of encephalopathy.  -Continue Keppra 500 mg twice daily.  -Reconsulted neurology.  -On Keppra 500 mg twice daily. - Ct head order and pending  -Got the access through new central line. Acute abdominal pain  Acute appendicitis perforated with generalized peritonitis  Right lower quadrant abdomen abscess  -Patient started having abdominal pain last evening, ordered a CT of the abdomen which shows acute appendicitis. -General surgery was consulted, patient was kept n.p.o. yesterday, got surgery today. -S/p laparoscopic appendicectomy with drainage of the right lower quadrant abscess and abdominal washout-.  -General surgery on board-appreciate input. -CT abdomen/pelvis-findings suggestive of acute appendicitis. -Continue Zosyn. -WBC increased to 16.5, general surgery added fluconazole along with Zosyn. Afebrile, will check it again in a.m. Sepsis unknown source POA  Acute encephalopathy   -Febrile and altered mental status on admission, some neck stiffness noted and patient was started on empiric coverage for meningitis  -Appreciate neurology consult  -ammonia mildly elevated 38 on admision, normalized  -Also had lumbar puncture, CSF was clear, cultures negative  -Antibiotics discontinued for the initial sepsis. -EEG captured no seizures, triphasic waves suggest possible hepatic/renal encephalopathy  -T bili 1.6, normalized, LFTs normal   -Covid testing negative  -encephalopathy slightly worsened today because of the surgery diversion yesterday because of the sepsis from the appendicitis.   Probably will need a day or 2 for improvement of the mental status. -passed bedside swallow, regular diet  -appreciate GI consult  -Ultrasound with elastography's reveals mild stages of liver fibrosis. Outpatient follow-up with GI as per the recommendations. CT a/p 7/16 (admission):  1. Circumferential rectal wall thickening is suspicious for malignancy. No bowel  obstruction. 2. No other acute abnormality in the abdomen or pelvis. Uterine fibroids are  noted. 3. Moderate cardiomegaly. MRI brain:  Encephalomalacia and white matter disease. No acute intracranial abnormality    ?seizures  -no seizure captured on EEG, triphasic waves point to hepatic/renal encephalopathy as per neuro  -on lose dose keppra, hold and observe    Diarrhea  -Resolved. ESRD  -appreciate nephro following  -Fistulogram ordered by the vascular surgery. Declotting on Monday.  -Liam catheter dialysis. -HD per nephro    Incidental finding of rectal wall thickening on CT A/P  -appreciate CRS eval  -patient can f/u outpatient, no intervention needed during this admission     Difficult IV access: Central line placed on 07/18  Hypokalemia  Resolved    DMII  HTN  CAD  FS monitoring, SS     Eye surgery in may : glaucoma       Code Status: Full  Appreciate palliative team following    Surrogate Decision Maker: Daughter  DVT Prophylaxis: SCD  GI Prophylaxis: not indicated  Baseline: Independent     25.0 - 29.9 Overweight / Body mass index is 25.08 kg/m².     Estimated discharge date: July 26  Barriers: Currently very sick  Updated daughter at bedside on 07/19  Updated daughter over the phone on 07/17 and 07/18  Code status: Full  Prophylaxis: Hep SQ  Recommended Disposition: TBD     Subjective:     Chief Complaint / Reason for Physician Visit  Patient seen today, not awake and alert, as per the nurses patient to follow some of the commands in the morning. Discussed with RN events overnight. Objective:     VITALS:   Last 24hrs VS reviewed since prior progress note.  Most recent are:  Patient Vitals for the past 24 hrs:   Temp Pulse Resp BP SpO2   07/31/21 1040 99 °F (37.2 °C) 61 20 (!) 114/31 100 %   07/31/21 0800 -- 72 18 (!) 111/25 --   07/31/21 0717 99.4 °F (37.4 °C) 75 17 (!) 146/42 100 %   07/31/21 0332 98.8 °F (37.1 °C) 74 20 (!) 122/55 97 %   07/31/21 0227 -- -- -- (!) 150/40 --   07/31/21 0203 98.5 °F (36.9 °C) 82 18 (!) 188/66 --   07/31/21 0144 -- 71 16 (!) 156/46 --   07/31/21 0129 -- 73 18 (!) 159/38 --   07/31/21 0115 -- 75 16 (!) 147/54 --   07/31/21 0100 -- 76 18 (!) 141/41 --   07/31/21 0044 -- 77 18 (!) 132/36 --   07/31/21 0030 -- 76 17 (!) 155/53 --   07/31/21 0014 -- 76 19 (!) 152/43 --   07/31/21 0000 -- 70 18 (!) 122/41 --   07/30/21 2344 -- 73 20 (!) 146/34 --   07/30/21 2337 98.3 °F (36.8 °C) 74 16 (!) 160/83 95 %   07/30/21 2329 -- 73 15 (!) 160/83 --   07/30/21 2315 -- 73 23 (!) 151/85 --   07/30/21 2303 -- 73 21 (!) 156/59 --   07/30/21 2245 98.2 °F (36.8 °C) 70 24 (!) 147/50 100 %   07/30/21 2236 -- -- -- (!) 144/56 --   07/30/21 2129 -- -- -- (!) 160/38 --   07/30/21 2043 -- -- -- (!) 151/42 --   07/30/21 2015 -- -- -- (!) 131/97 --   07/30/21 2011 -- -- -- (!) 176/55 --   07/30/21 2007 98.5 °F (36.9 °C) 73 16 (!) 159/53 95 %   07/30/21 1935 -- -- -- (!) 163/32 --   07/30/21 1900 -- 75 15 (!) 167/59 --   07/30/21 1846 -- 72 19 (!) 152/53 --   07/30/21 1830 -- 76 16 (!) 148/37 --   07/30/21 1624 -- 80 19 (!) 200/60 --   07/30/21 1622 -- 80 27 (!) 188/69 --   07/30/21 1616 -- 81 19 (!) 210/96 --   07/30/21 1612 -- 81 22 (!) 172/73 --   07/30/21 1522 -- 70 14 (!) 96/36 94 %   07/30/21 1515 -- 71 14 (!) 109/29 100 %   07/30/21 1502 -- 72 17 (!) 154/48 --       Intake/Output Summary (Last 24 hours) at 7/31/2021 1457  Last data filed at 7/31/2021 2721  Gross per 24 hour   Intake 1603.56 ml   Output 530 ml   Net 1073.56 ml        I had a face to face encounter and independently examined this patient on 7/31/2021, as outlined below:  PHYSICAL EXAM:  General: Adult  AA female, sleepy, arousable but not alert and oriented  EENT:  EOMI. Anicteric sclerae. MMM  Resp:  CTA bilaterally, no wheezing or rales. No accessory muscle use  CV:  Regular  rhythm,  No edema  GI:  Soft,  diffused tenderness on palpation. +Bowel sounds  Neurologic:  Sleepy, arousable but not alert moves all extremities and follows some commands. No obvious focal deficits   Psych:   deferred  Skin:  No rashes. No jaundice    Reviewed most current lab test results and cultures  YES  Reviewed most current radiology test results   YES  Review and summation of old records today    NO  Reviewed patient's current orders and MAR    YES  PMH/SH reviewed - no change compared to H&P  ________________________________________________________________________  Care Plan discussed with:    Comments   Patient x    Family      RN x    Care Manager x    Consultant                       x Multidiciplinary team rounds were held today with , nursing, pharmacist and clinical coordinator. Patient's plan of care was discussed; medications were reviewed and discharge planning was addressed. ________________________________________________________________________  Total NON critical care TIME:  40   Minutes    Total CRITICAL CARE TIME Spent:      Comments   >50% of visit spent in counseling and coordination of care x    ________________________________________________________________________  Jeane Gan MD     Procedures: see electronic medical records for all procedures/Xrays and details which were not copied into this note but were reviewed prior to creation of Plan. LABS:  I reviewed today's most current labs and imaging studies.   Pertinent labs include:  Recent Labs     07/31/21  0338 07/30/21  1422 07/30/21  0332   WBC 11.5* 14.6* 16.5*   HGB 8.6* 9.4* 9.6*   HCT 27.3* 30.7* 31.1*   * 127* 126*     Recent Labs     07/31/21  0339 07/30/21  1422 07/30/21  0332 07/28/21  1850 07/28/21  1850 07/28/21  1538 07/28/21  1538    136 135*   < > 134*   < > 133*   K 3.5 5.9* 5.2*   < > 4.2   < > 4.3   CL 99 101 100   < > 97   < > 94*   CO2 33* 27 29   < > 30   < > 31   GLU 79 108* 79   < > 156*   < > 192*   BUN 16 48* 44*   < > 24*   < > 45*   CREA 3.29* 7.70* 7.02*   < > 4.79*   < > 7.49*   CA 8.4* 8.6 8.7   < > 8.9   < > 8.9   PHOS  --   --   --   --   --   --  2.4*   ALB  --  2.1* 2.3*  --  2.7*   < > 2.7*   TBILI  --  0.8 1.4*  --  0.8  --   --    ALT  --  10* 9*  --  12  --   --     < > = values in this interval not displayed.        Signed: King Nance MD

## 2021-07-31 NOTE — PROGRESS NOTES
Nephrology Progress Note  Piter Perez     [email or web address]  Phone - [phone]   Patient: Lynnann Aase    YOB: 1950        Date- 7/31/2021   Admit Date: 7/16/2021  CC: Follow up for  esrd       IMPRESSION & PLAN:    ESRD- [facility]- [facility]  · Mild hyperkalemia  · AMS  · SEPSIS  · ANEMIA  · Acute appendicitis perforated with generalized peritonitis and right lower quadrant abdomen abscess  · CA/P/PTH/VITD  · DM  ·  HTN    PLAN-   No plans for HD today,next HD will be on Monday   Vascular surgery to do Declot on Monday   Epogen for anemia   Start ivf   Continue hd MWF   Subjective: Interval History:   -Seen and examined  -No issues overnight    Objective:   Vitals:    07/31/21 0227 07/31/21 0332 07/31/21 0717 07/31/21 0800   BP: (!) 150/40 (!) 122/55 (!) 146/42 (!) 111/25   Pulse:  74 75 72   Resp:  20 17 18   Temp:  98.8 °F (37.1 °C) 99.4 °F (37.4 °C)    TempSrc:       SpO2:  97% 100%    Weight:       Height:          07/30 0701 - 07/31 0700  In: 1603.6 [I.V.:1603.6]  Out: 530 [Drains:30]  Last 3 Recorded Weights in this Encounter    07/28/21 0700 07/29/21 0530 07/30/21 1935   Weight: 59.4 kg (130 lb 15.3 oz) 58.6 kg (129 lb 3 oz) 58.5 kg (129 lb)      Physical exam:   GEN:  nad  NECK:  Supple, no thyromegaly  RESP: clear b/l, no  wheezing,   CVS: RRR,S1,S2   ABDO:  Soft, NT  NEURO: non focal  EXT:no Edema +nt     EXT- right avg +, thrill +        Chart reviewed. Pertinent Notes reviewed. Data Review :  Recent Labs     07/31/21  0339 07/30/21  1422 07/30/21  0332 07/28/21  1850 07/28/21  1538    136 135*   < > 133*   K 3.5 5.9* 5.2*   < > 4.3   CL 99 101 100   < > 94*   CO2 33* 27 29   < > 31   BUN 16 48* 44*   < > 45*   CREA 3.29* 7.70* 7.02*   < > 7.49*   GLU 79 108* 79   < > 192*   CA 8.4* 8.6 8.7   < > 8.9   PHOS  --   --   --   --  2.4*    < > = values in this interval not displayed.      Recent Labs     07/31/21  0338 07/30/21  1422 07/30/21  0332   WBC 11.5* 14.6* 16.5*   HGB 8.6* 9.4* 9.6*   HCT 27.3* 30.7* 31.1*   * 127* 126*     No results for input(s): FE, TIBC, PSAT, FERR in the last 72 hours.    Medication list  reviewed  Current Facility-Administered Medications   Medication Dose Route Frequency    heparin (porcine) injection 5,000 Units  5,000 Units SubCUTAneous Q8H    0.9% sodium chloride infusion  50 mL/hr IntraVENous CONTINUOUS    fluconazole (DIFLUCAN) 200mg/100 mL IVPB (premix)  200 mg IntraVENous Q24H    NOREPINephrine (LEVOPHED) 8 mg in 5% dextrose 250mL (32 mcg/mL) infusion  0.5-16 mcg/min IntraVENous TITRATE    levETIRAcetam (KEPPRA) 500 mg in 0.9% sodium chloride 100 mL IVPB  500 mg IntraVENous Q12H    LORazepam (ATIVAN) injection 2 mg  2 mg IntraVENous Q4H PRN    HYDROcodone-acetaminophen (NORCO) 5-325 mg per tablet 1 Tablet  1 Tablet Oral Q4H PRN    HYDROcodone-acetaminophen (NORCO)  mg tablet 1 Tablet  1 Tablet Oral Q4H PRN    HYDROmorphone (DILAUDID) injection 0.5 mg  0.5 mg IntraVENous Q2H PRN    simethicone (MYLICON) tablet 80 mg  80 mg Oral QID PRN    pantoprazole (PROTONIX) 40 mg in 0.9% sodium chloride 10 mL injection  40 mg IntraVENous DAILY    piperacillin-tazobactam (ZOSYN) 3.375 g in 0.9% sodium chloride (MBP/ADV) 100 mL MBP  3.375 g IntraVENous Q12H    zinc oxide-cod liver oil (DESITIN) 40 % paste   Topical TID    levothyroxine (SYNTHROID) tablet 50 mcg  50 mcg Oral 6am    heparin (porcine) pf 300 Units  300 Units InterCATHeter PRN    insulin lispro (HUMALOG) injection   SubCUTAneous AC&HS    epoetin bia-epbx (RETACRIT) injection 4,000 Units  4,000 Units SubCUTAneous Q MON, WED & FRI    hydrALAZINE (APRESOLINE) 20 mg/mL injection 20 mg  20 mg IntraVENous Q6H PRN    labetaloL (NORMODYNE;TRANDATE) injection 20 mg  20 mg IntraVENous Q4H PRN    glucose chewable tablet 16 g  4 Tablet Oral PRN    dextrose (D50W) injection syrg 12.5-25 g  12.5-25 g IntraVENous PRN    glucagon (GLUCAGEN) injection 1 mg  1 mg IntraMUSCular PRN    sodium chloride (NS) flush 5-40 mL  5-40 mL IntraVENous Q8H    sodium chloride (NS) flush 5-40 mL  5-40 mL IntraVENous PRN    acetaminophen (TYLENOL) tablet 650 mg  650 mg Oral Q6H PRN    ondansetron (ZOFRAN ODT) tablet 4 mg  4 mg Oral Q8H PRN    Or    ondansetron (ZOFRAN) injection 4 mg  4 mg IntraVENous Q6H PRN    acetaminophen (TYLENOL) suppository 650 mg  650 mg Rectal Q4H PRN    brimonidine (ALPHAGAN) 0.2 % ophthalmic solution 1 Drop  1 Drop Both Eyes BID    timolol (TIMOPTIC) 0.25% ophthalmic solution  1 Drop Both Eyes DAILY    prednisoLONE acetate (PRED FORTE) 1 % ophthalmic suspension 1 Drop  1 Drop Right Eye TID    latanoprost (XALATAN) 0.005 % ophthalmic solution 1 Drop  1 Drop Both Eyes QHS          Deni Villalobos MD              [facility]  [facility] / [facility], [address], [address]  Phone - [phone]               Fax - [phone]

## 2021-07-31 NOTE — PROGRESS NOTES
RAPID RESPONSE TEAM - follow up    Rounded on patient due to recent RRT. Discussed with primary RN. No acute concerns, VSS, MEWS 1. Visit Vitals  BP (!) 144/57 (BP 1 Location: Left lower arm, BP Patient Position: At rest)   Pulse 63   Temp 98.5 °F (36.9 °C)   Resp 18   Ht 5' 2\" (1.575 m)   Wt 58.5 kg (129 lb)   SpO2 99%   BMI 23.59 kg/m²       No RRT interventions indicated at this time. Please call with any questions or concerns.      Abelino Robbins

## 2021-07-31 NOTE — PROCEDURES
Mauro Lewis, [address] [facility]     Electroencephalogram Report    Date:7/30/2021    Patient Name: Chemo Hayes  YOB: 1950   Medical Record No: [id number]    Procedure ID: [id number]  Procedure Type: routine    INDICATION: altered mental status    STATE: awake asleep    IMPRESSION:  Generalized slowing no seizures. Absence of seizures on this study does not exclude epilepsy. Clinical correlation is advised. DESCRIPTION OF PROCEDURE: Electrodes were applied in accordance with the international 10-20 system of electrode placement. EEG was reviewed in both bipolar and referential montages. DESCRIPTION OF FINDINGS: Background consist of theta activity intermixed with beta rhythyms that may be a medication effect. Photic stimulation produces a poor driving response. No seizures noted. Sleep spindles are noted in the latter part of the study.       Medications:  Current Facility-Administered Medications   Medication Dose Route Frequency    heparin (porcine) injection 5,000 Units  5,000 Units SubCUTAneous Q8H    0.9% sodium chloride infusion  50 mL/hr IntraVENous CONTINUOUS    fluconazole (DIFLUCAN) 200mg/100 mL IVPB (premix)  200 mg IntraVENous Q24H    NOREPINephrine (LEVOPHED) 8 mg in 5% dextrose 250mL (32 mcg/mL) infusion  0.5-16 mcg/min IntraVENous TITRATE    levETIRAcetam (KEPPRA) 500 mg in 0.9% sodium chloride 100 mL IVPB  500 mg IntraVENous Q12H    LORazepam (ATIVAN) injection 2 mg  2 mg IntraVENous Q4H PRN    HYDROcodone-acetaminophen (NORCO) 5-325 mg per tablet 1 Tablet  1 Tablet Oral Q4H PRN    HYDROcodone-acetaminophen (NORCO)  mg tablet 1 Tablet  1 Tablet Oral Q4H PRN    HYDROmorphone (DILAUDID) injection 0.5 mg  0.5 mg IntraVENous Q2H PRN    simethicone (MYLICON) tablet 80 mg  80 mg Oral QID PRN    pantoprazole (PROTONIX) 40 mg in 0.9% sodium chloride 10 mL injection  40 mg IntraVENous DAILY    piperacillin-tazobactam (ZOSYN) 3.375 g in 0.9% sodium chloride (MBP/ADV) 100 mL MBP  3.375 g IntraVENous Q12H    zinc oxide-cod liver oil (DESITIN) 40 % paste   Topical TID    levothyroxine (SYNTHROID) tablet 50 mcg  50 mcg Oral 6am    heparin (porcine) pf 300 Units  300 Units InterCATHeter PRN    insulin lispro (HUMALOG) injection   SubCUTAneous AC&HS    epoetin bia-epbx (RETACRIT) injection 4,000 Units  4,000 Units SubCUTAneous Q MON, WED & FRI    hydrALAZINE (APRESOLINE) 20 mg/mL injection 20 mg  20 mg IntraVENous Q6H PRN    labetaloL (NORMODYNE;TRANDATE) injection 20 mg  20 mg IntraVENous Q4H PRN    glucose chewable tablet 16 g  4 Tablet Oral PRN    dextrose (D50W) injection syrg 12.5-25 g  12.5-25 g IntraVENous PRN    glucagon (GLUCAGEN) injection 1 mg  1 mg IntraMUSCular PRN    sodium chloride (NS) flush 5-40 mL  5-40 mL IntraVENous Q8H    sodium chloride (NS) flush 5-40 mL  5-40 mL IntraVENous PRN    acetaminophen (TYLENOL) tablet 650 mg  650 mg Oral Q6H PRN    ondansetron (ZOFRAN ODT) tablet 4 mg  4 mg Oral Q8H PRN    Or    ondansetron (ZOFRAN) injection 4 mg  4 mg IntraVENous Q6H PRN    acetaminophen (TYLENOL) suppository 650 mg  650 mg Rectal Q4H PRN    brimonidine (ALPHAGAN) 0.2 % ophthalmic solution 1 Drop  1 Drop Both Eyes BID    timolol (TIMOPTIC) 0.25% ophthalmic solution  1 Drop Both Eyes DAILY    prednisoLONE acetate (PRED FORTE) 1 % ophthalmic suspension 1 Drop  1 Drop Right Eye TID    latanoprost (XALATAN) 0.005 % ophthalmic solution 1 Drop  1 Drop Both Eyes QHS

## 2021-07-31 NOTE — PROGRESS NOTES
Overnight levo gtt titrated off. BP stable. Dialysis completed, pt tolerated well. Slept most of the night, somnolent, does not follow commands. Slightly more alert and fidgeting with her sheets around 0600. No indication of seizure like activity or other clinical changes.

## 2021-07-31 NOTE — CONSULTS
Neurology Note    Patient ID:  Leah Messer  [id number]  34 y.o.  1950      Date of Consultation:  July 31, 2021    Subjective: minimal verbal output       History of Present Illness: Leah Messer is a 66 y.o. female with a prolonged hospitalization who has been seen by my colleagues earlier during the hospitalization who yesterday had a bout of hypotension and a seizure event. She did receive a stat EEG which revealed generalized slowing but no focality for seizure. This was most likely felt to be a provoked event. There has been no other seizure activity that was reported. She was continued on Keppra 500 mg twice a day. This a.m. I did speak to nursing who reported that she has become a bit more alert and interactive as the day has went on. Past Medical History:   Diagnosis Date    Diabetes ([address]     Hypertension         Past Surgical History:   Procedure Laterality Date    IR INSERT NON TUNL CVC OVER 5 YRS  7/30/2021    IR INSERT NON TUNL CVC OVER 5 YRS  7/30/2021    MO CARDIAC SURG PROCEDURE UNLIST          History reviewed. No pertinent family history.      Social History     Tobacco Use    Smoking status: Never Smoker    Smokeless tobacco: Never Used   Substance Use Topics    Alcohol use: Not Currently        No Known Allergies     Current Facility-Administered Medications   Medication Dose Route Frequency    dextrose (D50W) 50% injection syrg        heparin (porcine) injection 5,000 Units  5,000 Units SubCUTAneous Q8H    dextrose 5% infusion  50 mL/hr IntraVENous CONTINUOUS    fluconazole (DIFLUCAN) 200mg/100 mL IVPB (premix)  200 mg IntraVENous Q24H    NOREPINephrine (LEVOPHED) 8 mg in 5% dextrose 250mL (32 mcg/mL) infusion  0.5-16 mcg/min IntraVENous TITRATE    levETIRAcetam (KEPPRA) 500 mg in 0.9% sodium chloride 100 mL IVPB  500 mg IntraVENous Q12H    LORazepam (ATIVAN) injection 2 mg  2 mg IntraVENous Q4H PRN    HYDROcodone-acetaminophen (NORCO) 5-325 mg per tablet 1 Tablet 1 Tablet Oral Q4H PRN    HYDROcodone-acetaminophen (NORCO)  mg tablet 1 Tablet  1 Tablet Oral Q4H PRN    HYDROmorphone (DILAUDID) injection 0.5 mg  0.5 mg IntraVENous Q2H PRN    simethicone (MYLICON) tablet 80 mg  80 mg Oral QID PRN    pantoprazole (PROTONIX) 40 mg in 0.9% sodium chloride 10 mL injection  40 mg IntraVENous DAILY    piperacillin-tazobactam (ZOSYN) 3.375 g in 0.9% sodium chloride (MBP/ADV) 100 mL MBP  3.375 g IntraVENous Q12H    zinc oxide-cod liver oil (DESITIN) 40 % paste   Topical TID    levothyroxine (SYNTHROID) tablet 50 mcg  50 mcg Oral 6am    heparin (porcine) pf 300 Units  300 Units InterCATHeter PRN    insulin lispro (HUMALOG) injection   SubCUTAneous AC&HS    epoetin bia-epbx (RETACRIT) injection 4,000 Units  4,000 Units SubCUTAneous Q MON, WED & FRI    hydrALAZINE (APRESOLINE) 20 mg/mL injection 20 mg  20 mg IntraVENous Q6H PRN    labetaloL (NORMODYNE;TRANDATE) injection 20 mg  20 mg IntraVENous Q4H PRN    glucose chewable tablet 16 g  4 Tablet Oral PRN    dextrose (D50W) injection syrg 12.5-25 g  12.5-25 g IntraVENous PRN    glucagon (GLUCAGEN) injection 1 mg  1 mg IntraMUSCular PRN    sodium chloride (NS) flush 5-40 mL  5-40 mL IntraVENous Q8H    sodium chloride (NS) flush 5-40 mL  5-40 mL IntraVENous PRN    acetaminophen (TYLENOL) tablet 650 mg  650 mg Oral Q6H PRN    ondansetron (ZOFRAN ODT) tablet 4 mg  4 mg Oral Q8H PRN    Or    ondansetron (ZOFRAN) injection 4 mg  4 mg IntraVENous Q6H PRN    acetaminophen (TYLENOL) suppository 650 mg  650 mg Rectal Q4H PRN    brimonidine (ALPHAGAN) 0.2 % ophthalmic solution 1 Drop  1 Drop Both Eyes BID    timolol (TIMOPTIC) 0.25% ophthalmic solution  1 Drop Both Eyes DAILY    prednisoLONE acetate (PRED FORTE) 1 % ophthalmic suspension 1 Drop  1 Drop Right Eye TID    latanoprost (XALATAN) 0.005 % ophthalmic solution 1 Drop  1 Drop Both Eyes QHS       Review of Systems:    [x]Unable to obtain  ROS due to  [x]mental status change  []sedated   []intubated    Objective:     Visit Vitals  BP (!) 114/31 (BP 1 Location: Left lower arm, BP Patient Position: At rest)   Pulse 61   Temp 99 °F (37.2 °C) Comment: Simultaneous filing. User may not have seen previous data. Resp 20   Ht 5' 2\" (1.575 m)   Wt 129 lb (58.5 kg)   SpO2 100% Comment: Simultaneous filing. User may not have seen previous data. BMI 23.59 kg/m²       Physical Exam:      General:   The patient was lying comfortably in bed  Neck: no carotid bruits  Heart:  no murmurs, regular rate  Lower extremity: peripheral pulses palpable and no edema  Skin: intact    Neurological exam:    The patient was arousable. She would follow simple one-step commands such as stick out her tongue, show me her thumbs. Minimal verbal output but would answer simple questions. Cranial nerves:   II-XII were tested    Perrrla  Visual fields were full to visual threat   Eomi, no evidence of nystagmus  Facial sensation:  normal and symmetric  Facial motor: normal and symmetric  Hearing intact      Motor: Tone normal  Pronator drift was absent. She was able to lift all 4 extremities off the bed. There was generalized quadriparesis but no clear focality.     Sensory:  Upper extremity: intact to pp,  Lower extremity: intact to pp    Reflexes:  Diminished reflexes throughout    Cerebellar testing:  no tremor apparent, finger/nose and richard were intact    Labs:     Lab Results   Component Value Date/Time    Sodium 136 07/31/2021 03:39 AM    Potassium 3.5 07/31/2021 03:39 AM    Chloride 99 07/31/2021 03:39 AM    Glucose 79 07/31/2021 03:39 AM    BUN 16 07/31/2021 03:39 AM    Creatinine 3.29 (H) 07/31/2021 03:39 AM    Calcium 8.4 (L) 07/31/2021 03:39 AM    WBC 11.5 (H) 07/31/2021 03:38 AM    HCT 27.3 (L) 07/31/2021 03:38 AM    HGB 8.6 (L) 07/31/2021 03:38 AM    PLATELET 181 (L) 42/65/2459 03:38 AM       Imaging:    Results from Hospital Encounter encounter on 07/16/21    MRI BRAIN WO CONT    Narrative  EXAM: MRI BRAIN WO CONT    INDICATION: alt mental status    COMPARISON: 7/17/2021. CONTRAST: None. TECHNIQUE:  Multiplanar multisequence acquisition without contrast of the brain. FINDINGS:  The ventricles are normal in size and position. There is no acute infarct,  hemorrhage, extra-axial fluid collection, or mass effect. There is no cerebellar  tonsillar herniation. Diffuse encephalomalacia within the posterior left  temporal lobe and occipital lobe. Moderate high signal within the  periventricular white matter. Chronic right basal ganglia lacunar infarct. The paranasal sinuses, mastoid air cells, and middle ears are clear. The orbital  contents are within normal limits. No significant osseous or scalp lesions are  identified. Impression  Encephalomalacia and white matter disease. No acute intracranial abnormality      Results from Hospital Encounter encounter on 07/16/21    CT ABD PELV WO CONT    Narrative  CLINICAL HISTORY: severe abdominal pain  INDICATION: severe abdominal pain  COMPARISON: 7/16/2020  CONTRAST:  None. TECHNIQUE:  Thin axial images were obtained through the abdomen and pelvis. Coronal and  sagittal reformats were generated. Oral contrast was not administered. CT dose  reduction was achieved through use of a standardized protocol tailored for this  examination and automatic exposure control for dose modulation. The absence of intravenous contrast material reduces the sensitivity for  evaluation of visceral organs and vasculature including presence of small mass  lesions, hemodynamically significant stenoses, dissections, mucosal  abnormalities etc.    FINDINGS:  LOWER THORAX: Cardiomegaly and coronary artery disease. Dependent atelectasis on  the right. LIVER/GALLBLADDER: No mass. Gallbladder is within normal limits. CBD is not  dilated. SPLEEN/PANCREAS:  within normal limits. ADRENALS/KIDNEYS: Right lower pole calculus. No hydronephrosis.   STOMACH: Small hiatal hernia. SMALL BOWEL/COLON: Ascending colonic wall thickening with pericolonic  inflammatory stranding and trace fluid. Few colonic diverticula. No dilatation or  wall thickening. APPENDIX: Appendiceal enlargement. Inflammatory change. PERITONEUM: No ascites or pneumoperitoneum. RETROPERITONEUM: Aortic atherosclerotic change. REPRODUCTIVE ORGANS: Calcified uterine fibroids. URINARY BLADDER: No mass or calculus. BONES: Multilevel disc degenerative change with multilevel vacuum disc  phenomena. Schmorl's nodes at L2 and T12. A  ADDITIONAL COMMENTS: N/A    Impression  Imaging findings consistent with acute appendicitis. There is no associated  abscess or drainable fluid collection. The findings were called to the patient's nurse on 7/29/2021 at 12:28 AM by Dr. Regan Pressley.  [address] Remainder of the examination is stable. Incidental and/or nonemergent findings are as described in detail above. I did review the brain MRI from July 18, 2021. There was a significant amount of atrophy and encephalomalacia with chronic periventricular ischemic changes. Assessment and Plan:    The patient is a pleasant 76-year-old female with multiple medical conditions and prolonged hospitalization who had a bout of hypotension and seizure-like activity. There is no further ongoing seizure activity and her EEG revealed no focality. Seizure activity: It is unclear if there is an underlying disorder based in epilepsy versus a lowered seizure threshold due to underlying medical and metabolic abnormalities. I do agree with continuing Keppra 500 mg twice a day for the time being. Encephalopathy:   An extensive work-up had been performed earlier in the hospitalization. She has multiple ongoing medical conditions in a patient with decreased cognitive reserve as noted by the significant amount of atrophy and encephalomalacia seen on neuro imaging. Cognition has slightly improved today per nursing. Active Problems:    Acute encephalopathy (7/16/2021)      Visual impairment (7/22/2021)      Physical debility (7/22/2021)      Counseling regarding advance care planning and goals of care (7/22/2021)      Acute appendicitis with localized peritonitis (7/29/2021)      End stage renal disease ([facility] [address] (7/29/2021)      Acute appendicitis with generalized peritonitis, abscess, and gangrene (7/29/2021)               I spent  40   minutes providing care to this  acutely ill inpatient with > 50% of the time counseling and assisting in the coordination of care of the patient on the patient's hospital floor/unit.            Signed By:  Chester Moody DO FAAN    July 31, 2021

## 2021-07-31 NOTE — PROGRESS NOTES
0700 .Bedside and Verbal shift change report given to Meryl (oncoming nurse) by Dipak Nielsen  (offgoing nurse). Report included the following information SBAR, Kardex, Intake/Output and MAR   1100 Patient becoming more alert, follow commands briefly, decreases concentration. Drowsy but able to arouse with speech and calling her name. She continues to need reorientation place and situation. 1220 Patient had hypoglycemic episode BG 69, Oswaldo RN administered dextrose 25g ml D50%. Recheck     1131 Dextrose 5% 50ml/hr started. NS discontinued. .End of Shift Note    Bedside shift change report given to Dipak Nielsen (oncoming nurse) by Drea Cohen (offgoing nurse). Report included the following information SBAR, Kardex, Intake/Output and MAR    Shift worked:  0700 - 1900     Shift summary and any significant changes:     alertness increaseing cont to monitor  CT of Head     Concerns for physician to address:  . [facility] phone for oncoming shift:   [phone]       Activity:  Activity Level: Bed Rest  Number times ambulated in hallways past shift: 0  Number of times OOB to chair past shift: 0    Cardiac:   Cardiac Monitoring: Yes      Cardiac Rhythm: Sinus Rhythm    Access:   Current line(s): PIV and HD access     Genitourinary:   Urinary status: anuric    Respiratory:   O2 Device: Nasal cannula  Chronic home O2 use?: NO  Incentive spirometer at bedside: NO     GI:  Last Bowel Movement Date: 07/27/21  Current diet:  ADULT DIET Full Liquid  Passing flatus: YES  Tolerating current diet: NO       Pain Management:   Patient states pain is manageable on current regimen: YES    Skin:  Mick Score: 12  Interventions: speciality bed and PT/OT consult    Patient Safety:  Fall Score:  Total Score: 4  Interventions: bed/chair alarm  High Fall Risk: Yes    Length of Stay:  Expected LOS: 4d 19h  Actual LOS: [address]

## 2021-07-31 NOTE — DIALYSIS
Each catheter limb disinfected per p&p, caps removed, hubs disinfected per p&p. [facility]  [phone]    Vitals   Pre   Post   Assessment   Pre   Post     Temp  Temp: 98.2 °F (36.8 °C) (07/30/21 2245)  98.5 LOC  Lethargic/drowsy same   HR   Pulse (Heart Rate): 70 (07/30/21 2245) 82 Lungs   Rales, wheeze  same   B/P   BP: (!) 147/50 (07/30/21 2245) 188/66-large rebound after rinseback; primary triturating back pressers Cardiac   NSR  SV R   Resp   Resp Rate: 24 (07/30/21 2245) 18 Skin   Warm/dry  same   Pain level  Pain Intensity 1: 0 (07/30/21 0428) 0 Edema  generalized     same   Orders:    Duration:   Start:   0610 End:   2267 Total:   3.5hr   Dialyzer:   Dialyzer/Set Up Inspection: Isaiah Kovacs (07/30/21 2245)   K Bath:   Dialysate K (mEq/L): 2 (07/30/21 2245)   Ca Bath:   Dialysate CA (mEq/L): 2.5 (07/30/21 2245)   Na/Bicarb:   Dialysate NA (mEq/L): 140 (07/30/21 2245)   Target Fluid Removal:   Goal/Amount of Fluid to Remove (mL): 500 mL (07/30/21 2245)   Access     Type & Location:   L-IJ-dressing intact w/ old bloody drainage. No S+S of infection. W/ (+) aspiration/flush. [email or web address] after line swap   Labs     Obtained/Reviewed   Critical Results Called   Date when labs were drawn-  Hgb-    HGB   Date Value Ref Range Status   07/30/2021 9.4 (L) 11.5 - 16.0 g/dL Final     K-    Potassium   Date Value Ref Range Status   07/30/2021 5.9 (H) 3.5 - 5.1 mmol/L Final     Ca-   Calcium   Date Value Ref Range Status   07/30/2021 8.6 8.5 - 10.1 MG/DL Final     Bun-   BUN   Date Value Ref Range Status   07/30/2021 48 (H) 6 - 20 MG/DL Final     Creat-   Creatinine   Date Value Ref Range Status   07/30/2021 7.70 (H) 0.55 - 1.02 MG/DL Final        Medications/ Blood Products Given     Name   Dose   Route and Time                     Blood Volume Processed (BVP):    67.2 Net Fluid   Removed:  500ml   Comments   Time Out Done: 2240  Primary Nurse Rpt Pre: C.  [id number] RN  Primary Nurse Rpt Post: SANDI Veliz RN  Pt Education: Fluid restrictions  Care Plan:Continue Tx as ordered  Tx Summary: Pt tolerated Tx well. All possible blood returned via NS rinseback. Lines flushed and locked w/ NS, then capped. Admiting Diagnosis:Acute Encephalopathy  [facility]  Consent signed - Informed Consent Verified: Yes (07/30/21 2245)  [facility] Consent - Signed  Hepatitis Status- Neg Ag 6/28/21; 81Ab 7/7/21  Machine #- Machine Number: [id number] (07/30/21 2245)  Telemetry status-Bedside  Pre-dialysis wt. - Pre-Dialysis Weight: 58.5 kg (128 lb 15.5 oz) (07/30/21 2245)     Each dialysis catheter limb disinfected per p&p, blood returned per p&p, each dialysis hub disinfected per p&p, post dialysis catheter dwell instilled per order, and caps applied.

## 2021-07-31 NOTE — PROGRESS NOTES
SURGERY PROGRESS NOTE      Admit Date: 2021    POD 2 Days Post-Op    Procedure: Procedure(s):  APPENDECTOMY LAPAROSCOPIC      Subjective:     Patient asleep but arousable. Denies pain. Objective:     Visit Vitals  BP (!) 114/31 (BP 1 Location: Left lower arm, BP Patient Position: At rest)   Pulse 61   Temp 99 °F (37.2 °C) Comment: Simultaneous filing. User may not have seen previous data. Resp 20   Ht 5' 2\" (1.575 m)   Wt 58.5 kg (129 lb)   SpO2 100% Comment: Simultaneous filing. User may not have seen previous data. BMI 23.59 kg/m²        Temp (24hrs), Av.6 °F (37 °C), Min:98.2 °F (36.8 °C), Max:99.4 °F (37.4 °C)      No intake/output data recorded.    1901 -  0700  In: 1603.6 [I.V.:1603.6]  Out: 18 [Drains:70]    Physical Exam:    General:  cooperative, no distress, appears older than stated age   Abdomen: soft, bowel sounds active, tender   Incision:   healing well, no drainage, no erythema, no hernia, no seroma, no swelling, no dehiscence, incision well approximated           Lab Results   Component Value Date/Time    WBC 11.5 (H) 2021 03:38 AM    HGB 8.6 (L) 2021 03:38 AM    HCT 27.3 (L) 2021 03:38 AM    PLATELET 473 (L)  03:38 AM    MCV 86.4 2021 03:38 AM     Lab Results   Component Value Date/Time    GFR est non-AA 14 (L) 2021 03:39 AM    GFR est AA 17 (L) 2021 03:39 AM    Creatinine 3.29 (H) 2021 03:39 AM    BUN 16 2021 03:39 AM    Sodium 136 2021 03:39 AM    Potassium 3.5 2021 03:39 AM    Chloride 99 2021 03:39 AM    CO2 33 (H) 2021 03:39 AM    Magnesium 2.1 2021 11:45 PM    Phosphorus 2.4 (L) 2021 03:38 PM       Assessment:     Active Problems:    Acute encephalopathy (2021)      Visual impairment (2021)      Physical debility (2021)      Counseling regarding advance care planning and goals of care (2021)      Acute appendicitis with localized peritonitis (7/29/2021)      End stage renal disease ([facility] [address] (7/29/2021)      Acute appendicitis with generalized peritonitis, abscess, and gangrene (7/29/2021)    recovering from appendectomy as expected    Plan:       Advance diet as expected   Continue antibiotics for total of 10 days can switch to PO cipro and flagyl once more alert and taking PO well.

## 2021-07-31 NOTE — PROGRESS NOTES
Consulted for declot of RUE graft. Currently only responding to pain. Rapid response yesterday and now getting EEG. Encephalopathic. Getting dialysis through left IJ kadie catheter. Will hold off on RUE graft declot until more stable. Will re-evaluate at beginning of next week.

## 2021-08-01 LAB
ANION GAP SERPL CALC-SCNC: 5 MMOL/L (ref 5–15)
BASOPHILS # BLD: 0 K/UL (ref 0–0.1)
BASOPHILS NFR BLD: 0 % (ref 0–1)
BUN SERPL-MCNC: 25 MG/DL (ref 6–20)
BUN/CREAT SERPL: 5 (ref 12–20)
CALCIUM SERPL-MCNC: 8.2 MG/DL (ref 8.5–10.1)
CHLORIDE SERPL-SCNC: 99 MMOL/L (ref 97–108)
CO2 SERPL-SCNC: 33 MMOL/L (ref 21–32)
CREAT SERPL-MCNC: 4.95 MG/DL (ref 0.55–1.02)
DIFFERENTIAL METHOD BLD: ABNORMAL
EOSINOPHIL # BLD: 0.3 K/UL (ref 0–0.4)
EOSINOPHIL NFR BLD: 4 % (ref 0–7)
ERYTHROCYTE [DISTWIDTH] IN BLOOD BY AUTOMATED COUNT: 18.6 % (ref 11.5–14.5)
GLUCOSE BLD STRIP.AUTO-MCNC: 126 MG/DL (ref 65–117)
GLUCOSE BLD STRIP.AUTO-MCNC: 151 MG/DL (ref 65–117)
GLUCOSE BLD STRIP.AUTO-MCNC: 158 MG/DL (ref 65–117)
GLUCOSE BLD STRIP.AUTO-MCNC: 184 MG/DL (ref 65–117)
GLUCOSE SERPL-MCNC: 134 MG/DL (ref 65–100)
HCT VFR BLD AUTO: 25.6 % (ref 35–47)
HGB BLD-MCNC: 8.2 G/DL (ref 11.5–16)
IMM GRANULOCYTES # BLD AUTO: 0 K/UL (ref 0–0.04)
IMM GRANULOCYTES NFR BLD AUTO: 0 % (ref 0–0.5)
LYMPHOCYTES # BLD: 1.3 K/UL (ref 0.8–3.5)
LYMPHOCYTES NFR BLD: 17 % (ref 12–49)
MCH RBC QN AUTO: 27.2 PG (ref 26–34)
MCHC RBC AUTO-ENTMCNC: 32 G/DL (ref 30–36.5)
MCV RBC AUTO: 85 FL (ref 80–99)
MONOCYTES # BLD: 0.7 K/UL (ref 0–1)
MONOCYTES NFR BLD: 10 % (ref 5–13)
NEUTS SEG # BLD: 5.2 K/UL (ref 1.8–8)
NEUTS SEG NFR BLD: 68 % (ref 32–75)
NRBC # BLD: 0 K/UL (ref 0–0.01)
NRBC BLD-RTO: 0 PER 100 WBC
PLATELET # BLD AUTO: 125 K/UL (ref 150–400)
PMV BLD AUTO: 11.7 FL (ref 8.9–12.9)
POTASSIUM SERPL-SCNC: 3.5 MMOL/L (ref 3.5–5.1)
RBC # BLD AUTO: 3.01 M/UL (ref 3.8–5.2)
SERVICE CMNT-IMP: ABNORMAL
SODIUM SERPL-SCNC: 137 MMOL/L (ref 136–145)
WBC # BLD AUTO: 7.6 K/UL (ref 3.6–11)

## 2021-08-01 PROCEDURE — 77010033678 HC OXYGEN DAILY

## 2021-08-01 PROCEDURE — 74011250637 HC RX REV CODE- 250/637: Performed by: SURGERY

## 2021-08-01 PROCEDURE — 74011636637 HC RX REV CODE- 636/637: Performed by: SURGERY

## 2021-08-01 PROCEDURE — 80048 BASIC METABOLIC PNL TOTAL CA: CPT

## 2021-08-01 PROCEDURE — 65660000000 HC RM CCU STEPDOWN

## 2021-08-01 PROCEDURE — 82962 GLUCOSE BLOOD TEST: CPT

## 2021-08-01 PROCEDURE — 36415 COLL VENOUS BLD VENIPUNCTURE: CPT

## 2021-08-01 PROCEDURE — 74011250636 HC RX REV CODE- 250/636: Performed by: STUDENT IN AN ORGANIZED HEALTH CARE EDUCATION/TRAINING PROGRAM

## 2021-08-01 PROCEDURE — 74011000258 HC RX REV CODE- 258: Performed by: STUDENT IN AN ORGANIZED HEALTH CARE EDUCATION/TRAINING PROGRAM

## 2021-08-01 PROCEDURE — 74011250636 HC RX REV CODE- 250/636: Performed by: SURGERY

## 2021-08-01 PROCEDURE — 74011000258 HC RX REV CODE- 258: Performed by: SURGERY

## 2021-08-01 PROCEDURE — C9113 INJ PANTOPRAZOLE SODIUM, VIA: HCPCS | Performed by: SURGERY

## 2021-08-01 PROCEDURE — 74011000250 HC RX REV CODE- 250: Performed by: SURGERY

## 2021-08-01 PROCEDURE — 85025 COMPLETE CBC W/AUTO DIFF WBC: CPT

## 2021-08-01 RX ADMIN — PREDNISOLONE ACETATE 1 DROP: 10 SUSPENSION/ DROPS OPHTHALMIC at 08:52

## 2021-08-01 RX ADMIN — PREDNISOLONE ACETATE 1 DROP: 10 SUSPENSION/ DROPS OPHTHALMIC at 21:09

## 2021-08-01 RX ADMIN — LEVOTHYROXINE SODIUM 50 MCG: 0.05 TABLET ORAL at 05:24

## 2021-08-01 RX ADMIN — PREDNISOLONE ACETATE 1 DROP: 10 SUSPENSION/ DROPS OPHTHALMIC at 17:27

## 2021-08-01 RX ADMIN — HEPARIN SODIUM 5000 UNITS: 5000 INJECTION INTRAVENOUS; SUBCUTANEOUS at 08:33

## 2021-08-01 RX ADMIN — LEVETIRACETAM 500 MG: 100 INJECTION, SOLUTION INTRAVENOUS at 17:26

## 2021-08-01 RX ADMIN — INSULIN LISPRO 2 UNITS: 100 INJECTION, SOLUTION INTRAVENOUS; SUBCUTANEOUS at 08:33

## 2021-08-01 RX ADMIN — INSULIN LISPRO 3 UNITS: 100 INJECTION, SOLUTION INTRAVENOUS; SUBCUTANEOUS at 12:09

## 2021-08-01 RX ADMIN — BRIMONIDINE TARTRATE 1 DROP: 2 SOLUTION OPHTHALMIC at 17:42

## 2021-08-01 RX ADMIN — LEVETIRACETAM 500 MG: 100 INJECTION, SOLUTION INTRAVENOUS at 05:24

## 2021-08-01 RX ADMIN — BRIMONIDINE TARTRATE 1 DROP: 2 SOLUTION OPHTHALMIC at 08:52

## 2021-08-01 RX ADMIN — HEPARIN SODIUM 5000 UNITS: 5000 INJECTION INTRAVENOUS; SUBCUTANEOUS at 16:17

## 2021-08-01 RX ADMIN — Medication 10 ML: at 17:27

## 2021-08-01 RX ADMIN — Medication 10 ML: at 21:09

## 2021-08-01 RX ADMIN — TIMOLOL MALEATE 1 DROP: 2.5 SOLUTION/ DROPS OPHTHALMIC at 08:34

## 2021-08-01 RX ADMIN — Medication 10 ML: at 06:00

## 2021-08-01 RX ADMIN — Medication: at 21:08

## 2021-08-01 RX ADMIN — LATANOPROST 1 DROP: 50 SOLUTION OPHTHALMIC at 21:08

## 2021-08-01 RX ADMIN — PIPERACILLIN AND TAZOBACTAM 3.38 G: 3; .375 INJECTION, POWDER, LYOPHILIZED, FOR SOLUTION INTRAVENOUS at 08:32

## 2021-08-01 RX ADMIN — PIPERACILLIN AND TAZOBACTAM 3.38 G: 3; .375 INJECTION, POWDER, LYOPHILIZED, FOR SOLUTION INTRAVENOUS at 20:18

## 2021-08-01 RX ADMIN — Medication: at 08:52

## 2021-08-01 RX ADMIN — FLUCONAZOLE 200 MG: 200 INJECTION, SOLUTION INTRAVENOUS at 11:00

## 2021-08-01 RX ADMIN — SODIUM CHLORIDE 40 MG: 9 INJECTION, SOLUTION INTRAMUSCULAR; INTRAVENOUS; SUBCUTANEOUS at 08:33

## 2021-08-01 RX ADMIN — Medication: at 17:42

## 2021-08-01 RX ADMIN — HYDROCODONE BITARTRATE AND ACETAMINOPHEN 1 TABLET: 5; 325 TABLET ORAL at 15:57

## 2021-08-01 RX ADMIN — INSULIN LISPRO 2 UNITS: 100 INJECTION, SOLUTION INTRAVENOUS; SUBCUTANEOUS at 17:41

## 2021-08-01 RX ADMIN — HEPARIN SODIUM 5000 UNITS: 5000 INJECTION INTRAVENOUS; SUBCUTANEOUS at 00:52

## 2021-08-01 NOTE — PROGRESS NOTES
Received message from patient's nurse stating:    Can we have an order for mitts? Pt encephalopathic and pulling at her lines, removed her central line dressing. Discussion / orders:    Entered order for bilateral mitts restraints           Please note that this note was dictated using Dragon computer voice recognition software. Quite often unanticipated grammatical, syntax, homophones, and other interpretive errors are inadvertently transcribed by the computer software. Please disregard these errors. Please excuse any errors that have escaped final proofreading.

## 2021-08-01 NOTE — PROGRESS NOTES
RAPID RESPONSE TEAM - follow up    Rounded on patient due to recent RRT. Discussed with primary RN. No acute concerns, VSS, MEWS 1. Visit Vitals  BP (!) 154/73   Pulse 68   Temp 98.2 °F (36.8 °C)   Resp 18   Ht 5' 2\" (1.575 m)   Wt 58.5 kg (129 lb)   SpO2 97%   BMI 23.59 kg/m²       No RRT interventions indicated at this time. Please call with any questions or concerns.      Abelino Robbins

## 2021-08-01 NOTE — PROGRESS NOTES
Confused, agitated and pulled off her biopatch on her dialysis catheter and central line. Attempted to pull her madan drain. Soft mitts initiated. Dressings changed using sterile technique. Total CHG bath complete.

## 2021-08-01 NOTE — PROGRESS NOTES
SURGERY PROGRESS NOTE      Admit Date: 2021    POD 3 Days Post-Op    Procedure: Procedure(s):  APPENDECTOMY LAPAROSCOPIC      Subjective:     Patient more alert this morning. complains of abdominal pain. Denies nausea. Objective:     Visit Vitals  BP (!) 174/68 (BP 1 Location: Left upper arm, BP Patient Position: Supine)   Pulse 64   Temp 97.5 °F (36.4 °C)   Resp 15   Ht 5' 2\" (1.575 m)   Wt 58.5 kg (129 lb)   SpO2 93%   BMI 23.59 kg/m²        Temp (24hrs), Av.9 °F (36.6 °C), Min:97.3 °F (36.3 °C), Max:98.5 °F (36.9 °C)      No intake/output data recorded.  190 -  0700  In: 2960.7 [P.O.:233;  I.V.:2727.7]  Out: 56 [Drains:110]    Physical Exam:    General:  alert, cooperative, no distress, appears older than stated age   Abdomen: soft, bowel sounds active, tender in RLQ   PRTAIK - serous    Incision:   healing well, no drainage, no erythema, no hernia, no seroma, no swelling, no dehiscence, incision well approximated           Lab Results   Component Value Date/Time    WBC 7.6 2021 02:27 AM    HGB 8.2 (L) 2021 02:27 AM    HCT 25.6 (L) 2021 02:27 AM    PLATELET 434 (L)  02:27 AM    MCV 85.0 2021 02:27 AM     Lab Results   Component Value Date/Time    GFR est non-AA 9 (L) 2021 02:27 AM    GFR est AA 10 (L) 2021 02:27 AM    Creatinine 4.95 (H) 2021 02:27 AM    BUN 25 (H) 2021 02:27 AM    Sodium 137 2021 02:27 AM    Potassium 3.5 2021 02:27 AM    Chloride 99 2021 02:27 AM    CO2 33 (H) 2021 02:27 AM    Magnesium 2.1 2021 11:45 PM    Phosphorus 2.4 (L) 2021 03:38 PM       Assessment:     Active Problems:    Acute encephalopathy (2021)      Visual impairment (2021)      Physical debility (2021)      Counseling regarding advance care planning and goals of care (2021)      Acute appendicitis with localized peritonitis (2021)      End stage renal disease ([facility] [address] (2021)      Acute appendicitis with generalized peritonitis, abscess, and gangrene (7/29/2021)    recovering from appendicitis     Plan:       Continue present treatment   Advance diet as tolerated

## 2021-08-01 NOTE — PROGRESS NOTES
RAPID RESPONSE TEAM- Follow Up    Rounded on patient due to recent rapid response. Discussed with primary RN, Naomi Alfonso. No acute concerns, VSS, MEWS 1. Patient Vitals for the past 8 hrs:   Temp Pulse Resp BP SpO2   07/31/21 2347 97.3 °F (36.3 °C) 68 18 (!) 131/53 92 %   07/31/21 1959 98 °F (36.7 °C) 69 20 (!) 149/41 92 %   07/31/21 1758 98.5 °F (36.9 °C) 63 18 (!) 144/57 99 %            No RRT interventions indicated at this time. Please call with any questions or concerns.      Jerome Mcmanus  Rapid Response RN  Hans Diaz

## 2021-08-01 NOTE — PROGRESS NOTES
Spiritual Care Assessment/Progress Note  [facility]      NAME: Dianne Swift      MRN: [id number]  AGE: 66 y.o. SEX: female  Jehovah's witness Affiliation: Unknown   Language: English     8/1/2021     Total Time (in minutes): 10     Spiritual Assessment begun in [facility] PROGRESSIVE CARE through conversation with:         []Patient        [] Family    [] Friend(s)        Reason for Consult: Palliative Care, Initial/Spiritual Assessment     Spiritual beliefs: (Please include comment if needed)     [] Identifies with a nicolas tradition:         [] Supported by a nicolas community:            [] Claims no spiritual orientation:           [] Seeking spiritual identity:                [] Adheres to an individual form of spirituality:           [x] Not able to assess:                           Identified resources for coping:      [] Prayer                               [] Music                  [] Guided Imagery     [] Family/friends                 [] Pet visits     [] Devotional reading                         [x] Unknown     [] Other:                                               Interventions offered during this visit: (See comments for more details)    Patient Interventions: Initial visit           Plan of Care:     [x] Support spiritual and/or cultural needs    [] Support AMD and/or advance care planning process      [] Support grieving process   [] Coordinate Rites and/or Rituals    [] Coordination with community clergy   [] No spiritual needs identified at this time   [] Detailed Plan of Care below (See Comments)  [] Make referral to Music Therapy  [] Make referral to Pet Therapy     [] Make referral to Addiction services  [] Make referral to [facility]  [] Make referral to Spiritual Care Partner  [] No future visits requested        [x] Follow up upon further referrals     Comments:  Reviewed chart prior to visit on PCU for spiritual assessment. No family/friends present.  Patient appeared to be resting comfortably.  did not attempt to awaken patient. Unable to assess for spiritual concerns or needs at this time.    available upon referral by nurse or by patient request.       MARGUERITE Serna, [facility], Staff [address]    [address] Paging Service  [phone]

## 2021-08-01 NOTE — PROGRESS NOTES
1430 Patient is still having periodic of confused, removed her hand mitts and pulled her central line dressing off. Nurse reoriented patient, applied new dressing. Patient has poor safety  awareness. Pefectserve with Dr. Hair Hanson   8/1/21 4:39 PM   [phone] Hospital or Facility: [facility] From: Daija Oconnell RE: Caitlyn Glover 1950 RM: [id number] Patient will need a new order for non-violent restraints, her order expires [id number] 8/2/21 Need Callback: NO CALLBACK REQ PCU  Read 5:05 PM   0'\"   8/1/21 5:05 PM   Sure we can renew that      1900. End of Shift Note    Bedside shift change report given to Laina Gilbert (oncoming nurse) by Daija Oconnell (offgoing nurse). Report included the following information SBAR, Kardex, Intake/Output and MAR    Shift worked:  0700 - 1900     Shift summary and any significant changes:    Periodic confusion and poor safety cont from     Concerns for physician to address:  above      Zone phone for oncoming shift:   [phone]        Activity:  Activity Level: Bed Rest  Number times ambulated in hallways past shift: 0  Number of times OOB to chair past shift: 0    Cardiac:   Cardiac Monitoring: Yes      Cardiac Rhythm: Sinus Rhythm    Access:   Current line(s): PIV     Genitourinary:   Urinary status: voiding    Respiratory:   O2 Device: None (Room air)  Chronic home O2 use?: YES  Incentive spirometer at bedside: YES     GI:  Last Bowel Movement Date: 07/27/21  Current diet:  ADULT DIET Regular  Passing flatus: YES  Tolerating current diet: YES       Pain Management:   Patient states pain is manageable on current regimen: YES    Skin:  Mick Score: 13  Interventions: PT/OT consult    Patient Safety:  Fall Score:  Total Score: 3  Interventions: bed/chair alarm  High Fall Risk: Yes    Length of Stay:  Expected LOS: 4d 19h  Actual LOS: Hiral Ya 50.

## 2021-08-01 NOTE — PROGRESS NOTES
Hospitalist Progress Note    NAME: Marcia Macias   :  1950   MRN:  [id number]       Assessment / Plan:    Rapid code secondary to hypovolemic shock  Acute seizure   -The pressors on the fluids, blood pressure stable. -Consulted intensivist-appreciate recommendations.  -Neurology consulted-appreciate recommendations. -EEG shows finding suggestive of encephalopathy.  -Continue Keppra 500 mg twice daily.  -Reconsulted neurology-appreciate recommendations.  -On Keppra 500 mg twice daily. - Ct head repeated yesterday- No acute intracranial finding. Chronic ischemic findings.  -On D5 at 50 mL/h.  -Wean off the Levophed. Acute abdominal pain  Acute appendicitis perforated with generalized peritonitis  Right lower quadrant abdomen abscess  -Patient started having abdominal pain last evening, ordered a CT of the abdomen which shows acute appendicitis. -General surgery was consulted, patient was kept n.p.o. yesterday, got surgery today. -S/p laparoscopic appendicectomy with drainage of the right lower quadrant abscess and abdominal washout-.  -General surgery on board-appreciate input. -CT abdomen/pelvis-findings suggestive of acute appendicitis. -Continue Zosyn. -WBC improved to 7.6, afebrile. Sepsis unknown source POA  Acute encephalopathy   -Febrile and altered mental status on admission, some neck stiffness noted and patient was started on empiric coverage for meningitis  -Appreciate neurology consult  -ammonia mildly elevated 38 on admision, normalized  -Also had lumbar puncture, CSF was clear, cultures negative  -Antibiotics discontinued for the initial sepsis. -EEG captured no seizures, triphasic waves suggest possible hepatic/renal encephalopathy  -T bili 1.6, normalized, LFTs normal   -Covid testing negative  -encephalopathy slightly worsened today because of the surgery diversion yesterday because of the sepsis from the appendicitis.   Probably will need a day or 2 for improvement of the mental status. -passed bedside swallow, regular diet  -appreciate GI consult  -Ultrasound with elastography's reveals mild stages of liver fibrosis. Outpatient follow-up with GI as per the recommendations. CT a/p 7/16 (admission):  1. Circumferential rectal wall thickening is suspicious for malignancy. No bowel  obstruction. 2. No other acute abnormality in the abdomen or pelvis. Uterine fibroids are  noted. 3. Moderate cardiomegaly. MRI brain:  Encephalomalacia and white matter disease. No acute intracranial abnormality    ?seizures  -no seizure captured on EEG, triphasic waves point to hepatic/renal encephalopathy as per neuro  -on lose dose keppra, hold and observe    Diarrhea  -Resolved. ESRD  -appreciate nephro following  -Fistulogram ordered by the vascular surgery. Declotting on Monday.  -Liam catheter dialysis. -HD per nephro    Incidental finding of rectal wall thickening on CT A/P  -appreciate CRS eval  -patient can f/u outpatient, no intervention needed during this admission     Hypokalemia  Resolved    DMII  HTN  CAD  FS monitoring, SS     Eye surgery in may : glaucoma       Code Status: Full  Appreciate palliative team following    Surrogate Decision Maker: Daughter  DVT Prophylaxis: SCD  GI Prophylaxis: not indicated  Baseline: Independent     25.0 - 29.9 Overweight / Body mass index is 25.08 kg/m².     Estimated discharge date: TBD  Barriers: Currently very sick  Code status: Full  Prophylaxis: Hep SQ  Recommended Disposition: TBD     Subjective:     Chief Complaint / Reason for Physician Visit  Patient seen today, opened eyes on the not oriented and not alert. Last night patient was agitated, no behavioral disturbances in the morning. Afebrile, no other acute findings. Discussed with RN events overnight. Objective:     VITALS:   Last 24hrs VS reviewed since prior progress note.  Most recent are:  Patient Vitals for the past 24 hrs:   Temp Pulse Resp BP SpO2   08/01/21 0954 -- 70 16 -- 92 %   08/01/21 0725 97.5 °F (36.4 °C) 67 17 (!) 174/68 96 %   08/01/21 0400 97.6 °F (36.4 °C) (!) 59 16 (!) 116/48 97 %   07/31/21 2347 97.3 °F (36.3 °C) 68 18 (!) 131/53 92 %   07/31/21 1959 98 °F (36.7 °C) 69 20 (!) 149/41 92 %   07/31/21 1758 98.5 °F (36.9 °C) 63 18 (!) 144/57 99 %   07/31/21 1600 98.2 °F (36.8 °C) 64 15 (!) 142/44 94 %       Intake/Output Summary (Last 24 hours) at 8/1/2021 1053  Last data filed at 8/1/2021 0954  Gross per 24 hour   Intake 1257.16 ml   Output 80 ml   Net 1177.16 ml        I had a face to face encounter and independently examined this patient on 8/1/2021, as outlined below:  PHYSICAL EXAM:  General: Adult  AA female, sleepy, arousable but not alert and oriented  EENT:  EOMI. Anicteric sclerae. MMM  Resp:  CTA bilaterally, no wheezing or rales. No accessory muscle use  CV:  Regular  rhythm,  No edema  GI:  Soft,  diffused tenderness on palpation. +Bowel sounds  Neurologic:  Sleepy, arousable but not alert moves all extremities and follows some commands. No obvious focal deficits   Psych:   deferred  Skin:  No rashes. No jaundice    Reviewed most current lab test results and cultures  YES  Reviewed most current radiology test results   YES  Review and summation of old records today    NO  Reviewed patient's current orders and MAR    YES  PMH/SH reviewed - no change compared to H&P  ________________________________________________________________________  Care Plan discussed with:    Comments   Patient x    Family      RN x    Care Manager     Consultant                        Multidiciplinary team rounds were held today with , nursing, pharmacist and clinical coordinator. Patient's plan of care was discussed; medications were reviewed and discharge planning was addressed.      ________________________________________________________________________  Total NON critical care TIME:  35   Minutes    Total CRITICAL CARE TIME Spent:      Comments   >50% of visit spent in counseling and coordination of care     ________________________________________________________________________  Juan Hill MD     Procedures: see electronic medical records for all procedures/Xrays and details which were not copied into this note but were reviewed prior to creation of Plan. LABS:  I reviewed today's most current labs and imaging studies. Pertinent labs include:  Recent Labs     08/01/21 0227 07/31/21 0338 07/30/21  1422   WBC 7.6 11.5* 14.6*   HGB 8.2* 8.6* 9.4*   HCT 25.6* 27.3* 30.7*   * 132* 127*     Recent Labs     08/01/21 0227 07/31/21 0339 07/30/21 1422 07/30/21 0332 07/30/21  0332    136 136   < > 135*   K 3.5 3.5 5.9*   < > 5.2*   CL 99 99 101   < > 100   CO2 33* 33* 27   < > 29   * 79 108*   < > 79   BUN 25* 16 48*   < > 44*   CREA 4.95* 3.29* 7.70*   < > 7.02*   CA 8.2* 8.4* 8.6   < > 8.7   ALB  --   --  2.1*  --  2.3*   TBILI  --   --  0.8  --  1.4*   ALT  --   --  10*  --  9*    < > = values in this interval not displayed.        Signed: Juan Hill MD

## 2021-08-02 LAB
ANION GAP SERPL CALC-SCNC: 9 MMOL/L (ref 5–15)
BACTERIA SPEC CULT: NORMAL
BNP SERPL-MCNC: ABNORMAL PG/ML
BUN SERPL-MCNC: 31 MG/DL (ref 6–20)
BUN/CREAT SERPL: 5 (ref 12–20)
CALCIUM SERPL-MCNC: 8.6 MG/DL (ref 8.5–10.1)
CHLORIDE SERPL-SCNC: 95 MMOL/L (ref 97–108)
CO2 SERPL-SCNC: 27 MMOL/L (ref 21–32)
CREAT SERPL-MCNC: 5.87 MG/DL (ref 0.55–1.02)
ERYTHROCYTE [DISTWIDTH] IN BLOOD BY AUTOMATED COUNT: 18.5 % (ref 11.5–14.5)
GLUCOSE BLD STRIP.AUTO-MCNC: 106 MG/DL (ref 65–117)
GLUCOSE BLD STRIP.AUTO-MCNC: 131 MG/DL (ref 65–117)
GLUCOSE BLD STRIP.AUTO-MCNC: 140 MG/DL (ref 65–117)
GLUCOSE BLD STRIP.AUTO-MCNC: 80 MG/DL (ref 65–117)
GLUCOSE SERPL-MCNC: 139 MG/DL (ref 65–100)
HCT VFR BLD AUTO: 25.5 % (ref 35–47)
HGB BLD-MCNC: 8.1 G/DL (ref 11.5–16)
MCH RBC QN AUTO: 27.1 PG (ref 26–34)
MCHC RBC AUTO-ENTMCNC: 31.8 G/DL (ref 30–36.5)
MCV RBC AUTO: 85.3 FL (ref 80–99)
NRBC # BLD: 0 K/UL (ref 0–0.01)
NRBC BLD-RTO: 0 PER 100 WBC
PLATELET # BLD AUTO: 148 K/UL (ref 150–400)
PMV BLD AUTO: 11.4 FL (ref 8.9–12.9)
POTASSIUM SERPL-SCNC: 3.4 MMOL/L (ref 3.5–5.1)
RBC # BLD AUTO: 2.99 M/UL (ref 3.8–5.2)
SERVICE CMNT-IMP: ABNORMAL
SERVICE CMNT-IMP: ABNORMAL
SERVICE CMNT-IMP: NORMAL
SODIUM SERPL-SCNC: 131 MMOL/L (ref 136–145)
WBC # BLD AUTO: 5.6 K/UL (ref 3.6–11)

## 2021-08-02 PROCEDURE — 97530 THERAPEUTIC ACTIVITIES: CPT

## 2021-08-02 PROCEDURE — 74011250636 HC RX REV CODE- 250/636: Performed by: STUDENT IN AN ORGANIZED HEALTH CARE EDUCATION/TRAINING PROGRAM

## 2021-08-02 PROCEDURE — 74011000258 HC RX REV CODE- 258: Performed by: SURGERY

## 2021-08-02 PROCEDURE — 74011250637 HC RX REV CODE- 250/637: Performed by: SURGERY

## 2021-08-02 PROCEDURE — 90935 HEMODIALYSIS ONE EVALUATION: CPT

## 2021-08-02 PROCEDURE — 74011250636 HC RX REV CODE- 250/636: Performed by: SURGERY

## 2021-08-02 PROCEDURE — 83880 ASSAY OF NATRIURETIC PEPTIDE: CPT

## 2021-08-02 PROCEDURE — 74011250637 HC RX REV CODE- 250/637: Performed by: STUDENT IN AN ORGANIZED HEALTH CARE EDUCATION/TRAINING PROGRAM

## 2021-08-02 PROCEDURE — 85027 COMPLETE CBC AUTOMATED: CPT

## 2021-08-02 PROCEDURE — 65660000000 HC RM CCU STEPDOWN

## 2021-08-02 PROCEDURE — 74011000258 HC RX REV CODE- 258: Performed by: STUDENT IN AN ORGANIZED HEALTH CARE EDUCATION/TRAINING PROGRAM

## 2021-08-02 PROCEDURE — 36415 COLL VENOUS BLD VENIPUNCTURE: CPT

## 2021-08-02 PROCEDURE — 74011636637 HC RX REV CODE- 636/637: Performed by: SURGERY

## 2021-08-02 PROCEDURE — 80048 BASIC METABOLIC PNL TOTAL CA: CPT

## 2021-08-02 PROCEDURE — 82962 GLUCOSE BLOOD TEST: CPT

## 2021-08-02 PROCEDURE — C9113 INJ PANTOPRAZOLE SODIUM, VIA: HCPCS | Performed by: SURGERY

## 2021-08-02 PROCEDURE — 74011000250 HC RX REV CODE- 250: Performed by: SURGERY

## 2021-08-02 RX ORDER — PANTOPRAZOLE SODIUM 40 MG/1
40 TABLET, DELAYED RELEASE ORAL
Status: DISCONTINUED | OUTPATIENT
Start: 2021-08-02 | End: 2021-08-07 | Stop reason: HOSPADM

## 2021-08-02 RX ADMIN — LEVOTHYROXINE SODIUM 50 MCG: 0.05 TABLET ORAL at 06:40

## 2021-08-02 RX ADMIN — HEPARIN SODIUM 5000 UNITS: 5000 INJECTION INTRAVENOUS; SUBCUTANEOUS at 09:11

## 2021-08-02 RX ADMIN — TIMOLOL MALEATE 1 DROP: 2.5 SOLUTION/ DROPS OPHTHALMIC at 09:13

## 2021-08-02 RX ADMIN — HYDRALAZINE HYDROCHLORIDE 20 MG: 20 INJECTION INTRAMUSCULAR; INTRAVENOUS at 10:49

## 2021-08-02 RX ADMIN — SODIUM CHLORIDE 40 MG: 9 INJECTION, SOLUTION INTRAMUSCULAR; INTRAVENOUS; SUBCUTANEOUS at 09:11

## 2021-08-02 RX ADMIN — Medication 10 ML: at 17:36

## 2021-08-02 RX ADMIN — FLUCONAZOLE 200 MG: 200 INJECTION, SOLUTION INTRAVENOUS at 10:49

## 2021-08-02 RX ADMIN — HYDROCODONE BITARTRATE AND ACETAMINOPHEN 1 TABLET: 5; 325 TABLET ORAL at 20:19

## 2021-08-02 RX ADMIN — Medication: at 09:12

## 2021-08-02 RX ADMIN — PIPERACILLIN AND TAZOBACTAM 3.38 G: 3; .375 INJECTION, POWDER, LYOPHILIZED, FOR SOLUTION INTRAVENOUS at 09:11

## 2021-08-02 RX ADMIN — LEVETIRACETAM 500 MG: 100 INJECTION, SOLUTION INTRAVENOUS at 18:47

## 2021-08-02 RX ADMIN — LATANOPROST 1 DROP: 50 SOLUTION OPHTHALMIC at 21:14

## 2021-08-02 RX ADMIN — EPOETIN ALFA-EPBX 4000 UNITS: 4000 INJECTION, SOLUTION INTRAVENOUS; SUBCUTANEOUS at 21:14

## 2021-08-02 RX ADMIN — INSULIN LISPRO 2 UNITS: 100 INJECTION, SOLUTION INTRAVENOUS; SUBCUTANEOUS at 12:19

## 2021-08-02 RX ADMIN — BRIMONIDINE TARTRATE 1 DROP: 2 SOLUTION OPHTHALMIC at 17:36

## 2021-08-02 RX ADMIN — Medication 10 ML: at 06:41

## 2021-08-02 RX ADMIN — Medication 10 ML: at 21:22

## 2021-08-02 RX ADMIN — PIPERACILLIN AND TAZOBACTAM 3.38 G: 3; .375 INJECTION, POWDER, LYOPHILIZED, FOR SOLUTION INTRAVENOUS at 20:19

## 2021-08-02 RX ADMIN — HEPARIN SODIUM 5000 UNITS: 5000 INJECTION INTRAVENOUS; SUBCUTANEOUS at 17:41

## 2021-08-02 RX ADMIN — Medication: at 21:15

## 2021-08-02 RX ADMIN — HYDRALAZINE HYDROCHLORIDE 20 MG: 20 INJECTION INTRAMUSCULAR; INTRAVENOUS at 02:57

## 2021-08-02 RX ADMIN — LEVETIRACETAM 500 MG: 100 INJECTION, SOLUTION INTRAVENOUS at 04:15

## 2021-08-02 RX ADMIN — Medication: at 17:36

## 2021-08-02 RX ADMIN — PREDNISOLONE ACETATE 1 DROP: 10 SUSPENSION/ DROPS OPHTHALMIC at 17:35

## 2021-08-02 RX ADMIN — BRIMONIDINE TARTRATE 1 DROP: 2 SOLUTION OPHTHALMIC at 09:13

## 2021-08-02 RX ADMIN — PREDNISOLONE ACETATE 1 DROP: 10 SUSPENSION/ DROPS OPHTHALMIC at 21:14

## 2021-08-02 RX ADMIN — PANTOPRAZOLE SODIUM 40 MG: 40 TABLET, DELAYED RELEASE ORAL at 12:19

## 2021-08-02 RX ADMIN — DEXTROSE MONOHYDRATE 50 ML/HR: 50 INJECTION, SOLUTION INTRAVENOUS at 09:11

## 2021-08-02 RX ADMIN — PREDNISOLONE ACETATE 1 DROP: 10 SUSPENSION/ DROPS OPHTHALMIC at 09:13

## 2021-08-02 RX ADMIN — HEPARIN SODIUM 5000 UNITS: 5000 INJECTION INTRAVENOUS; SUBCUTANEOUS at 00:35

## 2021-08-02 RX ADMIN — IRON SUCROSE 200 MG: 20 INJECTION, SOLUTION INTRAVENOUS at 09:11

## 2021-08-02 NOTE — PROGRESS NOTES
9850 Notified NP Purveyor: Pt has dialysis today and no scheduled AM labs would you like me to place an order for BNP? Order placed for BNP, BMP, CBC    End of Shift Note    Bedside shift change report given to Naida Samaniego RN (oncoming nurse) by Patricia Evangelista (offgoing nurse). Report included the following information SBAR, Kardex, ED Summary, Intake/Output, MAR and Recent Results    Shift worked:  5921-4071     Shift summary and any significant changes:    CHG bath completed, Labs drawn and sent     Concerns for physician to address:       Zone phone for oncoming shift:          Activity:  Activity Level: Bed Rest  Number times ambulated in hallways past shift: 0  Number of times OOB to chair past shift: 0    Cardiac:   Cardiac Monitoring: Yes      Cardiac Rhythm: Sinus Rhythm    Access:   Current line(s): central line     Genitourinary:   Urinary status: anuric    Respiratory:   O2 Device: None (Room air)  Chronic home O2 use?: NO  Incentive spirometer at bedside: NO     GI:  Last Bowel Movement Date: 07/27/21  Current diet:  ADULT DIET Regular  Passing flatus: YES  Tolerating current diet: YES       Pain Management:   Patient states pain is manageable on current regimen: YES    Skin:  Mick Score: 13  Interventions: float heels, increase time out of bed and PT/OT consult    Patient Safety:  Fall Score:  Total Score: 3  Interventions: bed/chair alarm, gripper socks, pt to call before getting OOB and stay with me (per policy)  High Fall Risk: Yes    Length of Stay:  Expected LOS: 4d 19h  Actual [facility]: [facility]

## 2021-08-02 NOTE — PROGRESS NOTES
Problem: Mobility Impaired (Adult and Pediatric)  Goal: *Acute Goals and Plan of Care (Insert Text)  Description: FUNCTIONAL STATUS PRIOR TO ADMISSION: Patient was modified independent using a rollator for functional mobility. Independent with dressing and bathing. Used rollator when going to HD.    HOME SUPPORT PRIOR TO ADMISSION: The patient lived with family who assisted with cooking and meals. 1 story home with 4 steps and bilateral rails. Physical Therapy Goals  Revised 7/30/2021  1. Patient will move from supine to sit and sit to supine , scoot up and down, and roll side to side in bed with minimal assistance/contact guard assist within 7 day(s). 2.  Patient will transfer from bed to chair and chair to bed with minimal assistance/contact guard assist using the least restrictive device within 7 day(s). 3.  Patient will perform sit to stand with minimal assistance/contact guard assist within 7 day(s). 4.  Patient will ambulate with minimal assistance/contact guard assist for 105 feet with the least restrictive device within 7 day(s). Physical Therapy Goals  Initiated 7/26/2021  1. Patient will move from supine to sit and sit to supine , scoot up and down, and roll side to side in bed with modified independence within 7 day(s). 2.  Patient will transfer from bed to chair and chair to bed with supervision/set-up using the least restrictive device within 7 day(s). 3.  Patient will perform sit to stand with modified independence within 7 day(s). 4.  Patient will ambulate with supervision/set-up for 250 feet with the least restrictive device within 7 day(s). 5.  Patient will ascend/descend 4 stairs with 1 handrail(s) with minimal assistance/contact guard assist within 7 day(s). Outcome: Progressing Towards Goal     PHYSICAL THERAPY TREATMENT  Patient:  Marcia Macias (51 y.o. female)  Date: 8/2/2021  Diagnosis: Acute encephalopathy [G93.40] <principal problem not specified>  Procedure(s) (LRB): APPENDECTOMY LAPAROSCOPIC (N/A) 4 Days Post-Op  Precautions: Bed Alarm, Fall, Contact (MRSA) ; mittens B hands  Chart, physical therapy assessment, plan of care and goals were reviewed. ASSESSMENT  Pt in bed on arrival, nurse clears pt for mobility efforts. She is easily awakened to name call this date but requires additional time to remain alert and keep eyes open. She also requires assist to initiate mobility efforts today as well as guide RW, place hands in appropriate and effective placement for transfers. She is able to get out of bed, amb. To/from BSC (small loose BM), and back to bed (nursing request for pt to remain in bed due to confusion). Patient continues with skilled PT services and is slowly progressing towards goals. Continue to follow. Patient Vitals for the past 4 hrs:   Temp Pulse Resp BP SpO2   08/02/21 1125 -- 65 17 (!) 131/46 95 %   08/02/21 1046 -- 64 24 (!) 173/62 94 %   08/02/21 1040 97.8 °F (36.6 °C) 63 19 (!) 176/57 95 %   08/02/21 1031 -- 63 19 (!) 170/63 95 %           Current Level of Function Impacting Discharge (mobility/balance): mod. Assist for bed mob./transfers/short distance gait using RW    Other factors to consider for discharge: pt remains confused, cognitive deficits ; high fall risk         PLAN :  Patient continues to benefit from skilled intervention to address the above impairments. Continue treatment per established plan of care. to address goals. Recommendation for discharge: (in order for the patient to meet his/her long term goals)  Therapy up to 5 days/week in SNF setting    This discharge recommendation:  Has been made in collaboration with the attending provider and/or case management    IF patient discharges home will need the following DME: to be determined (TBD)       SUBJECTIVE:   Patient stated I'm cold.     OBJECTIVE DATA SUMMARY:   Critical Behavior:  Neurologic State: Alert, Confused  Orientation Level: Oriented to person, Disoriented to place, Disoriented to time, Disoriented to situation  Cognition: Decreased attention/concentration, Decreased command following, Impaired decision making  Safety/Judgement: Fall prevention, Decreased awareness of need for safety, Decreased insight into deficits  Functional Mobility Training:  Bed Mobility:  Rolling: Moderate assistance; Additional time  Supine to Sit: Moderate assistance; Additional time  Sit to Supine: Moderate assistance; Additional time  Scooting: Contact guard assistance        Transfers:  Sit to Stand: Minimum assistance; Additional time  Stand to Sit: Minimum assistance             Balance:  Sitting: Impaired  Sitting - Static: Good (unsupported)  Sitting - Dynamic: Fair (occasional)  Standing: Impaired  Standing - Static: Constant support; Fair (RW)  Standing - Dynamic : Constant support; Fair (RW)    Ambulation/Gait Training:  Distance (ft): 10 Feet (ft) (5ft x 2)  Assistive Device: Gait belt;Walker, rolling  Ambulation - Level of Assistance: Minimal assistance        Gait Abnormalities: Decreased step clearance; Step to gait; Other (flexed posture)        Base of Support: Widened     Speed/Ramona: Shuffled; Slow  Step Length: Left shortened;Right shortened        Interventions: Safety awareness training; Tactile cues; Verbal cues; Visual/Demos       Pain Rating:  c/o abdominal pain with mobility efforts, pt did not rate, nurse notified     Activity Tolerance:   Fair    After treatment patient left in no apparent distress:   Call bell within reach, Restraints (mittens) plus bed alarm, and Semi-Marsh's in bed, nurse notified     COMMUNICATION/COLLABORATION:   The patients plan of care was discussed with: Occupational therapist and Registered nurse.      Saul Hanson, PT, DPT   Time Calculation: 34 mins

## 2021-08-02 NOTE — PROGRESS NOTES
SURGERY PROGRESS NOTE      Admit Date: 2021    POD 4 Days Post-Op    Procedure: Procedure(s):  APPENDECTOMY LAPAROSCOPIC      Subjective:     Patient arousable. complains of abdominal pain. Eating better       Objective:     Visit Vitals  BP (!) 156/58 (BP 1 Location: Left lower arm, BP Patient Position: At rest)   Pulse 66   Temp 97.8 °F (36.6 °C)   Resp 20   Ht 5' 2\" (1.575 m)   Wt 58.5 kg (129 lb)   SpO2 92%   BMI 23.59 kg/m²        Temp (24hrs), Av.9 °F (36.6 °C), Min:97.5 °F (36.4 °C), Max:98.4 °F (36.9 °C)      No intake/output data recorded.    1901 -  0700  In: 1597.2 [P.O.:473; I.V.:1124.2]  Out: 215 [Drains:215]    Physical Exam:    General:  NAD   Abdomen: soft, bowel sounds active, mildly tender, PRATIK - serous    Incision:   healing well, no drainage, no erythema, no hernia, no seroma, no swelling, no dehiscence, incision well approximated           Lab Results   Component Value Date/Time    WBC 5.6 2021 06:57 AM    HGB 8.1 (L) 2021 06:57 AM    HCT 25.5 (L) 2021 06:57 AM    PLATELET 945 (L)  06:57 AM    MCV 85.3 2021 06:57 AM     Lab Results   Component Value Date/Time    GFR est non-AA 7 (L) 2021 06:57 AM    GFR est AA 9 (L) 2021 06:57 AM    Creatinine 5.87 (H) 2021 06:57 AM    BUN 31 (H) 2021 06:57 AM    Sodium 131 (L) 2021 06:57 AM    Potassium 3.4 (L) 2021 06:57 AM    Chloride 95 (L) 2021 06:57 AM    CO2 27 2021 06:57 AM    Magnesium 2.1 2021 11:45 PM    Phosphorus 2.4 (L) 2021 03:38 PM       Assessment:     Active Problems:    Acute encephalopathy (2021)      Visual impairment (2021)      Physical debility (2021)      Counseling regarding advance care planning and goals of care (2021)      Acute appendicitis with localized peritonitis (2021)      End stage renal disease ([facility] [address] (2021)      Acute appendicitis with generalized peritonitis, abscess, and gangrene (7/29/2021)    recovering from appendectomy as expected.      Plan:       D/C PRATIK  Regular diet

## 2021-08-02 NOTE — PROGRESS NOTES
IV to PO change    Changed Pantoprazole to PO    Note:  Pt on Famotidine 20mg PO BID PTA; the recommended HD dosing regimen is 20mg Q48h if Famotidine is resumed at discharge    Thank you,   Antoinette Martinez, [facility]

## 2021-08-02 NOTE — PROGRESS NOTES
Occupational Therapy Note:    Chart reviewed in preparation for therapy sessione. Pt currently on HD. Will defer OT treatment at this time and continue to follow. Thank you.     Caitlin Alexander OTR/L

## 2021-08-02 NOTE — PROGRESS NOTES
HD TRANSFER - OUT REPORT:    Verbal report given to Vannessa Hester RN on Marcial Pritchett being transferred to PCU for routine progression of care       Report consisted of patient's Situation, Background, Assessment and   Recommendations(SBAR). Information from the following report(s) SBAR, Procedure Summary, Intake/Output, MAR and Recent Results was reviewed with the receiving nurse. Method:  $$ Method: Hemodialysis (08/02/21 1505)    Fluid Removed  NET Fluid Removed (mL): 2500 ml (08/02/21 1845)     Patient response to treatment:  Stable    End Time  Hemodialysis End Time: 9072 (08/02/21 1845)  If not documented, dialysis nurse to update post-dialysis row in HD/Filtration flowsheet     Medications /Volume expansion agents or Fluid boluses administered during treatment? no    Post-dialysis medication administration due?  yes  Remind nurse to administer post-HD medication upon return to unit. Fistula hemostasis? no    Line heparinization? no    Lines: J [employer] for questions and clarification was provided.       Patient transported with: N/A

## 2021-08-02 NOTE — PROGRESS NOTES
Vascular Surgery Progress Note  Hayden Given ACNP-BC  8/2/2021       Subjective: Nneka Garcia is a 66 y.o. female with a past medical history significant for carotid stenosis, end-stage renal failure, diabetes mellitus, coronary artery disease, hypertension, hyperlipidemia, paroxysmal atrial fibrillation, thyroid disease, stroke, rheumatoid arthritis, and sleep apnea. She is admitted to the hospital with sepsis secondary to appendicitis. She is status post appendectomy on July 29, 2021. We have been asked to evaluate for a clotted RUE AVG. She was formally receiving care in [place]. The patient remains altered this morning. She is unable to follow commands. Nursing Data:     Patient Vitals for the past 24 hrs:   BP Temp Pulse Resp SpO2   08/02/21 0737 (!) 156/58 97.8 °F (36.6 °C) 66 20 92 %   08/02/21 0420 (!) 122/53 -- -- -- --   08/02/21 0257 (!) 174/74 98.2 °F (36.8 °C) 65 19 93 %   08/02/21 0243 (!) 167/44 -- -- -- --   08/01/21 2242 (!) 160/61 98.4 °F (36.9 °C) (!) 59 16 96 %   08/01/21 2000 (!) 157/52 97.5 °F (36.4 °C) (!) 58 16 94 %   08/01/21 1911 -- -- 60 19 93 %   08/01/21 1808 (!) 154/73 -- -- -- --   08/01/21 1540 (!) 148/61 98.2 °F (36.8 °C) 68 18 97 %   08/01/21 1433 -- -- 62 13 96 %   08/01/21 1358 -- -- 62 19 95 %   08/01/21 1241 -- -- (!) 57 18 94 %   08/01/21 1238 -- -- 60 20 95 %   08/01/21 1235 -- -- (!) 59 13 94 %   08/01/21 1131 (!) 159/70 97.5 °F (36.4 °C) 94 28 93 %   08/01/21 1106 -- -- 64 15 93 %         Intake/Output Summary (Last 24 hours) at 8/2/2021 1039  Last data filed at 8/2/2021 0257  Gross per 24 hour   Intake 340 ml   Output 165 ml   Net 175 ml       Exam:     Physical Exam  Constitutional:       Appearance: She is normal weight. She is confused and unable to follow commands. HENT:      Head: Normocephalic.       Nose: Nose normal.      Mouth/Throat:      Mouth: Mucous membranes are moist.   Eyes:      Pupils: Pupils are equal, round, and reactive to light. Cardiovascular:      Rate and Rhythm: Normal rate. Rhythm irregular. Arteriovenous access: right arteriovenous access is present. Comments: RUE AVG w/o palpable thrill. Pulmonary:      Effort: Pulmonary effort is normal. No respiratory distress. Abdominal:      General: Abdomen is flat. Tenderness: There is abdominal tenderness. PRATIK drain      Comments: Abdominal incision   Musculoskeletal:         General: Normal range of motion. Cervical back: Normal range of motion. Skin:     General: Skin is warm. Lab Review:     .   Recent Results (from the past 24 hour(s))   GLUCOSE, POC    Collection Time: 08/01/21 12:12 PM   Result Value Ref Range    Glucose (POC) 184 (H) 65 - 117 mg/dL    Performed by Kleeklle Needles PCT    GLUCOSE, POC    Collection Time: 08/01/21  4:15 PM   Result Value Ref Range    Glucose (POC) 158 (H) 65 - 117 mg/dL    Performed by Kleeknicolase Needles PCT    GLUCOSE, POC    Collection Time: 08/01/21  9:05 PM   Result Value Ref Range    Glucose (POC) 126 (H) 65 - 117 mg/dL    Performed by Benjamín Medina PCT    CBC W/O DIFF    Collection Time: 08/02/21  6:57 AM   Result Value Ref Range    WBC 5.6 3.6 - 11.0 K/uL    RBC 2.99 (L) 3.80 - 5.20 M/uL    HGB 8.1 (L) 11.5 - 16.0 g/dL    HCT 25.5 (L) 35.0 - 47.0 %    MCV 85.3 80.0 - 99.0 FL    MCH 27.1 26.0 - 34.0 PG    MCHC 31.8 30.0 - 36.5 g/dL    RDW 18.5 (H) 11.5 - 14.5 %    PLATELET 807 (L) 533 - 400 K/uL    MPV 11.4 8.9 - 12.9 FL    NRBC 0.0 0  WBC    ABSOLUTE NRBC 0.00 0.00 - 0.01 K/uL   NT-PRO BNP    Collection Time: 08/02/21  6:57 AM   Result Value Ref Range    NT pro-BNP >35,000 (H) <045 PG/ML   METABOLIC PANEL, BASIC    Collection Time: 08/02/21  6:57 AM   Result Value Ref Range    Sodium 131 (L) 136 - 145 mmol/L    Potassium 3.4 (L) 3.5 - 5.1 mmol/L    Chloride 95 (L) 97 - 108 mmol/L    CO2 27 21 - 32 mmol/L    Anion gap 9 5 - 15 mmol/L    Glucose 139 (H) 65 - 100 mg/dL    BUN 31 (H) 6 - 20 MG/DL    Creatinine 5.87 (H) 0.55 - 1.02 MG/DL    BUN/Creatinine ratio 5 (L) 12 - 20      GFR est AA 9 (L) >60 ml/min/1.73m2    GFR est non-AA 7 (L) >60 ml/min/1.73m2    Calcium 8.6 8.5 - 10.1 MG/DL   GLUCOSE, POC    Collection Time: 08/02/21  7:49 AM   Result Value Ref Range    Glucose (POC) 131 (H) 65 - 117 mg/dL    Performed by Claudio Age           Assessment/Plan:     Complication of hemodialysis access  -Hx of right Ax-Ax AV Loop Graft 2020 and failed left upper extremity graft  · Patient remains encephalopathic. She is unable to follow commands. Patient would benefit from a palliative care consult prior to any further procedural interventions. Will check back later in the week to see if patient's mentation has improved.       Carotid stenosis  -Last known carotid duplex was in 2017 the right ICA was at 40 to 59% and the left ICA was at 0 to 39%  · Patient will need to reestablish routine outpatient follow-up.     Encephalopathy  -History of stroke   Seizures  -Keppra   · Plan per nephrology     Sepsis  -blood cx NGTD  Hypotension   -persists   Thrombocytopenia  Acute perforated appendicitis/peritonitis/right lower quadrant abdominal abscess  -Status post appendectomy July 29, 2021  Diarrhea  Rectal wall thickening  -History of chronic sternal wound  -From 2017 => 2020  · Plan per general surgery  · Antibiotics per primary team      End-stage renal failure  -MWF  Mild hyperkalemia   Anemia of chronic disease  -Drop as expected post procedure  -On Retacrit  · Plan per nephrology     Diabetes mellitus w/ hypoglycemia   -Blood glucose is labile  -Poor p.o. intake on dextrose  ASHD   Hypertension  -BP is soft   Hyperlipidemia  Paroxysmal atrial fibrillation   -rate is controlled   Hypothyroidism  -Synthroid   Rheumatoid arthritis  Obstructive sleep apnea     VTE Prophylaxis:  Critical access hospital     Disposition:  SNF   Agree with palliative care consult. Vascular surgery will continue to follow intermittently.   We appreciate the opportunity to participate in the care of Ms Zo Zacarias. Please call with any urgent vascular issues.

## 2021-08-02 NOTE — PROGRESS NOTES
Hospitalist Progress Note    NAME: Yola Smith   :  1950   MRN:  [id number]       Assessment / Plan:    Rapid code secondary to hypovolemic shock  Acute seizure   -The pressors on the fluids, blood pressure stable. -Consulted intensivist-appreciate recommendations.  -Neurology consulted-appreciate recommendations. -EEG shows finding suggestive of encephalopathy.  -Continue Keppra 500 mg twice daily.  -Reconsulted neurology-appreciate recommendations.  -On Keppra 500 mg twice daily. - Ct head repeated yesterday- No acute intracranial finding. Chronic ischemic findings.  -On D5 at 50 mL/h.  -Wean off the Levophed. Acute abdominal pain  Acute appendicitis perforated with generalized peritonitis  Right lower quadrant abdomen abscess  -Patient started having abdominal pain last evening, ordered a CT of the abdomen which shows acute appendicitis. -General surgery was consulted, patient was kept n.p.o. yesterday, got surgery today. -S/p laparoscopic appendicectomy with drainage of the right lower quadrant abscess and abdominal washout-.  -General surgery on board-appreciate input.  -Patient started back on the regular diet. -CT abdomen/pelvis-findings suggestive of acute appendicitis. -Continue Zosyn. -WBC improving, afebrile. Sepsis unknown source POA  Acute encephalopathy   -Febrile and altered mental status on admission, some neck stiffness noted and patient was started on empiric coverage for meningitis  -Appreciate neurology consult  -ammonia mildly elevated 38 on admision, normalized  -Also had lumbar puncture, CSF was clear, cultures negative  -Antibiotics discontinued for the initial sepsis. -EEG captured no seizures, triphasic waves suggest possible hepatic/renal encephalopathy  -T bili 1.6, normalized, LFTs normal   -Covid testing negative  -encephalopathy slightly worsened today because of the surgery diversion yesterday because of the sepsis from the appendicitis.   Probably will need a day or 2 for improvement of the mental status. -passed bedside swallow, regular diet  -appreciate GI consult  -Ultrasound with elastography's reveals mild stages of liver fibrosis. Outpatient follow-up with GI as per the recommendations. CT a/p 7/16 (admission):  1. Circumferential rectal wall thickening is suspicious for malignancy. No bowel  obstruction. 2. No other acute abnormality in the abdomen or pelvis. Uterine fibroids are  noted. 3. Moderate cardiomegaly. MRI brain:  Encephalomalacia and white matter disease. No acute intracranial abnormality    ?seizures  -no seizure captured on EEG, triphasic waves point to hepatic/renal encephalopathy as per neuro  -on lose dose keppra, hold and observe    Diarrhea  -Resolved. ESRD  -appreciate nephro following  -Vascular surgery on board-states that they want to wait for the patient to become slightly more stable for declotting of the fistula. -Liam catheter dialysis. -HD per nephro    Incidental finding of rectal wall thickening on CT A/P  -appreciate CRS eval  -patient can f/u outpatient, no intervention needed during this admission     Hypokalemia  Resolved    DMII  HTN  CAD  FS monitoring, SS     Eye surgery in may : glaucoma    Disposition-we consulted the palliative care today to address the goals of care.       Code Status: Full  Appreciate palliative team following    Surrogate Decision Maker: Daughter  DVT Prophylaxis: SCD  GI Prophylaxis: not indicated  Baseline: Independent     25.0 - 29.9 Overweight / Body mass index is 25.08 kg/m².     Estimated discharge date: TBD  Barriers: Currently very sick  Code status: Full  Prophylaxis: Hep SQ  Recommended Disposition: TBD     Subjective:     Chief Complaint / Reason for Physician Visit  Patient seen today, awake and arousable, not alert and oriented. Afebrile. Discussed with RN events overnight. Objective:     VITALS:   Last 24hrs VS reviewed since prior progress note.  Most recent are:  Patient Vitals for the past 24 hrs:   Temp Pulse Resp BP SpO2   08/02/21 1125 -- 65 17 (!) 131/46 95 %   08/02/21 1046 -- 64 24 (!) 173/62 94 %   08/02/21 1040 97.8 °F (36.6 °C) 63 19 (!) 176/57 95 %   08/02/21 1031 -- 63 19 (!) 170/63 95 %   08/02/21 0737 97.8 °F (36.6 °C) 66 20 (!) 156/58 92 %   08/02/21 0420 -- -- -- (!) 122/53 --   08/02/21 0257 98.2 °F (36.8 °C) 65 19 (!) 174/74 93 %   08/02/21 0243 -- -- -- (!) 167/44 --   08/01/21 2242 98.4 °F (36.9 °C) (!) 59 16 (!) 160/61 96 %   08/01/21 2000 97.5 °F (36.4 °C) (!) 58 16 (!) 157/52 94 %   08/01/21 1911 -- 60 19 -- 93 %   08/01/21 1808 -- -- -- (!) 154/73 --   08/01/21 1540 98.2 °F (36.8 °C) 68 18 (!) 148/61 97 %   08/01/21 1433 -- 62 13 -- 96 %       Intake/Output Summary (Last 24 hours) at 8/2/2021 1404  Last data filed at 8/2/2021 1227  Gross per 24 hour   Intake 240 ml   Output 240 ml   Net 0 ml        I had a face to face encounter and independently examined this patient on 8/2/2021, as outlined below:  PHYSICAL EXAM:  General: Adult  AA female, awake but not alert and oriented  EENT:  EOMI. Anicteric sclerae. MMM  Resp:  CTA bilaterally, no wheezing or rales. No accessory muscle use  CV:  Regular  rhythm,  No edema  GI:  Soft, nontender, +Bowel sounds  Neurologic:  Awake but not alert moves all extremities and follows some commands. No obvious focal deficits   Psych:   deferred  Skin:  No rashes.   No jaundice    Reviewed most current lab test results and cultures  YES  Reviewed most current radiology test results   YES  Review and summation of old records today    NO  Reviewed patient's current orders and MAR    YES  PMH/SH reviewed - no change compared to H&P  ________________________________________________________________________  Care Plan discussed with:    Comments   Patient x    Family      RN x    Care Manager x    Consultant                       x Multidiciplinary team rounds were held today with , nursing, pharmacist and clinical coordinator. Patient's plan of care was discussed; medications were reviewed and discharge planning was addressed. ________________________________________________________________________  Total NON critical care TIME:  35   Minutes    Total CRITICAL CARE TIME Spent:      Comments   >50% of visit spent in counseling and coordination of care     ________________________________________________________________________  Luther Moreno MD     Procedures: see electronic medical records for all procedures/Xrays and details which were not copied into this note but were reviewed prior to creation of Plan. LABS:  I reviewed today's most current labs and imaging studies. Pertinent labs include:  Recent Labs     08/02/21  0657 08/01/21 0227 07/31/21  0338   WBC 5.6 7.6 11.5*   HGB 8.1* 8.2* 8.6*   HCT 25.5* 25.6* 27.3*   * 125* 132*     Recent Labs     08/02/21  0657 08/01/21 0227 07/31/21  0339 07/30/21  1422 07/30/21  1422   * 137 136   < > 136   K 3.4* 3.5 3.5   < > 5.9*   CL 95* 99 99   < > 101   CO2 27 33* 33*   < > 27   * 134* 79   < > 108*   BUN 31* 25* 16   < > 48*   CREA 5.87* 4.95* 3.29*   < > 7.70*   CA 8.6 8.2* 8.4*   < > 8.6   ALB  --   --   --   --  2.1*   TBILI  --   --   --   --  0.8   ALT  --   --   --   --  10*    < > = values in this interval not displayed.        Signed: Luther Moreno MD

## 2021-08-02 NOTE — PROGRESS NOTES
Nephrology Progress Note  Piter Perez     [email or web address]  Phone - [phone]   Patient: Corrinne Lins    YOB: 1950        Date- 8/2/2021   Admit Date: 7/16/2021  CC: Follow up for  esrd       IMPRESSION & PLAN:    ESRD- [facility]- [facility]  · Mild hyperkalemia  · AMS  · SEPSIS  · ANEMIA  · Acute appendicitis perforated with generalized peritonitis and right lower quadrant abdomen abscess  · CA/P/PTH/VITD  · DM  ·  HTN  · Seizures    PLAN-   Plan for hemodialysis today.  Vascular surgery on consultation for declot once she is stabilized.  Epogen for anemia   Continue HD MWF   Neurology following for seizure work-up   Subjective: Interval History:   -Seen and examined  -No issues overnight    Objective:   Vitals:    08/02/21 0243 08/02/21 0257 08/02/21 0420 08/02/21 0737   BP: (!) 167/44 (!) 174/74 (!) 122/53 (!) 156/58   Pulse:  65  66   Resp:  19  20   Temp:  98.2 °F (36.8 °C)  97.8 °F (36.6 °C)   TempSrc:       SpO2:  93%  92%   Weight:       Height:          08/01 0701 - 08/02 0700  In: 440 [P.O.:240; I.V.:200]  Out: 165 [Drains:165]  Last 3 Recorded Weights in this Encounter    07/29/21 0530 07/30/21 1935 07/31/21 1936   Weight: 58.6 kg (129 lb 3 oz) 58.5 kg (129 lb) 58.5 kg (129 lb)      Physical exam:   GEN:  nad  NECK:  Supple, no thyromegaly  RESP: clear b/l, no  wheezing,   CVS: RRR,S1,S2   ABDO:  Soft, NT  NEURO: non focal  EXT:no Edema +nt     EXT- right avg +, thrill +        Chart reviewed. Pertinent Notes reviewed.      Data Review :  Recent Labs     08/02/21  0657 08/01/21 0227 07/31/21  0339   * 137 136   K 3.4* 3.5 3.5   CL 95* 99 99   CO2 27 33* 33*   BUN 31* 25* 16   CREA 5.87* 4.95* 3.29*   * 134* 79   CA 8.6 8.2* 8.4*     Recent Labs     08/02/21  0657 08/01/21  0227 07/31/21  0338   WBC 5.6 7.6 11.5*   HGB 8.1* 8.2* 8.6*   HCT 25.5* 25.6* 27.3*   * 125* 132*     No results for input(s): FE, TIBC, PSAT, FERR in the last 72 hours.    Medication list  reviewed  Current Facility-Administered Medications   Medication Dose Route Frequency    heparin (porcine) injection 5,000 Units  5,000 Units SubCUTAneous Q8H    dextrose 5% infusion  50 mL/hr IntraVENous CONTINUOUS    fluconazole (DIFLUCAN) 200mg/100 mL IVPB (premix)  200 mg IntraVENous Q24H    levETIRAcetam (KEPPRA) 500 mg in 0.9% sodium chloride 100 mL IVPB  500 mg IntraVENous Q12H    LORazepam (ATIVAN) injection 2 mg  2 mg IntraVENous Q4H PRN    HYDROcodone-acetaminophen (NORCO) 5-325 mg per tablet 1 Tablet  1 Tablet Oral Q4H PRN    HYDROcodone-acetaminophen (NORCO)  mg tablet 1 Tablet  1 Tablet Oral Q4H PRN    HYDROmorphone (DILAUDID) injection 0.5 mg  0.5 mg IntraVENous Q2H PRN    simethicone (MYLICON) tablet 80 mg  80 mg Oral QID PRN    pantoprazole (PROTONIX) 40 mg in 0.9% sodium chloride 10 mL injection  40 mg IntraVENous DAILY    piperacillin-tazobactam (ZOSYN) 3.375 g in 0.9% sodium chloride (MBP/ADV) 100 mL MBP  3.375 g IntraVENous Q12H    zinc oxide-cod liver oil (DESITIN) 40 % paste   Topical TID    levothyroxine (SYNTHROID) tablet 50 mcg  50 mcg Oral 6am    heparin (porcine) pf 300 Units  300 Units InterCATHeter PRN    insulin lispro (HUMALOG) injection   SubCUTAneous AC&HS    epoetin bia-epbx (RETACRIT) injection 4,000 Units  4,000 Units SubCUTAneous Q MON, WED & FRI    hydrALAZINE (APRESOLINE) 20 mg/mL injection 20 mg  20 mg IntraVENous Q6H PRN    labetaloL (NORMODYNE;TRANDATE) injection 20 mg  20 mg IntraVENous Q4H PRN    glucose chewable tablet 16 g  4 Tablet Oral PRN    dextrose (D50W) injection syrg 12.5-25 g  12.5-25 g IntraVENous PRN    glucagon (GLUCAGEN) injection 1 mg  1 mg IntraMUSCular PRN    sodium chloride (NS) flush 5-40 mL  5-40 mL IntraVENous Q8H    sodium chloride (NS) flush 5-40 mL  5-40 mL IntraVENous PRN    acetaminophen (TYLENOL) tablet 650 mg  650 mg Oral Q6H PRN    ondansetron (ZOFRAN ODT) tablet 4 mg  4 mg Oral Q8H PRN    Or    ondansetron (ZOFRAN) injection 4 mg  4 mg IntraVENous Q6H PRN    acetaminophen (TYLENOL) suppository 650 mg  650 mg Rectal Q4H PRN    brimonidine (ALPHAGAN) 0.2 % ophthalmic solution 1 Drop  1 Drop Both Eyes BID    timolol (TIMOPTIC) 0.25% ophthalmic solution  1 Drop Both Eyes DAILY    prednisoLONE acetate (PRED FORTE) 1 % ophthalmic suspension 1 Drop  1 Drop Right Eye TID    latanoprost (XALATAN) 0.005 % ophthalmic solution 1 Drop  1 Drop Both Eyes QHS          Scot Flack, [address] [facility]  [facility] / [facility], [address]  [place], [address]  Phone - [phone]               Fax - [phone]

## 2021-08-02 NOTE — PROCEDURES
[facility]  [phone]    Vitals   Pre   Post   Assessment   Pre   Post     Temp  Temp: 98.3 °F (36.8 °C) (08/02/21 1454)  98.0, oral LOC  Drowsy, oriented to self Drowsy, oriented to self; asked about day and time   HR   Pulse (Heart Rate): 65 (08/02/21 1454) 76 Lungs   Diminished Dim bases   B/P   BP: 136/65 (08/02/21 1454) 131/54 Cardiac   Tele, SR Tele, NSR, with artifacts per Tele   Resp   Resp Rate: 20 (08/02/21 1454) 17 Skin   Abd incision, CDI Abd incision, CDI   Pain level  Pain Intensity 1: 0 (08/02/21 1454) 0 Edema  abd distention none   Orders:    Duration:   Start:   7687 End:   0496 Total:   3.5hr   Dialyzer:   Dialyzer/Set Up Inspection: Revaclear (08/02/21 1454)   K Bath:   Dialysate K (mEq/L): 3 (08/02/21 1454)   Ca Bath:   Dialysate CA (mEq/L): 2.5 (08/02/21 1454)   Na/Bicarb:   Dialysate NA (mEq/L): 140 (08/02/21 1454)   Target Fluid Removal:   Goal/Amount of Fluid to Remove (mL): 2500 mL (08/02/21 1454)   Access     Type & Location:   [facility]: Dressing CDI, dated 8/1/21. No s/s of infection. Both lumens aspirate & flush well. Running well in reverse at - 400.     Labs     Obtained/Reviewed   Critical Results Called   Date when labs were drawn-  Hgb-    HGB   Date Value Ref Range Status   08/02/2021 8.1 (L) 11.5 - 16.0 g/dL Final     K-    Potassium   Date Value Ref Range Status   08/02/2021 3.4 (L) 3.5 - 5.1 mmol/L Final     Ca-   Calcium   Date Value Ref Range Status   08/02/2021 8.6 8.5 - 10.1 MG/DL Final     Bun-   BUN   Date Value Ref Range Status   08/02/2021 31 (H) 6 - 20 MG/DL Final     Creat-   Creatinine   Date Value Ref Range Status   08/02/2021 5.87 (H) 0.55 - 1.02 MG/DL Final        Medications/ Blood Products Given     Name   Dose   Route and Time     None ordered                Blood Volume Processed (BVP):    69.9L Net Fluid   Removed:  2500ml   Comments   Time Out Done: 1503  Primary Nurse Rpt Pre: Mina Feliciano RN  Primary Nurse Rpt Post: Citlalli Miles RN  Pt Education: need for extra fluid removal today; don't pull at lines  Care Plan: on going  Tx Summary:  SBAR received from Primary RN. Arrived to patient room; Patient drowsy and oriented to self. Consent signed & on file. 1505: Each catheter limb disinfected per p&p, caps removed, hubs disinfected per p&p. Each lumen aspirated for blood return and flushed with Normal Saline per policy. VSS. Dialysis Tx initiated. 1630: BG 80mg/dL. [facility] HD RN was able to feed patient jello and apple sauce without issue. Patient has no complaints at this time. 1715: Arterial pressure increasing. 100ml NS flush given for clot prevention. Arterial pressures still high. BFR decreased 350. VSS. * no further issues*  1840: Tx ended. VSS. Each dialysis catheter limb disinfected per p&p, all possible blood returned per p&p, and each dialysis hub disinfected per p&p. Each lumen flushed, post dialysis catheter Heparin dwell instilled per order, and caps applied. Bed locked and in the lowest position, call bell and belongings in reach. SBAR given to Primary, RN. Patient is stable at time of my departure. All Dialysis related medications have been reviewed. Admiting Diagnosis: Acute Encephalopathy/ Acute Appendicitis with peritonitis, abcess, and gangrene/ Appendectomy  Pt's previous [facility]- [facility]  Consent signed - Informed Consent Verified: Yes (08/02/21 7727)  [facility] Consent - on file  Hepatitis Status- Immune; Neg Ag 6/28/21; AB >10 on 7/7/21  Machine #- Machine Number: [id number] [id number] (08/02/21 [id number])  Telemetry status- tele, SR  Pre-dialysis wt. - Pre-Dialysis Weight: 58.5 kg (128 lb 15.5 oz) (07/30/21 5560)

## 2021-08-02 NOTE — PROGRESS NOTES
Problem: Non-Violent Restraints  Goal: No harm/injury to patient while restraints in use  Outcome: Progressing Towards Goal  Goal: Patient's dignity will be maintained  Outcome: Progressing Towards Goal  Goal: Patient Interventions  Outcome: Progressing Towards Goal

## 2021-08-02 NOTE — PROGRESS NOTES
End of Shift Note    Bedside shift change report given to Bernis Meigs (oncoming nurse) by Aston Carlos RN (offgoing nurse). Report included the following information SBAR, Kardex, Intake/Output, MAR and Recent Results    Shift worked:  7a-7p     Shift summary and any significant changes:    Patient in and out of orientation throughout shift. PRATIK drain dc. Worked with PT today. 2 small BM. Dialysis done today. Restraint orders renewed per Dr. Josefina Gaston, patient still intermittently attempting to pull lines/equipment. Poor appetite. Concerns for physician to address:       Zone phone for oncoming shift:          Activity:  Activity Level: Bed Rest  Number times ambulated in hallways past shift: 0  Number of times OOB to chair past shift: 0    Cardiac:   Cardiac Monitoring: Yes      Cardiac Rhythm: Sinus Rhythm    Access:   Current line(s): central line and HD access     Genitourinary:   Urinary status: anuric    Respiratory:   O2 Device: None (Room air)  Chronic home O2 use?: NO  Incentive spirometer at bedside: NO     GI:  Last Bowel Movement Date: 08/02/21  Current diet:  ADULT DIET Regular  Passing flatus: YES  Tolerating current diet: NO       Pain Management:   Patient states pain is manageable on current regimen: YES    Skin:  Mick Score: 14  Interventions: increase time out of bed and PT/OT consult    Patient Safety:  Fall Score:  Total Score: 4  Interventions: bed/chair alarm  High Fall Risk: Yes    Length of Stay:  Expected LOS: 4d 19h  Actual LOS: Blair Hart RN

## 2021-08-03 DIAGNOSIS — N18.6 ESRD (END STAGE RENAL DISEASE) (HCC): ICD-10-CM

## 2021-08-03 LAB
GLUCOSE BLD STRIP.AUTO-MCNC: 107 MG/DL (ref 65–117)
GLUCOSE BLD STRIP.AUTO-MCNC: 138 MG/DL (ref 65–117)
GLUCOSE BLD STRIP.AUTO-MCNC: 172 MG/DL (ref 65–117)
GLUCOSE BLD STRIP.AUTO-MCNC: 68 MG/DL (ref 65–117)
GLUCOSE BLD STRIP.AUTO-MCNC: 86 MG/DL (ref 65–117)
SERVICE CMNT-IMP: ABNORMAL
SERVICE CMNT-IMP: ABNORMAL
SERVICE CMNT-IMP: NORMAL

## 2021-08-03 PROCEDURE — 74011250636 HC RX REV CODE- 250/636: Performed by: SURGERY

## 2021-08-03 PROCEDURE — 97535 SELF CARE MNGMENT TRAINING: CPT

## 2021-08-03 PROCEDURE — 0JH63XZ INSERTION OF TUNNELED VASCULAR ACCESS DEVICE INTO CHEST SUBCUTANEOUS TISSUE AND FASCIA, PERCUTANEOUS APPROACH: ICD-10-PCS | Performed by: STUDENT IN AN ORGANIZED HEALTH CARE EDUCATION/TRAINING PROGRAM

## 2021-08-03 PROCEDURE — 97530 THERAPEUTIC ACTIVITIES: CPT

## 2021-08-03 PROCEDURE — 74011000258 HC RX REV CODE- 258: Performed by: SURGERY

## 2021-08-03 PROCEDURE — 74011000258 HC RX REV CODE- 258: Performed by: STUDENT IN AN ORGANIZED HEALTH CARE EDUCATION/TRAINING PROGRAM

## 2021-08-03 PROCEDURE — 65660000000 HC RM CCU STEPDOWN

## 2021-08-03 PROCEDURE — 74011250636 HC RX REV CODE- 250/636: Performed by: STUDENT IN AN ORGANIZED HEALTH CARE EDUCATION/TRAINING PROGRAM

## 2021-08-03 PROCEDURE — 97116 GAIT TRAINING THERAPY: CPT

## 2021-08-03 PROCEDURE — 74011636637 HC RX REV CODE- 636/637: Performed by: SURGERY

## 2021-08-03 PROCEDURE — 82962 GLUCOSE BLOOD TEST: CPT

## 2021-08-03 PROCEDURE — 74011250637 HC RX REV CODE- 250/637: Performed by: SURGERY

## 2021-08-03 PROCEDURE — 74011250637 HC RX REV CODE- 250/637: Performed by: STUDENT IN AN ORGANIZED HEALTH CARE EDUCATION/TRAINING PROGRAM

## 2021-08-03 PROCEDURE — 02H633Z INSERTION OF INFUSION DEVICE INTO RIGHT ATRIUM, PERCUTANEOUS APPROACH: ICD-10-PCS | Performed by: STUDENT IN AN ORGANIZED HEALTH CARE EDUCATION/TRAINING PROGRAM

## 2021-08-03 RX ORDER — FOLIC ACID/VIT B COMPLEX AND C 0.8 MG
TABLET ORAL
Qty: 90 TABLET | Refills: 0 | Status: SHIPPED | OUTPATIENT
Start: 2021-08-03 | End: 2022-10-03

## 2021-08-03 RX ORDER — INSULIN LISPRO 100 [IU]/ML
INJECTION, SOLUTION INTRAVENOUS; SUBCUTANEOUS EVERY 6 HOURS
Status: DISCONTINUED | OUTPATIENT
Start: 2021-08-04 | End: 2021-08-07 | Stop reason: HOSPADM

## 2021-08-03 RX ADMIN — LEVETIRACETAM 500 MG: 100 INJECTION, SOLUTION INTRAVENOUS at 04:06

## 2021-08-03 RX ADMIN — Medication: at 08:21

## 2021-08-03 RX ADMIN — LATANOPROST 1 DROP: 50 SOLUTION OPHTHALMIC at 22:06

## 2021-08-03 RX ADMIN — BRIMONIDINE TARTRATE 1 DROP: 2 SOLUTION OPHTHALMIC at 08:20

## 2021-08-03 RX ADMIN — LEVETIRACETAM 500 MG: 100 INJECTION, SOLUTION INTRAVENOUS at 21:11

## 2021-08-03 RX ADMIN — PREDNISOLONE ACETATE 1 DROP: 10 SUSPENSION/ DROPS OPHTHALMIC at 22:06

## 2021-08-03 RX ADMIN — LEVOTHYROXINE SODIUM 50 MCG: 0.05 TABLET ORAL at 06:09

## 2021-08-03 RX ADMIN — PREDNISOLONE ACETATE 1 DROP: 10 SUSPENSION/ DROPS OPHTHALMIC at 17:43

## 2021-08-03 RX ADMIN — PREDNISOLONE ACETATE 1 DROP: 10 SUSPENSION/ DROPS OPHTHALMIC at 08:20

## 2021-08-03 RX ADMIN — BRIMONIDINE TARTRATE 1 DROP: 2 SOLUTION OPHTHALMIC at 17:44

## 2021-08-03 RX ADMIN — HYDRALAZINE HYDROCHLORIDE 20 MG: 20 INJECTION INTRAMUSCULAR; INTRAVENOUS at 17:42

## 2021-08-03 RX ADMIN — INSULIN LISPRO 2 UNITS: 100 INJECTION, SOLUTION INTRAVENOUS; SUBCUTANEOUS at 17:42

## 2021-08-03 RX ADMIN — Medication 10 ML: at 17:44

## 2021-08-03 RX ADMIN — HEPARIN SODIUM 5000 UNITS: 5000 INJECTION INTRAVENOUS; SUBCUTANEOUS at 08:19

## 2021-08-03 RX ADMIN — HEPARIN SODIUM 5000 UNITS: 5000 INJECTION INTRAVENOUS; SUBCUTANEOUS at 17:43

## 2021-08-03 RX ADMIN — PANTOPRAZOLE SODIUM 40 MG: 40 TABLET, DELAYED RELEASE ORAL at 08:19

## 2021-08-03 RX ADMIN — Medication 10 ML: at 22:05

## 2021-08-03 RX ADMIN — Medication 10 ML: at 06:09

## 2021-08-03 RX ADMIN — HEPARIN SODIUM 5000 UNITS: 5000 INJECTION INTRAVENOUS; SUBCUTANEOUS at 00:43

## 2021-08-03 RX ADMIN — TIMOLOL MALEATE 1 DROP: 2.5 SOLUTION/ DROPS OPHTHALMIC at 08:20

## 2021-08-03 RX ADMIN — Medication: at 17:43

## 2021-08-03 RX ADMIN — Medication: at 22:06

## 2021-08-03 RX ADMIN — FLUCONAZOLE 200 MG: 200 INJECTION, SOLUTION INTRAVENOUS at 11:38

## 2021-08-03 RX ADMIN — PIPERACILLIN AND TAZOBACTAM 3.38 G: 3; .375 INJECTION, POWDER, LYOPHILIZED, FOR SOLUTION INTRAVENOUS at 08:19

## 2021-08-03 NOTE — PROGRESS NOTES
Problem: Self Care Deficits Care Plan (Adult)  Goal: *Acute Goals and Plan of Care (Insert Text)  Description:   FUNCTIONAL STATUS PRIOR TO ADMISSION: Patient was modified independent using a rollator for functional mobility. Patient was modified independent for basic and instrumental ADLs. Patient questionable historian secondary to mild confusion. HOME SUPPORT: The patient lived with family. Occupational Therapy Goals  7/30/2021 Re-eval s/p emergent lap appendectomy    1. Patient will perform grooming seated with supervision/set-up within 7 day(s). 2.  Patient will perform sponge bath with moderate assist  within 7 day(s). 3.  Patient will perform lower body dressing with moderate assist within 7 day(s). 4.  Patient will perform toilet transfers with minimal assist within 7 day(s). 5.  Patient will perform all aspects of toileting with moderate assist within 7 day(s). Initiated 7/27/2021 discontinue below goals 7/30/2021   1. Patient will perform grooming standing at sink with supervision/set-up within 7 day(s). 2.  Patient will perform sponge bath with supervision/set-up within 7 day(s). 3.  Patient will perform lower body dressing with supervision/set-up within 7 day(s). 4.  Patient will perform toilet transfers with supervision/set-up within 7 day(s). 5.  Patient will perform all aspects of toileting with supervision/set-up within 7 day(s). Outcome: Progressing Towards Goal   OCCUPATIONAL THERAPY TREATMENT  Patient: Yola Smith (66 y.o. female)  Date: 8/3/2021  Diagnosis: Acute encephalopathy [G93.40] <principal problem not specified>  Procedure(s) (LRB):  APPENDECTOMY LAPAROSCOPIC (N/A) 5 Days Post-Op  Precautions: Bed Alarm, Fall, Contact (MRSA)  Chart, occupational therapy assessment, plan of care, and goals were reviewed. ASSESSMENT  Patient continues with skilled OT services and is progressing towards goals.  Although received drowsy, patient with improved arousal with mobility and activity this session, demonstrating good static sitting at EOB. Note patient incontinent of bowel and bladder. Patient transferring significantly improved this session, requiring min assist x2 with B HHA for stand-pivot to [facility]. She continues to require mod-max cues for attention to task as well as initiation and sequencing for changing soiled gown and transferring to/from [facility]. Patient tolerating standing for dependent alma rosa-care - deferred patient attempt due to hygiene concerns as patient with loose stool and stool in vaginal area. Patient SpO2 initially fluctuating between 88 and 93% at rest, improving to upper 90s during activity. Continue to recommend SNF at discharge. If patient discharges home, would need HH services and 24/7 supervision/assist for all self-care and mobility. Current Level of Function Impacting Discharge (ADLs): up to total A for self care; min A x2 for [facility] transfers w/ gait belt and HHA    Other factors to consider for discharge: cognition         PLAN :  Patient continues to benefit from skilled intervention to address the above impairments. Continue treatment per established plan of care to address goals. Recommend with staff: OOB to chair and toileting at [facility] with staff assist x2 and HHA    Recommendation for discharge: (in order for the patient to meet his/her long term goals)  Therapy up to 5 days/week in SNF setting    This discharge recommendation:  Has been made in collaboration with the attending provider and/or case management    IF patient discharges home will need the following DME: TBD       SUBJECTIVE:   Patient stated i'm afraid I pooped during bed mobility.     OBJECTIVE DATA SUMMARY:   Cognitive/Behavioral Status:  Neurologic State: Confused  Orientation Level: Oriented to person;Oriented to place  Cognition: Decreased attention/concentration;Decreased command following  Perception: Verbal;Tactile  Perseveration: Perseverates during conversation  Safety/Judgement: Decreased insight into deficits; Decreased awareness of need for safety;Decreased awareness of need for assistance; Fall prevention    Functional Mobility and Transfers for ADLs:  Bed Mobility:  Rolling: Moderate assistance; Additional time  Supine to Sit: Minimum assistance; Additional time  Sit to Supine: Minimum assistance  Scooting: Minimum assistance    Transfers:  Sit to Stand: Contact guard assistance;Minimum assistance  Functional Transfers  Toilet Transfer : Minimum assistance;Assist x2 (for SPT to [facility] w/ HHA)  Cues: Verbal cues provided;Visual cues provided;Physical assistance     Balance:  Sitting: Intact; High guard  Sitting - Static: Good (unsupported)  Sitting - Dynamic: Fair (occasional)  Standing: Impaired; With support  Standing - Static: Fair;Constant support  Standing - Dynamic : Fair;Constant support    ADL Intervention:  Upper Body Dressing Assistance  Dressing Assistance: Moderate assistance;Maximum assistance  Hospital Gown: Moderate assistance  Cues: Verbal cues provided;Visual cues provided;Physical assistance    Toileting  Toileting Assistance: Total assistance(dependent)  Bladder Hygiene: Total assistance (dependent)  Bowel Hygiene: Total assistance (dependent)  Clothing Management: Total assistance (dependent)  Cues: Visual cues provided;Verbal cues provided (physical assist)    Cognitive Retraining  Orientation Retraining: Reorienting;Situation; Awareness of environment  Problem Solving: Awareness of environment  Executive Functions: Executing cognitive plans  Organizing/Sequencing: Breaking task down  Attention to Task: Distractibility; Single task  Safety/Judgement: Decreased insight into deficits; Decreased awareness of need for safety;Decreased awareness of need for assistance; Fall prevention    Pain:  No reports.     Activity Tolerance:   Fair and requires rest breaks    After treatment patient left in no apparent distress:   Supine in bed, Call bell within reach, Bed / chair alarm activated, Side rails x 3, and soft mitts in place per RN    COMMUNICATION/COLLABORATION:   The patients plan of care was discussed with: Physical therapist and Registered nurse.      Maritza Ohara OT  Time Calculation: 42 mins

## 2021-08-03 NOTE — PROGRESS NOTES
Update:  Discussion had with Dr. Donna Arredondo. Plans for permacath placement with IR. We will make arrangements for outpatient follow-up for AVG occlusion. We will continue to follow patient intermittently while admitted. If mentation improves we can attempt to declot otherwise we will arrange for follow-up as an outpatient.   Please call with any urgent vascular issues

## 2021-08-03 NOTE — PROGRESS NOTES
Hospitalist Progress Note    NAME: Svetlana Ryder   :  1950   MRN:  [id number]       Assessment / Plan:    Rapid code secondary to hypovolemic shock  Acute seizure   -Wean off the pressors on the fluid.  -Consulted intensivist-appreciate recommendations.  -Neurology consulted-appreciate recommendations. -EEG shows finding suggestive of encephalopathy.  -Continue Keppra 500 mg twice daily.  -Reconsulted neurology-appreciate recommendations.  -On Keppra 500 mg twice daily. - Ct head repeated yesterday- No acute intracranial finding. Chronic ischemic findings.  -Continue the IV fluid. Acute abdominal pain  Acute appendicitis perforated with generalized peritonitis  Right lower quadrant abdomen abscess  -Patient started having abdominal pain last evening, ordered a CT of the abdomen which shows acute appendicitis. -General surgery was consulted, patient was kept n.p.o. yesterday, got surgery today. -S/p laparoscopic appendicectomy with drainage of the right lower quadrant abscess and abdominal washout-.  -General surgery on board-appreciate input.  -Patient started back on the regular diet. -CT abdomen/pelvis-findings suggestive of acute appendicitis. -Continue Zosyn. -WBC improving, afebrile. Sepsis unknown source POA  Acute encephalopathy   -Febrile and altered mental status on admission, some neck stiffness noted and patient was started on empiric coverage for meningitis  -Appreciate neurology consult  -ammonia mildly elevated 38 on admision, normalized  -Also had lumbar puncture, CSF was clear, cultures negative  -Antibiotics discontinued for the initial sepsis. -EEG captured no seizures, triphasic waves suggest possible hepatic/renal encephalopathy  -T bili 1.6, normalized, LFTs normal   -Covid testing negative  -encephalopathy slightly worsened today because of the surgery diversion yesterday because of the sepsis from the appendicitis.   Probably will need a day or 2 for improvement of the mental status. -passed bedside swallow, regular diet  -appreciate GI consult  -Ultrasound with elastography's reveals mild stages of liver fibrosis. Outpatient follow-up with GI as per the recommendations. CT a/p 7/16 (admission):  1. Circumferential rectal wall thickening is suspicious for malignancy. No bowel  obstruction. 2. No other acute abnormality in the abdomen or pelvis. Uterine fibroids are  noted. 3. Moderate cardiomegaly. MRI brain:  Encephalomalacia and white matter disease. No acute intracranial abnormality    ?seizures  -no seizure captured on EEG, triphasic waves point to hepatic/renal encephalopathy as per neuro  -on lose dose keppra, hold and observe    Diarrhea  -Resolved. ESRD  -appreciate nephro following  -Vascular surgery on board-not planning to do declotting of the fistula. IR consulted for the permacath placement.  -Liam catheter dialysis. -HD per nephro    Incidental finding of rectal wall thickening on CT A/P  -appreciate CRS eval  -patient can f/u outpatient, no intervention needed during this admission     Hypokalemia  Resolved    DMII  HTN  CAD  FS monitoring, SS     Eye surgery in may : glaucoma    Disposition-we consulted the palliative care today to address the goals of care.       Code Status: Full  Appreciate palliative team following    Surrogate Decision Maker: Daughter  DVT Prophylaxis: SCD  GI Prophylaxis: not indicated  Baseline: Independent     25.0 - 29.9 Overweight / Body mass index is 25.08 kg/m².     Estimated discharge date: August 4  Barriers: Currently very sick  Code status: Full  Prophylaxis: Hep SQ  Recommended Disposition: TBD     Subjective:     Chief Complaint / Reason for Physician Visit  Patient seen today, awake and alert, very much near the baseline. Afebrile. Discussed with RN events overnight. Objective:     VITALS:   Last 24hrs VS reviewed since prior progress note.  Most recent are:  Patient Vitals for the past 24 hrs:   Temp Pulse Resp BP SpO2   08/03/21 1101 -- 63 -- 127/75 100 %   08/03/21 1056 -- 61 -- (!) 115/38 92 %   08/03/21 1048 98.8 °F (37.1 °C) 61 16 (!) 118/41 91 %   08/03/21 0747 98.1 °F (36.7 °C) 72 16 (!) 160/64 100 %   08/03/21 0237 98 °F (36.7 °C) 64 15 (!) 151/53 99 %   08/02/21 2253 98.2 °F (36.8 °C) 71 19 (!) 118/52 98 %   08/02/21 2000 98 °F (36.7 °C) 73 16 119/70 95 %   08/02/21 1845 98 °F (36.7 °C) 81 21 (!) 131/54 94 %   08/02/21 1830 -- 77 18 (!) 123/57 95 %   08/02/21 1815 -- 77 16 (!) 106/90 95 %   08/02/21 1800 -- 78 13 (!) 122/55 96 %   08/02/21 1745 -- 76 17 133/61 95 %   08/02/21 1730 -- 76 12 (!) 127/57 96 %   08/02/21 1715 -- 77 16 (!) 133/54 96 %   08/02/21 1700 -- 78 17 134/68 95 %   08/02/21 1645 -- 80 18 (!) 145/65 96 %   08/02/21 1630 -- 76 20 (!) 147/60 96 %   08/02/21 1618 -- 73 20 (!) 149/63 96 %   08/02/21 1615 -- 71 20 138/60 95 %   08/02/21 1600 -- 70 27 (!) 159/58 96 %   08/02/21 1545 -- 69 21 (!) 156/65 96 %   08/02/21 1530 -- 67 20 (!) 150/64 96 %   08/02/21 1515 -- 65 19 (!) 156/70 96 %   08/02/21 1500 -- 68 17 -- 98 %   08/02/21 1454 98.3 °F (36.8 °C) 65 20 136/65 99 %       Intake/Output Summary (Last 24 hours) at 8/3/2021 1341  Last data filed at 8/3/2021 0900  Gross per 24 hour   Intake 120 ml   Output 2500 ml   Net -2380 ml        I had a face to face encounter and independently examined this patient on 8/3/2021, as outlined below:  PHYSICAL EXAM:  General: Adult  AA female, awake and alert. EENT:  EOMI. Anicteric sclerae. MMM  Resp:  CTA bilaterally, no wheezing or rales. No accessory muscle use  CV:  Regular  rhythm,  No edema  GI:  Soft, nontender, +Bowel sounds  Neurologic:  Awake and alert, oriented x2 at baseline  Psych:   deferred  Skin:  No rashes.   No jaundice    Reviewed most current lab test results and cultures  YES  Reviewed most current radiology test results   YES  Review and summation of old records today    NO  Reviewed patient's current orders and MAR    YES  PMH/SH reviewed - no change compared to H&P  ________________________________________________________________________  Care Plan discussed with:    Comments   Patient x    Family      RN x    Care Manager x    Consultant                       x Multidiciplinary team rounds were held today with , nursing, pharmacist and clinical coordinator. Patient's plan of care was discussed; medications were reviewed and discharge planning was addressed. ________________________________________________________________________  Total NON critical care TIME:  35   Minutes    Total CRITICAL CARE TIME Spent:      Comments   >50% of visit spent in counseling and coordination of care     ________________________________________________________________________  Sepideh Philip MD     Procedures: see electronic medical records for all procedures/Xrays and details which were not copied into this note but were reviewed prior to creation of Plan. LABS:  I reviewed today's most current labs and imaging studies.   Pertinent labs include:  Recent Labs     08/02/21 0657 08/01/21 0227   WBC 5.6 7.6   HGB 8.1* 8.2*   HCT 25.5* 25.6*   * 125*     Recent Labs     08/02/21 0657 08/01/21 0227   * 137   K 3.4* 3.5   CL 95* 99   CO2 27 33*   * 134*   BUN 31* 25*   CREA 5.87* 4.95*   CA 8.6 8.2*       Signed: Sepideh Philip MD

## 2021-08-03 NOTE — PROGRESS NOTES
1820: attempted to call report to neuro as patient has transfer orders to [id number]. RN to call back. 1845:Report given to RN on neuro. Patient ready to be transferred with belongings. PCTs transferring patient.

## 2021-08-03 NOTE — PROGRESS NOTES
Nephrology Progress Note  Piter Perez     [email or web address]  Phone - [phone]   Patient: Luz Buckley    YOB: 1950        Date- 8/3/2021   Admit Date: 7/16/2021  CC: Follow up for  esrd       IMPRESSION & PLAN:    ESRD- [facility]- [facility]  · Mild hyperkalemia  · AMS  · SEPSIS  · ANEMIA  · Acute appendicitis perforated with generalized peritonitis and right lower quadrant abdomen abscess  · CA/P/PTH/VITD  · DM  ·  HTN  · Seizures    PLAN-   Plan for hemodialysis tomorrow.  No plans for declot per vascular surgrey   Will consult IR for PC placement tomorrow.  Epogen for anemia   Continue HD MWF   Neurology following for seizure work-up   Subjective: Interval History:   -Seen and examined  -No issues overnight    Objective:   Vitals:    08/03/21 0747 08/03/21 1048 08/03/21 1056 08/03/21 1101   BP: (!) 160/64 (!) 118/41 (!) 115/38 127/75   Pulse: 72 61 61 63   Resp: 16 16     Temp: 98.1 °F (36.7 °C) 98.8 °F (37.1 °C)     TempSrc:       SpO2: 100% 91% 92% 100%   Weight:       Height:          08/02 0701 - 08/03 0700  In: 60 [P.O.:60]  Out: 4253 [Drains:75]  Last 3 Recorded Weights in this Encounter    07/29/21 0530 07/30/21 1935 07/31/21 1936   Weight: 58.6 kg (129 lb 3 oz) 58.5 kg (129 lb) 58.5 kg (129 lb)      Physical exam:   GEN:  confused  NECK:  Supple, no thyromegaly  RESP: clear b/l, no  wheezing,   CVS: RRR,S1,S2   ABDO:  Soft, NT  NEURO: non focal  EXT:no Edema no thrill in AVG           Chart reviewed. Pertinent Notes reviewed. Data Review :  Recent Labs     08/02/21  0657 08/01/21 0227   * 137   K 3.4* 3.5   CL 95* 99   CO2 27 33*   BUN 31* 25*   CREA 5.87* 4.95*   * 134*   CA 8.6 8.2*     Recent Labs     08/02/21  0657 08/01/21  0227   WBC 5.6 7.6   HGB 8.1* 8.2*   HCT 25.5* 25.6*   * 125*     No results for input(s): FE, TIBC, PSAT, FERR in the last 72 hours.    Medication list  reviewed  Current Facility-Administered Medications   Medication Dose Route Frequency    pantoprazole (PROTONIX) tablet 40 mg  40 mg Oral ACB    heparin (porcine) injection 5,000 Units  5,000 Units SubCUTAneous Q8H    fluconazole (DIFLUCAN) 200mg/100 mL IVPB (premix)  200 mg IntraVENous Q24H    levETIRAcetam (KEPPRA) 500 mg in 0.9% sodium chloride 100 mL IVPB  500 mg IntraVENous Q12H    LORazepam (ATIVAN) injection 2 mg  2 mg IntraVENous Q4H PRN    HYDROcodone-acetaminophen (NORCO) 5-325 mg per tablet 1 Tablet  1 Tablet Oral Q4H PRN    HYDROcodone-acetaminophen (NORCO)  mg tablet 1 Tablet  1 Tablet Oral Q4H PRN    HYDROmorphone (DILAUDID) injection 0.5 mg  0.5 mg IntraVENous Q2H PRN    simethicone (MYLICON) tablet 80 mg  80 mg Oral QID PRN    zinc oxide-cod liver oil (DESITIN) 40 % paste   Topical TID    levothyroxine (SYNTHROID) tablet 50 mcg  50 mcg Oral 6am    heparin (porcine) pf 300 Units  300 Units InterCATHeter PRN    insulin lispro (HUMALOG) injection   SubCUTAneous AC&HS    epoetin bia-epbx (RETACRIT) injection 4,000 Units  4,000 Units SubCUTAneous Q MON, WED & FRI    hydrALAZINE (APRESOLINE) 20 mg/mL injection 20 mg  20 mg IntraVENous Q6H PRN    labetaloL (NORMODYNE;TRANDATE) injection 20 mg  20 mg IntraVENous Q4H PRN    glucose chewable tablet 16 g  4 Tablet Oral PRN    dextrose (D50W) injection syrg 12.5-25 g  12.5-25 g IntraVENous PRN    glucagon (GLUCAGEN) injection 1 mg  1 mg IntraMUSCular PRN    sodium chloride (NS) flush 5-40 mL  5-40 mL IntraVENous Q8H    sodium chloride (NS) flush 5-40 mL  5-40 mL IntraVENous PRN    acetaminophen (TYLENOL) tablet 650 mg  650 mg Oral Q6H PRN    ondansetron (ZOFRAN ODT) tablet 4 mg  4 mg Oral Q8H PRN    Or    ondansetron (ZOFRAN) injection 4 mg  4 mg IntraVENous Q6H PRN    acetaminophen (TYLENOL) suppository 650 mg  650 mg Rectal Q4H PRN    brimonidine (ALPHAGAN) 0.2 % ophthalmic solution 1 Drop  1 Drop Both Eyes BID    timolol (TIMOPTIC) 0.25% ophthalmic solution  1 Drop Both Eyes DAILY    prednisoLONE acetate (PRED FORTE) 1 % ophthalmic suspension 1 Drop  1 Drop Right Eye TID    latanoprost (XALATAN) 0.005 % ophthalmic solution 1 Drop  1 Drop Both Eyes Q          Yasemin Richardson, [address] [facility]  [facility] / [facility], [address]  [place], [address]  Phone - [phone]               Fax - [phone]

## 2021-08-03 NOTE — PROGRESS NOTES
Problem: Mobility Impaired (Adult and Pediatric)  Goal: *Acute Goals and Plan of Care (Insert Text)  Description: FUNCTIONAL STATUS PRIOR TO ADMISSION: Patient was modified independent using a rollator for functional mobility. Independent with dressing and bathing. Used rollator when going to HD.    HOME SUPPORT PRIOR TO ADMISSION: The patient lived with family who assisted with cooking and meals. 1 story home with 4 steps and bilateral rails. Physical Therapy Goals  Revised 7/30/2021  1. Patient will move from supine to sit and sit to supine , scoot up and down, and roll side to side in bed with minimal assistance/contact guard assist within 7 day(s). 2.  Patient will transfer from bed to chair and chair to bed with minimal assistance/contact guard assist using the least restrictive device within 7 day(s). 3.  Patient will perform sit to stand with minimal assistance/contact guard assist within 7 day(s). 4.  Patient will ambulate with minimal assistance/contact guard assist for 105 feet with the least restrictive device within 7 day(s). Physical Therapy Goals  Initiated 7/26/2021  1. Patient will move from supine to sit and sit to supine , scoot up and down, and roll side to side in bed with modified independence within 7 day(s). 2.  Patient will transfer from bed to chair and chair to bed with supervision/set-up using the least restrictive device within 7 day(s). 3.  Patient will perform sit to stand with modified independence within 7 day(s). 4.  Patient will ambulate with supervision/set-up for 250 feet with the least restrictive device within 7 day(s). 5.  Patient will ascend/descend 4 stairs with 1 handrail(s) with minimal assistance/contact guard assist within 7 day(s). Outcome: Progressing Towards Goal   PHYSICAL THERAPY TREATMENT  Patient:  Svetlana Ryder (64 y.o. female)  Date: 8/3/2021  Diagnosis: Acute encephalopathy [G93.40] <principal problem not specified>  Procedure(s) (LRB): APPENDECTOMY LAPAROSCOPIC (N/A) 5 Days Post-Op  Precautions: Bed Alarm, Fall, Contact (MRSA)  Chart, physical therapy assessment, plan of care and goals were reviewed. ASSESSMENT  Patient continues with skilled PT services and is progressing towards goals, patient with improved transfer ability and ambulation tolerance this session. Patient resting with mittens in place upon arrival, easily aroused and agreeable to therapy session. Patient remains pleasantly confused & follows direction. Supine<>sit Min A with patient requiring increased time and max verbal/tactile instruction for sequencing. Initially upon sitting edge of bed, patient requiring min-mod A to maintain sitting balance though progressed to supervision. Repeated sit<>stand CGA-Min A with cuing for proper hand placement. Bed<>commode Min A with bilat hand held assist and maximal cuing for sequencing, patient with poor awareness of surroundings and asks Jessica Austin is the toilet\" requiring maximal cuing for direction to sit. Patient admits to \"foggy vision\". Ambulation x20ft with bilat hand held assist, Min A for steadying. Patient requires maximal cuing when approaching surface to sit as she has poor environmental awareness. Patient returned to chair position in bed with bed alarm on, mittens in place, and all needs in reach. Continue to recommend SNF level rehab at d/c. Current Level of Function Impacting Discharge (mobility/balance): CGA - Min A    Other factors to consider for discharge: cognitive status, poor environmental awareness          PLAN :  Patient continues to benefit from skilled intervention to address the above impairments. Continue treatment per established plan of care. to address goals.     Recommendation for discharge: (in order for the patient to meet his/her long term goals)  Therapy up to 5 days/week in SNF setting    This discharge recommendation:  Has been made in collaboration with the attending provider and/or case management    IF patient discharges home will need the following DME: to be determined (TBD)     SUBJECTIVE:   Patient stated I believe I'm in the hospital but I have no idea why.     OBJECTIVE DATA SUMMARY:   Critical Behavior:  Neurologic State: Confused  Orientation Level: Oriented to person, Oriented to place, Disoriented to situation, Disoriented to time  Cognition: Decreased attention/concentration  Safety/Judgement: Fall prevention, Decreased awareness of need for safety, Decreased insight into deficits  Functional Mobility Training:  Bed Mobility:  Rolling: Moderate assistance; Additional time  Supine to Sit: Minimum assistance; Additional time  Sit to Supine: Minimum assistance  Scooting: Minimum assistance  Transfers:  Sit to Stand: Contact guard assistance;Minimum assistance  Stand to Sit: Contact guard assistance;Minimum assistance  Balance:  Sitting: Intact; High guard  Sitting - Static: Good (unsupported)  Sitting - Dynamic: Fair (occasional)  Standing: Impaired; With support  Standing - Static: Fair;Constant support  Standing - Dynamic : Fair;Constant support  Ambulation/Gait Training:  Distance (ft): 20 Feet (ft)  Assistive Device: Gait belt; Other (comment) (bilat hand held assist)  Ambulation - Level of Assistance: Minimal assistance  Gait Abnormalities: Shuffling gait; Decreased step clearance  Base of Support: Narrowed  Speed/Ramona: Slow;Shuffled  Step Length: Right shortened;Left shortened    Activity Tolerance:   Fair and SpO2 stable on RA    After treatment patient left in no apparent distress:   Chair position in bed, Call bell within reach, Bed / chair alarm activated and Side rails x 3    COMMUNICATION/COLLABORATION:   The patients plan of care was discussed with: Occupational therapist and Registered nurse.      Teresa Manley PT   Time Calculation: 40 mins

## 2021-08-03 NOTE — PROGRESS NOTES
SURGERY PROGRESS NOTE      Admit Date: 2021    POD 5 Days Post-Op    Procedure: Procedure(s):  APPENDECTOMY LAPAROSCOPIC      Subjective:     Pleasantly confused this morning. Denies pain. No nausea and vomiting. States she is eating \"some\". Objective:     Visit Vitals  BP (!) 118/41 (BP 1 Location: Left lower arm)   Pulse 61   Temp 98.8 °F (37.1 °C)   Resp 16   Ht 5' 2\" (1.575 m)   Wt 58.5 kg (129 lb)   SpO2 91%   BMI 23.59 kg/m²        Temp (24hrs), Av.2 °F (36.8 °C), Min:98 °F (36.7 °C), Max:98.8 °F (37.1 °C)      No intake/output data recorded.  1901 -  0700  In: 61 [P.O.:60]  Out: 2710 [Drains:210]    Physical Exam:    General:  NAD   Abdomen: soft, bowel sounds active, mildly tender, PRATIK drain removed yesterday.     Incision:   healing well, no drainage, no erythema, no hernia, no seroma, no swelling, no dehiscence, incision well approximated           Lab Results   Component Value Date/Time    WBC 5.6 2021 06:57 AM    HGB 8.1 (L) 2021 06:57 AM    HCT 25.5 (L) 2021 06:57 AM    PLATELET 709 (L)  06:57 AM    MCV 85.3 2021 06:57 AM     Lab Results   Component Value Date/Time    GFR est non-AA 7 (L) 2021 06:57 AM    GFR est AA 9 (L) 2021 06:57 AM    Creatinine 5.87 (H) 2021 06:57 AM    BUN 31 (H) 2021 06:57 AM    Sodium 131 (L) 2021 06:57 AM    Potassium 3.4 (L) 2021 06:57 AM    Chloride 95 (L) 2021 06:57 AM    CO2 27 2021 06:57 AM    Magnesium 2.1 2021 11:45 PM    Phosphorus 2.4 (L) 2021 03:38 PM       Assessment:     Active Problems:    Acute encephalopathy (2021)      Visual impairment (2021)      Physical debility (2021)      Counseling regarding advance care planning and goals of care (2021)      Acute appendicitis with localized peritonitis (2021)      End stage renal disease ([address] (2021)      Acute appendicitis with generalized peritonitis, abscess, and gangrene (7/29/2021)    recovering from appendectomy as expected. Plan:     Continue with current treatment plan. Diet as ordered. Nothing further from surgical standpoint. F/U outpatient two weeks after discharge if needed. If continuing to recover as expected, may omit this appointment.      Riccardo Cherry, FRANCK

## 2021-08-03 NOTE — PROGRESS NOTES
End of Shift Note    Bedside shift change report given to Kathleen Godinez RN (oncoming nurse) by Alize Hanson (offgoing nurse). Report included the following information SBAR, Kardex, ED Summary, Procedure Summary, Intake/Output, MAR and Recent Results    Shift worked:  [id number]     Shift summary and any significant changes:     Pt remains in mitten attempted to take them off to see if pt could keep them off. As soon a mittens removed pt reached for CVC dressing to remove. Educated pt why it is important not to remove the dressing. Pt still trying to remove dressing. Mittens immediately placed back on pt. CHG bath completed. Concerns for physician to address:       Zone phone for oncoming shift:          Activity:  Activity Level: Bed Rest  Number times ambulated in hallways past shift: 0  Number of times OOB to chair past shift: 0    Cardiac:   Cardiac Monitoring: Yes      Cardiac Rhythm: Sinus Rhythm    Access:   Current line(s): central line     Genitourinary:   Urinary status: anuric    Respiratory:   O2 Device: None (Room air)  Chronic home O2 use?: NO  Incentive spirometer at bedside: NO     GI:  Last Bowel Movement Date: 08/02/21  Current diet:  ADULT DIET Regular  Passing flatus: YES  Tolerating current diet: YES       Pain Management:   Patient states pain is manageable on current regimen: YES    Skin:  Mick Score: 14  Interventions: float heels, increase time out of bed, foam dressing and PT/OT consult    Patient Safety:  Fall Score:  Total Score: 4  Interventions: bed/chair alarm, gripper socks, pt to call before getting OOB and stay with me (per policy)  High Fall Risk: Yes    Length of Stay:  Expected LOS: 4d 19h  Actual LOS: [facility]

## 2021-08-03 NOTE — PROGRESS NOTES
Vascular Surgery Progress Note  Emeli Wade ACNP-BC  8/3/2021       Subjective: Mushtaq Foster is a 66 y.o. female with a past medical history significant for carotid stenosis, end-stage renal failure, diabetes mellitus, coronary artery disease, hypertension, hyperlipidemia, paroxysmal atrial fibrillation, thyroid disease, stroke, rheumatoid arthritis, and sleep apnea. She is admitted to the hospital with sepsis secondary to appendicitis. She is status post appendectomy on July 29, 2021. We have been asked to evaluate for a clotted RUE AVG. She was formally receiving care in [place]. The patient remains altered this morning. She continues to be unable to follow commands.     Nursing Data:     Patient Vitals for the past 24 hrs:   BP Temp Temp src Pulse Resp SpO2   08/03/21 0747 (!) 160/64 98.1 °F (36.7 °C) -- 72 16 100 %   08/03/21 0237 (!) 151/53 98 °F (36.7 °C) -- 64 15 99 %   08/02/21 2253 (!) 118/52 98.2 °F (36.8 °C) -- 71 19 98 %   08/02/21 2000 119/70 98 °F (36.7 °C) -- 73 16 95 %   08/02/21 1845 (!) 131/54 98 °F (36.7 °C) Oral 81 21 94 %   08/02/21 1830 (!) 123/57 -- -- 77 18 95 %   08/02/21 1815 (!) 106/90 -- -- 77 16 95 %   08/02/21 1800 (!) 122/55 -- -- 78 13 96 %   08/02/21 1745 133/61 -- -- 76 17 95 %   08/02/21 1730 (!) 127/57 -- -- 76 12 96 %   08/02/21 1715 (!) 133/54 -- -- 77 16 96 %   08/02/21 1700 134/68 -- -- 78 17 95 %   08/02/21 1645 (!) 145/65 -- -- 80 18 96 %   08/02/21 1630 (!) 147/60 -- -- 76 20 96 %   08/02/21 1618 (!) 149/63 -- -- 73 20 96 %   08/02/21 1615 138/60 -- -- 71 20 95 %   08/02/21 1600 (!) 159/58 -- -- 70 27 96 %   08/02/21 1545 (!) 156/65 -- -- 69 21 96 %   08/02/21 1530 (!) 150/64 -- -- 67 20 96 %   08/02/21 1515 (!) 156/70 -- -- 65 19 96 %   08/02/21 1500 -- -- -- 68 17 98 %   08/02/21 1454 136/65 98.3 °F (36.8 °C) -- 65 20 99 %   08/02/21 1125 (!) 131/46 -- -- 65 17 95 %   08/02/21 1046 (!) 173/62 -- -- 64 24 94 %   08/02/21 1040 (!) 176/57 97.8 °F (36.6 °C) -- 63 19 95 %   08/02/21 1031 (!) 170/63 -- -- 63 19 95 %         Intake/Output Summary (Last 24 hours) at 8/3/2021 0904  Last data filed at 8/2/2021 1845  Gross per 24 hour   Intake 60 ml   Output 2575 ml   Net -2515 ml       Exam:     Physical Exam  Constitutional:       Appearance: She is normal weight. She is confused and unable to follow commands. HENT:      Head: Normocephalic. Nose: Nose normal.      Mouth/Throat:      Mouth: Mucous membranes are moist.   Eyes:      Pupils: Pupils are equal, round, and reactive to light. Cardiovascular:      Rate and Rhythm: Normal rate. Rhythm irregular. Arteriovenous access: right arteriovenous access is present. Comments: RUE AVG w/o palpable thrill. Pulmonary:      Effort: Pulmonary effort is normal. No respiratory distress. Abdominal:      General: Abdomen is flat. Tenderness: There is abdominal tenderness. PRATIK drain      Comments: Abdominal incision   Musculoskeletal:         General: Normal range of motion. Cervical back: Normal range of motion. Skin:     General: Skin is warm. Lab Review:     . Recent Results (from the past 24 hour(s))   GLUCOSE, POC    Collection Time: 08/02/21 11:28 AM   Result Value Ref Range    Glucose (POC) 140 (H) 65 - 117 mg/dL    Performed by Fredi Coleman PCT    GLUCOSE, POC    Collection Time: 08/02/21  4:20 PM   Result Value Ref Range    Glucose (POC) 80 65 - 117 mg/dL    Performed by Fredi Coleman PCT    GLUCOSE, POC    Collection Time: 08/02/21  9:21 PM   Result Value Ref Range    Glucose (POC) 106 65 - 117 mg/dL    Performed by Nabeel Camarena RN    GLUCOSE, POC    Collection Time: 08/03/21  7:49 AM   Result Value Ref Range    Glucose (POC) 107 65 - 117 mg/dL    Performed by Girish Potter PCT           Assessment/Plan:     Complication of hemodialysis access  -Hx of right Ax-Ax AV Loop Graft 2020 and failed left upper extremity graft  Patient remains encephalopathic.   She is unable to follow commands. Patient would benefit from a palliative care consult prior to any further procedural interventions. Awaiting results of palliative care meeting prior to determining plan of care.     Carotid stenosis  -Last known carotid duplex was in 2017 the right ICA was at 40 to 59% and the left ICA was at 0 to 39%  · Patient mayl need to reestablish routine outpatient follow-up.     Encephalopathy  -History of stroke   Seizures  -Keppra   · Plan per nephrology     Sepsis  -blood cx NGTD  Hypotension   -persists   Thrombocytopenia  Acute perforated appendicitis/peritonitis/right lower quadrant abdominal abscess  -Status post appendectomy July 29, 2021  Diarrhea  Rectal wall thickening  -History of chronic sternal wound  -From 2017 => 2020  · Plan per general surgery  · Antibiotics per primary team      End-stage renal failure  -MWF  Mild hyperkalemia   Anemia of chronic disease  -Drop as expected post procedure. Now stable.    -On Retacrit  · Plan per nephrology     Diabetes mellitus w/ hypoglycemia   -Blood glucose is labile  -Poor p.o. intake on dextrose  ASHD   Hypertension  -Hypotension has improved. Hyperlipidemia  Paroxysmal atrial fibrillation   -rate is controlled   Hypothyroidism  -Synthroid   Rheumatoid arthritis  Obstructive sleep apnea     VTE Prophylaxis:  [facility]     Disposition:  SNF   Agree with palliative care consult. Vascular surgery will continue to follow intermittently. We appreciate the opportunity to participate in the care of Ms Atul Dejesus. Please call with any urgent vascular issues.

## 2021-08-04 ENCOUNTER — APPOINTMENT (OUTPATIENT)
Dept: INTERVENTIONAL RADIOLOGY/VASCULAR | Age: 71
DRG: 853 | End: 2021-08-04
Attending: INTERNAL MEDICINE
Payer: MEDICARE

## 2021-08-04 LAB
ANION GAP SERPL CALC-SCNC: 9 MMOL/L (ref 5–15)
BUN SERPL-MCNC: 18 MG/DL (ref 6–20)
BUN/CREAT SERPL: 4 (ref 12–20)
CALCIUM SERPL-MCNC: 8.3 MG/DL (ref 8.5–10.1)
CHLORIDE SERPL-SCNC: 98 MMOL/L (ref 97–108)
CO2 SERPL-SCNC: 27 MMOL/L (ref 21–32)
CREAT SERPL-MCNC: 4.76 MG/DL (ref 0.55–1.02)
GLUCOSE BLD STRIP.AUTO-MCNC: 104 MG/DL (ref 65–117)
GLUCOSE BLD STRIP.AUTO-MCNC: 104 MG/DL (ref 65–117)
GLUCOSE BLD STRIP.AUTO-MCNC: 80 MG/DL (ref 65–117)
GLUCOSE BLD STRIP.AUTO-MCNC: 82 MG/DL (ref 65–117)
GLUCOSE SERPL-MCNC: 84 MG/DL (ref 65–100)
POTASSIUM SERPL-SCNC: 3.5 MMOL/L (ref 3.5–5.1)
SERVICE CMNT-IMP: NORMAL
SODIUM SERPL-SCNC: 134 MMOL/L (ref 136–145)

## 2021-08-04 PROCEDURE — 97530 THERAPEUTIC ACTIVITIES: CPT

## 2021-08-04 PROCEDURE — 97535 SELF CARE MNGMENT TRAINING: CPT

## 2021-08-04 PROCEDURE — 2709999900 HC NON-CHARGEABLE SUPPLY

## 2021-08-04 PROCEDURE — 36415 COLL VENOUS BLD VENIPUNCTURE: CPT

## 2021-08-04 PROCEDURE — 80048 BASIC METABOLIC PNL TOTAL CA: CPT

## 2021-08-04 PROCEDURE — 74011250636 HC RX REV CODE- 250/636

## 2021-08-04 PROCEDURE — C1769 GUIDE WIRE: HCPCS

## 2021-08-04 PROCEDURE — 74011000258 HC RX REV CODE- 258: Performed by: STUDENT IN AN ORGANIZED HEALTH CARE EDUCATION/TRAINING PROGRAM

## 2021-08-04 PROCEDURE — 65660000000 HC RM CCU STEPDOWN

## 2021-08-04 PROCEDURE — 97112 NEUROMUSCULAR REEDUCATION: CPT

## 2021-08-04 PROCEDURE — 77030010507 HC ADH SKN DERMBND J&J -B

## 2021-08-04 PROCEDURE — 99152 MOD SED SAME PHYS/QHP 5/>YRS: CPT

## 2021-08-04 PROCEDURE — 74011250636 HC RX REV CODE- 250/636: Performed by: STUDENT IN AN ORGANIZED HEALTH CARE EDUCATION/TRAINING PROGRAM

## 2021-08-04 PROCEDURE — 97116 GAIT TRAINING THERAPY: CPT

## 2021-08-04 PROCEDURE — 94760 N-INVAS EAR/PLS OXIMETRY 1: CPT

## 2021-08-04 PROCEDURE — 74011000250 HC RX REV CODE- 250: Performed by: SURGERY

## 2021-08-04 PROCEDURE — 74011250636 HC RX REV CODE- 250/636: Performed by: SURGERY

## 2021-08-04 PROCEDURE — C1892 INTRO/SHEATH,FIXED,PEEL-AWAY: HCPCS

## 2021-08-04 PROCEDURE — 82962 GLUCOSE BLOOD TEST: CPT

## 2021-08-04 PROCEDURE — 77030031139 HC SUT VCRL2 J&J -A

## 2021-08-04 PROCEDURE — C1750 CATH, HEMODIALYSIS,LONG-TERM: HCPCS

## 2021-08-04 PROCEDURE — 74011000250 HC RX REV CODE- 250: Performed by: STUDENT IN AN ORGANIZED HEALTH CARE EDUCATION/TRAINING PROGRAM

## 2021-08-04 PROCEDURE — 77030002996 HC SUT SLK J&J -A

## 2021-08-04 PROCEDURE — 74011250637 HC RX REV CODE- 250/637: Performed by: SURGERY

## 2021-08-04 PROCEDURE — 74011250637 HC RX REV CODE- 250/637: Performed by: STUDENT IN AN ORGANIZED HEALTH CARE EDUCATION/TRAINING PROGRAM

## 2021-08-04 PROCEDURE — 90935 HEMODIALYSIS ONE EVALUATION: CPT

## 2021-08-04 RX ORDER — FENTANYL CITRATE 50 UG/ML
100 INJECTION, SOLUTION INTRAMUSCULAR; INTRAVENOUS
Status: DISCONTINUED | OUTPATIENT
Start: 2021-08-04 | End: 2021-08-04

## 2021-08-04 RX ORDER — HEPARIN SODIUM 5000 [USP'U]/ML
5000 INJECTION, SOLUTION INTRAVENOUS; SUBCUTANEOUS EVERY 12 HOURS
Status: DISCONTINUED | OUTPATIENT
Start: 2021-08-04 | End: 2021-08-07 | Stop reason: HOSPADM

## 2021-08-04 RX ORDER — HEPARIN SODIUM 1000 [USP'U]/ML
INJECTION, SOLUTION INTRAVENOUS; SUBCUTANEOUS
Status: COMPLETED
Start: 2021-08-04 | End: 2021-08-04

## 2021-08-04 RX ORDER — MIDAZOLAM HYDROCHLORIDE 1 MG/ML
5 INJECTION INTRAMUSCULAR; INTRAVENOUS
Status: DISCONTINUED | OUTPATIENT
Start: 2021-08-04 | End: 2021-08-04

## 2021-08-04 RX ORDER — MIDAZOLAM HYDROCHLORIDE 1 MG/ML
INJECTION, SOLUTION INTRAMUSCULAR; INTRAVENOUS
Status: COMPLETED
Start: 2021-08-04 | End: 2021-08-04

## 2021-08-04 RX ORDER — MIDAZOLAM HYDROCHLORIDE 1 MG/ML
5 INJECTION, SOLUTION INTRAMUSCULAR; INTRAVENOUS
Status: CANCELLED | OUTPATIENT
Start: 2021-08-04

## 2021-08-04 RX ORDER — LIDOCAINE HYDROCHLORIDE 20 MG/ML
15 INJECTION, SOLUTION INFILTRATION; PERINEURAL ONCE
Status: COMPLETED | OUTPATIENT
Start: 2021-08-04 | End: 2021-08-04

## 2021-08-04 RX ORDER — HEPARIN 100 UNIT/ML
300 SYRINGE INTRAVENOUS ONCE
Status: DISPENSED | OUTPATIENT
Start: 2021-08-04 | End: 2021-08-05

## 2021-08-04 RX ORDER — HEPARIN SODIUM 1000 [USP'U]/ML
INJECTION, SOLUTION INTRAVENOUS; SUBCUTANEOUS
Status: DISPENSED
Start: 2021-08-04 | End: 2021-08-05

## 2021-08-04 RX ORDER — HEPARIN SODIUM 200 [USP'U]/100ML
400 INJECTION, SOLUTION INTRAVENOUS ONCE
Status: COMPLETED | OUTPATIENT
Start: 2021-08-04 | End: 2021-08-04

## 2021-08-04 RX ADMIN — LEVETIRACETAM 500 MG: 100 INJECTION, SOLUTION INTRAVENOUS at 03:58

## 2021-08-04 RX ADMIN — BRIMONIDINE TARTRATE 1 DROP: 2 SOLUTION OPHTHALMIC at 12:47

## 2021-08-04 RX ADMIN — Medication 10 ML: at 05:38

## 2021-08-04 RX ADMIN — HEPARIN SODIUM 4200 UNITS: 1000 INJECTION, SOLUTION INTRAVENOUS; SUBCUTANEOUS at 16:30

## 2021-08-04 RX ADMIN — HEPARIN SODIUM IN SODIUM CHLORIDE 10 ML: 200 INJECTION INTRAVENOUS at 16:29

## 2021-08-04 RX ADMIN — Medication 10 ML: at 17:43

## 2021-08-04 RX ADMIN — LIDOCAINE HYDROCHLORIDE 8 ML: 20 INJECTION, SOLUTION INFILTRATION; PERINEURAL at 16:29

## 2021-08-04 RX ADMIN — FLUCONAZOLE 200 MG: 200 INJECTION, SOLUTION INTRAVENOUS at 12:49

## 2021-08-04 RX ADMIN — Medication: at 12:48

## 2021-08-04 RX ADMIN — BRIMONIDINE TARTRATE 1 DROP: 2 SOLUTION OPHTHALMIC at 17:43

## 2021-08-04 RX ADMIN — MIDAZOLAM HYDROCHLORIDE 1 MG: 1 INJECTION, SOLUTION INTRAMUSCULAR; INTRAVENOUS at 16:10

## 2021-08-04 RX ADMIN — HEPARIN SODIUM 5000 UNITS: 5000 INJECTION INTRAVENOUS; SUBCUTANEOUS at 00:45

## 2021-08-04 RX ADMIN — FENTANYL CITRATE 50 MCG: 50 INJECTION, SOLUTION INTRAMUSCULAR; INTRAVENOUS at 16:15

## 2021-08-04 RX ADMIN — LEVOTHYROXINE SODIUM 50 MCG: 0.05 TABLET ORAL at 05:38

## 2021-08-04 RX ADMIN — LEVETIRACETAM 500 MG: 100 INJECTION, SOLUTION INTRAVENOUS at 17:44

## 2021-08-04 RX ADMIN — PREDNISOLONE ACETATE 1 DROP: 10 SUSPENSION/ DROPS OPHTHALMIC at 12:46

## 2021-08-04 RX ADMIN — TIMOLOL MALEATE 1 DROP: 2.5 SOLUTION/ DROPS OPHTHALMIC at 12:45

## 2021-08-04 RX ADMIN — Medication: at 17:45

## 2021-08-04 RX ADMIN — Medication 10 ML: at 18:18

## 2021-08-04 RX ADMIN — LABETALOL HYDROCHLORIDE 20 MG: 5 INJECTION INTRAVENOUS at 19:31

## 2021-08-04 RX ADMIN — PREDNISOLONE ACETATE 1 DROP: 10 SUSPENSION/ DROPS OPHTHALMIC at 17:45

## 2021-08-04 NOTE — PROGRESS NOTES
End of Shift Note    Bedside shift change report given to Jak Reno (oncoming nurse) by Brandon Stapleton (offgoing nurse). Report included the following information SBAR, Kardex and ED Summary    Shift worked:  nights   Shift summary and any significant changes:     keppra given, Glucose check Q6, NPO at midnight. Mitts discontinued. Pt behaves well, calm, cooperative, and follows commands. 1 bm     Concerns for physician to address:     Zone phone for oncoming shift:   [phone]     Patient Srinivasa Mesa  66 y.o.  7/16/2021  9:35 PM by Crystal Mendoza MD. Nneka Garcia was admitted from Home    Problem List  Patient Active Problem List    Diagnosis Date Noted    Acute appendicitis with localized peritonitis 07/29/2021    End stage renal disease ([facility] [address] 07/29/2021    Acute appendicitis with generalized peritonitis, abscess, and gangrene 07/29/2021    Visual impairment 07/22/2021    Physical debility 07/22/2021    Counseling regarding advance care planning and goals of care 07/22/2021    Acute encephalopathy 07/16/2021     Past Medical History:   Diagnosis Date    Diabetes ([facility] [address]     Hypertension        Core Measures:  CVA: No No  CHF:No No  PNA:No No    Activity:  Activity Level: Bed Rest  Number times ambulated in hallways past shift: 0  Number of times OOB to chair past shift: 0    Cardiac:   Cardiac Monitoring: Yes      Cardiac Rhythm: Sinus Rhythm    Access:   Current line(s): central line and HD access   Central Line? Yes Placement date  Reason Medically Necessary   PICC LINE? No Placement date Reason Medically Necessary     Genitourinary:   Urinary status: Anuric  Urinary Catheter?  No Placement Date  Reason Medically Necessary     Respiratory:   O2 Device: None (Room air)  Chronic home O2 use?: NO  Incentive spirometer at bedside: NO       GI:  Last Bowel Movement Date: 08/03/21  Current diet:  DIET NPO  Passing flatus: YES  Tolerating current diet: YES       Pain Management:   Patient states pain is manageable on current regimen: YES    Skin:  Mick Score: 15  Interventions: limit briefs    Patient Safety:  Fall Score:  Total Score: 4  Interventions: bed/chair alarm  High Fall Risk: Yes  @Rollbelt  @dexterity to release roll belt  Yes/No ( must document dexterity  here by stating Yes or No here, otherwise this is a restraint and must follow restraint documentation policy.)    DVT prophylaxis:  DVT prophylaxis Med- Yes  DVT prophylaxis SCD or WILMAN- No     Wounds: (If Applicable)  Wounds- yes  Location - [place]    Active Consults:  IP CONSULT TO NEUROLOGY  IP CONSULT TO NEPHROLOGY  IP CONSULT TO PALLIATIVE CARE - PROVIDER  IP CONSULT TO GASTROENTEROLOGY  IP CONSULT TO INTENSIVIST  IP CONSULT TO NEUROLOGY  IP CONSULT TO PALLIATIVE CARE - PROVIDER  IP CONSULT TO COLORECTAL SURGERY  IP CONSULT TO VASCULAR SURGERY    Length of Stay:  Expected LOS: 9d 14h  Actual LOS: 19  Discharge Plan:       Sonia Reed

## 2021-08-04 NOTE — PROGRESS NOTES
Problem: Falls - Risk of  Goal: *Absence of Falls  Description: Document Annetta Last Fall Risk and appropriate interventions in the flowsheet.   Outcome: Progressing Towards Goal  Note: Fall Risk Interventions:  Mobility Interventions: Bed/chair exit alarm    Mentation Interventions: Bed/chair exit alarm    Medication Interventions: Bed/chair exit alarm    Elimination Interventions: Bed/chair exit alarm, Call light in reach    History of Falls Interventions: Bed/chair exit alarm

## 2021-08-04 NOTE — PROGRESS NOTES
Hospitalist Progress Note    NAME: Mushtaq Foster   :  1950   MRN:  [id number]       Assessment / Plan:    Rapid code secondary to hypovolemic shock  Acute seizure   -Wean off the pressors on the fluid.  -Consulted intensivist-appreciate recommendations.  -Neurology consulted-appreciate recommendations. -EEG shows finding suggestive of encephalopathy.  -Continue Keppra 500 mg twice daily.  -Reconsulted neurology-appreciate recommendations.  -On Keppra 500 mg twice daily. - Ct head repeated yesterday- No acute intracranial finding. Chronic ischemic findings. -IV fluid discontinued.  -Blood pressure in good range. Acute abdominal pain  Acute appendicitis perforated with generalized peritonitis  Right lower quadrant abdomen abscess  Adenocarcinoma of the appendix  -Patient started having abdominal pain last evening, ordered a CT of the abdomen which shows acute appendicitis. -General surgery was consulted, patient was kept n.p.o. yesterday, got surgery today. -S/p laparoscopic appendicectomy with drainage of the right lower quadrant abscess and abdominal washout-.  -General surgery on board-appreciate input.  -Pathology from the surgery came back for shows adenocarcinoma of the appendix-G1 stage.  -Oncology consulted. -Patient started back on the regular diet. -CT abdomen/pelvis-findings suggestive of acute appendicitis. -WBC improved, afebrile, discontinue the antibiotic and antifungal.    Sepsis unknown source POA  Acute encephalopathy -improving  -Febrile and altered mental status on admission, some neck stiffness noted and patient was started on empiric coverage for meningitis  -Appreciate neurology consult  -ammonia mildly elevated 38 on admision, normalized  -Also had lumbar puncture, CSF was clear, cultures negative  -Antibiotics discontinued for the initial sepsis.   -EEG captured no seizures, triphasic waves suggest possible hepatic/renal encephalopathy  -T bili 1.6, normalized, LFTs normal   -Covid testing negative  -encephalopathy slightly worsened today because of the surgery diversion yesterday because of the sepsis from the appendicitis. Probably will need a day or 2 for improvement of the mental status. -passed bedside swallow, regular diet  -appreciate GI consult  -Ultrasound with elastography's reveals mild stages of liver fibrosis. Outpatient follow-up with GI as per the recommendations. CT a/p 7/16 (admission):  1. Circumferential rectal wall thickening is suspicious for malignancy. No bowel  obstruction. 2. No other acute abnormality in the abdomen or pelvis. Uterine fibroids are  noted. 3. Moderate cardiomegaly. MRI brain:  Encephalomalacia and white matter disease. No acute intracranial abnormality    ?seizures  -no seizure captured on EEG, triphasic waves point to hepatic/renal encephalopathy as per neuro  -on lose dose keppra, hold and observe    Diarrhea  -Resolved. ESRD  -appreciate nephro following  -Vascular surgery on board-PermCath placement today. -Liam catheter dialysis. -HD per nephro    Incidental finding of rectal wall thickening on CT A/P  -appreciate CRS eval  -patient can f/u outpatient, no intervention needed during this admission     Hypokalemia  Resolved    DMII  HTN  CAD  FS monitoring, SS     Eye surgery in may : glaucoma    Disposition-patient medically stable for discharge. Case management consulted for the SNF placement.     Code Status: Full  Appreciate palliative team following    Surrogate Decision Maker: Daughter  DVT Prophylaxis: SCD  GI Prophylaxis: not indicated  Baseline: Independent     25.0 - 29.9 Overweight / Body mass index is 25.08 kg/m².     Estimated discharge date: August 4  Barriers: Currently very sick  Code status: Full  Prophylaxis: Hep SQ  Recommended Disposition: TBD     Subjective:     Chief Complaint / Reason for Physician Visit  Patient seen today, awake and alert, mental status at the baseline.   No acute events overnight. Discussed with RN events overnight. Objective:     VITALS:   Last 24hrs VS reviewed since prior progress note. Most recent are:  Patient Vitals for the past 24 hrs:   Temp Pulse Resp BP SpO2   08/04/21 1200 -- 66 -- -- --   08/04/21 0800 -- 65 -- -- --   08/04/21 0400 -- 68 -- -- --   08/04/21 0338 98.7 °F (37.1 °C) 67 18 (!) 152/62 95 %   08/03/21 2358 -- 64 -- -- --   08/03/21 1945 97.9 °F (36.6 °C) 61 18 (!) 144/87 97 %   08/03/21 1708 98.3 °F (36.8 °C) 64 20 (!) 176/76 97 %       Intake/Output Summary (Last 24 hours) at 8/4/2021 1342  Last data filed at 8/3/2021 1742  Gross per 24 hour   Intake 60 ml   Output --   Net 60 ml        I had a face to face encounter and independently examined this patient on 8/4/2021, as outlined below:  PHYSICAL EXAM:  General: Adult  AA female, awake and alert. EENT:  EOMI. Anicteric sclerae. MMM  Resp:  CTA bilaterally, no wheezing or rales. No accessory muscle use  CV:  Regular  rhythm,  No edema  GI:  Soft, nontender, +Bowel sounds  Neurologic:  Awake and alert, oriented x2 at baseline  Psych:   deferred  Skin:  No rashes. No jaundice    Reviewed most current lab test results and cultures  YES  Reviewed most current radiology test results   YES  Review and summation of old records today    NO  Reviewed patient's current orders and MAR    YES  PMH/SH reviewed - no change compared to H&P  ________________________________________________________________________  Care Plan discussed with:    Comments   Patient x    Family      RN x    Care Manager x    Consultant                        Multidiciplinary team rounds were held today with , nursing, pharmacist and clinical coordinator. Patient's plan of care was discussed; medications were reviewed and discharge planning was addressed.      ________________________________________________________________________  Total NON critical care TIME:  35   Minutes    Total CRITICAL CARE TIME Spent:      Comments >50% of visit spent in counseling and coordination of care     ________________________________________________________________________  Jeane Gan MD     Procedures: see electronic medical records for all procedures/Xrays and details which were not copied into this note but were reviewed prior to creation of Plan. LABS:  I reviewed today's most current labs and imaging studies.   Pertinent labs include:  Recent Labs     08/02/21 0657   WBC 5.6   HGB 8.1*   HCT 25.5*   *     Recent Labs     08/04/21  0242 08/02/21  0657   * 131*   K 3.5 3.4*   CL 98 95*   CO2 27 27   GLU 84 139*   BUN 18 31*   CREA 4.76* 5.87*   CA 8.3* 8.6       Signed: Jeane Gan MD

## 2021-08-04 NOTE — PROGRESS NOTES
Brief oncology note: Nonbillable    Patient was away from bedside and receiving interventional procedure at time of encounter. As such, chart was reviewed and the patient's daughter was contacted for discussion of treatment options. Per discussion with nurse and patient's daughter, the patient has had difficulty understanding and making complex decisions as a consequence of the recent medical illness. I discussed the patient's care with her Rome Just, over the phone. We discussed that the surgical specimen that was obtained after appendectomy was fragmented in nature and as such, extent of disease is not able to be ascertained. Furthermore, imaging from prior to surgery suggest that there may be more disease in the distal colon. Given the patient's frail status and overall poor performance status, further surgery may do more harm than good in this setting. I agree with Dr. Telly sutton that proceeding with hemicolectomy at this time may hasten the patient's demise. I reviewed general treatment options for adenocarcinomas of the bowel and discussed close surveillance with repeat CT scan after the patient had shown stabilization and improvement from current  Illness. We will plan for outpatient follow-up about 2 weeks after acute care discharge. No plan for inpatient chemotherapy at this time. Formal consult note will follow tomorrow when patient can be examined at bedside. Please call oncology with any questions or concerns.       Chan Anderson MD    Attending [address] [place] [address]

## 2021-08-04 NOTE — PROGRESS NOTES
Name of procedure: Permacath placement    Sedation medications given: Versed 1 mg, Fentanyl 50 mcg    Sedation tolerated: well    Total Sedation time: 15 minutes    Vital Signs: Stable    Fluids removed: None    Samples sent to lab: No    Any complications related to procedure: NOne    Post Procedure Care Needed/order sets placed in [facility].

## 2021-08-04 NOTE — PROGRESS NOTES
Problem: Self Care Deficits Care Plan (Adult)  Goal: *Acute Goals and Plan of Care (Insert Text)  Description:   FUNCTIONAL STATUS PRIOR TO ADMISSION: Patient was modified independent using a rollator for functional mobility. Patient was modified independent for basic and instrumental ADLs. Patient questionable historian secondary to mild confusion. HOME SUPPORT: The patient lived with family. Occupational Therapy Goals  7/30/2021 Re-eval s/p emergent lap appendectomy    1. Patient will perform grooming seated with supervision/set-up within 7 day(s). 2.  Patient will perform sponge bath with moderate assist  within 7 day(s). 3.  Patient will perform lower body dressing with moderate assist within 7 day(s). 4.  Patient will perform toilet transfers with minimal assist within 7 day(s). 5.  Patient will perform all aspects of toileting with moderate assist within 7 day(s). Initiated 7/27/2021 discontinue below goals 7/30/2021   1. Patient will perform grooming standing at sink with supervision/set-up within 7 day(s). 2.  Patient will perform sponge bath with supervision/set-up within 7 day(s). 3.  Patient will perform lower body dressing with supervision/set-up within 7 day(s). 4.  Patient will perform toilet transfers with supervision/set-up within 7 day(s). 5.  Patient will perform all aspects of toileting with supervision/set-up within 7 day(s). Outcome: Progressing Towards Goal   OCCUPATIONAL THERAPY TREATMENT  Patient: Ish Gilbert (66 y.o. female)  Date: 8/4/2021  Diagnosis: Acute encephalopathy [G93.40] <principal problem not specified>  Procedure(s) (LRB):  APPENDECTOMY LAPAROSCOPIC (N/A) 6 Days Post-Op  Precautions: Bed Alarm, Fall, Contact (MRSA)  Chart, occupational therapy assessment, plan of care, and goals were reviewed. ASSESSMENT  Patient continues with skilled OT services and is progressing towards goals. Patient received semisupine in bed and agreeable for therapy.  Patient required less assist for bed mobility this session when coming edge of bed. While sitting, patient donned hospital gown around back with set-up/stand-by assist requiring visual/verbal cueing for sequencing task. Patient stood with contact guard assist and walked to bathroom with handheld assist with cueing for scanning room sink. Patient tolerated standing at sink to wash her face for several mins with contact guard assist. Patient walked around bed with PT present with LOB noted, patient able to self-correct with contact guard assist. Patient completed standing balance challenges while holding onto surface for additional support. Patient tolerated tasks fair and requested to rest break. Patient was left sitting in chair with all needs met and chair alarmed. Patient continues to be limited by general weakness, impaired balance, mild confusion, decreased insight, and decreased activity tolerance/ Patient would continue to benefit from skilled OT services during acute hospital stay. Continue to recommend patient discharge to SNF rehab prior to returning home. Current Level of Function Impacting Discharge (ADLs): set-up/stand-by assist to contact guard assist for self-care, stand-by assist to contact guard assist for functional mobility     Other factors to consider for discharge: fall risk, mild confusion, impaired balance          PLAN :  Patient continues to benefit from skilled intervention to address the above impairments. Continue treatment per established plan of care to address goals. Recommendation for discharge: (in order for the patient to meet his/her long term goals)  Therapy up to 5 days/week in SNF setting    This discharge recommendation:  Has been made in collaboration with the attending provider and/or case management    IF patient discharges home will need the following DME: TBD pending progress       SUBJECTIVE:   Patient stated I love to dance.     OBJECTIVE DATA SUMMARY: Cognitive/Behavioral Status:  Neurologic State: Alert  Orientation Level: Oriented to person;Oriented to place; Disoriented to situation;Disoriented to time  Cognition: Follows commands  Perception: Tactile;Verbal  Perseveration: No perseveration noted  Safety/Judgement: Decreased insight into deficits; Fall prevention    Functional Mobility and Transfers for ADLs:  Bed Mobility:  Rolling: Stand-by assistance; Additional time  Supine to Sit: Stand-by assistance; Additional time  Scooting: Contact guard assistance    Transfers:  Sit to Stand: Contact guard assistance  Stand to Sit: Contact guard assistance  Functional Transfers  Bathroom Mobility: Contact guard assistance  Bed to Chair: Contact guard assistance;Minimum assistance (handheld assist)    Balance:  Sitting: Intact  Standing: Impaired; With support (handheld support)  Standing - Static: Good;Constant support  Standing - Dynamic : Fair;Constant support    ADL Intervention:    Grooming  Position Performed: Standing (at sink)  Washing Face: Contact guard assistance  Washing Hands: Contact guard assistance  Cues: Tactile cues provided;Verbal cues provided    Upper Body Dressing Assistance  Shirt simulation with hospital gown: Set-up; Stand-by assistance  Cues: Verbal cues provided;Visual cues provided    Cognitive Retraining  Safety/Judgement: Decreased insight into deficits; Fall prevention    Pain:  Patient did not c/o pain during session. Activity Tolerance:   Fair    After treatment patient left in no apparent distress:   Sitting in chair, Call bell within reach, and Bed / chair alarm activated    COMMUNICATION/COLLABORATION:   The patients plan of care was discussed with: Physical therapist and Registered nurse.      Erven Flow, OTR/L  Time Calculation: 24 mins

## 2021-08-04 NOTE — PROGRESS NOTES
Nephrology Progress Note  Piter Perez     [email or web address]  Phone - [phone]   Patient: Quincy Marmolejo    YOB: 1950        Date- 8/4/2021   Admit Date: 7/16/2021  CC: Follow up for  esrd       IMPRESSION & PLAN:    ESRD- [facility]- [facility]  · Mild hyperkalemia  · AMS  · SEPSIS  · ANEMIA  · Acute appendicitis perforated with generalized peritonitis and right lower quadrant abdomen abscess  · CA/P/PTH/VITD  · DM  ·  HTN  · Seizures    PLAN-   Plan for hemodialysis today   No plans for declot per vascular surgrey for now   Consulted IR for PC placement today. Patient is NPO   Epogen for anemia   Continue HD MWF   Neurology following for seizure work-up   Subjective: Interval History:   -Seen and examined  -No issues overnight    Objective:   Vitals:    08/04/21 0800 08/04/21 1200 08/04/21 1303 08/04/21 1444   BP:    (!) 188/67   Pulse: 65 66  62   Resp:    16   Temp:    98.3 °F (36.8 °C)   TempSrc:       SpO2:    99%   Weight:       Height:   5' 2\" (1.575 m)       08/03 0701 - 08/04 0700  In: 120 [P.O.:120]  Out: -   Last 3 Recorded Weights in this Encounter    07/29/21 0530 07/30/21 1935 07/31/21 1936   Weight: 58.6 kg (129 lb 3 oz) 58.5 kg (129 lb) 58.5 kg (129 lb)      Physical exam:   GEN:  confused  NECK:  Supple, no thyromegaly  RESP: clear b/l, no  wheezing,   CVS: RRR,S1,S2   ABDO:  Soft, NT  NEURO: non focal  EXT:no Edema no thrill in AVG           Chart reviewed. Pertinent Notes reviewed. Data Review :  Recent Labs     08/04/21  0242 08/02/21  0657   * 131*   K 3.5 3.4*   CL 98 95*   CO2 27 27   BUN 18 31*   CREA 4.76* 5.87*   GLU 84 139*   CA 8.3* 8.6     Recent Labs     08/02/21  0657   WBC 5.6   HGB 8.1*   HCT 25.5*   *     No results for input(s): FE, TIBC, PSAT, FERR in the last 72 hours.    Medication list  reviewed  Current Facility-Administered Medications   Medication Dose Route Frequency    heparin (porcine) injection 5,000 Units  5,000 Units SubCUTAneous Q12H    lidocaine (XYLOCAINE) 20 mg/mL (2 %) injection 300 mg  15 mL SubCUTAneous ONCE    heparin (porcine) pf 300 Units  300 Units InterCATHeter ONCE    heparinized saline 2 units/mL infusion 800 Units  400 mL Irrigation ONCE    fentaNYL citrate (PF) injection 100 mcg  100 mcg IntraVENous Multiple    heparin (porcine) 1,000 unit/mL injection        midazolam (PF) (VERSED) 1 mg/mL injection 5 mg  5 mg IntraVENous Rad Multiple    midazolam (VERSED) 1 mg/mL injection        insulin lispro (HUMALOG) injection   SubCUTAneous Q6H    pantoprazole (PROTONIX) tablet 40 mg  40 mg Oral ACB    levETIRAcetam (KEPPRA) 500 mg in 0.9% sodium chloride 100 mL IVPB  500 mg IntraVENous Q12H    LORazepam (ATIVAN) injection 2 mg  2 mg IntraVENous Q4H PRN    HYDROcodone-acetaminophen (NORCO) 5-325 mg per tablet 1 Tablet  1 Tablet Oral Q4H PRN    HYDROcodone-acetaminophen (NORCO)  mg tablet 1 Tablet  1 Tablet Oral Q4H PRN    HYDROmorphone (DILAUDID) injection 0.5 mg  0.5 mg IntraVENous Q2H PRN    simethicone (MYLICON) tablet 80 mg  80 mg Oral QID PRN    zinc oxide-cod liver oil (DESITIN) 40 % paste   Topical TID    levothyroxine (SYNTHROID) tablet 50 mcg  50 mcg Oral 6am    heparin (porcine) pf 300 Units  300 Units InterCATHeter PRN    epoetin bia-epbx (RETACRIT) injection 4,000 Units  4,000 Units SubCUTAneous Q MON, WED & FRI    hydrALAZINE (APRESOLINE) 20 mg/mL injection 20 mg  20 mg IntraVENous Q6H PRN    labetaloL (NORMODYNE;TRANDATE) injection 20 mg  20 mg IntraVENous Q4H PRN    glucose chewable tablet 16 g  4 Tablet Oral PRN    dextrose (D50W) injection syrg 12.5-25 g  12.5-25 g IntraVENous PRN    glucagon (GLUCAGEN) injection 1 mg  1 mg IntraMUSCular PRN    sodium chloride (NS) flush 5-40 mL  5-40 mL IntraVENous Q8H    sodium chloride (NS) flush 5-40 mL  5-40 mL IntraVENous PRN    acetaminophen (TYLENOL) tablet 650 mg  650 mg Oral Q6H PRN    ondansetron (ZOFRAN ODT) tablet 4 mg  4 mg Oral Q8H PRN    Or    ondansetron (ZOFRAN) injection 4 mg  4 mg IntraVENous Q6H PRN    acetaminophen (TYLENOL) suppository 650 mg  650 mg Rectal Q4H PRN    brimonidine (ALPHAGAN) 0.2 % ophthalmic solution 1 Drop  1 Drop Both Eyes BID    timolol (TIMOPTIC) 0.25% ophthalmic solution  1 Drop Both Eyes DAILY    prednisoLONE acetate (PRED FORTE) 1 % ophthalmic suspension 1 Drop  1 Drop Right Eye TID    latanoprost (XALATAN) 0.005 % ophthalmic solution 1 Drop  1 Drop Both Eyes QHS          Anabella Fay, [address] [facility]  [facility]  [facility], [address]  [place], [address]  Phone - [phone]               Fax - [phone]

## 2021-08-04 NOTE — PROGRESS NOTES
TRANSFER - OUT REPORT:    Verbal report given to Jodi Siddiqi on Lex Semen  being transferred to [facility] [phone] for routine progression of care       Report consisted of patients Situation, Background, Assessment and   Recommendations(SBAR). Information from the following report(s) Procedure Summary was reviewed with the receiving nurse. Lines:   Triple Lumen CVC Triple Lumen 07/18/21 Anterior; Left Neck (Active)   Central Line Being Utilized Yes 08/04/21 0339   Criteria for Appropriate Use Limited/no vessel suitable for conventional peripheral access 08/04/21 0339   Site Assessment Clean, dry, & intact 08/04/21 0339   Infiltration Assessment 0 08/04/21 0339   Affected Extremity/Extremities Color distal to insertion site pink (or appropriate for race) 08/04/21 0339   Date of Last Dressing Change 08/01/21 08/03/21 1708   Dressing Status Clean, dry, & intact 08/04/21 0339   Dressing Type Disk with Chlorhexadine gluconate (CHG) 08/04/21 0339   Action Taken Open ports on tubing capped 08/04/21 0339   Proximal Hub Color/Line Status White;Capped;Flushed 08/03/21 1708   Positive Blood Return (Medial Site) No 08/03/21 1708   Medial Hub Color/Line Status Blue;Capped;Flushed 08/03/21 1708   Positive Blood Return (Lateral Site) Yes 08/03/21 1708   Distal Hub Color/Line Status Brown;Capped;Flushed 08/03/21 1708   Positive Blood Return (Site #3) Yes 08/03/21 1708   Alcohol Cap Used Yes 08/03/21 1708        Opportunity for questions and clarification was provided.       Patient transported with:   [email or web address]

## 2021-08-04 NOTE — PROGRESS NOTES
Comprehensive Nutrition Assessment    Type and Reason for Visit: Reassess    Nutrition Recommendations/Plan:   Advance to regular diet  Add nepro BID    Nutrition Assessment:     Chart reviewed; medically noted for encephalopathy, seizure, appendectomy, and ESRD on HD. Patient NPO today for permacath placement. Poor PO intake over the last several days per flowsheets. K+ WNL; no phos. BG under control. Recommend liberalized diet s/p procedure. Will add nepro supplements BID when cleared for PO. Patient Vitals for the past 168 hrs:   % Diet Eaten   08/03/21 1645 1 - 25%   08/03/21 1252 26 - 50%   08/03/21 0900 26 - 50%   08/02/21 1454 1 - 25%   08/02/21 1040 0%   08/01/21 1441 1 - 25%   08/01/21 0954 76 - 100%     Estimated Daily Nutrient Needs:  Energy (kcal): 1475 kcal (25 kcal/kg bw); Weight Used for Energy Requirements: Current  Protein (g): 77-89g (1.3-1.5 g/kg bw); Weight Used for Protein Requirements: Current  Fluid (ml/day): 1475 mL; Method Used for Fluid Requirements: 1 ml/kcal    Nutrition Related Findings:   K+ 3.5, BG -74-86-68  BM 8/4  Humalog, Keppra, Synthroid, Protonix      Wounds:    Surgical incision       Current Nutrition Therapies:  DIET NPO    Anthropometric Measures:  · Height:  5' 2\" (157.5 cm)  · Current Body Wt:  58.5 kg (128 lb 15.5 oz)     · BMI Category:  Normal weight (BMI 18.5-24. 9)       Nutrition Diagnosis:   · Inadequate protein-energy intake related to cognitive or neurological impairment as evidenced by intake 0-25%, intake 26-50%    Nutrition Interventions:   Food and/or Nutrient Delivery: Start oral diet, Start oral nutrition supplement  Nutrition Education and Counseling: No recommendations at this time  Coordination of Nutrition Care: No recommendation at this time    Goals:  PO intake at least 50% of meals/supplement next 3-5 days       Nutrition Monitoring and Evaluation:   Behavioral-Environmental Outcomes: None identified  Food/Nutrient Intake Outcomes: Food and nutrient intake, Supplement intake  Physical Signs/Symptoms Outcomes: Biochemical data, Weight    Discharge Planning:     (Regular diet + ONS)     Electronically signed by Kathleen Olivarez RD on 8/4/2021 at 1:04 PM    Contact: ext [phone]

## 2021-08-04 NOTE — PROGRESS NOTES
Transition of Care Plan:    RUR: 23% moderate risk  Disposition: SNF - [facility], will require authorization  Follow up appointments: To be arranged at SNF, will need PCP, JARRELL Francisco MWF 6:30am chair  DME needed: None at this time, patient has cane and rolling walker  Transportation at Discharge:  Daughter to transport if appropriate  Keys or means to access home: Daughter has acces, patient to go to SNF at discharge   IM Medicare Letter:  2nd. IMM letter needed prior to discharge  Is patient a BCPI-A Bundle:  no         If yes, was Bundle Letter given?:     Caregiver Contact: Daughter Eve Garciause [phone]  Discharge Caregiver contacted prior to discharge? Daughter will need to be contacted about discharge plans      Chart reviewed. Pt will need new insurance authorization to go to [facility] when medically stable. Care Management Interventions  PCP Verified by CM: Yes  Mode of Transport at Discharge: Self  Transition of Care Consult (CM Consult): Home Health, Discharge Planning (Pt had previous IP rehab experiance)  Discharge Durable Medical Equipment: No (Pt owns cane and RW)  Current Support Network: Lives with Caregiver (Pt is living with her daughter, family is very supportive.)  Confirm Follow Up Transport: Family  Discharge Location  Discharge Placement: Home with home health    Ryan Templeton.  Jesus Britta, [address] - [address] [place]  [employer] ACP Facilitator  Zone Phone: [phone]

## 2021-08-04 NOTE — PROGRESS NOTES
Problem: Mobility Impaired (Adult and Pediatric)  Goal: *Acute Goals and Plan of Care (Insert Text)  Description: FUNCTIONAL STATUS PRIOR TO ADMISSION: Patient was modified independent using a rollator for functional mobility. Independent with dressing and bathing. Used rollator when going to HD.    HOME SUPPORT PRIOR TO ADMISSION: The patient lived with family who assisted with cooking and meals. 1 story home with 4 steps and bilateral rails. Physical Therapy Goals  Revised 7/30/2021  1. Patient will move from supine to sit and sit to supine , scoot up and down, and roll side to side in bed with minimal assistance/contact guard assist within 7 day(s). 2.  Patient will transfer from bed to chair and chair to bed with minimal assistance/contact guard assist using the least restrictive device within 7 day(s). 3.  Patient will perform sit to stand with minimal assistance/contact guard assist within 7 day(s). 4.  Patient will ambulate with minimal assistance/contact guard assist for 105 feet with the least restrictive device within 7 day(s). Physical Therapy Goals  Initiated 7/26/2021  1. Patient will move from supine to sit and sit to supine , scoot up and down, and roll side to side in bed with modified independence within 7 day(s). 2.  Patient will transfer from bed to chair and chair to bed with supervision/set-up using the least restrictive device within 7 day(s). 3.  Patient will perform sit to stand with modified independence within 7 day(s). 4.  Patient will ambulate with supervision/set-up for 250 feet with the least restrictive device within 7 day(s). 5.  Patient will ascend/descend 4 stairs with 1 handrail(s) with minimal assistance/contact guard assist within 7 day(s). Outcome: Progressing Towards Goal   PHYSICAL THERAPY TREATMENT  Patient:  Prince Cooney (61 y.o. female)  Date: 8/4/2021  Diagnosis: Acute encephalopathy [G93.40] <principal problem not specified>  Procedure(s) (LRB): APPENDECTOMY LAPAROSCOPIC (N/A) 6 Days Post-Op  Precautions: Bed Alarm, Fall, Contact (MRSA)  Chart, physical therapy assessment, plan of care and goals were reviewed. ASSESSMENT  Patient continues with skilled PT services and is progressing towards goals. Patient received in bed and agreeable to participate, was able to come to EOB with stand by assist and extra time then sat unsupported without LOB. Patient ambulated in room with hand held assist x 2 for approx 40 feet, gait is narrow based and unsteady at times (occasional slight stumble), and needs cuing for attention to task. Also able to work on balance with weight shifting, reaching, sidestepping and turns  using single UE support. Patient still has difficulty maneuvering around  and identifying objects in room, but appears to be improved from last session. Left up in chair with call bell in reach and chair alarm, appropriate for continued rehab in SNF. Current Level of Function Impacting Discharge (mobility/balance): CG to min assist to ambulate    Other factors to consider for discharge: high falls risk, below baseline         PLAN :  Patient continues to benefit from skilled intervention to address the above impairments. Continue treatment per established plan of care. to address goals. Recommendation for discharge: (in order for the patient to meet his/her long term goals)  Therapy up to 5 days/week in SNF setting    This discharge recommendation:  Has been made in collaboration with the attending provider and/or case management    IF patient discharges home will need the following DME: patient owns DME required for discharge       SUBJECTIVE:   Patient stated Is this the chair? Courtney Garvey  pointing to bed    OBJECTIVE DATA SUMMARY:   Critical Behavior:  Neurologic State: Alert  Orientation Level: Oriented to person, Oriented to place, Disoriented to situation, Disoriented to time  Cognition: Follows commands  Safety/Judgement: Decreased insight into deficits, Fall prevention  Functional Mobility Training:  Bed Mobility:  Rolling: Stand-by assistance; Additional time  Supine to Sit: Stand-by assistance; Additional time     Scooting: Contact guard assistance        Transfers:  Sit to Stand: Contact guard assistance  Stand to Sit: Contact guard assistance        Bed to Chair: Contact guard assistance;Minimum assistance (handheld assist)                    Balance:  Sitting: Intact  Standing: Impaired; With support (handheld support)  Standing - Static: Good;Constant support  Standing - Dynamic : Fair;Constant support  Ambulation/Gait Training:  Distance (ft): 40 Feet (ft)  Assistive Device: Gait belt  Ambulation - Level of Assistance: Contact guard assistance;Minimal assistance (hand held)        Gait Abnormalities: Decreased step clearance;Shuffling gait;Trunk sway increased        Base of Support: Narrowed     Speed/Ramona: Slow  Step Length: Left shortened;Right shortened                    Stairs: Therapeutic Exercises:   Standing weigh shifts, reaching, head turns, sidestepping  Pain Rating:      Activity Tolerance:   Good    After treatment patient left in no apparent distress:   Sitting in chair and Call bell within reach    COMMUNICATION/COLLABORATION:   The patients plan of care was discussed with: Occupational therapist and Registered nurse.      Merari Sorensen, PT   Time Calculation: 24 mins

## 2021-08-05 ENCOUNTER — ANESTHESIA EVENT (OUTPATIENT)
Dept: SURGERY | Age: 71
DRG: 853 | End: 2021-08-05
Payer: MEDICARE

## 2021-08-05 LAB
GLUCOSE BLD STRIP.AUTO-MCNC: 110 MG/DL (ref 65–117)
GLUCOSE BLD STRIP.AUTO-MCNC: 117 MG/DL (ref 65–117)
GLUCOSE BLD STRIP.AUTO-MCNC: 122 MG/DL (ref 65–117)
GLUCOSE BLD STRIP.AUTO-MCNC: 136 MG/DL (ref 65–117)
GLUCOSE BLD STRIP.AUTO-MCNC: 170 MG/DL (ref 65–117)
GLUCOSE BLD STRIP.AUTO-MCNC: NORMAL MG/DL (ref 65–117)
SERVICE CMNT-IMP: ABNORMAL
SERVICE CMNT-IMP: NORMAL

## 2021-08-05 PROCEDURE — 97535 SELF CARE MNGMENT TRAINING: CPT

## 2021-08-05 PROCEDURE — 94760 N-INVAS EAR/PLS OXIMETRY 1: CPT

## 2021-08-05 PROCEDURE — 74011250637 HC RX REV CODE- 250/637: Performed by: STUDENT IN AN ORGANIZED HEALTH CARE EDUCATION/TRAINING PROGRAM

## 2021-08-05 PROCEDURE — 74011250636 HC RX REV CODE- 250/636: Performed by: SURGERY

## 2021-08-05 PROCEDURE — 97530 THERAPEUTIC ACTIVITIES: CPT

## 2021-08-05 PROCEDURE — 99223 1ST HOSP IP/OBS HIGH 75: CPT | Performed by: STUDENT IN AN ORGANIZED HEALTH CARE EDUCATION/TRAINING PROGRAM

## 2021-08-05 PROCEDURE — 74011250637 HC RX REV CODE- 250/637: Performed by: SURGERY

## 2021-08-05 PROCEDURE — 82962 GLUCOSE BLOOD TEST: CPT

## 2021-08-05 PROCEDURE — 74011636637 HC RX REV CODE- 636/637: Performed by: NURSE PRACTITIONER

## 2021-08-05 PROCEDURE — 65660000000 HC RM CCU STEPDOWN

## 2021-08-05 PROCEDURE — 74011250636 HC RX REV CODE- 250/636: Performed by: STUDENT IN AN ORGANIZED HEALTH CARE EDUCATION/TRAINING PROGRAM

## 2021-08-05 PROCEDURE — 74011000258 HC RX REV CODE- 258: Performed by: STUDENT IN AN ORGANIZED HEALTH CARE EDUCATION/TRAINING PROGRAM

## 2021-08-05 RX ORDER — AMLODIPINE BESYLATE 5 MG/1
5 TABLET ORAL DAILY
Status: DISCONTINUED | OUTPATIENT
Start: 2021-08-05 | End: 2021-08-07 | Stop reason: HOSPADM

## 2021-08-05 RX ORDER — LEVETIRACETAM 500 MG/1
500 TABLET ORAL 2 TIMES DAILY
Qty: 60 TABLET | Refills: 0 | Status: SHIPPED | OUTPATIENT
Start: 2021-08-05 | End: 2021-09-04

## 2021-08-05 RX ADMIN — Medication: at 00:24

## 2021-08-05 RX ADMIN — LATANOPROST 1 DROP: 50 SOLUTION OPHTHALMIC at 00:22

## 2021-08-05 RX ADMIN — HEPARIN SODIUM 5000 UNITS: 5000 INJECTION INTRAVENOUS; SUBCUTANEOUS at 13:15

## 2021-08-05 RX ADMIN — LATANOPROST 1 DROP: 50 SOLUTION OPHTHALMIC at 21:06

## 2021-08-05 RX ADMIN — BRIMONIDINE TARTRATE 1 DROP: 2 SOLUTION OPHTHALMIC at 17:05

## 2021-08-05 RX ADMIN — LEVETIRACETAM 500 MG: 100 INJECTION, SOLUTION INTRAVENOUS at 05:00

## 2021-08-05 RX ADMIN — Medication 10 ML: at 13:17

## 2021-08-05 RX ADMIN — HYDRALAZINE HYDROCHLORIDE 20 MG: 20 INJECTION INTRAMUSCULAR; INTRAVENOUS at 13:16

## 2021-08-05 RX ADMIN — PREDNISOLONE ACETATE 1 DROP: 10 SUSPENSION/ DROPS OPHTHALMIC at 17:05

## 2021-08-05 RX ADMIN — Medication 10 ML: at 06:48

## 2021-08-05 RX ADMIN — LEVOTHYROXINE SODIUM 50 MCG: 0.05 TABLET ORAL at 06:19

## 2021-08-05 RX ADMIN — LEVETIRACETAM 500 MG: 100 INJECTION, SOLUTION INTRAVENOUS at 17:04

## 2021-08-05 RX ADMIN — PREDNISOLONE ACETATE 1 DROP: 10 SUSPENSION/ DROPS OPHTHALMIC at 21:07

## 2021-08-05 RX ADMIN — PANTOPRAZOLE SODIUM 40 MG: 40 TABLET, DELAYED RELEASE ORAL at 10:37

## 2021-08-05 RX ADMIN — PREDNISOLONE ACETATE 1 DROP: 10 SUSPENSION/ DROPS OPHTHALMIC at 00:22

## 2021-08-05 RX ADMIN — Medication: at 13:16

## 2021-08-05 RX ADMIN — EPOETIN ALFA-EPBX 4000 UNITS: 4000 INJECTION, SOLUTION INTRAVENOUS; SUBCUTANEOUS at 00:21

## 2021-08-05 RX ADMIN — LORAZEPAM 2 MG: 2 INJECTION INTRAMUSCULAR; INTRAVENOUS at 21:15

## 2021-08-05 RX ADMIN — Medication 10 ML: at 21:07

## 2021-08-05 RX ADMIN — TIMOLOL MALEATE 1 DROP: 2.5 SOLUTION/ DROPS OPHTHALMIC at 10:50

## 2021-08-05 RX ADMIN — Medication: at 21:06

## 2021-08-05 RX ADMIN — HEPARIN SODIUM 5000 UNITS: 5000 INJECTION INTRAVENOUS; SUBCUTANEOUS at 00:21

## 2021-08-05 RX ADMIN — INSULIN LISPRO 2 UNITS: 100 INJECTION, SOLUTION INTRAVENOUS; SUBCUTANEOUS at 17:13

## 2021-08-05 RX ADMIN — PREDNISOLONE ACETATE 1 DROP: 10 SUSPENSION/ DROPS OPHTHALMIC at 10:50

## 2021-08-05 RX ADMIN — Medication 10 ML: at 00:22

## 2021-08-05 RX ADMIN — BRIMONIDINE TARTRATE 1 DROP: 2 SOLUTION OPHTHALMIC at 10:50

## 2021-08-05 RX ADMIN — Medication: at 10:51

## 2021-08-05 RX ADMIN — AMLODIPINE BESYLATE 5 MG: 5 TABLET ORAL at 13:16

## 2021-08-05 NOTE — PROGRESS NOTES
Mental status improved.   Will attempt RUE graft thrombectomy tomorrow morning  Discussed with Dr. Randal You

## 2021-08-05 NOTE — PROCEDURES
[facility] Acutes  [phone]    Vitals   Pre   Post   Assessment   Pre   Post     Temp  Temp: 97.9 °F (36.6 °C) (08/04/21 1930)  98.3   LOC  A/O x 2 A/O x 3   HR   Pulse (Heart Rate): (!) 54 (08/04/21 2000) 58 Lungs   CTA  CTA   B/P   BP: (!) 135/45 (no c/o) (08/04/21 2000) 138/57 Cardiac   s1s2  s1s2   Resp   Resp Rate: 16 (08/04/21 1930) 16 Skin   Intact catheter  Intact catheter   Pain level  Pain Intensity 1: 0 (08/04/21 1930) 0 Edema  None     None   Orders:    Duration:   Start:   1800 End:   2030 Total:   2.5 hours   Dialyzer:   Dialyzer/Set Up Inspection: Revaclear (08/04/21 1800)   K Bath:   Dialysate K (mEq/L): 3 (08/04/21 1800)   Ca Bath:   Dialysate CA (mEq/L): 2.5 (08/04/21 1800)   Na/Bicarb:   Dialysate NA (mEq/L): 140 (08/04/21 1800)   Target Fluid Removal:   Goal/Amount of Fluid to Remove (mL): 2500 mL (08/04/21 1800)   Access     Type & Location:   Left tunneled catheter placed today. Good aspirations/flushes. However, leakage noted at exit sit. Dressing changed x 2 and reinforced with sure seal and 4x4   Labs     Obtained/Reviewed   Critical Results Called   Date when labs were drawn-  Hgb-    HGB   Date Value Ref Range Status   08/02/2021 8.1 (L) 11.5 - 16.0 g/dL Final     K-    Potassium   Date Value Ref Range Status   08/04/2021 3.5 3.5 - 5.1 mmol/L Final     Ca-   Calcium   Date Value Ref Range Status   08/04/2021 8.3 (L) 8.5 - 10.1 MG/DL Final     Bun-   BUN   Date Value Ref Range Status   08/04/2021 18 6 - 20 MG/DL Final     Creat-   Creatinine   Date Value Ref Range Status   08/04/2021 4.76 (H) 0.55 - 1.02 MG/DL Final        Medications/ Blood Products Given     Name   Dose   Route and Time     None ordered                Blood Volume Processed (BVP):    51.6 Net Fluid   Removed:  2500-706=3637 ml   Comments   Time Out Done: ([facility])1144  Primary Nurse Rpt Pre:Anai Sylvester RN  Primary Nurse Rpt Post:Nessa Reynolds RN  Pt Education:ProcedureCare Plan:   Tx Summary: On arrival noted catheter exit site leaking. Area reinforced and dressing changed. Treatment initiated via Left perm. Catheter. Tolerated treatment. BP Elevated and medicated by primary. Tolerated treatment. All possible blood returned. Catheter ports flushed with NS;sterile caps applied. Endorsed to primary, Elisabeth Jimenez RN  Admiting Diagnosis:Acute Encephalopathy  Pt's previous Pako Waddell  Consent signed - Informed Consent Verified: Yes (08/04/21 1800)  [facility] Consent - N/A  Hepatitis Status- negative on 6/28/2021; immune on 7/7/2021  Machine #- Machine Number: [id number] (08/04/21 1800)  Telemetry status-Remotely monitored  Pre-dialysis wt. - Pre-Dialysis Weight: 58.5 kg (128 lb 15.5 oz) (07/30/21 3875)  Post dialysis wt:

## 2021-08-05 NOTE — PROGRESS NOTES
End of Shift Note    Bedside shift change report given to Kyler Santiago RN (oncoming nurse) by Power Decker RN (offgoing nurse). Report included the following information     Shift worked:  7a-7p   Shift summary and any significant changes:    fistulas on both arms, both non-functioning  NPO after midnight for thrombectomy in the fistula of the right arm  Pt have Triple lumen central line in left side and a perma-cath just below it  May have dialysis Friday post-procedure       Concerns for physician to address: Will pt get dialysis post-procedure on Friday? Family would prefer if she did. Zone phone for oncoming shift:   [phone]     Patient Information  Laurence Dixon  66 y.o.  7/16/2021  9:35 PM by Lane Bedoya MD. Laurence Dixon was admitted from home with daughter    Problem List  Patient Active Problem List    Diagnosis Date Noted    Acute appendicitis with localized peritonitis 07/29/2021    End stage renal disease ([facility] [address] 07/29/2021    Acute appendicitis with generalized peritonitis, abscess, and gangrene 07/29/2021    Visual impairment 07/22/2021    Physical debility 07/22/2021    Counseling regarding advance care planning and goals of care 07/22/2021    Acute encephalopathy 07/16/2021     Past Medical History:   Diagnosis Date    Diabetes ([facility] [address]     Hypertension        Core Measures:  CVA: N  CHF: N  PNA: N    Activity:  Activity Level: Up with Assistance  Number times ambulated in hallways past shift:0  Number of times OOB to chair past shift: 0    Cardiac:   Cardiac Monitoring: Y     Cardiac Rhythm: Sinus Rhythm    Access:   Current line(s): permacath  Central Line? Yes, triple lumen  PICC LINE? Genitourinary:   Urinary status: Incontinent, but can get to the bathroom x1 assist  Urinary Catheter?  N    Respiratory:   O2 Device: None (Room air)  Chronic home O2 use?: N  Incentive spirometer at bedside: N       GI:  Last Bowel Movement Date: 08/04/21  Current diet:  ADULT DIET Regular  ADULT ORAL NUTRITION SUPPLEMENT Breakfast, Dinner; Renal Supplement  DIET NPO  Passing flatus: Y  Tolerating current diet: Y       Pain Management:   Patient states pain is manageable on current regimen: NA    Skin:  Mick Score: 18  Interventions: turning and repositiong    Patient Safety:  Fall Score:  Total Score: 4  Interventions: bed low, wheels locked, alarm on, call bell in reach  High Fall Risk: Yes  @Rollbelt  @dexterity to release roll belt  No ( must document dexterity  here by stating Yes or No here, otherwise this is a restraint and must follow restraint documentation policy.)    DVT prophylaxis:  DVT prophylaxis Med- Y  DVT prophylaxis SCD or WILMAN- N    Wounds: (If Applicable)  Wounds- incision site to left abdomin, dressed    Active Consults:  IP CONSULT TO NEUROLOGY  IP CONSULT TO NEPHROLOGY  IP CONSULT TO PALLIATIVE CARE - PROVIDER  IP CONSULT TO GASTROENTEROLOGY  IP CONSULT TO INTENSIVIST  IP CONSULT TO NEUROLOGY  IP CONSULT TO PALLIATIVE CARE - PROVIDER  IP CONSULT TO ONCOLOGY  IP CONSULT TO COLORECTAL SURGERY  IP CONSULT TO VASCULAR SURGERY    Length of Stay:  Expected LOS: 9d 14h  Actual LOS: 20  Discharge Plan:

## 2021-08-05 NOTE — PROGRESS NOTES
Transition of Care Plan:     RUR: 21% moderate risk  Disposition: SNF - [facility], will require authorization  Follow up appointments: To be arranged at SNF, will need PCP, JARRELL Alonzo MWF 6:30am chair  DME needed: None at this time, patient has cane and rolling walker  Transportation at Discharge:  Daughter to transport if appropriate  Keys or means to access home: Daughter has acces, patient to go to SNF at discharge   IM Medicare Letter:  2nd. IMM letter needed prior to discharge  Is patient a BCPI-A Bundle:  no                    If yes, was Bundle Letter given?:     Caregiver Contact: Daughter Lalit Gutierres [phone]  Discharge Caregiver contacted prior to discharge? Daughter will need to be contacted about discharge plans      CM approached by Dr. Lyman Risk that pt is medically stable for d/c.  CM called and spoke with [address] [place] [address] [employer] at [phone]. Kinsey informed CM that she will need to get an auth; however, she stated it would probably be a quick turn around and she anticipates hearing back later today or early tomorrow. She will call CM when she hears.     Simon Barrow,   Care Management, [facility]  [phone]

## 2021-08-05 NOTE — PROGRESS NOTES
Hospitalist Progress Note    NAME: Luz Buckley   :  1950   MRN:  [id number]       Assessment / Plan:    Rapid code secondary to hypovolemic shock  Acute seizure   -Wean off the pressors on the fluid.  -Consulted intensivist-appreciate recommendations.  -Neurology consulted-appreciate recommendations. -EEG shows finding suggestive of encephalopathy.  -Continue Keppra 500 mg twice daily.  -Reconsulted neurology-appreciate recommendations.  -On Keppra 500 mg twice daily. - Ct head repeated yesterday- No acute intracranial finding. Chronic ischemic findings. -IV fluid discontinued.  -Blood pressure in good range. Acute abdominal pain  Acute appendicitis perforated with generalized peritonitis  Right lower quadrant abdomen abscess  Adenocarcinoma of the appendix  -Patient started having abdominal pain last evening, ordered a CT of the abdomen which shows acute appendicitis. -General surgery was consulted, patient was kept n.p.o. yesterday, got surgery today. -S/p laparoscopic appendicectomy with drainage of the right lower quadrant abscess and abdominal washout-.  -General surgery on board-appreciate input.  -Pathology from the surgery came back for shows adenocarcinoma of the appendix-G1 stage.  -Oncology consulted. -Patient started back on the regular diet. -CT abdomen/pelvis-findings suggestive of acute appendicitis. -WBC improved, afebrile, discontinue the antibiotic and antifungal.    Sepsis unknown source POA  Acute encephalopathy -improving  -Febrile and altered mental status on admission, some neck stiffness noted and patient was started on empiric coverage for meningitis  -Appreciate neurology consult  -ammonia mildly elevated 38 on admision, normalized  -Also had lumbar puncture, CSF was clear, cultures negative  -Antibiotics discontinued for the initial sepsis.   -EEG captured no seizures, triphasic waves suggest possible hepatic/renal encephalopathy  -T bili 1.6, normalized, LFTs normal   -Covid testing negative  -encephalopathy slightly worsened today because of the surgery diversion yesterday because of the sepsis from the appendicitis. Probably will need a day or 2 for improvement of the mental status. -passed bedside swallow, regular diet  -appreciate GI consult  -Ultrasound with elastography's reveals mild stages of liver fibrosis. Outpatient follow-up with GI as per the recommendations. CT a/p 7/16 (admission):  1. Circumferential rectal wall thickening is suspicious for malignancy. No bowel  obstruction. 2. No other acute abnormality in the abdomen or pelvis. Uterine fibroids are  noted. 3. Moderate cardiomegaly. MRI brain:  Encephalomalacia and white matter disease. No acute intracranial abnormality    ?seizures  -no seizure captured on EEG, triphasic waves point to hepatic/renal encephalopathy as per neuro  -on lose dose keppra, hold and observe    Diarrhea  -Resolved. ESRD  -appreciate nephro following  -Vascular surgery on board-PermCath placement today. -Liam catheter dialysis. -HD per nephro-vascular surgery will do declotting tomorrow and then can be discharged after the procedure. Incidental finding of rectal wall thickening on CT A/P  -appreciate CRS eval  -patient can f/u outpatient, no intervention needed during this admission     Hypokalemia  Resolved    DMII  HTN  CAD  FS monitoring, SS     Eye surgery in may : glaucoma    Disposition-patient medically stable for discharge.   Case management consulted for the SNF placement.     Code Status: Full  Appreciate palliative team following    Surrogate Decision Maker: Daughter  DVT Prophylaxis: SCD  GI Prophylaxis: not indicated  Baseline: Independent     25.0 - 29.9 Overweight / Body mass index is 25.08 kg/m².     Estimated discharge date: August 4  Barriers: Currently very sick  Code status: Full  Prophylaxis: Hep SQ  Recommended Disposition: TBD     Subjective:     Chief Complaint / Reason for Physician Visit  Seen today, sitting around the side of the bed, mental status at baseline. .  Discussed with RN events overnight. Objective:     VITALS:   Last 24hrs VS reviewed since prior progress note. Most recent are:  Patient Vitals for the past 24 hrs:   Temp Pulse Resp BP SpO2   08/05/21 1137 98.6 °F (37 °C) 63 16 (!) 181/77 98 %   08/05/21 0757 -- -- -- (!) 157/67 --   08/05/21 0754 98.8 °F (37.1 °C) 63 16 (!) 170/74 100 %   08/05/21 0300 98.6 °F (37 °C) 61 16 (!) 145/65 100 %   08/04/21 2238 98.2 °F (36.8 °C) 65 16 (!) 135/48 97 %   08/04/21 2030 98.3 °F (36.8 °C) (!) 55 16 (!) 133/48 --   08/04/21 2015 -- (!) 55 -- (!) 132/57 --   08/04/21 2000 -- (!) 54 -- (!) 135/45 --   08/04/21 1945 -- (!) 52 -- (!) 142/46 --   08/04/21 1930 97.9 °F (36.6 °C) 69 16 (!) 188/74 98 %   08/04/21 1915 -- 66 -- (!) 195/58 --   08/04/21 1900 -- 65 -- (!) 176/76 --   08/04/21 1845 -- 65 -- (!) 179/56 --   08/04/21 1830 -- 63 -- (!) 181/64 --   08/04/21 1815 -- 63 -- (!) 188/65 --   08/04/21 1800 98.8 °F (37.1 °C) 63 18 (!) 169/69 --   08/04/21 1640 -- 64 18 (!) 168/74 99 %   08/04/21 1635 -- 63 19 (!) 170/70 100 %   08/04/21 1630 -- 63 18 (!) 169/75 100 %   08/04/21 1625 -- 68 19 (!) 168/95 100 %   08/04/21 1620 -- 69 18 (!) 182/68 100 %   08/04/21 1615 -- 62 16 (!) 187/67 100 %   08/04/21 1609 -- 67 17 (!) 184/74 100 %   08/04/21 1444 98.3 °F (36.8 °C) 62 16 (!) 188/67 99 %       Intake/Output Summary (Last 24 hours) at 8/5/2021 1440  Last data filed at 8/4/2021 2030  Gross per 24 hour   Intake --   Output 2000 ml   Net -2000 ml        I had a face to face encounter and independently examined this patient on 8/5/2021, as outlined below:  PHYSICAL EXAM:  General: Adult  AA female, awake and alert. EENT:  EOMI. Anicteric sclerae. MMM  Resp:  CTA bilaterally, no wheezing or rales.   No accessory muscle use  CV:  Regular  rhythm,  No edema  GI:  Soft, nontender, +Bowel sounds  Neurologic:  Awake and alert, oriented x2 at baseline  Psych:   deferred  Skin:  No rashes. No jaundice    Reviewed most current lab test results and cultures  YES  Reviewed most current radiology test results   YES  Review and summation of old records today    NO  Reviewed patient's current orders and MAR    YES  PMH/SH reviewed - no change compared to H&P  ________________________________________________________________________  Care Plan discussed with:    Comments   Patient x    Family      RN x    Care Manager x    Consultant                        Multidiciplinary team rounds were held today with , nursing, pharmacist and clinical coordinator. Patient's plan of care was discussed; medications were reviewed and discharge planning was addressed. ________________________________________________________________________  Total NON critical care TIME:  35   Minutes    Total CRITICAL CARE TIME Spent:      Comments   >50% of visit spent in counseling and coordination of care     ________________________________________________________________________  Sharifa Roman MD     Procedures: see electronic medical records for all procedures/Xrays and details which were not copied into this note but were reviewed prior to creation of Plan. LABS:  I reviewed today's most current labs and imaging studies. Pertinent labs include:  No results for input(s): WBC, HGB, HCT, PLT, HGBEXT, HCTEXT, PLTEXT, HGBEXT, HCTEXT, PLTEXT in the last 72 hours.   Recent Labs     08/04/21  0242   *   K 3.5   CL 98   CO2 27   GLU 84   BUN 18   CREA 4.76*   CA 8.3*       Signed: Sharifa Roman MD

## 2021-08-05 NOTE — PROGRESS NOTES
Nephrology Progress Note  Piter Perez     www. [facility]  Phone - [phone]   Patient: Lex Glez    YOB: 1950        Date- 8/5/2021   Admit Date: 7/16/2021  CC: Follow up for  esrd       IMPRESSION & PLAN:    ESRD- [facility]- davita laburnum  · Mild hyperkalemia  · AMS  · SEPSIS  · ANEMIA  · Acute appendicitis perforated with generalized peritonitis and right lower quadrant abdomen abscess  · Adenocarcinoma of appendix  · CA/P/PTH/VITD  · DM  ·  HTN  · Seizures    PLAN-   Plan for hemodialysis tomorrow   S/P PC placement 8/4   Outpatient declot per vascular surgrey   Epogen for anemia   Continue HD MWF   No further plans for any more surgical procedures for now   Subjective: Interval History:   -Seen and examined  -No issues overnight  -S/P PC placeemnt    Objective:   Vitals:    08/05/21 0300 08/05/21 0754 08/05/21 0757 08/05/21 1137   BP: (!) 145/65 (!) 170/74 (!) 157/67 (!) 181/77   Pulse: 61 63  63   Resp: 16 16  16   Temp: 98.6 °F (37 °C) 98.8 °F (37.1 °C)  98.6 °F (37 °C)   TempSrc:       SpO2: 100% 100%  98%   Weight:       Height:          08/04 0701 - 08/05 0700  In: -   Out: 2000   Last 3 Recorded Weights in this Encounter    07/29/21 0530 07/30/21 1935 07/31/21 1936   Weight: 58.6 kg (129 lb 3 oz) 58.5 kg (129 lb) 58.5 kg (129 lb)      Physical exam:   GEN:  confused  NECK:  Supple, no thyromegaly  RESP: clear b/l, no  wheezing,   CVS: RRR,S1,S2   ABDO:  Soft, NT  NEURO: non focal  EXT:no Edema no thrill in AVG           Chart reviewed. Pertinent Notes reviewed. Data Review :  Recent Labs     08/04/21  0242   *   K 3.5   CL 98   CO2 27   BUN 18   CREA 4.76*   GLU 84   CA 8.3*     No results for input(s): WBC, HGB, HCT, PLT, HGBEXT, HCTEXT, PLTEXT, HGBEXT, HCTEXT, PLTEXT in the last 72 hours. No results for input(s): FE, TIBC, PSAT, FERR in the last 72 hours.    Medication list  reviewed  Current Facility-Administered Medications Medication Dose Route Frequency    amLODIPine (NORVASC) tablet 5 mg  5 mg Oral DAILY    heparin (porcine) injection 5,000 Units  5,000 Units SubCUTAneous Q12H    insulin lispro (HUMALOG) injection   SubCUTAneous Q6H    pantoprazole (PROTONIX) tablet 40 mg  40 mg Oral ACB    levETIRAcetam (KEPPRA) 500 mg in 0.9% sodium chloride 100 mL IVPB  500 mg IntraVENous Q12H    LORazepam (ATIVAN) injection 2 mg  2 mg IntraVENous Q4H PRN    HYDROcodone-acetaminophen (NORCO) 5-325 mg per tablet 1 Tablet  1 Tablet Oral Q4H PRN    HYDROcodone-acetaminophen (NORCO)  mg tablet 1 Tablet  1 Tablet Oral Q4H PRN    HYDROmorphone (DILAUDID) injection 0.5 mg  0.5 mg IntraVENous Q2H PRN    simethicone (MYLICON) tablet 80 mg  80 mg Oral QID PRN    zinc oxide-cod liver oil (DESITIN) 40 % paste   Topical TID    levothyroxine (SYNTHROID) tablet 50 mcg  50 mcg Oral 6am    heparin (porcine) pf 300 Units  300 Units InterCATHeter PRN    epoetin bia-epbx (RETACRIT) injection 4,000 Units  4,000 Units SubCUTAneous Q MON, WED & FRI    hydrALAZINE (APRESOLINE) 20 mg/mL injection 20 mg  20 mg IntraVENous Q6H PRN    labetaloL (NORMODYNE;TRANDATE) injection 20 mg  20 mg IntraVENous Q4H PRN    glucose chewable tablet 16 g  4 Tablet Oral PRN    dextrose (D50W) injection syrg 12.5-25 g  12.5-25 g IntraVENous PRN    glucagon (GLUCAGEN) injection 1 mg  1 mg IntraMUSCular PRN    sodium chloride (NS) flush 5-40 mL  5-40 mL IntraVENous Q8H    sodium chloride (NS) flush 5-40 mL  5-40 mL IntraVENous PRN    acetaminophen (TYLENOL) tablet 650 mg  650 mg Oral Q6H PRN    ondansetron (ZOFRAN ODT) tablet 4 mg  4 mg Oral Q8H PRN    Or    ondansetron (ZOFRAN) injection 4 mg  4 mg IntraVENous Q6H PRN    acetaminophen (TYLENOL) suppository 650 mg  650 mg Rectal Q4H PRN    brimonidine (ALPHAGAN) 0.2 % ophthalmic solution 1 Drop  1 Drop Both Eyes BID    timolol (TIMOPTIC) 0.25% ophthalmic solution  1 Drop Both Eyes DAILY    prednisoLONE acetate (PRED FORTE) 1 % ophthalmic suspension 1 Drop  1 Drop Right Eye TID    latanoprost (XALATAN) 0.005 % ophthalmic solution 1 Drop  1 Drop Both Eyes QHS          Alejandra Freeman, [address] [facility]  Formerly [facility], [address] [place] [place], [address]  Phone - [phone]               Fax - [phone]

## 2021-08-05 NOTE — PROGRESS NOTES
End of Shift Note    Bedside shift change report given to Simon Marie RN (oncoming nurse) by Marcine Duverney, RN (offgoing nurse). Report included the following information     Shift worked:  nights   Shift summary and any significant changes:     fistulas on both arms  Bruits/thrills on L, absent on the R     Concerns for physician to address:     Zone phone for oncoming shift:   [phone]     Patient Information  Dirk Greenfield  66 y.o.  7/16/2021  9:35 PM by Luisana Au MD. Dirk Greenfield was admitted from home with daughter    Problem List  Patient Active Problem List    Diagnosis Date Noted    Acute appendicitis with localized peritonitis 07/29/2021    End stage renal disease ([facility] [address] 07/29/2021    Acute appendicitis with generalized peritonitis, abscess, and gangrene 07/29/2021    Visual impairment 07/22/2021    Physical debility 07/22/2021    Counseling regarding advance care planning and goals of care 07/22/2021    Acute encephalopathy 07/16/2021     Past Medical History:   Diagnosis Date    Diabetes ([facility] [address]     Hypertension        Core Measures:  CVA: N  CHF: N  PNA: N    Activity:  Activity Level: Up with Assistance  Number times ambulated in hallways past shift:0  Number of times OOB to chair past shift: 0    Cardiac:   Cardiac Monitoring: Y     Cardiac Rhythm: Sinus Rhythm    Access:   Current line(s): permacath  Central Line? N  PICC LINE? Yes,triple lumen    Genitourinary:   Urinary status: Incontinent  Urinary Catheter? N    Respiratory:   O2 Device: None (Room air)  Chronic home O2 use?: N  Incentive spirometer at bedside: N       GI:  Last Bowel Movement Date: 08/04/21  Current diet:  ADULT DIET Regular  Passing flatus: Y  Tolerating current diet: Y       Pain Management:   Patient states pain is manageable on current regimen: NA    Skin:  Mick Score: (P) 15  Interventions: turning and repositiong    Patient Safety:  Fall Score:  Total Score: 4  Interventions: bed low, wheels locked, alarm on, call bell in reach  High Fall Risk: Yes  @Rollbelt  @dexterity to release roll belt  No ( must document dexterity  here by stating Yes or No here, otherwise this is a restraint and must follow restraint documentation policy.)    DVT prophylaxis:  DVT prophylaxis Med- Y  DVT prophylaxis SCD or WILMAN- N    Wounds: (If Applicable)  Wounds- incision site to left abdomin, dressed    Active Consults:  IP CONSULT TO NEUROLOGY  IP CONSULT TO NEPHROLOGY  IP CONSULT TO PALLIATIVE CARE - PROVIDER  IP CONSULT TO GASTROENTEROLOGY  IP CONSULT TO INTENSIVIST  IP CONSULT TO NEUROLOGY  IP CONSULT TO PALLIATIVE CARE - PROVIDER  IP CONSULT TO ONCOLOGY  IP CONSULT TO COLORECTAL SURGERY  IP CONSULT TO VASCULAR SURGERY    Length of Stay:  Expected LOS: 9d 14h  Actual LOS: 20  Discharge Plan:

## 2021-08-05 NOTE — ANESTHESIA PREPROCEDURE EVALUATION
Relevant Problems   No relevant active problems       Anesthetic History   No history of anesthetic complications            Review of Systems / Medical History  Patient summary reviewed and pertinent labs reviewed    Pulmonary  Within defined limits                 Neuro/Psych     seizures (recent hx in setting of encephalopathy)        Comments: Altered mental status.  A&O x2 Cardiovascular    Hypertension          CAD         GI/Hepatic/Renal         Renal disease: ESRD and dialysis      Comments: Adenocarcinoma of the appendix s/p appendectomy (07/29/21) Endo/Other    Diabetes         Other Findings            Physical Exam    Airway  Mallampati: II  TM Distance: > 6 cm  Neck ROM: normal range of motion   Mouth opening: Normal     Cardiovascular  Regular rate and rhythm,  S1 and S2 normal,  no murmur, click, rub, or gallop  Rhythm: regular  Rate: normal         Dental  No notable dental hx       Pulmonary  Breath sounds clear to auscultation               Abdominal  GI exam deferred       Other Findings            Anesthetic Plan    ASA: 3  Anesthesia type: MAC          Induction: Intravenous  Anesthetic plan and risks discussed with: Patient

## 2021-08-05 NOTE — PROGRESS NOTES
Problem: Self Care Deficits Care Plan (Adult)  Goal: *Acute Goals and Plan of Care (Insert Text)  Description:   FUNCTIONAL STATUS PRIOR TO ADMISSION: Patient was modified independent using a rollator for functional mobility. Patient was modified independent for basic and instrumental ADLs. Patient questionable historian secondary to mild confusion. HOME SUPPORT: The patient lived with family. Occupational Therapy Goals  7/30/2021 Re-eval s/p emergent lap appendectomy    1. Patient will perform grooming seated with supervision/set-up within 7 day(s). 2.  Patient will perform sponge bath with moderate assist  within 7 day(s). 3.  Patient will perform lower body dressing with moderate assist within 7 day(s). 4.  Patient will perform toilet transfers with minimal assist within 7 day(s). 5.  Patient will perform all aspects of toileting with moderate assist within 7 day(s). Initiated 7/27/2021 discontinue below goals 7/30/2021   1. Patient will perform grooming standing at sink with supervision/set-up within 7 day(s). 2.  Patient will perform sponge bath with supervision/set-up within 7 day(s). 3.  Patient will perform lower body dressing with supervision/set-up within 7 day(s). 4.  Patient will perform toilet transfers with supervision/set-up within 7 day(s). 5.  Patient will perform all aspects of toileting with supervision/set-up within 7 day(s). Outcome: Progressing Towards Goal   OCCUPATIONAL THERAPY TREATMENT  Patient: Anu River (66 y.o. female)  Date: 8/5/2021  Diagnosis: Acute encephalopathy [G93.40] <principal problem not specified>  Procedure(s) (LRB):  APPENDECTOMY LAPAROSCOPIC (N/A) 7 Days Post-Op  Precautions: Bed Alarm, Fall, Contact (MRSA)  Chart, occupational therapy assessment, plan of care, and goals were reviewed. ASSESSMENT  Patient continues with skilled OT services and is progressing towards goals.  Patient received sitting edge of bed with PCTs present and agreeable for therapy. Patient completed LB dressing while sitting edge of bed with contact guard assist and additional time. Patient stood with handheld assist and ambulated into bathroom. While in bathroom, patient completed grooming tasks while standing at sink with set-up/stand-by assist for safety. Patient required increased cueing for sequencing and scanning when completing tasks at the sink. Patient reported feeling tired and requested to return to chair. Patient was left sitting in chair with all needs met and chair alarmed. Patient continues to be limited by general weakness, impaired balance, mild confusion, baseline visual impairments, and decreased activity tolerance. Patient would continue to benefit from skilled OT services during acute hospital stay. Recommend patient discharge to SNF rehab prior to returning home. Current Level of Function Impacting Discharge (ADLs): set-up/stand-by assist to contact guard assist for self-care, contact guard for functional mobility using handheld assist     Other factors to consider for discharge: fall risk, impaired balance, mild confusion          PLAN :  Patient continues to benefit from skilled intervention to address the above impairments. Continue treatment per established plan of care to address goals. Recommendation for discharge: (in order for the patient to meet his/her long term goals)  Therapy up to 5 days/week in SNF setting    This discharge recommendation:  Has been made in collaboration with the attending provider and/or case management    IF patient discharges home will need the following DME: TBD pending progress       SUBJECTIVE:   Patient stated I would like to call my daughter when we're finished.     OBJECTIVE DATA SUMMARY:   Cognitive/Behavioral Status:  Neurologic State: Alert  Orientation Level: Oriented to person;Disoriented to place; Disoriented to situation;Disoriented to time  Cognition: Follows commands; Impaired decision making  Perception: Tactile;Verbal  Perseveration: No perseveration noted  Safety/Judgement: Decreased insight into deficits; Fall prevention;Decreased awareness of need for assistance    Functional Mobility and Transfers for ADLs:  Bed Mobility:  Rolling:  (received sitting EOB)  Scooting: Contact guard assistance    Transfers:  Sit to Stand: Contact guard assistance  Stand to Sit: Contact guard assistance   Functional Transfers  Bathroom Mobility: Contact guard assistance  Bed to Chair: Contact guard assistance    Balance:  Sitting: Intact  Standing: Impaired; With support (handheld assist)  Standing - Static: Good;Constant support  Standing - Dynamic : Fair;Constant support    ADL Intervention:    Grooming  Position Performed: Standing (at sink)  Washing Face: Set-up; Stand-by assistance  Washing Hands: Set-up; Stand-by assistance  Cues: Verbal cues provided;Visual cues provided    Lower Body Dressing Assistance  Socks: Contact guard assistance  Leg Crossed Method Used: Yes  Position Performed: Seated edge of bed  Cues: Don;Tactile cues provided;Verbal cues provided    Cognitive Retraining  Safety/Judgement: Decreased insight into deficits; Fall prevention;Decreased awareness of need for assistance    Pain:  Patient did not c/o pain during session. Activity Tolerance:   Fair and requires rest breaks    After treatment patient left in no apparent distress:   Sitting in chair, Call bell within reach, and Bed / chair alarm activated    COMMUNICATION/COLLABORATION:   The patients plan of care was discussed with: Physical therapist and Registered nurse.      MARGARITA Hernandez/L  Time Calculation: 24 mins

## 2021-08-05 NOTE — PROGRESS NOTES
.OK to proceed with scheduled surgical procedure tomorrow, assuming no acute changes in clinical status or lab derangements overnight and patient remains NPO overnight.

## 2021-08-05 NOTE — CONSULTS
[address] at [address]  W: [phone] F: [phone]      Reason for Visit:   Luz Buckley is a 66 y.o. female who is seen in consultation at the request of Dr. 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 Hematology / Oncology Treatment History:     Hematological/Oncological Diagnosis: Adenocarcinoma of the appendix    Date of Diagnosis: 8/4/21    Treatment course: Pending      History of Present Illness:     Very pleasant 65-year-old female with a complicated medical history most significant for diabetes mellitus type 2, hypertension, ESRD on HD, coronary artery disease, who presented to [facility] on July 16, 2021 with encephalopathy. Work-up showed that the patient had evidence of rectal wall thickening. The patient's inpatient hospital course has been complicated by encephalopathy, renal failure, hypotension from sepsis. The patient's clinical condition worsened over the last few days prompting the need for urgent appendectomy for acute appendicitis. On surgical resection, a well differentiated adenocarcinoma arising in association with villous adenoma this was noticed in the resected appendix. Tumor size and margins could not be determined based on available sample. It is notable that the patient did have some rectal wall thickening as well. No regional lymph nodes were sampled per operative note. On interview, the patient is confused and unable to answer questions meaningfully. Positive ROS findings include: Confusion, abdominal pain  Review of Systems: A complete review of systems was obtained, negative except as described above.     Past Medical History:   Diagnosis Date    Diabetes ([address]     Hypertension       Past Surgical History:   Procedure Laterality Date    IR INSERT NON TUNL CVC OVER 5 YRS  7/30/2021    IR INSERT NON TUNL CVC OVER 5 YRS  7/30/2021    IR INSERT LIZETH CVC W/O PORT OVER 5 YR  8/4/2021    MD CARDIAC SURG PROCEDURE UNLIST        Social History     Tobacco Use    Smoking status: Never Smoker    Smokeless tobacco: Never Used   Substance Use Topics    Alcohol use: Not Currently      History reviewed. No pertinent family history.   Current Facility-Administered Medications   Medication Dose Route Frequency    amLODIPine (NORVASC) tablet 5 mg  5 mg Oral DAILY    heparin (porcine) injection 5,000 Units  5,000 Units SubCUTAneous Q12H    insulin lispro (HUMALOG) injection   SubCUTAneous Q6H    pantoprazole (PROTONIX) tablet 40 mg  40 mg Oral ACB    levETIRAcetam (KEPPRA) 500 mg in 0.9% sodium chloride 100 mL IVPB  500 mg IntraVENous Q12H    LORazepam (ATIVAN) injection 2 mg  2 mg IntraVENous Q4H PRN    HYDROcodone-acetaminophen (NORCO) 5-325 mg per tablet 1 Tablet  1 Tablet Oral Q4H PRN    HYDROcodone-acetaminophen (NORCO)  mg tablet 1 Tablet  1 Tablet Oral Q4H PRN    HYDROmorphone (DILAUDID) injection 0.5 mg  0.5 mg IntraVENous Q2H PRN    simethicone (MYLICON) tablet 80 mg  80 mg Oral QID PRN    zinc oxide-cod liver oil (DESITIN) 40 % paste   Topical TID    levothyroxine (SYNTHROID) tablet 50 mcg  50 mcg Oral 6am    heparin (porcine) pf 300 Units  300 Units InterCATHeter PRN    epoetin bia-epbx (RETACRIT) injection 4,000 Units  4,000 Units SubCUTAneous Q MON, WED & FRI    hydrALAZINE (APRESOLINE) 20 mg/mL injection 20 mg  20 mg IntraVENous Q6H PRN    labetaloL (NORMODYNE;TRANDATE) injection 20 mg  20 mg IntraVENous Q4H PRN    glucose chewable tablet 16 g  4 Tablet Oral PRN    dextrose (D50W) injection syrg 12.5-25 g  12.5-25 g IntraVENous PRN    glucagon (GLUCAGEN) injection 1 mg  1 mg IntraMUSCular PRN    sodium chloride (NS) flush 5-40 mL  5-40 mL IntraVENous Q8H    sodium chloride (NS) flush 5-40 mL  5-40 mL IntraVENous PRN    acetaminophen (TYLENOL) tablet 650 mg  650 mg Oral Q6H PRN    ondansetron (ZOFRAN ODT) tablet 4 mg  4 mg Oral Q8H PRN    Or    ondansetron (ZOFRAN) injection 4 mg  4 mg IntraVENous Q6H PRN    acetaminophen (TYLENOL) suppository 650 mg  650 mg Rectal Q4H PRN    brimonidine (ALPHAGAN) 0.2 % ophthalmic solution 1 Drop  1 Drop Both Eyes BID    timolol (TIMOPTIC) 0.25% ophthalmic solution  1 Drop Both Eyes DAILY    prednisoLONE acetate (PRED FORTE) 1 % ophthalmic suspension 1 Drop  1 Drop Right Eye TID    latanoprost (XALATAN) 0.005 % ophthalmic solution 1 Drop  1 Drop Both Eyes QHS      No Known Allergies         Physical Exam:     Visit Vitals  BP (!) 124/58 (BP 1 Location: Left leg, BP Patient Position: Supine; At rest)   Pulse 64   Temp 98.7 °F (37.1 °C)   Resp 17   Ht 5' 2\" (1.575 m)   Wt 129 lb (58.5 kg)   SpO2 98%   BMI 23.59 kg/m²     General: alert, no distress, patient appears confused   Mental  status:  Pleasantly confused in bed   HENT: NCAT   Neck: no visualized mass   Resp: no respiratory distress   Neuro: no gross deficits   Skin: no discoloration or lesions of concern on visible areas             Results:     Lab Results   Component Value Date/Time    WBC 5.6 08/02/2021 06:57 AM    HGB 8.1 (L) 08/02/2021 06:57 AM    HCT 25.5 (L) 08/02/2021 06:57 AM    PLATELET 135 (L) 48/16/9065 06:57 AM    MCV 85.3 08/02/2021 06:57 AM    ABS. NEUTROPHILS 5.2 08/01/2021 02:27 AM     Lab Results   Component Value Date/Time    Sodium 134 (L) 08/04/2021 02:42 AM    Potassium 3.5 08/04/2021 02:42 AM    Chloride 98 08/04/2021 02:42 AM    CO2 27 08/04/2021 02:42 AM    Glucose 84 08/04/2021 02:42 AM    BUN 18 08/04/2021 02:42 AM    Creatinine 4.76 (H) 08/04/2021 02:42 AM    GFR est AA 11 (L) 08/04/2021 02:42 AM    GFR est non-AA 9 (L) 08/04/2021 02:42 AM    Calcium 8.3 (L) 08/04/2021 02:42 AM    Glucose (POC) 170 (H) 08/05/2021 05:04 PM     Lab Results   Component Value Date/Time    Bilirubin, total 0.8 07/30/2021 02:22 PM    ALT (SGPT) 10 (L) 07/30/2021 02:22 PM    Alk.  phosphatase 70 07/30/2021 02:22 PM    Protein, total 6.8 07/30/2021 02:22 PM    Albumin 2.1 (L) 07/30/2021 02:22 PM    Globulin 4.7 (H) 07/30/2021 02:22 PM     CT abdomen pelvis dated July 28, 2021   FINDINGS:   LOWER THORAX: Cardiomegaly and coronary artery disease. Dependent atelectasis on  the right. LIVER/GALLBLADDER: No mass. Gallbladder is within normal limits. CBD is not  dilated. SPLEEN/PANCREAS:  within normal limits. ADRENALS/KIDNEYS: Right lower pole calculus. No hydronephrosis. STOMACH: Small hiatal hernia. SMALL BOWEL/COLON: Ascending colonic wall thickening with pericolonic  inflammatory stranding and trace fluid. Few colonic diverticula. No dilatation or  wall thickening. APPENDIX: Appendiceal enlargement. Inflammatory change. PERITONEUM: No ascites or pneumoperitoneum. RETROPERITONEUM: Aortic atherosclerotic change. REPRODUCTIVE ORGANS: Calcified uterine fibroids. URINARY BLADDER: No mass or calculus. BONES: Multilevel disc degenerative change with multilevel vacuum disc  phenomena. Schmorl's nodes at L2 and T12. A  ADDITIONAL COMMENTS: N/A     IMPRESSION  Imaging findings consistent with acute appendicitis. There is no associated  abscess or drainable fluid collection. MRI brain dated July 18, 2021    FINDINGS:  The ventricles are normal in size and position. There is no acute infarct,  hemorrhage, extra-axial fluid collection, or mass effect. There is no cerebellar  tonsillar herniation. Diffuse encephalomalacia within the posterior left  temporal lobe and occipital lobe. Moderate high signal within the  periventricular white matter. Chronic right basal ganglia lacunar infarct.     The paranasal sinuses, mastoid air cells, and middle ears are clear. The orbital  contents are within normal limits. No significant osseous or scalp lesions are  identified.     IMPRESSION  Encephalomalacia and white matter disease.  No acute intracranial abnormality  Assessment and Recommendations:     # Adenocarcinoma of the appendix, [place], [place], [address]  -Given patient's poor functional status, would recommend allowing the patient to heal from acute appendicitis prior to further interventions to discern overall prognosis. At present, the patient denies severe pain from the abdomen. She appears to be healing slowly.  -No chest imaging is on record.   Patient would likely benefit from staging completion with CT thorax    -Plan for outpatient follow-up in 2 to 3 weeks from acute care discharge for review of treatment options after the patient is healed from surgery    Signed By: Dillan Silverio MD      Attending Medical Oncologist   [facility]

## 2021-08-06 ENCOUNTER — APPOINTMENT (OUTPATIENT)
Dept: GENERAL RADIOLOGY | Age: 71
DRG: 853 | End: 2021-08-06
Attending: SURGERY
Payer: MEDICARE

## 2021-08-06 ENCOUNTER — ANESTHESIA (OUTPATIENT)
Dept: SURGERY | Age: 71
DRG: 853 | End: 2021-08-06
Payer: MEDICARE

## 2021-08-06 VITALS
HEART RATE: 71 BPM | WEIGHT: 123.3 LBS | SYSTOLIC BLOOD PRESSURE: 162 MMHG | DIASTOLIC BLOOD PRESSURE: 73 MMHG | OXYGEN SATURATION: 100 % | BODY MASS INDEX: 22.69 KG/M2 | RESPIRATION RATE: 18 BRPM | TEMPERATURE: 98 F | HEIGHT: 62 IN

## 2021-08-06 LAB
ERYTHROCYTE [DISTWIDTH] IN BLOOD BY AUTOMATED COUNT: 19.5 % (ref 11.5–14.5)
GLUCOSE BLD STRIP.AUTO-MCNC: 85 MG/DL (ref 65–117)
GLUCOSE BLD STRIP.AUTO-MCNC: 90 MG/DL (ref 65–117)
GLUCOSE BLD STRIP.AUTO-MCNC: 92 MG/DL (ref 65–117)
HCT VFR BLD AUTO: 27 % (ref 35–47)
HGB BLD-MCNC: 8.5 G/DL (ref 11.5–16)
MCH RBC QN AUTO: 26.9 PG (ref 26–34)
MCHC RBC AUTO-ENTMCNC: 31.5 G/DL (ref 30–36.5)
MCV RBC AUTO: 85.4 FL (ref 80–99)
NRBC # BLD: 0 K/UL (ref 0–0.01)
NRBC BLD-RTO: 0 PER 100 WBC
PLATELET # BLD AUTO: 163 K/UL (ref 150–400)
PMV BLD AUTO: 10.2 FL (ref 8.9–12.9)
RBC # BLD AUTO: 3.16 M/UL (ref 3.8–5.2)
SERVICE CMNT-IMP: NORMAL
WBC # BLD AUTO: 5.4 K/UL (ref 3.6–11)

## 2021-08-06 PROCEDURE — C1757 CATH, THROMBECTOMY/EMBOLECT: HCPCS | Performed by: SURGERY

## 2021-08-06 PROCEDURE — 77030031139 HC SUT VCRL2 J&J -A: Performed by: SURGERY

## 2021-08-06 PROCEDURE — 2709999900 HC NON-CHARGEABLE SUPPLY: Performed by: SURGERY

## 2021-08-06 PROCEDURE — 74011250637 HC RX REV CODE- 250/637: Performed by: SURGERY

## 2021-08-06 PROCEDURE — 85027 COMPLETE CBC AUTOMATED: CPT

## 2021-08-06 PROCEDURE — 74011250636 HC RX REV CODE- 250/636: Performed by: STUDENT IN AN ORGANIZED HEALTH CARE EDUCATION/TRAINING PROGRAM

## 2021-08-06 PROCEDURE — 74011250636 HC RX REV CODE- 250/636: Performed by: SURGERY

## 2021-08-06 PROCEDURE — 82962 GLUCOSE BLOOD TEST: CPT

## 2021-08-06 PROCEDURE — 77030010507 HC ADH SKN DERMBND J&J -B: Performed by: SURGERY

## 2021-08-06 PROCEDURE — 77030004561 HC CATH ANGI DX COBRA ANGI -B: Performed by: SURGERY

## 2021-08-06 PROCEDURE — 74011000250 HC RX REV CODE- 250: Performed by: SURGERY

## 2021-08-06 PROCEDURE — C1769 GUIDE WIRE: HCPCS | Performed by: SURGERY

## 2021-08-06 PROCEDURE — 36415 COLL VENOUS BLD VENIPUNCTURE: CPT

## 2021-08-06 PROCEDURE — 77030002986 HC SUT PROL J&J -A: Performed by: SURGERY

## 2021-08-06 PROCEDURE — 03C73ZZ EXTIRPATION OF MATTER FROM RIGHT BRACHIAL ARTERY, PERCUTANEOUS APPROACH: ICD-10-PCS | Performed by: SURGERY

## 2021-08-06 PROCEDURE — C1894 INTRO/SHEATH, NON-LASER: HCPCS | Performed by: SURGERY

## 2021-08-06 PROCEDURE — 74011250636 HC RX REV CODE- 250/636: Performed by: NURSE ANESTHETIST, CERTIFIED REGISTERED

## 2021-08-06 PROCEDURE — 03WY07Z REVISION OF AUTOLOGOUS TISSUE SUBSTITUTE IN UPPER ARTERY, OPEN APPROACH: ICD-10-PCS | Performed by: SURGERY

## 2021-08-06 PROCEDURE — 76210000016 HC OR PH I REC 1 TO 1.5 HR: Performed by: SURGERY

## 2021-08-06 PROCEDURE — 94760 N-INVAS EAR/PLS OXIMETRY 1: CPT

## 2021-08-06 PROCEDURE — 77030008463 HC STPLR SKN PROX J&J -B: Performed by: SURGERY

## 2021-08-06 PROCEDURE — 77030002987 HC SUT PROL J&J -B: Performed by: SURGERY

## 2021-08-06 PROCEDURE — 76000 FLUOROSCOPY <1 HR PHYS/QHP: CPT

## 2021-08-06 PROCEDURE — 74011000258 HC RX REV CODE- 258: Performed by: SURGERY

## 2021-08-06 PROCEDURE — 77030002916 HC SUT ETHLN J&J -A: Performed by: SURGERY

## 2021-08-06 PROCEDURE — 76060000034 HC ANESTHESIA 1.5 TO 2 HR: Performed by: SURGERY

## 2021-08-06 PROCEDURE — 77030014008 HC SPNG HEMSTAT J&J -C: Performed by: SURGERY

## 2021-08-06 PROCEDURE — 74011000250 HC RX REV CODE- 250: Performed by: NURSE ANESTHETIST, CERTIFIED REGISTERED

## 2021-08-06 PROCEDURE — 74011000258 HC RX REV CODE- 258: Performed by: STUDENT IN AN ORGANIZED HEALTH CARE EDUCATION/TRAINING PROGRAM

## 2021-08-06 PROCEDURE — 76010000153 HC OR TIME 1.5 TO 2 HR: Performed by: SURGERY

## 2021-08-06 PROCEDURE — 77030038692 HC WND DEB SYS IRMX -B: Performed by: SURGERY

## 2021-08-06 PROCEDURE — 77030002996 HC SUT SLK J&J -A: Performed by: SURGERY

## 2021-08-06 PROCEDURE — 77030020263 HC SOL INJ SOD CL0.9% LFCR 1000ML: Performed by: SURGERY

## 2021-08-06 PROCEDURE — 74011000636 HC RX REV CODE- 636: Performed by: SURGERY

## 2021-08-06 PROCEDURE — 74011000258 HC RX REV CODE- 258: Performed by: NURSE ANESTHETIST, CERTIFIED REGISTERED

## 2021-08-06 PROCEDURE — 77030002933 HC SUT MCRYL J&J -A: Performed by: SURGERY

## 2021-08-06 PROCEDURE — 73060 X-RAY EXAM OF HUMERUS: CPT

## 2021-08-06 RX ORDER — SODIUM CHLORIDE 0.9 % (FLUSH) 0.9 %
5-40 SYRINGE (ML) INJECTION EVERY 8 HOURS
Status: DISCONTINUED | OUTPATIENT
Start: 2021-08-06 | End: 2021-08-07 | Stop reason: HOSPADM

## 2021-08-06 RX ORDER — OXYCODONE AND ACETAMINOPHEN 5; 325 MG/1; MG/1
1 TABLET ORAL AS NEEDED
Status: DISCONTINUED | OUTPATIENT
Start: 2021-08-06 | End: 2021-08-06 | Stop reason: HOSPADM

## 2021-08-06 RX ORDER — PROPOFOL 10 MG/ML
INJECTION, EMULSION INTRAVENOUS AS NEEDED
Status: DISCONTINUED | OUTPATIENT
Start: 2021-08-06 | End: 2021-08-06 | Stop reason: HOSPADM

## 2021-08-06 RX ORDER — LIDOCAINE HYDROCHLORIDE 10 MG/ML
0.1 INJECTION, SOLUTION EPIDURAL; INFILTRATION; INTRACAUDAL; PERINEURAL AS NEEDED
Status: DISCONTINUED | OUTPATIENT
Start: 2021-08-06 | End: 2021-08-07 | Stop reason: HOSPADM

## 2021-08-06 RX ORDER — SODIUM CHLORIDE 9 MG/ML
INJECTION, SOLUTION INTRAVENOUS
Status: DISCONTINUED | OUTPATIENT
Start: 2021-08-06 | End: 2021-08-06 | Stop reason: HOSPADM

## 2021-08-06 RX ORDER — PROPOFOL 10 MG/ML
INJECTION, EMULSION INTRAVENOUS
Status: DISCONTINUED | OUTPATIENT
Start: 2021-08-06 | End: 2021-08-06 | Stop reason: HOSPADM

## 2021-08-06 RX ORDER — MIDAZOLAM HYDROCHLORIDE 1 MG/ML
1 INJECTION, SOLUTION INTRAMUSCULAR; INTRAVENOUS AS NEEDED
Status: DISCONTINUED | OUTPATIENT
Start: 2021-08-06 | End: 2021-08-07 | Stop reason: HOSPADM

## 2021-08-06 RX ORDER — DEXMEDETOMIDINE HYDROCHLORIDE 100 UG/ML
INJECTION, SOLUTION INTRAVENOUS AS NEEDED
Status: DISCONTINUED | OUTPATIENT
Start: 2021-08-06 | End: 2021-08-06 | Stop reason: HOSPADM

## 2021-08-06 RX ORDER — SODIUM CHLORIDE 9 MG/ML
50 INJECTION, SOLUTION INTRAVENOUS CONTINUOUS
Status: DISCONTINUED | OUTPATIENT
Start: 2021-08-06 | End: 2021-08-07 | Stop reason: HOSPADM

## 2021-08-06 RX ORDER — FENTANYL CITRATE 50 UG/ML
INJECTION, SOLUTION INTRAMUSCULAR; INTRAVENOUS AS NEEDED
Status: DISCONTINUED | OUTPATIENT
Start: 2021-08-06 | End: 2021-08-06 | Stop reason: HOSPADM

## 2021-08-06 RX ORDER — SODIUM CHLORIDE, SODIUM LACTATE, POTASSIUM CHLORIDE, CALCIUM CHLORIDE 600; 310; 30; 20 MG/100ML; MG/100ML; MG/100ML; MG/100ML
75 INJECTION, SOLUTION INTRAVENOUS CONTINUOUS
Status: DISCONTINUED | OUTPATIENT
Start: 2021-08-06 | End: 2021-08-07 | Stop reason: HOSPADM

## 2021-08-06 RX ORDER — EPHEDRINE SULFATE/0.9% NACL/PF 50 MG/5 ML
SYRINGE (ML) INTRAVENOUS AS NEEDED
Status: DISCONTINUED | OUTPATIENT
Start: 2021-08-06 | End: 2021-08-06 | Stop reason: HOSPADM

## 2021-08-06 RX ORDER — HEPARIN SODIUM 1000 [USP'U]/ML
INJECTION, SOLUTION INTRAVENOUS; SUBCUTANEOUS AS NEEDED
Status: DISCONTINUED | OUTPATIENT
Start: 2021-08-06 | End: 2021-08-06 | Stop reason: HOSPADM

## 2021-08-06 RX ORDER — SODIUM CHLORIDE 0.9 % (FLUSH) 0.9 %
5-40 SYRINGE (ML) INJECTION AS NEEDED
Status: DISCONTINUED | OUTPATIENT
Start: 2021-08-06 | End: 2021-08-07 | Stop reason: HOSPADM

## 2021-08-06 RX ORDER — LIDOCAINE HYDROCHLORIDE 10 MG/ML
INJECTION INFILTRATION; PERINEURAL AS NEEDED
Status: DISCONTINUED | OUTPATIENT
Start: 2021-08-06 | End: 2021-08-06 | Stop reason: HOSPADM

## 2021-08-06 RX ORDER — FENTANYL CITRATE 50 UG/ML
50 INJECTION, SOLUTION INTRAMUSCULAR; INTRAVENOUS AS NEEDED
Status: DISCONTINUED | OUTPATIENT
Start: 2021-08-06 | End: 2021-08-07 | Stop reason: HOSPADM

## 2021-08-06 RX ADMIN — LEVETIRACETAM 500 MG: 100 INJECTION, SOLUTION INTRAVENOUS at 04:03

## 2021-08-06 RX ADMIN — FENTANYL CITRATE 25 MCG: 50 INJECTION, SOLUTION INTRAMUSCULAR; INTRAVENOUS at 08:08

## 2021-08-06 RX ADMIN — HEPARIN SODIUM 5000 UNITS: 1000 INJECTION, SOLUTION INTRAVENOUS; SUBCUTANEOUS at 08:00

## 2021-08-06 RX ADMIN — Medication 3 AMPULE: at 07:15

## 2021-08-06 RX ADMIN — PROPOFOL 20 MG: 10 INJECTION, EMULSION INTRAVENOUS at 08:00

## 2021-08-06 RX ADMIN — HEPARIN SODIUM 5000 UNITS: 5000 INJECTION INTRAVENOUS; SUBCUTANEOUS at 00:22

## 2021-08-06 RX ADMIN — PANTOPRAZOLE SODIUM 40 MG: 40 TABLET, DELAYED RELEASE ORAL at 11:00

## 2021-08-06 RX ADMIN — SODIUM CHLORIDE: 900 INJECTION, SOLUTION INTRAVENOUS at 07:00

## 2021-08-06 RX ADMIN — Medication 10 ML: at 11:00

## 2021-08-06 RX ADMIN — SODIUM CHLORIDE 2 G: 900 INJECTION, SOLUTION INTRAVENOUS at 07:53

## 2021-08-06 RX ADMIN — DEXMEDETOMIDINE HYDROCHLORIDE 8 MCG: 100 INJECTION, SOLUTION, CONCENTRATE INTRAVENOUS at 07:31

## 2021-08-06 RX ADMIN — PREDNISOLONE ACETATE 1 DROP: 10 SUSPENSION/ DROPS OPHTHALMIC at 11:00

## 2021-08-06 RX ADMIN — PROPOFOL 50 MCG/KG/MIN: 10 INJECTION, EMULSION INTRAVENOUS at 07:40

## 2021-08-06 RX ADMIN — TIMOLOL MALEATE 1 DROP: 2.5 SOLUTION/ DROPS OPHTHALMIC at 11:00

## 2021-08-06 RX ADMIN — Medication 10 MG: at 08:41

## 2021-08-06 RX ADMIN — LEVETIRACETAM 500 MG: 100 INJECTION, SOLUTION INTRAVENOUS at 17:37

## 2021-08-06 RX ADMIN — FENTANYL CITRATE 25 MCG: 50 INJECTION, SOLUTION INTRAMUSCULAR; INTRAVENOUS at 08:00

## 2021-08-06 RX ADMIN — Medication: at 11:01

## 2021-08-06 RX ADMIN — PROPOFOL 20 MG: 10 INJECTION, EMULSION INTRAVENOUS at 07:40

## 2021-08-06 RX ADMIN — LEVOTHYROXINE SODIUM 50 MCG: 0.05 TABLET ORAL at 11:00

## 2021-08-06 RX ADMIN — FENTANYL CITRATE 25 MCG: 50 INJECTION, SOLUTION INTRAMUSCULAR; INTRAVENOUS at 07:40

## 2021-08-06 RX ADMIN — BRIMONIDINE TARTRATE 1 DROP: 2 SOLUTION OPHTHALMIC at 11:00

## 2021-08-06 RX ADMIN — DEXMEDETOMIDINE HYDROCHLORIDE 4 MCG: 100 INJECTION, SOLUTION, CONCENTRATE INTRAVENOUS at 08:25

## 2021-08-06 RX ADMIN — FENTANYL CITRATE 25 MCG: 50 INJECTION, SOLUTION INTRAMUSCULAR; INTRAVENOUS at 07:49

## 2021-08-06 RX ADMIN — PROPOFOL 20 MG: 10 INJECTION, EMULSION INTRAVENOUS at 08:24

## 2021-08-06 NOTE — DISCHARGE SUMMARY
Hospitalist Discharge Summary     Patient ID:  Laurence Dixon  [id number]  70 y.o.  1950  7/16/2021    PCP on record: Mya Lerner NP    Admit date: 7/16/2021  Discharge date and time: 8/6/2021    DISCHARGE DIAGNOSIS:  Acute seizure episode  Hypovolemic shock  Acute abdominal pain  Acute appendicitis perforated with generalized peritonitis  Right lower quadrant abdomen abscess  Adenocarcinoma of the appendix  Sepsis unknown source POA  Acute encephalopathy  End-stage renal disease on dialysis  Hypokalemia  DMII  HTN  CAD      CONSULTATIONS:  IP CONSULT TO NEUROLOGY  IP CONSULT TO NEPHROLOGY  IP CONSULT TO PALLIATIVE CARE - PROVIDER  IP CONSULT TO GASTROENTEROLOGY  IP CONSULT TO INTENSIVIST  IP CONSULT TO NEUROLOGY  IP CONSULT TO PALLIATIVE CARE - PROVIDER  IP CONSULT TO ONCOLOGY  IP CONSULT TO COLORECTAL SURGERY  IP CONSULT TO VASCULAR SURGERY    Excerpted HPI from H&P of Lane Bedoya MD:  Laurence Dixon is a 66 y.o.  AA female who presents with CC listed above. Pt is encephalopathic and unable to provide any history at this time. EMR review indicates that the pt was feeling well yesterday, appeared to be more drowsy this morning and then was altered during and after dialysis. As per ED Attending Note, pt complained of a headache. She did complete her HD session. She was then taken to [facility] and then transferred to [facility].    ______________________________________________________________________  DISCHARGE SUMMARY/HOSPITAL COURSE:  for full details see H&P, daily progress notes, labs, consult notes. Rapid code secondary to hypovolemic shock  Acute seizure   -Wean off the pressors on the fluid.  -Consulted intensivist-appreciate recommendations.  -Neurology consulted-appreciate recommendations. -EEG shows finding suggestive of encephalopathy.  -Continue Keppra 500 mg twice daily.  -Reconsulted neurology-appreciate recommendations.  -On Keppra 500 mg twice daily.   - Ct head repeated yesterday- No acute intracranial finding. Chronic ischemic findings. -IV fluid discontinued.  -Blood pressure in good range.     Acute abdominal pain  Acute appendicitis perforated with generalized peritonitis  Right lower quadrant abdomen abscess  Adenocarcinoma of the appendix  -Patient started having abdominal pain last evening, ordered a CT of the abdomen which shows acute appendicitis. -General surgery was consulted, patient was kept n.p.o. yesterday, got surgery today. -S/p laparoscopic appendicectomy with drainage of the right lower quadrant abscess and abdominal washout-7/29.  -General surgery on board-appreciate input.  -Pathology from the surgery came back for shows adenocarcinoma of the appendix-G1 stage.  -Oncology consulted. -Patient started back on the regular diet. -CT abdomen/pelvis-findings suggestive of acute appendicitis. -WBC improved, afebrile, discontinue the antibiotic and antifungal.     Sepsis unknown source POA  Acute encephalopathy -improving  -Febrile and altered mental status on admission, some neck stiffness noted and patient was started on empiric coverage for meningitis  -Appreciate neurology consult  -ammonia mildly elevated 38 on admision, normalized  -Also had lumbar puncture, CSF was clear, cultures negative  -Antibiotics discontinued for the initial sepsis. -EEG captured no seizures, triphasic waves suggest possible hepatic/renal encephalopathy  -T bili 1.6, normalized, LFTs normal   -Covid testing negative  -encephalopathy slightly worsened today because of the surgery diversion yesterday because of the sepsis from the appendicitis. Probably will need a day or 2 for improvement of the mental status. -passed bedside swallow, regular diet  -appreciate GI consult  -Ultrasound with elastography's reveals mild stages of liver fibrosis. Outpatient follow-up with GI as per the recommendations.     CT a/p 7/16 (admission):  1.  Circumferential rectal wall thickening is suspicious for malignancy. No bowel  obstruction. 2. No other acute abnormality in the abdomen or pelvis. Uterine fibroids are  noted. 3. Moderate cardiomegaly.     MRI brain:  Encephalomalacia and white matter disease. No acute intracranial abnormality     ?seizures  -no seizure captured on EEG, triphasic waves point to hepatic/renal encephalopathy as per neuro  -on lose dose keppra, hold and observe     Diarrhea  -Resolved.     ESRD  -appreciate nephro following  -Vascular surgery on board-PermCath placement today. -Liam catheter dialysis. -HD per nephro-vascular surgery will do declotting tomorrow and then can be discharged after the procedure.     Incidental finding of rectal wall thickening on CT A/P  -appreciate CRS eval  -patient can f/u outpatient, no intervention needed during this admission     Hypokalemia  Resolved    DMII  HTN  CAD  FS monitoring, SS        _______________________________________________________________________  Patient seen and examined by me on discharge day. Pertinent Findings:  Gen:    Not in distress  Chest: Clear lungs  CVS:   Regular rhythm. No edema  Abd:  Soft, not distended, not tender  Neuro:  Alert,   _______________________________________________________________________  DISCHARGE MEDICATIONS:   Current Discharge Medication List      START taking these medications    Details   levETIRAcetam (Keppra) 500 mg tablet Take 1 Tablet by mouth two (2) times a day for 30 days. Qty: 60 Tablet, Refills: 0  Start date: 8/5/2021, End date: 9/4/2021         CONTINUE these medications which have NOT CHANGED    Details   levothyroxine (SYNTHROID) 50 mcg tablet TAKE 1 TABLET BY MOUTH EVERY DAY BEFORE BREAKFAST  Qty: 30 Tablet, Refills: 1      famotidine (PEPCID) 20 mg tablet TAKE 1 TAB BY MOUTH TWO (2) TIMES DAILY AS NEEDED FOR HEARTBURN. Qty: 60 Tablet, Refills: 1      atorvastatin (LIPITOR) 40 mg tablet Take  by mouth daily.       amLODIPine (NORVASC) 5 mg tablet Take 5 mg by mouth daily. aspirin delayed-release 81 mg tablet Take  by mouth daily. brimonidine-timoloL (Combigan) 0.2-0.5 % drop ophthalmic solution 1 Drop every twelve (12) hours. Dialyvite 800 0.8 mg tab tablet TAKE 1 TABLET BY MOUTH EVERY DAY  Qty: 90 Tablet, Refills: 0  Start date: 8/3/2021    Associated Diagnoses: ESRD (end stage renal disease) ([facility]      Blood-Glucose Meter monitoring kit Use as directed. Dx: E11.65 check sugar twice daily  Qty: 1 Kit, Refills: 0    Comments: Dispense what insurance will cover please  Associated Diagnoses: Controlled type 2 diabetes mellitus with chronic kidney disease on chronic dialysis, with long-term current use of insulin ([facility])      lancets misc Check sugar twice daily. E11.65  Qty: 1 Each, Refills: 11    Associated Diagnoses: Controlled type 2 diabetes mellitus with chronic kidney disease on chronic dialysis, with long-term current use of insulin ([facility])      glucose blood VI test strips (ASCENSIA AUTODISC VI, ONE TOUCH ULTRA TEST VI) strip Check sugar twice daily  Qty: 100 Strip, Refills: 11    Comments: Please dispense what insurance will cover  Associated Diagnoses: Controlled type 2 diabetes mellitus with chronic kidney disease on chronic dialysis, with long-term current use of insulin ([facility])      alcohol swabs padm 1 Each by Apply Externally route four (4) times daily. For use with insulin and glucometer  Qty: 100 Pad, Refills: 11    Associated Diagnoses: Controlled type 2 diabetes mellitus with chronic kidney disease on chronic dialysis, with long-term current use of insulin ([facility])      insulin glargine (LANTUS,BASAGLAR) 100 unit/mL (3 mL) inpn 10 Units by SubCUTAneous route daily (with dinner). Qty: 5 Pen, Refills: 1      Insulin Needles, Disposable, 32 gauge x 5/32\" ndle 1 Each by Does Not Apply route daily.   Qty: 100 Pen Needle, Refills: 11         STOP taking these medications       metoprolol succinate (TOPROL-XL) 50 mg XL tablet Comments:   Reason for Stopping:         isosorbide mononitrate (ISMO, MONOKET) 20 mg tablet Comments:   Reason for Stopping:                 Patient Follow Up Instructions: Activity: Activity as tolerated  Diet: Renal Diet  Wound Care: None needed    If you have questions regarding the hospital related prescriptions or hospital related issues please call [phone] [facility] at . You can always direct your questions to your primary care doctor if you are unable to reach your hospital physician; your PCP works as an extension of your hospital doctor just like your hospital doctor is an extension of your PCP for your time at [address] [facility].     If you experience any of the following symptoms then please call your primary care physician or return to the emergency room if you cannot get hold of your doctor:  Fever, chills, nausea, vomiting, diarrhea, change in mentation, falling, bleeding, shortness of breath    Follow-up Information     Follow up With Specialties Details Why Contact Info Verner Mort., NP Nurse Practitioner   [address]  [phone]        ________________________________________________________________    Risk of deterioration: Moderate    Condition at Discharge:  Stable  __________________________________________________________________    Disposition  SNF/LTC    ____________________________________________________________________    Code Status: Full Code  ___________________________________________________________________      Total time in minutes spent coordinating this discharge (includes going over instructions, follow-up, prescriptions, and preparing report for sign off to her PCP) :  >30 minutes    Signed:  Kimberly Tim MD

## 2021-08-06 NOTE — DISCHARGE INSTRUCTIONS
HOSPITALIST DISCHARGE INSTRUCTIONS    NAME: Anny Wise   :  1950   MRN:  [id number]     Date/Time:  2021 11:43 AM    ADMIT DATE: 2021   DISCHARGE DATE: 2021     Attending Physician: Patricia Bassett MD    DISCHARGE DIAGNOSIS:  Acute seizure episode  Hypovolemic shock  Acute abdominal pain  Acute appendicitis perforated with generalized peritonitis  Right lower quadrant abdomen abscess  Adenocarcinoma of the appendix  Sepsis unknown source POA  Acute encephalopathy  End-stage renal disease on dialysis  Hypokalemia  DMII  HTN  CAD    MEDICATIONS:  See above    · It is important that you take the medication exactly as they are prescribed. · Keep your medication in the bottles provided by the pharmacist and keep a list of the medication names, dosages, and times to be taken in your wallet. · Do not take other medications without consulting your doctor. Pain Management: per above medications    What to do at Home    Recommended diet:  Renal Diet    Recommended activity: Activity as tolerated    If you have questions regarding the hospital related prescriptions or hospital related issues please call [facility] at . You can always direct your questions to your primary care doctor if you are unable to reach your hospital physician; your PCP works as an extension of your hospital doctor just like your hospital doctor is an extension of your PCP for your time at [facility]. If you experience any of the following symptoms then please call your primary care physician or return to the emergency room if you cannot get hold of your doctor:  Fever, chills, nausea, vomiting, diarrhea, change in mentation, falling, bleeding, shortness of breath    Additional Instructions:      Bring these papers with you to your follow up appointments.  The papers will help your doctors be sure to continue the care plan from the hospital.              Information obtained by :  I understand that if any problems occur once I am at home I am to contact my physician. I understand and acknowledge receipt of the instructions indicated above.                                                                                                                                            Physician's or R.N.'s Signature                                                                  Date/Time                                                                                                                                              Patient or Representative Signature                                                          Da

## 2021-08-06 NOTE — PERIOP NOTES
TRANSFER - IN REPORT:    Verbal report received Denise on Lorri Stokesis  being received fromOR for routine progression of care      Report consisted of patients Situation, Background, Assessment and   Recommendations(SBAR). Information from the following report(s) OR Summary, Procedure Summary, Intake/Output and MAR was reviewed with the receiving nurse. Opportunity for questions and clarification was provided. Assessment completed upon patients arrival to unit and care assumed.

## 2021-08-06 NOTE — PROGRESS NOTES
TRANSFER - IN REPORT:    Verbal report received from [facility] (name) on Verónica Freedman  being received from [phone]  (unit) for ordered procedure      Report consisted of patients Situation, Background, Assessment and   Recommendations(SBAR). Information from the following report(s) SBAR, MAR and Recent Results was reviewed with the receiving nurse. Opportunity for questions and clarification was provided. Assessment completed upon patients arrival to unit and care assumed.

## 2021-08-06 NOTE — PROGRESS NOTES
1400: Patient still has IJ and has order to be discharged today. Notified Dr. Sabina Dozier and received order to remove the IJ prior to discharge. 1515: Report given to Whit at [facility]. All questions were answered. 1540: Bedside and Verbal shift change report given to [address] [place] [address] [place] and Ofelia Tavares (oncoming nurse) by Enriqueta Gamez (offgoing nurse). Report included the following information SBAR.

## 2021-08-06 NOTE — PROGRESS NOTES
Nephrology Progress Note  Piter Perez     [facility]. [facility]  Phone - [phone]   Patient: Svetlana Ryder    YOB: 1950        Date- 8/6/2021   Admit Date: 7/16/2021  CC: Follow up for  esrd       IMPRESSION & PLAN:    ESRD- [facility]- davita laburnum  · AVG malfunction  · AMS resolved  · SEPSIS  · ANEMIA  · Acute appendicitis perforated with generalized peritonitis and right lower quadrant abdomen abscess  · Adenocarcinoma of appendix  · CA/P/PTH/VITD  · DM  ·  HTN  · Seizures    PLAN-   Plan for hemodialysis today, Will use PC for HD.  S/P PC placement 8/4   S/P pseudoaneurysm ligation and AVG excision.  Epogen for anemia   Continue HD MWF     Subjective: Interval History:   -Seen and examined  -S/P pseudoaneurysm ligation and AVG excision    Objective:   Vitals:    08/06/21 0930 08/06/21 0945 08/06/21 1000 08/06/21 1043   BP: (!) 147/54 (!) 144/46 (!) 138/45 (!) 122/42   Pulse: 62 62 62 64   Resp: 14 12 12 14   Temp: 98.5 °F (36.9 °C)   98.5 °F (36.9 °C)   TempSrc:       SpO2: 100% 100% 100% 100%   Weight:       Height:          No intake/output data recorded. Last 3 Recorded Weights in this Encounter    07/30/21 1935 07/31/21 1936 08/05/21 2323   Weight: 58.5 kg (129 lb) 58.5 kg (129 lb) 55.9 kg (123 lb 4.8 oz)      Physical exam:   GEN:  Mildly sedated  NECK:  Supple, no thyromegaly  RESP: clear b/l, no  wheezing,   CVS: RRR,S1,S2   ABDO:  Soft, NT  NEURO: non focal  EXT:AVG excision  HD access: PC placement      Chart reviewed. Pertinent Notes reviewed. Data Review :  Recent Labs     08/04/21  0242   *   K 3.5   CL 98   CO2 27   BUN 18   CREA 4.76*   GLU 84   CA 8.3*     Recent Labs     08/06/21  0032   WBC 5.4   HGB 8.5*   HCT 27.0*        No results for input(s): FE, TIBC, PSAT, FERR in the last 72 hours.    Medication list  reviewed  Current Facility-Administered Medications   Medication Dose Route Frequency    lactated Ringers infusion  75 mL/hr IntraVENous CONTINUOUS    0.9% sodium chloride infusion  50 mL/hr IntraVENous CONTINUOUS    sodium chloride (NS) flush 5-40 mL  5-40 mL IntraVENous Q8H    sodium chloride (NS) flush 5-40 mL  5-40 mL IntraVENous PRN    lidocaine (PF) (XYLOCAINE) 10 mg/mL (1 %) injection 0.1 mL  0.1 mL SubCUTAneous PRN    fentaNYL citrate (PF) injection 50 mcg  50 mcg IntraVENous PRN    midazolam (VERSED) injection 1 mg  1 mg IntraVENous PRN    midazolam (VERSED) injection 1 mg  1 mg IntraVENous PRN    amLODIPine (NORVASC) tablet 5 mg  5 mg Oral DAILY    heparin (porcine) injection 5,000 Units  5,000 Units SubCUTAneous Q12H    insulin lispro (HUMALOG) injection   SubCUTAneous Q6H    pantoprazole (PROTONIX) tablet 40 mg  40 mg Oral ACB    levETIRAcetam (KEPPRA) 500 mg in 0.9% sodium chloride 100 mL IVPB  500 mg IntraVENous Q12H    LORazepam (ATIVAN) injection 2 mg  2 mg IntraVENous Q4H PRN    HYDROcodone-acetaminophen (NORCO) 5-325 mg per tablet 1 Tablet  1 Tablet Oral Q4H PRN    HYDROcodone-acetaminophen (NORCO)  mg tablet 1 Tablet  1 Tablet Oral Q4H PRN    HYDROmorphone (DILAUDID) injection 0.5 mg  0.5 mg IntraVENous Q2H PRN    simethicone (MYLICON) tablet 80 mg  80 mg Oral QID PRN    zinc oxide-cod liver oil (DESITIN) 40 % paste   Topical TID    levothyroxine (SYNTHROID) tablet 50 mcg  50 mcg Oral 6am    heparin (porcine) pf 300 Units  300 Units InterCATHeter PRN    epoetin bia-epbx (RETACRIT) injection 4,000 Units  4,000 Units SubCUTAneous Q MON, WED & FRI    hydrALAZINE (APRESOLINE) 20 mg/mL injection 20 mg  20 mg IntraVENous Q6H PRN    labetaloL (NORMODYNE;TRANDATE) injection 20 mg  20 mg IntraVENous Q4H PRN    glucose chewable tablet 16 g  4 Tablet Oral PRN    dextrose (D50W) injection syrg 12.5-25 g  12.5-25 g IntraVENous PRN    glucagon (GLUCAGEN) injection 1 mg  1 mg IntraMUSCular PRN    sodium chloride (NS) flush 5-40 mL  5-40 mL IntraVENous Q8H    sodium chloride (NS) flush 5-40 mL  5-40 mL IntraVENous PRN    acetaminophen (TYLENOL) tablet 650 mg  650 mg Oral Q6H PRN    ondansetron (ZOFRAN ODT) tablet 4 mg  4 mg Oral Q8H PRN    Or    ondansetron (ZOFRAN) injection 4 mg  4 mg IntraVENous Q6H PRN    acetaminophen (TYLENOL) suppository 650 mg  650 mg Rectal Q4H PRN    brimonidine (ALPHAGAN) 0.2 % ophthalmic solution 1 Drop  1 Drop Both Eyes BID    timolol (TIMOPTIC) 0.25% ophthalmic solution  1 Drop Both Eyes DAILY    prednisoLONE acetate (PRED FORTE) 1 % ophthalmic suspension 1 Drop  1 Drop Right Eye TID    latanoprost (XALATAN) 0.005 % ophthalmic solution 1 Drop  1 Drop Both Eyes QHS          Delos Luanne, [address] [facility]  [facility] / [facility]  Joshua Rico [address] [place] [address], [address]  Phone - [phone]               Fax - [phone] sudden onset

## 2021-08-06 NOTE — PROCEDURES
[facility] Dialysis Team [facility] Acutes  [phone]    Vitals   Pre   Post   Assessment   Pre   Post     Temp  Temp: 98.4 °F (36.9 °C) (08/06/21 1230)  98.0 LOC  AxOx3 AxOx3   HR   Pulse (Heart Rate): (!) 57 (08/06/21 1230) 71 Lungs   CTA CTA    B/P   BP: (!) 155/69 (08/06/21 1230) 162/73 Cardiac   RRR RRR    Resp   Resp Rate: 18 (08/06/21 1230) 18 Skin   intact intact    Pain level  Pain Intensity 1: 0 (08/06/21 1043) 0 Edema  generalized   generalized   Orders:    Duration:   Start:   1230 End:   1600 Total:   3.5 hrs   Dialyzer:   Dialyzer/Set Up Inspection: Revaclear (08/06/21 1230)   K Bath:   Dialysate K (mEq/L): 3 (08/06/21 1230)   Ca Bath:   Dialysate CA (mEq/L): 2.5 (08/06/21 1230)   Na/Bicarb:   Dialysate NA (mEq/L): 140 (08/06/21 1230)   Target Fluid Removal:   Goal/Amount of Fluid to Remove (mL): 2000 mL (08/06/21 1230)   Access     Type & Location:   Left tunneled CVC, dressing not dated but overlapped with IJ - primary RN asked to redress HD permcath after pulling IJ prior to discharge. Each catheter limb disinfected for 60 seconds per limb with alcohol swabs.  Caps removed, dialysis CVC hub scrubbed with Prevantics for 5 seconds, followed by a 5 second dry time per Hospital P&P.   +aspirated/+flushed   Labs     Obtained/Reviewed   Critical Results Called   Date when labs were drawn-  Hgb-    HGB   Date Value Ref Range Status   08/06/2021 8.5 (L) 11.5 - 16.0 g/dL Final     K-    Potassium   Date Value Ref Range Status   08/04/2021 3.5 3.5 - 5.1 mmol/L Final     Ca-   Calcium   Date Value Ref Range Status   08/04/2021 8.3 (L) 8.5 - 10.1 MG/DL Final     Bun-   BUN   Date Value Ref Range Status   08/04/2021 18 6 - 20 MG/DL Final     Creat-   Creatinine   Date Value Ref Range Status   08/04/2021 4.76 (H) 0.55 - 1.02 MG/DL Final        Medications/ Blood Products Given     Name   Dose   Route and Time     Heparin 1:1000  A 2.1 mL / V 2.1 mL             Blood Volume Processed (BVP):   77.1 L Net Fluid   Removed:  2000 mL   Comments   Time Out Done: [address]  Primary Nurse Rpt Pre: Reinaldo Devries RN  Primary Nurse Rpt Post: Nu Roman (name), RN  Pt Education: infection control / procedural  Care Plan: continue current HD plan of care  Tx Summary:  1625 HD treatment initiated per physicians order. 1600 HD treatment completed per physician order. All possible blood returned to patient. Each catheter limb disinfected for 60 seconds per limb with alcohol swabs. Dialysis CVC hub scrubbed with Prevantics for 5 seconds, followed by a 5 second dry time per Hospital P&P.   +flushed/+heplock/+capped. Admiting Diagnosis: Acute Encephalopathy  Pt's previous clinic- [facility]  Consent signed - Informed Consent Verified: Yes (08/06/21 1230)  [facility] Consent - verified  Hepatitis Status- 6/28/2021 neg/imm (portal)  Machine #- Machine Number: [id number] (08/06/21 1230)  Telemetry status- remote telemetry  Pre-dialysis wt. - Pre-Dialysis Weight: 58.5 kg (128 lb 15.5 oz) (07/30/21 0613)

## 2021-08-06 NOTE — PROGRESS NOTES
Physical Therapy    Chart reviewed and attempted to see, but patient is off the floor for procedure. Plan to defer and continue to follow.     Marie Wright

## 2021-08-06 NOTE — PROGRESS NOTES
Occupational Therapy  Chart reviewed; note patient on schedule for discharge this afternoon to SNF and has to have IJ tube removed prior to discharge. Will defer OT at this time.  Lukasz Finn OTR/L

## 2021-08-06 NOTE — BRIEF OP NOTE
Brief Postoperative Note    Patient: Dell Fothergill  YOB: 1950  MRN: [id number]    Date of Procedure: 8/6/2021     Pre-Op Diagnosis: ESRD    Post-Op Diagnosis: Same as preoperative diagnosis. Procedure(s):  RIGHT UPPER EXTREMITY GRAFT DECLOT    Surgeon(s):  Raj Esteves MD    Surgical Assistant: Surg Asst-1: Praneeth Batista RN    Anesthesia: MAC     Estimated Blood Loss (mL): less than 005     Complications: None    Specimens: * No specimens in log *     Implants: * No implants in log *    Drains:   Lattie Cheadle #1 07/29/21 Right Abdomen (Removed)   Site Assessment Clean, dry, & intact 08/02/21 1040   Dressing Status Clean, dry, & intact 08/02/21 1040   Drainage Description Serosanguinous 08/02/21 1040   Braden Drain Airleak No 08/02/21 1040   Status Charged;Draining 08/02/21 1040   Y Connector Used No 08/02/21 0257   Output (ml) 75 ml 08/02/21 1227       Findings: second incision in upper arm required to declot due to large pseudoaneurysm. This area of graft was unincorporated concerning for possible indolent infection. Graft ligated and excised to incorporated tissue. Will schedule for new graft placement as outpatient.  [id number]    Electronically Signed by Rishi Gao MD on 8/6/2021 at 9:03 AM

## 2021-08-06 NOTE — ANESTHESIA POSTPROCEDURE EVALUATION
Procedure(s):  RIGHT UPPER EXTREMITY GRAFT DECLOT. MAC    Anesthesia Post Evaluation      Multimodal analgesia: multimodal analgesia used between 6 hours prior to anesthesia start to PACU discharge  Patient location during evaluation: PACU  Patient participation: complete - patient participated  Level of consciousness: awake and alert  Pain management: adequate  Airway patency: patent  Anesthetic complications: no  Cardiovascular status: acceptable, hemodynamically stable and blood pressure returned to baseline  Respiratory status: acceptable and room air  Hydration status: euvolemic  Post anesthesia nausea and vomiting:  none  Final Post Anesthesia Temperature Assessment:  Normothermia (36.0-37.5 degrees C)      INITIAL Post-op Vital signs:   Vitals Value Taken Time   /45 08/06/21 1000   Temp 36.9 °C (98.5 °F) 08/06/21 0930   Pulse 62 08/06/21 1015   Resp 16 08/06/21 1015   SpO2 100 % 08/06/21 1015   Vitals shown include unvalidated device data.

## 2021-08-06 NOTE — PROGRESS NOTES
End of Shift Note    Bedside shift change report given to Godwin Chavarria RN (oncoming nurse) by Izabella Cheek RN (offgoing nurse). Report included the following information     Shift worked:  7p-7a   Shift summary and any significant changes:    fistulas on both arms, both non-functioning  NPO after midnight for thrombectomy in the fistula of the right arm  Pt have Triple lumen central line in left side and a perma-cath just below it  May have dialysis Friday post-procedure       Concerns for physician to address: Need to Change poc bs to  AC/HS. Will pt get dialysis post-procedure on Friday? Family would prefer if she did. Zone phone for oncoming shift:   [phone]     Patient Information  Corrinne Lins  66 y.o.  7/16/2021  9:35 PM by Yusef Marshall MD. Corrinne Lins was admitted from home with daughter    Problem List  Patient Active Problem List    Diagnosis Date Noted    Acute appendicitis with localized peritonitis 07/29/2021    End stage renal disease ([facility] [address] 07/29/2021    Acute appendicitis with generalized peritonitis, abscess, and gangrene 07/29/2021    Visual impairment 07/22/2021    Physical debility 07/22/2021    Counseling regarding advance care planning and goals of care 07/22/2021    Acute encephalopathy 07/16/2021     Past Medical History:   Diagnosis Date    Diabetes ([facility] [address]     Hypertension        Core Measures:  CVA: N  CHF: N  PNA: N    Activity:  Activity Level: Up with Assistance  Number times ambulated in hallways past shift:0  Number of times OOB to chair past shift: 0    Cardiac:   Cardiac Monitoring: Y     Cardiac Rhythm: Sinus Rhythm    Access:   Current line(s): permacath  Central Line? Yes, triple lumen  PICC LINE? Genitourinary:   Urinary status: Incontinent, but can get to the bathroom x1 assist  Urinary Catheter?  N    Respiratory:   O2 Device: None (Room air)  Chronic home O2 use?: N  Incentive spirometer at bedside: N       GI:  Last Bowel Movement Date: 08/05/21  Current diet:  ADULT ORAL NUTRITION SUPPLEMENT Breakfast, Dinner; Renal Supplement  DIET NPO  Passing flatus: Y  Tolerating current diet: Y       Pain Management:   Patient states pain is manageable on current regimen: NA    Skin:  Mick Score: 18  Interventions: turning and repositiong    Patient Safety:  Fall Score:  Total Score: 4  Interventions: bed low, wheels locked, alarm on, call bell in reach  High Fall Risk: Yes  @Rollbelt  @dexterity to release roll belt  No ( must document dexterity  here by stating Yes or No here, otherwise this is a restraint and must follow restraint documentation policy.)    DVT prophylaxis:  DVT prophylaxis Med- Y  DVT prophylaxis SCD or WILMAN- N    Wounds: (If Applicable)  Wounds- incision site to left abdomin, dressed    Active Consults:  IP CONSULT TO NEUROLOGY  IP CONSULT TO NEPHROLOGY  IP CONSULT TO PALLIATIVE CARE - PROVIDER  IP CONSULT TO GASTROENTEROLOGY  IP CONSULT TO INTENSIVIST  IP CONSULT TO NEUROLOGY  IP CONSULT TO PALLIATIVE CARE - PROVIDER  IP CONSULT TO ONCOLOGY  IP CONSULT TO COLORECTAL SURGERY  IP CONSULT TO VASCULAR SURGERY    Length of Stay:  Expected LOS: 9d 14h  Actual LOS: 21  Discharge Plan:

## 2021-08-06 NOTE — PERIOP NOTES
Irrdrewpt Wound Debridement and Cleansing System  Ref: Allie Neigh: [id number] LOT: [id number] Expiration Date: 2022/12/31

## 2021-08-06 NOTE — PROGRESS NOTES
Problem: Impaired Skin Integrity/Pressure Injury Treatment  Goal: *Improvement of Existing Pressure Injury  Outcome: Resolved/Met  Goal: *Prevention of pressure injury  Description: Document Mick Scale and appropriate interventions in the flowsheet. Outcome: Resolved/Met  Note: Pressure Injury Interventions:  Sensory Interventions: Assess changes in LOC, Float heels, Keep linens dry and wrinkle-free, Maintain/enhance activity level    Moisture Interventions: Absorbent underpads, Maintain skin hydration (lotion/cream), Minimize layers    Activity Interventions: Increase time out of bed, Pressure redistribution bed/mattress(bed type), PT/OT evaluation    Mobility Interventions: HOB 30 degrees or less, Pressure redistribution bed/mattress (bed type), PT/OT evaluation, Turn and reposition approx. every two hours(pillow and wedges)    Nutrition Interventions: Document food/fluid/supplement intake    Friction and Shear Interventions: Lift sheet, Minimize layers                Problem: Falls - Risk of  Goal: *Absence of Falls  Description: Document Stalin Fall Risk and appropriate interventions in the flowsheet.   Outcome: Resolved/Met  Note: Fall Risk Interventions:  Mobility Interventions: Patient to call before getting OOB, PT Consult for mobility concerns, PT Consult for assist device competence, OT consult for ADLs, Communicate number of staff needed for ambulation/transfer, Bed/chair exit alarm    Mentation Interventions: Adequate sleep, hydration, pain control, Bed/chair exit alarm, Door open when patient unattended, Evaluate medications/consider consulting pharmacy, More frequent rounding, Reorient patient, Toileting rounds, Update white board    Medication Interventions: Evaluate medications/consider consulting pharmacy, Patient to call before getting OOB, Teach patient to arise slowly, Bed/chair exit alarm    Elimination Interventions: Bed/chair exit alarm, Call light in reach, Patient to call for help with toileting needs, Stay With Me (per policy), Toileting schedule/hourly rounds    History of Falls Interventions: Door open when patient unattended, Evaluate medications/consider consulting pharmacy, Bed/chair exit alarm, Consult care management for discharge planning         Problem: Risk for Spread of Infection  Goal: Prevent transmission of infectious organism to others  Description: Prevent the transmission of infectious organisms to other patients, staff members, and visitors. Outcome: Resolved/Met     Problem: Pressure Injury - Risk of  Goal: *Prevention of pressure injury  Description: Document Mick Scale and appropriate interventions in the flowsheet. Outcome: Resolved/Met  Note: Pressure Injury Interventions:  Sensory Interventions: Assess changes in LOC, Float heels, Keep linens dry and wrinkle-free, Maintain/enhance activity level    Moisture Interventions: Absorbent underpads, Maintain skin hydration (lotion/cream), Minimize layers    Activity Interventions: Increase time out of bed, Pressure redistribution bed/mattress(bed type), PT/OT evaluation    Mobility Interventions: HOB 30 degrees or less, Pressure redistribution bed/mattress (bed type), PT/OT evaluation, Turn and reposition approx.  every two hours(pillow and wedges)    Nutrition Interventions: Document food/fluid/supplement intake    Friction and Shear Interventions: Lift sheet, Minimize layers                Problem: Infection - Risk of, Central Venous Catheter-Associated Bloodstream Infection  Goal: *Absence of infection signs and symptoms  Outcome: Resolved/Met

## 2021-08-06 NOTE — OP NOTES
[address]  OPERATIVE REPORT    Name:  Stephanie Mesa  MR#:  [id number]  :  1950  ACCOUNT #:  [de-identified]  DATE OF SERVICE:  2021    PREOPERATIVE DIAGNOSIS:  End-stage renal disease. POSTOPERATIVE DIAGNOSIS:  End-stage renal disease. PROCEDURES PERFORMED:  1. Right upper extremity graft thrombectomy. 2.  Right upper extremity fistulogram with over the wire thrombectomy of brachial artery. 3.  Subtotal excision of right upper extremity graft. SURGEON:  Josie Leo MD    SURGICAL ASSISTANT:  Marco Varela. ANESTHESIA:  MAC.    COMPLICATIONS:  None. SPECIMENS REMOVED:  None. IMPLANTS:  None. ESTIMATED BLOOD LOSS:  Less than 50 mL. INDICATIONS FOR PROCEDURE:  The patient is a 57-year-old female who presented to the hospital with a thrombosed right upper extremity brachial axillary graft and presents today for thrombectomy. Risks and benefits of the procedure were discussed with the patient and the daughter, and they wished to proceed. DESCRIPTION OF PROCEDURE IN DETAIL:  The patient was prepped and draped in the usual sterile fashion. She had a large pseudoaneurysm. An incision was made just proximal to this and the graft dissected free. Heparin was administered. A transverse graftotomy was made. I was able to remove the majority of the thrombus from the pseudoaneurysm. I was unable to pass a Nai distally into the venous system given the pseudoaneurysm. I then passed the Nai retrograde, attempting to place it into the brachial artery, but I was unable to do so because of resistance. I then placed a 6-Swedish sheath retrograde and using a Berenstein catheter and Glidewire, I was able to pass this into the brachial artery. I then placed an 0.014 wire and over this, a 4-Swedish over the wire Nai was used to make multiple passes to restore excellent pulsatile inflow. This was then clamped.   Next, a sheath was then placed towards the outflow, but I was unable to pass my wire into the outflow lumen as it kept coiling of the pseudoaneurysm, therefore I elected to make a separate incision just distal to the pseudoaneurysm. Incision here was made and as I dissected onto the graft, the graft was not well incorporated and there was no growth purulence, but I was concern for an indolent infection, therefore I made a decision to abort a thrombectomy. The graft in this location was excised through incorporated tissue and ligated. The graft in the separate incision was then ligated. The wounds were then copiously irrigated and closed in layers with Vicryl followed by skin staples. All needle and sponge counts were correct at the end of the case. The patient tolerated the procedure well and was taken to the PACU in stable condition.         MD JOSE Partida/[email or web address]  D:  08/06/2021 9:13  T:  08/06/2021 13:17  JOB #:  [id number]

## 2021-08-06 NOTE — PROGRESS NOTES
Transition of Care Plan:     RUR: 27% moderate risk  [address] [facility]  Follow up appointments: To be arranged at SNF, will need PCP, JARRELL Borrego MWF 6:30am chair  DME needed: None at this time, patient has cane and rolling walker  Transportation at [facility] to transport if appropriate  Keys or means to access home: Daughter has acces, patient to go to SNF at [address]  IM Medicare Letter:  2nd. IMM letter provided  Is patient a BCPI-A Bundle:  no                    If yes, was Bundle Letter given?:     Caregiver Contact: Daughter Kolton Bryant [phone]  Discharge Caregiver contacted prior to discharge? Daughter will need to be contacted about discharge plans      Pt is clear for d/c from a CM standpoint. 1:33 p.m. CM spoke with Skyler Johnson [phone] to ensure pt's transportation is arranged for dialysis when she gets back to [facility]. Rohit Allred confirmed they will pick her up on Monday at 6 a.m.      12:10 p.m.  CM called [facility] Borreg[facility] and spoke with Dipika who stated pt come on M, W, and F with a chair time of 6:30 a.m. CM will fax d/c summary to [phone] per Iraida's request.      VERONA received a call from Raúl Oro of [address] [place] [address] who stated pt's authorization has been approved. CM met with pt at bedside. Pt in agreement. Medicare pt has received, reviewed, and signed 2nd IM letter informing them of their right to appeal the discharge. Signed copy has been placed on pt bedside chart. VERONA called and spoke with Kolton Bryant and she is in agreement for pt to transition to [facility]. CM will send attending physician a message to inform pt's authorization is approved. CM will schedule transportation. Transition of Care Plan to SNF/Rehab    SNF/Rehab Transition:  Patient has been accepted to [facility] and meets criteria for admission. Patient will transported by [facility] and expected to leave at 5 p.m.     Communication to Patient/Family:  Met with patient and Rupinder Carballo (identified care giver) and they are agreeable to the transition plan. Communication to SNF/Rehab:  Bedside RN, Jackie Santiago, has been notified to update the transition plan to the facility and call report (phone number [phone]). Discharge information has been updated on the AVS.     Discharge instructions to be fax'd to facility at [facility] # [phone]. Nursing Please include all hard scripts for controlled substances, med rec and dc summary, and AVS in packet. Reviewed and confirmed with facility, Syeda Hamilton, can manage the patient care needs for the following:     Diana Tovar with (X) only those applicable:    Medication:  [x]  Medications will be available at the facility  []  IV Antibiotics   [x]  Controlled Substance - hard copy to be sent with patient   []  Weekly Labs   Documents:  [x] Hard RX  [x] MAR  [x] Kardex  [x] AVS  [x]Transfer Summary  [x]Discharge   Equipment:  []  CPAP/BiPAP  []  Wound Vacuum  []  Lopez or Urinary Device  []  PICC/Central Line  []  Nebulizer  []  Ventilator   Treatment:  [x]Isolation (for MRSA, VRE, etc.)  []Surgical Drain Management  []Tracheostomy Care  []Dressing Changes  [x]Dialysis with transportation and chair time 6:30 a.m. []PEG Care  []Oxygen  [x]Daily Weights for HD   Dietary:  [x]Any diet limitations low potassium  []Tube Feedings   []Total Parenteral Management (TPN)   Eligible for Medicaid Long Term Services and Supports  Yes:  [] Eligible for medical assistance or will become eligible within 180 days and UAI completed. [] Provider/Patient and/or support system has requested screening. [] UAI copy provided to patient or responsible party,   unavailable at discharge will send once processed to SNF provider. [] UAI unavailable at discharged mailed to patient  No:   [] Private pay and is not financially eligible for Medicaid within the next 180 days. [] Reside out-of-state.   [] A residents of a state owned/operated facility that is licensed  by Department of Behavioral Health and Developmental Services or [facility]  [] Enrollment in [facility] hospice services  [] Non [place] citizen  [] Patient /Family declines to have screening completed or provide financial information for screening     Financial Resources:  Medicaid    [] Initiated and application pending   [x] Full coverage     Advanced Care Plan:  []Surrogate Decision Maker of Care  []POA  [x]Communicated Code Status Full   Other Pt will go into room 405A.          Margarita Leiva,   Care Management, [address] [facility]  [phone]

## 2021-08-06 NOTE — PERIOP NOTES
TRANSFER - OUT REPORT:    Verbal report given to ARACELI on Verónica Freedman  being transferred to [phone] for routine post - op       Report consisted of patients Situation, Background, Assessment and   Recommendations(SBAR). Information from the following report(s) SBAR was reviewed with the receiving nurse. Opportunity for questions and clarification was provided.       Patient transported with:   Monitor  O2 @ 2 liters  Registered Nurse

## 2021-08-19 RX ORDER — SODIUM CHLORIDE 9 MG/ML
25 INJECTION, SOLUTION INTRAVENOUS CONTINUOUS
Status: CANCELLED | OUTPATIENT
Start: 2021-08-24

## 2021-08-19 NOTE — PERIOP NOTES
[facility]  Preoperative Instructions        Surgery Date 8/24/21          Time of Arrival 0545    1. On the day of your surgery, please report to the Surgical Services Registration Desk and sign in at your designated time. The Surgery Center is located to the right of the Emergency Room. 2. You must have someone with you to drive you home. You should not drive a car for 24 hours following surgery. Please make arrangements for a friend or family member to stay with you for the first 24 hours after your surgery. 3. Do not have anything to eat or drink (including water, gum, mints, coffee, juice) after midnight ?8/23/21? [place] ? This may not apply to medications prescribed by your physician. ?(Please note below the special instructions with medications to take the morning of your procedure.)    4. We recommend you do not drink any alcoholic beverages for 24 hours before and after your surgery. 5. Contact your surgeons office for instructions on the following medications: non-steroidal anti-inflammatory drugs (i.e. Advil, Aleve), vitamins, and supplements. (Some surgeons will want you to stop these medications prior to surgery and others may allow you to take them)  **If you are currently taking Plavix, Coumadin, Aspirin and/or other blood-thinning agents, contact your surgeon for instructions. ** Your surgeon will partner with the physician prescribing these medications to determine if it is safe to stop or if you need to continue taking. Please do not stop taking these medications without instructions from your surgeon    6. Wear comfortable clothes. Wear glasses instead of contacts. Do not bring any money or jewelry. Please bring picture ID, insurance card, and any prearranged co-payment or hospital payment. Do not wear make-up, particularly mascara the morning of your surgery. Do not wear nail polish, particularly if you are having foot /hand surgery.   Wear your hair loose or down, no ponytails, buns, beverly pins or clips. All body piercings must be removed. Please shower with antibacterial soap for three consecutive days before and on the morning of surgery, but do not apply any lotions, powders or deodorants after the shower on the day of surgery. Please use a fresh towels after each shower. Please sleep in clean clothes and change bed linens the night before surgery. Please do not shave for 48 hours prior to surgery. Shaving of the face is acceptable. 7. You should understand that if you do not follow these instructions your surgery may be cancelled. If your physical condition changes (I.e. fever, cold or flu) please contact your surgeon as soon as possible. 8. It is important that you be on time. If a situation occurs where you may be late, please call [phone] (OR Holding Area). 9. If you have any questions and or problems, please call [phone] (Pre-admission Testing). 10. Your surgery time may be subject to change. You will receive a phone call the evening prior if your time changes. 11.  If having outpatient surgery, you must have someone to drive you here, stay with you during the duration of your stay, and to drive you home at time of discharge. Special Instructions:     TAKE ALL MEDICATIONS DAY OF SURGERY EXCEPT:  Aspirin, dialyvite, take half dose of Lantus insulin the night before    I understand a pre-operative phone call will be made to verify my surgery time. In the event that I am not available, I give permission for a message to be left on my answering service and/or with another person?   Yes daughter Maggie Jarquin [phone]         ___________________      __________   _________    Orient Gum of Patient)             (Witness)                (Date and Time)

## 2021-08-19 NOTE — PERIOP NOTES
Discussed EKG and cardiology notes with Chencho Salgado NP. Ok to proceed with procedure as patient is followed by cardiology and there have not been any acute changes with symptoms or EKG since reviewed by cardiology. Also, patient underwent anesthesia in 7/21 and 8/21 without difficulty.

## 2021-08-24 ENCOUNTER — HOSPITAL ENCOUNTER (OUTPATIENT)
Age: 71
Setting detail: OUTPATIENT SURGERY
Discharge: HOME | End: 2021-08-24
Attending: SURGERY | Admitting: SURGERY
Payer: MEDICARE

## 2021-08-24 ENCOUNTER — APPOINTMENT (OUTPATIENT)
Dept: GENERAL RADIOLOGY | Age: 71
End: 2021-08-24
Attending: SURGERY
Payer: MEDICARE

## 2021-08-24 ENCOUNTER — ANESTHESIA (OUTPATIENT)
Dept: SURGERY | Age: 71
End: 2021-08-24
Payer: MEDICARE

## 2021-08-24 ENCOUNTER — ANESTHESIA EVENT (OUTPATIENT)
Dept: SURGERY | Age: 71
End: 2021-08-24
Payer: MEDICARE

## 2021-08-24 VITALS
HEART RATE: 62 BPM | DIASTOLIC BLOOD PRESSURE: 66 MMHG | RESPIRATION RATE: 16 BRPM | BODY MASS INDEX: 26.13 KG/M2 | WEIGHT: 129.63 LBS | OXYGEN SATURATION: 100 % | HEIGHT: 59 IN | TEMPERATURE: 97.6 F | SYSTOLIC BLOOD PRESSURE: 155 MMHG

## 2021-08-24 DIAGNOSIS — N18.6 END STAGE RENAL DISEASE (HCC): Primary | ICD-10-CM

## 2021-08-24 LAB
ANION GAP BLD CALC-SCNC: 11 MMOL/L (ref 10–20)
CA-I BLD-MCNC: 1.23 MMOL/L (ref 1.12–1.32)
CHLORIDE BLD-SCNC: 109 MMOL/L (ref 98–107)
CO2 BLD-SCNC: 28.2 MMOL/L (ref 21–32)
CREAT BLD-MCNC: 7.68 MG/DL (ref 0.6–1.3)
GLUCOSE BLD STRIP.AUTO-MCNC: 139 MG/DL (ref 65–117)
GLUCOSE BLD-MCNC: 87 MG/DL (ref 65–100)
POTASSIUM BLD-SCNC: 3.2 MMOL/L (ref 3.5–5.1)
SERVICE CMNT-IMP: ABNORMAL
SERVICE CMNT-IMP: ABNORMAL
SODIUM BLD-SCNC: 147 MMOL/L (ref 136–145)

## 2021-08-24 PROCEDURE — 77030002987 HC SUT PROL J&J -B: Performed by: SURGERY

## 2021-08-24 PROCEDURE — 74011000250 HC RX REV CODE- 250: Performed by: NURSE ANESTHETIST, CERTIFIED REGISTERED

## 2021-08-24 PROCEDURE — 77030002933 HC SUT MCRYL J&J -A: Performed by: SURGERY

## 2021-08-24 PROCEDURE — 77030014008 HC SPNG HEMSTAT J&J -C: Performed by: SURGERY

## 2021-08-24 PROCEDURE — 77030031139 HC SUT VCRL2 J&J -A: Performed by: SURGERY

## 2021-08-24 PROCEDURE — 77030038692 HC WND DEB SYS IRMX -B: Performed by: SURGERY

## 2021-08-24 PROCEDURE — 77030008463 HC STPLR SKN PROX J&J -B: Performed by: SURGERY

## 2021-08-24 PROCEDURE — C1768 GRAFT, VASCULAR: HCPCS | Performed by: SURGERY

## 2021-08-24 PROCEDURE — 77030002996 HC SUT SLK J&J -A: Performed by: SURGERY

## 2021-08-24 PROCEDURE — 76210000021 HC REC RM PH II 0.5 TO 1 HR: Performed by: SURGERY

## 2021-08-24 PROCEDURE — 77030026438 HC STYL ET INTUB CARD -A: Performed by: ANESTHESIOLOGY

## 2021-08-24 PROCEDURE — 77030013079 HC BLNKT BAIR HGGR 3M -A: Performed by: ANESTHESIOLOGY

## 2021-08-24 PROCEDURE — 74011250636 HC RX REV CODE- 250/636: Performed by: NURSE ANESTHETIST, CERTIFIED REGISTERED

## 2021-08-24 PROCEDURE — 74011250636 HC RX REV CODE- 250/636: Performed by: ANESTHESIOLOGY

## 2021-08-24 PROCEDURE — 73060 X-RAY EXAM OF HUMERUS: CPT

## 2021-08-24 PROCEDURE — 80047 BASIC METABLC PNL IONIZED CA: CPT

## 2021-08-24 PROCEDURE — 77030008684 HC TU ET CUF COVD -B: Performed by: ANESTHESIOLOGY

## 2021-08-24 PROCEDURE — 74011250636 HC RX REV CODE- 250/636: Performed by: SURGERY

## 2021-08-24 PROCEDURE — C1757 CATH, THROMBECTOMY/EMBOLECT: HCPCS | Performed by: SURGERY

## 2021-08-24 PROCEDURE — 82962 GLUCOSE BLOOD TEST: CPT

## 2021-08-24 PROCEDURE — 76210000017 HC OR PH I REC 1.5 TO 2 HR: Performed by: SURGERY

## 2021-08-24 PROCEDURE — 76000 FLUOROSCOPY <1 HR PHYS/QHP: CPT

## 2021-08-24 PROCEDURE — 76060000036 HC ANESTHESIA 2.5 TO 3 HR: Performed by: SURGERY

## 2021-08-24 PROCEDURE — 76010000132 HC OR TIME 2.5 TO 3 HR: Performed by: SURGERY

## 2021-08-24 PROCEDURE — 77030002986 HC SUT PROL J&J -A: Performed by: SURGERY

## 2021-08-24 PROCEDURE — 2709999900 HC NON-CHARGEABLE SUPPLY: Performed by: SURGERY

## 2021-08-24 PROCEDURE — 74011000636 HC RX REV CODE- 636: Performed by: SURGERY

## 2021-08-24 DEVICE — GORE ACUSEAL VASCULAR GRAFT 4-7MMX45CM TAPERED HEPARIN
Type: IMPLANTABLE DEVICE | Site: ARM | Status: FUNCTIONAL
Brand: GORE ACUSEAL VASCULAR GRAFT

## 2021-08-24 RX ORDER — SODIUM CHLORIDE 0.9 % (FLUSH) 0.9 %
5-40 SYRINGE (ML) INJECTION AS NEEDED
Status: DISCONTINUED | OUTPATIENT
Start: 2021-08-24 | End: 2021-08-24 | Stop reason: HOSPADM

## 2021-08-24 RX ORDER — FENTANYL CITRATE 50 UG/ML
25 INJECTION, SOLUTION INTRAMUSCULAR; INTRAVENOUS
Status: DISCONTINUED | OUTPATIENT
Start: 2021-08-24 | End: 2021-08-24 | Stop reason: HOSPADM

## 2021-08-24 RX ORDER — SUCCINYLCHOLINE CHLORIDE 20 MG/ML
INJECTION INTRAMUSCULAR; INTRAVENOUS AS NEEDED
Status: DISCONTINUED | OUTPATIENT
Start: 2021-08-24 | End: 2021-08-24 | Stop reason: HOSPADM

## 2021-08-24 RX ORDER — LIDOCAINE HYDROCHLORIDE 20 MG/ML
INJECTION, SOLUTION EPIDURAL; INFILTRATION; INTRACAUDAL; PERINEURAL AS NEEDED
Status: DISCONTINUED | OUTPATIENT
Start: 2021-08-24 | End: 2021-08-24 | Stop reason: HOSPADM

## 2021-08-24 RX ORDER — SODIUM CHLORIDE, SODIUM LACTATE, POTASSIUM CHLORIDE, CALCIUM CHLORIDE 600; 310; 30; 20 MG/100ML; MG/100ML; MG/100ML; MG/100ML
INJECTION, SOLUTION INTRAVENOUS
Status: DISCONTINUED | OUTPATIENT
Start: 2021-08-24 | End: 2021-08-24 | Stop reason: HOSPADM

## 2021-08-24 RX ORDER — SODIUM CHLORIDE 0.9 % (FLUSH) 0.9 %
5-40 SYRINGE (ML) INJECTION EVERY 8 HOURS
Status: DISCONTINUED | OUTPATIENT
Start: 2021-08-24 | End: 2021-08-24 | Stop reason: HOSPADM

## 2021-08-24 RX ORDER — PROPOFOL 10 MG/ML
INJECTION, EMULSION INTRAVENOUS AS NEEDED
Status: DISCONTINUED | OUTPATIENT
Start: 2021-08-24 | End: 2021-08-24 | Stop reason: HOSPADM

## 2021-08-24 RX ORDER — FENTANYL CITRATE 50 UG/ML
INJECTION, SOLUTION INTRAMUSCULAR; INTRAVENOUS AS NEEDED
Status: DISCONTINUED | OUTPATIENT
Start: 2021-08-24 | End: 2021-08-24 | Stop reason: HOSPADM

## 2021-08-24 RX ORDER — CEFAZOLIN SODIUM 1 G/3ML
INJECTION, POWDER, FOR SOLUTION INTRAMUSCULAR; INTRAVENOUS AS NEEDED
Status: DISCONTINUED | OUTPATIENT
Start: 2021-08-24 | End: 2021-08-24 | Stop reason: HOSPADM

## 2021-08-24 RX ORDER — ROCURONIUM BROMIDE 10 MG/ML
INJECTION, SOLUTION INTRAVENOUS AS NEEDED
Status: DISCONTINUED | OUTPATIENT
Start: 2021-08-24 | End: 2021-08-24 | Stop reason: HOSPADM

## 2021-08-24 RX ORDER — HEPARIN SODIUM 1000 [USP'U]/ML
2100 INJECTION, SOLUTION INTRAVENOUS; SUBCUTANEOUS ONCE
Status: COMPLETED | OUTPATIENT
Start: 2021-08-24 | End: 2021-08-24

## 2021-08-24 RX ORDER — GLYCOPYRROLATE 0.2 MG/ML
INJECTION INTRAMUSCULAR; INTRAVENOUS AS NEEDED
Status: DISCONTINUED | OUTPATIENT
Start: 2021-08-24 | End: 2021-08-24 | Stop reason: HOSPADM

## 2021-08-24 RX ORDER — PROTAMINE SULFATE 10 MG/ML
INJECTION, SOLUTION INTRAVENOUS AS NEEDED
Status: DISCONTINUED | OUTPATIENT
Start: 2021-08-24 | End: 2021-08-24 | Stop reason: HOSPADM

## 2021-08-24 RX ORDER — OXYCODONE AND ACETAMINOPHEN 5; 325 MG/1; MG/1
1 TABLET ORAL
Qty: 20 TABLET | Refills: 0 | Status: SHIPPED | OUTPATIENT
Start: 2021-08-24 | End: 2021-08-31

## 2021-08-24 RX ORDER — CISATRACURIUM BESYLATE 2 MG/ML
INJECTION, SOLUTION INTRAVENOUS AS NEEDED
Status: DISCONTINUED | OUTPATIENT
Start: 2021-08-24 | End: 2021-08-24 | Stop reason: HOSPADM

## 2021-08-24 RX ORDER — EPHEDRINE SULFATE/0.9% NACL/PF 50 MG/5 ML
SYRINGE (ML) INTRAVENOUS AS NEEDED
Status: DISCONTINUED | OUTPATIENT
Start: 2021-08-24 | End: 2021-08-24 | Stop reason: HOSPADM

## 2021-08-24 RX ORDER — EPHEDRINE SULFATE/0.9% NACL/PF 50 MG/5 ML
SYRINGE (ML) INTRAVENOUS AS NEEDED
Status: DISCONTINUED | OUTPATIENT
Start: 2021-08-24 | End: 2021-08-24

## 2021-08-24 RX ORDER — ONDANSETRON 2 MG/ML
INJECTION INTRAMUSCULAR; INTRAVENOUS AS NEEDED
Status: DISCONTINUED | OUTPATIENT
Start: 2021-08-24 | End: 2021-08-24 | Stop reason: HOSPADM

## 2021-08-24 RX ORDER — DEXAMETHASONE SODIUM PHOSPHATE 4 MG/ML
INJECTION, SOLUTION INTRA-ARTICULAR; INTRALESIONAL; INTRAMUSCULAR; INTRAVENOUS; SOFT TISSUE AS NEEDED
Status: DISCONTINUED | OUTPATIENT
Start: 2021-08-24 | End: 2021-08-24 | Stop reason: HOSPADM

## 2021-08-24 RX ORDER — HEPARIN SODIUM 1000 [USP'U]/ML
INJECTION, SOLUTION INTRAVENOUS; SUBCUTANEOUS
Status: DISCONTINUED
Start: 2021-08-24 | End: 2021-08-24 | Stop reason: HOSPADM

## 2021-08-24 RX ORDER — NEOSTIGMINE METHYLSULFATE 1 MG/ML
INJECTION INTRAVENOUS AS NEEDED
Status: DISCONTINUED | OUTPATIENT
Start: 2021-08-24 | End: 2021-08-24 | Stop reason: HOSPADM

## 2021-08-24 RX ORDER — ONDANSETRON 2 MG/ML
4 INJECTION INTRAMUSCULAR; INTRAVENOUS AS NEEDED
Status: DISCONTINUED | OUTPATIENT
Start: 2021-08-24 | End: 2021-08-24 | Stop reason: HOSPADM

## 2021-08-24 RX ORDER — SODIUM CHLORIDE 9 MG/ML
25 INJECTION, SOLUTION INTRAVENOUS CONTINUOUS
Status: DISCONTINUED | OUTPATIENT
Start: 2021-08-24 | End: 2021-08-24 | Stop reason: HOSPADM

## 2021-08-24 RX ORDER — HEPARIN SODIUM 1000 [USP'U]/ML
INJECTION, SOLUTION INTRAVENOUS; SUBCUTANEOUS AS NEEDED
Status: DISCONTINUED | OUTPATIENT
Start: 2021-08-24 | End: 2021-08-24 | Stop reason: HOSPADM

## 2021-08-24 RX ORDER — HYDROMORPHONE HYDROCHLORIDE 1 MG/ML
INJECTION, SOLUTION INTRAMUSCULAR; INTRAVENOUS; SUBCUTANEOUS AS NEEDED
Status: DISCONTINUED | OUTPATIENT
Start: 2021-08-24 | End: 2021-08-24 | Stop reason: HOSPADM

## 2021-08-24 RX ADMIN — CISATRACURIUM BESYLATE 2 MG: 2 INJECTION INTRAVENOUS at 07:48

## 2021-08-24 RX ADMIN — Medication 5 MG: at 09:21

## 2021-08-24 RX ADMIN — SUCCINYLCHOLINE CHLORIDE 50 MG: 20 INJECTION, SOLUTION INTRAMUSCULAR; INTRAVENOUS at 07:40

## 2021-08-24 RX ADMIN — Medication 5 MG: at 09:35

## 2021-08-24 RX ADMIN — LIDOCAINE HYDROCHLORIDE 100 MG: 20 INJECTION, SOLUTION INTRAVENOUS at 07:39

## 2021-08-24 RX ADMIN — Medication 5 MG: at 09:44

## 2021-08-24 RX ADMIN — VANCOMYCIN HYDROCHLORIDE 1000 MG: 1 INJECTION, POWDER, LYOPHILIZED, FOR SOLUTION INTRAVENOUS at 07:17

## 2021-08-24 RX ADMIN — HEPARIN SODIUM 2100 UNITS: 1000 INJECTION INTRAVENOUS; SUBCUTANEOUS at 12:05

## 2021-08-24 RX ADMIN — HYDROMORPHONE HYDROCHLORIDE 0.4 MG: 1 INJECTION, SOLUTION INTRAMUSCULAR; INTRAVENOUS; SUBCUTANEOUS at 08:59

## 2021-08-24 RX ADMIN — SODIUM CHLORIDE, POTASSIUM CHLORIDE, SODIUM LACTATE AND CALCIUM CHLORIDE: 600; 310; 30; 20 INJECTION, SOLUTION INTRAVENOUS at 07:37

## 2021-08-24 RX ADMIN — PROTAMINE SULFATE 30 MG: 10 INJECTION, SOLUTION INTRAVENOUS at 09:47

## 2021-08-24 RX ADMIN — ROCURONIUM BROMIDE 5 MG: 10 INJECTION INTRAVENOUS at 07:39

## 2021-08-24 RX ADMIN — HYDROMORPHONE HYDROCHLORIDE 0.2 MG: 1 INJECTION, SOLUTION INTRAMUSCULAR; INTRAVENOUS; SUBCUTANEOUS at 07:35

## 2021-08-24 RX ADMIN — DEXAMETHASONE SODIUM PHOSPHATE 4 MG: 4 INJECTION, SOLUTION INTRAMUSCULAR; INTRAVENOUS at 07:46

## 2021-08-24 RX ADMIN — NEOSTIGMINE METHYLSULFATE 2 MG: 1 INJECTION INTRAVENOUS at 10:02

## 2021-08-24 RX ADMIN — FENTANYL CITRATE 50 MCG: 50 INJECTION, SOLUTION INTRAMUSCULAR; INTRAVENOUS at 07:39

## 2021-08-24 RX ADMIN — CEFAZOLIN 2 G: 1 INJECTION, POWDER, FOR SOLUTION INTRAMUSCULAR; INTRAVENOUS; PARENTERAL at 07:46

## 2021-08-24 RX ADMIN — ONDANSETRON HYDROCHLORIDE 4 MG: 2 INJECTION, SOLUTION INTRAMUSCULAR; INTRAVENOUS at 10:02

## 2021-08-24 RX ADMIN — GLYCOPYRROLATE 0.2 MG: 0.2 INJECTION, SOLUTION INTRAMUSCULAR; INTRAVENOUS at 10:02

## 2021-08-24 RX ADMIN — SODIUM CHLORIDE 25 ML/HR: 9 INJECTION, SOLUTION INTRAVENOUS at 07:17

## 2021-08-24 RX ADMIN — PROPOFOL 100 MG: 10 INJECTION, EMULSION INTRAVENOUS at 07:39

## 2021-08-24 RX ADMIN — HEPARIN SODIUM 5000 UNITS: 1000 INJECTION, SOLUTION INTRAVENOUS; SUBCUTANEOUS at 08:52

## 2021-08-24 NOTE — PERIOP NOTES
Consent for Left upper extremity graft possible right with venogram signed by patients daughter Charla Perry. Patient has periodic confusion.   Medication reconcile done with [address] [place] [address], they are unsure when patient received their insulin last.

## 2021-08-24 NOTE — PERIOP NOTES
Report given to [facility] including discharge instructions, left arm dressing status N/V status prescription included with paperwork . Instructions also reviewed with patient daughter who is transporting patient to [facility]. with prescription and copy of instruction to be given to [facility] staff

## 2021-08-24 NOTE — PERIOP NOTES
Yola Martinezirn  1950  [id number]    Situation:  Verbal report given from: Shanta Robb RN and Saul Steward CRNA  Procedure: Procedure(s):  RIGHT UPPER EXTREMITY GRAFT WITH VENOGRAM    Background:    Preoperative diagnosis: ESRD    Postoperative diagnosis: ESRD    :  Dr. Lawrence Closs    Assistant(s): Circ-1: Lyudmila Gomez RN  Radiology Technician: Franchesca Room  Scrub Tech-1: Adriana Hill; [facility]  Surg Asst-1: Maxine HERNANDEZ    Specimens: * No specimens in log *    Assessment:  Intra-procedure medications         Anesthesia gave intra-procedure sedation and medications, see anesthesia flow sheet     Intravenous fluids: LR@ KVO     Vital signs stable       Recommendation:    Permission to share finding with daughter : yes

## 2021-08-24 NOTE — ANESTHESIA PREPROCEDURE EVALUATION
Relevant Problems   RENAL FAILURE   (+) End stage renal disease (HCC)       Anesthetic History   No history of anesthetic complications            Review of Systems / Medical History  Patient summary reviewed and pertinent labs reviewed    Pulmonary  Within defined limits                 Neuro/Psych     seizures (recent hx in setting of encephalopathy)  CVA: no residual symptoms  TIA    Comments: Altered mental status.  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) Endo/Other    Diabetes  Hypothyroidism       Other Findings              Physical Exam    Airway  Mallampati: II  TM Distance: > 6 cm  Neck ROM: normal range of motion   Mouth opening: Normal     Cardiovascular  Regular rate and rhythm,  S1 and S2 normal,  no murmur, click, rub, or gallop  Rhythm: regular  Rate: normal         Dental  No notable dental hx       Pulmonary  Breath sounds clear to auscultation               Abdominal  GI exam deferred       Other Findings            Anesthetic Plan    ASA: 3  Anesthesia type: general    Monitoring Plan: BIS      Induction: Intravenous  Anesthetic plan and risks discussed with: Patient

## 2021-08-24 NOTE — DISCHARGE INSTRUCTIONS
Patient Discharge Instructions    Dirk Greenfield / [id number] : 1950    Admitted 2021 Discharged: 2021     Take Home Medications     {Medication reconciliation information is now added to the patient's AVS automatically when it is printed. There is no need to use this SmartLink in discharge instructions. Highlight this text and delete it to clear this message}       · It is important that you take the medication exactly as they are prescribed. · Keep your medication in the bottles provided by the pharmacist and keep a list of the medication names, dosages, and times to be taken in your wallet. · Do not take other medications without consulting your doctor. What to do at [address] [place] [address] [place]: You may shower, no tub baths    Recommended diet: AHA    Recommended activity: As Tolerated. No Strenuous activity or heavy lifting    If you experience any of the following symptoms: bleeding, hand numbness/weakness, fevers, chills then please call the office    Follow-up with Dr Robert Murrell in 2 weeks [phone]        Information obtained by :  I understand that if any problems occur once I am at home I am to contact my physician. I understand and acknowledge receipt of the instructions indicated above.                                                                                                                                            Physician's or R.N.'s Signature                                                                  Date/Time                                                                                                                                              Patient or Representative Signature                                                          Date/Time     DISCHARGE SUMMARY from Nurse    PATIENT INSTRUCTIONS:    After general anesthesia or intravenous sedation, for 24 hours or while taking prescription Narcotics:  · Limit your activities  · Do not drive and operate hazardous machinery  · Do not make important personal or business decisions  · Do  not drink alcoholic beverages  · If you have not urinated within 8 hours after discharge, please contact your surgeon on call. Report the following to your surgeon:  · Excessive pain, swelling, redness or odor of or around the surgical area  · Temperature over 100.5  · Nausea and vomiting lasting longer than 4 hours or if unable to take medications  · Any signs of decreased circulation or nerve impairment to extremity: change in color, persistent  numbness, tingling, coldness or increase pain  · Any questions    A common side effect of anesthesia following surgery is nausea and/or vomiting. In order to decrease symptoms, it is wise to avoid foods that are high in fat, greasy foods, milk products, and spicy foods for the first 24 hours. Acceptable foods for the first 24 hours following surgery include but are not limited to:     soup   broth    toast    crackers    applesauce    bananas    mashed potatoes,   soft or scrambled eggs   oatmeal    jello    It is important to eat when taking your pain medication. This will help to prevent nausea. If possible, please try to time your meals with your medications. It is very important to stay hydrated following surgery. Sip fluids frequently while awake. Avoid acidic drinks such as citrus juices and soda for 24 hours. Carbonated beverages may cause bloating and gas. Acceptable fluids include:    - water (flavor packets may add variety)  - coffee or tea (in moderation)  - Gatorade  - Ernesto-aid  - apple juice  - cranberry juice    You are encouraged to cough and deep breathe every hour when awake. This will help to prevent respiratory complications following anesthesia. You may want to hug a pillow when coughing and sneezing to add additional support to the surgical area and to decrease discomfort if you had abdominal or chest surgery.     If you are discharged home with support stockings, you may remove them after 24 hours. Support stockings are used to help prevent blood clots in the legs following surgery. Please take time to review all of your Home Care Instructions and Medication Information sheets provided in your discharge packet. If you have any questions, please contact your surgeons office. Thank you. TO PREVENT AN INFECTION      1. [address] YOUR HANDS     To prevent infection, good handwashing is the most important thing you or your caregiver can do.  Wash your hands with soap and water or use the hand  we gave you before you touch any wounds. 2. SHOWER     Use the antibacterial soap we gave you when you take a shower.  Shower with this soap until your wounds are healed.  To reach all areas of your body, you may need someone to help you.  Dont forget to clean your belly button with every shower. 3.  USE CLEAN SHEETS     Use freshly cleaned sheets on your bed after surgery.  To keep the surgery site clean, do not allow pets to sleep with you while your wound is still healing. 4. STOP SMOKING     Stop smoking, or at least cut back on smoking     Smoking slows your healing. 5.  CONTROL YOUR BLOOD SUGAR     High blood sugars slow wound healing. If you are diabetic, control your blood sugar levels before and after your surgery. Patient Education      Narcotic-Analgesic/Acetaminophen (Percocet, [address] [facility], [facility], Lortab 10/325) - (By mouth)   Why this medicine is used:   Relieves pain.   Contact a nurse or doctor right away if you have:  · Extreme weakness, shallow breathing, slow heartbeat  · Severe confusion, lightheadedness, dizziness, fainting  · Yellow skin or eyes, dark urine or pale stools  · Severe constipation, severe stomach pain, nausea, vomiting, loss of appetite  · Sweating or cold, clammy skin     Common side effects:  · Mild constipation, nausea, vomiting  · Sleepiness, tiredness  · Itching, rash  © 2017 [facility] [address] is for End User's use only and may not be sold, redistributed or otherwise used for commercial purposes. .    Obesity, smoking, and sedentary lifestyle greatly increases your risk for illness    A healthy diet, regular physical exercise & weight monitoring are important for maintaining a healthy lifestyle    You may be retaining fluid if you have a history of heart failure or if you experience any of the following symptoms:  Weight gain of 3 pounds or more overnight or 5 pounds in a week, increased swelling in our hands or feet or shortness of breath while lying flat in bed. Please call your doctor as soon as you notice any of these symptoms; do not wait until your next office visit. The discharge information has been reviewed with the {PATIENT PARENT GUARDIAN:[id number]}. The {PATIENT PARENT GUARDIAN:79341} verbalized understanding. Discharge medications reviewed with the {Dishcarge meds reviewed LYYA:72688} and appropriate educational materials and side effects teaching were provided.   ___________________________________________________________________________________________________________________________________

## 2021-08-24 NOTE — ANESTHESIA POSTPROCEDURE EVALUATION
Procedure(s):  RIGHT UPPER EXTREMITY GRAFT WITH VENOGRAM.    general    Anesthesia Post Evaluation        Patient location during evaluation: PACU  Note status: Adequate. Level of consciousness: responsive to verbal stimuli and sleepy but conscious  Pain management: satisfactory to patient  Airway patency: patent  Anesthetic complications: no  Cardiovascular status: acceptable  Respiratory status: acceptable  Hydration status: acceptable  Comments: +Post-Anesthesia Evaluation and Assessment    Patient: Steph Moon MRN: [id number]  SSN: [id number]   YOB: 1950  Age: 66 y.o. Sex: female      Cardiovascular Function/Vital Signs    BP (!) (P) 140/53 (BP 1 Location: Right leg, BP Patient Position: At rest)   Pulse (P) 64   Temp 36.5 °C (97.7 °F)   Resp (P) 10   Ht 4' 11\" (1.499 m)   Wt 58.8 kg (129 lb 10.1 oz)   SpO2 98%   BMI 26.18 kg/m²     Patient is status post Procedure(s):  RIGHT UPPER EXTREMITY GRAFT WITH VENOGRAM.    Nausea/Vomiting: Controlled. Postoperative hydration reviewed and adequate. Pain:  Pain Scale 1: Numeric (0 - 10) (08/24/21 1150)  Pain Intensity 1: 0 (08/24/21 1150)   Managed. Neurological Status:   Neuro (WDL): Exceptions to WDL (08/24/21 1022)   At baseline. Mental Status and Level of Consciousness: Arousable. Pulmonary Status:   O2 Device: None (Room air) (08/24/21 1150)   Adequate oxygenation and airway patent. Complications related to anesthesia: None    Post-anesthesia assessment completed. No concerns.     Signed By: Brigitte Doshi MD    8/24/2021  Post anesthesia nausea and vomiting:  controlled      INITIAL Post-op Vital signs:   Vitals Value Taken Time   /37 08/24/21 1150   Temp 36.5 °C (97.7 °F) 08/24/21 1055   Pulse 70 08/24/21 1150   Resp 13 08/24/21 1150   SpO2 98 % 08/24/21 1150

## 2021-08-24 NOTE — BRIEF OP NOTE
Brief Postoperative Note    Patient: Ish Gilbert  YOB: 1950  MRN: [id number]    Date of Procedure: 8/24/2021     Pre-Op Diagnosis: ESRD    Post-Op Diagnosis: Same as preoperative diagnosis. Procedure(s):  RIGHT UPPER EXTREMITY GRAFT WITH VENOGRAM    Surgeon(s):  Kinsey Esteves MD    Surgical Assistant: Surg Asst-1: Goyo HERNANDEZ    Anesthesia: General     Estimated Blood Loss (mL): less than 197     Complications: None    Specimens: * No specimens in log *     Implants:   Implant Name Type Inv. Item Serial No.  Lot No. LRB No. Used Action   GRAFT VASC L45CM ID4-7MM TAPR ACUSEAL - [id number]  GRAFT VASC L45CM ID4-7MM TAPR ACUSEAL [id number] [employer]WD N/A Right 1 Implanted       Drains: * No LDAs found *    Findings: small axillary vein but patent without central venous occlusion. Venous limb medial. Good bruit/thrill on completion.      Electronically Signed by Donis Mckenzie MD on 8/24/2021 at 10:20 AM

## 2021-08-24 NOTE — PERIOP NOTES
Patient received to PACU, VSS. Patient anesthetized. Dressing to right arm intact with no drainage noted. Bruit/Thrill present. 1048: Patient arouses to voice, resting comfortably in stretcher. Will continue to monitor. 1152: patient awake tolerating liquids, rates pain 0/10. Patient has no complaints. Blood sugar 139.    1215: Report given to [facility], RN. Patient moved to phase 2 at this time.

## 2021-08-25 LAB
BACTERIA SPEC CULT: NORMAL
BACTERIA SPEC CULT: NORMAL
SERVICE CMNT-IMP: NORMAL

## 2021-08-25 NOTE — H&P
Vascular Surgery History & Physical    Subjective: Dirk Greenfield is a 66 y.o.  female with ESRD with failed bilateral UE graft presenting for new access. Past Medical History:   Diagnosis Date    CAD (coronary artery disease)     [facility] [address]     possible stomach cancer 2021- workup in progress    Chronic kidney disease     Milena Hayes M-W-F    Dementia [facility])     per daughter pt is alert and oriented x3    Diabetes [facility] [address]     GERD (gastroesophageal reflux disease)     Hyperlipidemia     Hypertension     Seizures ([facility] [address]     possible while in hospital 8/2021    Stroke [facility])     Thyroid disease       Past Surgical History:   Procedure Laterality Date    HX APPENDECTOMY      08/21    HX VASCULAR ACCESS      HD catheter    IR INSERT NON TUNL CVC OVER 5 YRS  7/30/2021    IR INSERT NON TUNL CVC OVER 5 YRS  7/30/2021    IR INSERT TUNL CVC W/O PORT OVER 5 YR  8/4/2021    SC CABG, ARTERY-VEIN, FOUR      5 years ago    VASCULAR SURGERY PROCEDURE UNLIST      right arm graft     History reviewed. No pertinent family history. Social History     Tobacco Use    Smoking status: Never Smoker    Smokeless tobacco: Never Used   Substance Use Topics    Alcohol use: Not Currently       Prior to Admission medications    Medication Sig Start Date End Date Taking? Authorizing Provider   oxyCODONE-acetaminophen (Percocet) 5-325 mg per tablet Take 1 Tablet by mouth every six (6) hours as needed for Pain for up to 7 days. Max Daily Amount: 4 Tablets. 8/24/21 8/31/21 Yes Perez Esteves MD   levETIRAcetam (Keppra) 500 mg tablet Take 1 Tablet by mouth two (2) times a day for 30 days.  8/5/21 9/4/21 Yes Beverly Pringle MD   Dialyvite 800 0.8 mg tab tablet TAKE 1 TABLET BY MOUTH EVERY DAY 8/3/21  Yes Leafy Balder., NP   levothyroxine (SYNTHROID) 50 mcg tablet TAKE 1 TABLET BY MOUTH EVERY DAY BEFORE BREAKFAST 7/5/21  Yes Leafy Balder., NP   famotidine (PEPCID) 20 mg tablet TAKE 1 TAB BY MOUTH TWO (2) TIMES DAILY AS NEEDED FOR HEARTBURN. 7/5/21  Yes Rosalio Brock NP   atorvastatin (LIPITOR) 40 mg tablet Take  by mouth daily. Yes Provider, Historical   amLODIPine (NORVASC) 5 mg tablet Take 5 mg by mouth daily. Yes Provider, Historical   aspirin delayed-release 81 mg tablet Take  by mouth daily. Yes Provider, Historical   brimonidine-timoloL (Combigan) 0.2-0.5 % drop ophthalmic solution 1 Drop every twelve (12) hours. Yes Provider, Historical   Blood-Glucose Meter monitoring kit Use as directed. Dx: E11.65 check sugar twice daily 2/9/21   Rosalio Brock NP   lancets misc Check sugar twice daily. E11.65 2/9/21   Rosalio Brock NP   glucose blood VI test strips (ASCENSIA AUTODISC VI, ONE TOUCH ULTRA TEST VI) strip Check sugar twice daily 2/9/21   Rosalio Brock NP   alcohol swabs padm 1 Each by Apply Externally route four (4) times daily. For use with insulin and glucometer  Patient not taking: Reported on 7/17/2021 2/9/21   Rosalio Brock NP   insulin glargine (LANTUS,BASAGLAR) 100 unit/mL (3 mL) inpn 10 Units by SubCUTAneous route daily (with dinner). Patient taking differently: 10 Units by SubCUTAneous route nightly. 2/9/21   Rosalio Brock NP   Insulin Needles, Disposable, 32 gauge x 5/32\" ndle 1 Each by Does Not Apply route daily. 2/9/21   Rosalio Brock NP     No Known Allergies     Review of Systems:  Review of Systems   All other systems reviewed and are negative. Objective:     Patient Vitals for the past 24 hrs:   BP Temp Pulse Resp SpO2   08/24/21 1215 (!) 155/66 97.6 °F (36.4 °C) 62 16 100 %       Physical Exam:   Physical Exam  Constitutional:       Appearance: Normal appearance. HENT:      Head: Normocephalic. Nose: Nose normal.      Mouth/Throat:      Mouth: Mucous membranes are moist.   Eyes:      Extraocular Movements: Extraocular movements intact. Pupils: Pupils are equal, round, and reactive to light. Cardiovascular:      Rate and Rhythm: Normal rate. Abdominal:      Palpations: Abdomen is soft. Musculoskeletal:         General: Normal range of motion. Cervical back: Normal range of motion. Neurological:      General: No focal deficit present. Mental Status: She is alert. Data Review:   No results found for this or any previous visit (from the past 24 hour(s)).     Assessment/Plan:       OR for redo AVG and venogram    Signed By: Cyndi Millan MD     August 25, 2021

## 2021-08-25 NOTE — OP NOTES
[address]  OPERATIVE REPORT     Name: Mushtaq Foster  MR#: [id number]  : 1950  DATE OF SERVICE: 21     PREOPERATIVE DIAGNOSIS: ESRD     POSTOPERATIVE DIAGNOSIS: ESRD     PROCEDURE PERFORMED:  1. Creation of redo-right upper extremity axillary-axillary loop graft  2. Right arm venogram    SURGEON:  Souleymane Jessica MD.     ANESTHESIA:  Block    COMPLICATIONS:  None. SPECIMENS REMOVED:  None. IMPLANTS:  4-7mm Accuseal     ESTIMATED BLOOD LOSS:  minimal     INDICATIONS:  The patient is a 66year old female with failed bilateral UE grafts presenting for new access placement. Vein mapping did not show adequate vein for a fistula so he will require a graft. Risks and benefits of procedure discussed with patient and they wished to proceed. She has been having issues with catheter at dialysis and therefore will plan for immediate stick graft       PROCEDURE: After informed consent was obtained, the patient was taken to the operating room. The patient was placed in the supine position. The patient received intravenous sedation. The arm was prepped and draped in the usual sterile fashion. Using a micropuncture needle the right brachial vein was accessed and contrast injection showed a patient brachial, axillary, subclavian and innominate vein. We made a longitudinal incision in the axilla. The axillary vein was identified and mobilized and noted to be small. The axillary artery was also identified and mobilized. A good pulse was noted. The axillary artery was mobilized proximally and distally. The 4-7mm Accuseal graft was brought onto the field and tunneled in a loop configuration with the venous limb medial. Heparin was given. The axillary vein was clamped, a venotomy made and the graft sewn to it in a end to side fashion. There was good back bleeding on completion. The axillary artery was then clamped proximally and distally. Longitudinal arteriotomy was made in brachial artery.  We then sewed the graft to the artery in a end-to-side fashion using a running 6-0 Prolene suture. Just prior to completion of the anastomosis, it was flushed, and the anastomosis was then completed. A great thrill was noted in the graft. Hemostasis was secured. We then closed the wound using interrupted 3-0 vicryl sutures for the fascia and staples for the skin. All counter incisions for tunneling were closed in a similar manner. The patient tolerated the procedure well. There were no operative complications. The sponge, instrument, and needle counts were correct at the end of the case. I was present and participated in all aspects of the procedure. The patient was then transferred to the recovery room in satisfactory condition. There was also a palpable radial pulse distally.      Iram Laura MD

## 2021-09-22 NOTE — PROGRESS NOTES
Ish Gilbert is a 66 y.o. female here for new patient appt for appendiceal carcinoma. Her appendix was removed on 7/29/21. 1. Have you been to the ER, urgent care clinic since your last visit? Hospitalized since your last visit? New Pt     2. Have you seen or consulted any other health care providers outside of the [address] [place] [address] since your last visit? Include any pap smears or colon screening. New Pt    Pt here with daughter. Pt does get dialysis. Pt states she eats ok. Does not take much to get full. Pt has no pain.

## 2021-09-23 ENCOUNTER — OFFICE VISIT (OUTPATIENT)
Dept: ONCOLOGY | Age: 71
End: 2021-09-23
Payer: MEDICARE

## 2021-09-23 ENCOUNTER — HOSPITAL ENCOUNTER (OUTPATIENT)
Dept: INFUSION THERAPY | Age: 71
Discharge: HOME | End: 2021-09-23
Payer: MEDICARE

## 2021-09-23 ENCOUNTER — OFFICE VISIT (OUTPATIENT)
Dept: INTERNAL MEDICINE CLINIC | Age: 71
End: 2021-09-23

## 2021-09-23 VITALS
BODY MASS INDEX: 25.6 KG/M2 | TEMPERATURE: 98.1 F | HEART RATE: 63 BPM | RESPIRATION RATE: 18 BRPM | HEIGHT: 59 IN | WEIGHT: 127 LBS | SYSTOLIC BLOOD PRESSURE: 158 MMHG | DIASTOLIC BLOOD PRESSURE: 61 MMHG | OXYGEN SATURATION: 99 %

## 2021-09-23 VITALS
SYSTOLIC BLOOD PRESSURE: 153 MMHG | WEIGHT: 129 LBS | BODY MASS INDEX: 26 KG/M2 | HEART RATE: 56 BPM | OXYGEN SATURATION: 99 % | DIASTOLIC BLOOD PRESSURE: 62 MMHG | TEMPERATURE: 97 F | HEIGHT: 59 IN

## 2021-09-23 VITALS
TEMPERATURE: 98.9 F | DIASTOLIC BLOOD PRESSURE: 60 MMHG | SYSTOLIC BLOOD PRESSURE: 158 MMHG | RESPIRATION RATE: 18 BRPM | HEART RATE: 54 BPM

## 2021-09-23 DIAGNOSIS — C18.1 PRIMARY APPENDICEAL ADENOCARCINOMA (HCC): Primary | ICD-10-CM

## 2021-09-23 DIAGNOSIS — N18.6 ESRD (END STAGE RENAL DISEASE) (HCC): ICD-10-CM

## 2021-09-23 DIAGNOSIS — R56.9 SEIZURE (HCC): Primary | ICD-10-CM

## 2021-09-23 DIAGNOSIS — I10 ESSENTIAL HYPERTENSION: ICD-10-CM

## 2021-09-23 DIAGNOSIS — C80.1 ADENOCARCINOMA (HCC): ICD-10-CM

## 2021-09-23 LAB
ALBUMIN SERPL-MCNC: 3.5 G/DL (ref 3.5–5)
ALBUMIN/GLOB SERPL: 0.8 {RATIO} (ref 1.1–2.2)
ALP SERPL-CCNC: 144 U/L (ref 45–117)
ALT SERPL-CCNC: 13 U/L (ref 12–78)
ANION GAP SERPL CALC-SCNC: 5 MMOL/L (ref 5–15)
AST SERPL-CCNC: 9 U/L (ref 15–37)
BASOPHILS # BLD: 0 K/UL (ref 0–0.1)
BASOPHILS NFR BLD: 0 % (ref 0–1)
BILIRUB SERPL-MCNC: 0.6 MG/DL (ref 0.2–1)
BUN SERPL-MCNC: 19 MG/DL (ref 6–20)
BUN/CREAT SERPL: 4 (ref 12–20)
CALCIUM SERPL-MCNC: 9.3 MG/DL (ref 8.5–10.1)
CEA SERPL-MCNC: 12 NG/ML
CHLORIDE SERPL-SCNC: 103 MMOL/L (ref 97–108)
CO2 SERPL-SCNC: 30 MMOL/L (ref 21–32)
CREAT SERPL-MCNC: 5.33 MG/DL (ref 0.55–1.02)
DIFFERENTIAL METHOD BLD: ABNORMAL
EOSINOPHIL # BLD: 1.8 K/UL (ref 0–0.4)
EOSINOPHIL NFR BLD: 25 % (ref 0–7)
ERYTHROCYTE [DISTWIDTH] IN BLOOD BY AUTOMATED COUNT: 15.6 % (ref 11.5–14.5)
GLOBULIN SER CALC-MCNC: 4.6 G/DL (ref 2–4)
GLUCOSE SERPL-MCNC: 148 MG/DL (ref 65–100)
HCT VFR BLD AUTO: 40.9 % (ref 35–47)
HGB BLD-MCNC: 12.4 G/DL (ref 11.5–16)
IMM GRANULOCYTES # BLD AUTO: 0 K/UL (ref 0–0.04)
IMM GRANULOCYTES NFR BLD AUTO: 0 % (ref 0–0.5)
LYMPHOCYTES # BLD: 2 K/UL (ref 0.8–3.5)
LYMPHOCYTES NFR BLD: 29 % (ref 12–49)
MCH RBC QN AUTO: 27.8 PG (ref 26–34)
MCHC RBC AUTO-ENTMCNC: 30.3 G/DL (ref 30–36.5)
MCV RBC AUTO: 91.7 FL (ref 80–99)
MONOCYTES # BLD: 0.6 K/UL (ref 0–1)
MONOCYTES NFR BLD: 8 % (ref 5–13)
NEUTS SEG # BLD: 2.6 K/UL (ref 1.8–8)
NEUTS SEG NFR BLD: 38 % (ref 32–75)
NRBC # BLD: 0 K/UL (ref 0–0.01)
NRBC BLD-RTO: 0 PER 100 WBC
PLATELET # BLD AUTO: 125 K/UL (ref 150–400)
PMV BLD AUTO: 10.2 FL (ref 8.9–12.9)
POTASSIUM SERPL-SCNC: 3.2 MMOL/L (ref 3.5–5.1)
PROT SERPL-MCNC: 8.1 G/DL (ref 6.4–8.2)
RBC # BLD AUTO: 4.46 M/UL (ref 3.8–5.2)
RBC MORPH BLD: ABNORMAL
SODIUM SERPL-SCNC: 138 MMOL/L (ref 136–145)
WBC # BLD AUTO: 7 K/UL (ref 3.6–11)

## 2021-09-23 PROCEDURE — G8419 CALC BMI OUT NRM PARAM NOF/U: HCPCS | Performed by: NURSE PRACTITIONER

## 2021-09-23 PROCEDURE — G9711 PT HX TOT COL OR COLON CA: HCPCS | Performed by: STUDENT IN AN ORGANIZED HEALTH CARE EDUCATION/TRAINING PROGRAM

## 2021-09-23 PROCEDURE — G8400 PT W/DXA NO RESULTS DOC: HCPCS | Performed by: STUDENT IN AN ORGANIZED HEALTH CARE EDUCATION/TRAINING PROGRAM

## 2021-09-23 PROCEDURE — 36415 COLL VENOUS BLD VENIPUNCTURE: CPT

## 2021-09-23 PROCEDURE — 85025 COMPLETE CBC W/AUTO DIFF WBC: CPT

## 2021-09-23 PROCEDURE — G9711 PT HX TOT COL OR COLON CA: HCPCS | Performed by: NURSE PRACTITIONER

## 2021-09-23 PROCEDURE — 1090F PRES/ABSN URINE INCON ASSESS: CPT | Performed by: STUDENT IN AN ORGANIZED HEALTH CARE EDUCATION/TRAINING PROGRAM

## 2021-09-23 PROCEDURE — G9231 DOC ESRD DIA TRANS PREG: HCPCS | Performed by: STUDENT IN AN ORGANIZED HEALTH CARE EDUCATION/TRAINING PROGRAM

## 2021-09-23 PROCEDURE — 1101F PT FALLS ASSESS-DOCD LE1/YR: CPT | Performed by: NURSE PRACTITIONER

## 2021-09-23 PROCEDURE — G8536 NO DOC ELDER MAL SCRN: HCPCS | Performed by: STUDENT IN AN ORGANIZED HEALTH CARE EDUCATION/TRAINING PROGRAM

## 2021-09-23 PROCEDURE — 99215 OFFICE O/P EST HI 40 MIN: CPT | Performed by: STUDENT IN AN ORGANIZED HEALTH CARE EDUCATION/TRAINING PROGRAM

## 2021-09-23 PROCEDURE — G9231 DOC ESRD DIA TRANS PREG: HCPCS | Performed by: NURSE PRACTITIONER

## 2021-09-23 PROCEDURE — 80053 COMPREHEN METABOLIC PANEL: CPT

## 2021-09-23 PROCEDURE — G8427 DOCREV CUR MEDS BY ELIG CLIN: HCPCS | Performed by: STUDENT IN AN ORGANIZED HEALTH CARE EDUCATION/TRAINING PROGRAM

## 2021-09-23 PROCEDURE — 1101F PT FALLS ASSESS-DOCD LE1/YR: CPT | Performed by: STUDENT IN AN ORGANIZED HEALTH CARE EDUCATION/TRAINING PROGRAM

## 2021-09-23 PROCEDURE — 1090F PRES/ABSN URINE INCON ASSESS: CPT | Performed by: NURSE PRACTITIONER

## 2021-09-23 PROCEDURE — G8432 DEP SCR NOT DOC, RNG: HCPCS | Performed by: STUDENT IN AN ORGANIZED HEALTH CARE EDUCATION/TRAINING PROGRAM

## 2021-09-23 PROCEDURE — G9899 SCRN MAM PERF RSLTS DOC: HCPCS | Performed by: STUDENT IN AN ORGANIZED HEALTH CARE EDUCATION/TRAINING PROGRAM

## 2021-09-23 PROCEDURE — G8536 NO DOC ELDER MAL SCRN: HCPCS | Performed by: NURSE PRACTITIONER

## 2021-09-23 PROCEDURE — G8400 PT W/DXA NO RESULTS DOC: HCPCS | Performed by: NURSE PRACTITIONER

## 2021-09-23 PROCEDURE — G8427 DOCREV CUR MEDS BY ELIG CLIN: HCPCS | Performed by: NURSE PRACTITIONER

## 2021-09-23 PROCEDURE — G9899 SCRN MAM PERF RSLTS DOC: HCPCS | Performed by: NURSE PRACTITIONER

## 2021-09-23 PROCEDURE — 82378 CARCINOEMBRYONIC ANTIGEN: CPT

## 2021-09-23 PROCEDURE — 99214 OFFICE O/P EST MOD 30 MIN: CPT | Performed by: NURSE PRACTITIONER

## 2021-09-23 PROCEDURE — G8432 DEP SCR NOT DOC, RNG: HCPCS | Performed by: NURSE PRACTITIONER

## 2021-09-23 PROCEDURE — G8419 CALC BMI OUT NRM PARAM NOF/U: HCPCS | Performed by: STUDENT IN AN ORGANIZED HEALTH CARE EDUCATION/TRAINING PROGRAM

## 2021-09-23 RX ORDER — SODIUM CHLORIDE 9 MG/ML
10 INJECTION INTRAMUSCULAR; INTRAVENOUS; SUBCUTANEOUS AS NEEDED
Status: CANCELLED | OUTPATIENT
Start: 2021-09-23

## 2021-09-23 RX ORDER — SODIUM CHLORIDE 0.9 % (FLUSH) 0.9 %
5-10 SYRINGE (ML) INJECTION AS NEEDED
Status: CANCELLED | OUTPATIENT
Start: 2021-09-23 | End: 2021-09-23

## 2021-09-23 RX ORDER — HEPARIN 100 UNIT/ML
500 SYRINGE INTRAVENOUS AS NEEDED
Status: CANCELLED | OUTPATIENT
Start: 2021-09-23

## 2021-09-23 RX ORDER — BRIMONIDINE TARTRATE, TIMOLOL MALEATE 2; 5 MG/ML; MG/ML
1 SOLUTION/ DROPS OPHTHALMIC EVERY 12 HOURS
Status: CANCELLED | OUTPATIENT
Start: 2021-09-23

## 2021-09-23 NOTE — PROGRESS NOTES
[facility] Lab Note    [facility] on appointment from MD office. Pt arrived at [facility] ambulatory and in no distress for labs. No new complaints voiced. Patient denied having any symptoms of COVID-19, i.e. SOB, coughing, fever, or generally not feeling well. Also denies having been exposed to COVID-19 recently or having had any recent contact with family/friend that has a pending COVID test.     Visit Vitals  BP (!) 158/60   Pulse (!) 54   Temp 98.9 °F (37.2 °C)   Resp 18       Labs drawn from left hand on second attempt. See connect care for pended results. 1010 Tolerated treatment well, no adverse reaction noted. D/Cd from [facility] ambulatory and in no distress accompanied by family. Treatment completed. No further appointments scheduled.

## 2021-09-23 NOTE — PROGRESS NOTES
Chief Complaint   Patient presents with   [facility] Follow Up     MRM       1. Have you been to the ER, urgent care clinic since your last visit? Hospitalized since your last visit? No    2. Have you seen or consulted any other health care providers outside of the [address] [place] [address] since your last visit? Include any pap smears or colon screening.  No

## 2021-09-23 NOTE — PROGRESS NOTES
[facility] at [address]  W: [phone] F: [phone]    Reason for Visit:   Anny Wise is a 66 y.o. female who is seen in consultation at the request of Dr. 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 Subsequent outpatient visit. Hematology / Oncology Treatment History:     Hematological/Oncological Diagnosis: Adenocarcinoma of the appendix, [id number]    Date of Diagnosis: 8/4/21    Treatment course:   7/16/21 - Patient admitted with seizure episode, radiographic findings of acute appendicitis. Subsequent surgery included appendectomy and abdominal washout. Pathology from surgery revealed adenocarcinoma of the appendix. Pathology showed that specimen was fragmented and margins were not able to be discerned. No lymph nodes were sampled for evaluation. Overall size of lesion from pathology could not be determined. No radiographic T size for clinical estimation. History of Present Illness:     Initial consult as inpatient dated August 5, 2021:   Very pleasant 50-year-old female with a complicated medical history most significant for diabetes mellitus type 2, hypertension, ESRD on HD, coronary artery disease, who presented to [facility] on July 16, 2021 with encephalopathy. Work-up showed that the patient had evidence of rectal wall thickening. The patient's inpatient hospital course has been complicated by encephalopathy, renal failure, hypotension from sepsis. The patient's clinical condition worsened over the last few days prompting the need for urgent appendectomy for acute appendicitis. On surgical resection, a well differentiated adenocarcinoma arising in association with villous adenoma this was noticed in the resected appendix. Tumor size and margins could not be determined based on available sample.   It is notable that the patient did have some rectal wall thickening as well. No regional lymph nodes were sampled per operative note. On interview, the patient is confused and unable to answer questions meaningfully. Positive ROS findings include: Confusion, abdominal pain    Interval History:     9/23/21: The patient presents to clinic today accompanied by her daughter. On evaluation today, the patient is able to answer questions appropriately though she remains confused and has difficulty with answering complex questions. Answers to questions often given by the patient's daughter. The patient presents to clinic today with reports of fatigue that has been improving since hospital discharge. Her postoperative care has been complicated as she is required multiple procedures to obtain vascular access for her end-stage renal disease. The patient reports that she has been having improvement in her bowel function since hospital discharge. Per record review, she has not followed up with surgery after appendectomy. Overall, the patient is a marginal functional status and spends most of her time in bed or seated in a chair at home. She lives with her daughter and her son. Current ECOG is a 2-3. She receives dialysis Monday Wednesday Friday and has other medical comorbidities including seizure disorder, hypothyroidism, coronary artery disease with history of CABG. After extensive discussion with the patient and her daughter, we reviewed treatment options for appendiceal adenocarcinomas. For now, the patient and her family wish to focus on quality of life and be conservative with regards to chemotherapeutic treatment options. For now, staging is incomplete and discussions on overall treatment course and plan will depend on imaging results. Current clinical symptoms include fatigue, unsteady gait, mild confusion  Review of Systems: A complete review of systems was obtained, negative except as described above.     Past Medical History: Diagnosis Date    CAD (coronary artery disease)     Cancer ([facility] [address]     possible stomach cancer 2021- workup in progress    Chronic kidney disease     Dev Hanley- M-W-F    Dementia [facility])     per daughter pt is alert and oriented x3    Diabetes ([facility] [address]     GERD (gastroesophageal reflux disease)     Hyperlipidemia     Hypertension     Seizures ([facility] [address]     possible while in hospital 8/2021    Stroke [facility])     Thyroid disease       Past Surgical History:   Procedure Laterality Date    HX APPENDECTOMY      08/21    HX VASCULAR ACCESS      HD catheter    IR INSERT NON TUNL CVC OVER 5 YRS  7/30/2021    IR INSERT NON TUNL CVC OVER 5 YRS  7/30/2021    IR INSERT TUNL CVC W/O PORT OVER 5 YR  8/4/2021    IL CABG, ARTERY-VEIN, FOUR      5 years ago    VASCULAR SURGERY PROCEDURE UNLIST      right arm graft      Social History     Tobacco Use    Smoking status: Never Smoker    Smokeless tobacco: Never Used   Substance Use Topics    Alcohol use: Not Currently      No family history on file. Current Outpatient Medications   Medication Sig    Dialyvite 800 0.8 mg tab tablet TAKE 1 TABLET BY MOUTH EVERY DAY    levothyroxine (SYNTHROID) 50 mcg tablet TAKE 1 TABLET BY MOUTH EVERY DAY BEFORE BREAKFAST    famotidine (PEPCID) 20 mg tablet TAKE 1 TAB BY MOUTH TWO (2) TIMES DAILY AS NEEDED FOR HEARTBURN.  atorvastatin (LIPITOR) 40 mg tablet Take  by mouth daily.  amLODIPine (NORVASC) 5 mg tablet Take 5 mg by mouth daily.  aspirin delayed-release 81 mg tablet Take  by mouth daily.  brimonidine-timoloL (Combigan) 0.2-0.5 % drop ophthalmic solution 1 Drop every twelve (12) hours.  Blood-Glucose Meter monitoring kit Use as directed. Dx: E11.65 check sugar twice daily    lancets misc Check sugar twice daily. E11.65    glucose blood VI test strips (ASCENSIA AUTODISC VI, ONE TOUCH ULTRA TEST VI) strip Check sugar twice daily    alcohol swabs padm 1 Each by Apply Externally route four (4) times daily.  For use with insulin and glucometer (Patient not taking: Reported on 7/17/2021)    insulin glargine (LANTUS,BASAGLAR) 100 unit/mL (3 mL) inpn 10 Units by SubCUTAneous route daily (with dinner). (Patient taking differently: 10 Units by SubCUTAneous route nightly.)    Insulin Needles, Disposable, 32 gauge x 5/32\" ndle 1 Each by Does Not Apply route daily. No current facility-administered medications for this visit. No Known Allergies         Physical Exam:     Visit Vitals  BP (!) 153/62 (BP 1 Location: Left upper arm, BP Patient Position: Sitting)   Pulse (!) 56   Temp 97 °F (36.1 °C) (Temporal)   Ht 4' 11\" (1.499 m)   Wt 129 lb (58.5 kg)   SpO2 99%   BMI 26.05 kg/m²   ECOG of 2-3  General: alert, no distress, patient in good spirits   Mental  status:  Mild confusion low patient is able to discuss complex topics with reorientation   HENT: NCAT   Neck: no visualized mass   Resp: no respiratory distress   Neuro: no gross deficits   Skin: no discoloration or lesions of concern on visible areas             Results:     Lab Results   Component Value Date/Time    WBC 5.4 08/06/2021 12:32 AM    HGB 8.5 (L) 08/06/2021 12:32 AM    HCT 27.0 (L) 08/06/2021 12:32 AM    PLATELET 671 34/49/8075 12:32 AM    MCV 85.4 08/06/2021 12:32 AM    ABS.  NEUTROPHILS 5.2 08/01/2021 02:27 AM     Lab Results   Component Value Date/Time    Sodium 134 (L) 08/04/2021 02:42 AM    Potassium 3.5 08/04/2021 02:42 AM    Chloride 98 08/04/2021 02:42 AM    CO2 27 08/04/2021 02:42 AM    Glucose 84 08/04/2021 02:42 AM    BUN 18 08/04/2021 02:42 AM    Creatinine 4.76 (H) 08/04/2021 02:42 AM    GFR est AA 11 (L) 08/04/2021 02:42 AM    GFR est non-AA 9 (L) 08/04/2021 02:42 AM    Calcium 8.3 (L) 08/04/2021 02:42 AM    Sodium (POC) 147 (H) 08/24/2021 06:59 AM    Potassium (POC) 3.2 (L) 08/24/2021 06:59 AM    Chloride (POC) 109 (H) 08/24/2021 06:59 AM    Glucose (POC) 139 (H) 08/24/2021 12:01 PM    Creatinine (POC) 7.68 (H) 08/24/2021 06:59 AM    Calcium, ionized (POC) 1.23 08/24/2021 06:59 AM     Lab Results   Component Value Date/Time    Bilirubin, total 0.8 07/30/2021 02:22 PM    ALT (SGPT) 10 (L) 07/30/2021 02:22 PM    Alk. phosphatase 70 07/30/2021 02:22 PM    Protein, total 6.8 07/30/2021 02:22 PM    Albumin 2.1 (L) 07/30/2021 02:22 PM    Globulin 4.7 (H) 07/30/2021 02:22 PM     CT abdomen pelvis dated July 28, 2021   FINDINGS:   LOWER THORAX: Cardiomegaly and coronary artery disease. Dependent atelectasis on  the right. LIVER/GALLBLADDER: No mass. Gallbladder is within normal limits. CBD is not  dilated. SPLEEN/PANCREAS:  within normal limits. ADRENALS/KIDNEYS: Right lower pole calculus. No hydronephrosis. STOMACH: Small hiatal hernia. SMALL BOWEL/COLON: Ascending colonic wall thickening with pericolonic  inflammatory stranding and trace fluid. Few colonic diverticula. No dilatation or  wall thickening. APPENDIX: Appendiceal enlargement. Inflammatory change. PERITONEUM: No ascites or pneumoperitoneum. RETROPERITONEUM: Aortic atherosclerotic change. REPRODUCTIVE ORGANS: Calcified uterine fibroids. URINARY BLADDER: No mass or calculus. BONES: Multilevel disc degenerative change with multilevel vacuum disc  phenomena. Schmorl's nodes at L2 and T12. A  ADDITIONAL COMMENTS: N/A     IMPRESSION  Imaging findings consistent with acute appendicitis. There is no associated  abscess or drainable fluid collection. MRI brain dated July 18, 2021    FINDINGS:  The ventricles are normal in size and position. There is no acute infarct,  hemorrhage, extra-axial fluid collection, or mass effect. There is no cerebellar  tonsillar herniation. Diffuse encephalomalacia within the posterior left  temporal lobe and occipital lobe. Moderate high signal within the  periventricular white matter. Chronic right basal ganglia lacunar infarct.     The paranasal sinuses, mastoid air cells, and middle ears are clear. The orbital  contents are within normal limits.  No significant osseous or scalp lesions are  identified.     IMPRESSION  Encephalomalacia and white matter disease. No acute intracranial abnormality      Assessment and Recommendations:     # Adenocarcinoma of the appendix, TX, NX, MX  -Patient has not yet completed staging for her appendiceal adenocarcinoma. I reviewed at length possible treatment options that include adjuvant chemotherapy for this patient's disease. I also discussed the patient's pathology results that showed that only fragments of the adenocarcinoma were removed and it is unclear if the patient has negative margins. Furthermore, inadequate lymph node sampling makes staging difficult. The patient did report that she had spoken with a surgeon who reported that she may need repeat surgery after she heals from appendectomy. She has not yet followed up with surgery in the outpatient setting per chart review.    -We will plan for PET/CT to evaluate for any residual or recurrent disease in the abdomen after surgical resection    -The patient has ESRD as well as seizure disorder. We will discuss adjuvant use of capecitabine with our chemotherapy pharmacist to see if dose reduced capecitabine in the adjuvant setting would be safe for this patient. Further treatment planning will depend on the results of PET/CT. -Plan for follow-up in 2 to 3 weeks just after PET scan.     Signed By: Girish Garcia MD      Attending Medical Oncologist   [facility]

## 2021-09-24 NOTE — PROGRESS NOTES
Subjective: (As above and below)     Chief Complaint   Patient presents with   [facility] Follow Up     MRM     Anny Wise is a 66y.o. year old female who presents for   Hospital follow up    She was admitted from 7/16 to 8/6 for    Acute appendicitis with peritonitis. 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 History reviewed. No pertinent family history.     Past Medical History:   Diagnosis Date    CAD (coronary artery disease)     Cancer ([address]     possible stomach cancer 2021- workup in progress    Chronic kidney disease     Enrique Urbina- M-W-F    Dementia ([facility] [address]     per daughter pt is alert and oriented x3    Diabetes ([address]     GERD (gastroesophageal reflux disease)     Hyperlipidemia     Hypertension     Seizures ([facility] [address]     possible while in hospital 8/2021    Stroke [facility])     Thyroid disease       Social History     Socioeconomic History    Marital status:      Spouse name: Not on file    Number of children: Not on file    Years of education: Not on file    Highest education level: Not on file   Tobacco Use    Smoking status: Never Smoker    Smokeless tobacco: Never Used   Vaping Use    Vaping Use: Never used   Substance and Sexual Activity    Alcohol use: Not Currently    Drug use: Not Currently    Sexual activity: Yes     Partners: Female     Social Determinants of Health     Financial Resource Strain:     Difficulty of Paying Living Expenses: Food Insecurity:     Worried About Running Out of Food in the Last Year:     [address] in the Last Year:    Transportation Needs:     Lack of Transportation (Medical):  Lack of Transportation (Non-Medical):    Physical Activity:     Days of Exercise per Week:     Minutes of Exercise per Session:    Stress:     Feeling of Stress :    Social Connections:     Frequency of Communication with Friends and Family:     Frequency of Social Gatherings with Friends and Family:     Attends Synagogue Services:     Active Member of Clubs or Organizations:     Attends Club or Organization Meetings:     Marital Status:           Current Outpatient Medications   Medication Sig    Dialyvite 800 0.8 mg tab tablet TAKE 1 TABLET BY MOUTH EVERY DAY    levothyroxine (SYNTHROID) 50 mcg tablet TAKE 1 TABLET BY MOUTH EVERY DAY BEFORE BREAKFAST    famotidine (PEPCID) 20 mg tablet TAKE 1 TAB BY MOUTH TWO (2) TIMES DAILY AS NEEDED FOR HEARTBURN.  atorvastatin (LIPITOR) 40 mg tablet Take  by mouth daily.  amLODIPine (NORVASC) 5 mg tablet Take 5 mg by mouth daily.  aspirin delayed-release 81 mg tablet Take  by mouth daily.  brimonidine-timoloL (Combigan) 0.2-0.5 % drop ophthalmic solution 1 Drop every twelve (12) hours.  insulin glargine (LANTUS,BASAGLAR) 100 unit/mL (3 mL) inpn 10 Units by SubCUTAneous route daily (with dinner). (Patient taking differently: 10 Units by SubCUTAneous route nightly.)    Blood-Glucose Meter monitoring kit Use as directed. Dx: E11.65 check sugar twice daily    lancets misc Check sugar twice daily. E11.65    glucose blood VI test strips (ASCENSIA AUTODISC VI, ONE TOUCH ULTRA TEST VI) strip Check sugar twice daily    alcohol swabs padm 1 Each by Apply Externally route four (4) times daily. For use with insulin and glucometer (Patient not taking: Reported on 7/17/2021)    Insulin Needles, Disposable, 32 gauge x 5/32\" ndle 1 Each by Does Not Apply route daily.      No current facility-administered medications for this visit. Review of Systems:   Constitutional:    Negative for fever and chills, negative diaphoresis. HEENT:              Negative for neck pain and stiffness. Eyes:                  Negative for visual disturbance, itching, redness or discharge. Respiratory:        Negative for cough and shortness of breath. Cardiovascular:  Negative for chest pain and palpitations. Gastrointestinal: Negative for nausea, vomiting, abdominal pain, diarrhea or constipation. Genitourinary:     Negative for dysuria and frequency. Musculoskeletal: Negative for falls, tenderness and swelling. Skin:                    Negative for rash, masses or lesions. Neurological:       Negative for dizzyness, seizure, loss of consciousness, weakness and numbness. Objective:     Vitals:    09/23/21 1115 09/23/21 1145   BP: (!) 169/59 (!) 158/61   Pulse: 63 63   Resp: 18    Temp: 98.1 °F (36.7 °C)    TempSrc: Temporal    SpO2: 99%    Weight: 127 lb (57.6 kg)    Height: 4' 11\" (1.499 m)        Results for orders placed or performed during the hospital encounter of 09/23/21   CBC WITH AUTOMATED DIFF   Result Value Ref Range    WBC 7.0 3.6 - 11.0 K/uL    RBC 4.46 3.80 - 5.20 M/uL    HGB 12.4 11.5 - 16.0 g/dL    HCT 40.9 35.0 - 47.0 %    MCV 91.7 80.0 - 99.0 FL    MCH 27.8 26.0 - 34.0 PG    MCHC 30.3 30.0 - 36.5 g/dL    RDW 15.6 (H) 11.5 - 14.5 %    PLATELET 484 (L) 384 - 400 K/uL    MPV 10.2 8.9 - 12.9 FL    NRBC 0.0 0  WBC    ABSOLUTE NRBC 0.00 0.00 - 0.01 K/uL    NEUTROPHILS 38 32 - 75 %    LYMPHOCYTES 29 12 - 49 %    MONOCYTES 8 5 - 13 %    EOSINOPHILS 25 (H) 0 - 7 %    BASOPHILS 0 0 - 1 %    IMMATURE GRANULOCYTES 0 0.0 - 0.5 %    ABS. NEUTROPHILS 2.6 1.8 - 8.0 K/UL    ABS. LYMPHOCYTES 2.0 0.8 - 3.5 K/UL    ABS. MONOCYTES 0.6 0.0 - 1.0 K/UL    ABS. EOSINOPHILS 1.8 (H) 0.0 - 0.4 K/UL    ABS. BASOPHILS 0.0 0.0 - 0.1 K/UL    ABS. IMM.  GRANS. 0.0 0.00 - 0.04 K/UL    DF SMEAR SCANNED RBC COMMENTS NORMOCYTIC, NORMOCHROMIC     METABOLIC PANEL, COMPREHENSIVE   Result Value Ref Range    Sodium 138 136 - 145 mmol/L    Potassium 3.2 (L) 3.5 - 5.1 mmol/L    Chloride 103 97 - 108 mmol/L    CO2 30 21 - 32 mmol/L    Anion gap 5 5 - 15 mmol/L    Glucose 148 (H) 65 - 100 mg/dL    BUN 19 6 - 20 MG/DL    Creatinine 5.33 (H) 0.55 - 1.02 MG/DL    BUN/Creatinine ratio 4 (L) 12 - 20      GFR est AA 10 (L) >60 ml/min/1.73m2    GFR est non-AA 8 (L) >60 ml/min/1.73m2    Calcium 9.3 8.5 - 10.1 MG/DL    Bilirubin, total 0.6 0.2 - 1.0 MG/DL    ALT (SGPT) 13 12 - 78 U/L    AST (SGOT) 9 (L) 15 - 37 U/L    Alk. phosphatase 144 (H) 45 - 117 U/L    Protein, total 8.1 6.4 - 8.2 g/dL    Albumin 3.5 3.5 - 5.0 g/dL    Globulin 4.6 (H) 2.0 - 4.0 g/dL    A-G Ratio 0.8 (L) 1.1 - 2.2     CEA   Result Value Ref Range    CEA 12.0 ng/mL         Physical Examination: General appearance - alert, well appearing, and in no distress  Mental status - alert, oriented to person, place, and time, normal mood, behavior, speech, dress, motor activity, and thought processes  Chest - clear to auscultation, no wheezes, rales or rhonchi, symmetric air entry  Heart - normal rate, regular rhythm, normal S1, S2, no murmurs, rubs, clicks or gallops  Abdomen - soft, nontender, nondistended, no masses or organomegaly  Stab sites healed  Extremities - no pedal edema noted      Assessment/ Plan:     1. Seizure ([facility] [address]    - REFERRAL TO NEUROLOGY    2. Adenocarcinoma [facility])  Fu oncology    3. ESRD (end stage renal disease) ([facility] [address]      4. Essential hypertension  Variable with dialysis, per pt gets quite low on dialysis days        I have discussed the diagnosis with the patient and the intended plan as seen in the above orders. The patient has received an after-visit summary and questions were answered concerning future plans. Pt conveyed understanding of plan.       Medication Side Effects and Warnings were discussed with patient: yes  Patient Labs were reviewed: yes  Patient Past Records were reviewed:  yes    Rishi Samayoa.  Stevie Real, NP

## 2021-10-05 ENCOUNTER — HOSPITAL ENCOUNTER (OUTPATIENT)
Dept: PET IMAGING | Age: 71
Discharge: HOME | End: 2021-10-05
Attending: STUDENT IN AN ORGANIZED HEALTH CARE EDUCATION/TRAINING PROGRAM
Payer: MEDICARE

## 2021-10-05 VITALS — WEIGHT: 124 LBS | HEIGHT: 60 IN | BODY MASS INDEX: 24.35 KG/M2

## 2021-10-05 DIAGNOSIS — C18.1 PRIMARY APPENDICEAL ADENOCARCINOMA (HCC): ICD-10-CM

## 2021-10-05 LAB
GLUCOSE BLD STRIP.AUTO-MCNC: 123 MG/DL (ref 65–117)
SERVICE CMNT-IMP: ABNORMAL

## 2021-10-05 PROCEDURE — A9552 F18 FDG: HCPCS

## 2021-10-05 RX ORDER — SODIUM CHLORIDE 0.9 % (FLUSH) 0.9 %
10 SYRINGE (ML) INJECTION
Status: COMPLETED | OUTPATIENT
Start: 2021-10-05 | End: 2021-10-05

## 2021-10-05 RX ORDER — FLUDEOXYGLUCOSE F-18 200 MCI/ML
10 INJECTION INTRAVENOUS ONCE
Status: COMPLETED | OUTPATIENT
Start: 2021-10-05 | End: 2021-10-05

## 2021-10-05 RX ADMIN — FLUDEOXYGLUCOSE F-18 10.2 MILLICURIE: 200 INJECTION INTRAVENOUS at 07:58

## 2021-10-05 RX ADMIN — Medication 10 ML: at 07:58

## 2021-10-19 NOTE — PROGRESS NOTES
Darrel Stratton is a 66 y.o. female here for 3 week follow up for appendiceal carcinoma. 1. Have you been to the ER, urgent care clinic since your last visit? Hospitalized since your last visit? no  2. Have you seen or consulted any other health care providers outside of the [address] [place] [address] since your last visit? Include any pap smears or colon screening.   no

## 2021-10-20 ENCOUNTER — OFFICE VISIT (OUTPATIENT)
Dept: ONCOLOGY | Age: 71
End: 2021-10-20
Payer: MEDICARE

## 2021-10-20 VITALS
DIASTOLIC BLOOD PRESSURE: 52 MMHG | HEART RATE: 51 BPM | BODY MASS INDEX: 25.13 KG/M2 | OXYGEN SATURATION: 96 % | SYSTOLIC BLOOD PRESSURE: 144 MMHG | WEIGHT: 128 LBS | TEMPERATURE: 97.9 F | HEIGHT: 60 IN

## 2021-10-20 DIAGNOSIS — C18.1 PRIMARY APPENDICEAL ADENOCARCINOMA (HCC): Primary | ICD-10-CM

## 2021-10-20 PROCEDURE — G8536 NO DOC ELDER MAL SCRN: HCPCS | Performed by: STUDENT IN AN ORGANIZED HEALTH CARE EDUCATION/TRAINING PROGRAM

## 2021-10-20 PROCEDURE — G8400 PT W/DXA NO RESULTS DOC: HCPCS | Performed by: STUDENT IN AN ORGANIZED HEALTH CARE EDUCATION/TRAINING PROGRAM

## 2021-10-20 PROCEDURE — G9711 PT HX TOT COL OR COLON CA: HCPCS | Performed by: STUDENT IN AN ORGANIZED HEALTH CARE EDUCATION/TRAINING PROGRAM

## 2021-10-20 PROCEDURE — G8419 CALC BMI OUT NRM PARAM NOF/U: HCPCS | Performed by: STUDENT IN AN ORGANIZED HEALTH CARE EDUCATION/TRAINING PROGRAM

## 2021-10-20 PROCEDURE — G8432 DEP SCR NOT DOC, RNG: HCPCS | Performed by: STUDENT IN AN ORGANIZED HEALTH CARE EDUCATION/TRAINING PROGRAM

## 2021-10-20 PROCEDURE — G9899 SCRN MAM PERF RSLTS DOC: HCPCS | Performed by: STUDENT IN AN ORGANIZED HEALTH CARE EDUCATION/TRAINING PROGRAM

## 2021-10-20 PROCEDURE — 99215 OFFICE O/P EST HI 40 MIN: CPT | Performed by: STUDENT IN AN ORGANIZED HEALTH CARE EDUCATION/TRAINING PROGRAM

## 2021-10-20 PROCEDURE — G8427 DOCREV CUR MEDS BY ELIG CLIN: HCPCS | Performed by: STUDENT IN AN ORGANIZED HEALTH CARE EDUCATION/TRAINING PROGRAM

## 2021-10-20 PROCEDURE — 1090F PRES/ABSN URINE INCON ASSESS: CPT | Performed by: STUDENT IN AN ORGANIZED HEALTH CARE EDUCATION/TRAINING PROGRAM

## 2021-10-20 PROCEDURE — G9231 DOC ESRD DIA TRANS PREG: HCPCS | Performed by: STUDENT IN AN ORGANIZED HEALTH CARE EDUCATION/TRAINING PROGRAM

## 2021-10-20 PROCEDURE — 1101F PT FALLS ASSESS-DOCD LE1/YR: CPT | Performed by: STUDENT IN AN ORGANIZED HEALTH CARE EDUCATION/TRAINING PROGRAM

## 2021-10-20 NOTE — PROGRESS NOTES
[address] [facility] at [address] [facility] [address]  W: [phone] F: [phone]    Reason for Visit:   Brooke Kelly is a 66 y.o. female who is seen in consultation at the request of Dr. Julissa Fritz for evaluation of adenocarcinoma of ruptured appendix. Subsequent outpatient visit. Hematology / Oncology Treatment History:     Hematological/Oncological Diagnosis: Adenocarcinoma of the appendix, [id number]    Date of Diagnosis: 8/4/21    Treatment course:   7/16/21 - Patient admitted with seizure episode, radiographic findings of acute appendicitis. Subsequent surgery included appendectomy and abdominal washout. Pathology from surgery revealed adenocarcinoma of the appendix. Pathology showed that specimen was fragmented and margins were not able to be discerned. No lymph nodes were sampled for evaluation. Overall size of lesion from pathology could not be determined. No radiographic T size for clinical estimation. History of Present Illness:     Initial consult as inpatient dated August 5, 2021:     Very pleasant 69-year-old female with a complicated medical history most significant for diabetes mellitus type 2, hypertension, ESRD on HD, coronary artery disease, who presented to [facility] on July 16, 2021 with encephalopathy. Work-up showed that the patient had evidence of rectal wall thickening. The patient's inpatient hospital course has been complicated by encephalopathy, renal failure, hypotension from sepsis. The patient's clinical condition worsened over the last few days prompting the need for urgent appendectomy for acute appendicitis. On surgical resection, a well differentiated adenocarcinoma arising in association with villous adenoma this was noticed in the resected appendix. Tumor size and margins could not be determined based on available sample.   It is notable that the patient did have some rectal wall thickening as well. No regional lymph nodes were sampled per operative note. On interview, the patient is confused and unable to answer questions meaningfully. Positive ROS findings include: Confusion, abdominal pain      9/23/21: The patient presents to clinic today accompanied by her daughter. On evaluation today, the patient is able to answer questions appropriately though she remains confused and has difficulty with answering complex questions. Answers to questions often given by the patient's daughter. The patient presents to clinic today with reports of fatigue that has been improving since hospital discharge. Her postoperative care has been complicated as she is required multiple procedures to obtain vascular access for her end-stage renal disease. The patient reports that she has been having improvement in her bowel function since hospital discharge. Per record review, she has not followed up with surgery after appendectomy. Overall, the patient is a marginal functional status and spends most of her time in bed or seated in a chair at home. She lives with her daughter and her son. Current ECOG is a 2-3. She receives dialysis Monday Wednesday Friday and has other medical comorbidities including seizure disorder, hypothyroidism, coronary artery disease with history of CABG. After extensive discussion with the patient and her daughter, we reviewed treatment options for appendiceal adenocarcinomas. For now, the patient and her family wish to focus on quality of life and be conservative with regards to chemotherapeutic treatment options. For now, staging is incomplete and discussions on overall treatment course and plan will depend on imaging results. Interval History:     10/20/21: PET scan completed on October 5, 2021. Shows no evidence of abnormal uptake or metastatic disease.   Clinically, the patient is improving with improved functional status and weight gain since last encounter. She has no residual abdominal pain and no problems with stooling. At baseline though the patient remains frail with an ECOG of 2-3. She is present today in clinic with her daughter who helps with decision making. Current clinical symptoms include fatigue, unsteady gait, mild confusion  Review of Systems: A complete review of systems was obtained, negative except as described above. Past Medical History:   Diagnosis Date    CAD (coronary artery disease)     Cancer ([facility]     possible stomach cancer 2021- workup in progress    Chronic kidney disease     Sam Rizzo- M-W-F    Dementia [facility])     per daughter pt is alert and oriented x3    Diabetes ([facility]     GERD (gastroesophageal reflux disease)     Hyperlipidemia     Hypertension     Seizures ([facility] [facility]     possible while in hospital 8/2021    Stroke [facility])     Thyroid disease       Past Surgical History:   Procedure Laterality Date    HX APPENDECTOMY      08/21    HX VASCULAR ACCESS      HD catheter    IR INSERT NON TUNL CVC OVER 5 YRS  7/30/2021    IR INSERT NON TUNL CVC OVER 5 YRS  7/30/2021    IR INSERT TUNL CVC W/O PORT OVER 5 YR  8/4/2021    MS CABG, ARTERY-VEIN, FOUR      5 years ago    VASCULAR SURGERY PROCEDURE UNLIST      right arm graft      Social History     Tobacco Use    Smoking status: Never Smoker    Smokeless tobacco: Never Used   Substance Use Topics    Alcohol use: Not Currently      No family history on file. Current Outpatient Medications   Medication Sig    Dialyvite 800 0.8 mg tab tablet TAKE 1 TABLET BY MOUTH EVERY DAY    levothyroxine (SYNTHROID) 50 mcg tablet TAKE 1 TABLET BY MOUTH EVERY DAY BEFORE BREAKFAST    famotidine (PEPCID) 20 mg tablet TAKE 1 TAB BY MOUTH TWO (2) TIMES DAILY AS NEEDED FOR HEARTBURN.  atorvastatin (LIPITOR) 40 mg tablet Take  by mouth daily.  amLODIPine (NORVASC) 5 mg tablet Take 5 mg by mouth daily.     aspirin delayed-release 81 mg tablet Take  by mouth daily.    brimonidine-timoloL (Combigan) 0.2-0.5 % drop ophthalmic solution 1 Drop every twelve (12) hours.  Blood-Glucose Meter monitoring kit Use as directed. Dx: E11.65 check sugar twice daily    lancets misc Check sugar twice daily. E11.65    glucose blood VI test strips (ASCENSIA AUTODISC VI, ONE TOUCH ULTRA TEST VI) strip Check sugar twice daily    alcohol swabs padm 1 Each by Apply Externally route four (4) times daily. For use with insulin and glucometer (Patient not taking: Reported on 7/17/2021)    insulin glargine (LANTUS,BASAGLAR) 100 unit/mL (3 mL) inpn 10 Units by SubCUTAneous route daily (with dinner). (Patient taking differently: 10 Units by SubCUTAneous route nightly.)    Insulin Needles, Disposable, 32 gauge x 5/32\" ndle 1 Each by Does Not Apply route daily. No current facility-administered medications for this visit. No Known Allergies         Physical Exam:     Visit Vitals  BP (!) 144/52 (BP 1 Location: Left upper arm, BP Patient Position: Sitting)   Pulse (!) 51   Temp 97.9 °F (36.6 °C) (Temporal)   Ht 5' (1.524 m)   Wt 128 lb (58.1 kg)   SpO2 96%   BMI 25.00 kg/m²   ECOG of 2-3  General: alert, no distress, patient in good spirits   Mental  status:  Mild confusion low patient is able to discuss complex topics with reorientation   HENT: NCAT   Neck: no visualized mass   Resp: no respiratory distress   Neuro: no gross deficits   Skin: no discoloration or lesions of concern on visible areas             Results:     Lab Results   Component Value Date/Time    WBC 7.0 09/23/2021 09:54 AM    HGB 12.4 09/23/2021 09:54 AM    HCT 40.9 09/23/2021 09:54 AM    PLATELET 616 (L) 00/80/7047 09:54 AM    MCV 91.7 09/23/2021 09:54 AM    ABS.  NEUTROPHILS 2.6 09/23/2021 09:54 AM     Lab Results   Component Value Date/Time    Sodium 138 09/23/2021 09:54 AM    Potassium 3.2 (L) 09/23/2021 09:54 AM    Chloride 103 09/23/2021 09:54 AM    CO2 30 09/23/2021 09:54 AM    Glucose 148 (H) 09/23/2021 09:54 AM    BUN 19 09/23/2021 09:54 AM    Creatinine 5.33 (H) 09/23/2021 09:54 AM    GFR est AA 10 (L) 09/23/2021 09:54 AM    GFR est non-AA 8 (L) 09/23/2021 09:54 AM    Calcium 9.3 09/23/2021 09:54 AM    Sodium (POC) 147 (H) 08/24/2021 06:59 AM    Potassium (POC) 3.2 (L) 08/24/2021 06:59 AM    Chloride (POC) 109 (H) 08/24/2021 06:59 AM    Glucose (POC) 123 (H) 10/05/2021 07:33 AM    Creatinine (POC) 7.68 (H) 08/24/2021 06:59 AM    Calcium, ionized (POC) 1.23 08/24/2021 06:59 AM     Lab Results   Component Value Date/Time    Bilirubin, total 0.6 09/23/2021 09:54 AM    ALT (SGPT) 13 09/23/2021 09:54 AM    Alk. phosphatase 144 (H) 09/23/2021 09:54 AM    Protein, total 8.1 09/23/2021 09:54 AM    Albumin 3.5 09/23/2021 09:54 AM    Globulin 4.6 (H) 09/23/2021 09:54 AM     CT abdomen pelvis dated July 28, 2021   FINDINGS:   LOWER THORAX: Cardiomegaly and coronary artery disease. Dependent atelectasis on  the right. LIVER/GALLBLADDER: No mass. Gallbladder is within normal limits. CBD is not  dilated. SPLEEN/PANCREAS:  within normal limits. ADRENALS/KIDNEYS: Right lower pole calculus. No hydronephrosis. STOMACH: Small hiatal hernia. SMALL BOWEL/COLON: Ascending colonic wall thickening with pericolonic  inflammatory stranding and trace fluid. Few colonic diverticula. No dilatation or  wall thickening. APPENDIX: Appendiceal enlargement. Inflammatory change. PERITONEUM: No ascites or pneumoperitoneum. RETROPERITONEUM: Aortic atherosclerotic change. REPRODUCTIVE ORGANS: Calcified uterine fibroids. URINARY BLADDER: No mass or calculus. BONES: Multilevel disc degenerative change with multilevel vacuum disc  phenomena. Schmorl's nodes at L2 and T12. A  ADDITIONAL COMMENTS: N/A     IMPRESSION  Imaging findings consistent with acute appendicitis. There is no associated  abscess or drainable fluid collection. MRI brain dated July 18, 2021    FINDINGS:  The ventricles are normal in size and position.  There is no acute infarct,  hemorrhage, extra-axial fluid collection, or mass effect. There is no cerebellar  tonsillar herniation. Diffuse encephalomalacia within the posterior left  temporal lobe and occipital lobe. Moderate high signal within the  periventricular white matter. Chronic right basal ganglia lacunar infarct.     The paranasal sinuses, mastoid air cells, and middle ears are clear. The orbital  contents are within normal limits. No significant osseous or scalp lesions are  identified.     IMPRESSION  Encephalomalacia and white matter disease. No acute intracranial abnormality      PET scan dated October 5, 2021  IMPRESSION  No PET evidence of residual disease in the appendectomy bed. No  evidence of distant metastatic disease. Assessment and Recommendations:     # Adenocarcinoma of the appendix, [place], NX, M0  -Pathology only showed fragments of adenocarcinoma sample, lymph node sampling was inadequate.  -Postsurgical PET scan shows no evidence of residual disease in the appendectomy bed or any evidence of any distant disease  -Today I again reviewed treatment options including adjuvant Xeloda versus active surveillance given the patient's frailty and overall function. The patient and her daughter wish to proceed with active surveillance and hold off on treatment with any adjuvant chemotherapy. -CEA from September 23, 2021 was 12.0.   No presurgical CEA is available    -Plan for follow-up CT abdomen pelvis scan in about 3 months as part of active surveillance    -Plan for follow-up in 3 months just after CT abdomen pelvis    Signed By: Dustin Ross MD      Attending Medical Oncologist   [facility]

## 2021-10-28 RX ORDER — ATORVASTATIN CALCIUM 40 MG/1
40 TABLET, FILM COATED ORAL DAILY
Qty: 90 TABLET | Refills: 0 | Status: SHIPPED | OUTPATIENT
Start: 2021-10-28 | End: 2022-02-18

## 2021-10-28 NOTE — TELEPHONE ENCOUNTER
Pt left a voice message requesting a refill. Historical medication: Lipitor 40 mg     Last visit 09/23/2021 NP Jose Manuel Jaime   Next appointment Nothing scheduled       Requested Prescriptions     Pending Prescriptions Disp Refills    atorvastatin (LIPITOR) 40 mg tablet 90 Tablet 0     Sig: Take 1 Tablet by mouth daily.

## 2021-12-16 RX ORDER — LEVOTHYROXINE SODIUM 50 UG/1
50 TABLET ORAL
Qty: 30 TABLET | Refills: 1 | Status: SHIPPED | OUTPATIENT
Start: 2021-12-16 | End: 2022-01-20 | Stop reason: SDUPTHER

## 2021-12-23 ENCOUNTER — HOSPITAL ENCOUNTER (EMERGENCY)
Age: 71
Discharge: HOME | End: 2021-12-23
Attending: EMERGENCY MEDICINE
Payer: MEDICARE

## 2021-12-23 VITALS
SYSTOLIC BLOOD PRESSURE: 130 MMHG | OXYGEN SATURATION: 100 % | DIASTOLIC BLOOD PRESSURE: 51 MMHG | BODY MASS INDEX: 25.13 KG/M2 | RESPIRATION RATE: 16 BRPM | HEIGHT: 60 IN | WEIGHT: 128 LBS | TEMPERATURE: 98.7 F | HEART RATE: 58 BPM

## 2021-12-23 DIAGNOSIS — F03.90 DEMENTIA WITHOUT BEHAVIORAL DISTURBANCE, UNSPECIFIED DEMENTIA TYPE: Primary | ICD-10-CM

## 2021-12-23 PROCEDURE — 99283 EMERGENCY DEPT VISIT LOW MDM: CPT

## 2021-12-23 NOTE — TELEPHONE ENCOUNTER
Pt is scheduled for 01/20/2022 @2:30PM, pt has appt w/ foot dr @ 11AM and will come to office after would like to be seen early if possible

## 2021-12-24 NOTE — ED TRIAGE NOTES
Patient who has hx of dementia, HTN and diabetes presents to the ED by [facility] with c/o patient hallucinating \"a cute cat\" that walks around her house and jumps on the tables since thanksgiving. noone else in the house sees this cat. Pt is a dialysis patient and has access in her right arm. Pt stated she goes MWF and went yesterday. Stated she only makes a little bit of urine. Reports difficulty sleeping. Denies hearing voices. Denies SI or HI. Denies drug or alcohol use. Denies chest pain or SOB. Denies pain. Pt is alert x 4 calm and cooperative at this time. Patient has no complaints at this time. Stated she feels great and is laughing in the room with her daughter at bedside. Emergency Department Nursing Plan of Care       The Nursing Plan of Care is developed from the Nursing assessment and Emergency Department Attending provider initial evaluation. The plan of care may be reviewed in the ED Provider note.     The Plan of Care was developed with the following considerations:   Patient / Family readiness to learn indicated by:verbalized understanding  Persons(s) to be included in education: patient  Barriers to Learning/Limitations:No    Signed     Angelo Maillard    12/23/2021   8:09 PM

## 2021-12-24 NOTE — ED NOTES
..Discharge summary and discharge medications reviewed with patient and appropriate educational materials and side effects teaching were provided. patient  Given  paper prescriptions and 0 electronic prescriptions sent to pt's listed pharmacy. Patien verbalized understanding of the importance of discussing medications with his or her physician or clinic they will be following up with. No si/s of acute distress prior to discharge. Patient offered wheelchair from treatment area to hospital entrance, patient used cane out of ED with caregiver.

## 2021-12-24 NOTE — ED PROVIDER NOTES
69-year-old female with multiple medical issues brought in because she was having visual hallucinations-- told family she was seeing a cat, but there was no cat in the house. Daughter-in-law, who is her caregiver, states this is baseline but her boyfriend called 911 not knowing that that this is common behavior for patient. Daughter-in-law states she has previously hallucinated about elephants. Daughter-in-law states that she herself was in her room and not spending time with patient this evening, and states patient has a tendency to act out when she is not getting interaction. Patient arrived to the ED states she feels better. She is A&O x3, can tell me place, year, name. Denies pain, fever, breathing problems, Covid symptoms. She is due for dialysis tomorrow. Currently denies AH, VH, SI, HI. Past Medical History:   Diagnosis Date    CAD (coronary artery disease)     Cancer ([facility]     possible stomach cancer 2021- workup in progress    Chronic kidney disease     Angelica Jackson- M-W-F    Dementia [facility])     per daughter pt is alert and oriented x3    Diabetes ([address]     GERD (gastroesophageal reflux disease)     Hyperlipidemia     Hypertension     Seizures ([facility] [address]     possible while in hospital 8/2021    Stroke [facility])     Thyroid disease        Past Surgical History:   Procedure Laterality Date    HX APPENDECTOMY      08/21    HX VASCULAR ACCESS      HD catheter    IR INSERT NON TUNL CVC OVER 5 YRS  7/30/2021    IR INSERT NON TUNL CVC OVER 5 YRS  7/30/2021    IR INSERT TUNL CVC W/O PORT OVER 5 YR  8/4/2021    AR CABG, ARTERY-VEIN, FOUR      5 years ago    VASCULAR SURGERY PROCEDURE UNLIST      right arm graft         History reviewed. No pertinent family history.     Social History     Socioeconomic History    Marital status:      Spouse name: Not on file    Number of children: Not on file    Years of education: Not on file    Highest education level: Not on file Occupational History    Not on file   Tobacco Use    Smoking status: Never Smoker    Smokeless tobacco: Never Used   Vaping Use    Vaping Use: Never used   Substance and Sexual Activity    Alcohol use: Not Currently    Drug use: Not Currently    Sexual activity: Yes     Partners: Female   Other Topics Concern    Not on file   Social History Narrative    Not on file     Social Determinants of Health     Financial Resource Strain:     Difficulty of Paying Living Expenses: Not on file   Food Insecurity:     Worried About Running Out of Food in the Last Year: Not on file    Anahi of Food in the Last Year: Not on file   Transportation Needs:     Lack of Transportation (Medical): Not on file    Lack of Transportation (Non-Medical): Not on file   Physical Activity:     Days of Exercise per Week: Not on file    Minutes of Exercise per Session: Not on file   Stress:     Feeling of Stress : Not on file   Social Connections:     Frequency of Communication with Friends and Family: Not on file    Frequency of Social Gatherings with Friends and Family: Not on file    Attends Caodaism Services: Not on file    Active Member of [address] [place] [address] or Organizations: Not on file    Attends Club or Organization Meetings: Not on file    Marital Status: Not on file   Intimate Partner Violence:     Fear of Current or Ex-Partner: Not on file    Emotionally Abused: Not on file    Physically Abused: Not on file    Sexually Abused: Not on file   Housing Stability:     Unable to Pay for Housing in the Last Year: Not on file    Number of [place] in the Last Year: Not on file    Unstable Housing in the Last Year: Not on file         ALLERGIES: Patient has no known allergies. Review of Systems   Constitutional: Negative. Negative for chills, fever and unexpected weight change. HENT: Negative. Negative for congestion and trouble swallowing. Eyes: Negative for discharge. Respiratory: Negative.   Negative for cough, chest tightness and shortness of breath. Cardiovascular: Negative. Negative for chest pain. Gastrointestinal: Negative. Negative for abdominal distention, abdominal pain, constipation, diarrhea and nausea. Endocrine: Negative. Genitourinary: Negative. Negative for difficulty urinating, dysuria, frequency and urgency. Musculoskeletal: Negative. Negative for arthralgias and myalgias. Skin: Negative. Negative for color change. Allergic/Immunologic: Negative. Neurological: Negative. Negative for dizziness, speech difficulty and headaches. Hematological: Negative. Psychiatric/Behavioral: Positive for confusion and hallucinations. Negative for agitation. All other systems reviewed and are negative. Vitals:    12/23/21 2004 12/23/21 2013   BP: (!) 159/63    Pulse: (!) 58    Resp: 16    Temp:  98.7 °F (37.1 °C)   SpO2: 100%    Weight: 58.1 kg (128 lb)    Height: 5' (1.524 m)             Physical Exam  Vitals and nursing note reviewed. Constitutional:       Appearance: She is well-developed. HENT:      Head: Normocephalic and atraumatic. Eyes:      Conjunctiva/sclera: Conjunctivae normal.   Cardiovascular:      Rate and Rhythm: Normal rate and regular rhythm. Pulmonary:      Effort: Pulmonary effort is normal. No respiratory distress. Abdominal:      Palpations: Abdomen is soft. Tenderness: There is no abdominal tenderness. Musculoskeletal:         General: No deformity. Normal range of motion. Cervical back: Neck supple. Skin:     General: Skin is warm and dry. Neurological:      General: No focal deficit present. Mental Status: She is alert and oriented to person, place, and time. Psychiatric:         Attention and Perception: She perceives visual hallucinations. Behavior: Behavior normal.         Thought Content:  Thought content normal.          MDM  Number of Diagnoses or Management Options  Dementia without behavioral disturbance, unspecified dementia type [facility])  Diagnosis management comments: On chart review, patient does have a history of encephalopathy. I discussed possibly doing CT, blood work, labs, urine, to rule out occult infection, uremia, electrolyte abnormality, CVA, etc.  Daughter reiterates that patient is at baseline and neither she nor patient believe this is warranted. She states she will be seen at dialysis tomorrow and they will return if there are further concerns. Procedures    LABORATORY TESTS:  No results found for this or any previous visit (from the past 12 hour(s)). IMAGING RESULTS:  No orders to display       MEDICATIONS GIVEN:  Medications - No data to display    IMPRESSION:  1. Dementia without behavioral disturbance, unspecified dementia type ([facility] [address]        PLAN:  1. Discharge Medication List as of 12/23/2021  8:45 PM        2.    Follow-up Information     Follow up With Specialties Details Why Contact Raquel Manrique, NP Nurse Practitioner   Bety Evans  [address] [place]  [phone]          Return to ED if worse

## 2021-12-30 ENCOUNTER — TELEPHONE (OUTPATIENT)
Dept: SURGERY | Age: 71
End: 2021-12-30

## 2021-12-30 NOTE — TELEPHONE ENCOUNTER
I spoke with Dr. Avinash Willard. I called the patient and spoke with the daughter Maia Cheung. I told her it is possible a suture. The patient was given a follow up visit. I did tell her she could cover the area with a band aid. She acknowledged understanding and thanked me for the call.

## 2021-12-30 NOTE — TELEPHONE ENCOUNTER
Patient had surgery and on the right side of her stomach it feels like a piece of wire is sticking out, please call.

## 2021-12-30 NOTE — TELEPHONE ENCOUNTER
I called the daughter back and told her if the area becomes red, has drainage or the patient has a fever, please take her to Urgent care or the ED. She acknowledged understanding and thanked me for the call.

## 2022-01-01 ENCOUNTER — APPOINTMENT (OUTPATIENT)
Dept: CT IMAGING | Age: 72
DRG: 871 | End: 2022-01-01
Attending: NURSE PRACTITIONER
Payer: MEDICARE

## 2022-01-01 ENCOUNTER — APPOINTMENT (OUTPATIENT)
Dept: GENERAL RADIOLOGY | Age: 72
DRG: 871 | End: 2022-01-01
Attending: HOSPITALIST
Payer: MEDICARE

## 2022-01-01 ENCOUNTER — APPOINTMENT (OUTPATIENT)
Dept: VASCULAR SURGERY | Age: 72
DRG: 871 | End: 2022-01-01
Attending: PSYCHIATRY & NEUROLOGY
Payer: MEDICARE

## 2022-01-01 ENCOUNTER — APPOINTMENT (OUTPATIENT)
Dept: MRI IMAGING | Age: 72
DRG: 871 | End: 2022-01-01
Attending: PSYCHIATRY & NEUROLOGY
Payer: MEDICARE

## 2022-01-01 ENCOUNTER — APPOINTMENT (OUTPATIENT)
Dept: MRI IMAGING | Age: 72
DRG: 871 | End: 2022-01-01
Attending: STUDENT IN AN ORGANIZED HEALTH CARE EDUCATION/TRAINING PROGRAM
Payer: MEDICARE

## 2022-01-01 ENCOUNTER — ANESTHESIA (OUTPATIENT)
Dept: SURGERY | Age: 72
DRG: 871 | End: 2022-01-01
Payer: MEDICARE

## 2022-01-01 ENCOUNTER — OFFICE VISIT (OUTPATIENT)
Dept: INTERNAL MEDICINE CLINIC | Age: 72
End: 2022-01-01
Payer: MEDICARE

## 2022-01-01 ENCOUNTER — HOSPITAL ENCOUNTER (INPATIENT)
Age: 72
LOS: 5 days | DRG: 871 | End: 2022-11-11
Attending: EMERGENCY MEDICINE | Admitting: INTERNAL MEDICINE
Payer: MEDICARE

## 2022-01-01 ENCOUNTER — APPOINTMENT (OUTPATIENT)
Dept: INTERVENTIONAL RADIOLOGY/VASCULAR | Age: 72
DRG: 871 | End: 2022-01-01
Attending: INTERNAL MEDICINE
Payer: MEDICARE

## 2022-01-01 ENCOUNTER — ANESTHESIA EVENT (OUTPATIENT)
Dept: SURGERY | Age: 72
DRG: 871 | End: 2022-01-01
Payer: MEDICARE

## 2022-01-01 ENCOUNTER — APPOINTMENT (OUTPATIENT)
Dept: GENERAL RADIOLOGY | Age: 72
DRG: 871 | End: 2022-01-01
Attending: NURSE PRACTITIONER
Payer: MEDICARE

## 2022-01-01 ENCOUNTER — APPOINTMENT (OUTPATIENT)
Dept: CT IMAGING | Age: 72
DRG: 871 | End: 2022-01-01
Attending: EMERGENCY MEDICINE
Payer: MEDICARE

## 2022-01-01 ENCOUNTER — APPOINTMENT (OUTPATIENT)
Dept: NON INVASIVE DIAGNOSTICS | Age: 72
DRG: 871 | End: 2022-01-01
Attending: STUDENT IN AN ORGANIZED HEALTH CARE EDUCATION/TRAINING PROGRAM
Payer: MEDICARE

## 2022-01-01 VITALS
HEIGHT: 59 IN | WEIGHT: 108 LBS | SYSTOLIC BLOOD PRESSURE: 170 MMHG | HEART RATE: 84 BPM | DIASTOLIC BLOOD PRESSURE: 90 MMHG | RESPIRATION RATE: 18 BRPM | BODY MASS INDEX: 21.77 KG/M2 | TEMPERATURE: 97.8 F

## 2022-01-01 VITALS
DIASTOLIC BLOOD PRESSURE: 16 MMHG | RESPIRATION RATE: 25 BRPM | OXYGEN SATURATION: 35 % | SYSTOLIC BLOOD PRESSURE: 36 MMHG | WEIGHT: 125.44 LBS | HEIGHT: 59 IN | TEMPERATURE: 96 F | BODY MASS INDEX: 25.29 KG/M2

## 2022-01-01 DIAGNOSIS — Z76.89 ENCOUNTER FOR SUPPORT AND COORDINATION OF TRANSITION OF CARE: Primary | ICD-10-CM

## 2022-01-01 DIAGNOSIS — G93.40 ENCEPHALOPATHY: Primary | ICD-10-CM

## 2022-01-01 DIAGNOSIS — M54.50 ACUTE MIDLINE LOW BACK PAIN WITHOUT SCIATICA: ICD-10-CM

## 2022-01-01 DIAGNOSIS — R00.1 BRADYCARDIA: ICD-10-CM

## 2022-01-01 DIAGNOSIS — R41.82 ALTERED MENTAL STATUS, UNSPECIFIED ALTERED MENTAL STATUS TYPE: ICD-10-CM

## 2022-01-01 DIAGNOSIS — Z51.5 PALLIATIVE CARE ENCOUNTER: ICD-10-CM

## 2022-01-01 DIAGNOSIS — F03.B0 MODERATE DEMENTIA WITHOUT BEHAVIORAL DISTURBANCE, PSYCHOTIC DISTURBANCE, MOOD DISTURBANCE, OR ANXIETY, UNSPECIFIED DEMENTIA TYPE: ICD-10-CM

## 2022-01-01 DIAGNOSIS — Z09 HOSPITAL DISCHARGE FOLLOW-UP: ICD-10-CM

## 2022-01-01 DIAGNOSIS — Z79.4 CONTROLLED TYPE 2 DIABETES MELLITUS WITH CHRONIC KIDNEY DISEASE ON CHRONIC DIALYSIS, WITH LONG-TERM CURRENT USE OF INSULIN (HCC): ICD-10-CM

## 2022-01-01 DIAGNOSIS — Z71.89 ACP (ADVANCE CARE PLANNING): ICD-10-CM

## 2022-01-01 DIAGNOSIS — N18.6 CONTROLLED TYPE 2 DIABETES MELLITUS WITH CHRONIC KIDNEY DISEASE ON CHRONIC DIALYSIS, WITH LONG-TERM CURRENT USE OF INSULIN (HCC): ICD-10-CM

## 2022-01-01 DIAGNOSIS — E11.22 CONTROLLED TYPE 2 DIABETES MELLITUS WITH CHRONIC KIDNEY DISEASE ON CHRONIC DIALYSIS, WITH LONG-TERM CURRENT USE OF INSULIN (HCC): ICD-10-CM

## 2022-01-01 DIAGNOSIS — C18.1 PRIMARY APPENDICEAL ADENOCARCINOMA (HCC): ICD-10-CM

## 2022-01-01 DIAGNOSIS — N18.6 ESRD (END STAGE RENAL DISEASE) (HCC): ICD-10-CM

## 2022-01-01 DIAGNOSIS — Z99.2 CONTROLLED TYPE 2 DIABETES MELLITUS WITH CHRONIC KIDNEY DISEASE ON CHRONIC DIALYSIS, WITH LONG-TERM CURRENT USE OF INSULIN (HCC): ICD-10-CM

## 2022-01-01 DIAGNOSIS — I48.11 LONGSTANDING PERSISTENT ATRIAL FIBRILLATION (HCC): ICD-10-CM

## 2022-01-01 DIAGNOSIS — Z71.1 CONCERN ABOUT END OF LIFE: ICD-10-CM

## 2022-01-01 DIAGNOSIS — I63.9 CEREBROVASCULAR ACCIDENT (CVA), UNSPECIFIED MECHANISM (HCC): ICD-10-CM

## 2022-01-01 DIAGNOSIS — I10 ESSENTIAL HYPERTENSION: ICD-10-CM

## 2022-01-01 DIAGNOSIS — Z71.89 ADVANCED CARE PLANNING/COUNSELING DISCUSSION: ICD-10-CM

## 2022-01-01 DIAGNOSIS — I21.4 NSTEMI (NON-ST ELEVATED MYOCARDIAL INFARCTION) (HCC): ICD-10-CM

## 2022-01-01 LAB
25(OH)D3 SERPL-MCNC: 26.2 NG/ML (ref 30–100)
ACC. NO. FROM MICRO ORDER, ACCP: ABNORMAL
ACINETOBACTER CALCOACETICUS-BAUMANII COMPLEX, ACBCX: NOT DETECTED
ALBUMIN SERPL-MCNC: 2.6 G/DL (ref 3.5–5)
ALBUMIN SERPL-MCNC: 2.8 G/DL (ref 3.5–5)
ALBUMIN/GLOB SERPL: 0.5 {RATIO} (ref 1.1–2.2)
ALBUMIN/GLOB SERPL: 0.7 {RATIO} (ref 1.1–2.2)
ALP SERPL-CCNC: 138 U/L (ref 45–117)
ALP SERPL-CCNC: 141 U/L (ref 45–117)
ALT SERPL-CCNC: 42 U/L (ref 12–78)
ALT SERPL-CCNC: 73 U/L (ref 12–78)
AMMONIA PLAS-SCNC: 29 UMOL/L
AMMONIA PLAS-SCNC: 33 UMOL/L
AMPHET UR QL SCN: NEGATIVE
ANA SER QL: POSITIVE
ANION GAP SERPL CALC-SCNC: 10 MMOL/L (ref 5–15)
ANION GAP SERPL CALC-SCNC: 12 MMOL/L (ref 5–15)
ANION GAP SERPL CALC-SCNC: 13 MMOL/L (ref 5–15)
ANION GAP SERPL CALC-SCNC: 13 MMOL/L (ref 5–15)
ANION GAP SERPL CALC-SCNC: 29 MMOL/L (ref 5–15)
ANION GAP SERPL CALC-SCNC: 38 MMOL/L (ref 5–15)
ANION GAP SERPL CALC-SCNC: 44 MMOL/L (ref 5–15)
APPEARANCE UR: ABNORMAL
APTT PPP: 30.8 SEC (ref 22.1–31)
APTT PPP: 31.3 SEC (ref 22.1–31)
APTT PPP: 36 SEC (ref 22.1–31)
APTT PPP: 38.7 SEC (ref 22.1–31)
APTT PPP: 66.6 SEC (ref 22.1–31)
ARTERIAL PATENCY WRIST A: ABNORMAL
ARTERIAL PATENCY WRIST A: ABNORMAL
ARTERIAL PATENCY WRIST A: POSITIVE
ARTERIAL PATENCY WRIST A: YES
AST SERPL-CCNC: 20 U/L (ref 15–37)
AST SERPL-CCNC: 41 U/L (ref 15–37)
ATRIAL RATE: 250 BPM
ATRIAL RATE: 389 BPM
ATRIAL RATE: 55 BPM
BACTERIA SPEC CULT: ABNORMAL
BACTERIA URNS QL MICRO: ABNORMAL /HPF
BACTEROIDES FRAGILIS, BFRA: NOT DETECTED
BARBITURATES UR QL SCN: NEGATIVE
BASE DEFICIT BLDA-SCNC: 26.1 MMOL/L
BASE DEFICIT BLDA-SCNC: 4.8 MMOL/L
BASE EXCESS BLD CALC-SCNC: 1.3 MMOL/L
BASE EXCESS BLD CALC-SCNC: 2.5 MMOL/L
BASE EXCESS BLDA CALC-SCNC: 4.2 MMOL/L
BASE EXCESS BLDV CALC-SCNC: 0.7 MMOL/L
BASE EXCESS BLDV CALC-SCNC: 1.8 MMOL/L
BASOPHILS # BLD: 0 K/UL (ref 0–0.1)
BASOPHILS # BLD: 0.1 K/UL (ref 0–0.1)
BASOPHILS NFR BLD: 0 % (ref 0–1)
BASOPHILS NFR BLD: 1 % (ref 0–1)
BDY SITE: ABNORMAL
BDY SITE: NORMAL
BENZODIAZ UR QL: NEGATIVE
BILIRUB SERPL-MCNC: 0.9 MG/DL (ref 0.2–1)
BILIRUB SERPL-MCNC: 1.4 MG/DL (ref 0.2–1)
BILIRUB UR QL CFM: NEGATIVE
BIOFIRE COMMENT, BCIDPF: ABNORMAL
BREATHS.SPONTANEOUS ON VENT: 18
BUN SERPL-MCNC: 34 MG/DL (ref 6–20)
BUN SERPL-MCNC: 38 MG/DL (ref 6–20)
BUN SERPL-MCNC: 50 MG/DL (ref 6–20)
BUN SERPL-MCNC: 61 MG/DL (ref 6–20)
BUN SERPL-MCNC: 74 MG/DL (ref 6–20)
BUN SERPL-MCNC: 76 MG/DL (ref 6–20)
BUN SERPL-MCNC: 83 MG/DL (ref 6–20)
BUN/CREAT SERPL: 10 (ref 12–20)
BUN/CREAT SERPL: 10 (ref 12–20)
BUN/CREAT SERPL: 11 (ref 12–20)
BUN/CREAT SERPL: 11 (ref 12–20)
BUN/CREAT SERPL: 9 (ref 12–20)
C GLABRATA DNA VAG QL NAA+PROBE: NOT DETECTED
CA-I BLD-MCNC: 1.18 MMOL/L (ref 1.12–1.32)
CALCIUM SERPL-MCNC: 7.2 MG/DL (ref 8.5–10.1)
CALCIUM SERPL-MCNC: 8.6 MG/DL (ref 8.5–10.1)
CALCIUM SERPL-MCNC: 9 MG/DL (ref 8.5–10.1)
CALCIUM SERPL-MCNC: 9.1 MG/DL (ref 8.5–10.1)
CALCIUM SERPL-MCNC: 9.5 MG/DL (ref 8.5–10.1)
CALCIUM SERPL-MCNC: 9.6 MG/DL (ref 8.5–10.1)
CALCIUM SERPL-MCNC: 9.8 MG/DL (ref 8.5–10.1)
CALCULATED R AXIS, ECG10: -12 DEGREES
CALCULATED R AXIS, ECG10: 137 DEGREES
CALCULATED R AXIS, ECG10: 3 DEGREES
CALCULATED T AXIS, ECG11: -131 DEGREES
CALCULATED T AXIS, ECG11: -148 DEGREES
CALCULATED T AXIS, ECG11: -166 DEGREES
CANDIDA ALBICANS: NOT DETECTED
CANDIDA AURIS, CAAU: NOT DETECTED
CANDIDA KRUSEI, CKRP: NOT DETECTED
CANDIDA PARAPSILOSIS, CPAUP: NOT DETECTED
CANDIDA TROPICALIS, CTROP: NOT DETECTED
CANNABINOIDS UR QL SCN: NEGATIVE
CAOX CRY URNS QL MICRO: ABNORMAL
CC UR VC: ABNORMAL
CHLORIDE BLD-SCNC: 102 MMOL/L (ref 100–108)
CHLORIDE SERPL-SCNC: 102 MMOL/L (ref 97–108)
CHLORIDE SERPL-SCNC: 87 MMOL/L (ref 97–108)
CHLORIDE SERPL-SCNC: 91 MMOL/L (ref 97–108)
CHLORIDE SERPL-SCNC: 96 MMOL/L (ref 97–108)
CHLORIDE SERPL-SCNC: 99 MMOL/L (ref 97–108)
CHOLEST SERPL-MCNC: 116 MG/DL
CHOLEST SERPL-MCNC: 92 MG/DL
CK SERPL-CCNC: 35 U/L (ref 26–192)
CO2 BLD-SCNC: 26 MMOL/L (ref 19–24)
CO2 SERPL-SCNC: 13 MMOL/L (ref 21–32)
CO2 SERPL-SCNC: 15 MMOL/L (ref 21–32)
CO2 SERPL-SCNC: 23 MMOL/L (ref 21–32)
CO2 SERPL-SCNC: 25 MMOL/L (ref 21–32)
CO2 SERPL-SCNC: 25 MMOL/L (ref 21–32)
CO2 SERPL-SCNC: 27 MMOL/L (ref 21–32)
CO2 SERPL-SCNC: 8 MMOL/L (ref 21–32)
COCAINE UR QL SCN: NEGATIVE
COLOR UR: ABNORMAL
COMMENT, HOLDF: NORMAL
CREAT SERPL-MCNC: 3.68 MG/DL (ref 0.55–1.02)
CREAT SERPL-MCNC: 4.21 MG/DL (ref 0.55–1.02)
CREAT SERPL-MCNC: 5.1 MG/DL (ref 0.55–1.02)
CREAT SERPL-MCNC: 6.52 MG/DL (ref 0.55–1.02)
CREAT SERPL-MCNC: 6.87 MG/DL (ref 0.55–1.02)
CREAT SERPL-MCNC: 7.45 MG/DL (ref 0.55–1.02)
CREAT SERPL-MCNC: 7.76 MG/DL (ref 0.55–1.02)
CREAT UR-MCNC: 6.2 MG/DL (ref 0.6–1.3)
CRYPTO NEOFORMANS/GATTII, CRYNEG: NOT DETECTED
DIAGNOSIS, 93000: NORMAL
DIFFERENTIAL METHOD BLD: ABNORMAL
DRUG SCRN COMMENT,DRGCM: NORMAL
ECHO AV MEAN GRADIENT: 6 MMHG
ECHO AV MEAN VELOCITY: 1.2 M/S
ECHO AV PEAK GRADIENT: 14 MMHG
ECHO AV PEAK VELOCITY: 1.8 M/S
ECHO AV VTI: 32.2 CM
ECHO LA DIAMETER INDEX: 2.99 CM/M2
ECHO LA DIAMETER: 4.1 CM
ECHO LV FRACTIONAL SHORTENING: 26 % (ref 28–44)
ECHO LV INTERNAL DIMENSION DIASTOLE INDEX: 2.48 CM/M2
ECHO LV INTERNAL DIMENSION DIASTOLIC: 3.4 CM (ref 3.9–5.3)
ECHO LV INTERNAL DIMENSION SYSTOLIC INDEX: 1.82 CM/M2
ECHO LV INTERNAL DIMENSION SYSTOLIC: 2.5 CM
ECHO LV IVSD: 1.1 CM (ref 0.6–0.9)
ECHO LV MASS 2D: 138.8 G (ref 67–162)
ECHO LV MASS INDEX 2D: 101.3 G/M2 (ref 43–95)
ECHO LV POSTERIOR WALL DIASTOLIC: 1.4 CM (ref 0.6–0.9)
ECHO LV RELATIVE WALL THICKNESS RATIO: 0.82
ECHO LVOT AREA: 2.5 CM2
ECHO LVOT DIAM: 1.8 CM
ECHO TV REGURGITANT MAX VELOCITY: 2.76 M/S
ECHO TV REGURGITANT PEAK GRADIENT: 30 MMHG
ENTEROBACTER CLOACAE COMPLEX, ECCP: NOT DETECTED
ENTEROBACTERALES SP. , ENBLS: NOT DETECTED
ENTEROCOCCUS FAECALIS, ENFA: NOT DETECTED
ENTEROCOCCUS FAECIUM, ENFAM: NOT DETECTED
EOSINOPHIL # BLD: 0 K/UL (ref 0–0.4)
EOSINOPHIL NFR BLD: 0 % (ref 0–7)
EPITH CASTS URNS QL MICRO: ABNORMAL /LPF
ERYTHROCYTE [DISTWIDTH] IN BLOOD BY AUTOMATED COUNT: 15.8 % (ref 11.5–14.5)
ERYTHROCYTE [DISTWIDTH] IN BLOOD BY AUTOMATED COUNT: 16 % (ref 11.5–14.5)
ERYTHROCYTE [DISTWIDTH] IN BLOOD BY AUTOMATED COUNT: 16.1 % (ref 11.5–14.5)
ERYTHROCYTE [DISTWIDTH] IN BLOOD BY AUTOMATED COUNT: 16.2 % (ref 11.5–14.5)
ERYTHROCYTE [DISTWIDTH] IN BLOOD BY AUTOMATED COUNT: 16.5 % (ref 11.5–14.5)
ERYTHROCYTE [DISTWIDTH] IN BLOOD BY AUTOMATED COUNT: 16.6 % (ref 11.5–14.5)
ERYTHROCYTE [DISTWIDTH] IN BLOOD BY AUTOMATED COUNT: 16.8 % (ref 11.5–14.5)
ERYTHROCYTE [DISTWIDTH] IN BLOOD BY AUTOMATED COUNT: 17.1 % (ref 11.5–14.5)
ERYTHROCYTE [SEDIMENTATION RATE] IN BLOOD: 66 MM/HR (ref 0–30)
ESCHERICHIA COLI: NOT DETECTED
EST. AVERAGE GLUCOSE BLD GHB EST-MCNC: 126 MG/DL
EST. AVERAGE GLUCOSE BLD GHB EST-MCNC: 126 MG/DL
ETHANOL SERPL-MCNC: <10 MG/DL
FIBRINOGEN PPP-MCNC: 652 MG/DL (ref 200–475)
FIO2 ON VENT: 100 %
FIO2 ON VENT: 40 %
FOLATE SERPL-MCNC: 6.9 NG/ML (ref 5–21)
GAS FLOW.O2 O2 DELIVERY SYS: 1 L/MIN
GAS FLOW.O2 O2 DELIVERY SYS: 3 L/MIN
GAS FLOW.O2 O2 DELIVERY SYS: ABNORMAL L/MIN
GLOBULIN SER CALC-MCNC: 3.9 G/DL (ref 2–4)
GLOBULIN SER CALC-MCNC: 4.8 G/DL (ref 2–4)
GLUCOSE BLD STRIP.AUTO-MCNC: 101 MG/DL (ref 65–117)
GLUCOSE BLD STRIP.AUTO-MCNC: 114 MG/DL (ref 65–117)
GLUCOSE BLD STRIP.AUTO-MCNC: 116 MG/DL (ref 65–117)
GLUCOSE BLD STRIP.AUTO-MCNC: 119 MG/DL (ref 65–117)
GLUCOSE BLD STRIP.AUTO-MCNC: 123 MG/DL (ref 65–117)
GLUCOSE BLD STRIP.AUTO-MCNC: 128 MG/DL (ref 65–117)
GLUCOSE BLD STRIP.AUTO-MCNC: 132 MG/DL (ref 65–117)
GLUCOSE BLD STRIP.AUTO-MCNC: 141 MG/DL (ref 65–117)
GLUCOSE BLD STRIP.AUTO-MCNC: 188 MG/DL (ref 65–117)
GLUCOSE BLD STRIP.AUTO-MCNC: 200 MG/DL (ref 65–117)
GLUCOSE BLD STRIP.AUTO-MCNC: 224 MG/DL (ref 74–106)
GLUCOSE BLD STRIP.AUTO-MCNC: 234 MG/DL (ref 65–117)
GLUCOSE BLD STRIP.AUTO-MCNC: 241 MG/DL (ref 65–117)
GLUCOSE BLD STRIP.AUTO-MCNC: 243 MG/DL (ref 65–117)
GLUCOSE BLD STRIP.AUTO-MCNC: 288 MG/DL (ref 65–117)
GLUCOSE BLD STRIP.AUTO-MCNC: 66 MG/DL (ref 65–117)
GLUCOSE BLD STRIP.AUTO-MCNC: 68 MG/DL (ref 65–117)
GLUCOSE BLD STRIP.AUTO-MCNC: 95 MG/DL (ref 65–117)
GLUCOSE BLD STRIP.AUTO-MCNC: NORMAL MG/DL (ref 65–117)
GLUCOSE BLD STRIP.AUTO-MCNC: NORMAL MG/DL (ref 65–117)
GLUCOSE BLD-MCNC: 204 MG/DL (ref 65–100)
GLUCOSE BLD-MCNC: 309 MG/DL (ref 65–100)
GLUCOSE BLD-MCNC: 70 MG/DL (ref 65–100)
GLUCOSE BLD-MCNC: 95 MG/DL (ref 65–100)
GLUCOSE SERPL-MCNC: 113 MG/DL (ref 65–100)
GLUCOSE SERPL-MCNC: 168 MG/DL (ref 65–100)
GLUCOSE SERPL-MCNC: 173 MG/DL (ref 65–100)
GLUCOSE SERPL-MCNC: 188 MG/DL (ref 65–100)
GLUCOSE SERPL-MCNC: 218 MG/DL (ref 65–100)
GLUCOSE SERPL-MCNC: 251 MG/DL (ref 65–100)
GLUCOSE SERPL-MCNC: 314 MG/DL (ref 65–100)
GLUCOSE UR STRIP.AUTO-MCNC: NEGATIVE MG/DL
HAEMOPHILUS INFLUENZAE, HMI: NOT DETECTED
HBA1C MFR BLD: 6 % (ref 4–5.6)
HBA1C MFR BLD: 6 % (ref 4–5.6)
HCO3 BLD-SCNC: 24.4 MMOL/L (ref 22–26)
HCO3 BLDA-SCNC: 19 MMOL/L (ref 22–26)
HCO3 BLDA-SCNC: 24 MMOL/L (ref 22–26)
HCO3 BLDA-SCNC: 27 MMOL/L
HCO3 BLDA-SCNC: 6 MMOL/L (ref 22–26)
HCO3 BLDV-SCNC: 26 MMOL/L (ref 23–28)
HCO3 BLDV-SCNC: 27 MMOL/L (ref 23–28)
HCT VFR BLD AUTO: 14.6 % (ref 35–47)
HCT VFR BLD AUTO: 21.9 % (ref 35–47)
HCT VFR BLD AUTO: 24.2 % (ref 35–47)
HCT VFR BLD AUTO: 25.5 % (ref 35–47)
HCT VFR BLD AUTO: 30.6 % (ref 35–47)
HCT VFR BLD AUTO: 31.1 % (ref 35–47)
HCT VFR BLD AUTO: 33.8 % (ref 35–47)
HCT VFR BLD AUTO: 34.3 % (ref 35–47)
HDLC SERPL-MCNC: 11 MG/DL
HDLC SERPL-MCNC: 15 MG/DL
HDLC SERPL: 7.7 {RATIO} (ref 0–5)
HDLC SERPL: 8.4 {RATIO} (ref 0–5)
HGB BLD-MCNC: 10.4 G/DL (ref 11.5–16)
HGB BLD-MCNC: 11.1 G/DL (ref 11.5–16)
HGB BLD-MCNC: 11.4 G/DL (ref 11.5–16)
HGB BLD-MCNC: 4.3 G/DL (ref 11.5–16)
HGB BLD-MCNC: 6.8 G/DL (ref 11.5–16)
HGB BLD-MCNC: 8.3 G/DL (ref 11.5–16)
HGB BLD-MCNC: 8.4 G/DL (ref 11.5–16)
HGB BLD-MCNC: 9.9 G/DL (ref 11.5–16)
HGB UR QL STRIP: ABNORMAL
HISTORY CHECKED?,CKHIST: NORMAL
IMM GRANULOCYTES # BLD AUTO: 0 K/UL (ref 0–0.04)
IMM GRANULOCYTES # BLD AUTO: 0.2 K/UL (ref 0–0.04)
IMM GRANULOCYTES # BLD AUTO: 0.2 K/UL (ref 0–0.04)
IMM GRANULOCYTES # BLD AUTO: 0.4 K/UL (ref 0–0.04)
IMM GRANULOCYTES NFR BLD AUTO: 0 % (ref 0–0.5)
IMM GRANULOCYTES NFR BLD AUTO: 1 % (ref 0–0.5)
IMM GRANULOCYTES NFR BLD AUTO: 1 % (ref 0–0.5)
IMM GRANULOCYTES NFR BLD AUTO: 2 % (ref 0–0.5)
INR PPP: 1.4 (ref 0.9–1.1)
KETONES UR QL STRIP.AUTO: ABNORMAL MG/DL
KLEBSIELLA AEROGENES, KLAE: NOT DETECTED
KLEBSIELLA OXYTOCA: NOT DETECTED
KLEBSIELLA PNEUMONIAE GROUP, KPPG: NOT DETECTED
LACTATE BLD-SCNC: 1.48 MMOL/L (ref 0.4–2)
LACTATE SERPL-SCNC: >30 MMOL/L (ref 0.4–2)
LACTATE SERPL-SCNC: >30 MMOL/L (ref 0.4–2)
LDLC SERPL CALC-MCNC: 40.2 MG/DL (ref 0–100)
LDLC SERPL CALC-MCNC: 66.6 MG/DL (ref 0–100)
LEFT CCA DIST DIAS: 9.3 CM/S
LEFT CCA DIST SYS: 62.4 CM/S
LEFT CCA PROX DIAS: 6.8 CM/S
LEFT CCA PROX SYS: 82.7 CM/S
LEFT ECA DIAS: 0 CM/S
LEFT ECA SYS: 65.4 CM/S
LEFT ICA DIST DIAS: 15.6 CM/S
LEFT ICA DIST SYS: 120.1 CM/S
LEFT ICA MID DIAS: 12.2 CM/S
LEFT ICA MID SYS: 106.8 CM/S
LEFT ICA PROX DIAS: 8.3 CM/S
LEFT ICA PROX SYS: 95 CM/S
LEFT ICA/CCA SYS: 1.92 NO UNITS
LEFT VERTEBRAL DIAS: 5.44 CM/S
LEFT VERTEBRAL SYS: 81.8 CM/S
LEUKOCYTE ESTERASE UR QL STRIP.AUTO: ABNORMAL
LISTERIA MONOCYTOGENES, LMONP: NOT DETECTED
LYMPHOCYTES # BLD: 0.9 K/UL (ref 0.8–3.5)
LYMPHOCYTES # BLD: 0.9 K/UL (ref 0.8–3.5)
LYMPHOCYTES # BLD: 1.1 K/UL (ref 0.8–3.5)
LYMPHOCYTES # BLD: 1.7 K/UL (ref 0.8–3.5)
LYMPHOCYTES NFR BLD: 5 % (ref 12–49)
LYMPHOCYTES NFR BLD: 6 % (ref 12–49)
MAGNESIUM SERPL-MCNC: 2.1 MG/DL (ref 1.6–2.4)
MAGNESIUM SERPL-MCNC: 2.2 MG/DL (ref 1.6–2.4)
MAGNESIUM SERPL-MCNC: 2.4 MG/DL (ref 1.6–2.4)
MAGNESIUM SERPL-MCNC: 2.5 MG/DL (ref 1.6–2.4)
MCH RBC QN AUTO: 27.9 PG (ref 26–34)
MCH RBC QN AUTO: 28.3 PG (ref 26–34)
MCH RBC QN AUTO: 28.5 PG (ref 26–34)
MCH RBC QN AUTO: 28.6 PG (ref 26–34)
MCH RBC QN AUTO: 28.7 PG (ref 26–34)
MCH RBC QN AUTO: 28.7 PG (ref 26–34)
MCH RBC QN AUTO: 29.1 PG (ref 26–34)
MCH RBC QN AUTO: 30.9 PG (ref 26–34)
MCHC RBC AUTO-ENTMCNC: 29.5 G/DL (ref 30–36.5)
MCHC RBC AUTO-ENTMCNC: 31.1 G/DL (ref 30–36.5)
MCHC RBC AUTO-ENTMCNC: 32.4 G/DL (ref 30–36.5)
MCHC RBC AUTO-ENTMCNC: 32.5 G/DL (ref 30–36.5)
MCHC RBC AUTO-ENTMCNC: 32.8 G/DL (ref 30–36.5)
MCHC RBC AUTO-ENTMCNC: 33.2 G/DL (ref 30–36.5)
MCHC RBC AUTO-ENTMCNC: 33.4 G/DL (ref 30–36.5)
MCHC RBC AUTO-ENTMCNC: 34.7 G/DL (ref 30–36.5)
MCV RBC AUTO: 105 FL (ref 80–99)
MCV RBC AUTO: 82.6 FL (ref 80–99)
MCV RBC AUTO: 85.4 FL (ref 80–99)
MCV RBC AUTO: 86.2 FL (ref 80–99)
MCV RBC AUTO: 87 FL (ref 80–99)
MCV RBC AUTO: 87.3 FL (ref 80–99)
MCV RBC AUTO: 87.5 FL (ref 80–99)
MCV RBC AUTO: 91.6 FL (ref 80–99)
MECA/C (METHICILLIN RESISTANT GENE), MECACP: NOT DETECTED
METHADONE UR QL: NEGATIVE
MONOCYTES # BLD: 1.1 K/UL (ref 0–1)
MONOCYTES # BLD: 1.2 K/UL (ref 0–1)
MONOCYTES # BLD: 1.4 K/UL (ref 0–1)
MONOCYTES # BLD: 2 K/UL (ref 0–1)
MONOCYTES NFR BLD: 7 % (ref 5–13)
MONOCYTES NFR BLD: 8 % (ref 5–13)
NEISSERIA MENINGITIDIS, NMNI: NOT DETECTED
NEUTS SEG # BLD: 14 K/UL (ref 1.8–8)
NEUTS SEG # BLD: 15.3 K/UL (ref 1.8–8)
NEUTS SEG # BLD: 15.5 K/UL (ref 1.8–8)
NEUTS SEG # BLD: 25.4 K/UL (ref 1.8–8)
NEUTS SEG NFR BLD: 85 % (ref 32–75)
NEUTS SEG NFR BLD: 85 % (ref 32–75)
NEUTS SEG NFR BLD: 86 % (ref 32–75)
NEUTS SEG NFR BLD: 87 % (ref 32–75)
NITRITE UR QL STRIP.AUTO: NEGATIVE
NRBC # BLD: 0 K/UL (ref 0–0.01)
NRBC # BLD: 0.02 K/UL (ref 0–0.01)
NRBC # BLD: 0.14 K/UL (ref 0–0.01)
NRBC # BLD: 1.42 K/UL (ref 0–0.01)
NRBC # BLD: 1.76 K/UL (ref 0–0.01)
NRBC BLD-RTO: 0 PER 100 WBC
NRBC BLD-RTO: 0.1 PER 100 WBC
NRBC BLD-RTO: 0.5 PER 100 WBC
NRBC BLD-RTO: 4 PER 100 WBC
NRBC BLD-RTO: 6.6 PER 100 WBC
O2/TOTAL GAS SETTING VFR VENT: 21 %
OPIATES UR QL: NEGATIVE
PCO2 BLD: 33 MMHG (ref 35–45)
PCO2 BLDA: 24 MMHG (ref 35–45)
PCO2 BLDA: 28 MMHG (ref 35–45)
PCO2 BLDA: 32 MMHG (ref 35–45)
PCO2 BLDA: <15 MMHG (ref 35–45)
PCO2 BLDV: 41.6 MMHG (ref 41–51)
PCO2 BLDV: 41.7 MMHG (ref 41–51)
PCO2 BLDV: 48 MMHG (ref 41–51)
PCP UR QL: NEGATIVE
PEEP RESPIRATORY: 5 CM[H2O]
PH BLD: 7.48 [PH] (ref 7.35–7.45)
PH BLDA: 6.93 [PH] (ref 7.35–7.45)
PH BLDA: 7.14 [PH] (ref 7.35–7.45)
PH BLDA: 7.39 [PH] (ref 7.35–7.45)
PH BLDA: 7.61 [PH] (ref 7.35–7.45)
PH BLDV: 7.36 [PH] (ref 7.32–7.42)
PH BLDV: 7.42 [PH] (ref 7.32–7.42)
PH BLDV: 7.42 [PH] (ref 7.32–7.42)
PH UR STRIP: 6.5 [PH] (ref 5–8)
PHOSPHATE SERPL-MCNC: 1.7 MG/DL (ref 2.6–4.7)
PHOSPHATE SERPL-MCNC: 2.9 MG/DL (ref 2.6–4.7)
PHOSPHATE SERPL-MCNC: 2.9 MG/DL (ref 2.6–4.7)
PHOSPHATE SERPL-MCNC: 6 MG/DL (ref 2.6–4.7)
PHOSPHATE SERPL-MCNC: 6.9 MG/DL (ref 2.6–4.7)
PLATELET # BLD AUTO: 107 K/UL (ref 150–400)
PLATELET # BLD AUTO: 116 K/UL (ref 150–400)
PLATELET # BLD AUTO: 12 K/UL (ref 150–400)
PLATELET # BLD AUTO: 141 K/UL (ref 150–400)
PLATELET # BLD AUTO: 143 K/UL (ref 150–400)
PLATELET # BLD AUTO: 49 K/UL (ref 150–400)
PLATELET # BLD AUTO: 68 K/UL (ref 150–400)
PLATELET # BLD AUTO: 92 K/UL (ref 150–400)
PMV BLD AUTO: 11.3 FL (ref 8.9–12.9)
PMV BLD AUTO: 11.6 FL (ref 8.9–12.9)
PMV BLD AUTO: 12.1 FL (ref 8.9–12.9)
PMV BLD AUTO: 12.3 FL (ref 8.9–12.9)
PMV BLD AUTO: 12.7 FL (ref 8.9–12.9)
PMV BLD AUTO: ABNORMAL FL (ref 8.9–12.9)
PO2 BLD: 80 MMHG (ref 80–100)
PO2 BLDA: 156 MMHG (ref 80–100)
PO2 BLDA: 39 MMHG (ref 80–100)
PO2 BLDA: 530 MMHG (ref 80–100)
PO2 BLDA: 81 MMHG (ref 80–100)
PO2 BLDV: 35 MMHG (ref 25–40)
PO2 BLDV: 40 MMHG (ref 25–40)
PO2 BLDV: 81 MMHG (ref 25–40)
POTASSIUM BLD-SCNC: 4.8 MMOL/L (ref 3.5–5.5)
POTASSIUM SERPL-SCNC: 4.1 MMOL/L (ref 3.5–5.1)
POTASSIUM SERPL-SCNC: 4.2 MMOL/L (ref 3.5–5.1)
POTASSIUM SERPL-SCNC: 4.3 MMOL/L (ref 3.5–5.1)
POTASSIUM SERPL-SCNC: 4.4 MMOL/L (ref 3.5–5.1)
POTASSIUM SERPL-SCNC: 4.4 MMOL/L (ref 3.5–5.1)
POTASSIUM SERPL-SCNC: 4.6 MMOL/L (ref 3.5–5.1)
POTASSIUM SERPL-SCNC: 5.2 MMOL/L (ref 3.5–5.1)
PRESSURE SUPPORT SETTING VENT: 5 CM[H2O]
PROT SERPL-MCNC: 6.7 G/DL (ref 6.4–8.2)
PROT SERPL-MCNC: 7.4 G/DL (ref 6.4–8.2)
PROT UR STRIP-MCNC: 300 MG/DL
PROTEUS, PRP: NOT DETECTED
PROTHROMBIN TIME: 14.3 SEC (ref 9–11.1)
PSEUDOMONAS AERUGINOSA: NOT DETECTED
Q-T INTERVAL, ECG07: 396 MS
Q-T INTERVAL, ECG07: 510 MS
Q-T INTERVAL, ECG07: 626 MS
QRS DURATION, ECG06: 134 MS
QRS DURATION, ECG06: 78 MS
QRS DURATION, ECG06: 82 MS
QTC CALCULATION (BEZET), ECG08: 401 MS
QTC CALCULATION (BEZET), ECG08: 451 MS
QTC CALCULATION (BEZET), ECG08: 565 MS
RBC # BLD AUTO: 1.39 M/UL (ref 3.8–5.2)
RBC # BLD AUTO: 2.39 M/UL (ref 3.8–5.2)
RBC # BLD AUTO: 2.93 M/UL (ref 3.8–5.2)
RBC # BLD AUTO: 2.93 M/UL (ref 3.8–5.2)
RBC # BLD AUTO: 3.55 M/UL (ref 3.8–5.2)
RBC # BLD AUTO: 3.64 M/UL (ref 3.8–5.2)
RBC # BLD AUTO: 3.87 M/UL (ref 3.8–5.2)
RBC # BLD AUTO: 3.92 M/UL (ref 3.8–5.2)
RBC #/AREA URNS HPF: >100 /HPF (ref 0–5)
RBC MORPH BLD: ABNORMAL
RESISTANT GENE SPACE, REGENE: ABNORMAL
RIGHT CCA DIST DIAS: 5.1 CM/S
RIGHT CCA DIST SYS: 93.1 CM/S
RIGHT CCA PROX DIAS: 0 CM/S
RIGHT CCA PROX SYS: 72.3 CM/S
RIGHT ECA DIAS: 0 CM/S
RIGHT ECA SYS: 64.9 CM/S
RIGHT ICA MID DIAS: 11.2 CM/S
RIGHT ICA MID SYS: 106.5 CM/S
RIGHT ICA PROX DIAS: 7.9 CM/S
RIGHT ICA PROX SYS: 95.2 CM/S
RIGHT ICA/CCA SYS: 1.1 NO UNITS
RIGHT VERTEBRAL DIAS: 8.4 CM/S
RIGHT VERTEBRAL SYS: 73 CM/S
SALMONELLA, SALMO: NOT DETECTED
SAMPLES BEING HELD,HOLD: NORMAL
SAO2 % BLD: 100 % (ref 92–97)
SAO2 % BLD: 46 % (ref 92–97)
SAO2 % BLD: 96 % (ref 92–97)
SAO2 % BLD: 96.6 % (ref 92–97)
SAO2 % BLDV: 67 % (ref 65–88)
SAO2 % BLDV: 96 % (ref 65–88)
SAO2% DEVICE SAO2% SENSOR NAME: ABNORMAL
SAO2% DEVICE SAO2% SENSOR NAME: NORMAL
SERRATIA MARCESCENS: NOT DETECTED
SERVICE CMNT-IMP: ABNORMAL
SERVICE CMNT-IMP: NORMAL
SODIUM BLD-SCNC: 139 MMOL/L (ref 136–145)
SODIUM SERPL-SCNC: 135 MMOL/L (ref 136–145)
SODIUM SERPL-SCNC: 137 MMOL/L (ref 136–145)
SODIUM SERPL-SCNC: 138 MMOL/L (ref 136–145)
SODIUM SERPL-SCNC: 138 MMOL/L (ref 136–145)
SODIUM SERPL-SCNC: 146 MMOL/L (ref 136–145)
SP GR UR REFRACTOMETRY: 1.02
SPECIMEN SITE: ABNORMAL
SPECIMEN SITE: NORMAL
SPECIMEN TYPE: ABNORMAL
STAPH EPIDERMIDIS, STEP: NOT DETECTED
STAPH LUGDUNENSIS, STALUG: DETECTED
STAPHYLOCOCCUS AUREUS: NOT DETECTED
STAPHYLOCOCCUS, STAPP: DETECTED
STENO MALTOPHILIA, STMA: NOT DETECTED
STREPTOCOCCUS , STPSP: DETECTED
STREPTOCOCCUS AGALACTIAE (GROUP B): NOT DETECTED
STREPTOCOCCUS PNEUMONIAE , SPNP: NOT DETECTED
STREPTOCOCCUS PYOGENES (GROUP A), SPYOP: NOT DETECTED
T4 FREE SERPL-MCNC: 1.2 NG/DL (ref 0.8–1.5)
THERAPEUTIC RANGE,PTTT: ABNORMAL SECS (ref 58–77)
THERAPEUTIC RANGE,PTTT: NORMAL SECS (ref 58–77)
TRIGL SERPL-MCNC: 172 MG/DL (ref ?–150)
TRIGL SERPL-MCNC: 204 MG/DL (ref ?–150)
TROPONIN-HIGH SENSITIVITY: 1077 NG/L (ref 0–51)
TROPONIN-HIGH SENSITIVITY: 1348 NG/L (ref 0–51)
TROPONIN-HIGH SENSITIVITY: 880 NG/L (ref 0–51)
TSH SERPL DL<=0.05 MIU/L-ACNC: 3.57 UIU/ML (ref 0.36–3.74)
UA: UC IF INDICATED,UAUC: ABNORMAL
UROBILINOGEN UR QL STRIP.AUTO: 1 EU/DL (ref 0.2–1)
VANCOMYCIN SERPL-MCNC: 8.1 UG/ML
VAS RIGHT SUBCLAVIAN PROX EDV: 17.5 CM/S
VAS RIGHT SUBCLAVIAN PROX PSV: 114.1 CM/S
VENTILATION MODE VENT: ABNORMAL
VENTRICULAR RATE, ECG03: 47 BPM
VENTRICULAR RATE, ECG03: 49 BPM
VENTRICULAR RATE, ECG03: 62 BPM
VIT B12 SERPL-MCNC: 1222 PG/ML (ref 193–986)
VLDLC SERPL CALC-MCNC: 34.4 MG/DL
VLDLC SERPL CALC-MCNC: 40.8 MG/DL
WBC # BLD AUTO: 16.2 K/UL (ref 3.6–11)
WBC # BLD AUTO: 18 K/UL (ref 3.6–11)
WBC # BLD AUTO: 18.2 K/UL (ref 3.6–11)
WBC # BLD AUTO: 21.9 K/UL (ref 3.6–11)
WBC # BLD AUTO: 26.8 K/UL (ref 3.6–11)
WBC # BLD AUTO: 27 K/UL (ref 3.6–11)
WBC # BLD AUTO: 29.1 K/UL (ref 3.6–11)
WBC # BLD AUTO: 35.6 K/UL (ref 3.6–11)
WBC URNS QL MICRO: >100 /HPF (ref 0–4)

## 2022-01-01 PROCEDURE — 74011000258 HC RX REV CODE- 258: Performed by: NURSE PRACTITIONER

## 2022-01-01 PROCEDURE — 85025 COMPLETE CBC W/AUTO DIFF WBC: CPT

## 2022-01-01 PROCEDURE — 83735 ASSAY OF MAGNESIUM: CPT

## 2022-01-01 PROCEDURE — 83036 HEMOGLOBIN GLYCOSYLATED A1C: CPT

## 2022-01-01 PROCEDURE — 74011250636 HC RX REV CODE- 250/636: Performed by: SURGERY

## 2022-01-01 PROCEDURE — 36415 COLL VENOUS BLD VENIPUNCTURE: CPT

## 2022-01-01 PROCEDURE — 80048 BASIC METABOLIC PNL TOTAL CA: CPT

## 2022-01-01 PROCEDURE — 82803 BLOOD GASES ANY COMBINATION: CPT

## 2022-01-01 PROCEDURE — 93005 ELECTROCARDIOGRAM TRACING: CPT

## 2022-01-01 PROCEDURE — 74011250636 HC RX REV CODE- 250/636: Performed by: INTERNAL MEDICINE

## 2022-01-01 PROCEDURE — 65610000006 HC RM INTENSIVE CARE

## 2022-01-01 PROCEDURE — 93880 EXTRACRANIAL BILAT STUDY: CPT

## 2022-01-01 PROCEDURE — 95706 EEG WO VID 2-12HR INTMT MNTR: CPT | Performed by: NURSE PRACTITIONER

## 2022-01-01 PROCEDURE — 84100 ASSAY OF PHOSPHORUS: CPT

## 2022-01-01 PROCEDURE — 2022F DILAT RTA XM EVC RTNOPTHY: CPT | Performed by: NURSE PRACTITIONER

## 2022-01-01 PROCEDURE — 1111F DSCHRG MED/CURRENT MED MERGE: CPT | Performed by: NURSE PRACTITIONER

## 2022-01-01 PROCEDURE — 77030002986 HC SUT PROL J&J -A: Performed by: SURGERY

## 2022-01-01 PROCEDURE — 74011636637 HC RX REV CODE- 636/637: Performed by: NURSE PRACTITIONER

## 2022-01-01 PROCEDURE — 99285 EMERGENCY DEPT VISIT HI MDM: CPT

## 2022-01-01 PROCEDURE — 77030014008 HC SPNG HEMSTAT J&J -C: Performed by: SURGERY

## 2022-01-01 PROCEDURE — 87086 URINE CULTURE/COLONY COUNT: CPT

## 2022-01-01 PROCEDURE — 74011000250 HC RX REV CODE- 250: Performed by: STUDENT IN AN ORGANIZED HEALTH CARE EDUCATION/TRAINING PROGRAM

## 2022-01-01 PROCEDURE — 85027 COMPLETE CBC AUTOMATED: CPT

## 2022-01-01 PROCEDURE — 74011000258 HC RX REV CODE- 258: Performed by: INTERNAL MEDICINE

## 2022-01-01 PROCEDURE — 85730 THROMBOPLASTIN TIME PARTIAL: CPT

## 2022-01-01 PROCEDURE — 74011250636 HC RX REV CODE- 250/636: Performed by: HOSPITALIST

## 2022-01-01 PROCEDURE — 96375 TX/PRO/DX INJ NEW DRUG ADDON: CPT

## 2022-01-01 PROCEDURE — 74011000250 HC RX REV CODE- 250: Performed by: HOSPITALIST

## 2022-01-01 PROCEDURE — P9047 ALBUMIN (HUMAN), 25%, 50ML: HCPCS | Performed by: HOSPITALIST

## 2022-01-01 PROCEDURE — 82140 ASSAY OF AMMONIA: CPT

## 2022-01-01 PROCEDURE — 82962 GLUCOSE BLOOD TEST: CPT

## 2022-01-01 PROCEDURE — 70450 CT HEAD/BRAIN W/O DYE: CPT

## 2022-01-01 PROCEDURE — 70551 MRI BRAIN STEM W/O DYE: CPT

## 2022-01-01 PROCEDURE — 75810000457 HC INSERT PICC CATHETER LVL 3 5183

## 2022-01-01 PROCEDURE — 94002 VENT MGMT INPAT INIT DAY: CPT

## 2022-01-01 PROCEDURE — 74011250636 HC RX REV CODE- 250/636: Performed by: STUDENT IN AN ORGANIZED HEALTH CARE EDUCATION/TRAINING PROGRAM

## 2022-01-01 PROCEDURE — 84484 ASSAY OF TROPONIN QUANT: CPT

## 2022-01-01 PROCEDURE — 74011000250 HC RX REV CODE- 250: Performed by: INTERNAL MEDICINE

## 2022-01-01 PROCEDURE — 74011250637 HC RX REV CODE- 250/637: Performed by: HOSPITALIST

## 2022-01-01 PROCEDURE — G8536 NO DOC ELDER MAL SCRN: HCPCS | Performed by: NURSE PRACTITIONER

## 2022-01-01 PROCEDURE — 1101F PT FALLS ASSESS-DOCD LE1/YR: CPT | Performed by: NURSE PRACTITIONER

## 2022-01-01 PROCEDURE — 74011000250 HC RX REV CODE- 250: Performed by: NURSE PRACTITIONER

## 2022-01-01 PROCEDURE — 5A1945Z RESPIRATORY VENTILATION, 24-96 CONSECUTIVE HOURS: ICD-10-PCS | Performed by: HOSPITALIST

## 2022-01-01 PROCEDURE — 85384 FIBRINOGEN ACTIVITY: CPT

## 2022-01-01 PROCEDURE — 99232 SBSQ HOSP IP/OBS MODERATE 35: CPT | Performed by: PSYCHIATRY & NEUROLOGY

## 2022-01-01 PROCEDURE — 82306 VITAMIN D 25 HYDROXY: CPT

## 2022-01-01 PROCEDURE — 96374 THER/PROPH/DIAG INJ IV PUSH: CPT

## 2022-01-01 PROCEDURE — 85610 PROTHROMBIN TIME: CPT

## 2022-01-01 PROCEDURE — 82607 VITAMIN B-12: CPT

## 2022-01-01 PROCEDURE — 36600 WITHDRAWAL OF ARTERIAL BLOOD: CPT

## 2022-01-01 PROCEDURE — 93306 TTE W/DOPPLER COMPLETE: CPT

## 2022-01-01 PROCEDURE — 74011250637 HC RX REV CODE- 250/637: Performed by: INTERNAL MEDICINE

## 2022-01-01 PROCEDURE — 0BH17EZ INSERTION OF ENDOTRACHEAL AIRWAY INTO TRACHEA, VIA NATURAL OR ARTIFICIAL OPENING: ICD-10-PCS | Performed by: HOSPITALIST

## 2022-01-01 PROCEDURE — 76010000161 HC OR TIME 1 TO 1.5 HR INTENSV-TIER 1: Performed by: SURGERY

## 2022-01-01 PROCEDURE — G8420 CALC BMI NORM PARAMETERS: HCPCS | Performed by: NURSE PRACTITIONER

## 2022-01-01 PROCEDURE — 95819 EEG AWAKE AND ASLEEP: CPT | Performed by: PSYCHIATRY & NEUROLOGY

## 2022-01-01 PROCEDURE — 85652 RBC SED RATE AUTOMATED: CPT

## 2022-01-01 PROCEDURE — 82746 ASSAY OF FOLIC ACID SERUM: CPT

## 2022-01-01 PROCEDURE — 74011000250 HC RX REV CODE- 250

## 2022-01-01 PROCEDURE — 71045 X-RAY EXAM CHEST 1 VIEW: CPT

## 2022-01-01 PROCEDURE — 99223 1ST HOSP IP/OBS HIGH 75: CPT | Performed by: PSYCHIATRY & NEUROLOGY

## 2022-01-01 PROCEDURE — 82947 ASSAY GLUCOSE BLOOD QUANT: CPT

## 2022-01-01 PROCEDURE — 80061 LIPID PANEL: CPT

## 2022-01-01 PROCEDURE — 74011250636 HC RX REV CODE- 250/636: Performed by: NURSE ANESTHETIST, CERTIFIED REGISTERED

## 2022-01-01 PROCEDURE — 74011250637 HC RX REV CODE- 250/637: Performed by: STUDENT IN AN ORGANIZED HEALTH CARE EDUCATION/TRAINING PROGRAM

## 2022-01-01 PROCEDURE — 02PYX3Z REMOVAL OF INFUSION DEVICE FROM GREAT VESSEL, EXTERNAL APPROACH: ICD-10-PCS | Performed by: SURGERY

## 2022-01-01 PROCEDURE — 87040 BLOOD CULTURE FOR BACTERIA: CPT

## 2022-01-01 PROCEDURE — 87077 CULTURE AEROBIC IDENTIFY: CPT

## 2022-01-01 PROCEDURE — 74011636637 HC RX REV CODE- 636/637: Performed by: INTERNAL MEDICINE

## 2022-01-01 PROCEDURE — 82550 ASSAY OF CK (CPK): CPT

## 2022-01-01 PROCEDURE — 94003 VENT MGMT INPAT SUBQ DAY: CPT

## 2022-01-01 PROCEDURE — 95816 EEG AWAKE AND DROWSY: CPT | Performed by: PSYCHIATRY & NEUROLOGY

## 2022-01-01 PROCEDURE — 83605 ASSAY OF LACTIC ACID: CPT

## 2022-01-01 PROCEDURE — 36430 TRANSFUSION BLD/BLD COMPNT: CPT

## 2022-01-01 PROCEDURE — P9047 ALBUMIN (HUMAN), 25%, 50ML: HCPCS | Performed by: INTERNAL MEDICINE

## 2022-01-01 PROCEDURE — 84439 ASSAY OF FREE THYROXINE: CPT

## 2022-01-01 PROCEDURE — 74011000250 HC RX REV CODE- 250: Performed by: NURSE ANESTHETIST, CERTIFIED REGISTERED

## 2022-01-01 PROCEDURE — 76060000033 HC ANESTHESIA 1 TO 1.5 HR: Performed by: SURGERY

## 2022-01-01 PROCEDURE — 74011250636 HC RX REV CODE- 250/636: Performed by: NURSE PRACTITIONER

## 2022-01-01 PROCEDURE — G8427 DOCREV CUR MEDS BY ELIG CLIN: HCPCS | Performed by: NURSE PRACTITIONER

## 2022-01-01 PROCEDURE — G9711 PT HX TOT COL OR COLON CA: HCPCS | Performed by: NURSE PRACTITIONER

## 2022-01-01 PROCEDURE — 02HV33Z INSERTION OF INFUSION DEVICE INTO SUPERIOR VENA CAVA, PERCUTANEOUS APPROACH: ICD-10-PCS | Performed by: HOSPITALIST

## 2022-01-01 PROCEDURE — 80307 DRUG TEST PRSMV CHEM ANLYZR: CPT

## 2022-01-01 PROCEDURE — 80053 COMPREHEN METABOLIC PANEL: CPT

## 2022-01-01 PROCEDURE — 2709999900 HC NON-CHARGEABLE SUPPLY: Performed by: SURGERY

## 2022-01-01 PROCEDURE — 90935 HEMODIALYSIS ONE EVALUATION: CPT

## 2022-01-01 PROCEDURE — 65270000046 HC RM TELEMETRY

## 2022-01-01 PROCEDURE — 84443 ASSAY THYROID STIM HORMONE: CPT

## 2022-01-01 PROCEDURE — 51702 INSERT TEMP BLADDER CATH: CPT

## 2022-01-01 PROCEDURE — 70544 MR ANGIOGRAPHY HEAD W/O DYE: CPT

## 2022-01-01 PROCEDURE — 86038 ANTINUCLEAR ANTIBODIES: CPT

## 2022-01-01 PROCEDURE — 77030013079 HC BLNKT BAIR HGGR 3M -A: Performed by: ANESTHESIOLOGY

## 2022-01-01 PROCEDURE — 77030031139 HC SUT VCRL2 J&J -A: Performed by: SURGERY

## 2022-01-01 PROCEDURE — 1090F PRES/ABSN URINE INCON ASSESS: CPT | Performed by: NURSE PRACTITIONER

## 2022-01-01 PROCEDURE — 77030038692 HC WND DEB SYS IRMX -B: Performed by: SURGERY

## 2022-01-01 PROCEDURE — 74011000258 HC RX REV CODE- 258: Performed by: HOSPITALIST

## 2022-01-01 PROCEDURE — 87186 SC STD MICRODIL/AGAR DIL: CPT

## 2022-01-01 PROCEDURE — G8400 PT W/DXA NO RESULTS DOC: HCPCS | Performed by: NURSE PRACTITIONER

## 2022-01-01 PROCEDURE — 99214 OFFICE O/P EST MOD 30 MIN: CPT | Performed by: NURSE PRACTITIONER

## 2022-01-01 PROCEDURE — 74011250636 HC RX REV CODE- 250/636

## 2022-01-01 PROCEDURE — G8432 DEP SCR NOT DOC, RNG: HCPCS | Performed by: NURSE PRACTITIONER

## 2022-01-01 PROCEDURE — 81001 URINALYSIS AUTO W/SCOPE: CPT

## 2022-01-01 PROCEDURE — 87150 DNA/RNA AMPLIFIED PROBE: CPT

## 2022-01-01 PROCEDURE — 86923 COMPATIBILITY TEST ELECTRIC: CPT

## 2022-01-01 PROCEDURE — 5A1D70Z PERFORMANCE OF URINARY FILTRATION, INTERMITTENT, LESS THAN 6 HOURS PER DAY: ICD-10-PCS | Performed by: HOSPITALIST

## 2022-01-01 PROCEDURE — 86900 BLOOD TYPING SEROLOGIC ABO: CPT

## 2022-01-01 PROCEDURE — 99233 SBSQ HOSP IP/OBS HIGH 50: CPT | Performed by: PSYCHIATRY & NEUROLOGY

## 2022-01-01 PROCEDURE — P9045 ALBUMIN (HUMAN), 5%, 250 ML: HCPCS | Performed by: HOSPITALIST

## 2022-01-01 PROCEDURE — P9016 RBC LEUKOCYTES REDUCED: HCPCS

## 2022-01-01 PROCEDURE — 82077 ASSAY SPEC XCP UR&BREATH IA: CPT

## 2022-01-01 PROCEDURE — 80202 ASSAY OF VANCOMYCIN: CPT

## 2022-01-01 PROCEDURE — 77030026438 HC STYL ET INTUB CARD -A: Performed by: ANESTHESIOLOGY

## 2022-01-01 PROCEDURE — 77030008684 HC TU ET CUF COVD -B: Performed by: ANESTHESIOLOGY

## 2022-01-01 PROCEDURE — 93880 EXTRACRANIAL BILAT STUDY: CPT | Performed by: PSYCHIATRY & NEUROLOGY

## 2022-01-01 PROCEDURE — 3044F HG A1C LEVEL LT 7.0%: CPT | Performed by: NURSE PRACTITIONER

## 2022-01-01 PROCEDURE — 06HN33Z INSERTION OF INFUSION DEVICE INTO LEFT FEMORAL VEIN, PERCUTANEOUS APPROACH: ICD-10-PCS | Performed by: HOSPITALIST

## 2022-01-01 PROCEDURE — 74011000272 HC RX REV CODE- 272: Performed by: SURGERY

## 2022-01-01 PROCEDURE — G9899 SCRN MAM PERF RSLTS DOC: HCPCS | Performed by: NURSE PRACTITIONER

## 2022-01-01 RX ORDER — ALBUMIN HUMAN 50 G/1000ML
SOLUTION INTRAVENOUS
Status: DISCONTINUED
Start: 2022-01-01 | End: 2022-01-01 | Stop reason: HOSPADM

## 2022-01-01 RX ORDER — LIDOCAINE 4 G/100G
1 PATCH TOPICAL
Status: DISCONTINUED | OUTPATIENT
Start: 2022-01-01 | End: 2022-11-12 | Stop reason: HOSPADM

## 2022-01-01 RX ORDER — SEVELAMER CARBONATE 800 MG/1
800 TABLET, FILM COATED ORAL
Status: DISCONTINUED | OUTPATIENT
Start: 2022-01-01 | End: 2022-01-01

## 2022-01-01 RX ORDER — SODIUM CHLORIDE 9 MG/ML
INJECTION, SOLUTION INTRAVENOUS
Status: DISCONTINUED | OUTPATIENT
Start: 2022-01-01 | End: 2022-01-01 | Stop reason: HOSPADM

## 2022-01-01 RX ORDER — LIDOCAINE 50 MG/G
1 PATCH TOPICAL EVERY 12 HOURS
Qty: 30 EACH | Refills: 1 | Status: SHIPPED | OUTPATIENT
Start: 2022-01-01

## 2022-01-01 RX ORDER — SODIUM BICARBONATE 1 MEQ/ML
SYRINGE (ML) INTRAVENOUS
Status: DISCONTINUED
Start: 2022-01-01 | End: 2022-01-01 | Stop reason: HOSPADM

## 2022-01-01 RX ORDER — ATROPINE SULFATE 1 MG/ML
0.5 INJECTION, SOLUTION INTRAVENOUS AS NEEDED
Status: DISCONTINUED | OUTPATIENT
Start: 2022-01-01 | End: 2022-11-12 | Stop reason: HOSPADM

## 2022-01-01 RX ORDER — ALBUMIN HUMAN 50 G/1000ML
25 SOLUTION INTRAVENOUS
Status: COMPLETED | OUTPATIENT
Start: 2022-01-01 | End: 2022-01-01

## 2022-01-01 RX ORDER — SODIUM BICARBONATE 1 MEQ/ML
50 SYRINGE (ML) INTRAVENOUS ONCE
Status: COMPLETED | OUTPATIENT
Start: 2022-01-01 | End: 2022-01-01

## 2022-01-01 RX ORDER — FENTANYL CITRATE 50 UG/ML
25-50 INJECTION, SOLUTION INTRAMUSCULAR; INTRAVENOUS
Status: DISCONTINUED | OUTPATIENT
Start: 2022-01-01 | End: 2022-11-12 | Stop reason: HOSPADM

## 2022-01-01 RX ORDER — NOREPINEPHRINE BITARTRATE/D5W 8 MG/250ML
.5-2 PLASTIC BAG, INJECTION (ML) INTRAVENOUS
Status: DISPENSED | OUTPATIENT
Start: 2022-01-01 | End: 2022-01-01

## 2022-01-01 RX ORDER — MIDAZOLAM HYDROCHLORIDE 1 MG/ML
1 INJECTION, SOLUTION INTRAMUSCULAR; INTRAVENOUS ONCE
Status: COMPLETED | OUTPATIENT
Start: 2022-01-01 | End: 2022-01-01

## 2022-01-01 RX ORDER — PROPOFOL 10 MG/ML
0-50 VIAL (ML) INTRAVENOUS
Status: DISCONTINUED | OUTPATIENT
Start: 2022-01-01 | End: 2022-11-12 | Stop reason: HOSPADM

## 2022-01-01 RX ORDER — FENTANYL CITRATE 50 UG/ML
25 INJECTION, SOLUTION INTRAMUSCULAR; INTRAVENOUS ONCE
Status: COMPLETED | OUTPATIENT
Start: 2022-01-01 | End: 2022-01-01

## 2022-01-01 RX ORDER — ATORVASTATIN CALCIUM 40 MG/1
40 TABLET, FILM COATED ORAL DAILY
Status: DISCONTINUED | OUTPATIENT
Start: 2022-01-01 | End: 2022-01-01 | Stop reason: SDUPTHER

## 2022-01-01 RX ORDER — CARVEDILOL 3.12 MG/1
3.12 TABLET ORAL 2 TIMES DAILY WITH MEALS
Status: DISCONTINUED | OUTPATIENT
Start: 2022-01-01 | End: 2022-01-01

## 2022-01-01 RX ORDER — ATROPINE SULFATE 1 MG/ML
0.5 INJECTION, SOLUTION INTRAVENOUS ONCE
Status: DISCONTINUED | OUTPATIENT
Start: 2022-01-01 | End: 2022-01-01

## 2022-01-01 RX ORDER — HEPARIN SODIUM 1000 [USP'U]/ML
30 INJECTION, SOLUTION INTRAVENOUS; SUBCUTANEOUS AS NEEDED
Status: DISCONTINUED | OUTPATIENT
Start: 2022-01-01 | End: 2022-11-12 | Stop reason: HOSPADM

## 2022-01-01 RX ORDER — LEVOTHYROXINE SODIUM 50 UG/1
50 TABLET ORAL
Status: DISCONTINUED | OUTPATIENT
Start: 2022-01-01 | End: 2022-11-12 | Stop reason: HOSPADM

## 2022-01-01 RX ORDER — SODIUM BICARBONATE 1 MEQ/ML
SYRINGE (ML) INTRAVENOUS
Status: COMPLETED
Start: 2022-01-01 | End: 2022-01-01

## 2022-01-01 RX ORDER — TIZANIDINE 2 MG/1
2 TABLET ORAL
Qty: 30 TABLET | Refills: 0 | Status: SHIPPED | OUTPATIENT
Start: 2022-01-01

## 2022-01-01 RX ORDER — PROPOFOL 10 MG/ML
INJECTION, EMULSION INTRAVENOUS AS NEEDED
Status: DISCONTINUED | OUTPATIENT
Start: 2022-01-01 | End: 2022-01-01 | Stop reason: HOSPADM

## 2022-01-01 RX ORDER — ACETAMINOPHEN 325 MG/1
650 TABLET ORAL
Status: DISCONTINUED | OUTPATIENT
Start: 2022-01-01 | End: 2022-11-12 | Stop reason: HOSPADM

## 2022-01-01 RX ORDER — NOREPINEPHRINE BITARTRATE/D5W 8 MG/250ML
.5-3 PLASTIC BAG, INJECTION (ML) INTRAVENOUS
Status: DISCONTINUED | OUTPATIENT
Start: 2022-01-01 | End: 2022-01-01 | Stop reason: SDUPTHER

## 2022-01-01 RX ORDER — ATROPINE SULFATE 0.1 MG/ML
INJECTION INTRAVENOUS
Status: DISPENSED
Start: 2022-01-01 | End: 2022-01-01

## 2022-01-01 RX ORDER — DOPAMINE HYDROCHLORIDE 320 MG/100ML
0-20 INJECTION, SOLUTION INTRAVENOUS
Status: DISCONTINUED | OUTPATIENT
Start: 2022-01-01 | End: 2022-11-12 | Stop reason: HOSPADM

## 2022-01-01 RX ORDER — INSULIN LISPRO 100 [IU]/ML
INJECTION, SOLUTION INTRAVENOUS; SUBCUTANEOUS EVERY 6 HOURS
Status: DISCONTINUED | OUTPATIENT
Start: 2022-01-01 | End: 2022-11-12 | Stop reason: HOSPADM

## 2022-01-01 RX ORDER — TIZANIDINE 2 MG/1
2 TABLET ORAL
Status: DISCONTINUED | OUTPATIENT
Start: 2022-01-01 | End: 2022-11-12 | Stop reason: HOSPADM

## 2022-01-01 RX ORDER — PHENYLEPHRINE HCL IN 0.9% NACL 100MG/250
10-300 PLASTIC BAG, INJECTION (ML) INTRAVENOUS
Status: DISCONTINUED | OUTPATIENT
Start: 2022-01-01 | End: 2022-11-12 | Stop reason: HOSPADM

## 2022-01-01 RX ORDER — ATROPINE SULFATE 1 MG/ML
1 INJECTION, SOLUTION INTRAVENOUS ONCE
Status: COMPLETED | OUTPATIENT
Start: 2022-01-01 | End: 2022-01-01

## 2022-01-01 RX ORDER — FOLIC ACID/VIT B COMPLEX AND C 0.8 MG
TABLET ORAL
COMMUNITY
Start: 2022-01-01

## 2022-01-01 RX ORDER — HYDROCORTISONE SODIUM SUCCINATE 100 MG/2ML
100 INJECTION, POWDER, FOR SOLUTION INTRAMUSCULAR; INTRAVENOUS EVERY 8 HOURS
Status: DISCONTINUED | OUTPATIENT
Start: 2022-01-01 | End: 2022-11-12 | Stop reason: HOSPADM

## 2022-01-01 RX ORDER — EPHEDRINE SULFATE/0.9% NACL/PF 50 MG/5 ML
SYRINGE (ML) INTRAVENOUS AS NEEDED
Status: DISCONTINUED | OUTPATIENT
Start: 2022-01-01 | End: 2022-01-01

## 2022-01-01 RX ORDER — ACETAMINOPHEN 325 MG/1
650 TABLET ORAL
Status: DISCONTINUED | OUTPATIENT
Start: 2022-01-01 | End: 2022-01-01

## 2022-01-01 RX ORDER — MAGNESIUM SULFATE 100 %
4 CRYSTALS MISCELLANEOUS AS NEEDED
Status: DISCONTINUED | OUTPATIENT
Start: 2022-01-01 | End: 2022-01-01 | Stop reason: SDUPTHER

## 2022-01-01 RX ORDER — CHLORHEXIDINE GLUCONATE 0.12 MG/ML
15 RINSE ORAL EVERY 12 HOURS
Status: DISCONTINUED | OUTPATIENT
Start: 2022-01-01 | End: 2022-11-12 | Stop reason: HOSPADM

## 2022-01-01 RX ORDER — SODIUM BICARBONATE 1 MEQ/ML
150 SYRINGE (ML) INTRAVENOUS ONCE
Status: COMPLETED | OUTPATIENT
Start: 2022-01-01 | End: 2022-01-01

## 2022-01-01 RX ORDER — LANOLIN ALCOHOL/MO/W.PET/CERES
4.5 CREAM (GRAM) TOPICAL
Status: DISCONTINUED | OUTPATIENT
Start: 2022-01-01 | End: 2022-11-12 | Stop reason: HOSPADM

## 2022-01-01 RX ORDER — BALSAM PERU/CASTOR OIL
OINTMENT (GRAM) TOPICAL 2 TIMES DAILY
Status: DISCONTINUED | OUTPATIENT
Start: 2022-01-01 | End: 2022-11-12 | Stop reason: HOSPADM

## 2022-01-01 RX ORDER — HEPARIN SODIUM 1000 [USP'U]/ML
60 INJECTION, SOLUTION INTRAVENOUS; SUBCUTANEOUS ONCE
Status: COMPLETED | OUTPATIENT
Start: 2022-01-01 | End: 2022-01-01

## 2022-01-01 RX ORDER — HEPARIN 100 UNIT/ML
300 SYRINGE INTRAVENOUS ONCE
Status: DISPENSED | OUTPATIENT
Start: 2022-01-01 | End: 2022-01-01

## 2022-01-01 RX ORDER — SODIUM CHLORIDE 9 MG/ML
250 INJECTION, SOLUTION INTRAVENOUS AS NEEDED
Status: DISCONTINUED | OUTPATIENT
Start: 2022-01-01 | End: 2022-11-12 | Stop reason: HOSPADM

## 2022-01-01 RX ORDER — SODIUM BICARBONATE 1 MEQ/ML
100 SYRINGE (ML) INTRAVENOUS ONCE
Status: COMPLETED | OUTPATIENT
Start: 2022-01-01 | End: 2022-01-01

## 2022-01-01 RX ORDER — NITROGLYCERIN 0.4 MG/1
0.4 TABLET SUBLINGUAL AS NEEDED
Status: DISCONTINUED | OUTPATIENT
Start: 2022-01-01 | End: 2022-11-12 | Stop reason: HOSPADM

## 2022-01-01 RX ORDER — LEVOTHYROXINE SODIUM 50 UG/1
TABLET ORAL
Qty: 30 TABLET | Refills: 1 | Status: SHIPPED | OUTPATIENT
Start: 2022-01-01

## 2022-01-01 RX ORDER — ALBUMIN HUMAN 250 G/1000ML
25 SOLUTION INTRAVENOUS ONCE
Status: COMPLETED | OUTPATIENT
Start: 2022-01-01 | End: 2022-01-01

## 2022-01-01 RX ORDER — ATROPINE SULFATE 0.1 MG/ML
0.6 INJECTION INTRAVENOUS ONCE
Status: COMPLETED | OUTPATIENT
Start: 2022-01-01 | End: 2022-01-01

## 2022-01-01 RX ORDER — INSULIN LISPRO 100 [IU]/ML
1-8 INJECTION, SOLUTION INTRAVENOUS; SUBCUTANEOUS
Status: DISCONTINUED | OUTPATIENT
Start: 2022-01-01 | End: 2022-01-01

## 2022-01-01 RX ORDER — ERGOCALCIFEROL 1.25 MG/1
50000 CAPSULE ORAL
Status: DISCONTINUED | OUTPATIENT
Start: 2022-01-01 | End: 2022-11-12 | Stop reason: HOSPADM

## 2022-01-01 RX ORDER — LATANOPROST 50 UG/ML
SOLUTION/ DROPS OPHTHALMIC
COMMUNITY
Start: 2022-01-01

## 2022-01-01 RX ORDER — ALBUMIN HUMAN 250 G/1000ML
50 SOLUTION INTRAVENOUS ONCE
Status: DISCONTINUED | OUTPATIENT
Start: 2022-01-01 | End: 2022-01-01 | Stop reason: SDUPTHER

## 2022-01-01 RX ORDER — INSULIN PUMP SYRINGE, 3 ML
EACH MISCELLANEOUS
Qty: 1 KIT | Refills: 0 | Status: SHIPPED | OUTPATIENT
Start: 2022-01-01

## 2022-01-01 RX ORDER — FENTANYL CITRATE 50 UG/ML
25-50 INJECTION, SOLUTION INTRAMUSCULAR; INTRAVENOUS
Status: DISCONTINUED | OUTPATIENT
Start: 2022-01-01 | End: 2022-01-01

## 2022-01-01 RX ORDER — FENTANYL CITRATE 50 UG/ML
INJECTION, SOLUTION INTRAMUSCULAR; INTRAVENOUS AS NEEDED
Status: DISCONTINUED | OUTPATIENT
Start: 2022-01-01 | End: 2022-01-01 | Stop reason: HOSPADM

## 2022-01-01 RX ORDER — HYDRALAZINE HYDROCHLORIDE 20 MG/ML
10 INJECTION INTRAMUSCULAR; INTRAVENOUS
Status: DISCONTINUED | OUTPATIENT
Start: 2022-01-01 | End: 2022-11-12 | Stop reason: HOSPADM

## 2022-01-01 RX ORDER — ATORVASTATIN CALCIUM 40 MG/1
40 TABLET, FILM COATED ORAL
Status: DISCONTINUED | OUTPATIENT
Start: 2022-01-01 | End: 2022-11-12 | Stop reason: HOSPADM

## 2022-01-01 RX ORDER — ATORVASTATIN CALCIUM 40 MG/1
40 TABLET, FILM COATED ORAL DAILY
Qty: 90 TABLET | Refills: 2 | Status: SHIPPED | OUTPATIENT
Start: 2022-01-01

## 2022-01-01 RX ORDER — LIDOCAINE HYDROCHLORIDE 20 MG/ML
18 INJECTION, SOLUTION INFILTRATION; PERINEURAL ONCE
Status: ACTIVE | OUTPATIENT
Start: 2022-01-01 | End: 2022-01-01

## 2022-01-01 RX ORDER — LIDOCAINE HYDROCHLORIDE 20 MG/ML
INJECTION, SOLUTION EPIDURAL; INFILTRATION; INTRACAUDAL; PERINEURAL AS NEEDED
Status: DISCONTINUED | OUTPATIENT
Start: 2022-01-01 | End: 2022-01-01 | Stop reason: HOSPADM

## 2022-01-01 RX ORDER — ASPIRIN 325 MG
325 TABLET ORAL ONCE
Status: ACTIVE | OUTPATIENT
Start: 2022-01-01 | End: 2022-01-01

## 2022-01-01 RX ORDER — ALBUMIN HUMAN 250 G/1000ML
12.5 SOLUTION INTRAVENOUS
Status: COMPLETED | OUTPATIENT
Start: 2022-01-01 | End: 2022-01-01

## 2022-01-01 RX ORDER — NOREPINEPHRINE BITARTRATE/D5W 8 MG/250ML
.5-16 PLASTIC BAG, INJECTION (ML) INTRAVENOUS
Status: DISCONTINUED | OUTPATIENT
Start: 2022-01-01 | End: 2022-01-01 | Stop reason: SDUPTHER

## 2022-01-01 RX ORDER — GUAIFENESIN 100 MG/5ML
81 LIQUID (ML) ORAL DAILY
Status: DISCONTINUED | OUTPATIENT
Start: 2022-01-01 | End: 2022-01-01 | Stop reason: SDUPTHER

## 2022-01-01 RX ORDER — NOREPINEPHRINE BITARTRATE/D5W 8 MG/250ML
.5-16 PLASTIC BAG, INJECTION (ML) INTRAVENOUS
Status: DISCONTINUED | OUTPATIENT
Start: 2022-01-01 | End: 2022-01-01

## 2022-01-01 RX ORDER — FAMOTIDINE 20 MG/1
20 TABLET, FILM COATED ORAL
Qty: 60 TABLET | Refills: 1 | Status: SHIPPED | OUTPATIENT
Start: 2022-01-01

## 2022-01-01 RX ORDER — HEPARIN SODIUM 200 [USP'U]/100ML
400 INJECTION, SOLUTION INTRAVENOUS ONCE
Status: DISPENSED | OUTPATIENT
Start: 2022-01-01 | End: 2022-01-01

## 2022-01-01 RX ORDER — AMLODIPINE BESYLATE 5 MG/1
5 TABLET ORAL DAILY
Qty: 90 TABLET | Refills: 1 | Status: SHIPPED | OUTPATIENT
Start: 2022-01-01

## 2022-01-01 RX ORDER — HEPARIN SODIUM 10000 [USP'U]/100ML
12-25 INJECTION, SOLUTION INTRAVENOUS
Status: DISCONTINUED | OUTPATIENT
Start: 2022-01-01 | End: 2022-11-12 | Stop reason: HOSPADM

## 2022-01-01 RX ORDER — IBUPROFEN 200 MG
1 TABLET ORAL DAILY
Status: DISCONTINUED | OUTPATIENT
Start: 2022-01-01 | End: 2022-11-12 | Stop reason: HOSPADM

## 2022-01-01 RX ORDER — MORPHINE SULFATE 2 MG/ML
2 INJECTION, SOLUTION INTRAMUSCULAR; INTRAVENOUS ONCE
Status: ACTIVE | OUTPATIENT
Start: 2022-01-01 | End: 2022-01-01

## 2022-01-01 RX ORDER — DEXTROSE MONOHYDRATE 100 MG/ML
0-250 INJECTION, SOLUTION INTRAVENOUS AS NEEDED
Status: DISCONTINUED | OUTPATIENT
Start: 2022-01-01 | End: 2022-11-12 | Stop reason: HOSPADM

## 2022-01-01 RX ORDER — DEXTROSE MONOHYDRATE 100 MG/ML
INJECTION, SOLUTION INTRAVENOUS
Status: COMPLETED
Start: 2022-01-01 | End: 2022-01-01

## 2022-01-01 RX ORDER — DEXTROSE MONOHYDRATE 100 MG/ML
0-250 INJECTION, SOLUTION INTRAVENOUS AS NEEDED
Status: DISCONTINUED | OUTPATIENT
Start: 2022-01-01 | End: 2022-01-01 | Stop reason: SDUPTHER

## 2022-01-01 RX ORDER — LANCETS
EACH MISCELLANEOUS
Qty: 1 EACH | Refills: 11 | Status: SHIPPED | OUTPATIENT
Start: 2022-01-01

## 2022-01-01 RX ORDER — HEPARIN SODIUM 1000 [USP'U]/ML
60 INJECTION, SOLUTION INTRAVENOUS; SUBCUTANEOUS AS NEEDED
Status: DISCONTINUED | OUTPATIENT
Start: 2022-01-01 | End: 2022-11-12 | Stop reason: HOSPADM

## 2022-01-01 RX ORDER — ALBUMIN HUMAN 250 G/1000ML
50 SOLUTION INTRAVENOUS ONCE
Status: COMPLETED | OUTPATIENT
Start: 2022-01-01 | End: 2022-01-01

## 2022-01-01 RX ORDER — MIDAZOLAM HYDROCHLORIDE 1 MG/ML
INJECTION, SOLUTION INTRAMUSCULAR; INTRAVENOUS
Status: DISPENSED
Start: 2022-01-01 | End: 2022-01-01

## 2022-01-01 RX ORDER — MIDAZOLAM HYDROCHLORIDE 1 MG/ML
INJECTION, SOLUTION INTRAMUSCULAR; INTRAVENOUS
Status: COMPLETED
Start: 2022-01-01 | End: 2022-01-01

## 2022-01-01 RX ORDER — SODIUM BICARBONATE 1 MEQ/ML
100 SYRINGE (ML) INTRAVENOUS
Status: COMPLETED | OUTPATIENT
Start: 2022-01-01 | End: 2022-01-01

## 2022-01-01 RX ORDER — ROCURONIUM BROMIDE 10 MG/ML
INJECTION, SOLUTION INTRAVENOUS AS NEEDED
Status: DISCONTINUED | OUTPATIENT
Start: 2022-01-01 | End: 2022-01-01 | Stop reason: HOSPADM

## 2022-01-01 RX ORDER — ASPIRIN 81 MG/1
81 TABLET ORAL DAILY
Status: DISCONTINUED | OUTPATIENT
Start: 2022-01-01 | End: 2022-11-12 | Stop reason: HOSPADM

## 2022-01-01 RX ORDER — ATROPINE SULFATE 0.1 MG/ML
INJECTION INTRAVENOUS
Status: DISCONTINUED
Start: 2022-01-01 | End: 2022-01-01 | Stop reason: WASHOUT

## 2022-01-01 RX ORDER — SODIUM CHLORIDE 9 MG/ML
70 INJECTION, SOLUTION INTRAVENOUS ONCE
Status: COMPLETED | OUTPATIENT
Start: 2022-01-01 | End: 2022-01-01

## 2022-01-01 RX ORDER — FENTANYL CITRATE 50 UG/ML
100 INJECTION, SOLUTION INTRAMUSCULAR; INTRAVENOUS ONCE
Status: COMPLETED | OUTPATIENT
Start: 2022-01-01 | End: 2022-01-01

## 2022-01-01 RX ORDER — INSULIN LISPRO 100 [IU]/ML
INJECTION, SOLUTION INTRAVENOUS; SUBCUTANEOUS
Status: DISCONTINUED | OUTPATIENT
Start: 2022-01-01 | End: 2022-01-01

## 2022-01-01 RX ORDER — SODIUM BICARBONATE 1 MEQ/ML
200 SYRINGE (ML) INTRAVENOUS ONCE
Status: COMPLETED | OUTPATIENT
Start: 2022-01-01 | End: 2022-01-01

## 2022-01-01 RX ORDER — ATROPINE SULFATE 1 MG/ML
0.5 INJECTION, SOLUTION INTRAVENOUS AS NEEDED
Status: DISCONTINUED | OUTPATIENT
Start: 2022-01-01 | End: 2022-01-01

## 2022-01-01 RX ORDER — ATROPINE SULFATE 0.1 MG/ML
INJECTION INTRAVENOUS
Status: COMPLETED
Start: 2022-01-01 | End: 2022-01-01

## 2022-01-01 RX ORDER — ATROPINE SULFATE 1 MG/ML
0.6 INJECTION, SOLUTION INTRAVENOUS ONCE
Status: DISCONTINUED | OUTPATIENT
Start: 2022-01-01 | End: 2022-01-01 | Stop reason: SDUPTHER

## 2022-01-01 RX ORDER — MAGNESIUM SULFATE 100 %
16 CRYSTALS MISCELLANEOUS AS NEEDED
Status: DISCONTINUED | OUTPATIENT
Start: 2022-01-01 | End: 2022-11-12 | Stop reason: HOSPADM

## 2022-01-01 RX ORDER — REPAGLINIDE 1 MG/1
2 TABLET ORAL
Status: DISCONTINUED | OUTPATIENT
Start: 2022-01-01 | End: 2022-01-01

## 2022-01-01 RX ADMIN — CASTOR OIL AND BALSAM, PERU: 788; 87 OINTMENT TOPICAL at 08:34

## 2022-01-01 RX ADMIN — SODIUM BICARBONATE 200 MEQ: 84 INJECTION INTRAVENOUS at 09:15

## 2022-01-01 RX ADMIN — ATORVASTATIN CALCIUM 40 MG: 40 TABLET, FILM COATED ORAL at 22:59

## 2022-01-01 RX ADMIN — CHLORHEXIDINE GLUCONATE 15 ML: 1.2 RINSE ORAL at 08:27

## 2022-01-01 RX ADMIN — Medication 300 MCG/MIN: at 10:44

## 2022-01-01 RX ADMIN — ACETAMINOPHEN 650 MG: 325 TABLET ORAL at 16:31

## 2022-01-01 RX ADMIN — DOPAMINE HYDROCHLORIDE IN DEXTROSE 9 MCG/KG/MIN: 3.2 INJECTION, SOLUTION INTRAVENOUS at 16:50

## 2022-01-01 RX ADMIN — ATROPINE SULFATE 1 MG: 1 INJECTION, SOLUTION INTRAMUSCULAR; INTRAVENOUS; SUBCUTANEOUS at 09:26

## 2022-01-01 RX ADMIN — SODIUM CHLORIDE 4.5 G: 900 INJECTION INTRAVENOUS at 08:29

## 2022-01-01 RX ADMIN — Medication 5 UNITS: at 11:59

## 2022-01-01 RX ADMIN — DOPAMINE HYDROCHLORIDE IN DEXTROSE 20 MCG/KG/MIN: 3.2 INJECTION, SOLUTION INTRAVENOUS at 18:51

## 2022-01-01 RX ADMIN — VANCOMYCIN HYDROCHLORIDE 1000 MG: 1 INJECTION, POWDER, LYOPHILIZED, FOR SOLUTION INTRAVENOUS at 01:49

## 2022-01-01 RX ADMIN — ATROPINE SULFATE 1 MG: 1 INJECTION, SOLUTION INTRAMUSCULAR; INTRAVENOUS; SUBCUTANEOUS at 04:08

## 2022-01-01 RX ADMIN — FAMOTIDINE 20 MG: 10 INJECTION, SOLUTION INTRAVENOUS at 08:47

## 2022-01-01 RX ADMIN — DEXTROSE MONOHYDRATE 125 ML: 10 INJECTION, SOLUTION INTRAVENOUS at 01:00

## 2022-01-01 RX ADMIN — Medication 30 MCG/MIN: at 14:05

## 2022-01-01 RX ADMIN — FENTANYL CITRATE 25 MCG: 50 INJECTION, SOLUTION INTRAMUSCULAR; INTRAVENOUS at 03:54

## 2022-01-01 RX ADMIN — VASOPRESSIN 0.04 UNITS/MIN: 20 INJECTION INTRAVENOUS at 10:23

## 2022-01-01 RX ADMIN — SODIUM BICARBONATE 100 MEQ: 84 INJECTION INTRAVENOUS at 18:10

## 2022-01-01 RX ADMIN — Medication 3 UNITS: at 13:00

## 2022-01-01 RX ADMIN — CASTOR OIL AND BALSAM, PERU: 788; 87 OINTMENT TOPICAL at 22:00

## 2022-01-01 RX ADMIN — NOREPINEPHRINE BITARTRATE 30 MCG/MIN: 1 SOLUTION INTRAVENOUS at 05:36

## 2022-01-01 RX ADMIN — HEPARIN SODIUM 18 UNITS/KG/HR: 10000 INJECTION, SOLUTION INTRAVENOUS at 20:21

## 2022-01-01 RX ADMIN — MIDAZOLAM 1 MG: 1 INJECTION INTRAMUSCULAR; INTRAVENOUS at 21:00

## 2022-01-01 RX ADMIN — NOREPINEPHRINE BITARTRATE 200 MCG/MIN: 1 SOLUTION INTRAVENOUS at 13:26

## 2022-01-01 RX ADMIN — SODIUM CHLORIDE: 0.9 INJECTION, SOLUTION INTRAVENOUS at 02:48

## 2022-01-01 RX ADMIN — SODIUM BICARBONATE: 84 INJECTION, SOLUTION INTRAVENOUS at 16:12

## 2022-01-01 RX ADMIN — Medication: at 22:59

## 2022-01-01 RX ADMIN — Medication: at 17:52

## 2022-01-01 RX ADMIN — Medication: at 08:04

## 2022-01-01 RX ADMIN — ATORVASTATIN CALCIUM 40 MG: 40 TABLET, FILM COATED ORAL at 21:00

## 2022-01-01 RX ADMIN — NOREPINEPHRINE BITARTRATE 200 MCG/MIN: 1 SOLUTION INTRAVENOUS at 16:51

## 2022-01-01 RX ADMIN — Medication 7 UNITS: at 18:40

## 2022-01-01 RX ADMIN — PROPOFOL 70 MG: 10 INJECTION, EMULSION INTRAVENOUS at 03:02

## 2022-01-01 RX ADMIN — HEPARIN SODIUM 2730 UNITS: 1000 INJECTION INTRAVENOUS; SUBCUTANEOUS at 01:09

## 2022-01-01 RX ADMIN — NOREPINEPHRINE BITARTRATE 200 MCG/MIN: 1 SOLUTION INTRAVENOUS at 19:54

## 2022-01-01 RX ADMIN — ALBUMIN (HUMAN) 12.5 G: 0.25 INJECTION, SOLUTION INTRAVENOUS at 11:03

## 2022-01-01 RX ADMIN — DOPAMINE HYDROCHLORIDE IN DEXTROSE 14 MCG/KG/MIN: 3.2 INJECTION, SOLUTION INTRAVENOUS at 14:19

## 2022-01-01 RX ADMIN — MIDAZOLAM HYDROCHLORIDE 1 MG: 1 INJECTION, SOLUTION INTRAMUSCULAR; INTRAVENOUS at 21:00

## 2022-01-01 RX ADMIN — HEPARIN SODIUM 16 UNITS/KG/HR: 10000 INJECTION, SOLUTION INTRAVENOUS at 07:04

## 2022-01-01 RX ADMIN — MELATONIN 4.5 MG: at 22:59

## 2022-01-01 RX ADMIN — PHENYLEPHRINE HYDROCHLORIDE 300 MCG/MIN: 10 INJECTION INTRAVENOUS at 15:44

## 2022-01-01 RX ADMIN — CASTOR OIL AND BALSAM, PERU: 788; 87 OINTMENT TOPICAL at 20:23

## 2022-01-01 RX ADMIN — ROCURONIUM BROMIDE 50 MG: 10 INJECTION INTRAVENOUS at 03:02

## 2022-01-01 RX ADMIN — VASOPRESSIN 0.04 UNITS/MIN: 20 INJECTION INTRAVENOUS at 04:30

## 2022-01-01 RX ADMIN — Medication 2 UNITS: at 17:43

## 2022-01-01 RX ADMIN — FAMOTIDINE 20 MG: 10 INJECTION, SOLUTION INTRAVENOUS at 08:43

## 2022-01-01 RX ADMIN — EPINEPHRINE 20 MCG/MIN: 1 INJECTION INTRAMUSCULAR; INTRAVENOUS; SUBCUTANEOUS at 18:48

## 2022-01-01 RX ADMIN — Medication: at 18:13

## 2022-01-01 RX ADMIN — EPOETIN ALFA-EPBX 20000 UNITS: 10000 INJECTION, SOLUTION INTRAVENOUS; SUBCUTANEOUS at 20:24

## 2022-01-01 RX ADMIN — ALBUMIN (HUMAN) 12.5 G: 0.25 INJECTION, SOLUTION INTRAVENOUS at 12:19

## 2022-01-01 RX ADMIN — SODIUM CHLORIDE 1000 ML: 9 INJECTION, SOLUTION INTRAVENOUS at 10:47

## 2022-01-01 RX ADMIN — PIPERACILLIN AND TAZOBACTAM 3.38 G: 3; .375 INJECTION, POWDER, FOR SOLUTION INTRAVENOUS at 21:08

## 2022-01-01 RX ADMIN — SODIUM CHLORIDE 70 ML/HR: 9 INJECTION, SOLUTION INTRAVENOUS at 11:51

## 2022-01-01 RX ADMIN — ATROPINE SULFATE 1 MG: 0.1 INJECTION, SOLUTION INTRAVENOUS at 08:02

## 2022-01-01 RX ADMIN — SODIUM CHLORIDE, POTASSIUM CHLORIDE, SODIUM LACTATE AND CALCIUM CHLORIDE 1000 ML: 600; 310; 30; 20 INJECTION, SOLUTION INTRAVENOUS at 04:05

## 2022-01-01 RX ADMIN — FAMOTIDINE 20 MG: 10 INJECTION, SOLUTION INTRAVENOUS at 08:24

## 2022-01-01 RX ADMIN — FENTANYL CITRATE 25 MCG: 50 INJECTION, SOLUTION INTRAMUSCULAR; INTRAVENOUS at 18:28

## 2022-01-01 RX ADMIN — NOREPINEPHRINE BITARTRATE 200 MCG/MIN: 1 SOLUTION INTRAVENOUS at 14:08

## 2022-01-01 RX ADMIN — ALBUMIN (HUMAN) 25 G: 0.25 INJECTION, SOLUTION INTRAVENOUS at 13:23

## 2022-01-01 RX ADMIN — ATROPINE SULFATE 0.6 MG: 0.1 INJECTION, SOLUTION ENDOTRACHEAL; INTRAMUSCULAR; INTRAVENOUS; SUBCUTANEOUS at 08:06

## 2022-01-01 RX ADMIN — NOREPINEPHRINE BITARTRATE 100 MCG/MIN: 1 SOLUTION INTRAVENOUS at 11:26

## 2022-01-01 RX ADMIN — LEVOTHYROXINE SODIUM 50 MCG: 0.05 TABLET ORAL at 06:44

## 2022-01-01 RX ADMIN — SODIUM BICARBONATE 100 MEQ: 84 INJECTION INTRAVENOUS at 04:22

## 2022-01-01 RX ADMIN — PIPERACILLIN AND TAZOBACTAM 3.38 G: 3; .375 INJECTION, POWDER, FOR SOLUTION INTRAVENOUS at 20:50

## 2022-01-01 RX ADMIN — ALBUMIN (HUMAN) 12.5 G: 0.25 INJECTION, SOLUTION INTRAVENOUS at 11:24

## 2022-01-01 RX ADMIN — PIPERACILLIN AND TAZOBACTAM 3.38 G: 3; .375 INJECTION, POWDER, FOR SOLUTION INTRAVENOUS at 08:40

## 2022-01-01 RX ADMIN — SODIUM BICARBONATE: 84 INJECTION, SOLUTION INTRAVENOUS at 10:49

## 2022-01-01 RX ADMIN — SODIUM BICARBONATE 150 MEQ: 84 INJECTION INTRAVENOUS at 17:47

## 2022-01-01 RX ADMIN — NEPHROCAP 1 CAPSULE: 1 CAP ORAL at 08:38

## 2022-01-01 RX ADMIN — SODIUM BICARBONATE 100 MEQ: 84 INJECTION INTRAVENOUS at 09:55

## 2022-01-01 RX ADMIN — PROPOFOL 10 MG: 10 INJECTION, EMULSION INTRAVENOUS at 04:12

## 2022-01-01 RX ADMIN — Medication 2 UNITS: at 16:46

## 2022-01-01 RX ADMIN — ATORVASTATIN CALCIUM 40 MG: 40 TABLET, FILM COATED ORAL at 21:12

## 2022-01-01 RX ADMIN — LEVOTHYROXINE SODIUM 50 MCG: 0.05 TABLET ORAL at 06:00

## 2022-01-01 RX ADMIN — DOPAMINE HYDROCHLORIDE IN DEXTROSE 2 MCG/KG/MIN: 3.2 INJECTION, SOLUTION INTRAVENOUS at 08:02

## 2022-01-01 RX ADMIN — ALBUMIN (HUMAN) 12.5 G: 12.5 INJECTION, SOLUTION INTRAVENOUS at 10:45

## 2022-01-01 RX ADMIN — EPINEPHRINE 20 MCG/MIN: 1 INJECTION INTRAMUSCULAR; INTRAVENOUS; SUBCUTANEOUS at 14:30

## 2022-01-01 RX ADMIN — EPINEPHRINE 2 MCG/MIN: 1 INJECTION INTRAMUSCULAR; INTRAVENOUS; SUBCUTANEOUS at 10:00

## 2022-01-01 RX ADMIN — VASOPRESSIN 0.04 UNITS/MIN: 20 INJECTION INTRAVENOUS at 18:02

## 2022-01-01 RX ADMIN — CASTOR OIL AND BALSAM, PERU: 788; 87 OINTMENT TOPICAL at 21:08

## 2022-01-01 RX ADMIN — PROPOFOL 10 MG: 10 INJECTION, EMULSION INTRAVENOUS at 04:15

## 2022-01-01 RX ADMIN — VANCOMYCIN HYDROCHLORIDE 750 MG: 750 INJECTION, POWDER, LYOPHILIZED, FOR SOLUTION INTRAVENOUS at 17:42

## 2022-01-01 RX ADMIN — SODIUM BICARBONATE 100 MEQ: 84 INJECTION, SOLUTION INTRAVENOUS at 04:22

## 2022-01-01 RX ADMIN — Medication 300 MCG/MIN: at 14:04

## 2022-01-01 RX ADMIN — FENTANYL CITRATE 25 MCG: 50 INJECTION, SOLUTION INTRAMUSCULAR; INTRAVENOUS at 22:02

## 2022-01-01 RX ADMIN — AMPICILLIN SODIUM AND SULBACTAM SODIUM 3 G: 2; 1 INJECTION, POWDER, FOR SOLUTION INTRAMUSCULAR; INTRAVENOUS at 17:59

## 2022-01-01 RX ADMIN — EPOETIN ALFA-EPBX 20000 UNITS: 10000 INJECTION, SOLUTION INTRAVENOUS; SUBCUTANEOUS at 21:21

## 2022-01-01 RX ADMIN — DOPAMINE HYDROCHLORIDE IN DEXTROSE 11 MCG/KG/MIN: 3.2 INJECTION, SOLUTION INTRAVENOUS at 16:50

## 2022-01-01 RX ADMIN — DOPAMINE HYDROCHLORIDE IN DEXTROSE 18 MCG/KG/MIN: 3.2 INJECTION, SOLUTION INTRAVENOUS at 03:29

## 2022-01-01 RX ADMIN — MEROPENEM 1 G: 1 INJECTION, POWDER, FOR SOLUTION INTRAVENOUS at 12:56

## 2022-01-01 RX ADMIN — EPINEPHRINE 10 MCG/MIN: 1 INJECTION INTRAMUSCULAR; INTRAVENOUS; SUBCUTANEOUS at 10:30

## 2022-01-01 RX ADMIN — SODIUM BICARBONATE 50 MEQ: 84 INJECTION INTRAVENOUS at 11:38

## 2022-01-01 RX ADMIN — NEPHROCAP 1 CAPSULE: 1 CAP ORAL at 08:44

## 2022-01-01 RX ADMIN — Medication 3 UNITS: at 08:25

## 2022-01-01 RX ADMIN — FENTANYL CITRATE 25 MCG: 50 INJECTION, SOLUTION INTRAMUSCULAR; INTRAVENOUS at 03:31

## 2022-01-01 RX ADMIN — PROPOFOL 10 MG: 10 INJECTION, EMULSION INTRAVENOUS at 04:08

## 2022-01-01 RX ADMIN — LEVOTHYROXINE SODIUM 50 MCG: 0.05 TABLET ORAL at 05:41

## 2022-01-01 RX ADMIN — CHLORHEXIDINE GLUCONATE 15 ML: 1.2 RINSE ORAL at 20:25

## 2022-01-01 RX ADMIN — PROPOFOL 10 MG: 10 INJECTION, EMULSION INTRAVENOUS at 04:20

## 2022-01-01 RX ADMIN — SODIUM BICARBONATE 100 MEQ: 84 INJECTION INTRAVENOUS at 02:42

## 2022-01-01 RX ADMIN — LIDOCAINE HYDROCHLORIDE 60 MG: 20 INJECTION, SOLUTION EPIDURAL; INFILTRATION; INTRACAUDAL; PERINEURAL at 03:00

## 2022-01-01 RX ADMIN — PIPERACILLIN AND TAZOBACTAM 3.38 G: 3; .375 INJECTION, POWDER, FOR SOLUTION INTRAVENOUS at 08:49

## 2022-01-01 RX ADMIN — NOREPINEPHRINE BITARTRATE 50 MCG/MIN: 1 SOLUTION INTRAVENOUS at 09:50

## 2022-01-01 RX ADMIN — DEXTROSE MONOHYDRATE 125 ML: 100 INJECTION, SOLUTION INTRAVENOUS at 01:00

## 2022-01-01 RX ADMIN — Medication 300 MCG/MIN: at 12:27

## 2022-01-01 RX ADMIN — DOPAMINE HYDROCHLORIDE IN DEXTROSE 13 MCG/KG/MIN: 3.2 INJECTION, SOLUTION INTRAVENOUS at 00:32

## 2022-01-01 RX ADMIN — Medication 250 MCG/MIN: at 04:46

## 2022-01-01 RX ADMIN — FAMOTIDINE 20 MG: 10 INJECTION, SOLUTION INTRAVENOUS at 11:14

## 2022-01-01 RX ADMIN — HYDROCORTISONE SODIUM SUCCINATE 100 MG: 100 INJECTION, POWDER, FOR SOLUTION INTRAMUSCULAR; INTRAVENOUS at 04:39

## 2022-01-01 RX ADMIN — CASTOR OIL AND BALSAM, PERU: 788; 87 OINTMENT TOPICAL at 08:03

## 2022-01-01 RX ADMIN — HEPARIN SODIUM 12 UNITS/KG/HR: 10000 INJECTION, SOLUTION INTRAVENOUS at 01:10

## 2022-01-01 RX ADMIN — IRON SUCROSE 200 MG: 20 INJECTION, SOLUTION INTRAVENOUS at 14:03

## 2022-01-01 RX ADMIN — NOREPINEPHRINE BITARTRATE 4 MCG/MIN: 1 SOLUTION INTRAVENOUS at 01:10

## 2022-01-01 RX ADMIN — VANCOMYCIN HYDROCHLORIDE 1000 MG: 1 INJECTION, POWDER, LYOPHILIZED, FOR SOLUTION INTRAVENOUS at 12:06

## 2022-01-01 RX ADMIN — HEPARIN SODIUM 2730 UNITS: 1000 INJECTION INTRAVENOUS; SUBCUTANEOUS at 07:03

## 2022-01-01 RX ADMIN — PIPERACILLIN AND TAZOBACTAM 3.38 G: 3; .375 INJECTION, POWDER, FOR SOLUTION INTRAVENOUS at 08:24

## 2022-01-01 RX ADMIN — FENTANYL CITRATE 25 MCG: 50 INJECTION, SOLUTION INTRAMUSCULAR; INTRAVENOUS at 03:15

## 2022-01-01 RX ADMIN — HYDROCORTISONE SODIUM SUCCINATE 100 MG: 100 INJECTION, POWDER, FOR SOLUTION INTRAMUSCULAR; INTRAVENOUS at 17:41

## 2022-01-01 RX ADMIN — NOREPINEPHRINE BITARTRATE 4 MCG/MIN: 1 SOLUTION INTRAVENOUS at 08:02

## 2022-01-01 RX ADMIN — PIPERACILLIN AND TAZOBACTAM 3.38 G: 3; .375 INJECTION, POWDER, FOR SOLUTION INTRAVENOUS at 21:32

## 2022-01-01 RX ADMIN — ALBUMIN (HUMAN) 50 G: 0.25 INJECTION, SOLUTION INTRAVENOUS at 10:00

## 2022-01-01 RX ADMIN — FENTANYL CITRATE 25 MCG: 50 INJECTION, SOLUTION INTRAMUSCULAR; INTRAVENOUS at 03:21

## 2022-01-01 RX ADMIN — MIDAZOLAM 1 MG: 1 INJECTION INTRAMUSCULAR; INTRAVENOUS at 23:01

## 2022-01-01 RX ADMIN — HEPARIN SODIUM 2730 UNITS: 1000 INJECTION INTRAVENOUS; SUBCUTANEOUS at 20:21

## 2022-01-01 RX ADMIN — SODIUM BICARBONATE: 84 INJECTION, SOLUTION INTRAVENOUS at 03:14

## 2022-01-01 RX ADMIN — PROPOFOL 10 MG: 10 INJECTION, EMULSION INTRAVENOUS at 04:22

## 2022-01-01 RX ADMIN — NOREPINEPHRINE BITARTRATE 16 MCG/MIN: 1 SOLUTION INTRAVENOUS at 23:58

## 2022-01-05 ENCOUNTER — TRANSCRIBE ORDER (OUTPATIENT)
Dept: SCHEDULING | Age: 72
End: 2022-01-05

## 2022-01-05 DIAGNOSIS — M79.605 BILATERAL LEG PAIN: Primary | ICD-10-CM

## 2022-01-05 DIAGNOSIS — M79.604 BILATERAL LEG PAIN: Primary | ICD-10-CM

## 2022-01-06 ENCOUNTER — OFFICE VISIT (OUTPATIENT)
Dept: SURGERY | Age: 72
End: 2022-01-06

## 2022-01-06 VITALS
DIASTOLIC BLOOD PRESSURE: 78 MMHG | OXYGEN SATURATION: 90 % | HEART RATE: 50 BPM | HEIGHT: 60 IN | SYSTOLIC BLOOD PRESSURE: 140 MMHG | TEMPERATURE: 97.9 F | WEIGHT: 128 LBS | RESPIRATION RATE: 20 BRPM | BODY MASS INDEX: 25.13 KG/M2

## 2022-01-06 DIAGNOSIS — Z18.9 RETAINED SUTURE, INITIAL ENCOUNTER: Primary | ICD-10-CM

## 2022-01-06 DIAGNOSIS — T81.89XA RETAINED SUTURE, INITIAL ENCOUNTER: Primary | ICD-10-CM

## 2022-01-06 NOTE — PROGRESS NOTES
Identified pt with two pt identifiers(name and ). Reviewed record in preparation for visit and have obtained necessary documentation. All patient medications has been reviewed. Chief Complaint   Patient presents with    Follow-up     Skin Problem/Question suture protruding from skin S/P APPENDECTOMY 21       Health Maintenance Due   Topic    Hepatitis C Screening     MICROALBUMIN Q1     Eye Exam Retinal or Dilated     DTaP/Tdap/Td series (1 - Tdap)    Shingrix Vaccine Age 49> (1 of 2)    Breast Cancer Screen Mammogram     Bone Densitometry (Dexa) Screening     Pneumococcal 65+ yrs at Risk Vaccine (1 of 2 - PCV13)    Colorectal Cancer Screening Combo     Flu Vaccine (1)    COVID-19 Vaccine (3 - Booster for Moderna series)       Vitals:    22 1315   Pulse: (!) 50   Resp: 20   Temp: 97.9 °F (36.6 °C)   SpO2: 90%   Weight: 58.1 kg (128 lb)   Height: 5' (1.524 m)   PainSc:   0 - No pain       4. Have you been to the ER, urgent care clinic since your last visit? Hospitalized since your last visit? No    5. Have you seen or consulted any other health care providers outside of the [address] [place] [address] since your last visit? Include any pap smears or colon screening. No      Patient is accompanied by self I have received verbal consent from Nay Thao to discuss any/all medical information while they are present in the room.

## 2022-01-06 NOTE — PROGRESS NOTES
Patient presents 6 months after lap appy with complaints of a suture poking out of one of the incision sites. She has no other complaints. Blood pressure (!) 140/78, pulse (!) 50, temperature 97.9 °F (36.6 °C), resp. rate 20, height 5' (1.524 m), weight 58.1 kg (128 lb), SpO2 90 %. Exam:  2-0 Nylon suture in a healed left abdominal scar. Suture removed completely in the office today. 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   They are aware of the cancer diagnosis and have a follow uo scheduled with oncology on 1/17/2022

## 2022-01-11 ENCOUNTER — HOSPITAL ENCOUNTER (OUTPATIENT)
Dept: VASCULAR SURGERY | Age: 72
Discharge: HOME | End: 2022-01-11
Attending: PODIATRIST
Payer: MEDICARE

## 2022-01-11 DIAGNOSIS — M79.605 BILATERAL LEG PAIN: ICD-10-CM

## 2022-01-11 DIAGNOSIS — M79.604 BILATERAL LEG PAIN: ICD-10-CM

## 2022-01-11 PROCEDURE — 93922 UPR/L XTREMITY ART 2 LEVELS: CPT

## 2022-01-12 LAB
LEFT ARM BP: 180 MMHG
LEFT POSTERIOR TIBIAL: 280 MMHG
LEFT TBI: 0.53
LEFT TOE PRESSURE: 96 MMHG
RIGHT POSTERIOR TIBIAL: 280 MMHG
RIGHT TBI: 0.49
RIGHT TOE PRESSURE: 89 MMHG
VAS LEFT DORSALIS PEDIS BP: 280 MMHG
VAS RIGHT DORSALIS PEDIS BP: 280 MMHG

## 2022-01-12 PROCEDURE — 93922 UPR/L XTREMITY ART 2 LEVELS: CPT | Performed by: INTERNAL MEDICINE

## 2022-01-20 ENCOUNTER — VIRTUAL VISIT (OUTPATIENT)
Dept: INTERNAL MEDICINE CLINIC | Age: 72
End: 2022-01-20
Payer: MEDICARE

## 2022-01-20 DIAGNOSIS — R56.9 SEIZURE (HCC): ICD-10-CM

## 2022-01-20 DIAGNOSIS — E03.9 ACQUIRED HYPOTHYROIDISM: Primary | ICD-10-CM

## 2022-01-20 DIAGNOSIS — Z79.4 CONTROLLED TYPE 2 DIABETES MELLITUS WITH CHRONIC KIDNEY DISEASE ON CHRONIC DIALYSIS, WITH LONG-TERM CURRENT USE OF INSULIN (HCC): ICD-10-CM

## 2022-01-20 DIAGNOSIS — Z99.2 CONTROLLED TYPE 2 DIABETES MELLITUS WITH CHRONIC KIDNEY DISEASE ON CHRONIC DIALYSIS, WITH LONG-TERM CURRENT USE OF INSULIN (HCC): ICD-10-CM

## 2022-01-20 DIAGNOSIS — N18.6 CONTROLLED TYPE 2 DIABETES MELLITUS WITH CHRONIC KIDNEY DISEASE ON CHRONIC DIALYSIS, WITH LONG-TERM CURRENT USE OF INSULIN (HCC): ICD-10-CM

## 2022-01-20 DIAGNOSIS — C18.1 PRIMARY APPENDICEAL ADENOCARCINOMA (HCC): ICD-10-CM

## 2022-01-20 DIAGNOSIS — F03.90 DEMENTIA WITHOUT BEHAVIORAL DISTURBANCE, UNSPECIFIED DEMENTIA TYPE: ICD-10-CM

## 2022-01-20 DIAGNOSIS — E11.22 CONTROLLED TYPE 2 DIABETES MELLITUS WITH CHRONIC KIDNEY DISEASE ON CHRONIC DIALYSIS, WITH LONG-TERM CURRENT USE OF INSULIN (HCC): ICD-10-CM

## 2022-01-20 DIAGNOSIS — E55.9 VITAMIN D DEFICIENCY: ICD-10-CM

## 2022-01-20 PROCEDURE — 1090F PRES/ABSN URINE INCON ASSESS: CPT | Performed by: NURSE PRACTITIONER

## 2022-01-20 PROCEDURE — G8536 NO DOC ELDER MAL SCRN: HCPCS | Performed by: NURSE PRACTITIONER

## 2022-01-20 PROCEDURE — G8432 DEP SCR NOT DOC, RNG: HCPCS | Performed by: NURSE PRACTITIONER

## 2022-01-20 PROCEDURE — 3046F HEMOGLOBIN A1C LEVEL >9.0%: CPT | Performed by: NURSE PRACTITIONER

## 2022-01-20 PROCEDURE — G8419 CALC BMI OUT NRM PARAM NOF/U: HCPCS | Performed by: NURSE PRACTITIONER

## 2022-01-20 PROCEDURE — 2022F DILAT RTA XM EVC RTNOPTHY: CPT | Performed by: NURSE PRACTITIONER

## 2022-01-20 PROCEDURE — 99214 OFFICE O/P EST MOD 30 MIN: CPT | Performed by: NURSE PRACTITIONER

## 2022-01-20 PROCEDURE — G9899 SCRN MAM PERF RSLTS DOC: HCPCS | Performed by: NURSE PRACTITIONER

## 2022-01-20 PROCEDURE — G8427 DOCREV CUR MEDS BY ELIG CLIN: HCPCS | Performed by: NURSE PRACTITIONER

## 2022-01-20 PROCEDURE — 1101F PT FALLS ASSESS-DOCD LE1/YR: CPT | Performed by: NURSE PRACTITIONER

## 2022-01-20 PROCEDURE — G9711 PT HX TOT COL OR COLON CA: HCPCS | Performed by: NURSE PRACTITIONER

## 2022-01-20 PROCEDURE — G8400 PT W/DXA NO RESULTS DOC: HCPCS | Performed by: NURSE PRACTITIONER

## 2022-01-20 RX ORDER — LEVOTHYROXINE SODIUM 50 UG/1
50 TABLET ORAL
Qty: 30 TABLET | Refills: 1 | Status: SHIPPED | OUTPATIENT
Start: 2022-01-20 | End: 2022-04-11

## 2022-01-20 RX ORDER — INSULIN PUMP SYRINGE, 3 ML
EACH MISCELLANEOUS
Qty: 1 KIT | Refills: 0 | Status: SHIPPED | OUTPATIENT
Start: 2022-01-20 | End: 2022-10-03

## 2022-01-20 RX ORDER — LANCETS
EACH MISCELLANEOUS
Qty: 1 EACH | Refills: 11 | Status: SHIPPED | OUTPATIENT
Start: 2022-01-20 | End: 2022-11-03 | Stop reason: SDUPTHER

## 2022-01-20 RX ORDER — FAMOTIDINE 20 MG/1
20 TABLET, FILM COATED ORAL
Qty: 60 TABLET | Refills: 1 | Status: SHIPPED | OUTPATIENT
Start: 2022-01-20 | End: 2022-03-24

## 2022-01-20 NOTE — PROGRESS NOTES
Chief Complaint   Patient presents with    Follow-up    Medication Refill     pt was given keppra at [facility] ED was told visit is needed to refill     Foot Pain     both feet x 6 months     New Order     glucometer and supplies    Other     doxy. me [phone]     Pt rates feet pain 8/10     1. Have you been to the ER, urgent care clinic since your last visit? Hospitalized since your last visit? No    2. Have you seen or consulted any other health care providers outside of the [address] [place] [address] since your last visit? Include any pap smears or colon screening.  No

## 2022-01-22 NOTE — PROGRESS NOTES
Tamanna Faust is a 66 y.o. female who was seen by synchronous (real-time) audio-video technology on 1/20/2022 for Follow-up, Medication Refill (pt was given keppra at [address] ED was told visit is needed to refill ), Foot Pain (both feet x 6 months ), New Order (glucometer and supplies), and Other (doxy. me [phone])        Assessment & Plan:   Diagnoses and all orders for this visit:    1. Controlled type 2 diabetes mellitus with chronic kidney disease on chronic dialysis, with long-term current use of insulin (HCC)  -     Blood-Glucose Meter monitoring kit; Use as directed. Dx: E11.65 check sugar twice daily  -     glucose blood VI test strips (ASCENSIA AUTODISC VI, ONE TOUCH ULTRA TEST VI) strip; Check sugar twice daily  -     lancets misc; Check sugar twice daily. E11.65    2. Dementia without behavioral disturbance, unspecified dementia type ([facility]  -     REFERRAL TO NEUROLOGY    3. Primary appendiceal adenocarcinoma ([facility]    4. Seizure ([facility] [facility]  -     REFERRAL TO NEUROLOGY    Other orders  -     famotidine (PEPCID) 20 mg tablet; Take 1 Tablet by mouth two (2) times daily as needed for Heartburn. -     levothyroxine (SYNTHROID) 50 mcg tablet; Take 1 Tablet by mouth Daily (before breakfast). The complexity of medical decision making for this visit is moderate       Subjective:       Prior to Admission medications    Medication Sig Start Date End Date Taking? Authorizing Provider   famotidine (PEPCID) 20 mg tablet Take 1 Tablet by mouth two (2) times daily as needed for Heartburn. 1/20/22  Yes Paulino [place]., NP   levothyroxine (SYNTHROID) 50 mcg tablet Take 1 Tablet by mouth Daily (before breakfast). 1/20/22  Yes Paulino [place]., NP   Blood-Glucose Meter monitoring kit Use as directed.  Dx: E11.65 check sugar twice daily 1/20/22  Yes Paulino [place]., NP   glucose blood VI test strips (ASCENSIA AUTODISC VI, ONE TOUCH ULTRA TEST VI) strip Check sugar twice daily 1/20/22  Yes Paulino [place]., NP   lancets misc Check sugar twice daily. E11.65 1/20/22  Yes Analisa Perez., NP   atorvastatin (LIPITOR) 40 mg tablet Take 1 Tablet by mouth daily. 10/28/21  Yes Analisa Perez., FRANCK   Dialyvite 800 0.8 mg tab tablet TAKE 1 TABLET BY MOUTH EVERY DAY 8/3/21  Yes Darrow Leyden Z., NP   amLODIPine (NORVASC) 5 mg tablet Take 5 mg by mouth daily. Yes Provider, Historical   aspirin delayed-release 81 mg tablet Take  by mouth daily. Yes Provider, Historical   brimonidine-timoloL (Combigan) 0.2-0.5 % drop ophthalmic solution 1 Drop every twelve (12) hours. Yes Provider, Historical   alcohol swabs padm 1 Each by Apply Externally route four (4) times daily. For use with insulin and glucometer  Patient not taking: Reported on 7/17/2021 2/9/21   Analisa Perez., NP   insulin glargine (LANTUS,BASAGLAR) 100 unit/mL (3 mL) inpn 10 Units by SubCUTAneous route daily (with dinner). Patient taking differently: 10 Units by SubCUTAneous route nightly. 2/9/21   Analisa Call., NP   Insulin Needles, Disposable, 32 gauge x 5/32\" ndle 1 Each by Does Not Apply route daily. 2/9/21   Analisa Perez., FRANCK PEDROZA     Diabetic Review of Systems - medication compliance: compliant all of the time, diabetic diet compliance: compliant most of the time, home glucose monitoring: is not performed.   Needs new meter    Dementia; has had some visual hallucinations- per ED note from 12/23- she saw a \"friendly cat\"  Pt is pleasantly demented  Does not see neuro at present    Appendiceal adenocarcinoma follows w/ oncology, conservative treatment    Seizure disorder; on keppra, no recent seizures    ESRD: on dialysis    Hypothyroidism: stable ton current meds- last labs    Hyperlipidemia: tolerating statin    Hypertension ROS:  taking medications as instructed, no medication side effects noted, no TIAs, no chest pain on exertion, no dyspnea on exertion, no swelling of ankles      Objective:   No flowsheet data found.     Justina Pont:  \"[x]\" Indicates a positive item  \"[]\" Indicates a negative item  -- DELETE ALL ITEMS NOT EXAMINED]    Constitutional: [x] Appears well-developed and well-nourished [x] No apparent distress      [] Abnormal -     Mental status: [x] Alert and awake  [x] Oriented to person/place/time [x] Able to follow commands    [] Abnormal -     Eyes:   EOM    [x]  Normal    [] Abnormal -   Sclera  [x]  Normal    [] Abnormal -          Discharge [x]  None visible   [] Abnormal -     HENT: [x] Normocephalic, atraumatic  [] Abnormal -   [x] Mouth/Throat: Mucous membranes are moist    External Ears [x] Normal  [] Abnormal -    Neck: [x] No visualized mass [] Abnormal -     Pulmonary/Chest: [x] Respiratory effort normal   [x] No visualized signs of difficulty breathing or respiratory distress        [] Abnormal -      Musculoskeletal:   [x] Normal gait with no signs of ataxia         [x] Normal range of motion of neck        [] Abnormal -     Neurological:        [x] No Facial Asymmetry (Cranial nerve 7 motor function) (limited exam due to video visit)          [x] No gaze palsy        [] Abnormal -          Skin:        [x] No significant exanthematous lesions or discoloration noted on facial skin         [] Abnormal -            Psychiatric:       [x] Normal Affect [] Abnormal -        [x] No Hallucinations    Other pertinent observable physical exam findings:-        We discussed the expected course, resolution and complications of the diagnosis(es) in detail. Medication risks, benefits, costs, interactions, and alternatives were discussed as indicated. I advised her to contact the office if her condition worsens, changes or fails to improve as anticipated. She expressed understanding with the diagnosis(es) and plan. Gretchen Turpin, was evaluated through a synchronous (real-time) audio-video encounter.  The patient (or guardian if applicable) is aware that this is a billable service, which includes applicable co-pays. Verbal consent to proceed has been obtained. The visit was conducted pursuant to the emergency declaration under the [address] [place] [address] and the [employer] and [employer] General Act. Patient identification was verified, and a caregiver was present when appropriate. The patient was located at home in a state where the provider was licensed to provide care. Nicki Vela NP

## 2022-01-27 ENCOUNTER — TELEPHONE (OUTPATIENT)
Dept: ONCOLOGY | Age: 72
End: 2022-01-27

## 2022-02-13 DIAGNOSIS — Z12.11 COLON CANCER SCREENING: Primary | ICD-10-CM

## 2022-02-15 ENCOUNTER — HOSPITAL ENCOUNTER (OUTPATIENT)
Dept: CT IMAGING | Age: 72
Discharge: HOME | End: 2022-02-15
Attending: STUDENT IN AN ORGANIZED HEALTH CARE EDUCATION/TRAINING PROGRAM
Payer: MEDICARE

## 2022-02-15 DIAGNOSIS — C18.1 PRIMARY APPENDICEAL ADENOCARCINOMA (HCC): ICD-10-CM

## 2022-02-15 PROCEDURE — 74011000250 HC RX REV CODE- 250: Performed by: STUDENT IN AN ORGANIZED HEALTH CARE EDUCATION/TRAINING PROGRAM

## 2022-02-15 PROCEDURE — 74177 CT ABD & PELVIS W/CONTRAST: CPT

## 2022-02-15 PROCEDURE — 74011000636 HC RX REV CODE- 636: Performed by: STUDENT IN AN ORGANIZED HEALTH CARE EDUCATION/TRAINING PROGRAM

## 2022-02-15 RX ORDER — BARIUM SULFATE 20 MG/ML
450 SUSPENSION ORAL
Status: COMPLETED | OUTPATIENT
Start: 2022-02-15 | End: 2022-02-15

## 2022-02-15 RX ADMIN — BARIUM SULFATE 900 ML: 20 SUSPENSION ORAL at 11:43

## 2022-02-15 RX ADMIN — IOPAMIDOL 100 ML: 755 INJECTION, SOLUTION INTRAVENOUS at 11:43

## 2022-02-18 RX ORDER — ATORVASTATIN CALCIUM 40 MG/1
TABLET, FILM COATED ORAL
Qty: 90 TABLET | Refills: 0 | Status: SHIPPED | OUTPATIENT
Start: 2022-02-18 | End: 2022-04-11

## 2022-03-24 ENCOUNTER — TELEPHONE (OUTPATIENT)
Dept: ONCOLOGY | Age: 72
End: 2022-03-24

## 2022-03-24 RX ORDER — FAMOTIDINE 20 MG/1
20 TABLET, FILM COATED ORAL
Qty: 60 TABLET | Refills: 1 | Status: SHIPPED | OUTPATIENT
Start: 2022-03-24 | End: 2022-07-04

## 2022-03-24 NOTE — TELEPHONE ENCOUNTER
Called patient, spoke with daughter Jason Tobin) who has confirmed rescheduled appt for 3/28/22 @ 1pm (Virtual).

## 2022-03-28 ENCOUNTER — VIRTUAL VISIT (OUTPATIENT)
Dept: ONCOLOGY | Age: 72
End: 2022-03-28
Payer: MEDICARE

## 2022-03-28 ENCOUNTER — TELEPHONE (OUTPATIENT)
Dept: ONCOLOGY | Age: 72
End: 2022-03-28

## 2022-03-28 DIAGNOSIS — C18.1 PRIMARY APPENDICEAL ADENOCARCINOMA (HCC): Primary | ICD-10-CM

## 2022-03-28 PROBLEM — E11.9 TYPE 2 DIABETES MELLITUS (HCC): Status: ACTIVE | Noted: 2022-03-28

## 2022-03-28 PROCEDURE — G8536 NO DOC ELDER MAL SCRN: HCPCS | Performed by: STUDENT IN AN ORGANIZED HEALTH CARE EDUCATION/TRAINING PROGRAM

## 2022-03-28 PROCEDURE — G9711 PT HX TOT COL OR COLON CA: HCPCS | Performed by: STUDENT IN AN ORGANIZED HEALTH CARE EDUCATION/TRAINING PROGRAM

## 2022-03-28 PROCEDURE — 99214 OFFICE O/P EST MOD 30 MIN: CPT | Performed by: STUDENT IN AN ORGANIZED HEALTH CARE EDUCATION/TRAINING PROGRAM

## 2022-03-28 PROCEDURE — 1090F PRES/ABSN URINE INCON ASSESS: CPT | Performed by: STUDENT IN AN ORGANIZED HEALTH CARE EDUCATION/TRAINING PROGRAM

## 2022-03-28 PROCEDURE — G9899 SCRN MAM PERF RSLTS DOC: HCPCS | Performed by: STUDENT IN AN ORGANIZED HEALTH CARE EDUCATION/TRAINING PROGRAM

## 2022-03-28 PROCEDURE — G8419 CALC BMI OUT NRM PARAM NOF/U: HCPCS | Performed by: STUDENT IN AN ORGANIZED HEALTH CARE EDUCATION/TRAINING PROGRAM

## 2022-03-28 PROCEDURE — G8427 DOCREV CUR MEDS BY ELIG CLIN: HCPCS | Performed by: STUDENT IN AN ORGANIZED HEALTH CARE EDUCATION/TRAINING PROGRAM

## 2022-03-28 PROCEDURE — G8400 PT W/DXA NO RESULTS DOC: HCPCS | Performed by: STUDENT IN AN ORGANIZED HEALTH CARE EDUCATION/TRAINING PROGRAM

## 2022-03-28 PROCEDURE — 1101F PT FALLS ASSESS-DOCD LE1/YR: CPT | Performed by: STUDENT IN AN ORGANIZED HEALTH CARE EDUCATION/TRAINING PROGRAM

## 2022-03-28 PROCEDURE — G8432 DEP SCR NOT DOC, RNG: HCPCS | Performed by: STUDENT IN AN ORGANIZED HEALTH CARE EDUCATION/TRAINING PROGRAM

## 2022-03-28 NOTE — TELEPHONE ENCOUNTER
Lab Orders, Follow up information for CT and Annual Update forms mailed to patient at address on file.

## 2022-03-28 NOTE — PROGRESS NOTES
Zeina Méndez is a 67 y.o. female seeing provider virtually today for apendiceal cancer f/u and to review scans. Chief Complaint   Patient presents with    Follow-up     Appendiceal Cancer     1. Have you been to the ER, urgent care clinic since your last visit? Hospitalized since your last visit? No    2. Have you seen or consulted any other health care providers outside of the [address] [place] [address] since your last visit? Include any pap smears or colon screening.  Yes; Eye doctor

## 2022-03-28 NOTE — PROGRESS NOTES
[address] at [address]  W: [phone] F: [phone]    Reason for Visit:   Brooklyn Mohr is a 67 y.o. female who is seen in consultation at the request of Dr. 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 Subsequent outpatient visit. Hematology / Oncology Treatment History:     Hematological/Oncological Diagnosis: Adenocarcinoma of the appendix, [id number]    Date of Diagnosis: 8/4/21    Treatment course:   7/16/21 - Patient admitted with seizure episode, radiographic findings of acute appendicitis. Subsequent surgery included appendectomy and abdominal washout. Pathology from surgery revealed adenocarcinoma of the appendix. Pathology showed that specimen was fragmented and margins were not able to be discerned. No lymph nodes were sampled for evaluation. Overall size of lesion from pathology could not be determined. No radiographic T size for clinical estimation. History of Present Illness:     Initial consult as inpatient dated August 5, 2021:     Very pleasant 62-year-old female with a complicated medical history most significant for diabetes mellitus type 2, hypertension, ESRD on HD, coronary artery disease, who presented to [facility] on July 16, 2021 with encephalopathy. Work-up showed that the patient had evidence of rectal wall thickening. The patient's inpatient hospital course has been complicated by encephalopathy, renal failure, hypotension from sepsis. The patient's clinical condition worsened over the last few days prompting the need for urgent appendectomy for acute appendicitis. On surgical resection, a well differentiated adenocarcinoma arising in association with villous adenoma this was noticed in the resected appendix. Tumor size and margins could not be determined based on available sample.   It is notable that the patient did have some rectal wall thickening as well. No regional lymph nodes were sampled per operative note. On interview, the patient is confused and unable to answer questions meaningfully. Positive ROS findings include: Confusion, abdominal pain      9/23/21: The patient presents to clinic today accompanied by her daughter. On evaluation today, the patient is able to answer questions appropriately though she remains confused and has difficulty with answering complex questions. Answers to questions often given by the patient's daughter. The patient presents to clinic today with reports of fatigue that has been improving since hospital discharge. Her postoperative care has been complicated as she is required multiple procedures to obtain vascular access for her end-stage renal disease. The patient reports that she has been having improvement in her bowel function since hospital discharge. Per record review, she has not followed up with surgery after appendectomy. Overall, the patient is a marginal functional status and spends most of her time in bed or seated in a chair at home. She lives with her daughter and her son. Current ECOG is a 2-3. She receives dialysis Monday Wednesday Friday and has other medical comorbidities including seizure disorder, hypothyroidism, coronary artery disease with history of CABG. After extensive discussion with the patient and her daughter, we reviewed treatment options for appendiceal adenocarcinomas. For now, the patient and her family wish to focus on quality of life and be conservative with regards to chemotherapeutic treatment options. For now, staging is incomplete and discussions on overall treatment course and plan will depend on imaging results. 10/20/21: PET scan completed on October 5, 2021. Shows no evidence of abnormal uptake or metastatic disease. Clinically, the patient is improving with improved functional status and weight gain since last encounter. She has no residual abdominal pain and no problems with stooling. At baseline though the patient remains frail with an ECOG of 2-3. She is present today in clinic with her daughter who helps with decision making. Interval History:   3/28/22: Emigdio Mcleod, who was evaluated through a synchronous (real-time) audio-video encounter, and/or her healthcare decision maker, is aware that it is a billable service, which includes applicable co-pays, with coverage as determined by her insurance carrier. She provided verbal consent to proceed and patient identification was verified. This visit was conducted pursuant to the emergency declaration under the [address] [facility], [address] [place] [address] authority and the [employer] and [employer] General Act. A caregiver was present when appropriate. Ability to conduct physical exam was limited. The patient was located at home in a state where the provider was licensed to provide care. --Billy Coyle MD on 3/28/2022 at 1:24 PM    Patient had CT abd/pelvis interval scan on 2/15/22, showed no clear evidence of metastatic or recurrent disease. Since last encounter, the patient was hospitalized for hallucinations in December, symptoms have now resolved. She feels well and denies abdominal pain, or any constitutional symptoms that would be concerning for malignancy. Overall feeling well. No weight loss. Previous fatigue has improved. Review of Systems: A complete review of systems was obtained, negative except as described above.     Past Medical History:   Diagnosis Date    CAD (coronary artery disease)     Cancer ([facility] [address]     possible stomach cancer 2021- workup in progress    Chronic kidney disease     Camilla Marker- M-W-F    Dementia [facility])     per daughter pt is alert and oriented x3    Diabetes ([address]     GERD (gastroesophageal reflux disease)     Hyperlipidemia     Hypertension     Seizures ([address]     possible while in hospital 8/2021    Stroke [facility])     Thyroid disease       Past Surgical History:   Procedure Laterality Date    HX APPENDECTOMY      08/21    HX VASCULAR ACCESS      HD catheter    IR INSERT NON TUNL CVC OVER 5 YRS  7/30/2021    IR INSERT NON TUNL CVC OVER 5 YRS  7/30/2021    IR INSERT TUNL CVC W/O PORT OVER 5 YR  8/4/2021    CO CABG, ARTERY-VEIN, FOUR      5 years ago    VASCULAR SURGERY PROCEDURE UNLIST      right arm graft      Social History     Tobacco Use    Smoking status: Never Smoker    Smokeless tobacco: Never Used   Substance Use Topics    Alcohol use: Not Currently      No family history on file. Current Outpatient Medications   Medication Sig    famotidine (PEPCID) 20 mg tablet TAKE 1 TABLET BY MOUTH TWO (2) TIMES DAILY AS NEEDED FOR HEARTBURN.  atorvastatin (LIPITOR) 40 mg tablet TAKE 1 TABLET BY MOUTH EVERY DAY    levothyroxine (SYNTHROID) 50 mcg tablet Take 1 Tablet by mouth Daily (before breakfast).  Blood-Glucose Meter monitoring kit Use as directed. Dx: E11.65 check sugar twice daily    glucose blood VI test strips (ASCENSIA AUTODISC VI, ONE TOUCH ULTRA TEST VI) strip Check sugar twice daily    lancets misc Check sugar twice daily. E11.65    Dialyvite 800 0.8 mg tab tablet TAKE 1 TABLET BY MOUTH EVERY DAY    amLODIPine (NORVASC) 5 mg tablet Take 5 mg by mouth daily.  aspirin delayed-release 81 mg tablet Take  by mouth daily.  brimonidine-timoloL (Combigan) 0.2-0.5 % drop ophthalmic solution 1 Drop every twelve (12) hours.  alcohol swabs padm 1 Each by Apply Externally route four (4) times daily. For use with insulin and glucometer    insulin glargine (LANTUS,BASAGLAR) 100 unit/mL (3 mL) inpn 10 Units by SubCUTAneous route daily (with dinner). (Patient taking differently: 10 Units by SubCUTAneous route nightly.)    Insulin Needles, Disposable, 32 gauge x 5/32\" ndle 1 Each by Does Not Apply route daily.      No current facility-administered medications for this visit. No Known Allergies         Physical Exam:     There were no vitals taken for this visit. ECOG of 2-3  General: alert, no distress, patient in good spirits   Mental  status:  Mild confusion low patient is able to discuss complex topics with reorientation   HENT: NCAT   Neck: no visualized mass   Resp: no respiratory distress   Neuro: no gross deficits   Skin: no discoloration or lesions of concern on visible areas             Results:     Lab Results   Component Value Date/Time    WBC 7.0 09/23/2021 09:54 AM    HGB 12.4 09/23/2021 09:54 AM    HCT 40.9 09/23/2021 09:54 AM    PLATELET 755 (L) 42/89/9881 09:54 AM    MCV 91.7 09/23/2021 09:54 AM    ABS. NEUTROPHILS 2.6 09/23/2021 09:54 AM     Lab Results   Component Value Date/Time    Sodium 138 09/23/2021 09:54 AM    Potassium 3.2 (L) 09/23/2021 09:54 AM    Chloride 103 09/23/2021 09:54 AM    CO2 30 09/23/2021 09:54 AM    Glucose 148 (H) 09/23/2021 09:54 AM    BUN 19 09/23/2021 09:54 AM    Creatinine 5.33 (H) 09/23/2021 09:54 AM    GFR est AA 10 (L) 09/23/2021 09:54 AM    GFR est non-AA 8 (L) 09/23/2021 09:54 AM    Calcium 9.3 09/23/2021 09:54 AM    Sodium (POC) 147 (H) 08/24/2021 06:59 AM    Potassium (POC) 3.2 (L) 08/24/2021 06:59 AM    Chloride (POC) 109 (H) 08/24/2021 06:59 AM    Glucose (POC) 123 (H) 10/05/2021 07:33 AM    Creatinine (POC) 7.68 (H) 08/24/2021 06:59 AM    Calcium, ionized (POC) 1.23 08/24/2021 06:59 AM     Lab Results   Component Value Date/Time    Bilirubin, total 0.6 09/23/2021 09:54 AM    ALT (SGPT) 13 09/23/2021 09:54 AM    Alk. phosphatase 144 (H) 09/23/2021 09:54 AM    Protein, total 8.1 09/23/2021 09:54 AM    Albumin 3.5 09/23/2021 09:54 AM    Globulin 4.6 (H) 09/23/2021 09:54 AM     CT abdomen pelvis dated July 28, 2021   FINDINGS:   LOWER THORAX: Cardiomegaly and coronary artery disease. Dependent atelectasis on  the right. LIVER/GALLBLADDER: No mass. Gallbladder is within normal limits.  CBD is not  dilated. SPLEEN/PANCREAS:  within normal limits. ADRENALS/KIDNEYS: Right lower pole calculus. No hydronephrosis. STOMACH: Small hiatal hernia. SMALL BOWEL/COLON: Ascending colonic wall thickening with pericolonic  inflammatory stranding and trace fluid. Few colonic diverticula. No dilatation or  wall thickening. APPENDIX: Appendiceal enlargement. Inflammatory change. PERITONEUM: No ascites or pneumoperitoneum. RETROPERITONEUM: Aortic atherosclerotic change. REPRODUCTIVE ORGANS: Calcified uterine fibroids. URINARY BLADDER: No mass or calculus. BONES: Multilevel disc degenerative change with multilevel vacuum disc  phenomena. Schmorl's nodes at L2 and T12. A  ADDITIONAL COMMENTS: N/A     IMPRESSION  Imaging findings consistent with acute appendicitis. There is no associated  abscess or drainable fluid collection. CT Abdomen/Pelvis dated 2/15/22  FINDINGS:   LOWER THORAX: No significant abnormality in the incidentally imaged lower chest.  LIVER: No mass. BILIARY TREE: Gallbladder is within normal limits. CBD is not dilated. SPLEEN: within normal limits. PANCREAS: No mass. Duct measures up to 4 mm in the pancreatic head but no masses  seen   ADRENALS: Unremarkable. KIDNEYS: No mass, calculus, or hydronephrosis. Small kidneys  STOMACH: Unremarkable. SMALL BOWEL: No dilatation or wall thickening. COLON: No dilatation or wall thickening. APPENDIX: Surgically absent  PERITONEUM: No ascites or pneumoperitoneum. RETROPERITONEUM: No lymphadenopathy or aortic aneurysm. Atherosclerosis. Severe  left renal ostial calcification. REPRODUCTIVE ORGANS: Fibroid uterus  URINARY BLADDER: No mass or calculus. BONES: No destructive bone lesion. Disc bulges at L4-5 and L5-S1  ABDOMINAL WALL: No mass or hernia. ADDITIONAL COMMENTS: N/A     IMPRESSION  No evidence for metastatic disease. Mild pancreatic ductal dilatation, without an obvious cause.  Recommend  three-phase CT or MRI to further evaluate  Incidental fibroid uterus. MRI brain dated July 18, 2021    FINDINGS:  The ventricles are normal in size and position. There is no acute infarct,  hemorrhage, extra-axial fluid collection, or mass effect. There is no cerebellar  tonsillar herniation. Diffuse encephalomalacia within the posterior left  temporal lobe and occipital lobe. Moderate high signal within the  periventricular white matter. Chronic right basal ganglia lacunar infarct.     The paranasal sinuses, mastoid air cells, and middle ears are clear. The orbital  contents are within normal limits. No significant osseous or scalp lesions are  identified.     IMPRESSION  Encephalomalacia and white matter disease. No acute intracranial abnormality      PET scan dated October 5, 2021  IMPRESSION  No PET evidence of residual disease in the appendectomy bed. No  evidence of distant metastatic disease. Assessment and Recommendations:     # Adenocarcinoma of the appendix, TX, NX, M0  -Pathology only showed fragments of adenocarcinoma sample, lymph node sampling was inadequate.  -Postsurgical PET scan shows no evidence of residual disease in the appendectomy bed or any evidence of any distant disease  -Today I again reviewed treatment options including adjuvant Xeloda versus active surveillance given the patient's frailty and overall function. The patient and her daughter wish to proceed with active surveillance and hold off on treatment with any adjuvant chemotherapy. -CEA from September 23, 2021 was 12.0. No presurgical CEA is available    - repeat CT abd/pelvis from 2/15/22 showed no evidence of disease recurrence. - check CEA, CBC, CMP today -- will mail to patient. - Plan for next CT abd/pelvis in August 2022 with follow up just after.        Signed By: Shawn Parekh MD      Attending Medical Oncologist   [facility]

## 2022-04-11 RX ORDER — ATORVASTATIN CALCIUM 40 MG/1
TABLET, FILM COATED ORAL
Qty: 90 TABLET | Refills: 0 | Status: SHIPPED | OUTPATIENT
Start: 2022-04-11 | End: 2022-07-28

## 2022-04-11 RX ORDER — LEVOTHYROXINE SODIUM 50 UG/1
TABLET ORAL
Qty: 30 TABLET | Refills: 1 | Status: SHIPPED | OUTPATIENT
Start: 2022-04-11 | End: 2022-05-03

## 2022-05-03 RX ORDER — LEVOTHYROXINE SODIUM 50 UG/1
TABLET ORAL
Qty: 30 TABLET | Refills: 1 | Status: SHIPPED | OUTPATIENT
Start: 2022-05-03 | End: 2022-06-08

## 2022-06-08 RX ORDER — LEVOTHYROXINE SODIUM 50 UG/1
TABLET ORAL
Qty: 30 TABLET | Refills: 1 | Status: SHIPPED | OUTPATIENT
Start: 2022-06-08 | End: 2022-08-02

## 2022-06-08 NOTE — TELEPHONE ENCOUNTER
Will refill but she needs labs, can you print my labs as well as dr Carlos A Martinez and mail them to her and let her know to please get them

## 2022-06-13 ENCOUNTER — HOSPITAL ENCOUNTER (EMERGENCY)
Age: 72
Discharge: HOME | End: 2022-06-13
Attending: EMERGENCY MEDICINE
Payer: MEDICARE

## 2022-06-13 VITALS
WEIGHT: 127.87 LBS | BODY MASS INDEX: 25.78 KG/M2 | OXYGEN SATURATION: 100 % | TEMPERATURE: 98.1 F | DIASTOLIC BLOOD PRESSURE: 61 MMHG | SYSTOLIC BLOOD PRESSURE: 123 MMHG | RESPIRATION RATE: 16 BRPM | HEIGHT: 59 IN | HEART RATE: 45 BPM

## 2022-06-13 DIAGNOSIS — I95.9 HYPOTENSION, UNSPECIFIED HYPOTENSION TYPE: Primary | ICD-10-CM

## 2022-06-13 DIAGNOSIS — R00.1 BRADYCARDIA: ICD-10-CM

## 2022-06-13 LAB
ALBUMIN SERPL-MCNC: 3.1 G/DL (ref 3.5–5)
ALBUMIN/GLOB SERPL: 0.8 {RATIO} (ref 1.1–2.2)
ALP SERPL-CCNC: 117 U/L (ref 45–117)
ALT SERPL-CCNC: 17 U/L (ref 12–78)
ANION GAP SERPL CALC-SCNC: 7 MMOL/L (ref 5–15)
AST SERPL-CCNC: 20 U/L (ref 15–37)
BASOPHILS # BLD: 0.1 K/UL (ref 0–0.1)
BASOPHILS NFR BLD: 1 % (ref 0–1)
BILIRUB SERPL-MCNC: 0.5 MG/DL (ref 0.2–1)
BUN SERPL-MCNC: 28 MG/DL (ref 6–20)
BUN/CREAT SERPL: 4 (ref 12–20)
CALCIUM SERPL-MCNC: 9 MG/DL (ref 8.5–10.1)
CHLORIDE SERPL-SCNC: 102 MMOL/L (ref 97–108)
CO2 SERPL-SCNC: 31 MMOL/L (ref 21–32)
CREAT SERPL-MCNC: 6.47 MG/DL (ref 0.55–1.02)
DIFFERENTIAL METHOD BLD: ABNORMAL
EOSINOPHIL # BLD: 0.5 K/UL (ref 0–0.4)
EOSINOPHIL NFR BLD: 9 % (ref 0–7)
ERYTHROCYTE [DISTWIDTH] IN BLOOD BY AUTOMATED COUNT: 14.3 % (ref 11.5–14.5)
GLOBULIN SER CALC-MCNC: 4 G/DL (ref 2–4)
GLUCOSE SERPL-MCNC: 123 MG/DL (ref 65–100)
HCT VFR BLD AUTO: 45.3 % (ref 35–47)
HGB BLD-MCNC: 14 G/DL (ref 11.5–16)
IMM GRANULOCYTES # BLD AUTO: 0 K/UL (ref 0–0.04)
IMM GRANULOCYTES NFR BLD AUTO: 0 % (ref 0–0.5)
LYMPHOCYTES # BLD: 1.6 K/UL (ref 0.8–3.5)
LYMPHOCYTES NFR BLD: 27 % (ref 12–49)
MCH RBC QN AUTO: 28.2 PG (ref 26–34)
MCHC RBC AUTO-ENTMCNC: 30.9 G/DL (ref 30–36.5)
MCV RBC AUTO: 91.3 FL (ref 80–99)
MONOCYTES # BLD: 0.5 K/UL (ref 0–1)
MONOCYTES NFR BLD: 8 % (ref 5–13)
NEUTS SEG # BLD: 3.4 K/UL (ref 1.8–8)
NEUTS SEG NFR BLD: 55 % (ref 32–75)
NRBC # BLD: 0 K/UL (ref 0–0.01)
NRBC BLD-RTO: 0 PER 100 WBC
PLATELET # BLD AUTO: 88 K/UL (ref 150–400)
PMV BLD AUTO: 12.3 FL (ref 8.9–12.9)
POTASSIUM SERPL-SCNC: 3.7 MMOL/L (ref 3.5–5.1)
PROT SERPL-MCNC: 7.1 G/DL (ref 6.4–8.2)
RBC # BLD AUTO: 4.96 M/UL (ref 3.8–5.2)
RBC MORPH BLD: ABNORMAL
SODIUM SERPL-SCNC: 140 MMOL/L (ref 136–145)
WBC # BLD AUTO: 6.1 K/UL (ref 3.6–11)

## 2022-06-13 PROCEDURE — 99284 EMERGENCY DEPT VISIT MOD MDM: CPT

## 2022-06-13 PROCEDURE — 36415 COLL VENOUS BLD VENIPUNCTURE: CPT

## 2022-06-13 PROCEDURE — 85025 COMPLETE CBC W/AUTO DIFF WBC: CPT

## 2022-06-13 PROCEDURE — 80053 COMPREHEN METABOLIC PANEL: CPT

## 2022-06-13 PROCEDURE — 93005 ELECTROCARDIOGRAM TRACING: CPT

## 2022-06-13 NOTE — ED PROVIDER NOTES
EMERGENCY DEPARTMENT HISTORY AND PHYSICAL EXAM      Date: 6/13/2022  Patient Name: Devaughn Schwarz    History of Presenting Illness     Chief Complaint   Patient presents with    Dizziness     History Provided By: Patient and EMS    HPI: Devaughn Schwarz, 67 y.o. female with past medical history significant for CAD, end-stage renal disease, dementia, diabetes, GERD, hypertension, hyperlipidemia, and CVA who presents via EMS to the ED with cc of low blood pressure readings while at dialysis. EMS states that patient initially reported some dizziness but she denies it at this time. She denies any chest pain, shortness of breath, nausea, vomiting, or diarrhea. PMHx: CAD, ESRD, dementia, diabetes, GERD, hypertension, hyperlipidemia, and CVA  Social Hx: Denies alcohol, tobacco, or illegal drug use    PCP: Mark Coto, NP    History and review of systems limited secondary to dementia. No current facility-administered medications on file prior to encounter. Current Outpatient Medications on File Prior to Encounter   Medication Sig Dispense Refill    levothyroxine (SYNTHROID) 50 mcg tablet TAKE 1 TABLET BY MOUTH EVERY DAY BEFORE BREAKFAST 30 Tablet 1    atorvastatin (LIPITOR) 40 mg tablet TAKE 1 TABLET BY MOUTH EVERY DAY 90 Tablet 0    famotidine (PEPCID) 20 mg tablet TAKE 1 TABLET BY MOUTH TWO (2) TIMES DAILY AS NEEDED FOR HEARTBURN. 60 Tablet 1    Blood-Glucose Meter monitoring kit Use as directed. Dx: E11.65 check sugar twice daily 1 Kit 0    glucose blood VI test strips (ASCENSIA AUTODISC VI, ONE TOUCH ULTRA TEST VI) strip Check sugar twice daily 100 Strip 11    lancets misc Check sugar twice daily. E11.65 1 Each 11    Dialyvite 800 0.8 mg tab tablet TAKE 1 TABLET BY MOUTH EVERY DAY 90 Tablet 0    amLODIPine (NORVASC) 5 mg tablet Take 5 mg by mouth daily.  aspirin delayed-release 81 mg tablet Take  by mouth daily.       brimonidine-timoloL (Combigan) 0.2-0.5 % drop ophthalmic solution 1 Drop every twelve (12) hours.  insulin glargine (LANTUS,BASAGLAR) 100 unit/mL (3 mL) inpn 10 Units by SubCUTAneous route daily (with dinner). (Patient taking differently: 10 Units by SubCUTAneous route nightly.) 5 Pen 1    Insulin Needles, Disposable, 32 gauge x 5/32\" ndle 1 Each by Does Not Apply route daily. 100 Pen Needle 11    alcohol swabs padm 1 Each by Apply Externally route four (4) times daily. For use with insulin and glucometer 100 Pad 11     Past History     Past Medical History:  Past Medical History:   Diagnosis Date    CAD (coronary artery disease)     Cancer ([address]     possible stomach cancer 2021- workup in progress    Chronic kidney disease     Manfred Harmon- M-W-F    Dementia ([address]     per daughter pt is alert and oriented x3    Diabetes ([address]     GERD (gastroesophageal reflux disease)     Hyperlipidemia     Hypertension     Seizures ([address]     possible while in hospital 8/2021    Stroke [facility])     Thyroid disease      Past Surgical History:  Past Surgical History:   Procedure Laterality Date    HX APPENDECTOMY      08/21    HX VASCULAR ACCESS      HD catheter    IR INSERT NON TUNL CVC OVER 5 YRS  7/30/2021    IR INSERT NON TUNL CVC OVER 5 YRS  7/30/2021    IR INSERT TUNL CVC W/O PORT OVER 5 YR  8/4/2021    OH CABG, ARTERY-VEIN, FOUR      5 years ago    VASCULAR SURGERY PROCEDURE UNLIST      right arm graft     Family History:  History reviewed. No pertinent family history. Social History:  Social History     Tobacco Use    Smoking status: Never Smoker    Smokeless tobacco: Never Used   Vaping Use    Vaping Use: Never used   Substance Use Topics    Alcohol use: Not Currently    Drug use: Not Currently     Allergies:  No Known Allergies  Review of Systems   Review of Systems   Constitutional: Negative for chills and fever. HENT: Negative for congestion, rhinorrhea, sneezing and sore throat. Eyes: Negative for redness and visual disturbance.    Respiratory: Negative for shortness of breath. Cardiovascular: Negative for leg swelling. Gastrointestinal: Negative for abdominal pain, nausea and vomiting. Genitourinary: Negative for difficulty urinating and frequency. Musculoskeletal: Negative for back pain, myalgias and neck stiffness. Skin: Negative for rash. Neurological: Positive for dizziness. Negative for syncope, weakness and headaches. Hematological: Negative for adenopathy. All other systems reviewed and are negative. Physical Exam   Physical Exam  Vitals and nursing note reviewed. Constitutional:       Appearance: Normal appearance. She is well-developed. HENT:      Head: Normocephalic and atraumatic. Eyes:      Conjunctiva/sclera: Conjunctivae normal.   Cardiovascular:      Rate and Rhythm: Regular rhythm. Bradycardia present. Pulses: Normal pulses. Heart sounds: Normal heart sounds, S1 normal and S2 normal.   Pulmonary:      Effort: Pulmonary effort is normal. No respiratory distress. Breath sounds: Normal breath sounds. No wheezing. Abdominal:      General: Bowel sounds are normal. There is no distension. Palpations: Abdomen is soft. Tenderness: There is no abdominal tenderness. There is no rebound. Musculoskeletal:         General: Normal range of motion. Cervical back: Full passive range of motion without pain, normal range of motion and neck supple. Skin:     General: Skin is warm and dry. Findings: No rash. Neurological:      Mental Status: She is alert. Mental status is at baseline. Psychiatric:         Speech: Speech normal.         Behavior: Behavior normal.         Thought Content: Thought content normal.         Cognition and Memory: She exhibits impaired recent memory.          Judgment: Judgment normal.       Diagnostic Study Results   Labs -     Recent Results (from the past 12 hour(s))   CBC WITH AUTOMATED DIFF    Collection Time: 06/13/22  8:30 AM   Result Value Ref Range    WBC 6.1 3.6 - 11.0 K/uL    RBC 4.96 3.80 - 5.20 M/uL    HGB 14.0 11.5 - 16.0 g/dL    HCT 45.3 35.0 - 47.0 %    MCV 91.3 80.0 - 99.0 FL    MCH 28.2 26.0 - 34.0 PG    MCHC 30.9 30.0 - 36.5 g/dL    RDW 14.3 11.5 - 14.5 %    PLATELET 88 (L) 841 - 400 K/uL    MPV 12.3 8.9 - 12.9 FL    NRBC 0.0 0  WBC    ABSOLUTE NRBC 0.00 0.00 - 0.01 K/uL    NEUTROPHILS 55 32 - 75 %    LYMPHOCYTES 27 12 - 49 %    MONOCYTES 8 5 - 13 %    EOSINOPHILS 9 (H) 0 - 7 %    BASOPHILS 1 0 - 1 %    IMMATURE GRANULOCYTES 0 0.0 - 0.5 %    ABS. NEUTROPHILS 3.4 1.8 - 8.0 K/UL    ABS. LYMPHOCYTES 1.6 0.8 - 3.5 K/UL    ABS. MONOCYTES 0.5 0.0 - 1.0 K/UL    ABS. EOSINOPHILS 0.5 (H) 0.0 - 0.4 K/UL    ABS. BASOPHILS 0.1 0.0 - 0.1 K/UL    ABS. IMM. GRANS. 0.0 0.00 - 0.04 K/UL    DF SMEAR SCANNED      RBC COMMENTS NORMOCYTIC, NORMOCHROMIC     METABOLIC PANEL, COMPREHENSIVE    Collection Time: 06/13/22  8:30 AM   Result Value Ref Range    Sodium 140 136 - 145 mmol/L    Potassium 3.7 3.5 - 5.1 mmol/L    Chloride 102 97 - 108 mmol/L    CO2 31 21 - 32 mmol/L    Anion gap 7 5 - 15 mmol/L    Glucose 123 (H) 65 - 100 mg/dL    BUN 28 (H) 6 - 20 MG/DL    Creatinine 6.47 (H) 0.55 - 1.02 MG/DL    BUN/Creatinine ratio 4 (L) 12 - 20      GFR est AA 8 (L) >60 ml/min/1.73m2    GFR est non-AA 6 (L) >60 ml/min/1.73m2    Calcium 9.0 8.5 - 10.1 MG/DL    Bilirubin, total 0.5 0.2 - 1.0 MG/DL    ALT (SGPT) 17 12 - 78 U/L    AST (SGOT) 20 15 - 37 U/L    Alk. phosphatase 117 45 - 117 U/L    Protein, total 7.1 6.4 - 8.2 g/dL    Albumin 3.1 (L) 3.5 - 5.0 g/dL    Globulin 4.0 2.0 - 4.0 g/dL    A-G Ratio 0.8 (L) 1.1 - 2.2         Radiologic Studies -   No orders to display     No results found. Medical Decision Making   I am the first provider for this patient. I reviewed the vital signs, available nursing notes, past medical history, past surgical history, family history and social history. Vital Signs-Reviewed the patient's vital signs.   Patient Vitals for the past 24 hrs:   Temp Pulse Resp BP SpO2   06/13/22 1004 -- (!) 45 -- 123/61 100 %   06/13/22 0845 -- (!) 45 -- (!) 119/57 100 %   06/13/22 0816 98.1 °F (36.7 °C) (!) 44 16 (!) 117/51 99 %     Pulse Oximetry Analysis - 100% on RA (normal)    Cardiac Monitor:   Rate: 44 bpm  Rhythm: Atrial Fibrillation     ED EKG interpretation: 08:14  Rhythm: atrial fib; and irregular. Rate (approx.): 44; Axis: left axis deviation; P wave: normal; QRS interval: normal ; ST/T wave: normal; Other findings: normal. This EKG was interpreted by Sherrill Gan MD,ED Provider. Records Reviewed: Nursing Notes, Old Medical Records, Previous Radiology Studies and Previous Laboratory Studies    Provider Notes (Medical Decision Making):   27-year-old female presents with hypotension and bradycardia from dialysis. She did receive a liter of IV fluids at dialysis prior to EMS transport. She denies any symptoms at this time. Will check basic labs and monitor on the cardiac monitor. ED Course:   Initial assessment performed. The patients presenting problems have been discussed, and they are in agreement with the care plan formulated and outlined with them. I have encouraged them to ask questions as they arise throughout their visit. Progress Note  10:19 AM  I have re-evaluated pt and her blood pressure is normal.  Her daughter states that her heart rate has been low ever since her quadruple bypass. Patient denies any complaints at this time. Discussed her lab results with both the patient and the daughter. Her daughter feels comfortable taking her home. She will call the dialysis center today to reschedule her dialysis. Progress Note:   Updated pt on all returned results and findings. Discussed the importance of proper follow up as referred below along with return precautions. Pt in agreement with the care plan and expresses agreement with and understanding of all items discussed. Disposition:  Discharge Note:  The pt is ready for discharge.  The pt's signs, symptoms, diagnosis, and discharge instructions have been discussed and pt has conveyed their understanding. The pt is to follow up as recommended or return to ER should their symptoms worsen. Plan has been discussed and pt is in agreement. PLAN:  1. Current Discharge Medication List        2. Follow-up Information     Follow up With Specialties Details Why Contact Info    Allayne Opitz., NP Nurse Practitioner Schedule an appointment as soon as possible for a visit   [facility]ety Mcdonough [address]in [address]  [address]  [phone]      Your dialysis unit  Call today To inquire about rescheduling dialysis         Return to ED if worse     Diagnosis     Clinical Impression:   1. Hypotension, unspecified hypotension type    2. Bradycardia            Please note that this dictation was completed with Dragon, computer voice recognition software. Quite often unanticipated grammatical, syntax, homophones, and other interpretive errors are inadvertently transcribed by the computer software. Please disregard these errors. Additionally, please excuse any errors that have escaped final proofreading.

## 2022-06-13 NOTE — ED NOTES
Patient  given copy of dc instructions and 0 script(s). Patient  verbalized understanding of instructions and script (s). Patient given a current medication reconciliation form and verbalized understanding of their medications. Patient  verbalized understanding of the importance of discussing medications with  his or her physician or clinic they will be following up with. Patient alert and oriented and in no acute distress. Patient discharged home ambulatory with daughter .

## 2022-06-13 NOTE — ED TRIAGE NOTES
Pt arrives via EMS from dialysis with c/o dizziness. Dialyiss states her HR and BP was low about an hour into dialysis. Dialysis gave 1LNS and BP did not improve thus called EMS. Pads placed on pt. Emergency Department Nursing Plan of Care       The Nursing Plan of Care is developed from the Nursing assessment and Emergency Department Attending provider initial evaluation. The plan of care may be reviewed in the ED Provider note.     The Plan of Care was developed with the following considerations:   Patient / Family readiness to learn indicated by:verbalized understanding  Persons(s) to be included in education: patient  Barriers to Learning/Limitations:Idalia Villavicencio, RN    6/13/2022   8:50 AM

## 2022-06-13 NOTE — ED NOTES
Daughter at bedside, [facility] pt has not taken any daily meds today. Daughter [facility] pt's cardiologist is Dr. Flores Letters at [id number] [facility].

## 2022-06-14 LAB
ATRIAL RATE: 288 BPM
CALCULATED R AXIS, ECG10: -44 DEGREES
CALCULATED T AXIS, ECG11: -58 DEGREES
DIAGNOSIS, 93000: NORMAL
Q-T INTERVAL, ECG07: 484 MS
QRS DURATION, ECG06: 98 MS
QTC CALCULATION (BEZET), ECG08: 413 MS
VENTRICULAR RATE, ECG03: 44 BPM

## 2022-07-04 RX ORDER — FAMOTIDINE 20 MG/1
20 TABLET, FILM COATED ORAL
Qty: 60 TABLET | Refills: 1 | Status: SHIPPED | OUTPATIENT
Start: 2022-07-04 | End: 2022-08-02

## 2022-07-19 ENCOUNTER — PATIENT MESSAGE (OUTPATIENT)
Dept: INTERNAL MEDICINE CLINIC | Age: 72
End: 2022-07-19

## 2022-07-28 RX ORDER — ATORVASTATIN CALCIUM 40 MG/1
TABLET, FILM COATED ORAL
Qty: 90 TABLET | Refills: 0 | Status: SHIPPED | OUTPATIENT
Start: 2022-07-28

## 2022-08-02 RX ORDER — LEVOTHYROXINE SODIUM 50 UG/1
TABLET ORAL
Qty: 30 TABLET | Refills: 1 | Status: SHIPPED | OUTPATIENT
Start: 2022-08-02 | End: 2022-09-01

## 2022-08-02 RX ORDER — FAMOTIDINE 20 MG/1
20 TABLET, FILM COATED ORAL
Qty: 60 TABLET | Refills: 1 | Status: SHIPPED | OUTPATIENT
Start: 2022-08-02 | End: 2022-09-01

## 2022-08-10 ENCOUNTER — TELEPHONE (OUTPATIENT)
Dept: ONCOLOGY | Age: 72
End: 2022-08-10

## 2022-08-10 NOTE — TELEPHONE ENCOUNTER
Called and spoke to pt's daughter HIPAA verified by two patient identifiers. To schedule ct scan daughter asked if I could make appt. On thursdays because mom doesn't have dialysis on those day. Called scheduling team to make appt which is 8/18/22 at 1:30 pm called back pt's daughter to inform her of date and time of appt. And had to leave a detailed message of appt and ct instructions on being there early and no solid foods 4 hrs before procedure also stated no ibuprofen and naproxen on the day of the procedure encouraged pt to drink plenty of clear fluids. Left a call back number to speak to me if they had any other questions or concerns.

## 2022-08-18 ENCOUNTER — HOSPITAL ENCOUNTER (OUTPATIENT)
Dept: CT IMAGING | Age: 72
Discharge: HOME | End: 2022-08-18
Attending: STUDENT IN AN ORGANIZED HEALTH CARE EDUCATION/TRAINING PROGRAM
Payer: MEDICARE

## 2022-08-18 DIAGNOSIS — C18.1 PRIMARY APPENDICEAL ADENOCARCINOMA (HCC): ICD-10-CM

## 2022-08-18 PROCEDURE — 74177 CT ABD & PELVIS W/CONTRAST: CPT

## 2022-08-18 PROCEDURE — 74011000636 HC RX REV CODE- 636: Performed by: STUDENT IN AN ORGANIZED HEALTH CARE EDUCATION/TRAINING PROGRAM

## 2022-08-18 RX ORDER — BARIUM SULFATE 20 MG/ML
900 SUSPENSION ORAL
Status: DISCONTINUED | OUTPATIENT
Start: 2022-08-18 | End: 2022-08-19 | Stop reason: HOSPADM

## 2022-08-18 RX ADMIN — IOPAMIDOL 100 ML: 755 INJECTION, SOLUTION INTRAVENOUS at 15:42

## 2022-08-25 ENCOUNTER — TELEPHONE (OUTPATIENT)
Dept: ONCOLOGY | Age: 72
End: 2022-08-25

## 2022-08-29 ENCOUNTER — VIRTUAL VISIT (OUTPATIENT)
Dept: ONCOLOGY | Age: 72
End: 2022-08-29
Payer: MEDICARE

## 2022-08-29 DIAGNOSIS — C18.1 PRIMARY APPENDICEAL ADENOCARCINOMA (HCC): Primary | ICD-10-CM

## 2022-08-29 PROCEDURE — G9899 SCRN MAM PERF RSLTS DOC: HCPCS | Performed by: STUDENT IN AN ORGANIZED HEALTH CARE EDUCATION/TRAINING PROGRAM

## 2022-08-29 PROCEDURE — G9711 PT HX TOT COL OR COLON CA: HCPCS | Performed by: STUDENT IN AN ORGANIZED HEALTH CARE EDUCATION/TRAINING PROGRAM

## 2022-08-29 PROCEDURE — G8400 PT W/DXA NO RESULTS DOC: HCPCS | Performed by: STUDENT IN AN ORGANIZED HEALTH CARE EDUCATION/TRAINING PROGRAM

## 2022-08-29 PROCEDURE — G8432 DEP SCR NOT DOC, RNG: HCPCS | Performed by: STUDENT IN AN ORGANIZED HEALTH CARE EDUCATION/TRAINING PROGRAM

## 2022-08-29 PROCEDURE — G8427 DOCREV CUR MEDS BY ELIG CLIN: HCPCS | Performed by: STUDENT IN AN ORGANIZED HEALTH CARE EDUCATION/TRAINING PROGRAM

## 2022-08-29 PROCEDURE — 1101F PT FALLS ASSESS-DOCD LE1/YR: CPT | Performed by: STUDENT IN AN ORGANIZED HEALTH CARE EDUCATION/TRAINING PROGRAM

## 2022-08-29 PROCEDURE — 1123F ACP DISCUSS/DSCN MKR DOCD: CPT | Performed by: STUDENT IN AN ORGANIZED HEALTH CARE EDUCATION/TRAINING PROGRAM

## 2022-08-29 PROCEDURE — G8536 NO DOC ELDER MAL SCRN: HCPCS | Performed by: STUDENT IN AN ORGANIZED HEALTH CARE EDUCATION/TRAINING PROGRAM

## 2022-08-29 PROCEDURE — 99214 OFFICE O/P EST MOD 30 MIN: CPT | Performed by: STUDENT IN AN ORGANIZED HEALTH CARE EDUCATION/TRAINING PROGRAM

## 2022-08-29 PROCEDURE — G8417 CALC BMI ABV UP PARAM F/U: HCPCS | Performed by: STUDENT IN AN ORGANIZED HEALTH CARE EDUCATION/TRAINING PROGRAM

## 2022-08-29 PROCEDURE — 1090F PRES/ABSN URINE INCON ASSESS: CPT | Performed by: STUDENT IN AN ORGANIZED HEALTH CARE EDUCATION/TRAINING PROGRAM

## 2022-08-29 NOTE — PROGRESS NOTES
Jewel Harris is a 67 y.o. 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 Pt reports falling 2 mos ago and hurt her leg  8/10. Pt reports  numbness and tingling in hands and feet and cramping in her hands. Pt denies abnormal bleeding but reports sob. Chief Complaint   Patient presents with    Follow-up     Adenocarcinoma of the appendix     1. Have you been to the ER, urgent care clinic since your last visit? Hospitalized since your last visit? No    2. Have you seen or consulted any other health care providers outside of the [address] [place] [address] since your last visit? Include any pap smears or colon screening.  No

## 2022-08-29 NOTE — PROGRESS NOTES
[address]  W: [phone] F: [phone]    Reason for Visit:   Burgess Childers is a 67 y.o. female who is seen in consultation at the request of Dr. 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 Subsequent outpatient visit. Hematology / Oncology Treatment History:     Hematological/Oncological Diagnosis: Adenocarcinoma of the appendix, [id number]    Date of Diagnosis: 8/4/21    Treatment course:   7/16/21 - Patient admitted with seizure episode, radiographic findings of acute appendicitis. Subsequent surgery included appendectomy and abdominal washout. Pathology from surgery revealed adenocarcinoma of the appendix. Pathology showed that specimen was fragmented and margins were not able to be discerned. No lymph nodes were sampled for evaluation. Overall size of lesion from pathology could not be determined. No radiographic T size for clinical estimation. History of Present Illness:     Initial consult as inpatient dated August 5, 2021:     Very pleasant 43-year-old female with a complicated medical history most significant for diabetes mellitus type 2, hypertension, ESRD on HD, coronary artery disease, who presented to [facility] on July 16, 2021 with encephalopathy. Work-up showed that the patient had evidence of rectal wall thickening. The patient's inpatient hospital course has been complicated by encephalopathy, renal failure, hypotension from sepsis. The patient's clinical condition worsened over the last few days prompting the need for urgent appendectomy for acute appendicitis. On surgical resection, a well differentiated adenocarcinoma arising in association with villous adenoma this was noticed in the resected appendix. Tumor size and margins could not be determined based on available sample.   It is notable that the patient did have some rectal wall thickening as well. No regional lymph nodes were sampled per operative note. On interview, the patient is confused and unable to answer questions meaningfully. Positive ROS findings include: Confusion, abdominal pain      9/23/21: The patient presents to clinic today accompanied by her daughter. On evaluation today, the patient is able to answer questions appropriately though she remains confused and has difficulty with answering complex questions. Answers to questions often given by the patient's daughter. The patient presents to clinic today with reports of fatigue that has been improving since hospital discharge. Her postoperative care has been complicated as she is required multiple procedures to obtain vascular access for her end-stage renal disease. The patient reports that she has been having improvement in her bowel function since hospital discharge. Per record review, she has not followed up with surgery after appendectomy. Overall, the patient is a marginal functional status and spends most of her time in bed or seated in a chair at home. She lives with her daughter and her son. Current ECOG is a 2-3. She receives dialysis Monday Wednesday Friday and has other medical comorbidities including seizure disorder, hypothyroidism, coronary artery disease with history of CABG. After extensive discussion with the patient and her daughter, we reviewed treatment options for appendiceal adenocarcinomas. For now, the patient and her family wish to focus on quality of life and be conservative with regards to chemotherapeutic treatment options. For now, staging is incomplete and discussions on overall treatment course and plan will depend on imaging results. 10/20/21: PET scan completed on October 5, 2021. Shows no evidence of abnormal uptake or metastatic disease. Clinically, the patient is improving with improved functional status and weight gain since last encounter. She has no residual abdominal pain and no problems with stooling. At baseline though the patient remains frail with an ECOG of 2-3. She is present today in clinic with her daughter who helps with decision making. 3/28/22:  Patient had CT abd/pelvis interval scan on 2/15/22, showed no clear evidence of metastatic or recurrent disease. Since last encounter, the patient was hospitalized for hallucinations in December, symptoms have now resolved. She feels well and denies abdominal pain, or any constitutional symptoms that would be concerning for malignancy. Overall feeling well. No weight loss. Previous fatigue has improved. Interval History: Puneet Jones, who was evaluated through a synchronous (real-time) audio-video encounter, and/or her healthcare decision maker, is aware that it is a billable service, which includes applicable co-pays, with coverage as determined by her insurance carrier. She provided verbal consent to proceed and patient identification was verified. This visit was conducted pursuant to the emergency declaration under the [address] [place] [address] authority and the [employer]. A caregiver was present when appropriate. Ability to conduct physical exam was limited. The patient was located at home in a state where the provider was licensed to provide care. --Jailyn Song MD on 8/29/2022 at 1:24 PM  8/29/22:  Patient doing well. On review of leg pain described by MA, patient now reports no significant pain symptoms. No other new clinical complaints. No bleeding, abdominal pain, blood in stool reported. Review of Systems: A complete review of systems was obtained, negative except as described above.     Past Medical History:   Diagnosis Date    CAD (coronary artery disease)     Cancer ([facility] [address]     possible stomach cancer 2021- workup in progress    Chronic kidney disease     Yemi Adriane- M-W-F    Dementia [facility])     per daughter pt is alert and oriented x3    Diabetes ([facility] [address]     GERD (gastroesophageal reflux disease)     Hyperlipidemia     Hypertension     Seizures ([facility] [address]     possible while in hospital 8/2021    Stroke [facility])     Thyroid disease       Past Surgical History:   Procedure Laterality Date    HX APPENDECTOMY      08/21    HX VASCULAR ACCESS      HD catheter    IR INSERT NON TUNL CVC OVER 5 YRS  7/30/2021    IR INSERT NON TUNL CVC OVER 5 YRS  7/30/2021    IR INSERT TUNL CVC W/O PORT OVER 5 YR  8/4/2021    [place] CABG, ARTERY-VEIN, FOUR      5 years ago    VASCULAR SURGERY PROCEDURE UNLIST      right arm graft      Social History     Tobacco Use    Smoking status: Never    Smokeless tobacco: Never   Substance Use Topics    Alcohol use: Not Currently      No family history on file. Current Outpatient Medications   Medication Sig    levothyroxine (SYNTHROID) 50 mcg tablet TAKE 1 TABLET BY MOUTH EVERY DAY BEFORE BREAKFAST    famotidine (PEPCID) 20 mg tablet TAKE 1 TABLET BY MOUTH TWO (2) TIMES DAILY AS NEEDED FOR HEARTBURN. atorvastatin (LIPITOR) 40 mg tablet TAKE 1 TABLET BY MOUTH EVERY DAY    Blood-Glucose Meter monitoring kit Use as directed. Dx: E11.65 check sugar twice daily    glucose blood VI test strips (ASCENSIA AUTODISC VI, ONE TOUCH ULTRA TEST VI) strip Check sugar twice daily    lancets misc Check sugar twice daily. E11.65    Dialyvite 800 0.8 mg tab tablet TAKE 1 TABLET BY MOUTH EVERY DAY    amLODIPine (NORVASC) 5 mg tablet Take 5 mg by mouth daily. aspirin delayed-release 81 mg tablet Take  by mouth daily. brimonidine-timoloL (Combigan) 0.2-0.5 % drop ophthalmic solution 1 Drop every twelve (12) hours. alcohol swabs padm 1 Each by Apply Externally route four (4) times daily. For use with insulin and glucometer    insulin glargine (LANTUS,BASAGLAR) 100 unit/mL (3 mL) inpn 10 Units by SubCUTAneous route daily (with dinner).  (Patient not taking: Reported on 8/29/2022)    Insulin Needles, Disposable, 32 gauge x 5/32\" ndle 1 Each by Does Not Apply route daily. (Patient not taking: Reported on 8/29/2022)     No current facility-administered medications for this visit. No Known Allergies         Physical Exam:     There were no vitals taken for this visit. ECOG of 2-3  General: alert, no distress, patient in good spirits   Mental  status:  Mild confusion low patient is able to discuss complex topics with reorientation   HENT: NCAT   Neck: no visualized mass   Resp: no respiratory distress   Neuro: no gross deficits   Skin: no discoloration or lesions of concern on visible areas             Results:     Lab Results   Component Value Date/Time    WBC 6.1 06/13/2022 08:30 AM    HGB 14.0 06/13/2022 08:30 AM    HCT 45.3 06/13/2022 08:30 AM    PLATELET 88 (L) 30/32/8469 08:30 AM    MCV 91.3 06/13/2022 08:30 AM    ABS. NEUTROPHILS 3.4 06/13/2022 08:30 AM     Lab Results   Component Value Date/Time    Sodium 140 06/13/2022 08:30 AM    Potassium 3.7 06/13/2022 08:30 AM    Chloride 102 06/13/2022 08:30 AM    CO2 31 06/13/2022 08:30 AM    Glucose 123 (H) 06/13/2022 08:30 AM    BUN 28 (H) 06/13/2022 08:30 AM    Creatinine 6.47 (H) 06/13/2022 08:30 AM    GFR est AA 8 (L) 06/13/2022 08:30 AM    GFR est non-AA 6 (L) 06/13/2022 08:30 AM    Calcium 9.0 06/13/2022 08:30 AM    Sodium (POC) 147 (H) 08/24/2021 06:59 AM    Potassium (POC) 3.2 (L) 08/24/2021 06:59 AM    Chloride (POC) 109 (H) 08/24/2021 06:59 AM    Glucose (POC) 123 (H) 10/05/2021 07:33 AM    Creatinine (POC) 7.68 (H) 08/24/2021 06:59 AM    Calcium, ionized (POC) 1.23 08/24/2021 06:59 AM     Lab Results   Component Value Date/Time    Bilirubin, total 0.5 06/13/2022 08:30 AM    ALT (SGPT) 17 06/13/2022 08:30 AM    Alk.  phosphatase 117 06/13/2022 08:30 AM    Protein, total 7.1 06/13/2022 08:30 AM    Albumin 3.1 (L) 06/13/2022 08:30 AM    Globulin 4.0 06/13/2022 08:30 AM     CT abdomen pelvis dated July 28, 2021   FINDINGS: LOWER THORAX: Cardiomegaly and coronary artery disease. Dependent atelectasis on  the right. LIVER/GALLBLADDER: No mass. Gallbladder is within normal limits. CBD is not  dilated. SPLEEN/PANCREAS:  within normal limits. ADRENALS/KIDNEYS: Right lower pole calculus. No hydronephrosis. STOMACH: Small hiatal hernia. SMALL BOWEL/COLON: Ascending colonic wall thickening with pericolonic  inflammatory stranding and trace fluid. Few colonic diverticula. No dilatation or  wall thickening. APPENDIX: Appendiceal enlargement. Inflammatory change. PERITONEUM: No ascites or pneumoperitoneum. RETROPERITONEUM: Aortic atherosclerotic change. REPRODUCTIVE ORGANS: Calcified uterine fibroids. URINARY BLADDER: No mass or calculus. BONES: Multilevel disc degenerative change with multilevel vacuum disc  phenomena. Schmorl's nodes at L2 and T12. A  ADDITIONAL COMMENTS: N/A     IMPRESSION  Imaging findings consistent with acute appendicitis. There is no associated  abscess or drainable fluid collection. CT Abdomen/Pelvis dated 2/15/22  FINDINGS:   LOWER THORAX: No significant abnormality in the incidentally imaged lower chest.  LIVER: No mass. BILIARY TREE: Gallbladder is within normal limits. CBD is not dilated. SPLEEN: within normal limits. PANCREAS: No mass. Duct measures up to 4 mm in the pancreatic head but no masses  seen   ADRENALS: Unremarkable. KIDNEYS: No mass, calculus, or hydronephrosis. Small kidneys  STOMACH: Unremarkable. SMALL BOWEL: No dilatation or wall thickening. COLON: No dilatation or wall thickening. APPENDIX: Surgically absent  PERITONEUM: No ascites or pneumoperitoneum. RETROPERITONEUM: No lymphadenopathy or aortic aneurysm. Atherosclerosis. Severe  left renal ostial calcification. REPRODUCTIVE ORGANS: Fibroid uterus  URINARY BLADDER: No mass or calculus. BONES: No destructive bone lesion. Disc bulges at L4-5 and L5-S1  ABDOMINAL WALL: No mass or hernia.   ADDITIONAL COMMENTS: N/A IMPRESSION  No evidence for metastatic disease. Mild pancreatic ductal dilatation, without an obvious cause. Recommend  three-phase CT or MRI to further evaluate  Incidental fibroid uterus. MRI brain dated July 18, 2021    FINDINGS:  The ventricles are normal in size and position. There is no acute infarct,  hemorrhage, extra-axial fluid collection, or mass effect. There is no cerebellar  tonsillar herniation. Diffuse encephalomalacia within the posterior left  temporal lobe and occipital lobe. Moderate high signal within the  periventricular white matter. Chronic right basal ganglia lacunar infarct. The paranasal sinuses, mastoid air cells, and middle ears are clear. The orbital  contents are within normal limits. No significant osseous or scalp lesions are  identified. IMPRESSION  Encephalomalacia and white matter disease. No acute intracranial abnormality      PET scan dated October 5, 2021  IMPRESSION  No PET evidence of residual disease in the appendectomy bed. No  evidence of distant metastatic disease. CT Results (most recent):  Results from Hospital Encounter encounter on 08/18/22    CT ABD PELV W CONT    Narrative  EXAM: CT ABD PELV W CONT    INDICATION: History of malignant neoplasm of the appendix    COMPARISON: CT February 15, 2022    CONTRAST: 100 mL of Isovue-370. ORAL CONTRAST: Given    TECHNIQUE:  Following the uneventful intravenous administration of contrast, thin axial  images were obtained through the abdomen and pelvis. Coronal and sagittal  reconstructions were generated. CT dose reduction was achieved through use of a  standardized protocol tailored for this examination and automatic exposure  control for dose modulation. FINDINGS:  LOWER THORAX: Lung bases are clear. There is chronic cardiomegaly. LIVER: No mass. BILIARY TREE: Gallbladder is within normal limits. CBD is not dilated. SPLEEN: within normal limits. PANCREAS: No mass or ductal dilatation.   ADRENALS: Unremarkable. KIDNEYS: No mass, calculus, or hydronephrosis. Chronic atrophic kidneys. STOMACH: Unremarkable. SMALL BOWEL: No dilatation or wall thickening. COLON: Large volume of stool throughout the colon. No rectal distention. APPENDIX: Status post appendectomy. No evidence of local recurrence. PERITONEUM: No ascites or pneumoperitoneum. RETROPERITONEUM: No lymphadenopathy or aortic aneurysm. Diffuse calcific aortic  atherosclerosis. REPRODUCTIVE ORGANS: Enlarged, nodular uterus consistent with fibroids. URINARY BLADDER: No mass or calculus. BONES: Severe degenerative disc disease at L4-L5. No aggressive osseous lesions. ABDOMINAL WALL: No mass or hernia. ADDITIONAL COMMENTS: N/A    Impression  1. No evidence of abdominal or pelvic metastatic disease. 2. Severe generalized constipation, particularly proximally. 3. Uterine fibroids. 4. Cardiomegaly. 5. Atrophic kidneys, consistent with chronic medical renal disease. Assessment and Recommendations:     # Adenocarcinoma of the appendix, [place], NX, M0  -Pathology only showed fragments of adenocarcinoma sample, lymph node sampling was inadequate.  -Postsurgical PET scan shows no evidence of residual disease in the appendectomy bed or any evidence of any distant disease  - I have previously reviewed treatment options including adjuvant Xeloda versus active surveillance given the patient's frailty and overall function. The patient and her daughter wish to proceed with active surveillance and hold off on treatment with any adjuvant chemotherapy. -CEA from September 23, 2021 was 12.0. No presurgical CEA is available    - repeat CT abd/pelvis from 2/15/22 showed no evidence of disease recurrence. - repeat CT abd/pelvis from 8/2022 showed no evidence of disease recurrence    - check CEA, CBC, CMP today -- will mail to patient.         Follow up in 3 months for symptoms check, can be virtual      Signed By: Phillip De La Garza MD      Attending Medical [address] [facility]

## 2022-09-01 RX ORDER — FAMOTIDINE 20 MG/1
20 TABLET, FILM COATED ORAL
Qty: 60 TABLET | Refills: 1 | Status: SHIPPED | OUTPATIENT
Start: 2022-09-01 | End: 2022-10-04

## 2022-09-01 RX ORDER — LEVOTHYROXINE SODIUM 50 UG/1
TABLET ORAL
Qty: 30 TABLET | Refills: 1 | Status: SHIPPED | OUTPATIENT
Start: 2022-09-01 | End: 2022-10-03

## 2022-09-26 ENCOUNTER — NURSE TRIAGE (OUTPATIENT)
Dept: OTHER | Facility: CLINIC | Age: 72
End: 2022-09-26

## 2022-09-26 NOTE — TELEPHONE ENCOUNTER
Received call from [address] at [facility] with Red Flag Complaint. Subjective: Caller states \"I've noticed maybe about two weeks ago, it wasn't that bad because she forget a lot anyway. Lately she has been saying that she is seeing stuff. She has been talking out her head. I've been trying to see if she remembers things and she do. But she's been talking out of her head, talking to people. My mom gets very defensive. She is not really sleeping. She is taking stuff out of her room, throwing stuff away. Her mind is not right. \"     Limited Triage d/t patient not with caller-patient at Dialysis    Current Symptoms: confusion, seeing things that aren't there frequently-NOT today, reduced appetite at times, diarrhea after eating-about 3 times/day, blood sugar and BP WNL at Dialysis today, vomiting x1 at Dialysis today    Recent changes in medication doses    Denies Head Injury    Onset: 2 weeks ago; worsening    Associated Symptoms: reduced activity, diarrhea    Pain Severity: Unable to rate d/t patient not with caller- caller states NO pain    Temperature: Denies    What has been tried: Re-orienting patient- helps    LMP: NA Pregnant: NA    Recommended disposition: Go to ED Now    Care advice provided, patient verbalizes understanding; denies any other questions or concerns; instructed to call back for any new or worsening symptoms. Patient/caller agrees to proceed to [facility] Emergency Department. Attention Provider: Thank you for allowing me to participate in the care of your patient. The patient was connected to triage in response to information provided to the [facility]. Please do not respond through this encounter as the response is not directed to a shared pool.     Reason for Disposition   Headache or vomiting    Protocols used: Confusion - Delirium-ADULT-[place]

## 2022-09-29 ENCOUNTER — HOSPITAL ENCOUNTER (EMERGENCY)
Age: 72
Discharge: HOME | End: 2022-09-29
Attending: EMERGENCY MEDICINE
Payer: MEDICARE

## 2022-09-29 VITALS
SYSTOLIC BLOOD PRESSURE: 154 MMHG | HEART RATE: 58 BPM | WEIGHT: 114 LBS | TEMPERATURE: 98.6 F | DIASTOLIC BLOOD PRESSURE: 68 MMHG | BODY MASS INDEX: 22.98 KG/M2 | RESPIRATION RATE: 18 BRPM | HEIGHT: 59 IN | OXYGEN SATURATION: 100 %

## 2022-09-29 DIAGNOSIS — N30.90 CYSTITIS: Primary | ICD-10-CM

## 2022-09-29 LAB
APPEARANCE UR: ABNORMAL
BACTERIA URNS QL MICRO: ABNORMAL /HPF
BASOPHILS # BLD: 0 K/UL (ref 0–0.1)
BASOPHILS NFR BLD: 0 % (ref 0–1)
BILIRUB UR QL: NEGATIVE
COLOR UR: ABNORMAL
DIFFERENTIAL METHOD BLD: ABNORMAL
EOSINOPHIL # BLD: 0.1 K/UL (ref 0–0.4)
EOSINOPHIL NFR BLD: 2 % (ref 0–7)
EPITH CASTS URNS QL MICRO: ABNORMAL /LPF
ERYTHROCYTE [DISTWIDTH] IN BLOOD BY AUTOMATED COUNT: 15.2 % (ref 11.5–14.5)
GLUCOSE UR STRIP.AUTO-MCNC: NEGATIVE MG/DL
HCT VFR BLD AUTO: 36.5 % (ref 35–47)
HGB BLD-MCNC: 11.4 G/DL (ref 11.5–16)
HGB UR QL STRIP: ABNORMAL
IMM GRANULOCYTES # BLD AUTO: 0 K/UL (ref 0–0.04)
IMM GRANULOCYTES NFR BLD AUTO: 0 % (ref 0–0.5)
KETONES UR QL STRIP.AUTO: NEGATIVE MG/DL
LEUKOCYTE ESTERASE UR QL STRIP.AUTO: ABNORMAL
LYMPHOCYTES # BLD: 2.5 K/UL (ref 0.8–3.5)
LYMPHOCYTES NFR BLD: 40 % (ref 12–49)
MCH RBC QN AUTO: 28.6 PG (ref 26–34)
MCHC RBC AUTO-ENTMCNC: 31.2 G/DL (ref 30–36.5)
MCV RBC AUTO: 91.5 FL (ref 80–99)
MONOCYTES # BLD: 0.7 K/UL (ref 0–1)
MONOCYTES NFR BLD: 11 % (ref 5–13)
NEUTS SEG # BLD: 3 K/UL (ref 1.8–8)
NEUTS SEG NFR BLD: 47 % (ref 32–75)
NITRITE UR QL STRIP.AUTO: NEGATIVE
NRBC # BLD: 0 K/UL (ref 0–0.01)
NRBC BLD-RTO: 0 PER 100 WBC
PH UR STRIP: 7.5 [PH] (ref 5–8)
PLATELET # BLD AUTO: 105 K/UL (ref 150–400)
PMV BLD AUTO: 11.8 FL (ref 8.9–12.9)
PROT UR STRIP-MCNC: 300 MG/DL
RBC # BLD AUTO: 3.99 M/UL (ref 3.8–5.2)
RBC #/AREA URNS HPF: ABNORMAL /HPF (ref 0–5)
SP GR UR REFRACTOMETRY: 1.01
UA: UC IF INDICATED,UAUC: ABNORMAL
UROBILINOGEN UR QL STRIP.AUTO: 1 EU/DL (ref 0.2–1)
WBC # BLD AUTO: 6.4 K/UL (ref 3.6–11)
WBC URNS QL MICRO: ABNORMAL /HPF (ref 0–4)

## 2022-09-29 PROCEDURE — 74011000250 HC RX REV CODE- 250: Performed by: EMERGENCY MEDICINE

## 2022-09-29 PROCEDURE — 96374 THER/PROPH/DIAG INJ IV PUSH: CPT

## 2022-09-29 PROCEDURE — 87186 SC STD MICRODIL/AGAR DIL: CPT

## 2022-09-29 PROCEDURE — 87077 CULTURE AEROBIC IDENTIFY: CPT

## 2022-09-29 PROCEDURE — 81001 URINALYSIS AUTO W/SCOPE: CPT

## 2022-09-29 PROCEDURE — 99284 EMERGENCY DEPT VISIT MOD MDM: CPT

## 2022-09-29 PROCEDURE — 74011250636 HC RX REV CODE- 250/636: Performed by: EMERGENCY MEDICINE

## 2022-09-29 PROCEDURE — 36415 COLL VENOUS BLD VENIPUNCTURE: CPT

## 2022-09-29 PROCEDURE — 87086 URINE CULTURE/COLONY COUNT: CPT

## 2022-09-29 PROCEDURE — 85025 COMPLETE CBC W/AUTO DIFF WBC: CPT

## 2022-09-29 RX ORDER — CEFDINIR 300 MG/1
CAPSULE ORAL
Qty: 4 CAPSULE | Refills: 0 | Status: SHIPPED | OUTPATIENT
Start: 2022-09-29 | End: 2022-11-03 | Stop reason: ALTCHOICE

## 2022-09-29 RX ADMIN — CEFTRIAXONE SODIUM 1 G: 1 INJECTION, POWDER, FOR SOLUTION INTRAMUSCULAR; INTRAVENOUS at 22:49

## 2022-09-29 NOTE — ED TRIAGE NOTES
Daughter reports increased confusion for the past 2 weeks with visual hallucinations. Hx dementia.      HD pt MWF

## 2022-09-30 NOTE — ED NOTES
Pt presents ambulatory to ED accompanied by daughter. Per daughter, pt has hx dementia, increasing confusion and visual hallucinations x1 week. Pt receives dialysis M, W, F.   Pt is alert, confused, RR even and unlabored, skin is warm and dry. Assesment completed and pt updated on plan of care. Emergency Department Nursing Plan of Care       The Nursing Plan of Care is developed from the Nursing assessment and Emergency Department Attending provider initial evaluation. The plan of care may be reviewed in the ED Provider note.     The Plan of Care was developed with the following considerations:   Patient / Family readiness to learn indicated by:verbalized understanding  Persons(s) to be included in education: family, daughter  Barriers to Learning/Limitations:hx dementia    [address]    9/29/2022   8:48 PM

## 2022-09-30 NOTE — ED NOTES
Patient (s) daughter given copy of dc instructions and 0 paper script(s) and 1 electronic scripts. Patient (s) daughter verbalized understanding of instructions and script (s). Patient given a current medication reconciliation form and verbalized understanding of their medications. Patient (s)daughter verbalized understanding of the importance of discussing medications with  his or her physician or clinic they will be following up with. Patient alert and oriented and in no acute distress.

## 2022-10-02 LAB
BACTERIA SPEC CULT: ABNORMAL
CC UR VC: ABNORMAL
SERVICE CMNT-IMP: ABNORMAL

## 2022-10-03 ENCOUNTER — OFFICE VISIT (OUTPATIENT)
Dept: INTERNAL MEDICINE CLINIC | Age: 72
End: 2022-10-03
Payer: MEDICARE

## 2022-10-03 VITALS
HEART RATE: 85 BPM | RESPIRATION RATE: 18 BRPM | OXYGEN SATURATION: 99 % | WEIGHT: 107 LBS | HEIGHT: 59 IN | BODY MASS INDEX: 21.57 KG/M2 | SYSTOLIC BLOOD PRESSURE: 86 MMHG | TEMPERATURE: 97.8 F | DIASTOLIC BLOOD PRESSURE: 36 MMHG

## 2022-10-03 DIAGNOSIS — N18.6 TYPE 2 DIABETES MELLITUS WITH CHRONIC KIDNEY DISEASE ON CHRONIC DIALYSIS, WITH LONG-TERM CURRENT USE OF INSULIN (HCC): ICD-10-CM

## 2022-10-03 DIAGNOSIS — I50.32 CHRONIC DIASTOLIC CONGESTIVE HEART FAILURE (HCC): ICD-10-CM

## 2022-10-03 DIAGNOSIS — Z79.4 TYPE 2 DIABETES MELLITUS WITH CHRONIC KIDNEY DISEASE ON CHRONIC DIALYSIS, WITH LONG-TERM CURRENT USE OF INSULIN (HCC): ICD-10-CM

## 2022-10-03 DIAGNOSIS — F51.01 PRIMARY INSOMNIA: ICD-10-CM

## 2022-10-03 DIAGNOSIS — E03.9 ACQUIRED HYPOTHYROIDISM: ICD-10-CM

## 2022-10-03 DIAGNOSIS — J44.9 CHRONIC OBSTRUCTIVE PULMONARY DISEASE, UNSPECIFIED COPD TYPE (HCC): ICD-10-CM

## 2022-10-03 DIAGNOSIS — Z00.00 ENCOUNTER FOR SUBSEQUENT ANNUAL WELLNESS VISIT (AWV) IN MEDICARE PATIENT: ICD-10-CM

## 2022-10-03 DIAGNOSIS — I10 ESSENTIAL HYPERTENSION: ICD-10-CM

## 2022-10-03 DIAGNOSIS — F03.B11 MODERATE DEMENTIA WITH AGITATION, UNSPECIFIED DEMENTIA TYPE: ICD-10-CM

## 2022-10-03 DIAGNOSIS — Z79.899 POLYPHARMACY: ICD-10-CM

## 2022-10-03 DIAGNOSIS — C80.1 ADENOCARCINOMA (HCC): ICD-10-CM

## 2022-10-03 DIAGNOSIS — N30.90 CYSTITIS: Primary | ICD-10-CM

## 2022-10-03 DIAGNOSIS — N18.6 ESRD (END STAGE RENAL DISEASE) (HCC): ICD-10-CM

## 2022-10-03 DIAGNOSIS — Z99.2 TYPE 2 DIABETES MELLITUS WITH CHRONIC KIDNEY DISEASE ON CHRONIC DIALYSIS, WITH LONG-TERM CURRENT USE OF INSULIN (HCC): ICD-10-CM

## 2022-10-03 DIAGNOSIS — E11.22 TYPE 2 DIABETES MELLITUS WITH CHRONIC KIDNEY DISEASE ON CHRONIC DIALYSIS, WITH LONG-TERM CURRENT USE OF INSULIN (HCC): ICD-10-CM

## 2022-10-03 PROBLEM — I50.30 DIASTOLIC CONGESTIVE HEART FAILURE (HCC): Status: ACTIVE | Noted: 2017-11-06

## 2022-10-03 PROBLEM — D61.1: Status: ACTIVE | Noted: 2017-09-12

## 2022-10-03 PROBLEM — T80.211A CATHETER-RELATED BLOODSTREAM INFECTION (CRBSI): Status: ACTIVE | Noted: 2017-11-06

## 2022-10-03 PROBLEM — N17.0 ATN (ACUTE TUBULAR NECROSIS) (HCC): Status: ACTIVE | Noted: 2017-08-28

## 2022-10-03 PROBLEM — G47.33 OSA (OBSTRUCTIVE SLEEP APNEA): Status: ACTIVE | Noted: 2017-11-06

## 2022-10-03 LAB
GLUCOSE POC: 180 MG/DL
HBA1C MFR BLD HPLC: 5.7 %

## 2022-10-03 PROCEDURE — G8420 CALC BMI NORM PARAMETERS: HCPCS | Performed by: NURSE PRACTITIONER

## 2022-10-03 PROCEDURE — G8400 PT W/DXA NO RESULTS DOC: HCPCS | Performed by: NURSE PRACTITIONER

## 2022-10-03 PROCEDURE — 99214 OFFICE O/P EST MOD 30 MIN: CPT | Performed by: NURSE PRACTITIONER

## 2022-10-03 PROCEDURE — G0439 PPPS, SUBSEQ VISIT: HCPCS | Performed by: NURSE PRACTITIONER

## 2022-10-03 PROCEDURE — G8432 DEP SCR NOT DOC, RNG: HCPCS | Performed by: NURSE PRACTITIONER

## 2022-10-03 PROCEDURE — 3044F HG A1C LEVEL LT 7.0%: CPT | Performed by: NURSE PRACTITIONER

## 2022-10-03 PROCEDURE — 82962 GLUCOSE BLOOD TEST: CPT | Performed by: NURSE PRACTITIONER

## 2022-10-03 PROCEDURE — G8536 NO DOC ELDER MAL SCRN: HCPCS | Performed by: NURSE PRACTITIONER

## 2022-10-03 PROCEDURE — G8427 DOCREV CUR MEDS BY ELIG CLIN: HCPCS | Performed by: NURSE PRACTITIONER

## 2022-10-03 PROCEDURE — G9711 PT HX TOT COL OR COLON CA: HCPCS | Performed by: NURSE PRACTITIONER

## 2022-10-03 PROCEDURE — 2022F DILAT RTA XM EVC RTNOPTHY: CPT | Performed by: NURSE PRACTITIONER

## 2022-10-03 PROCEDURE — 1090F PRES/ABSN URINE INCON ASSESS: CPT | Performed by: NURSE PRACTITIONER

## 2022-10-03 PROCEDURE — G9899 SCRN MAM PERF RSLTS DOC: HCPCS | Performed by: NURSE PRACTITIONER

## 2022-10-03 PROCEDURE — 83036 HEMOGLOBIN GLYCOSYLATED A1C: CPT | Performed by: NURSE PRACTITIONER

## 2022-10-03 PROCEDURE — 1101F PT FALLS ASSESS-DOCD LE1/YR: CPT | Performed by: NURSE PRACTITIONER

## 2022-10-03 RX ORDER — ERGOCALCIFEROL 1.25 MG/1
50000 CAPSULE ORAL
COMMUNITY

## 2022-10-03 RX ORDER — INSULIN LISPRO 100 [IU]/ML
INJECTION, SOLUTION INTRAVENOUS; SUBCUTANEOUS
COMMUNITY
End: 2022-11-03

## 2022-10-03 RX ORDER — CARVEDILOL 6.25 MG/1
6.25 TABLET ORAL 2 TIMES DAILY WITH MEALS
COMMUNITY
End: 2022-10-19

## 2022-10-03 RX ORDER — MELATONIN 5 MG
5 CAPSULE ORAL
Qty: 30 CAPSULE | Refills: 1 | Status: SHIPPED | OUTPATIENT
Start: 2022-10-03

## 2022-10-03 RX ORDER — AMLODIPINE BESYLATE 10 MG/1
TABLET ORAL
COMMUNITY
End: 2022-10-19

## 2022-10-03 RX ORDER — REPAGLINIDE 2 MG/1
2 TABLET ORAL
COMMUNITY

## 2022-10-03 RX ORDER — ATORVASTATIN CALCIUM 80 MG/1
80 TABLET, FILM COATED ORAL DAILY
COMMUNITY
End: 2022-10-03

## 2022-10-03 RX ORDER — SEVELAMER CARBONATE 800 MG/1
1 TABLET, FILM COATED ORAL
COMMUNITY

## 2022-10-03 RX ORDER — HYDRALAZINE HYDROCHLORIDE 25 MG/1
TABLET, FILM COATED ORAL
COMMUNITY
End: 2022-10-19

## 2022-10-03 RX ORDER — LEVOTHYROXINE SODIUM 100 UG/1
100 TABLET ORAL DAILY
COMMUNITY
End: 2022-10-03

## 2022-10-03 RX ORDER — METOPROLOL SUCCINATE 25 MG/1
TABLET, EXTENDED RELEASE ORAL
COMMUNITY
Start: 2022-07-07 | End: 2022-10-19

## 2022-10-03 NOTE — PROGRESS NOTES
Chief Complaint   Patient presents with    ED Follow-up       1. \"Have you been to the ER, urgent care clinic since your last visit? Hospitalized since your last visit? \" Yes When: 9/29/22 Where: [facility] Reason for visit: cystitis    2. \"Have you seen or consulted any other health care providers outside of the [address] [place] [address] since your last visit? \" No     3. For patients aged 39-70: Has the patient had a colonoscopy / FIT/ Cologuard? No      If the patient is female:    4. For patients aged 41-77: Has the patient had a mammogram within the past 2 years? No      5. For patients aged 21-65: Has the patient had a pap smear?  NA - based on age or sex

## 2022-10-03 NOTE — PROGRESS NOTES
Subjective: (As above and below)     Chief Complaint   Patient presents with    ED Follow-up     [facility]    Request For New Medication     Sleep     Labs     anemia     Suzette Musa is a 67y.o. year old female who presents for     ED follow up presented on 09/29 for confusion, found to have UTI  Finished cefdinir which is appropriate for cx    Here w/ caregiver, meds brought in but poor understanding of what she is taking    Hypertension ROS:  BP low today, came from dialysis  Pt is asymptomatic, caregiver does not know usual BP   Unsure which meds she is taking    Diabetic Review of Systems - medication compliance: compliant most of the time, diabetic diet compliance: compliant most of the time, home glucose monitoring: is performed sporadically. 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 Chose to defer treatment- last saw oncology 08/29/22  Weight and appetite stable    Insomnia;  trouble falling asleep      SHIMON    Hx of seizure disorder\"         Reviewed PmHx, RxHx, FmHx, SocHx, AllgHx and updated in chart. History reviewed. No pertinent family history.     Past Medical History:   Diagnosis Date    CAD (coronary artery disease)     Cancer ([address]     possible stomach cancer 2021- workup in progress    Chronic kidney disease     Amy Wheatley- M-W-F    Dementia ([address]     per daughter pt is alert and oriented x3    Diabetes ([address]     GERD (gastroesophageal reflux disease)     Hyperlipidemia     Hypertension     Seizures ([address]     possible while in hospital 8/2021    Stroke [facility])     Thyroid disease       Social History     Socioeconomic History    Marital status:    Tobacco Use    Smoking status: Never    Smokeless tobacco: Never   Vaping Use    Vaping Use: Never used   Substance and Sexual Activity    Alcohol use: Not Currently    Drug use: Not Currently    Sexual activity: Yes Partners: Female          Current Outpatient Medications   Medication Sig    melatonin 5 mg cap capsule Take 1 Capsule by mouth nightly. cefdinir (OMNICEF) 300 mg capsule 300 mg 3 times per week after hemodialysis sessions    famotidine (PEPCID) 20 mg tablet TAKE 1 TABLET BY MOUTH TWO (2) TIMES DAILY AS NEEDED FOR HEARTBURN. atorvastatin (LIPITOR) 40 mg tablet TAKE 1 TABLET BY MOUTH EVERY DAY    insulin lispro protamine/insulin lispro (HUMALOG MIX 50/50) 100 unit/mL (50-50) injection 3 Units by SubCUTAneous route. carvediloL (COREG) 6.25 mg tablet Take 6.25 mg by mouth two (2) times daily (with meals). ergocalciferol (ERGOCALCIFEROL) 1,250 mcg (50,000 unit) capsule Take 50,000 Units by mouth. hydrALAZINE (APRESOLINE) 25 mg tablet Take  by mouth. insulin detemir U-100 (Levemir U-100 Insulin) 100 unit/mL injection 18 Units by SubCUTAneous route. insulin lispro (HumaLOG KwikPen Insulin) 100 unit/mL kwikpen by SubCUTAneous route. metoprolol succinate (TOPROL-XL) 25 mg XL tablet TAKE 1/2 TABLET BY MOUTH DAILY AFTER DIALYSIS TREATMENT    repaglinide (PRANDIN) 2 mg tablet Take 2 mg by mouth.    sevelamer carbonate (RENVELA) 800 mg tab tab Take 1 Tablet by mouth. amLODIPine (NORVASC) 10 mg tablet Take  by mouth.    lancets misc Check sugar twice daily. E11.65 (Patient not taking: Reported on 9/29/2022)    amLODIPine (NORVASC) 5 mg tablet Take 5 mg by mouth daily. (Patient not taking: Reported on 9/29/2022)    aspirin delayed-release 81 mg tablet Take  by mouth daily. brimonidine-timoloL (Combigan) 0.2-0.5 % drop ophthalmic solution 1 Drop every twelve (12) hours. insulin glargine (LANTUS,BASAGLAR) 100 unit/mL (3 mL) inpn 10 Units by SubCUTAneous route daily (with dinner). (Patient not taking: No sig reported)    Insulin Needles, Disposable, 32 gauge x 5/32\" ndle 1 Each by Does Not Apply route daily.  (Patient not taking: No sig reported)     No current facility-administered medications for this visit. Review of Systems:   Constitutional:    Negative for fever and chills, negative diaphoresis. HEENT:              Negative for neck pain and stiffness. Eyes:                  Negative for visual disturbance, itching, redness or discharge. Respiratory:        Negative for cough and shortness of breath. Cardiovascular:  Negative for chest pain and palpitations. Gastrointestinal: Negative for nausea, vomiting, abdominal pain, diarrhea or constipation. Genitourinary:     Negative for dysuria and frequency. Musculoskeletal: Negative for falls, tenderness and swelling. Skin:                    Negative for rash, masses or lesions. Neurological:       Negative for dizzyness, seizure, loss of consciousness, weakness and numbness. Objective:     Vitals:    10/03/22 1411 10/03/22 1441   BP: (!) 80/44 (!) 86/36   Pulse: 78 85   Resp: 18    Temp: 97.8 °F (36.6 °C)    TempSrc: Temporal    SpO2: 99%    Weight: 107 lb (48.5 kg)    Height: 4' 11\" (1.499 m)        Results for orders placed or performed in visit on 10/03/22   AMB POC HEMOGLOBIN A1C   Result Value Ref Range    Hemoglobin A1c (POC) 5.7 %   AMB POC GLUCOSE BLOOD, BY GLUCOSE MONITORING DEVICE   Result Value Ref Range    Glucose  MG/DL       Gen: Oriented to person, place and time and well-developed, well-nourished and in no distress. HEENT:    Head: normocephalic and atraumatic. Eyes:  EOM are normal. Pupils equal and round. Neck:  Normal range of motion. Neck supple. Cardiovascular: normal rate, regular rhythm and normal heart sounds. Pulmonary/Chest:  Effort normal and breath sounds normal.  No respiratory distress. No wheezes, no rales. Abdominal: soft, normal  bowel sounds. Musculoskeletal:  No edema, no tenderness. No calf tenderness or edema. Neurological:  Alert, oriented to person, place and time. Skin: skin is warm and dry.        Assessment/ Plan:     Follow-up and Dispositions    Return in about 6 months (around 4/3/2023). [facility] home BP checks    1. Encounter for subsequent annual wellness visit (AWV) in Medicare patient      2. Cystitis  S/s Resolved, attempted to get ua today but pt unable    3. Essential hypertension    - LIPID PANEL    4. ESRD (end stage renal disease) ([facility]    - METABOLIC PANEL, COMPREHENSIVE  - CBC W/O DIFF  - VITAMIN D, 25 HYDROXY; Future  - FERRITIN; Future  - VITAMIN D, 25 HYDROXY  - FERRITIN    5. Type 2 diabetes mellitus with chronic kidney disease on chronic dialysis, with long-term current use of insulin (HCC)  Denies hypoglycemia  - AMB POC HEMOGLOBIN A1C  - AMB POC GLUCOSE BLOOD, BY GLUCOSE MONITORING DEVICE  - AMB POC URINE, MICROALBUMIN, SEMIQUANT (3 RESULTS)    6. Acquired hypothyroidism    - THYROID CASCADE PROFILE; Future  - THYROID CASCADE PROFILE    7. Primary insomnia    - melatonin 5 mg cap capsule; Take 1 Capsule by mouth nightly. Dispense: 30 Capsule; Refill: 1    8. Adenocarcinoma [facility])  oncology    9. Polypharmacy    - REFERRAL TO PHARMACIST    10. Moderate dementia with agitation, unspecified dementia type    - REFERRAL TO NEUROLOGY      11. Chronic obstructive pulmonary disease, unspecified COPD type ([facility]  stable    12. Chronic diastolic congestive heart failure [facility])  Cardiology Dr. Michele Justice? Caregiver unsure, will get records        I have discussed the diagnosis with the patient and the intended plan as seen in the above orders. The patient has received an after-visit summary and questions were answered concerning future plans. Pt conveyed understanding of plan. Medication Side Effects and Warnings were discussed with patient: yes  Patient Labs were reviewed: yes  Patient Past Records were reviewed:  yes    Vishnu Salas.  Corinna Mondragon NP,    This is the Subsequent Medicare Annual Wellness Exam, performed 12 months or more after the Initial AWV or the last Subsequent AWV    I have reviewed the patient's medical history in detail and updated the computerized patient record. Assessment/Plan   Education and counseling provided:  Are appropriate based on today's review and evaluation    1. Cystitis  2. Encounter for subsequent annual wellness visit (AWV) in Medicare patient  3. Essential hypertension  -     LIPID PANEL  4. ESRD (end stage renal disease) ([facility]  -     METABOLIC PANEL, COMPREHENSIVE  -     CBC W/O DIFF  -     VITAMIN D, 25 HYDROXY; Future  -     FERRITIN; Future  5. Type 2 diabetes mellitus with chronic kidney disease on chronic dialysis, with long-term current use of insulin (HCC)  -     AMB POC HEMOGLOBIN A1C  -     AMB POC GLUCOSE BLOOD, BY GLUCOSE MONITORING DEVICE  -     AMB POC URINE, MICROALBUMIN, SEMIQUANT (3 RESULTS)  6. Acquired hypothyroidism  -     THYROID CASCADE PROFILE; Future  7. Primary insomnia  -     melatonin 5 mg cap capsule; Take 1 Capsule by mouth nightly., Normal, Disp-30 Capsule, R-1  8. Adenocarcinoma ([facility]  9. Polypharmacy  -     REFERRAL TO PHARMACIST  10. Moderate dementia with agitation, unspecified dementia type  -     REFERRAL TO NEUROLOGY  11. Chronic obstructive pulmonary disease, unspecified COPD type ([facility]  12.  Chronic diastolic congestive heart failure (HCC)       Depression Risk Factor Screening     3 most recent PHQ Screens 10/3/2022   Little interest or pleasure in doing things Nearly every day   Feeling down, depressed, irritable, or hopeless Nearly every day   Total Score PHQ 2 6   Trouble falling or staying asleep, or sleeping too much Nearly every day   Feeling tired or having little energy Nearly every day   Poor appetite, weight loss, or overeating More than half the days   Feeling bad about yourself - or that you are a failure or have let yourself or your family down Nearly every day   Trouble concentrating on things such as school, work, reading, or watching TV Nearly every day   Moving or speaking so slowly that other people could have noticed; or the opposite being so fidgety that others notice Nearly every day   Thoughts of being better off dead, or hurting yourself in some way Several days   PHQ 9 Score 24   How difficult have these problems made it for you to do your work, take care of your home and get along with others Somewhat difficult       Alcohol & Drug Abuse Risk Screen    Do you average more than 1 drink per night or more than 7 drinks a week:  No    On any one occasion in the past three months have you have had more than 3 drinks containing alcohol:  No          Functional Ability and Level of Safety    Hearing: Hearing is good. Activities of Daily Living: The home contains: no safety equipment. Patient does total self care      Ambulation: with no difficulty     Fall Risk:  Fall Risk Assessment, last 12 mths 10/3/2022   Able to walk? Yes   Fall in past 12 months? 0   Do you feel unsteady?  1   Are you worried about falling 1   Is the gait abnormal? 1   Number of falls in past 12 months 0      Abuse Screen:  Patient is not abused       Cognitive Screening    Has your family/caregiver stated any concerns about your memory: no     Cognitive Screening: Abnormal - aox person place    Health Maintenance Due     Health Maintenance Due   Topic Date Due    Hepatitis C Screening  Never done    Pneumococcal 65+ years (1 - PCV) Never done    Eye Exam Retinal or Dilated  Never done    Shingrix Vaccine Age 50> (1 of 2) Never done    DTaP/Tdap/Td series (1 - Tdap) Never done    Breast Cancer Screen Mammogram  Never done    Bone Densitometry (Dexa) Screening  Never done    Colorectal Cancer Screening Combo  05/31/2019    COVID-19 Vaccine (3 - Booster for Moderna series) 09/01/2021    Lipid Screen  02/09/2022    Foot Exam Q1  05/11/2022    Medicare Yearly Exam  05/12/2022    Flu Vaccine (1) Never done       Patient Care Team   Patient Care Team:  Tori Patel NP as PCP - General (Nurse Practitioner)  Tori Patel NP as PCP - [facility] Empaneled Provider  Rosa Casarez MD as Physician (General Surgery)    History     Patient Active Problem List   Diagnosis Code    Acute encephalopathy G93.40    Visual impairment H54.7    Physical debility R53.81    Counseling regarding advance care planning and goals of care Z71.89    Acute appendicitis with localized peritonitis K35.30    End stage renal disease ([facility] N18.6    Acute appendicitis with generalized peritonitis, abscess, and gangrene K35.21, K35.891    Primary appendiceal adenocarcinoma (HCC) C18.1    Type 2 diabetes mellitus E11.9    ATN (acute tubular necrosis) (HCC) N17.0    Catheter-related bloodstream infection (CRBSI) T80.211A    COPD (chronic obstructive pulmonary disease) (HCC) K16.6    Diastolic congestive heart failure (HCC) I50.30    SHIMON (obstructive sleep apnea) G47.33     Past Medical History:   Diagnosis Date    CAD (coronary artery disease)     Cancer ([facility]     possible stomach cancer 2021- workup in progress    Chronic kidney disease     [place] Cassi- M-W-F    Dementia ([facility]     per daughter pt is alert and oriented x3    Diabetes ([facility]     GERD (gastroesophageal reflux disease)     Hyperlipidemia     Hypertension     Seizures ([facility]     possible while in hospital 8/2021    Stroke [facility])     Thyroid disease       Past Surgical History:   Procedure Laterality Date    HX APPENDECTOMY      08/21    HX VASCULAR ACCESS      HD catheter    IR INSERT NON TUNL CVC OVER 5 YRS  7/30/2021    IR INSERT NON TUNL CVC OVER 5 YRS  7/30/2021    IR INSERT TUNL CVC W/O PORT OVER 5 YR  8/4/2021    NC CABG, ARTERY-VEIN, FOUR      5 years ago    VASCULAR SURGERY PROCEDURE UNLIST      right arm graft     Current Outpatient Medications   Medication Sig Dispense Refill    melatonin 5 mg cap capsule Take 1 Capsule by mouth nightly. 30 Capsule 1    cefdinir (OMNICEF) 300 mg capsule 300 mg 3 times per week after hemodialysis sessions 4 Capsule 0    famotidine (PEPCID) 20 mg tablet TAKE 1 TABLET BY MOUTH TWO (2) TIMES DAILY AS NEEDED FOR HEARTBURN.  61 Tablet 1    atorvastatin (LIPITOR) 40 mg tablet TAKE 1 TABLET BY MOUTH EVERY DAY 90 Tablet 0    insulin lispro protamine/insulin lispro (HUMALOG MIX 50/50) 100 unit/mL (50-50) injection 3 Units by SubCUTAneous route. carvediloL (COREG) 6.25 mg tablet Take 6.25 mg by mouth two (2) times daily (with meals). ergocalciferol (ERGOCALCIFEROL) 1,250 mcg (50,000 unit) capsule Take 50,000 Units by mouth. hydrALAZINE (APRESOLINE) 25 mg tablet Take  by mouth. insulin detemir U-100 (Levemir U-100 Insulin) 100 unit/mL injection 18 Units by SubCUTAneous route. insulin lispro (HumaLOG KwikPen Insulin) 100 unit/mL kwikpen by SubCUTAneous route. metoprolol succinate (TOPROL-XL) 25 mg XL tablet TAKE 1/2 TABLET BY MOUTH DAILY AFTER DIALYSIS TREATMENT      repaglinide (PRANDIN) 2 mg tablet Take 2 mg by mouth.      sevelamer carbonate (RENVELA) 800 mg tab tab Take 1 Tablet by mouth. amLODIPine (NORVASC) 10 mg tablet Take  by mouth.      lancets misc Check sugar twice daily. E11.65 (Patient not taking: Reported on 9/29/2022) 1 Each 11    amLODIPine (NORVASC) 5 mg tablet Take 5 mg by mouth daily. (Patient not taking: Reported on 9/29/2022)      aspirin delayed-release 81 mg tablet Take  by mouth daily. brimonidine-timoloL (Combigan) 0.2-0.5 % drop ophthalmic solution 1 Drop every twelve (12) hours. insulin glargine (LANTUS,BASAGLAR) 100 unit/mL (3 mL) inpn 10 Units by SubCUTAneous route daily (with dinner). (Patient not taking: No sig reported) 5 Pen 1    Insulin Needles, Disposable, 32 gauge x 5/32\" ndle 1 Each by Does Not Apply route daily. (Patient not taking: No sig reported) 100 Pen Needle 11     No Known Allergies    History reviewed. No pertinent family history. Social History     Tobacco Use    Smoking status: Never    Smokeless tobacco: Never   Substance Use Topics    Alcohol use: Not Currently         Nicki Hansen NP

## 2022-10-04 PROBLEM — D61.1: Status: RESOLVED | Noted: 2017-09-12 | Resolved: 2022-10-04

## 2022-10-04 RX ORDER — LEVOTHYROXINE SODIUM 50 UG/1
TABLET ORAL
Qty: 30 TABLET | Refills: 1 | Status: SHIPPED | OUTPATIENT
Start: 2022-10-04 | End: 2022-10-31

## 2022-10-04 RX ORDER — FAMOTIDINE 20 MG/1
20 TABLET, FILM COATED ORAL
Qty: 60 TABLET | Refills: 1 | Status: SHIPPED | OUTPATIENT
Start: 2022-10-04 | End: 2022-10-31

## 2022-10-05 NOTE — ED PROVIDER NOTES
EMERGENCY DEPARTMENT HISTORY AND PHYSICAL EXAM      Date: 9/29/2022  Patient Name: Zita Howard    History of Presenting Illness     Chief Complaint   Patient presents with    Altered mental status       History Provided By: Patient    HPI: Zita Howard, 67 y.o. female with PMHx as noted below presents the emergency department with chief complaint of altered mental status. History is limited secondary to dementia as well as confusion. History obtained from patient's daughter. Daughter reports that lives with the patient confused over the last week. Daughter notes that she has been seeing things and talking to people that are not there which is not typical for her. Patient has no specific complaints when asked. Otherwise symptoms seem to be constant and without relieving or exacerbating factors. Daughter has noted no falls/trauma, fevers, vomiting, diarrhea, cough or increased work of breathing. PCP: Deepika Escobedo., NP    Current Outpatient Medications   Medication Sig Dispense Refill    cefdinir (OMNICEF) 300 mg capsule 300 mg 3 times per week after hemodialysis sessions 4 Capsule 0    atorvastatin (LIPITOR) 40 mg tablet TAKE 1 TABLET BY MOUTH EVERY DAY 90 Tablet 0    aspirin delayed-release 81 mg tablet Take  by mouth daily. levothyroxine (SYNTHROID) 50 mcg tablet TAKE 1 TABLET BY MOUTH EVERY DAY BEFORE BREAKFAST 30 Tablet 1    famotidine (PEPCID) 20 mg tablet TAKE 1 TABLET BY MOUTH TWO (2) TIMES DAILY AS NEEDED FOR HEARTBURN. 60 Tablet 1    insulin lispro protamine/insulin lispro (HUMALOG MIX 50/50) 100 unit/mL (50-50) injection 3 Units by SubCUTAneous route. carvediloL (COREG) 6.25 mg tablet Take 6.25 mg by mouth two (2) times daily (with meals). ergocalciferol (ERGOCALCIFEROL) 1,250 mcg (50,000 unit) capsule Take 50,000 Units by mouth. hydrALAZINE (APRESOLINE) 25 mg tablet Take  by mouth.       insulin detemir U-100 (Levemir U-100 Insulin) 100 unit/mL injection 18 Units by SubCUTAneous route. insulin lispro (HumaLOG KwikPen Insulin) 100 unit/mL kwikpen by SubCUTAneous route. metoprolol succinate (TOPROL-XL) 25 mg XL tablet TAKE 1/2 TABLET BY MOUTH DAILY AFTER DIALYSIS TREATMENT      repaglinide (PRANDIN) 2 mg tablet Take 2 mg by mouth.      sevelamer carbonate (RENVELA) 800 mg tab tab Take 1 Tablet by mouth. amLODIPine (NORVASC) 10 mg tablet Take  by mouth.      melatonin 5 mg cap capsule Take 1 Capsule by mouth nightly. 30 Capsule 1    lancets misc Check sugar twice daily. E11.65 (Patient not taking: Reported on 9/29/2022) 1 Each 11    amLODIPine (NORVASC) 5 mg tablet Take 5 mg by mouth daily. (Patient not taking: Reported on 9/29/2022)      brimonidine-timoloL (Combigan) 0.2-0.5 % drop ophthalmic solution 1 Drop every twelve (12) hours. insulin glargine (LANTUS,BASAGLAR) 100 unit/mL (3 mL) inpn 10 Units by SubCUTAneous route daily (with dinner). (Patient not taking: No sig reported) 5 Pen 1    Insulin Needles, Disposable, 32 gauge x 5/32\" ndle 1 Each by Does Not Apply route daily.  (Patient not taking: No sig reported) 100 Pen Needle 11       Past History     Past Medical History:  Past Medical History:   Diagnosis Date    CAD (coronary artery disease)     Cancer ([address]     possible stomach cancer 2021- workup in progress    Chronic kidney disease     Robbie Schulzw- M-W-F    Dementia ([address]     per daughter pt is alert and oriented x3    Diabetes ([address]     GERD (gastroesophageal reflux disease)     Hyperlipidemia     Hypertension     Seizures ([address]     possible while in hospital 8/2021    Stroke [facility])     Thyroid disease        Past Surgical History:  Past Surgical History:   Procedure Laterality Date    HX APPENDECTOMY      08/21    HX VASCULAR ACCESS      HD catheter    IR INSERT NON TUNL CVC OVER 5 YRS  7/30/2021    IR INSERT NON TUNL CVC OVER 5 YRS  7/30/2021    IR INSERT TUNL CVC W/O PORT OVER 5 YR  8/4/2021    MI CABG, ARTERY-VEIN, FOUR      5 years ago VASCULAR SURGERY PROCEDURE UNLIST      right arm graft       Family History:  History reviewed. No pertinent family history. Social History:  Social History     Tobacco Use    Smoking status: Never    Smokeless tobacco: Never   Vaping Use    Vaping Use: Never used   Substance Use Topics    Alcohol use: Not Currently    Drug use: Not Currently       Allergies:  No Known Allergies      Review of Systems   Review of Systems   Unable to perform ROS: Mental status change       Physical Exam   Physical Exam    GENERAL: alert, no acute distress  EYES: PEERL, No injection, discharge or icterus. ENT: Mucous membranes pink and moist.  NECK: Supple  LUNGS: Airway patent. Non-labored respirations. Breath sounds clear with good air entry bilaterally. HEART: Regular rate and rhythm. No peripheral edema  ABDOMEN: Non-distended and non-tender, without guarding or rebound. SKIN:  warm, dry  MSK/EXTREMITIES: Without swelling, tenderness or deformity, symmetric with normal ROM  NEUROLOGICAL: Alert, oriented to person and place. Strength symmetric in upper and lower extremities      Diagnostic Study Results     Labs -   No results found for this or any previous visit (from the past 12 hour(s)). Radiologic Studies -   No orders to display     CT Results  (Last 48 hours)      None          CXR Results  (Last 48 hours)      None              Medical Decision Making     ITian MD am the first provider for this patient and am the attending of record for this patient encounter. I reviewed the vital signs, available nursing notes, past medical history, past surgical history, family history and social history. Vital Signs-Reviewed the patient's vital signs. Records Reviewed: Nursing Notes and Old Medical Records    Provider Notes (Medical Decision Making): On presentation, the patient is well appearing, in no acute distress with normal vital signs.   Based on my history and exam the differential diagnosis for this patient includes infection, dehydration, metabolic abnormality, electrolyte abnormality, CNS pathology. Basic labs, urinalysis consistent with urinary tract infection. No signs of severe systemic illness. Patient was started on antibiotics after discussion with daughter felt outpatient management is most appropriate as she will have supervision at home from her daughter. Otherwise she is stable for discharge with outpatient follow-up. ED Course:   Initial assessment performed. The patients presenting problems have been discussed, and they are in agreement with the care plan formulated and outlined with them. I have encouraged them to ask questions as they arise throughout their visit. Medications   cefTRIAXone (ROCEPHIN) 1 g in sterile water (preservative free) 10 mL IV syringe (1 g IntraVENous Given 9/29/22 1893)       PROGRESS  Demurraymolly Sylvia  results have been reviewed with her. She has been counseled regarding her diagnosis. She verbally conveys understanding and agreement of the signs, symptoms, diagnosis, treatment and prognosis and additionally agrees to follow up as recommended with Dr. Denisse Preston, NP in 24 - 48 hours. She also agrees with the care-plan and conveys that all of her questions have been answered. I have also put together some discharge instructions for her that include: 1) educational information regarding their diagnosis, 2) how to care for their diagnosis at home, as well a 3) list of reasons why they would want to return to the ED prior to their follow-up appointment, should their condition change. Disposition:  home    PLAN:  1.    Discharge Medication List as of 9/29/2022 11:05 PM        START taking these medications    Details   cefdinir (OMNICEF) 300 mg capsule 300 mg 3 times per week after hemodialysis sessions, Normal, Disp-4 Capsule, R-0           CONTINUE these medications which have NOT CHANGED    Details   famotidine (PEPCID) 20 mg tablet TAKE 1 TABLET BY MOUTH TWO (2) TIMES DAILY AS NEEDED FOR HEARTBURN., Normal, Disp-60 Tablet, R-1      levothyroxine (SYNTHROID) 50 mcg tablet TAKE 1 TABLET BY MOUTH EVERY DAY BEFORE BREAKFAST, Normal, Disp-30 Tablet, R-1      atorvastatin (LIPITOR) 40 mg tablet TAKE 1 TABLET BY MOUTH EVERY DAY, Normal, Disp-90 Tablet, R-0      aspirin delayed-release 81 mg tablet Take  by mouth daily. , Historical Med      lancets misc Check sugar twice daily. E11.65, Normal, Disp-1 Each, R-11      Blood-Glucose Meter monitoring kit Use as directed. Dx: E11.65 check sugar twice daily, Normal, Disp-1 Kit, R-0Dispense what insurance will cover please      glucose blood VI test strips (ASCENSIA AUTODISC VI, ONE TOUCH ULTRA TEST VI) strip Check sugar twice daily, Normal, Disp-100 Strip, R-11Please dispense what insurance will cover      Dialyvite 800 0.8 mg tab tablet TAKE 1 TABLET BY MOUTH EVERY DAY, Normal, Disp-90 Tablet, R-0      amLODIPine (NORVASC) 5 mg tablet Take 5 mg by mouth daily. , Historical Med      brimonidine-timoloL (Combigan) 0.2-0.5 % drop ophthalmic solution 1 Drop every twelve (12) hours. , Historical Med      insulin glargine (LANTUS,BASAGLAR) 100 unit/mL (3 mL) inpn 10 Units by SubCUTAneous route daily (with dinner). , Normal, Disp-5 Pen, R-1      Insulin Needles, Disposable, 32 gauge x 5/32\" ndle 1 Each by Does Not Apply route daily. , Normal, Disp-100 Pen Needle, R-11      alcohol swabs padm 1 Each by Apply Externally route four (4) times daily. For use with insulin and glucometer, Normal, Disp-100 Pad, R-11           2.    Follow-up Information       Follow up With Specialties Details Why Contact Raquel Hills, NP Nurse Practitioner Schedule an appointment as soon as possible for a visit in 2 days  [address]      [facility] DEPT Emergency Medicine  If symptoms worsen [facility]  [phone]          Return to ED if worse     Diagnosis     Clinical Impression:   1. Cystitis        Please note that this dictation was completed with Dragon, computer voice recognition software. Quite often unanticipated grammatical, syntax, homophones, and other interpretive errors are inadvertently transcribed by the computer software. Please disregard these errors. Additionally, please excuse any errors that have escaped final proofreading.

## 2022-10-10 ENCOUNTER — TELEPHONE (OUTPATIENT)
Dept: INTERNAL MEDICINE CLINIC | Age: 72
End: 2022-10-10

## 2022-10-10 NOTE — TELEPHONE ENCOUNTER
Pt's daughter, Kasey Jamison called stating her mother went to the emergency room and was given 3 pills of antibiotics because her dementia was worse due to infection. Kasey Jamison wants to know if this is enough or do you think her mother needs more pills.   Li's # [phone]

## 2022-10-11 NOTE — TELEPHONE ENCOUNTER
Informed pt's daughter of provider's response, MsPedro Mayela Yunior states pt is still the same and seems to be getting worse instead of improving. She also wants a new neuro dr to try- appt she got is in February.

## 2022-10-14 ENCOUNTER — HOSPITAL ENCOUNTER (INPATIENT)
Age: 72
LOS: 5 days | Discharge: HOME | DRG: 308 | End: 2022-10-19
Attending: EMERGENCY MEDICINE | Admitting: INTERNAL MEDICINE
Payer: MEDICARE

## 2022-10-14 DIAGNOSIS — R00.1 SINUS BRADYCARDIA: Primary | ICD-10-CM

## 2022-10-14 DIAGNOSIS — N18.6 ESRD (END STAGE RENAL DISEASE) (HCC): ICD-10-CM

## 2022-10-14 DIAGNOSIS — I95.9 HYPOTENSION, UNSPECIFIED HYPOTENSION TYPE: ICD-10-CM

## 2022-10-14 LAB
ALBUMIN SERPL-MCNC: 3.8 G/DL (ref 3.5–5)
ALBUMIN/GLOB SERPL: 1 {RATIO} (ref 1.1–2.2)
ALP SERPL-CCNC: 128 U/L (ref 45–117)
ALT SERPL-CCNC: 28 U/L (ref 12–78)
ANION GAP SERPL CALC-SCNC: 9 MMOL/L (ref 5–15)
AST SERPL-CCNC: 22 U/L (ref 15–37)
ATRIAL RATE: 357 BPM
BASOPHILS # BLD: 0 K/UL (ref 0–0.1)
BASOPHILS NFR BLD: 1 % (ref 0–1)
BILIRUB SERPL-MCNC: 0.9 MG/DL (ref 0.2–1)
BUN SERPL-MCNC: 23 MG/DL (ref 6–20)
BUN/CREAT SERPL: 3 (ref 12–20)
CALCIUM SERPL-MCNC: 9.3 MG/DL (ref 8.5–10.1)
CALCULATED R AXIS, ECG10: -26 DEGREES
CALCULATED T AXIS, ECG11: -171 DEGREES
CHLORIDE SERPL-SCNC: 99 MMOL/L (ref 97–108)
CO2 SERPL-SCNC: 29 MMOL/L (ref 21–32)
CREAT SERPL-MCNC: 6.88 MG/DL (ref 0.55–1.02)
DIAGNOSIS, 93000: NORMAL
DIFFERENTIAL METHOD BLD: ABNORMAL
EOSINOPHIL # BLD: 0.3 K/UL (ref 0–0.4)
EOSINOPHIL NFR BLD: 7 % (ref 0–7)
ERYTHROCYTE [DISTWIDTH] IN BLOOD BY AUTOMATED COUNT: 15.6 % (ref 11.5–14.5)
GLOBULIN SER CALC-MCNC: 4 G/DL (ref 2–4)
GLUCOSE BLD STRIP.AUTO-MCNC: 160 MG/DL (ref 65–117)
GLUCOSE SERPL-MCNC: 126 MG/DL (ref 65–100)
HCT VFR BLD AUTO: 42.1 % (ref 35–47)
HGB BLD-MCNC: 13.2 G/DL (ref 11.5–16)
IMM GRANULOCYTES # BLD AUTO: 0 K/UL (ref 0–0.04)
IMM GRANULOCYTES NFR BLD AUTO: 0 % (ref 0–0.5)
LYMPHOCYTES # BLD: 1.2 K/UL (ref 0.8–3.5)
LYMPHOCYTES NFR BLD: 28 % (ref 12–49)
MCH RBC QN AUTO: 29 PG (ref 26–34)
MCHC RBC AUTO-ENTMCNC: 31.4 G/DL (ref 30–36.5)
MCV RBC AUTO: 92.5 FL (ref 80–99)
MONOCYTES # BLD: 0.4 K/UL (ref 0–1)
MONOCYTES NFR BLD: 10 % (ref 5–13)
NEUTS SEG # BLD: 2.2 K/UL (ref 1.8–8)
NEUTS SEG NFR BLD: 54 % (ref 32–75)
NRBC # BLD: 0 K/UL (ref 0–0.01)
NRBC BLD-RTO: 0 PER 100 WBC
PLATELET # BLD AUTO: 88 K/UL (ref 150–400)
PMV BLD AUTO: 12.5 FL (ref 8.9–12.9)
POTASSIUM SERPL-SCNC: 3.3 MMOL/L (ref 3.5–5.1)
PROT SERPL-MCNC: 7.8 G/DL (ref 6.4–8.2)
Q-T INTERVAL, ECG07: 506 MS
QRS DURATION, ECG06: 102 MS
QTC CALCULATION (BEZET), ECG08: 461 MS
RBC # BLD AUTO: 4.55 M/UL (ref 3.8–5.2)
SERVICE CMNT-IMP: ABNORMAL
SODIUM SERPL-SCNC: 137 MMOL/L (ref 136–145)
TROPONIN-HIGH SENSITIVITY: 20 NG/L (ref 0–51)
VENTRICULAR RATE, ECG03: 50 BPM
WBC # BLD AUTO: 4.2 K/UL (ref 3.6–11)

## 2022-10-14 PROCEDURE — 93005 ELECTROCARDIOGRAM TRACING: CPT

## 2022-10-14 PROCEDURE — 74011250636 HC RX REV CODE- 250/636: Performed by: INTERNAL MEDICINE

## 2022-10-14 PROCEDURE — 85025 COMPLETE CBC W/AUTO DIFF WBC: CPT

## 2022-10-14 PROCEDURE — 74011636637 HC RX REV CODE- 636/637: Performed by: INTERNAL MEDICINE

## 2022-10-14 PROCEDURE — 74011250636 HC RX REV CODE- 250/636: Performed by: EMERGENCY MEDICINE

## 2022-10-14 PROCEDURE — 80053 COMPREHEN METABOLIC PANEL: CPT

## 2022-10-14 PROCEDURE — 99285 EMERGENCY DEPT VISIT HI MDM: CPT

## 2022-10-14 PROCEDURE — 36415 COLL VENOUS BLD VENIPUNCTURE: CPT

## 2022-10-14 PROCEDURE — 74011250637 HC RX REV CODE- 250/637: Performed by: INTERNAL MEDICINE

## 2022-10-14 PROCEDURE — 84484 ASSAY OF TROPONIN QUANT: CPT

## 2022-10-14 PROCEDURE — 82962 GLUCOSE BLOOD TEST: CPT

## 2022-10-14 PROCEDURE — 65270000046 HC RM TELEMETRY

## 2022-10-14 RX ORDER — ATORVASTATIN CALCIUM 40 MG/1
40 TABLET, FILM COATED ORAL DAILY
Status: DISCONTINUED | OUTPATIENT
Start: 2022-10-15 | End: 2022-10-19 | Stop reason: HOSPADM

## 2022-10-14 RX ORDER — INSULIN GLARGINE 100 [IU]/ML
15 INJECTION, SOLUTION SUBCUTANEOUS
Status: DISCONTINUED | OUTPATIENT
Start: 2022-10-14 | End: 2022-10-16

## 2022-10-14 RX ORDER — POTASSIUM CHLORIDE 20 MEQ/1
40 TABLET, EXTENDED RELEASE ORAL
Status: COMPLETED | OUTPATIENT
Start: 2022-10-14 | End: 2022-10-14

## 2022-10-14 RX ORDER — HEPARIN SODIUM 5000 [USP'U]/ML
5000 INJECTION, SOLUTION INTRAVENOUS; SUBCUTANEOUS EVERY 12 HOURS
Status: DISCONTINUED | OUTPATIENT
Start: 2022-10-14 | End: 2022-10-15

## 2022-10-14 RX ORDER — ASPIRIN 81 MG/1
81 TABLET ORAL DAILY
Status: DISCONTINUED | OUTPATIENT
Start: 2022-10-15 | End: 2022-10-19 | Stop reason: HOSPADM

## 2022-10-14 RX ORDER — SEVELAMER CARBONATE FOR ORAL SUSPENSION 2400 MG/1
800 POWDER, FOR SUSPENSION ORAL
Status: DISCONTINUED | OUTPATIENT
Start: 2022-10-14 | End: 2022-10-19 | Stop reason: HOSPADM

## 2022-10-14 RX ORDER — CARVEDILOL 3.12 MG/1
6.25 TABLET ORAL 2 TIMES DAILY WITH MEALS
Status: DISCONTINUED | OUTPATIENT
Start: 2022-10-14 | End: 2022-10-15

## 2022-10-14 RX ORDER — HYDRALAZINE HYDROCHLORIDE 25 MG/1
25 TABLET, FILM COATED ORAL 3 TIMES DAILY
Status: DISCONTINUED | OUTPATIENT
Start: 2022-10-14 | End: 2022-10-19 | Stop reason: HOSPADM

## 2022-10-14 RX ORDER — INSULIN LISPRO 100 [IU]/ML
INJECTION, SOLUTION INTRAVENOUS; SUBCUTANEOUS
Status: DISCONTINUED | OUTPATIENT
Start: 2022-10-14 | End: 2022-10-19 | Stop reason: HOSPADM

## 2022-10-14 RX ORDER — AMLODIPINE BESYLATE 5 MG/1
5 TABLET ORAL DAILY
Status: DISCONTINUED | OUTPATIENT
Start: 2022-10-15 | End: 2022-10-18

## 2022-10-14 RX ORDER — MAGNESIUM SULFATE 100 %
4 CRYSTALS MISCELLANEOUS AS NEEDED
Status: DISCONTINUED | OUTPATIENT
Start: 2022-10-14 | End: 2022-10-19 | Stop reason: HOSPADM

## 2022-10-14 RX ADMIN — SEVELAMER CARBONATE FOR ORAL SUSPENSION 800 MG: 2400 POWDER, FOR SUSPENSION ORAL at 17:16

## 2022-10-14 RX ADMIN — SODIUM CHLORIDE 250 ML: 9 INJECTION, SOLUTION INTRAVENOUS at 09:26

## 2022-10-14 RX ADMIN — INSULIN GLARGINE 15 UNITS: 100 INJECTION, SOLUTION SUBCUTANEOUS at 21:29

## 2022-10-14 RX ADMIN — HYDRALAZINE HYDROCHLORIDE 25 MG: 25 TABLET, FILM COATED ORAL at 21:21

## 2022-10-14 RX ADMIN — POTASSIUM CHLORIDE 40 MEQ: 20 TABLET, EXTENDED RELEASE ORAL at 18:30

## 2022-10-14 RX ADMIN — HYDRALAZINE HYDROCHLORIDE 25 MG: 25 TABLET, FILM COATED ORAL at 17:16

## 2022-10-14 RX ADMIN — HEPARIN SODIUM 5000 UNITS: 5000 INJECTION INTRAVENOUS; SUBCUTANEOUS at 18:30

## 2022-10-14 NOTE — PROGRESS NOTES
Nephrology Progress Note  Formerly [facility] / [facility] [address]  [place], [address]  Phone - [phone]  Fax - [phone]                 Patient: Kurt Case                   YOB: 1950        Date- 10/14/2022                      Admit Date: 10/14/2022  CC: Follow up for esrd          IMPRESSION & PLAN:    Esrd - [facility] mwf- DR. TINAJERO  Anemia of ckd  Sec. Hyperpara  Hypertension  Dm 2  bradycardia    PLAN-  No hd indicated today  Hd mwf schedule  Continue norvasc, coreg, hydralazine  Continue renvela  D/w nurse     Subjective: Interval History:   -  she is admitted for bradycardia  She had 3hr 15 min hd today at [facility]      Objective:   Vitals:    10/14/22 1045 10/14/22 1100 10/14/22 1145 10/14/22 1245   BP: (!) 107/53 (!) 122/53 (!) 114/42 (!) 147/50   Pulse: (!) 48 90 (!) 59 (!) 52   Resp: 10 16 14 23   Temp:    98.4 °F (36.9 °C)   SpO2:  99% 94% 95%   Weight:       Height:          No intake/output data recorded. Last 3 Recorded Weights in this Encounter    10/14/22 0816   Weight: 52.4 kg (115 lb 8.3 oz)        Physical exam:    GEN: NAD  NECK- Supple, no mass  RESP: No wheezing, Clear b/l  CVS: S1,S2  ,emely  NEURO: Normal speech, Non focal  EXT: right arm avf +  PSYCH: Normal Mood    Chart reviewed. Pertinent Notes reviewed. Data Review :  Recent Labs     10/14/22  0911      K 3.3*   CL 99   CO2 29   BUN 23*   CREA 6.88*   *   CA 9.3     Recent Labs     10/14/22  0911   WBC 4.2   HGB 13.2   HCT 42.1   PLT 88*     No results for input(s): FE, TIBC, PSAT, FERR in the last 72 hours.    No results found for: HBA1C, [id number]   No results found for: MCACR, MCA1, MCA2, MCA3, MCAU, MCAU2, MCALPOCT  Lab Results   Component Value Date/Time    NT pro-BNP >35,000 (H) 08/02/2021 06:57 AM     US Results (most recent):  Medication list  reviewed  Current Facility-Administered Medications   Medication Dose Route Frequency    [START ON 10/15/2022] amLODIPine (NORVASC) tablet 5 mg  5 mg Oral DAILY    [START ON 10/15/2022] aspirin delayed-release tablet 81 mg  81 mg Oral DAILY    [START ON 10/15/2022] atorvastatin (LIPITOR) tablet 40 mg  40 mg Oral DAILY    carvediloL (COREG) tablet 6.25 mg  6.25 mg Oral BID WITH MEALS    hydrALAZINE (APRESOLINE) tablet 25 mg  25 mg Oral TID    insulin glargine (LANTUS) injection 15 Units  15 Units SubCUTAneous QHS    sevelamer carbonate (RENVELA) oral powder 800 mg  800 mg Oral TID WITH MEALS        Grover Kaye MD  10/14/2022

## 2022-10-14 NOTE — H&P
Hospitalist Admission Note    NAME: Jill Pritchett   :  1950   MRN:  [id number]     Date/Time:  10/14/2022 5:35 PM    Patient PCP: Mar Austin., NP  ______________________________________________________________________  Given the patient's current clinical presentation, I have a high level of concern for decompensation if discharged from the emergency department. Complex decision making was performed, which includes reviewing the patient's available past medical records, laboratory results, and x-ray films. My assessment of this patient's clinical condition and my plan of care is as follows. Assessment / Plan:  Bradycardia  HR 41-59 awake  Will hold av mason blockers  No indication for atropine at this time  Cardiology consult, await further recs    Hypokalemia  3.3 K replace    ESRD HD MWF  Consult nephrology  No HD indicated today  Resume HD on Monday    Hx of UTI  Was treated with abx (cefdinir) outpatient  Will repeat UA  Low suspicion for infection as afebrile and no leukocytosis    HLD cont. PTA md  Hypothyroidism cont. PTA med  DM- long acting lantus and SSI  HTN cont. Home meds, hold b-blocker  Hx of Seizure previously on keppra? CM/SW  PT/OT  Correct Electrolytes as needed    Code Status: full  Surrogate Decision Maker: Gerber Doherty  Daughter [phone]    DVT Prophylaxis: heparin  GI Prophylaxis: not indicated    Baseline: from home      Subjective:   CHIEF COMPLAINT: low heart rate    HISTORY OF PRESENT ILLNESS:     Jill Pritchett is a 67 y.o.  female with a history of dementia, ESRD on HD MWF, DM, HTN who presents with low heart rate. Patient was receiving dialysis at [facility] on [facility] where it was noticed that her heart rate was low and she was sent via Ambulance to the ED. Patient denies dizziness, lightheadedness or loc.  Patient has baseline dementia and history was obtained by daughter at bedside    We were asked to admit for work up and evaluation of the above problems. Past Medical History:   Diagnosis Date    CAD (coronary artery disease)     Cancer ([facility] [address]     possible stomach cancer 2021- workup in progress    Chronic kidney disease     Cassandra Lewis- DANIEL-W-F    Dementia ([facility]     per daughter pt is alert and oriented x3    Diabetes ([facility] [facility]     GERD (gastroesophageal reflux disease)     Hyperlipidemia     Hypertension     Seizures ([facility]     possible while in hospital 8/2021    Stroke [facility])     Thyroid disease         Past Surgical History:   Procedure Laterality Date    HX APPENDECTOMY      08/21    HX VASCULAR ACCESS      HD catheter    IR INSERT NON TUNL CVC OVER 5 YRS  7/30/2021    IR INSERT NON TUNL CVC OVER 5 YRS  7/30/2021    IR INSERT TUNL CVC W/O PORT OVER 5 YR  8/4/2021    [place] CABG, ARTERY-VEIN, FOUR      5 years ago    VASCULAR SURGERY PROCEDURE UNLIST      right arm graft       Social History     Tobacco Use    Smoking status: Never    Smokeless tobacco: Never   Substance Use Topics    Alcohol use: Not Currently        History reviewed. No pertinent family history. No Known Allergies     Prior to Admission medications    Medication Sig Start Date End Date Taking? Authorizing Provider   levothyroxine (SYNTHROID) 50 mcg tablet TAKE 1 TABLET BY MOUTH EVERY DAY BEFORE BREAKFAST 10/4/22   Marilyn QUACH NP   famotidine (PEPCID) 20 mg tablet TAKE 1 TABLET BY MOUTH TWO (2) TIMES DAILY AS NEEDED FOR HEARTBURN. 10/4/22   Erica Jones NP   insulin lispro protamine/insulin lispro (HUMALOG MIX 50/50) 100 unit/mL (50-50) injection 3 Units by SubCUTAneous route. Provider, Historical   carvediloL (COREG) 6.25 mg tablet Take 6.25 mg by mouth two (2) times daily (with meals). Provider, Historical   ergocalciferol (ERGOCALCIFEROL) 1,250 mcg (50,000 unit) capsule Take 50,000 Units by mouth. Provider, Historical   hydrALAZINE (APRESOLINE) 25 mg tablet Take  by mouth.     Provider, Historical   insulin detemir U-100 (Levemir U-100 Insulin) 100 unit/mL injection 18 Units by SubCUTAneous route. Provider, Historical   insulin lispro (HumaLOG KwikPen Insulin) 100 unit/mL kwikpen by SubCUTAneous route. Provider, Historical   metoprolol succinate (TOPROL-XL) 25 mg XL tablet TAKE 1/2 TABLET BY MOUTH DAILY AFTER DIALYSIS TREATMENT 7/7/22   Provider, Historical   repaglinide (PRANDIN) 2 mg tablet Take 2 mg by mouth. Provider, Historical   sevelamer carbonate (RENVELA) 800 mg tab tab Take 1 Tablet by mouth. Provider, Historical   amLODIPine (NORVASC) 10 mg tablet Take  by mouth. Provider, Historical   melatonin 5 mg cap capsule Take 1 Capsule by mouth nightly. 10/3/22   Shruthi Rodney NP   cefdinir (OMNICEF) 300 mg capsule 300 mg 3 times per week after hemodialysis sessions 9/29/22   David Celaya MD   atorvastatin (LIPITOR) 40 mg tablet TAKE 1 TABLET BY MOUTH EVERY DAY 7/28/22   Dalia Nick NP   lancets misc Check sugar twice daily. E11.65  Patient not taking: Reported on 9/29/2022 1/20/22   Shruthi Rodney NP   amLODIPine (NORVASC) 5 mg tablet Take 5 mg by mouth daily. Patient not taking: Reported on 9/29/2022    Provider, Historical   aspirin delayed-release 81 mg tablet Take  by mouth daily. Provider, Historical   brimonidine-timoloL (Combigan) 0.2-0.5 % drop ophthalmic solution 1 Drop every twelve (12) hours. Provider, Historical   insulin glargine (LANTUS,BASAGLAR) 100 unit/mL (3 mL) inpn 10 Units by SubCUTAneous route daily (with dinner). Patient not taking: No sig reported 2/9/21   Shruthi Rodney NP   Insulin Needles, Disposable, 32 gauge x 5/32\" ndle 1 Each by Does Not Apply route daily. Patient not taking: No sig reported 2/9/21   Shruthi Rodney NP       REVIEW OF SYSTEMS:     I am not able to complete the review of systems because:    The patient is intubated and sedated    The patient has altered mental status due to his acute medical problems    The patient has baseline aphasia from prior stroke(s)   x The patient has baseline dementia and is not reliable historian    The patient is in acute medical distress and unable to provide information           Total of 12 systems reviewed as follows:       POSITIVE= underlined text  Negative = text not underlined  General:  fever, chills, sweats, generalized weakness, weight loss/gain,      loss of appetite   Eyes:    blurred vision, eye pain, loss of vision, double vision  ENT:    rhinorrhea, pharyngitis   Respiratory:   cough, sputum production, SOB, CHURCH, wheezing, pleuritic pain   Cardiology:   chest pain, palpitations, orthopnea, PND, edema, syncope   Gastrointestinal:  abdominal pain , N/V, diarrhea, dysphagia, constipation, bleeding   Genitourinary:  frequency, urgency, dysuria, hematuria, incontinence   Muskuloskeletal :  arthralgia, myalgia, back pain  Hematology:  easy bruising, nose or gum bleeding, lymphadenopathy   Dermatological: rash, ulceration, pruritis, color change / jaundice  Endocrine:   hot flashes or polydipsia   Neurological:  headache, dizziness, confusion, focal weakness, paresthesia,     Speech difficulties, memory loss, gait difficulty  Psychological: Feelings of anxiety, depression, agitation    Objective:   VITALS:    Visit Vitals  BP (!) 145/49   Pulse (!) 57   Temp 97.9 °F (36.6 °C)   Resp 17   Ht 4' 11\" (1.499 m)   Wt 52.4 kg (115 lb 8.3 oz)   SpO2 100%   BMI 23.33 kg/m²       PHYSICAL EXAM:    General:    Alert, cooperative, no distress, appears stated age. Lungs:   Clear to auscultation bilaterally. No Wheezing or Rhonchi. No rales. Chest wall:  No tenderness  No Accessory muscle use. Heart:   Regular  rhythm,  No  murmur   No edema  Abdomen:   Soft, non-tender. Not distended. Bowel sounds normal  Extremities: No cyanosis. No clubbing,      Skin turgor normal, Capillary refill normal, Radial dial pulse 2+  Skin:     Not pale. Not Jaundiced  No rashes   Psych:  Confused Not depressed. Not anxious or agitated. Neurologic:  No facial asymmetry. No aphasia or slurred speech. Symmetrical strength, Sensation grossly intact.   _______________________________________________________________________  Care Plan discussed with:    Comments   Patient x    Family  x    RN x    Care Manager                    Consultant:      _______________________________________________________________________  Expected  Disposition:   Home with Family x   HH/PT/OT/RN    SNF/LTC    ABILIO    ________________________________________________________________________  TOTAL TIME:  79 Minutes    Critical Care Provided     Minutes non procedure based      Comments     Reviewed previous records   >50% of visit spent in counseling and coordination of care x Discussion with patient and/or family and questions answered       ________________________________________________________________________  Signed: Josep Barrera MD    Procedures: see electronic medical records for all procedures/Xrays and details which were not copied into this note but were reviewed prior to creation of Plan.     LAB DATA REVIEWED:    Recent Results (from the past 24 hour(s))   EKG, 12 LEAD, INITIAL    Collection Time: 10/14/22  8:47 AM   Result Value Ref Range    Ventricular Rate 50 BPM    Atrial Rate 357 BPM    QRS Duration 102 ms    Q-T Interval 506 ms    QTC Calculation (Bezet) 461 ms    Calculated R Axis -26 degrees    Calculated T Axis -171 degrees    Diagnosis       Atrial fibrillation with slow ventricular response  ST & T wave abnormality, consider inferior ischemia  ST & T wave abnormality, consider anterolateral ischemia  When compared with ECG of 13-JUN-2022 08:14,  T wave inversion now evident in Anterolateral leads  QT has lengthened  Confirmed by Celina Jones ([id number]) on 10/14/2022 9:08:42 AM     CBC WITH AUTOMATED DIFF    Collection Time: 10/14/22  9:11 AM   Result Value Ref Range    WBC 4.2 3.6 - 11.0 K/uL    RBC 4.55 3.80 - 5.20 M/uL    HGB 13.2 11.5 - 16.0 g/dL    HCT 42.1 35.0 - 47.0 %    MCV 92.5 80.0 - 99.0 FL    MCH 29.0 26.0 - 34.0 PG    MCHC 31.4 30.0 - 36.5 g/dL    RDW 15.6 (H) 11.5 - 14.5 %    PLATELET 88 (L) 576 - 400 K/uL    MPV 12.5 8.9 - 12.9 FL    NRBC 0.0 0  WBC    ABSOLUTE NRBC 0.00 0.00 - 0.01 K/uL    NEUTROPHILS 54 32 - 75 %    LYMPHOCYTES 28 12 - 49 %    MONOCYTES 10 5 - 13 %    EOSINOPHILS 7 0 - 7 %    BASOPHILS 1 0 - 1 %    IMMATURE GRANULOCYTES 0 0.0 - 0.5 %    ABS. NEUTROPHILS 2.2 1.8 - 8.0 K/UL    ABS. LYMPHOCYTES 1.2 0.8 - 3.5 K/UL    ABS. MONOCYTES 0.4 0.0 - 1.0 K/UL    ABS. EOSINOPHILS 0.3 0.0 - 0.4 K/UL    ABS. BASOPHILS 0.0 0.0 - 0.1 K/UL    ABS. IMM. GRANS. 0.0 0.00 - 0.04 K/UL    DF AUTOMATED     METABOLIC PANEL, COMPREHENSIVE    Collection Time: 10/14/22  9:11 AM   Result Value Ref Range    Sodium 137 136 - 145 mmol/L    Potassium 3.3 (L) 3.5 - 5.1 mmol/L    Chloride 99 97 - 108 mmol/L    CO2 29 21 - 32 mmol/L    Anion gap 9 5 - 15 mmol/L    Glucose 126 (H) 65 - 100 mg/dL    BUN 23 (H) 6 - 20 MG/DL    Creatinine 6.88 (H) 0.55 - 1.02 MG/DL    BUN/Creatinine ratio 3 (L) 12 - 20      eGFR 6 (L) >60 ml/min/1.73m2    Calcium 9.3 8.5 - 10.1 MG/DL    Bilirubin, total 0.9 0.2 - 1.0 MG/DL    ALT (SGPT) 28 12 - 78 U/L    AST (SGOT) 22 15 - 37 U/L    Alk.  phosphatase 128 (H) 45 - 117 U/L    Protein, total 7.8 6.4 - 8.2 g/dL    Albumin 3.8 3.5 - 5.0 g/dL    Globulin 4.0 2.0 - 4.0 g/dL    A-G Ratio 1.0 (L) 1.1 - 2.2     TROPONIN-HIGH SENSITIVITY    Collection Time: 10/14/22  9:11 AM   Result Value Ref Range    Troponin-High Sensitivity 20 0 - 51 ng/L

## 2022-10-14 NOTE — CONSULTS
Piter Perez         NAME:Sanna Ferrell  XTX:[id number]   :1950     S/p hd today as out pt  Esrd  Plan for hd on Monday        Bradycardia [R00.1]     Marc Corbett MD  [facility]  [facility]  [address], [address] [facility], [address] [facility]  Phone - [phone]         Fax - [phone] [facility]  [address] [address]  Phone - [phone]        Fax - [phone]

## 2022-10-14 NOTE — TELEPHONE ENCOUNTER
Pt is currently hospitalized at [facility], pts daughter given # to Dr. Nilton Alegre

## 2022-10-14 NOTE — ED NOTES
TRANSITION OF CARE - SBAR OUT    Patient is being transferred to PCU, Room# 5124 [id number]. Report GIVEN TO Ashutosh Alvarado RN on Kurt Case for routine progression of care. Report is consisted of the following information KATYA, Shalini, ED summary, MAR. Called outstanding consults: Yes   Collected routine labs: Yes     All current orders reviewed with accepting nurse: Yes    The following personal items will be sent with the patient during transfer to the floor: All valuables:      Visual Aid: None                   CARDIAC MONITORING ORDERED: Yes     The following CURRENT information were reported to the receiving RN:    CODE STATUS: Prior    NIH SCORE:    MINA SCREENING:      NEURO ASSESSMENT: Neuro  Neurologic State: Confused, Restless (10/14/22 0832)      RESTRAINTS IN USE: No      IS DOCUMENTATION COMPLETE: Yes      Vital Signs  Level of Consciousness: Alert (0) (10/14/22 0816)  Temp: 98.9 °F (37.2 °C) (10/14/22 0816)  Temp Source: Oral (10/14/22 0816)  Pulse (Heart Rate): (!) 59 (10/14/22 1145)  Resp Rate: 14 (10/14/22 1145)  BP: (!) 114/42 (10/14/22 1145)  MAP (Monitor): 65 (10/14/22 1145)  MAP (Calculated): 66 (10/14/22 1145)  MEWS Score: 1 (10/14/22 0816)  Pain 1  Pain Scale 1: Numeric (0 - 10) (10/14/22 0816)  Pain Intensity 1: 0 (10/14/22 0816)      OXYGEN: Oxygen Therapy  O2 Device: None (Room air) (10/14/22 0816)    KINDER FALL ASSESSMENT:  Presents to emergency department  because of falls (Syncope, seizure, or loss of consciousness): No, Age > 79: Yes, Altered Mental Status, Intoxication with alcohol or substance confusion (Disorientation, impaired judgment, poor safety awaremess, or inability to follow instructions): Yes, Impaired Mobility: Ambulates or transfers with assistive devices or assistance;  Unable to ambulate or transer.: Yes, Nursing Judgement : Yes    WOUNDS: No      URINARY CATHETER: voiding    LINE ACCESS:   Peripheral IV 10/14/22 Left Antecubital (Active)   Site Assessment Clean, dry, & intact 10/14/22 0909   Phlebitis Assessment 0 10/14/22 0909   Infiltration Assessment 0 10/14/22 0909   Dressing Status Clean, dry, & intact 10/14/22 0909   Hub Color/Line Status Green 10/14/22 6884        Opportunity for questions and clarification were provided.   Letitia Barnes RN

## 2022-10-14 NOTE — ED NOTES
Pt presents to to ER via EMS after reporting dizziness at dialysis appointment. She had not received any treatment when the dialysis center reported her to become hypotensive with an irregular heart rhythm. Assisted to position of comfort, call bell within reach.

## 2022-10-14 NOTE — ED PROVIDER NOTES
EMERGENCY DEPARTMENT HISTORY AND PHYSICAL EXAM      Date: 10/14/2022  Patient Name: Greg Barnes    History of Presenting Illness     Chief Complaint   Patient presents with    Dizziness       History Provided By: Patient and EMS    HPI: Greg Barnes, 67 y.o. female presents to the ED with cc of dizziness. Patient is a Monday, Wednesday, Friday dialysis patient. She has a history of atrial fibrillation as well, diabetes and hypertension. She was an hour into dialysis, when  her blood pressure and pulse were low, and she complained of being dizzy. She normally ambulates with a walker. She denies chest pain, abdominal pain, cough, fever. She has baseline shortness of breath. She was seen for similar complaint in June. She denies dysuria. There are no other complaints, changes, or physical findings at this time. PCP: Lenny Tavera., NP    No current facility-administered medications on file prior to encounter. Current Outpatient Medications on File Prior to Encounter   Medication Sig Dispense Refill    levothyroxine (SYNTHROID) 50 mcg tablet TAKE 1 TABLET BY MOUTH EVERY DAY BEFORE BREAKFAST 30 Tablet 1    famotidine (PEPCID) 20 mg tablet TAKE 1 TABLET BY MOUTH TWO (2) TIMES DAILY AS NEEDED FOR HEARTBURN. 60 Tablet 1    insulin lispro protamine/insulin lispro (HUMALOG MIX 50/50) 100 unit/mL (50-50) injection 3 Units by SubCUTAneous route. carvediloL (COREG) 6.25 mg tablet Take 6.25 mg by mouth two (2) times daily (with meals). ergocalciferol (ERGOCALCIFEROL) 1,250 mcg (50,000 unit) capsule Take 50,000 Units by mouth. hydrALAZINE (APRESOLINE) 25 mg tablet Take  by mouth. insulin detemir U-100 (Levemir U-100 Insulin) 100 unit/mL injection 18 Units by SubCUTAneous route. insulin lispro (HumaLOG KwikPen Insulin) 100 unit/mL kwikpen by SubCUTAneous route.       metoprolol succinate (TOPROL-XL) 25 mg XL tablet TAKE 1/2 TABLET BY MOUTH DAILY AFTER DIALYSIS TREATMENT repaglinide (PRANDIN) 2 mg tablet Take 2 mg by mouth.      sevelamer carbonate (RENVELA) 800 mg tab tab Take 1 Tablet by mouth. amLODIPine (NORVASC) 10 mg tablet Take  by mouth.      melatonin 5 mg cap capsule Take 1 Capsule by mouth nightly. 30 Capsule 1    cefdinir (OMNICEF) 300 mg capsule 300 mg 3 times per week after hemodialysis sessions 4 Capsule 0    atorvastatin (LIPITOR) 40 mg tablet TAKE 1 TABLET BY MOUTH EVERY DAY 90 Tablet 0    lancets misc Check sugar twice daily. E11.65 (Patient not taking: Reported on 9/29/2022) 1 Each 11    amLODIPine (NORVASC) 5 mg tablet Take 5 mg by mouth daily. (Patient not taking: Reported on 9/29/2022)      aspirin delayed-release 81 mg tablet Take  by mouth daily. brimonidine-timoloL (Combigan) 0.2-0.5 % drop ophthalmic solution 1 Drop every twelve (12) hours. insulin glargine (LANTUS,BASAGLAR) 100 unit/mL (3 mL) inpn 10 Units by SubCUTAneous route daily (with dinner). (Patient not taking: No sig reported) 5 Pen 1    Insulin Needles, Disposable, 32 gauge x 5/32\" ndle 1 Each by Does Not Apply route daily.  (Patient not taking: No sig reported) 100 Pen Needle 11       Past History     Past Medical History:  Past Medical History:   Diagnosis Date    CAD (coronary artery disease)     Cancer ([address]     possible stomach cancer 2021- workup in progress    Chronic kidney disease     Stefany Cagey- M-W-F    Dementia ([address]     per daughter pt is alert and oriented x3    Diabetes ([address]     GERD (gastroesophageal reflux disease)     Hyperlipidemia     Hypertension     Seizures ([address]     possible while in hospital 8/2021    Stroke [facility])     Thyroid disease        Past Surgical History:  Past Surgical History:   Procedure Laterality Date    HX APPENDECTOMY      08/21    HX VASCULAR ACCESS      HD catheter    IR INSERT NON TUNL CVC OVER 5 YRS  7/30/2021    IR INSERT NON TUNL CVC OVER 5 YRS  7/30/2021    IR INSERT TUNL CVC W/O PORT OVER 5 YR  8/4/2021    MD CABG, ARTERY-VEIN, FOUR      5 years ago    VASCULAR SURGERY PROCEDURE UNLIST      right arm graft       Family History:  No family history on file. Social History:  Social History     Tobacco Use    Smoking status: Never    Smokeless tobacco: Never   Vaping Use    Vaping Use: Never used   Substance Use Topics    Alcohol use: Not Currently    Drug use: Not Currently       Allergies:  No Known Allergies      Review of Systems   Review of Systems   Constitutional:  Negative for fever. HENT:  Negative for congestion. Eyes: Negative. Respiratory:  Positive for shortness of breath. Cardiovascular:  Negative for chest pain. Gastrointestinal:  Negative for abdominal pain. Endocrine: Negative for heat intolerance. Genitourinary: Negative. Musculoskeletal:  Negative for back pain. Skin:  Negative for rash. Allergic/Immunologic: Negative for immunocompromised state. Neurological:  Positive for dizziness. Hematological:  Does not bruise/bleed easily. Psychiatric/Behavioral: Negative. All other systems reviewed and are negative. Physical Exam   Physical Exam  Vitals and nursing note reviewed. Constitutional:       General: She is not in acute distress. Appearance: She is well-developed. HENT:      Head: Normocephalic. Cardiovascular:      Rate and Rhythm: Bradycardia present. Rhythm irregular. Heart sounds: Normal heart sounds. Pulmonary:      Effort: Pulmonary effort is normal.      Breath sounds: Normal breath sounds. Abdominal:      General: Bowel sounds are normal.      Palpations: Abdomen is soft. Tenderness: There is no abdominal tenderness. Musculoskeletal:         General: Normal range of motion. Cervical back: Normal range of motion and neck supple. Skin:     General: Skin is warm and dry. Neurological:      General: No focal deficit present. Mental Status: She is alert.       Comments: Alert and oriented x2   Psychiatric:         Mood and Affect: Mood normal. Behavior: Behavior normal.       Diagnostic Study Results     Labs -   No results found for this or any previous visit (from the past 12 hour(s)). Radiologic Studies -   No orders to display     CT Results  (Last 48 hours)      None          CXR Results  (Last 48 hours)      None            Medical Decision Making   I am the first provider for this patient. I reviewed the vital signs, available nursing notes, past medical history, past surgical history, family history and social history. Vital Signs-Reviewed the patient's vital signs. Patient Vitals for the past 12 hrs:   Temp Pulse Resp BP SpO2   10/14/22 0816 98.9 °F (37.2 °C) 60 15 (!) 107/48 98 %       EKG interpretation: (Preliminary)  Rhythm: atrial fib; and irregular. Rate (approx.): 50; Axis: normal; ; QRS interval: normal ; ST/T wave: T wave inverted; Other findings: Increased T wave inversions V4 to V6. Records Reviewed: Nursing Notes, Old Medical Records, Previous electrocardiograms, Ambulance Run Sheet, Previous Radiology Studies, and Previous Laboratory Studies patient's symptoms could be due to    Provider Notes (Medical Decision Making):   Patient symptoms could be due to over effects of dialysis, electrolyte abnormality, particular hyperkalemia, anemia, sepsis, dysrhythmia. Will obtain lab work, initiate small amount of fluids, obtain EKG, and make adjustments as needed    ED Course:   Initial assessment performed. The patients presenting problems have been discussed, and they are in agreement with the care plan formulated and outlined with them. I have encouraged them to ask questions as they arise throughout their visit. Consult note:    Discussed case with Dr. Camacho Leyva, cardiology. He states that given that the patient is on Coreg and metoprolol, she should be admitted by the hospitalist, and her medications need to be adjusted.       Consult note:    Patient is being admitted by the hospitalist           Critical Care Time: CRITICAL CARE NOTE :    8:27 AM    IMPENDING DETERIORATION -Cardiovascular, Metabolic, and Renal  ASSOCIATED RISK FACTORS - Hypotension, Dysrhythmia, Metabolic changes, and Dehydration  MANAGEMENT- Bedside Assessment and Supervision of Care  INTERPRETATION -  ECG and Blood Pressure  INTERVENTIONS - hemodynamic mngmt and Metobolic interventions  CASE REVIEW - Hospitalist/Intensivist, Medical Sub-Specialist, and Nursing  TREATMENT RESPONSE -Unchanged   PERFORMED BY - Self    NOTES   :  I have spent 35 minutes of critical care time involved in lab review, consultations with specialist, family decision- making, bedside attention and documentation. This time excludes time spent in any separate billed procedures. During this entire length of time I was immediately available to the patient . Silver Wan MD      Disposition:  admit    DISCHARGE PLAN:  1. Current Discharge Medication List        2. Follow-up Information    None       3. Return to ED if worse     Diagnosis     Clinical Impression:   1. Sinus bradycardia    2. Hypotension, unspecified hypotension type    3. ESRD (end stage renal disease) ([facility]        Attestations:    Silver Wan MD        Please note that this dictation was completed with [employer], the computer voice recognition software. Quite often unanticipated grammatical, syntax, homophones, and other interpretive errors are inadvertently transcribed by the computer software. Please disregard these errors. Please excuse any errors that have escaped final proofreading. Thank you.

## 2022-10-15 LAB
ANION GAP SERPL CALC-SCNC: 8 MMOL/L (ref 5–15)
APTT PPP: 29.4 SEC (ref 22.1–31)
APTT PPP: 80.4 SEC (ref 22.1–31)
BASOPHILS # BLD: 0 K/UL (ref 0–0.1)
BASOPHILS NFR BLD: 1 % (ref 0–1)
BUN SERPL-MCNC: 33 MG/DL (ref 6–20)
BUN/CREAT SERPL: 4 (ref 12–20)
CALCIUM SERPL-MCNC: 8.7 MG/DL (ref 8.5–10.1)
CALCULATED R AXIS, ECG10: -32 DEGREES
CALCULATED T AXIS, ECG11: -129 DEGREES
CHLORIDE SERPL-SCNC: 103 MMOL/L (ref 97–108)
CO2 SERPL-SCNC: 25 MMOL/L (ref 21–32)
CREAT SERPL-MCNC: 7.91 MG/DL (ref 0.55–1.02)
DIAGNOSIS, 93000: NORMAL
DIFFERENTIAL METHOD BLD: ABNORMAL
EOSINOPHIL # BLD: 0.3 K/UL (ref 0–0.4)
EOSINOPHIL NFR BLD: 5 % (ref 0–7)
ERYTHROCYTE [DISTWIDTH] IN BLOOD BY AUTOMATED COUNT: 15.5 % (ref 11.5–14.5)
ERYTHROCYTE [DISTWIDTH] IN BLOOD BY AUTOMATED COUNT: 15.9 % (ref 11.5–14.5)
GLUCOSE BLD STRIP.AUTO-MCNC: 129 MG/DL (ref 65–117)
GLUCOSE BLD STRIP.AUTO-MCNC: 148 MG/DL (ref 65–117)
GLUCOSE BLD STRIP.AUTO-MCNC: 44 MG/DL (ref 65–117)
GLUCOSE BLD STRIP.AUTO-MCNC: 46 MG/DL (ref 65–117)
GLUCOSE BLD STRIP.AUTO-MCNC: 54 MG/DL (ref 65–117)
GLUCOSE BLD STRIP.AUTO-MCNC: 57 MG/DL (ref 65–117)
GLUCOSE BLD STRIP.AUTO-MCNC: 65 MG/DL (ref 65–117)
GLUCOSE BLD STRIP.AUTO-MCNC: 66 MG/DL (ref 65–117)
GLUCOSE BLD STRIP.AUTO-MCNC: 79 MG/DL (ref 65–117)
GLUCOSE BLD STRIP.AUTO-MCNC: 80 MG/DL (ref 65–117)
GLUCOSE SERPL-MCNC: 68 MG/DL (ref 65–100)
HBV SURFACE AB SER QL: REACTIVE
HBV SURFACE AB SER-ACNC: 12.24 MIU/ML
HBV SURFACE AG SER QL: <0.1 INDEX
HBV SURFACE AG SER QL: NEGATIVE
HCT VFR BLD AUTO: 36.7 % (ref 35–47)
HCT VFR BLD AUTO: 38.2 % (ref 35–47)
HGB BLD-MCNC: 11.6 G/DL (ref 11.5–16)
HGB BLD-MCNC: 11.7 G/DL (ref 11.5–16)
IMM GRANULOCYTES # BLD AUTO: 0 K/UL (ref 0–0.04)
IMM GRANULOCYTES NFR BLD AUTO: 0 % (ref 0–0.5)
LYMPHOCYTES # BLD: 1.4 K/UL (ref 0.8–3.5)
LYMPHOCYTES NFR BLD: 28 % (ref 12–49)
MCH RBC QN AUTO: 28.7 PG (ref 26–34)
MCH RBC QN AUTO: 29 PG (ref 26–34)
MCHC RBC AUTO-ENTMCNC: 30.6 G/DL (ref 30–36.5)
MCHC RBC AUTO-ENTMCNC: 31.6 G/DL (ref 30–36.5)
MCV RBC AUTO: 91.8 FL (ref 80–99)
MCV RBC AUTO: 93.6 FL (ref 80–99)
MONOCYTES # BLD: 0.4 K/UL (ref 0–1)
MONOCYTES NFR BLD: 8 % (ref 5–13)
NEUTS SEG # BLD: 2.8 K/UL (ref 1.8–8)
NEUTS SEG NFR BLD: 58 % (ref 32–75)
NRBC # BLD: 0 K/UL (ref 0–0.01)
NRBC # BLD: 0 K/UL (ref 0–0.01)
NRBC BLD-RTO: 0 PER 100 WBC
NRBC BLD-RTO: 0 PER 100 WBC
PLATELET # BLD AUTO: 109 K/UL (ref 150–400)
PLATELET # BLD AUTO: 81 K/UL (ref 150–400)
PMV BLD AUTO: 11.9 FL (ref 8.9–12.9)
PMV BLD AUTO: 12.8 FL (ref 8.9–12.9)
POTASSIUM SERPL-SCNC: 4 MMOL/L (ref 3.5–5.1)
Q-T INTERVAL, ECG07: 476 MS
QRS DURATION, ECG06: 86 MS
QTC CALCULATION (BEZET), ECG08: 442 MS
RBC # BLD AUTO: 4 M/UL (ref 3.8–5.2)
RBC # BLD AUTO: 4.08 M/UL (ref 3.8–5.2)
SERVICE CMNT-IMP: ABNORMAL
SERVICE CMNT-IMP: NORMAL
SODIUM SERPL-SCNC: 136 MMOL/L (ref 136–145)
T4 FREE SERPL-MCNC: 1.2 NG/DL (ref 0.8–1.5)
THERAPEUTIC RANGE,PTTT: ABNORMAL SECS (ref 58–77)
THERAPEUTIC RANGE,PTTT: NORMAL SECS (ref 58–77)
TSH SERPL DL<=0.05 MIU/L-ACNC: 1.29 UIU/ML (ref 0.36–3.74)
VENTRICULAR RATE, ECG03: 52 BPM
WBC # BLD AUTO: 4.9 K/UL (ref 3.6–11)
WBC # BLD AUTO: 5.7 K/UL (ref 3.6–11)

## 2022-10-15 PROCEDURE — 74011250636 HC RX REV CODE- 250/636: Performed by: INTERNAL MEDICINE

## 2022-10-15 PROCEDURE — 84439 ASSAY OF FREE THYROXINE: CPT

## 2022-10-15 PROCEDURE — 74011250636 HC RX REV CODE- 250/636: Performed by: STUDENT IN AN ORGANIZED HEALTH CARE EDUCATION/TRAINING PROGRAM

## 2022-10-15 PROCEDURE — 85730 THROMBOPLASTIN TIME PARTIAL: CPT

## 2022-10-15 PROCEDURE — 82962 GLUCOSE BLOOD TEST: CPT

## 2022-10-15 PROCEDURE — 5A1D70Z PERFORMANCE OF URINARY FILTRATION, INTERMITTENT, LESS THAN 6 HOURS PER DAY: ICD-10-PCS | Performed by: INTERNAL MEDICINE

## 2022-10-15 PROCEDURE — 85025 COMPLETE CBC W/AUTO DIFF WBC: CPT

## 2022-10-15 PROCEDURE — 90935 HEMODIALYSIS ONE EVALUATION: CPT

## 2022-10-15 PROCEDURE — 86706 HEP B SURFACE ANTIBODY: CPT

## 2022-10-15 PROCEDURE — 74011250637 HC RX REV CODE- 250/637: Performed by: INTERNAL MEDICINE

## 2022-10-15 PROCEDURE — 74011250636 HC RX REV CODE- 250/636: Performed by: HOSPITALIST

## 2022-10-15 PROCEDURE — 97116 GAIT TRAINING THERAPY: CPT

## 2022-10-15 PROCEDURE — 36415 COLL VENOUS BLD VENIPUNCTURE: CPT

## 2022-10-15 PROCEDURE — 80048 BASIC METABOLIC PNL TOTAL CA: CPT

## 2022-10-15 PROCEDURE — 84443 ASSAY THYROID STIM HORMONE: CPT

## 2022-10-15 PROCEDURE — 97535 SELF CARE MNGMENT TRAINING: CPT | Performed by: OCCUPATIONAL THERAPIST

## 2022-10-15 PROCEDURE — 97161 PT EVAL LOW COMPLEX 20 MIN: CPT

## 2022-10-15 PROCEDURE — 87340 HEPATITIS B SURFACE AG IA: CPT

## 2022-10-15 PROCEDURE — 65270000046 HC RM TELEMETRY

## 2022-10-15 PROCEDURE — 97165 OT EVAL LOW COMPLEX 30 MIN: CPT | Performed by: OCCUPATIONAL THERAPIST

## 2022-10-15 PROCEDURE — 85027 COMPLETE CBC AUTOMATED: CPT

## 2022-10-15 RX ORDER — HEPARIN SODIUM 1000 [USP'U]/ML
30 INJECTION, SOLUTION INTRAVENOUS; SUBCUTANEOUS AS NEEDED
Status: DISCONTINUED | OUTPATIENT
Start: 2022-10-15 | End: 2022-10-18

## 2022-10-15 RX ORDER — DOPAMINE HYDROCHLORIDE 320 MG/100ML
0-20 INJECTION, SOLUTION INTRAVENOUS
Status: DISCONTINUED | OUTPATIENT
Start: 2022-10-15 | End: 2022-10-18

## 2022-10-15 RX ORDER — HEPARIN SODIUM 1000 [USP'U]/ML
60 INJECTION, SOLUTION INTRAVENOUS; SUBCUTANEOUS AS NEEDED
Status: DISCONTINUED | OUTPATIENT
Start: 2022-10-15 | End: 2022-10-18

## 2022-10-15 RX ORDER — HEPARIN SODIUM 10000 [USP'U]/100ML
12-25 INJECTION, SOLUTION INTRAVENOUS
Status: DISCONTINUED | OUTPATIENT
Start: 2022-10-15 | End: 2022-10-18

## 2022-10-15 RX ADMIN — SEVELAMER CARBONATE FOR ORAL SUSPENSION 800 MG: 2400 POWDER, FOR SUSPENSION ORAL at 12:04

## 2022-10-15 RX ADMIN — ASPIRIN 81 MG: 81 TABLET, COATED ORAL at 09:13

## 2022-10-15 RX ADMIN — SODIUM CHLORIDE, POTASSIUM CHLORIDE, SODIUM LACTATE AND CALCIUM CHLORIDE 150 ML: 600; 310; 30; 20 INJECTION, SOLUTION INTRAVENOUS at 00:05

## 2022-10-15 RX ADMIN — SODIUM CHLORIDE, POTASSIUM CHLORIDE, SODIUM LACTATE AND CALCIUM CHLORIDE 100 ML: 600; 310; 30; 20 INJECTION, SOLUTION INTRAVENOUS at 00:40

## 2022-10-15 RX ADMIN — HEPARIN SODIUM 12 UNITS/KG/HR: 10000 INJECTION, SOLUTION INTRAVENOUS at 16:30

## 2022-10-15 RX ADMIN — HEPARIN SODIUM 12 UNITS/KG/HR: 10000 INJECTION, SOLUTION INTRAVENOUS at 09:25

## 2022-10-15 RX ADMIN — HEPARIN SODIUM 5000 UNITS: 5000 INJECTION INTRAVENOUS; SUBCUTANEOUS at 05:32

## 2022-10-15 RX ADMIN — ATORVASTATIN CALCIUM 40 MG: 40 TABLET, FILM COATED ORAL at 09:13

## 2022-10-15 RX ADMIN — DOPAMINE HYDROCHLORIDE 5 MCG/KG/MIN: 320 INJECTION, SOLUTION INTRAVENOUS at 04:35

## 2022-10-15 NOTE — PROGRESS NOTES
Rapid Response Team    2350- while rounding, asked to assess patient by primary RN for decreased HR. Patient is alert, lying in bed, currently Afib SVR on monitor- HR 40s, intermittently dropping to upper 30s. BP 99/39 MAP 49. Patient is asymptomatic at this time. Primary RN instructed to notify hospitalist of HR and BP.     2359- orders placed by tele-hospitalist for 150 ml LR bolus and scheduled hydralazine held    0025- patient continues to be bradycardic and hypotensive (MAP <65) despite fluid bolus, HR now intermittently dropping to upper 20s- RRT called over head. Dr. Alberto Herrmann at bedside- No new interventions at this time, verbal orders received to notify MD if SBP <90     0310- Notified by charge nurse, patient is again hypotensive with bradycardia. Recommended that Primary RN notify MD.    Arrived to bedside, patient is alert, moving around in bed. Leads and BP cuff adjusted- HR 44, /39. Transfer orders placed by tele-hospitalist and patient accepted to ICU. Patient to be started on Dopamine gtt and transfer to Room # [phone].          Franchesca Morris RN  Rapid Response Team  #[id number]

## 2022-10-15 NOTE — PROGRESS NOTES
PHYSICAL THERAPY EVALUATION/DISCHARGE  Patient: Suzette Musa (67 y.o. female)  Date: 10/15/2022  Primary Diagnosis: Bradycardia [R00.1]       Precautions:          ASSESSMENT  Based on the objective data described below, the patient presents with baseline dementia however good strength, intact balance, and overall baseline independent functional mobility. Pt oriented to self and place only this date, requiring verbal cueing for re-orientation/attending to task. Secondary to baseline vision impairments and baseline cognitive deficits, pt required supervision throughout all aspects of mobility. Gait steady overall as pt ambulated 40ft without device. Balance remained intact throughout standing ADLs and dynamic standing balance tasks, including as pt danced while standing EOB and dance around obstacles in room. Pt also exhibited steady gait while retro walking. VSS on RA. Functional Outcome Measure: The patient scored 60/100 on the Barthel Index outcome measure which is indicative of moderate impairments . Other factors to consider for discharge: baseline dementia     Further skilled acute physical therapy is not indicated at this time. PLAN :  Recommendation for discharge: (in order for the patient to meet his/her long term goals)  No skilled physical therapy/ follow up rehabilitation needs identified at this time. This discharge recommendation:  Has been made in collaboration with the attending provider and/or case management    IF patient discharges home will need the following DME: patient owns DME required for discharge       SUBJECTIVE:   Patient stated I can do all sorts of dancing.     OBJECTIVE DATA SUMMARY:   HISTORY:    Past Medical History:   Diagnosis Date    CAD (coronary artery disease)     Cancer ([facility] [address]     possible stomach cancer 2021- workup in progress    Chronic kidney disease     Yemi Forrest- M-W-F    Dementia ([facility] [address]     per daughter pt is alert and oriented x3    Diabetes ([facility] [address] GERD (gastroesophageal reflux disease)     Hyperlipidemia     Hypertension     Seizures ([facility] [address]     possible while in hospital 8/2021    Stroke [facility])     Thyroid disease      Past Surgical History:   Procedure Laterality Date    HX APPENDECTOMY      08/21    HX VASCULAR ACCESS      HD catheter    IR INSERT NON TUNL CVC OVER 5 YRS  7/30/2021    IR INSERT NON TUNL CVC OVER 5 YRS  7/30/2021    IR INSERT TUNL CVC W/O PORT OVER 5 YR  8/4/2021    [place] CABG, ARTERY-VEIN, FOUR      5 years ago    VASCULAR SURGERY PROCEDURE UNLIST      right arm graft       Prior level of function: mod I with use of RW vs SPC. States she lives with son and daughter-in-law and that DIL is caregiver. Denies history of falls. Personal factors and/or comorbidities impacting plan of care: baseline dementia    Home Situation  Home Environment: Private residence  Living Alone: No (daughter in law and son live with pt)  Support Systems: Child(russell) (daughter)  Current DME Used/Available at Home: Cane, straight, Walker, rolling    EXAMINATION/PRESENTATION/DECISION MAKING:   Critical Behavior:  Neurologic State: Confused  Orientation Level: Oriented to person, Oriented to place  Cognition: Decreased attention/concentration, Follows commands     Hearing: Auditory  Auditory Impairment: None, Hard of hearing, bilateral  Skin:  intact  Edema: none noted  Range Of Motion:  AROM: Within functional limits                       Strength:    Strength:  Within functional limits                    Tone & Sensation:   Tone: Normal                              Coordination:  Coordination: Generally decreased, functional  Vision:      Functional Mobility:  Bed Mobility:  Rolling: Supervision  Supine to Sit: Supervision  Sit to Supine: Supervision  Scooting: Supervision  Transfers:  Sit to Stand: Supervision  Stand to Sit: Supervision                       Balance:   Sitting: Intact  Standing: Intact  Ambulation/Gait Training:  Distance (ft): 40 Feet (ft)  Assistive Device: Gait belt  Ambulation - Level of Assistance: Supervision        Gait Abnormalities: Decreased step clearance        Base of Support: Narrowed     Speed/Ramona: Pace decreased (<100 feet/min)  Step Length: Right shortened;Left shortened                      Functional Measure:  Barthel Index:    Bathin  Bladder: 5  Bowels: 5  Groomin  Dressing: 10  Feeding: 10  Mobility: 0  Stairs: 0  Toilet Use: 10  Transfer (Bed to Chair and Back): 10  Total: 60/100       The Barthel ADL Index: Guidelines  1. The index should be used as a record of what a patient does, not as a record of what a patient could do. 2. The main aim is to establish degree of independence from any help, physical or verbal, however minor and for whatever reason. 3. The need for supervision renders the patient not independent. 4. A patient's performance should be established using the best available evidence. Asking the patient, friends/relatives and nurses are the usual sources, but direct observation and common sense are also important. However direct testing is not needed. 5. Usually the patient's performance over the preceding 24-48 hours is important, but occasionally longer periods will be relevant. 6. Middle categories imply that the patient supplies over 50 per cent of the effort. 7. Use of aids to be independent is allowed. Score Interpretation (from [address] [address])    Independent   60-79 Minimally independent   40-59 Partially dependent   20-39 Very dependent   <20 Totally dependent     -Duglas Fernandez., Barthel, D.W. (1965). Functional evaluation: the Barthel Index. [facility] ([address] (). The Barthel activities of daily living index: self-reporting versus actual performance in the old (> or = 75 years). Journal of [address] [place] [address] 45(7), [facility], JKAT, Matthias Guadarrama., Ericka Hay (1999).  Measuring the change in disability after inpatient rehabilitation; comparison of the responsiveness of the Barthel Index and Functional [place] Measure. Journal of Neurology, Neurosurgery, and Psychiatry, 66(4), 263-542. KATARINA Oswald, ANTIONE Quiroga, & Samara Thomson M.A. (2004) Assessment of post-stroke quality of life in cost-effectiveness studies: The usefulness of the Barthel Index and the EuroQoL-5D. Quality of Life Research, 15, 739-53           Physical Therapy Evaluation Charge Determination   History Examination Presentation Decision-Making   MEDIUM  Complexity : 1-2 comorbidities / personal factors will impact the outcome/ POC  MEDIUM Complexity : 3 Standardized tests and measures addressing body structure, function, activity limitation and / or participation in recreation  MEDIUM Complexity : Evolving with changing characteristics  MEDIUM Complexity : FOTO score of 26-74      Based on the above components, the patient evaluation is determined to be of the following complexity level: MEDIUM    Pain Rating:  Denied c/o pain    Activity Tolerance:   VSS on RA      After treatment patient left in no apparent distress:   Supine in bed, Call bell within reach, Bed / chair alarm activated, and Side rails x 3    COMMUNICATION/EDUCATION:   The patients plan of care was discussed with: Occupational therapist and Registered nurse. Fall prevention education was provided and the patient/caregiver indicated understanding., Patient/family have participated as able in goal setting and plan of care. , and Patient/family agree to work toward stated goals and plan of care.     Thank you for this referral.  Ella Roger, PT, DPT   Time Calculation: 24 mins

## 2022-10-15 NOTE — PROGRESS NOTES
RAPID RESPONSE NOTE:    BACKGROUND/ SITUATION:  28-year-old -American female with history of CAD status post CABG and ESRD on IHD admitted for bradycardia and borderline hypotension. ECG demonstrating atrial fibrillation with bradycardia. Evaluated by cardiology today who recommended beta-blocker dose reduction. Tonight, patient noted to have episodes of bradycardia down to upper 20s lasting only few seconds as well as borderline low blood pressures prompting rapid response to be called. Staff report she had been administered 20 mg scheduled p.o. hydralazine which likely accounts for the low blood pressures as systolics were only in the 100s when this medication was administered. FINDINGS:  On arrival to the room, patient resting comfortably in bed in no apparent distress. Arousable to voice and responding appropriately. Systolic blood pressure is 90s-100s (noted low diastolics on all readings which spuriously skew maps down) I suspect low diastolic readings are due to PAD inherent in ESRD population as well as necessity to obtain blood pressure from left forearm. Patient currently receiving 150 cc normal saline bolus ordered by cross cover provider. ASSESSMENT/ INTERVENTION/ RESPONSE  Appears to have A. fib with bradycardia and borderline hypotension, but remains asymptomatic  Agree with 150 cc normal saline to augment intravascular volume  Directed staff to hold further antihypertensives and rate control agents until reevaluated in the morning      Signed: Rickey Kinney DO  10/15/2022 12:38 AM      0320 -called by nursing and informed of sustained heart rates in the 20s for multiple minutes at a time as well as hypotension with systolics in the 50N. Directed staff to obtain ECG, administer 500 cc normal saline bolus and went to evaluate patient. On arrival, patient blood pressure had improved to 110s with rates in the 40s after stimulation.   Case discussed with intensivist (unclear who initiated, but certainly appropriate) and patient accepted to ICU for initiation of dopamine drip. Given apparent symptomatic bradycardia she will likely require PPM could be candidate for Micra implantation, but defer to cardiology expertise.

## 2022-10-15 NOTE — PROCEDURES
Hemodialysis / [phone]    Vitals Pre Post Assessment Pre Post   BP BP: (!) 117/43 (10/15/22 1318)   136/73 LOC A&Ox3, period of confusion A&Ox3   HR Pulse (Heart Rate): 65 (10/15/22 1318) 69 Lungs Clear renate Clear renate   Resp Resp Rate: 17 (10/15/22 1300) 23 Cardiac A-fib A-fib   Temp Temp: 97.8 °F (36.6 °C) (10/15/22 1300) 98.1 Skin Warm and dry Warm and dry   Weight  N/a N/a Edema none none   Tele status tele tele Pain Pain Intensity 1: 0 (10/15/22 0745) 0     Orders   Duration: Start:  End:  Total: 2hrs   Dialyzer: Dialyzer/Set Up Inspection: Revaclear (10/15/22 1318)   K Bath: Dialysate K (mEq/L): 3 (10/15/22 1318)   Ca Bath: Dialysate CA (mEq/L): 2.5 (10/15/22 1318)   Na:     Bicarb: Target Fluid Removal: Goal/Amount of Fluid to Remove (mL): 1000 mL (10/15/22 1318)     Access   Type & Location: [facility]ANCI AVG: Pre-assessment: skin CDI. No s/s of infection. + B/T. No issues with cannulation. Running well at . Post assessment: No issues with hemostasis, + B/T remains. Dressing CDI.    Comments:                                        Labs   HBsAg (Antigen) / date:     Pending ( in portal)                                          HBsAb (Antibody) / date: Pending (22 imm in portal)   Source: Epic/[place]   Obtained/Reviewed  Critical Results Called HGB   Date Value Ref Range Status   10/15/2022 11.6 11.5 - 16.0 g/dL Final     Potassium   Date Value Ref Range Status   10/15/2022 4.0 3.5 - 5.1 mmol/L Final     Comment:     SPECIMEN HEMOLYZED, RESULTS MAY BE AFFECTED     Calcium   Date Value Ref Range Status   10/15/2022 8.7 8.5 - 10.1 MG/DL Final     BUN   Date Value Ref Range Status   10/15/2022 33 (H) 6 - 20 MG/DL Final     Creatinine   Date Value Ref Range Status   10/15/2022 7.91 (H) 0.55 - 1.02 MG/DL Final        Meds Given   Name Dose Route   None                 Adequacy / Fluid    Total Liters Process: 41.7L   Net Fluid Removed: 1000ml      Comments   Time Out Done:   (Time) 7487 Admitting Diagnosis: Bradycardiac    Consent obtained/signed:  Chronic   Machine / RO # Machine Number: [id number] (10/15/22 3178)   Primary Nurse Rpt Pre: Rachid Pinedo   Primary Nurse Rpt Post: Rachid Pinedo   Pt Education: Access care   Care Plan: To continue HD as ordered   Pts outpatient clinic: [facility]     Tx Summary   Comments:     Pt in CCU A&Ox3, with periods of confusion. Consent signed & on file. SBAR received from Primary RN. 1318: Pt cannulated with 52J needles per policy & without issue. Labs drawn per request/ order. VSS. Dialysis Tx initiated. 1518: Tx ended. VSS. All possible blood returned to patient. Hemostasis achieved without issue. Bed locked and in the lowest position, call bell and belongings in reach. SBAR given to Primary, RN. Patient is stable at time of their/ my departure. All Dialysis related medications have been reviewed.

## 2022-10-15 NOTE — PROGRESS NOTES
2300: Patient is afib w/ low ventricular response HR:40s sometimes 30s, soft BP now 99/32, she's asymptomatic but laying flat. No dizziness  or pain voiced; Rapid nurse here to assess patient. 2345: Patient is quietly resting in bed, still low ventricular rate now dipping in the 30s, on call MD dr. Everlyn Cabot notified, order received to hold BP meds and to administer LR bolus of 150 ml.     0030: Patient's HR dipped once in the upper 20s but non sustained, so Rapid nurse call a rapid and dr. Whit Aguilar came at bedside to assess patient. Patient  remains in A. fib with bradycardia and borderline hypotension, but remains asymptomatic,  /37, HR 45, no intervention as per MD but close monitoring. 0100: No changes noted, on call dr. Everlyn Cabot gave another bolus of 100 ml which patient received, closed monitoring in progress. 0215:Patient is asleep, BP 92/42, HR 40, asymptomatic, cardio on call was notified and no new order received except to monitor for SBP > 90, continue to monitor patient.     5516: Cardiology was consulted. stated that as long as SBP remains greater than 90 they are fine with that. And to reach out to hospitalist on call for any other concerns. Pt BP is now 78/53 HR 40. Rapid Response Nurse has been called to bed side. 0308 : called in house hospitalist, Per Dr Dafne Begum  at bedside to assess patient, afib w/ HR 46, MAP<60, order for transfer and Dopamine drip infusion,will call report to CCU rn.

## 2022-10-15 NOTE — PROGRESS NOTES
2130: Patient is calm and confused in bed, resp. Even and unlabored on room air, afebrile, vitals stable w/ SBP 110s, no pain voiced, Afib on monitor w/ HR 50s, R upper arm AVF w/ bruit and thrill, anuric, assessment completed and charted, seen by cardio consult earlier, bed alarm and call light within reach, will continue patient monitoring. Problem: Pressure Injury - Risk of  Goal: *Prevention of pressure injury  Description: Document Mick Scale and appropriate interventions in the flowsheet. Outcome: Progressing Towards Goal  Note: Pressure Injury Interventions: Activity Interventions: Chair cushion, Increase time out of bed    Mobility Interventions: Float heels, HOB 30 degrees or less    Nutrition Interventions: Document food/fluid/supplement intake       Problem: Patient Education: Go to Patient Education Activity  Goal: Patient/Family Education  Outcome: Progressing Towards Goal     Problem: Falls - Risk of  Goal: *Absence of Falls  Description: Document Bobbetta Hoop Fall Risk and appropriate interventions in the flowsheet.   Outcome: Progressing Towards Goal  Note: Fall Risk Interventions:  Mobility Interventions: Assess mobility with egress test    Mentation Interventions: Bed/chair exit alarm    Medication Interventions: Assess postural VS orthostatic hypotension, Bed/chair exit alarm    Elimination Interventions: Call light in reach

## 2022-10-15 NOTE — H&P
[facility]    ICU TEAM H & P Note    Name: Tamanna Faust   : 1950   MRN: [id number]   Date: 10/15/2022        Subjective:     Reason for ICU Admission: bradycardia     HPI: 67-year-old female with a history of known coronary artery disease and prior coronary bypass grafting in 2016 while she was living in [place]. She recently moved to this area and has been followed by Dr. Ulices Moya. The patient also has end-stage renal disease and is on hemodialysis. Her cardiac risk factors include type 2 diabetes, hypertension and dyslipidemia. She had an EKG several months ago that showed atrial fibrillation, but she is not on anticoagulation. The patient was sent into the ED [facility] ER on 10/14/22 when she was at hemodialysis and was noted to be quite bradycardic and somewhat hypotensive. She did have complaints of dizziness. She had a recent urinary tract infection and was treated with antibiotics. Her heart rate in the emergency room was noted to be between 28 and 50 and she was in atrial fibrillation. Past Medical History:      has a past medical history of CAD (coronary artery disease), Cancer ([address] Chronic kidney disease, Dementia ([address] Diabetes ([address] GERD (gastroesophageal reflux disease), Hyperlipidemia, Hypertension, Seizures ([address] Stroke ([address] and Thyroid disease. Past Surgical History:      has a past surgical history that includes ir insert non tunl cvc over 5 yrs (2021); ir insert non tunl cvc over 5 yrs (2021); ir insert tunl cvc w/o port over 5 yr (2021); hx vascular access; pr cabg, artery-vein, four; hx appendectomy; and vascular surgery procedure unlist.    Home Medications:     Prior to Admission medications    Medication Sig Start Date End Date Taking?  Authorizing Provider   levothyroxine (SYNTHROID) 50 mcg tablet TAKE 1 TABLET BY MOUTH EVERY DAY BEFORE BREAKFAST 10/4/22   Vinny QUACH NP   famotidine (PEPCID) 20 mg tablet TAKE 1 TABLET BY MOUTH TWO (2) TIMES DAILY AS NEEDED FOR HEARTBURN. 10/4/22   Iman Epps NP   insulin lispro protamine/insulin lispro (HUMALOG MIX 50/50) 100 unit/mL (50-50) injection 3 Units by SubCUTAneous route. Provider, Historical   carvediloL (COREG) 6.25 mg tablet Take 6.25 mg by mouth two (2) times daily (with meals). Provider, Historical   ergocalciferol (ERGOCALCIFEROL) 1,250 mcg (50,000 unit) capsule Take 50,000 Units by mouth. Provider, Historical   hydrALAZINE (APRESOLINE) 25 mg tablet Take  by mouth. Provider, Historical   insulin detemir U-100 (Levemir U-100 Insulin) 100 unit/mL injection 18 Units by SubCUTAneous route. Provider, Historical   insulin lispro (HumaLOG KwikPen Insulin) 100 unit/mL kwikpen by SubCUTAneous route. Provider, Historical   metoprolol succinate (TOPROL-XL) 25 mg XL tablet TAKE 1/2 TABLET BY MOUTH DAILY AFTER DIALYSIS TREATMENT 7/7/22   Provider, Historical   repaglinide (PRANDIN) 2 mg tablet Take 2 mg by mouth. Provider, Historical   sevelamer carbonate (RENVELA) 800 mg tab tab Take 1 Tablet by mouth. Provider, Historical   amLODIPine (NORVASC) 10 mg tablet Take  by mouth. Provider, Historical   melatonin 5 mg cap capsule Take 1 Capsule by mouth nightly. 10/3/22   Iman Epps NP   cefdinir (OMNICEF) 300 mg capsule 300 mg 3 times per week after hemodialysis sessions 9/29/22   Natalie Gongora MD   atorvastatin (LIPITOR) 40 mg tablet TAKE 1 TABLET BY MOUTH EVERY DAY 7/28/22   FRANCK Jensen misc Check sugar twice daily. E11.65  Patient not taking: Reported on 9/29/2022 1/20/22   Iman Epps NP   amLODIPine (NORVASC) 5 mg tablet Take 5 mg by mouth daily. Patient not taking: Reported on 9/29/2022    Provider, Historical   aspirin delayed-release 81 mg tablet Take  by mouth daily. Provider, Historical   brimonidine-timoloL (Combigan) 0.2-0.5 % drop ophthalmic solution 1 Drop every twelve (12) hours.     Provider, Historical   insulin glargine (LANTUS,BASAGLAR) 100 unit/mL (3 mL) inpn 10 Units by SubCUTAneous route daily (with dinner). Patient not taking: No sig reported 21   Dolores Escobedo NP   Insulin Needles, Disposable, 32 gauge x \" ndle 1 Each by Does Not Apply route daily. Patient not taking: No sig reported 21   Dolores Escobedo NP       Allergies/Social/Family History:     No Known Allergies   Social History     Tobacco Use    Smoking status: Never    Smokeless tobacco: Never   Substance Use Topics    Alcohol use: Not Currently      History reviewed. No pertinent family history.     Review of Systems:     Constitutional: positive for fatigue  Eyes: negative  Ears, nose, mouth, throat, and face: negative  Respiratory: negative for shortness of breath  Cardiovascular: negative for chest pain, chest pressure/discomfort  Gastrointestinal: negative for change in bowel habits  Genitourinary:negative for frequency  Integument/breast: negative  Hematologic/lymphatic: negative for easy bruising and bleeding  Musculoskeletal:negative for myalgias  Neurological: positive for dizziness  Behavioral/Psych: negative  Endocrine: negative  Allergic/Immunologic: negative    Objective:   Vital Signs:  Visit Vitals  BP (!) 110/35 (BP 1 Location: Left lower arm, BP Patient Position: At rest)   Pulse (!) 37   Temp 98 °F (36.7 °C)   Resp 10   Ht 4' 11\" (1.499 m)   Wt 52.4 kg (115 lb 8.3 oz)   SpO2 100%   BMI 23.33 kg/m²      O2 Device: None (Room air) Temp (24hrs), Av.3 °F (36.8 °C), Min:97.9 °F (36.6 °C), Max:98.9 °F (37.2 °C)           Intake/Output:     Intake/Output Summary (Last 24 hours) at 10/15/2022 0343  Last data filed at 10/14/2022 1530  Gross per 24 hour   Intake --   Output 0 ml   Net 0 ml       Physical Exam: appears stated age,thin built  HEENT : normal  Heart: bradycardic  Lungs: clear  Abd: soft  Ext: no edema  Cns: alert awake, no focal deficit  LABS AND  DATA: Personally reviewed  Recent Labs     10/14/22  0911   WBC 4.2   HGB 13.2   HCT 42.1   PLT 88*     Recent Labs     10/14/22  0911      K 3.3*   CL 99   CO2 29   BUN 23*   CREA 6.88*   *   CA 9.3     Recent Labs     10/14/22  0911   *   TP 7.8   ALB 3.8   GLOB 4.0     No results for input(s): INR, PTP, APTT, INREXT in the last 72 hours. No results for input(s): PHI, PCO2I, PO2I, FIO2I in the last 72 hours. No results for input(s): CPK, CKMB, TROIQ, BNPP in the last 72 hours. Hemodynamics:   PAP:   CO:     Wedge:   CI:     CVP:    SVR:       PVR:       Ventilator Settings:  Mode Rate Tidal Volume Pressure FiO2 PEEP                    Peak airway pressure:      Minute ventilation:        MEDS: Reviewed    Chest X-Ray:  CXR Results  (Last 48 hours)      None              Multidisciplinary Rounds Completed:  Pending    ABCDEF Bundle/Checklist Completed:  Yes      ICU Assessment/ Comprehensive Plan of Care: Cyrus Richardson Is a 67 y.o. female with ESRD on HD who is admitted for bradycardia.     CARDIAC  Obtain EKG  Start dopamine  Possible PPM on Monday  Holding off on initiating any coumadin or NOAC's in anticipation of PPM    RENAL  ESRD on HD MWWF  Monitor lytes   Lopez Catheter Present: No  IVFs: none    GASTROINTESTINAL  Renal diet  No acute issues at this time    Hematology  Low platelets : continue to monitor  HIT panel if they decrease any further    ID  H/o UTI : treated as outpatient with cefdinir  Observe for now  No signs of infection    ENDOCRINE  DM : continue lantus and SQ insulin    MUSCULOSKELETAL  Heparin SQ for DVT prophylaxis    Neurology : underlying dementia  H/o seizures but none this admission  Continue to observe      Mobility: Bedrest  PT/OT: PT consulted and on board and OT consulted and on board       DVT Prophylaxis: SCD's or Sequential Compression Device , enoxaparin  U - Ulcer Prophylaxis:   G - Glycemic Control: Insulin  B - Bowel Regimen:   Tubes: None  Lines: Peripheral IV  Drains: None        DISPOSITION/COMMUNICATION  Discussed Plan of Care/Code Status: Full Code  Surrogate Decision Maker: Jodi Sánchez  Daughter [phone]    Stay in ICU    CRITICAL CARE CONSULTANT NOTE  I had a face to face encounter with the patient, reviewed and interpreted patient data including clinical events, labs, images, vital signs, I/O's, and examined patient. I have discussed the case and the plan and management of the patient's care with the consulting services, the bedside nurses and the respiratory therapist.      NOTE OF PERSONAL INVOLVEMENT IN CARE   This patient has a high probability of imminent, clinically significant deterioration, which requires the highest level of preparedness to intervene urgently. I participated in the decision-making and personally managed or directed the management of the following life and organ supporting interventions that required my frequent assessment to treat or prevent imminent deterioration. I personally spent 45 minutes of critical care time. This is time spent at this critically ill patient's bedside actively involved in patient care as well as the coordination of care. This does not include any procedural time which has been billed separately.     Ondina Genao  10/15/2022

## 2022-10-15 NOTE — PROGRESS NOTES
[address]  YOB: 1950          Assessment & Plan:     ESRD on HD MWF at [facility]  Bradycardia  HTN  Anemia    Rec:  Reviewed [facility] records. Ran less than an hour yesterday. Will run x 2 hours today then MWF  Available as needed over weekend       Subjective:   CC: ESRD  HPI: Patient moved to ICU for bradycardia. On dopamine with better HR.  ROS: No sob/n/v  Current Facility-Administered Medications   Medication Dose Route Frequency    DOPamine (INTROPIN) 800 mg in dextrose 5% 250 mL infusion  0-20 mcg/kg/min IntraVENous TITRATE    heparin 25,000 units in D5W 250 ml infusion  12-25 Units/kg/hr IntraVENous TITRATE    heparin (porcine) 1,000 unit/mL injection 1,570 Units  30 Units/kg IntraVENous PRN    Or    heparin (porcine) 1,000 unit/mL injection 3,140 Units  60 Units/kg IntraVENous PRN    [Held by provider] amLODIPine (NORVASC) tablet 5 mg  5 mg Oral DAILY    aspirin delayed-release tablet 81 mg  81 mg Oral DAILY    atorvastatin (LIPITOR) tablet 40 mg  40 mg Oral DAILY    [Held by provider] hydrALAZINE (APRESOLINE) tablet 25 mg  25 mg Oral TID    insulin glargine (LANTUS) injection 15 Units  15 Units SubCUTAneous QHS    sevelamer carbonate (RENVELA) oral powder 800 mg  800 mg Oral TID WITH MEALS    insulin lispro (HUMALOG) injection   SubCUTAneous AC&HS    glucose chewable tablet 16 g  4 Tablet Oral PRN    glucagon (GLUCAGEN) injection 1 mg  1 mg IntraMUSCular PRN    dextrose 10 % infusion 0-250 mL  0-250 mL IntraVENous PRN          Objective:     Vitals:  Blood pressure (!) 110/46, pulse (!) 59, temperature (P) 98.2 °F (36.8 °C), resp. rate 14, height 4' 11\" (1.499 m), weight 52.4 kg (115 lb 8.3 oz), SpO2 99 %. Temp (24hrs), Av °F (36.7 °C), Min:97.7 °F (36.5 °C), Max:98.4 °F (36.9 °C)      Intake and Output:  No intake/output data recorded. No intake/output data recorded.     Physical Exam:               GENERAL ASSESSMENT: NAD  HEENT: Nontraumatic   CHEST: CTA  HEART: S1S2  ABDOMEN: Soft,NT  EXTREMITY: no EDEMA; AVF +t/b          ECG/rhythm:    Data Review      No results for input(s): TNIPOC in the last 72 hours. No lab exists for component: ITNL   No results for input(s): CPK, CKMB, TROIQ in the last 72 hours. Recent Labs     10/15/22  0503 10/14/22  0911    137   K 4.0 3.3*    99   CO2 25 29   BUN 33* 23*   CREA 7.91* 6.88*   GLU 68 126*   CA 8.7 9.3   ALB  --  3.8   WBC 5.7 4.2   HGB 11.7 13.2   HCT 38.2 42.1   * 88*      No results for input(s): INR, PTP, APTT, INREXT in the last 72 hours. Needs: urine analysis, urine sodium, protein and creatinine  No results found for: MAGGI KINSEY        : Reshma Prieto MD  10/15/2022        [facility]:  [email or web address]  [place] office:  [address] [place] [address], [address] [place] [address] [address]  [place], [address] [facility]  Phone: [phone]  Fax :     [phone]    [place] office:  [address] [address] [address]  Phone - [phone]  Fax - [phone]

## 2022-10-15 NOTE — PROGRESS NOTES
7584  Dopamine stopped per ICU MD. Will notify if HR sustains under 45.  0945  Lab called and notified RN that initial PTT lab for hep gtt had clotted and needed redraw. Hep gtt stopped at 0950 for redraw. Will resume once lab is drawn. Levi Moscoso sent and hep gtt restarted.

## 2022-10-15 NOTE — PROGRESS NOTES
Hospitalist Progress Note    NAME: Siomara Wyatt   :  1950   MRN:  [id number]       Assessment / Plan:  Bradycardia  Atrial fibrillation  Off dopamine drip   hold av mason blockers  Cardiology consult  Plan for possible pacemaker insertion Monday  Continue heparin drip  Check TSH level    Hypokalemia  Resolved     ESRD HD MWF  Consult nephrology  Continue hemodialysis as per nephrology    Hx of UTI    Follow-up urine culture     HLD cont. PTA md  Hypothyroidism cont. PTA med  DM- long acting lantus and SSI  HTN cont. Home meds, hold b-blocker  Hx of Seizure previously on keppra? CM/SW  PT/OT  Correct Electrolytes as needed      18.5 - 24.9 Normal weight / Body mass index is 23.33 kg/m². Estimated discharge date:   Barriers:    Code status: Full  Prophylaxis:  Heparin  Recommended Disposition: Home w/Family     Subjective:     Chief Complaint / Reason for Physician Visit  \"\". Discussed with RN events overnight. Off dopamine, cardiology follow-up, possible pacemaker insertion    Review of Systems:  Symptom Y/N Comments  Symptom Y/N Comments   Fever/Chills n   Chest Pain n    Poor Appetite    Edema     Cough    Abdominal Pain     Sputum    Joint Pain     SOB/CHURCH n   Pruritis/Rash     Nausea/vomit    Tolerating PT/OT     Diarrhea    Tolerating Diet y    Constipation    Other       Could NOT obtain due to:      Objective:     VITALS:   Last 24hrs VS reviewed since prior progress note.  Most recent are:  Patient Vitals for the past 24 hrs:   Temp Pulse Resp BP SpO2   10/15/22 1430 -- 62 -- (!) 123/51 --   10/15/22 1415 -- 62 -- 108/65 --   10/15/22 1400 -- 65 -- (!) 130/54 --   10/15/22 1345 -- 62 -- 138/66 --   10/15/22 1330 -- 63 -- (!) 123/49 --   10/15/22 1318 -- 65 -- (!) 117/43 --   10/15/22 1300 97.8 °F (36.6 °C) (!) 59 17 (!) 124/52 99 %   10/15/22 1245 -- (!) 59 17 137/60 99 %   10/15/22 1230 -- 63 19 (!) 159/71 99 %   10/15/22 1215 -- 72 22 (!) 138/115 94 %   10/15/22 1200 98 °F (36.7 °C) 64 16 138/61 98 %   10/15/22 1145 -- (!) 55 14 (!) 114/54 100 %   10/15/22 1130 -- 61 11 114/60 98 %   10/15/22 1115 -- 60 12 (!) 116/53 97 %   10/15/22 1100 -- 69 14 (!) 164/83 --   10/15/22 1045 -- 88 24 (!) 149/60 --   10/15/22 1038 -- 75 -- (!) 149/66 99 %   10/15/22 1036 -- 62 -- (!) 116/56 99 %   10/15/22 1030 -- (!) 55 12 (!) 116/56 98 %   10/15/22 1015 -- (!) 52 17 (!) 125/49 99 %   10/15/22 1000 -- (!) 50 15 (!) 120/55 99 %   10/15/22 0945 -- (!) 49 12 (!) 141/55 97 %   10/15/22 0930 -- 62 24 (!) 145/59 (!) 84 %   10/15/22 0915 -- 63 20 138/73 92 %   10/15/22 0900 -- 68 16 -- 95 %   10/15/22 0845 -- 63 16 (!) 116/43 98 %   10/15/22 0830 -- (!) 55 8 (!) 100/42 98 %   10/15/22 0815 -- 64 10 (!) 114/48 99 %   10/15/22 0800 -- (!) 58 (!) 7 (!) 114/47 99 %   10/15/22 0745 98.2 °F (36.8 °C) (!) 59 14 (!) 110/46 99 %   10/15/22 0730 -- 76 15 -- 98 %   10/15/22 0715 -- 78 8 (!) 138/55 98 %   10/15/22 0700 -- 78 (!) 4 (!) 135/54 100 %   10/15/22 0603 -- 95 9 (!) 147/59 --   10/15/22 0548 -- 98 (!) 6 (!) 146/68 99 %   10/15/22 0519 -- -- -- -- 99 %   10/15/22 0516 -- (!) 42 -- (!) 139/45 --   10/15/22 0515 -- (!) 40 14 (!) 139/45 98 %   10/15/22 0500 -- (!) 38 17 -- 97 %   10/15/22 0449 -- (!) 48 -- (!) 105/36 --   10/15/22 0445 97.7 °F (36.5 °C) (!) 40 13 (!) 105/36 --   10/15/22 0435 -- (!) 43 -- (!) 101/37 --   10/15/22 0400 -- (!) 39 17 -- 100 %   10/15/22 0320 -- (!) 48 8 (!) 117/32 100 %   10/15/22 0305 -- (!) 54 19 (!) 84/25 96 %   10/15/22 0105 -- (!) 37 10 (!) 110/35 100 %   10/15/22 0030 97.7 °F (36.5 °C) (!) 45 14 (!) 97/38 99 %   10/15/22 0000 -- (!) 45 13 (!) 101/33 99 %   10/14/22 2025 98 °F (36.7 °C) 60 26 (!) 106/44 100 %   10/14/22 2000 -- (!) 55 -- -- --   10/14/22 1530 97.9 °F (36.6 °C) (!) 57 17 (!) 145/49 100 %   10/14/22 1500 -- (!) 49 10 -- 100 %       Intake/Output Summary (Last 24 hours) at 10/15/2022 1441  Last data filed at 10/15/2022 1200  Gross per 24 hour   Intake 692.98 ml   Output 0 ml   Net 692.98 ml        I had a face to face encounter and independently examined this patient on 10/15/2022, as outlined below:  PHYSICAL EXAM:  General: WD, WN. Alert, cooperative, no acute distress    EENT:  EOMI. Anicteric sclerae. MMM  Resp:  CTA bilaterally, no wheezing or rales. No accessory muscle use  CV:  Regular  rhythm,  No edema  GI:  Soft, Non distended, Non tender. +Bowel sounds  Neurologic:  Alert and oriented X 3, normal speech,   Psych:   Good insight. Not anxious nor agitated  Skin:  No rashes. No jaundice    Reviewed most current lab test results and cultures  YES  Reviewed most current radiology test results   YES  Review and summation of old records today    NO  Reviewed patient's current orders and MAR    YES  PMH/ reviewed - no change compared to H&P  ________________________________________________________________________  Care Plan discussed with:    Comments   Patient y    Family      RN y    Care Manager     Consultant                        Multidiciplinary team rounds were held today with , nursing, pharmacist and clinical coordinator. Patient's plan of care was discussed; medications were reviewed and discharge planning was addressed. ________________________________________________________________________  Total NON critical care TIME:  35   Minutes    Total CRITICAL CARE TIME Spent:   Minutes non procedure based      Comments   >50% of visit spent in counseling and coordination of care     ________________________________________________________________________  Chiara Pressley MD     Procedures: see electronic medical records for all procedures/Xrays and details which were not copied into this note but were reviewed prior to creation of Plan. LABS:  I reviewed today's most current labs and imaging studies.   Pertinent labs include:  Recent Labs     10/15/22  1002 10/15/22  0503 10/14/22  0911   WBC 4.9 5.7 4.2   HGB 11.6 11.7 13.2   HCT 36.7 38.2 42.1 PLT 81* 109* 88*     Recent Labs     10/15/22  0503 10/14/22  0911    137   K 4.0 3.3*    99   CO2 25 29   GLU 68 126*   BUN 33* 23*   CREA 7.91* 6.88*   CA 8.7 9.3   ALB  --  3.8   TBILI  --  0.9   ALT  --  28       Signed:  Betty Becker MD

## 2022-10-15 NOTE — PROGRESS NOTES
Progress Note      10/15/2022 7:54 AM  NAME: Yariel Burton   MRN:  [id number]   Admit Diagnosis: Bradycardia [R00.1]         Primary Cardiologist: Dr Sophie Bonilla   Seen by Dr Brenton Sue   Physician Requesting consult: Dr Joe Marcum:    Significant Bradycardia and hypotension last night needing Dopamine gtt    1. Bradycardia with persistent atrial fibrillation. 2.  Coronary artery disease with prior coronary bypass grafting in 2016. 3.  Hypertension. 4.  Dyslipidemia. 5.  Type 2 diabetes. 6.  End-stage renal disease on hemodialysis. 7.  Mild dementia. 8.  Prior cerebrovascular accident. 9.  Hypothyroidism. 10.  Type 2 diabetes. Recommendations:    Currently on Dopa of 5 > decrease to  2.5 later today and monitor HR - consider pacemaker if cont to have significant bradycardia   Cont hold Coreg   Heparin gtt for anticoagulation for now    On asa and lipitor   Check TSH         [x]        High complexity decision making was performed    Subjective:     HPI:   Reports feeling okay   No CP or SOB     ROS: No CP, SOB, Abd pain, nausea, vomiting, syncope, palpitations, new focal neurological symptoms     Objective:      Physical Exam:    Last 24hrs VS reviewed since prior progress note.  Most recent are:    Visit Vitals  BP (!) 110/46   Pulse (!) 59   Temp 97.7 °F (36.5 °C)   Resp 14   Ht 4' 11\" (1.499 m)   Wt 52.4 kg (115 lb 8.3 oz)   SpO2 99%   BMI 23.33 kg/m²       Intake/Output Summary (Last 24 hours) at 10/15/2022 0754  Last data filed at 10/14/2022 1530  Gross per 24 hour   Intake --   Output 0 ml   Net 0 ml        General: Alert and oriented x3, no acute distress   Neck: Supple   Respiratory: No respiratory distress, clear lung sound   Cardiovascular: Irregular rate rhythm, S1S2   Abdomen: soft, non tender, non distended   Neuro: moves all extremities, oriented x3   Skin: warm and dry   Extremity: no edema, warm to touch      Data Review    Telemetry: Afib     Lab Data Personally Reviewed:    Recent Labs     10/15/22  0503 10/14/22  0911   WBC 5.7 4.2   HGB 11.7 13.2   HCT 38.2 42.1   * 88*     No results for input(s): INR, PTP, APTT, INREXT in the last 72 hours. Recent Labs     10/15/22  0503 10/14/22  0911    137   K 4.0 3.3*    99   CO2 25 29   BUN 33* 23*   CREA 7.91* 6.88*   GLU 68 126*   CA 8.7 9.3     No results for input(s): CPK, CKNDX, TROIQ in the last 72 hours. No lab exists for component: CPKMB  No results found for: CHOL, CHOLX, CHLST, CHOLV, HDL, HDLP, LDL, LDLC, DLDLP, TGLX, TRIGL, TRIGP, CHHD, CHHDX    Recent Labs     10/14/22  0911   *   TP 7.8   ALB 3.8   GLOB 4.0     No results for input(s): PH, PCO2, PO2 in the last 72 hours.     Medications Personally Reviewed:    Current Facility-Administered Medications   Medication Dose Route Frequency    DOPamine (INTROPIN) 800 mg in dextrose 5% 250 mL infusion  0-20 mcg/kg/min IntraVENous TITRATE    [Held by provider] amLODIPine (NORVASC) tablet 5 mg  5 mg Oral DAILY    aspirin delayed-release tablet 81 mg  81 mg Oral DAILY    atorvastatin (LIPITOR) tablet 40 mg  40 mg Oral DAILY    [Held by provider] carvediloL (COREG) tablet 6.25 mg  6.25 mg Oral BID WITH MEALS    [Held by provider] hydrALAZINE (APRESOLINE) tablet 25 mg  25 mg Oral TID    insulin glargine (LANTUS) injection 15 Units  15 Units SubCUTAneous QHS    sevelamer carbonate (RENVELA) oral powder 800 mg  800 mg Oral TID WITH MEALS    insulin lispro (HUMALOG) injection   SubCUTAneous AC&HS    glucose chewable tablet 16 g  4 Tablet Oral PRN    glucagon (GLUCAGEN) injection 1 mg  1 mg IntraMUSCular PRN    dextrose 10 % infusion 0-250 mL  0-250 mL IntraVENous PRN    heparin (porcine) injection 5,000 Units  5,000 Units SubCUTAneous Q12H              Becky Juarez MD

## 2022-10-15 NOTE — CONSULTS
[facility]    Name:  Camilla Rodrigez  MR#:  [id number]  :  1950  ACCOUNT #:  [de-identified]  DATE OF SERVICE:  10/14/2022      REQUESTING PHYSICIAN:  Cat Bedoya MD, [facility]    REASON FOR CONSULTATION:  Evaluate bradycardia and atrial fibrillation. CHIEF COMPLAINT:  Dizziness. HISTORY OF PRESENT ILLNESS:  The patient is a 76-year-old female with a history of known coronary artery disease and prior coronary bypass grafting in 2016 while she was living in [place]. She recently moved to this area and has been followed by Dr. Andrez Jerome. The patient also has end-stage renal disease and is on hemodialysis. Her cardiac risk factors include type 2 diabetes, hypertension and dyslipidemia. She had an EKG several months ago that showed atrial fibrillation, but she is not on anticoagulation. The patient was sent into the [facility] ER today when she was at hemodialysis and was noted to be quite bradycardic and somewhat hypotensive. She did have complaints of dizziness. She had a recent urinary tract infection and was treated with antibiotics. Her heart rate in the emergency room was noted to be between 28 and 50 and she was in atrial fibrillation. She has been admitted to the Hospitalist Service and I was asked to see her for evaluation. She is not currently on anticoagulation. No other complaints today including chest discomfort. She is a limited historian because of underlying dementia. PAST MEDICAL HISTORY:  As noted above, history of dementia, prior CVA, hypothyroidism, type 2 diabetes, dyslipidemia, seizure disorder. SURGERIES:  Prior bypass surgery, prior appendectomy, prior right upper extremity dialysis catheter. MEDICATIONS:  1. Hydralazine. 2.  Carvedilol 6.25 mg b.i.d.  3.  Norvasc. 4.  Aspirin. 5.  Lantus insulin. 6.  Renvela. SOCIAL HISTORY:  The patient does not currently smoke or drink.     FAMILY HISTORY:  No strong family history of heart disease. REVIEW OF SYSTEMS:  As noted above, otherwise noncontributory. PHYSICAL EXAMINATION:  GENERAL:  Exam reveals an elderly  female in no acute distress. VITAL SIGNS:  Blood pressure 114/42, pulse 59, respirations 14, temperature 98.9. HEENT:  Pupils are equal.  No obvious head trauma. NECK:  Supple. No masses or thyromegaly. No cervical or supraclavicular adenopathy. No carotid bruits. No JVD. CHEST:  Clear. No wheezes or crackles. SKIN:  Warm and dry. CARDIAC:  Irregularly irregular rhythm. 2/6 systolic murmur. No gallop. ABDOMEN:  Soft, nontender, no masses or organomegaly. Bowel sounds positive. EXTREMITIES:  No cyanosis, clubbing or edema. Distal pulses not well palpated in the feet. NEUROLOGIC:  No obvious gross motor deficits. LABORATORY DATA:  Hemoglobin 13.2, potassium 3.3, BUN 23, creatinine 6.9, troponin 20. EKG showed atrial fibrillation, normal axis, nonspecific ST-T changes. IMPRESSION:  1. Bradycardia with persistent atrial fibrillation. 2.  Coronary artery disease with prior coronary bypass grafting in 2016. 3.  Hypertension. 4.  Dyslipidemia. 5.  Type 2 diabetes. 6.  End-stage renal disease on hemodialysis. 7.  Mild dementia. 8.  Prior cerebrovascular accident. 9.  Hypothyroidism. 10.  Type 2 diabetes. RECOMMENDATIONS:  I would go ahead and decrease the beta-blocker to try and prevent episodes of extreme bradycardia although the patient does not currently appear symptomatic. If necessary, the beta-blocker can be stopped and other medications can be used for antihypertensive therapy. In regards to the atrial fibrillation, she is not on anticoagulation and if there are no contraindications, I would go ahead and start either warfarin or low dose of a NOAC. I would also get an updated echocardiogram if this has not been done recently.   If the patient's symptoms improved over the next 24 hours, I think she could probably go home tomorrow and follow up with Dr. Justine uJarez. Thank you for this consult. Ivon Robb MD      [id number]  D:  10/14/2022 20:22  T:  10/14/2022 22:17  JOB #:  [id number]  CC:   MD Karthik Talbot MD Gennett Howard, MD

## 2022-10-15 NOTE — PROGRESS NOTES
See Dr Lancaster's admission note from earlier this morning. The ICU course has been uncomplicated. Patient is no longer requiring dopamine. He HR is in upper 50s and BP is adequate. She is presently undergoing HD and tolerating well. Cardiology has seen - consult note pending. Will order transfer to SDU and Hospitalist Service.  I have sent Perfect Serve message to Dr Tamanna Vasquez, [facility]  [phone]  10/15/2022 1:40 PM

## 2022-10-15 NOTE — PROGRESS NOTES
OCCUPATIONAL THERAPY EVALUATION/DISCHARGE  Patient: Phyllis Camp (67 y.o. female)  Date: 10/15/2022  Primary Diagnosis: Bradycardia [R00.1]       Precautions:    (MRSA)    ASSESSMENT  Based on the objective data described below, the patient presents with pleasant confusion and was intermittently oriented to place. She does have memory loss and needs SBA overall for ADLs at this time. Pt is blind in right eye and appears to have low vision in the left. She was off of dopamine drip and BP and HR were stable. At rest HR was in the 50's and HR increased to 70s with activity. She was very jovial and was dancing forward, backward and around objects with good balance. She was incontinent of stool and was unaware. Pt mobilized to toilet attempt to finish having BM and was able to wipe herself with cues not to pull stool forward. She was aware that she needed to sit down to finish having BM after initially starting to wipe. Pt performed grooming at sink with supervision and was assisted back to supine. PLOF is unknown as pt has varying reports of PLOF. Pt will need 24/7 supervision and assist due to decreased mentation and low vision. Further OT services are not needed at this time due to pt being at her max functional potential due to the above. Current Level of Function (ADLs/self-care): SBA to supervision for mobility and ADLS    Functional Outcome Measure: The patient scored 50/100 on the barthel outcome measure which is indicative of decline in mobility and ADLS. Other factors to consider for discharge: confused, has dementia, will need 24/7 supervision and SBA due to mental status,  pt shouldn't manage her medications or finances     PLAN :  Recommend with staff: mobilize, chair and bed alarm    Recommendation for discharge: (in order for the patient to meet his/her long term goals)  No skilled occupational therapy/ follow up rehabilitation needs identified at this time.     This discharge recommendation:  Has not yet been discussed the attending provider and/or case management    IF patient discharges home will need the following DME: none       SUBJECTIVE:   Patient stated Erica Botello does my memory black out. It has been happing more.     OBJECTIVE DATA SUMMARY:   HISTORY:   Past Medical History:   Diagnosis Date    CAD (coronary artery disease)     Cancer ([facility] [address]     possible stomach cancer 2021- workup in progress    Chronic kidney disease     Ova Holster- M-W-F    Dementia ([facility] [address]     per daughter pt is alert and oriented x3    Diabetes ([facility] [address]     GERD (gastroesophageal reflux disease)     Hyperlipidemia     Hypertension     Seizures ([facility] [address]     possible while in hospital 8/2021    Stroke [facility])     Thyroid disease      Past Surgical History:   Procedure Laterality Date    HX APPENDECTOMY      08/21    HX VASCULAR ACCESS      HD catheter    IR INSERT NON TUNL CVC OVER 5 YRS  7/30/2021    IR INSERT NON TUNL CVC OVER 5 YRS  7/30/2021    IR INSERT TUNL CVC W/O PORT OVER 5 YR  8/4/2021    NV CABG, ARTERY-VEIN, FOUR      5 years ago    VASCULAR SURGERY PROCEDURE UNLIST      right arm graft       Prior Level of Function/Environment/Context: pt reports getting assist with bathing while seated on shower chair, family assists with ADLS as needed, ambulated with rolling walker (per pt report, pt has memory loss and confusion so accuracy of report is unknown)  Expanded or extensive additional review of patient history:   Home Situation  Home Environment: Private residence  Living Alone: No (daughter in law and son live with pt)  Support Systems: Child(russell) (daughter)  Current DME Used/Available at Home: Cane, straight, Walker, rolling    Hand dominance: Right    EXAMINATION OF PERFORMANCE DEFICITS:  Cognitive/Behavioral Status:  Neurologic State: Confused  Orientation Level: Oriented to person;Oriented to place  Cognition: Decreased attention/concentration;Decreased command following;Memory loss;Impulsive  Perception: Appears intact  Perseveration: Perseverates during conversation  Safety/Judgement: Fall prevention;Decreased awareness of need for safety;Decreased insight into deficits; Decreased awareness of environment    Hearing: Auditory  Auditory Impairment: None, Hard of hearing, bilateral    Vision/Perceptual:                           Acuity:  (completely blind in right eye and very low vision in left eye)         Range of Motion:    AROM: Within functional limits                         Strength:    Strength: Within functional limits                Coordination:  Coordination: Generally decreased, functional  Fine Motor Skills-Upper: Left Intact; Right Intact    Gross Motor Skills-Upper: Left Intact; Right Intact    Tone & Sensation:    Tone: Normal                         Balance:  Sitting: Intact  Standing: Intact    Functional Mobility and Transfers for ADLs:  Bed Mobility:  Rolling: Supervision  Supine to Sit: Supervision  Sit to Supine: Supervision  Scooting: Supervision    Transfers:  Sit to Stand: Supervision  Stand to Sit: Supervision  Bed to Chair: Supervision  Bathroom Mobility:  (supervision)  Toilet Transfer : Supervision    ADL Assessment:  Feeding: Stand-by assistance (due to mental status, decreased attention and low vision)    Oral Facial Hygiene/Grooming: Stand-by assistance (due to mental status, decreased attention and low vision)    Bathing: Stand-by assistance (due to mental status, decreased attention and low vision)    Upper Body Dressing: Stand-by assistance (due to mental status, decreased attention and low vision)    Lower Body Dressing: Stand-by assistance (due to mental status, decreased attention and low vision)    Toileting: Stand by assistance (due to mental status, decreased attention and low vision)                ADL Intervention and task modifications:          Toileting  Bladder Hygiene: Stand-by assistance (standing cues to not pull stool forward)  Bowel Hygiene: Stand-by assistance  Clothing Management: Supervision  Adaptive Equipment: Grab bars    Cognitive Retraining  Orientation Retraining: Place;Situation;Time  Safety/Judgement: Fall prevention;Decreased awareness of need for safety;Decreased insight into deficits; Decreased awareness of environment      Functional Measure:    Barthel Index:  Bathin  Bladder: 5  Bowels: 5  Groomin  Dressin  Feedin  Mobility: 10  Stairs: 5  Toilet Use: 5  Transfer (Bed to Chair and Back): 10  Total: 50/100      The Barthel ADL Index: Guidelines  1. The index should be used as a record of what a patient does, not as a record of what a patient could do. 2. The main aim is to establish degree of independence from any help, physical or verbal, however minor and for whatever reason. 3. The need for supervision renders the patient not independent. 4. A patient's performance should be established using the best available evidence. Asking the patient, friends/relatives and nurses are the usual sources, but direct observation and common sense are also important. However direct testing is not needed. 5. Usually the patient's performance over the preceding 24-48 hours is important, but occasionally longer periods will be relevant. 6. Middle categories imply that the patient supplies over 50 per cent of the effort. 7. Use of aids to be independent is allowed. Score Interpretation (from [address] [facility])    Independent   60-79 Minimally independent   40-59 Partially dependent   20-39 Very dependent   <20 Totally dependent     -Duglas Fernandez., Barthel, D.W. (1965). Functional evaluation: the Barthel Index. [address] [facility] ([address] (). The Barthel activities of daily living index: self-reporting versus actual performance in the old (> or = 75 years). Journal of [address] [place] [address] 45(7), [facility], J.J.M., David Home., [facility]. ().  Measuring the change in disability after inpatient rehabilitation; comparison of the responsiveness of the Barthel Index and Functional [place] Measure. Journal of Neurology, Neurosurgery, and Psychiatry, 66(4), 483-529. KATARINA Oswald, ANTIONE Quiroga, & Samara Thomson M.A. (2004) Assessment of post-stroke quality of life in cost-effectiveness studies: The usefulness of the Barthel Index and the EuroQoL-5D. Quality of Life Research, 15, 895-59         Occupational Therapy Evaluation Charge Determination   History Examination Decision-Making   LOW Complexity : Brief history review  LOW Complexity : 1-3 performance deficits relating to physical, cognitive , or psychosocial skils that result in activity limitations and / or participation restrictions  LOW Complexity : No comorbidities that affect functional and no verbal or physical assistance needed to complete eval tasks       Based on the above components, the patient evaluation is determined to be of the following complexity level: LOW   Pain Ratin/10    Activity Tolerance:   Good    After treatment patient left in no apparent distress:    Supine in bed, Call bell within reach, Bed / chair alarm activated, and Side rails x 3    COMMUNICATION/EDUCATION:   The patients plan of care was discussed with: Physical therapist, Registered nurse, and paient .      Thank you for this referral.  Home Ramos OTR/L  Time Calculation: 29 mins unknown

## 2022-10-15 NOTE — PROGRESS NOTES
TRANSFER - IN REPORT:    Verbal report received from JUAN FRANCISCO Richey(name) on Antonella Cornea  being received from CCU(unit) for routine progression of care      Report consisted of patients Situation, Background, Assessment and   Recommendations(SBAR). Information from the following report(s) SBAR, Kardex, MAR, Recent Results, and Cardiac Rhythm sinus bradycardia  was reviewed with the receiving nurse. Opportunity for questions and clarification was provided. Assessment completed upon patients arrival to unit and care assumed. 1812: lab called. Unable to result sample. Re-draw needed. End of Shift Note    Bedside shift change report given to Louisa Shane RN (oncoming nurse) by Millicent Mills RN (offgoing nurse). Report included the following information SBAR, Kardex, Recent Results, and Cardiac Rhythm bradycardia/a-fib    Shift worked:  7a-7p     Shift summary and any significant changes:     Admission to unit from CCU. Heparin gtt continues, see MAR. Next aptt @     Concerns for physician to address:  Pacemaker? Zone phone for oncoming shift:          Activity:  Activity Level: Up with Assistance  Number times ambulated in hallways past shift: 0  Number of times OOB to chair past shift: 0    Cardiac:   Cardiac Monitoring: Yes      Cardiac Rhythm: Atrial Fib    Access:  Current line(s): PIV     Genitourinary:   Urinary status: anuric    Respiratory:   O2 Device: None (Room air)  Chronic home O2 use?: N/A  Incentive spirometer at bedside: N/A       GI:  Last Bowel Movement Date: 10/15/22  Current diet:  ADULT DIET Regular; 3 carb choices (45 gm/meal); Low Fat/Low Chol/High Fiber/MELISSA; No Salt Added (3-4 gm); Low Potassium (Less than 3000 mg/day);  Low Phosphorus (Less than 1000 mg); 60 to 80 gm; 1000 ml  Passing flatus: YES  Tolerating current diet: YES       Pain Management:   Patient states pain is manageable on current regimen: YES    Skin:  Mick Score: 17  Interventions: speciality bed    Patient Safety:  Fall Score:  Total Score: 4  Interventions: bed/chair alarm  High Fall Risk: Yes    Length of Stay:  Expected LOS: - - -  Actual LOS: 1      Leena Babin RN

## 2022-10-15 NOTE — PROGRESS NOTES
2300:Patient is afib w/ low ventricular response HR:40s sometimes 30s, soft BP now 99/32, she's asymptomatic but laying flat.,  was made aware, and order received for a bolus 150 ml of LR.     23:Rapid RN at bedside, patient 's HR dropped to low 30s and once in 28, Marian was called and Dr Rudy Flores came at bedside to assess patient.     0030

## 2022-10-16 LAB
ANION GAP SERPL CALC-SCNC: 6 MMOL/L (ref 5–15)
APTT PPP: 33.6 SEC (ref 22.1–31)
APTT PPP: 39 SEC (ref 22.1–31)
APTT PPP: 89.4 SEC (ref 22.1–31)
BUN SERPL-MCNC: 19 MG/DL (ref 6–20)
BUN/CREAT SERPL: 4 (ref 12–20)
CALCIUM SERPL-MCNC: 7.8 MG/DL (ref 8.5–10.1)
CHLORIDE SERPL-SCNC: 106 MMOL/L (ref 97–108)
CO2 SERPL-SCNC: 25 MMOL/L (ref 21–32)
CREAT SERPL-MCNC: 5.3 MG/DL (ref 0.55–1.02)
ERYTHROCYTE [DISTWIDTH] IN BLOOD BY AUTOMATED COUNT: 16 % (ref 11.5–14.5)
GLUCOSE BLD STRIP.AUTO-MCNC: 101 MG/DL (ref 65–117)
GLUCOSE BLD STRIP.AUTO-MCNC: 175 MG/DL (ref 65–117)
GLUCOSE BLD STRIP.AUTO-MCNC: 65 MG/DL (ref 65–117)
GLUCOSE BLD STRIP.AUTO-MCNC: 87 MG/DL (ref 65–117)
GLUCOSE BLD STRIP.AUTO-MCNC: 95 MG/DL (ref 65–117)
GLUCOSE SERPL-MCNC: 170 MG/DL (ref 65–100)
HBV SURFACE AG SER QL: <0.1 INDEX
HBV SURFACE AG SER QL: NEGATIVE
HCT VFR BLD AUTO: 35.6 % (ref 35–47)
HCV AB SERPL QL IA: NONREACTIVE
HGB BLD-MCNC: 11.1 G/DL (ref 11.5–16)
HIV1 P24 AG SERPL QL IA: NONREACTIVE
HIV1+2 AB SERPL QL IA: NONREACTIVE
MCH RBC QN AUTO: 28.9 PG (ref 26–34)
MCHC RBC AUTO-ENTMCNC: 31.2 G/DL (ref 30–36.5)
MCV RBC AUTO: 92.7 FL (ref 80–99)
NRBC # BLD: 0 K/UL (ref 0–0.01)
NRBC BLD-RTO: 0 PER 100 WBC
PLATELET # BLD AUTO: 76 K/UL (ref 150–400)
PMV BLD AUTO: 11.1 FL (ref 8.9–12.9)
POTASSIUM SERPL-SCNC: 4.2 MMOL/L (ref 3.5–5.1)
RBC # BLD AUTO: 3.84 M/UL (ref 3.8–5.2)
SERVICE CMNT-IMP: ABNORMAL
SERVICE CMNT-IMP: NORMAL
SODIUM SERPL-SCNC: 137 MMOL/L (ref 136–145)
THERAPEUTIC RANGE,PTTT: ABNORMAL SECS (ref 58–77)
WBC # BLD AUTO: 4.5 K/UL (ref 3.6–11)

## 2022-10-16 PROCEDURE — 80048 BASIC METABOLIC PNL TOTAL CA: CPT

## 2022-10-16 PROCEDURE — 82962 GLUCOSE BLOOD TEST: CPT

## 2022-10-16 PROCEDURE — 85730 THROMBOPLASTIN TIME PARTIAL: CPT

## 2022-10-16 PROCEDURE — 65270000046 HC RM TELEMETRY

## 2022-10-16 PROCEDURE — 74011636637 HC RX REV CODE- 636/637: Performed by: STUDENT IN AN ORGANIZED HEALTH CARE EDUCATION/TRAINING PROGRAM

## 2022-10-16 PROCEDURE — 85027 COMPLETE CBC AUTOMATED: CPT

## 2022-10-16 PROCEDURE — 36415 COLL VENOUS BLD VENIPUNCTURE: CPT

## 2022-10-16 PROCEDURE — 74011250636 HC RX REV CODE- 250/636: Performed by: STUDENT IN AN ORGANIZED HEALTH CARE EDUCATION/TRAINING PROGRAM

## 2022-10-16 PROCEDURE — 74011250637 HC RX REV CODE- 250/637: Performed by: INTERNAL MEDICINE

## 2022-10-16 PROCEDURE — 74011250637 HC RX REV CODE- 250/637: Performed by: HOSPITALIST

## 2022-10-16 RX ORDER — IBUPROFEN 200 MG
1 TABLET ORAL DAILY
Status: DISCONTINUED | OUTPATIENT
Start: 2022-10-16 | End: 2022-10-19 | Stop reason: HOSPADM

## 2022-10-16 RX ORDER — LANOLIN ALCOHOL/MO/W.PET/CERES
3 CREAM (GRAM) TOPICAL
Status: DISCONTINUED | OUTPATIENT
Start: 2022-10-16 | End: 2022-10-19 | Stop reason: HOSPADM

## 2022-10-16 RX ORDER — INSULIN GLARGINE 100 [IU]/ML
12 INJECTION, SOLUTION SUBCUTANEOUS
Status: DISCONTINUED | OUTPATIENT
Start: 2022-10-16 | End: 2022-10-17

## 2022-10-16 RX ORDER — HYDROXYZINE HYDROCHLORIDE 10 MG/1
25 TABLET, FILM COATED ORAL
Status: COMPLETED | OUTPATIENT
Start: 2022-10-16 | End: 2022-10-16

## 2022-10-16 RX ADMIN — HEPARIN SODIUM 3140 UNITS: 1000 INJECTION INTRAVENOUS; SUBCUTANEOUS at 02:58

## 2022-10-16 RX ADMIN — INSULIN GLARGINE 12 UNITS: 100 INJECTION, SOLUTION SUBCUTANEOUS at 21:49

## 2022-10-16 RX ADMIN — SEVELAMER CARBONATE FOR ORAL SUSPENSION 800 MG: 2400 POWDER, FOR SUSPENSION ORAL at 13:06

## 2022-10-16 RX ADMIN — HEPARIN SODIUM 1570 UNITS: 1000 INJECTION INTRAVENOUS; SUBCUTANEOUS at 19:20

## 2022-10-16 RX ADMIN — MELATONIN 3 MG: at 23:19

## 2022-10-16 RX ADMIN — ASPIRIN 81 MG: 81 TABLET, COATED ORAL at 08:23

## 2022-10-16 RX ADMIN — ATORVASTATIN CALCIUM 40 MG: 40 TABLET, FILM COATED ORAL at 08:23

## 2022-10-16 RX ADMIN — SEVELAMER CARBONATE FOR ORAL SUSPENSION 800 MG: 2400 POWDER, FOR SUSPENSION ORAL at 08:27

## 2022-10-16 RX ADMIN — HYDROXYZINE HYDROCHLORIDE 25 MG: 10 TABLET ORAL at 23:19

## 2022-10-16 RX ADMIN — SEVELAMER CARBONATE FOR ORAL SUSPENSION 800 MG: 2400 POWDER, FOR SUSPENSION ORAL at 17:46

## 2022-10-16 NOTE — PROGRESS NOTES
2144: patient was given 300ML of orange juice and a microwave meal. She ate 40% of the meal.   2230: Hospitalist notified about blood glucose, gave orders to hold the Lantus. 2234: Patient daughter was called and gave updates. 0221: PTT hasn't resulted yet. 0258: heparin bolus was given and heparin gtt rate changed. End of Shift Note    Bedside shift change report given to Lambert Estrella RN (oncoming nurse) by Beto Mclean RN (offgoing nurse). Report included the following information SBAR, Kardex, MAR, and Recent Results    Shift worked:  3326-0829     Shift summary and any significant changes:     Patient has poor PO intake, BG was low for HS check and night MD gave the ok to withheld Lantus. Concerns for physician to address:       Zone phone for oncoming shift:          Activity:  Activity Level: Up with Assistance  Number times ambulated in hallways past shift: 0  Number of times OOB to chair past shift: 1    Cardiac:   Cardiac Monitoring: Yes      Cardiac Rhythm: Atrial Fib    Access:  Current line(s): PIV     Genitourinary:   Urinary status: anuric    Respiratory:   O2 Device: None (Room air)  Chronic home O2 use?: NO  Incentive spirometer at bedside: NO       GI:  Last Bowel Movement Date: 10/15/22  Current diet:  ADULT DIET Regular; 3 carb choices (45 gm/meal); Low Fat/Low Chol/High Fiber/MELISSA; No Salt Added (3-4 gm); Low Potassium (Less than 3000 mg/day); Low Phosphorus (Less than 1000 mg); 60 to 80 gm; 1000 ml  Passing flatus: YES  Tolerating current diet: YES       Pain Management:   Patient states pain is manageable on current regimen: YES    Skin:  Mick Score: 17  Interventions: increase time out of bed    Patient Safety:  Fall Score:  Total Score: 4  Interventions: bed/chair alarm and gripper socks  High Fall Risk: Yes    Length of Stay:  Expected LOS: - - -  Actual LOS: 2      Beto Mclean RN

## 2022-10-16 NOTE — PROGRESS NOTES
1918: Bedside and Verbal shift change report given to Ramez Duval RN (oncoming nurse) by Kristy Negron RN (offgoing nurse). Report included the following information SBAR, Kardex, and Recent Results. Also, did rate change and bolus for the heparin gtt. 0123: bloodwork was done. 80: Confirmed with pharmacy that no rate change is indicated for the heparin gtt. End of Shift Note    Bedside shift change report given to Kristy Negron RN (oncoming nurse) by Jannice Apgar, RN (offgoing nurse). Report included the following information SBAR, Kardex, and Recent Results    Shift worked:  6408-5402     Shift summary and any significant changes:     0650 aPTT hasn't resulted. Concerns for physician to address:       Zone phone for oncoming shift:          Activity:  Activity Level: Up with Assistance  Number times ambulated in hallways past shift: 0  Number of times OOB to chair past shift: 0    Cardiac:   Cardiac Monitoring: Yes      Cardiac Rhythm: Atrial Fib    Access:  Current line(s): PIV     Genitourinary:   Urinary status: anuric    Respiratory:   O2 Device: None (Room air)  Chronic home O2 use?: NO  Incentive spirometer at bedside: NO       GI:  Last Bowel Movement Date: 10/16/22  Current diet:  ADULT DIET Regular; 3 carb choices (45 gm/meal); Low Fat/Low Chol/High Fiber/MELISSA; No Salt Added (3-4 gm); Low Potassium (Less than 3000 mg/day); Low Phosphorus (Less than 1000 mg); 60 to 80 gm; 1000 ml  Passing flatus: YES  Tolerating current diet: YES       Pain Management:   Patient states pain is manageable on current regimen: YES    Skin:  Mick Score: 18  Interventions: increase time out of bed    Patient Safety:  Fall Score:  Total Score: 4  Interventions: bed/chair alarm and gripper socks  High Fall Risk: Yes    Length of Stay:  Expected LOS: - - -  Actual LOS: 3      Jannice Apgar, RN

## 2022-10-16 NOTE — PROGRESS NOTES
Problem: Falls - Risk of  Goal: *Absence of Falls  Description: Document [place] Fall Risk and appropriate interventions in the flowsheet. Outcome: Progressing Towards Goal  Note: Fall Risk Interventions:  Mobility Interventions: Bed/chair exit alarm, Patient to call before getting OOB, PT Consult for mobility concerns    Mentation Interventions: Bed/chair exit alarm, Increase mobility, More frequent rounding, Reorient patient    Medication Interventions: Assess postural VS orthostatic hypotension, Bed/chair exit alarm, Patient to call before getting OOB, Teach patient to arise slowly    Elimination Interventions: Bed/chair exit alarm, Call light in reach, Patient to call for help with toileting needs              Problem: Pressure Injury - Risk of  Goal: *Prevention of pressure injury  Description: Document Mick Scale and appropriate interventions in the flowsheet.   Outcome: Not Progressing Towards Goal  Note: Pressure Injury Interventions:       Moisture Interventions: Minimize layers    Activity Interventions: Increase time out of bed, PT/OT evaluation    Mobility Interventions: Float heels, Suspension boots    Nutrition Interventions: Document food/fluid/supplement intake, Discuss nutritional consult with provider, Offer support with meals,snacks and hydration    Friction and Shear Interventions: Minimize layers

## 2022-10-16 NOTE — PROGRESS NOTES
Hospitalist Progress Note    NAME: Norbert Hale   :  1950   MRN:  [id number]       Assessment / Plan:  Bradycardia  Atrial fibrillation  Off dopamine drip   old av mason blockers  Cardiology following, appreciate recommendations  No pacemaker for now  Continue heparin drip per cardiology recommendations  TSH WNL    Hypokalemia  Resolved     ESRD HD MWF  Consult nephrology  Continue hemodialysis as per nephrology    Hx of UTI    Follow-up urine culture     HLD cont. PTA md  Hypothyroidism cont. PTA med  DM- long acting lantus and SSI  HTN cont. Home meds, hold b-blocker  Hx of Seizure previously on keppra? Tobacco use disorder  --Tobacco cessation encouraged, nicotine patch ordered     CM/SW  PT/OT  Correct Electrolytes as needed      18.5 - 24.9 Normal weight / Body mass index is 22.78 kg/m². Estimated discharge date:   Barriers:    Code status: Full  Prophylaxis:  Heparin  Recommended Disposition: Home w/Family     Subjective:     Chief Complaint / Reason for Physician Visit  Patient was seen and examined this morning. She is pleasantly demented. Denies any new complaints. Denies fever, chills, chest pain, shortness of breath, abdominal pain, nausea, vomiting diarrhea. No overnight events reported by nursing staff. Review of Systems:  Symptom Y/N Comments  Symptom Y/N Comments   Fever/Chills n   Chest Pain n    Poor Appetite    Edema     Cough    Abdominal Pain     Sputum    Joint Pain     SOB/CHURCH n   Pruritis/Rash     Nausea/vomit    Tolerating PT/OT     Diarrhea    Tolerating Diet y    Constipation    Other       Could NOT obtain due to:      Objective:     VITALS:   Last 24hrs VS reviewed since prior progress note.  Most recent are:  Patient Vitals for the past 24 hrs:   Temp Pulse Resp BP SpO2   10/16/22 1109 97.6 °F (36.4 °C) 63 16 (!) 131/47 99 %   10/16/22 0800 -- 62 -- -- --   10/16/22 0730 98.1 °F (36.7 °C) (!) 47 12 (!) 120/48 100 %   10/16/22 0353 97.8 °F (36.6 °C) 61 20 118/68 98 %   10/15/22 2311 97.8 °F (36.6 °C) (!) 59 19 (!) 109/57 100 %   10/15/22 1927 98.1 °F (36.7 °C) (!) 53 18 (!) 113/52 100 %   10/15/22 1633 98 °F (36.7 °C) 64 19 134/67 100 %   10/15/22 1518 98.1 °F (36.7 °C) (!) 59 23 136/73 --   10/15/22 1515 -- (!) 54 -- (!) 114/49 --   10/15/22 1500 -- 62 -- (!) 117/56 --   10/15/22 1445 -- 60 -- 126/62 --   10/15/22 1430 -- 62 -- (!) 123/51 --   10/15/22 1415 -- 62 -- 108/65 --   10/15/22 1400 -- 65 -- (!) 130/54 --   10/15/22 1345 -- 62 -- 138/66 --   10/15/22 1330 -- 63 -- (!) 123/49 --   10/15/22 1318 -- 65 -- (!) 117/43 --   10/15/22 1300 97.8 °F (36.6 °C) (!) 59 17 (!) 124/52 99 %   10/15/22 1245 -- (!) 59 17 137/60 99 %   10/15/22 1230 -- 63 19 (!) 159/71 99 %   10/15/22 1215 -- 72 22 (!) 138/115 94 %   10/15/22 1200 98 °F (36.7 °C) 64 16 138/61 98 %   10/15/22 1145 -- (!) 55 14 (!) 114/54 100 %         Intake/Output Summary (Last 24 hours) at 10/16/2022 1144  Last data filed at 10/16/2022 0353  Gross per 24 hour   Intake 555.68 ml   Output 1000 ml   Net -444.32 ml          I had a face to face encounter and independently examined this patient on 10/16/2022, as outlined below:  PHYSICAL EXAM:  General: WD, WN. Alert, cooperative, no acute distress    EENT:  EOMI. Anicteric sclerae. MMM  Resp:  CTA bilaterally, no wheezing or rales. No accessory muscle use  CV:  Regular  rhythm,  No edema  GI:  Soft, Non distended, Non tender. +Bowel sounds  Neurologic:  Alert and oriented X 3, normal speech,   Psych:   Good insight. Not anxious nor agitated  Skin:  No rashes.   No jaundice    Reviewed most current lab test results and cultures  YES  Reviewed most current radiology test results   YES  Review and summation of old records today    NO  Reviewed patient's current orders and MAR    YES  PMH/SH reviewed - no change compared to H&P  ________________________________________________________________________  Care Plan discussed with:    Comments   Patient y    Family RN y    Care Manager     Consultant                        Multidiciplinary team rounds were held today with , nursing, pharmacist and clinical coordinator. Patient's plan of care was discussed; medications were reviewed and discharge planning was addressed. ________________________________________________________________________  Total NON critical care TIME:  35   Minutes    Total CRITICAL CARE TIME Spent:   Minutes non procedure based      Comments   >50% of visit spent in counseling and coordination of care     ________________________________________________________________________  Tamara Johnson DO     Procedures: see electronic medical records for all procedures/Xrays and details which were not copied into this note but were reviewed prior to creation of Plan. LABS:  I reviewed today's most current labs and imaging studies. Pertinent labs include:  Recent Labs     10/16/22  0137 10/15/22  1002 10/15/22  0503   WBC 4.5 4.9 5.7   HGB 11.1* 11.6 11.7   HCT 35.6 36.7 38.2   PLT 76* 81* 109*       Recent Labs     10/16/22  0137 10/15/22  0503 10/14/22  0911    136 137   K 4.2 4.0 3.3*    103 99   CO2 25 25 29   * 68 126*   BUN 19 33* 23*   CREA 5.30* 7.91* 6.88*   CA 7.8* 8.7 9.3   ALB  --   --  3.8   TBILI  --   --  0.9   ALT  --   --  28         Signed:  Tamara Johnson DO

## 2022-10-16 NOTE — PROGRESS NOTES
Problem: Pressure Injury - Risk of  Goal: *Prevention of pressure injury  Description: Document Mcik Scale and appropriate interventions in the flowsheet. 10/16/2022 1943 by Chelsey Kern RN  Outcome: Progressing Towards Goal  Note: Pressure Injury Interventions:       Moisture Interventions: Check for incontinence Q2 hours and as needed    Activity Interventions: Increase time out of bed, Pressure redistribution bed/mattress(bed type)    Mobility Interventions: Assess need for specialty bed, Pressure redistribution bed/mattress (bed type)    Nutrition Interventions: Document food/fluid/supplement intake    Friction and Shear Interventions: Minimize layers             10/16/2022 0757 by Chelsey Kern RN  Outcome: Progressing Towards Goal  Note: Pressure Injury Interventions:       Moisture Interventions: Absorbent underpads, Check for incontinence Q2 hours and as needed    Activity Interventions: Increase time out of bed, Assess need for specialty bed    Mobility Interventions: Assess need for specialty bed, HOB 30 degrees or less, PT/OT evaluation    Nutrition Interventions: Document food/fluid/supplement intake    Friction and Shear Interventions: HOB 30 degrees or less, Minimize layers                Problem: Falls - Risk of  Goal: *Absence of Falls  Description: Document Stalin Fall Risk and appropriate interventions in the flowsheet.   Outcome: Progressing Towards Goal  Note: Fall Risk Interventions:  Mobility Interventions: Bed/chair exit alarm, Communicate number of staff needed for ambulation/transfer, Utilize walker, cane, or other assistive device    Mentation Interventions: Adequate sleep, hydration, pain control, Bed/chair exit alarm, Door open when patient unattended, Update white board    Medication Interventions: Bed/chair exit alarm    Elimination Interventions: Bed/chair exit alarm, Call light in reach

## 2022-10-16 NOTE — PROGRESS NOTES
0700: Bedside shift change report given to [address], RN (oncoming nurse) by Saul Martínez RN (offgoing nurse). Report included the following information SBAR and Kardex. 1637: aPTT due at 4PM. Attempting to draw, patient is an extremely hard stick. Unsuccessful attempts by two (2) RNs. Pharmacy and Abhilash Li MD notified. 1832: Left voicemail for PICC team to place endurance catheter. 1900:End of Shift Note    Bedside shift change report given to Saul Martínez RN (oncoming nurse) by Destinee Abreu RN (offgoing nurse). Report included the following information SBAR, Kardex, and MAR    Shift worked:  7a-7p     Shift summary and any significant changes:     Please see aforementioned     Concerns for physician to address:       Zone phone for oncoming shift:          Activity:  Activity Level: Up with Assistance  Number times ambulated in hallways past shift: 0  Number of times OOB to chair past shift: 3    Cardiac:   Cardiac Monitoring: Yes      Cardiac Rhythm: Atrial Fib    Access:  Current line(s): PIV     Genitourinary:   Urinary status: anuric    Respiratory:   O2 Device: None (Room air)  Chronic home O2 use?: NO  Incentive spirometer at bedside: NO       GI:  Last Bowel Movement Date: 10/16/22  Current diet:  ADULT DIET Regular; 3 carb choices (45 gm/meal); Low Fat/Low Chol/High Fiber/MELISSA; No Salt Added (3-4 gm); Low Potassium (Less than 3000 mg/day); Low Phosphorus (Less than 1000 mg); 60 to 80 gm; 1000 ml  Passing flatus: YES  Tolerating current diet: YES       Pain Management:   Patient states pain is manageable on current regimen: YES    Skin:  Mick Score: 17  Interventions: turn team, float heels, increase time out of bed, and PT/OT consult    Patient Safety:  Fall Score:  Total Score: 4  Interventions: bed/chair alarm, assistive device (walker, cane, etc), gripper socks, and pt to call before getting OOB  High Fall Risk: Yes    Length of Stay:  Expected LOS: - - -  Actual LOS: [address] RN

## 2022-10-16 NOTE — PROGRESS NOTES
Progress Note      10/16/2022 7:54 AM  NAME: Sha Salas   MRN:  [id number]   Admit Diagnosis: Bradycardia [R00.1]         Primary Cardiologist: Dr Tim Muro   Seen by Dr Marc Luevano   Physician Requesting consult: Dr Lombardi Dakota:    Significant Bradycardia and hypotension needing Dopamine gtt, now off Dopamine gtt since yesterday, HR remains stable after discontinuation of Coreg . Iván Anibal 1.  Bradycardia with persistent atrial fibrillation. 2.  Coronary artery disease with prior coronary bypass grafting in 2016. 3.  Hypertension. 4.  Dyslipidemia. 5.  Type 2 diabetes. 6.  End-stage renal disease on hemodialysis. 7.  Mild dementia. 8.  Prior cerebrovascular accident. 9.  Hypothyroidism. 10.  Type 2 diabetes. Recommendations:    Some bradycardia at night but during day time HR 60-70s, asymptomatic - no need for PM. No AV mason blocking agent. Heparin gtt for anticoagulation for now    On asa and lipitor         [x]        High complexity decision making was performed    Subjective:     HPI:   Reports feeling okay   No CP or SOB   HR Stable     ROS: No CP, SOB, Abd pain, nausea, vomiting, syncope, palpitations, new focal neurological symptoms     Objective:      Physical Exam:    Last 24hrs VS reviewed since prior progress note.  Most recent are:    Visit Vitals  BP (!) 131/47 (BP 1 Location: Left upper arm, BP Patient Position: At rest)   Pulse 63   Temp 97.6 °F (36.4 °C)   Resp 16   Ht 4' 11\" (1.499 m)   Wt 51.2 kg (112 lb 12.8 oz)   SpO2 99%   BMI 22.78 kg/m²       Intake/Output Summary (Last 24 hours) at 10/16/2022 1139  Last data filed at 10/16/2022 0353  Gross per 24 hour   Intake 555.68 ml   Output 1000 ml   Net -444.32 ml        General: Alert and oriented x3, no acute distress   Neck: Supple   Respiratory: No respiratory distress, clear lung sound   Cardiovascular: Irregular rate rhythm, S1S2   Abdomen: soft, non tender, non distended   Neuro: moves all extremities, oriented x3 Skin: warm and dry   Extremity: no edema, warm to touch      Data Review    Telemetry: Afib     Lab Data Personally Reviewed:    Recent Labs     10/16/22  0137 10/15/22  1002   WBC 4.5 4.9   HGB 11.1* 11.6   HCT 35.6 36.7   PLT 76* 81*     Recent Labs     10/16/22  0959 10/16/22  0137 10/15/22  1815   APTT 89.4* 33.6* 80.4*      Recent Labs     10/16/22  0137 10/15/22  0503 10/14/22  0911    136 137   K 4.2 4.0 3.3*    103 99   CO2 25 25 29   BUN 19 33* 23*   CREA 5.30* 7.91* 6.88*   * 68 126*   CA 7.8* 8.7 9.3     No results for input(s): CPK, CKNDX, TROIQ in the last 72 hours. No lab exists for component: CPKMB  No results found for: CHOL, CHOLX, CHLST, CHOLV, HDL, HDLP, LDL, LDLC, DLDLP, TGLX, TRIGL, TRIGP, CHHD, CHHDX    Recent Labs     10/14/22  0911   *   TP 7.8   ALB 3.8   GLOB 4.0     No results for input(s): PH, PCO2, PO2 in the last 72 hours.     Medications Personally Reviewed:    Current Facility-Administered Medications   Medication Dose Route Frequency    insulin glargine (LANTUS) injection 12 Units  12 Units SubCUTAneous QHS    DOPamine (INTROPIN) 800 mg in dextrose 5% 250 mL infusion  0-20 mcg/kg/min IntraVENous TITRATE    heparin 25,000 units in D5W 250 ml infusion  12-25 Units/kg/hr IntraVENous TITRATE    heparin (porcine) 1,000 unit/mL injection 1,570 Units  30 Units/kg IntraVENous PRN    Or    heparin (porcine) 1,000 unit/mL injection 3,140 Units  60 Units/kg IntraVENous PRN    [Held by provider] amLODIPine (NORVASC) tablet 5 mg  5 mg Oral DAILY    aspirin delayed-release tablet 81 mg  81 mg Oral DAILY    atorvastatin (LIPITOR) tablet 40 mg  40 mg Oral DAILY    [Held by provider] hydrALAZINE (APRESOLINE) tablet 25 mg  25 mg Oral TID    sevelamer carbonate (RENVELA) oral powder 800 mg  800 mg Oral TID WITH MEALS    insulin lispro (HUMALOG) injection   SubCUTAneous AC&HS    glucose chewable tablet 16 g  4 Tablet Oral PRN    glucagon (GLUCAGEN) injection 1 mg  1 mg IntraMUSCular PRN    dextrose 10 % infusion 0-250 mL  0-250 mL IntraVENous PRN              Irene Wilson MD

## 2022-10-16 NOTE — PROGRESS NOTES
Problem: Pressure Injury - Risk of  Goal: *Prevention of pressure injury  Description: Document Mick Scale and appropriate interventions in the flowsheet.   Outcome: Progressing Towards Goal  Note: Pressure Injury Interventions:       Moisture Interventions: Absorbent underpads, Check for incontinence Q2 hours and as needed    Activity Interventions: Increase time out of bed, Assess need for specialty bed    Mobility Interventions: Assess need for specialty bed, HOB 30 degrees or less, PT/OT evaluation    Nutrition Interventions: Document food/fluid/supplement intake    Friction and Shear Interventions: HOB 30 degrees or less, Minimize layers

## 2022-10-16 NOTE — PROGRESS NOTES
10/16/22 1129   Critical Result Types   Type of Critical Result Laboratory   Critical Lab Result Types   Critical Lab Value   (aPTT)   Notification Information   Notified By (Name) Nieves Ruvalcaba   Time of Critical Result Notification 1484   Verbal Readback Provided Yes   Provider Notification   Was Provider Notified Yes   Name of Provider Marlen Ladd MD   Time Provider Notified [phone]   Date Provider Notified 10/16/22   Were Orders Received No       Stopped infusion, will leave off for one hour and will restart at 11 per John Becker, PharmD and [facility]

## 2022-10-17 LAB
ALBUMIN SERPL-MCNC: 3.7 G/DL (ref 3.5–5)
ALBUMIN/GLOB SERPL: 0.9 {RATIO} (ref 1.1–2.2)
ALP SERPL-CCNC: 109 U/L (ref 45–117)
ALT SERPL-CCNC: 27 U/L (ref 12–78)
ANION GAP SERPL CALC-SCNC: 7 MMOL/L (ref 5–15)
APTT PPP: 55.3 SEC (ref 22.1–31)
APTT PPP: 63.5 SEC (ref 22.1–31)
APTT PPP: 64.9 SEC (ref 22.1–31)
APTT PPP: 76 SEC (ref 22.1–31)
AST SERPL-CCNC: 19 U/L (ref 15–37)
BILIRUB SERPL-MCNC: 0.7 MG/DL (ref 0.2–1)
BUN SERPL-MCNC: 32 MG/DL (ref 6–20)
BUN/CREAT SERPL: 5 (ref 12–20)
CALCIUM SERPL-MCNC: 9.2 MG/DL (ref 8.5–10.1)
CHLORIDE SERPL-SCNC: 102 MMOL/L (ref 97–108)
CO2 SERPL-SCNC: 29 MMOL/L (ref 21–32)
CREAT SERPL-MCNC: 7.05 MG/DL (ref 0.55–1.02)
ERYTHROCYTE [DISTWIDTH] IN BLOOD BY AUTOMATED COUNT: 15.9 % (ref 11.5–14.5)
GLOBULIN SER CALC-MCNC: 4 G/DL (ref 2–4)
GLUCOSE BLD STRIP.AUTO-MCNC: 101 MG/DL (ref 65–117)
GLUCOSE BLD STRIP.AUTO-MCNC: 178 MG/DL (ref 65–117)
GLUCOSE BLD STRIP.AUTO-MCNC: 39 MG/DL (ref 65–117)
GLUCOSE BLD STRIP.AUTO-MCNC: 42 MG/DL (ref 65–117)
GLUCOSE BLD STRIP.AUTO-MCNC: 43 MG/DL (ref 65–117)
GLUCOSE BLD STRIP.AUTO-MCNC: 62 MG/DL (ref 65–117)
GLUCOSE BLD STRIP.AUTO-MCNC: 78 MG/DL (ref 65–117)
GLUCOSE BLD STRIP.AUTO-MCNC: 78 MG/DL (ref 65–117)
GLUCOSE SERPL-MCNC: 106 MG/DL (ref 65–100)
HCT VFR BLD AUTO: 39.9 % (ref 35–47)
HGB BLD-MCNC: 12.5 G/DL (ref 11.5–16)
MCH RBC QN AUTO: 28.7 PG (ref 26–34)
MCHC RBC AUTO-ENTMCNC: 31.3 G/DL (ref 30–36.5)
MCV RBC AUTO: 91.7 FL (ref 80–99)
NRBC # BLD: 0 K/UL (ref 0–0.01)
NRBC BLD-RTO: 0 PER 100 WBC
PLATELET # BLD AUTO: 95 K/UL (ref 150–400)
PMV BLD AUTO: 12.1 FL (ref 8.9–12.9)
POTASSIUM SERPL-SCNC: 4.4 MMOL/L (ref 3.5–5.1)
PROT SERPL-MCNC: 7.7 G/DL (ref 6.4–8.2)
RBC # BLD AUTO: 4.35 M/UL (ref 3.8–5.2)
SERVICE CMNT-IMP: ABNORMAL
SERVICE CMNT-IMP: NORMAL
SODIUM SERPL-SCNC: 138 MMOL/L (ref 136–145)
THERAPEUTIC RANGE,PTTT: ABNORMAL SECS (ref 58–77)
WBC # BLD AUTO: 6.7 K/UL (ref 3.6–11)

## 2022-10-17 PROCEDURE — 85730 THROMBOPLASTIN TIME PARTIAL: CPT

## 2022-10-17 PROCEDURE — 74011250637 HC RX REV CODE- 250/637: Performed by: INTERNAL MEDICINE

## 2022-10-17 PROCEDURE — 65270000046 HC RM TELEMETRY

## 2022-10-17 PROCEDURE — 74011250636 HC RX REV CODE- 250/636: Performed by: STUDENT IN AN ORGANIZED HEALTH CARE EDUCATION/TRAINING PROGRAM

## 2022-10-17 PROCEDURE — 90935 HEMODIALYSIS ONE EVALUATION: CPT

## 2022-10-17 PROCEDURE — 74011636637 HC RX REV CODE- 636/637: Performed by: STUDENT IN AN ORGANIZED HEALTH CARE EDUCATION/TRAINING PROGRAM

## 2022-10-17 PROCEDURE — 85027 COMPLETE CBC AUTOMATED: CPT

## 2022-10-17 PROCEDURE — 74011250637 HC RX REV CODE- 250/637: Performed by: STUDENT IN AN ORGANIZED HEALTH CARE EDUCATION/TRAINING PROGRAM

## 2022-10-17 PROCEDURE — 80053 COMPREHEN METABOLIC PANEL: CPT

## 2022-10-17 PROCEDURE — 82962 GLUCOSE BLOOD TEST: CPT

## 2022-10-17 PROCEDURE — 74011250637 HC RX REV CODE- 250/637: Performed by: HOSPITALIST

## 2022-10-17 PROCEDURE — 36415 COLL VENOUS BLD VENIPUNCTURE: CPT

## 2022-10-17 RX ORDER — INSULIN GLARGINE 100 [IU]/ML
6 INJECTION, SOLUTION SUBCUTANEOUS
Status: DISCONTINUED | OUTPATIENT
Start: 2022-10-17 | End: 2022-10-18

## 2022-10-17 RX ADMIN — ASPIRIN 81 MG: 81 TABLET, COATED ORAL at 09:08

## 2022-10-17 RX ADMIN — SEVELAMER CARBONATE FOR ORAL SUSPENSION 800 MG: 2400 POWDER, FOR SUSPENSION ORAL at 09:10

## 2022-10-17 RX ADMIN — SEVELAMER CARBONATE FOR ORAL SUSPENSION 800 MG: 2400 POWDER, FOR SUSPENSION ORAL at 18:15

## 2022-10-17 RX ADMIN — MELATONIN 3 MG: at 21:56

## 2022-10-17 RX ADMIN — ATORVASTATIN CALCIUM 40 MG: 40 TABLET, FILM COATED ORAL at 09:08

## 2022-10-17 RX ADMIN — INSULIN GLARGINE 6 UNITS: 100 INJECTION, SOLUTION SUBCUTANEOUS at 21:56

## 2022-10-17 RX ADMIN — HEPARIN SODIUM 13 UNITS/KG/HR: 10000 INJECTION, SOLUTION INTRAVENOUS at 01:33

## 2022-10-17 NOTE — PROGRESS NOTES
0700: Bedside shift change report given to JUAN FRANCISCO Patiño and Marge Miles RN (oncoming nurse) by Evette Santacruz RN (offgoing nurse). Report included the following information SBAR and Kardex. Lakia Rachel RN will chart on this patient.

## 2022-10-17 NOTE — PROCEDURES
Hemodialysis / [phone]    Vitals Pre Post Assessment Pre Post   BP BP: (!) 147/53 (10/17/22 1306)   115/62 LOC A&Ox3 A&Ox3   HR Pulse (Heart Rate): 61 (10/17/22 1306) 68 Lungs clear clear   Resp Resp Rate: 18 (10/17/22 1306) 18 Cardiac RRR RRR   Temp Temp: 98 °F (36.7 °C) (10/17/22 1306) 97.8 Skin CDI CDI   Weight    Edema None noted    Tele status   Pain Pain Intensity 1: 0 (10/16/22 2317)      Orders   Duration: Start: 6488 End: 1626 Total: 3.25hrs   Dialyzer: Dialyzer/Set Up Inspection: Revaclear (10/17/22 1306)   K Bath: Dialysate K (mEq/L): 3 (10/17/22 1306)   Ca Bath: Dialysate CA (mEq/L): 2.5 (10/17/22 1306)   Na: Dialysate NA (mEq/L): 138 (10/17/22 1306)   Bicarb: Dialysate HCO3 (mEq/L): 40 (10/17/22 1306)   Target Fluid Removal: Goal/Amount of Fluid to Remove (mL): 2000 mL (10/17/22 1306)     Access   Type & Location: NANCI AVG: Pre-assessment: skin CDI. No s/s of infection. + B/T. No issues with cannulation. Running well at . Post assessment: No issues with hemostasis, + B/T remains. Dressing CDI. Pt arrived to HD suite A&Ox4. Consent signed & on file. SBAR received from Primary RN. Pt cannulated with 74F needles per policy & without issue. Labs drawn per request/ order. VSS. Dialysis Tx initiated. Comments:   + b/t. No s/s of infection noted.                                      Labs   HBsAg (Antigen) / date: Neg 10/15/22                                              HBsAb (Antibody) / date: Imm 10/15/22   Source: EPIC   Obtained/Reviewed  Critical Results Called HGB   Date Value Ref Range Status   10/17/2022 12.5 11.5 - 16.0 g/dL Final     Potassium   Date Value Ref Range Status   10/17/2022 4.4 3.5 - 5.1 mmol/L Final     Calcium   Date Value Ref Range Status   10/17/2022 9.2 8.5 - 10.1 MG/DL Final     BUN   Date Value Ref Range Status   10/17/2022 32 (H) 6 - 20 MG/DL Final     Creatinine   Date Value Ref Range Status   10/17/2022 7.05 (H) 0.55 - 1.02 MG/DL Final     Comment: INVESTIGATED PER DELTA CHECK PROTOCOL        Meds Given   Name Dose Route   None ordered                 Adequacy / Fluid    Total Liters Process: 73.3   Net Fluid Removed: 2000mL      Comments   Time Out Done:   (Time) 1301   Admitting Diagnosis: Bradycardia   Consent obtained/signed: Informed Consent Verified: Yes (10/17/22 1306)   Machine / Barbara Coats # Machine Number: [id number] (10/17/22 8528)   Primary Nurse Rpt Pre: Shasha Scott RN   Primary Nurse Rpt Post: Shasha Scott RN   Pt Education: procedural   Care Plan: On going   Pts outpatient clinic: [facility]     Tx Summary  1306:  NANCI AVG: Pre-assessment: skin CDI. No s/s of infection. + B/T. No issues with cannulation. Running well at . Post assessment: No issues with hemostasis, + B/T remains. Dressing CDI. Pt arrived to HD suite A&Ox4. Consent signed & on file. SBAR received from Primary RN. Pt cannulated with 54I needles per policy & without issue. Labs drawn per request/ order. VSS. Dialysis Tx initiated. 1626: Tx ended. VSS. All possible blood returned to patient. Hemostasis achieved without issue. Bed locked and in the lowest position, call bell and belongings in reach. SBAR given to Primary, RN. Patient is stable at time of their/ my departure. All Dialysis related medications have been reviewed. Comments:  Assessment performed by RN. Procedure and documentation observed and reviewed by Madiha Perea RN.

## 2022-10-17 NOTE — PROGRESS NOTES
6795 Bedside and Verbal shift change report given to Diana Juan RN and Eric Tripp RN (oncoming nurse) by Kar Hernández RN (offgoing nurse). Report included the following information SBAR, Kardex, and MAR.     0859 Blood sugar 42. Patient alert and verbal talking with RN. 120 ml of orange juice accepted. Breakfast accepted with staff assistance. 9591. Blood sugar rechecked 62. Patient remains alert and verbal, additional 120 ml of orange juice given. 5444 Blood sugar rechecked 101.    1241 Patient off floor for Dialysis. [id number] Patient returned to floor from Dialysis. 1800 Rate change to heparin gtt. Tenzin SILVERMAN verified rate change. Blood sugar 42. Patient alert and verbal. Patient given and accepted 240 ml of orange juice and a pack of honorio crackers. 1900 End of Shift Note    Bedside shift change report given to Kar Palacio RN (oncoming nurse) by Nicki Arita RN and JUAN FRANCISCO Bardales(offgoing nurse). Report included the following information SBAR, Kardex, and MAR    Shift worked:  7 AM- 7 PM     Shift summary and any significant changes:     Patient off floor to dialysis. Patient continues on heparin gtt. See above note. Concerns for physician to address:  N/A     Zone phone for oncoming shift:          Activity:  Activity Level: Up with Assistance  Number times ambulated in hallways past shift: 0  Number of times OOB to chair past shift: 0    Cardiac:   Cardiac Monitoring: Yes      Cardiac Rhythm: Atrial Fib    Access:  Current line(s): PIV     Genitourinary:   Urinary status: voiding and anuric    Respiratory:   O2 Device: None (Room air)  Chronic home O2 use?: NO  Incentive spirometer at bedside: NO       GI:  Last Bowel Movement Date: 10/16/22  Current diet:  ADULT DIET Regular; 4 carb choices (60 gm/meal); Low Fat/Low Chol/High Fiber/MELISSA; Low Potassium (Less than 3000 mg/day);  Low Phosphorus (Less than 1000 mg)  Passing flatus: YES  Tolerating current diet: YES       Pain Management:   Patient states pain is manageable on current regimen: YES    Skin:  Mick Score: 18  Interventions: PT/OT consult and nutritional support     Patient Safety:  Fall Score:  Total Score: 4  Interventions: bed/chair alarm, gripper socks, and pt to call before getting OOB  High Fall Risk: Yes    Length of Stay:  Expected LOS: 3d 12h  Actual LOS: 3      Jamar Nichole RN

## 2022-10-17 NOTE — PROGRESS NOTES
PICC team nurse attempted insert the Endurance Catheter but unsuccessful. Left arm veins too small to access. Notified primary nurse.     Alex Washburn 49, VIA [facility]  Vascular Access Team

## 2022-10-17 NOTE — PROGRESS NOTES
Cardiology Progress Note      10/17/2022 4:20 PM    Admit Date: 10/14/2022          Subjective: Course over weekend noted. Just finished HD. No further severe bradycardia          Visit Vitals  BP (!) 97/44   Pulse 77   Temp 98 °F (36.7 °C) (Oral)   Resp 18   Ht 4' 11\" (1.499 m)   Wt 51.3 kg (113 lb 1.6 oz)   SpO2 99%   BMI 22.84 kg/m²     10/15 1901 - 10/17 0700  In: 963.1 [P.O.:720; I.V.:243.1]  Out: -         Objective:      Physical Exam:  VS as above  Chest CTA  Card  Irreg irreg 2/6SEM     Data Review:   Labs:      PTT 55  K  4.4  Hgb 12.5  Plat 95K    Telemetry: afib R 60-70      Assessment:     1. Bradycardia with persistent atrial fibrillation- improved   2. Coronary artery disease with prior coronary bypass grafting in 2016. 3.  Hypertension. 4.  Dyslipidemia. 5.  Type 2 diabetes. 6.  End-stage renal disease on hemodialysis. 7.  Mild dementia. 8.  Prior cerebrovascular accident. 9.  Hypothyroidism. 10.  Type 2 diabetes. 11. Thrombocytopenia      Plan:  Considerwarfarin for long term anticoag with afib.  Avoid beta blockers and AV node blocking drugs in future

## 2022-10-17 NOTE — PROGRESS NOTES
Hospitalist Progress Note    NAME: Cheri Dorman   :  1950   MRN:  [id number]       Assessment / Plan:  Bradycardia  Atrial fibrillation  Off dopamine drip   old av mason blockers  Cardiology following, appreciate recommendations  No pacemaker for now  Continue heparin drip per cardiology recommendations  TSH WNL    Hypokalemia  Resolved     ESRD HD MWF  Consult nephrology  Continue hemodialysis as per nephrology, dialysis today    Hx of UTI    Follow-up urine culture    Diabetes  -Patient has been hypoglycemic, decrease Lantus to 6  -Continue sliding scale Humalog     HLD cont. PTA md  Hypothyroidism cont. PTA med  DM- long acting lantus and SSI  HTN cont. Home meds, hold b-blocker  Hx of Seizure previously on keppra? Tobacco use disorder  --Tobacco cessation encouraged, nicotine patch ordered     CM/SW  PT/OT  Correct Electrolytes as needed      18.5 - 24.9 Normal weight / Body mass index is 22.84 kg/m². Estimated discharge date:   Barriers: Pending cardiology clearance    Code status: Full  Prophylaxis:  Heparin  Recommended Disposition: Home w/Family     Subjective:     Chief Complaint / Reason for Physician Visit  Patient was seen and examined this morning. She is pleasantly demented. Denies any new complaints. Has been hypoglycemic this morning. Plan for dialysis today. Denies fever, chills, chest pain, shortness of breath, abdominal pain, nausea, vomiting diarrhea. No overnight events reported by nursing staff. Review of Systems:  Symptom Y/N Comments  Symptom Y/N Comments   Fever/Chills n   Chest Pain n    Poor Appetite    Edema     Cough    Abdominal Pain     Sputum    Joint Pain     SOB/CHURCH n   Pruritis/Rash     Nausea/vomit    Tolerating PT/OT     Diarrhea    Tolerating Diet y    Constipation    Other       Could NOT obtain due to:      Objective:     VITALS:   Last 24hrs VS reviewed since prior progress note.  Most recent are:  Patient Vitals for the past 24 hrs:   Temp Pulse Resp BP SpO2   10/17/22 0724 97.7 °F (36.5 °C) (!) 53 11 (!) 130/52 97 %   10/17/22 0354 98.1 °F (36.7 °C) 67 21 (!) 149/65 96 %   10/16/22 2317 98 °F (36.7 °C) 74 26 (!) 150/56 98 %   10/16/22 1918 98.7 °F (37.1 °C) 69 14 (!) 141/48 96 %   10/16/22 1500 97.8 °F (36.6 °C) (!) 59 18 (!) 128/54 98 %   10/16/22 1200 -- 69 -- -- --         Intake/Output Summary (Last 24 hours) at 10/17/2022 1129  Last data filed at 10/17/2022 0354  Gross per 24 hour   Intake 630.48 ml   Output --   Net 630.48 ml          I had a face to face encounter and independently examined this patient on 10/17/2022, as outlined below:  PHYSICAL EXAM:  General: WD, WN. Alert, cooperative, no acute distress    EENT:  EOMI. Anicteric sclerae. MMM  Resp:  CTA bilaterally, no wheezing or rales. No accessory muscle use  CV:  Regular  rhythm,  No edema  GI:  Soft, Non distended, Non tender. +Bowel sounds  Neurologic:  Alert and oriented X 3, normal speech,   Psych:   Good insight. Not anxious nor agitated  Skin:  No rashes. No jaundice    Reviewed most current lab test results and cultures  YES  Reviewed most current radiology test results   YES  Review and summation of old records today    NO  Reviewed patient's current orders and MAR    YES  PMH/SH reviewed - no change compared to H&P  ________________________________________________________________________  Care Plan discussed with:    Comments   Patient y    Family      RN y    Care Manager     Consultant                        Multidiciplinary team rounds were held today with , nursing, pharmacist and clinical coordinator. Patient's plan of care was discussed; medications were reviewed and discharge planning was addressed.      ________________________________________________________________________  Total NON critical care TIME:  35   Minutes    Total CRITICAL CARE TIME Spent:   Minutes non procedure based      Comments   >50% of visit spent in counseling and coordination of care     ________________________________________________________________________  Julia Hernandez DO     Procedures: see electronic medical records for all procedures/Xrays and details which were not copied into this note but were reviewed prior to creation of Plan. LABS:  I reviewed today's most current labs and imaging studies. Pertinent labs include:  Recent Labs     10/17/22  0123 10/16/22  0137 10/15/22  1002   WBC 6.7 4.5 4.9   HGB 12.5 11.1* 11.6   HCT 39.9 35.6 36.7   PLT 95* 76* 81*       Recent Labs     10/17/22  0123 10/16/22  0137 10/15/22  0503    137 136   K 4.4 4.2 4.0    106 103   CO2 29 25 25   * 170* 68   BUN 32* 19 33*   CREA 7.05* 5.30* 7.91*   CA 9.2 7.8* 8.7   ALB 3.7  --   --    TBILI 0.7  --   --    ALT 27  --   --          Signed:  Julia Hernandez DO

## 2022-10-17 NOTE — PROGRESS NOTES
Nephrology Progress Note  [facility]  [facility] [address]  [address] [facility] [address], [address]  Phone - [phone]  Fax - [phone]                 Patient: Rosmery Wong                   YOB: 1950        Date- 10/17/2022                      Admit Date: 10/14/2022  CC: Follow up for esrd         IMPRESSION & PLAN:   ESRD - [facility]- DR. TINAJERO  Anemia of ckd  Sec. Hyperpara  Hypertension  Dm 2  bradycardia    PLAN-  SEEN ON HD TODAY  Continue renvela  CONTINUE HD MWF schedule  Hold epogen     Subjective: Interval History:   -  bp stable  Norvasc and hydralazine on hold  Seen on hd      Objective:   Vitals:    10/17/22 1345 10/17/22 1400 10/17/22 1415 10/17/22 1430   BP: 93/76 (!) 123/51 (!) 137/47 (!) 151/54   Pulse: 60 68 64 68   Resp: 18 18 18 18   Temp:       TempSrc:       SpO2:       Weight:       Height:          10/16 0701 - 10/17 0700  In: 870.5 [P.O.:720; I.V.:150.5]  Out: -   Last 3 Recorded Weights in this Encounter    10/14/22 0816 10/16/22 0353 10/17/22 0354   Weight: 52.4 kg (115 lb 8.3 oz) 51.2 kg (112 lb 12.8 oz) 51.3 kg (113 lb 1.6 oz)        Physical exam:  GEN:  NAD  NECK:  Supple, no thyromegaly  RESP: Clear  b/l, no  wheezing,   CVS: RRR,S1,S2   NEURO: non focal, normal speech         Chart reviewed. Pertinent Notes reviewed. Data Review :  Recent Labs     10/17/22  0123 10/16/22  0137 10/15/22  0503    137 136   K 4.4 4.2 4.0    106 103   CO2 29 25 25   BUN 32* 19 33*   CREA 7.05* 5.30* 7.91*   * 170* 68   CA 9.2 7.8* 8.7       Recent Labs     10/17/22  0123 10/16/22  0137 10/15/22  1002   WBC 6.7 4.5 4.9   HGB 12.5 11.1* 11.6   HCT 39.9 35.6 36.7   PLT 95* 76* 81*       No results for input(s): FE, TIBC, PSAT, FERR in the last 72 hours.    No results found for: HBA1C, RRG9OHRF, KDS1JHHW   No results found for: MCACR, MCA1, MCA2, MCA3, MCAU, MCAU2, MCALPOCT  Lab Results   Component Value Date/Time    NT pro-BNP >35,000 (H) 08/02/2021 06:57 AM     US Results (most recent):  Medication list  reviewed  Current Facility-Administered Medications   Medication Dose Route Frequency    insulin glargine (LANTUS) injection 6 Units  6 Units SubCUTAneous QHS    nicotine (NICODERM CQ) 21 mg/24 hr patch 1 Patch  1 Patch TransDERmal DAILY    melatonin tablet 3 mg  3 mg Oral QHS PRN    DOPamine (INTROPIN) 800 mg in dextrose 5% 250 mL infusion  0-20 mcg/kg/min IntraVENous TITRATE    heparin 25,000 units in D5W 250 ml infusion  12-25 Units/kg/hr IntraVENous TITRATE    heparin (porcine) 1,000 unit/mL injection 1,570 Units  30 Units/kg IntraVENous PRN    Or    heparin (porcine) 1,000 unit/mL injection 3,140 Units  60 Units/kg IntraVENous PRN    [Held by provider] amLODIPine (NORVASC) tablet 5 mg  5 mg Oral DAILY    aspirin delayed-release tablet 81 mg  81 mg Oral DAILY    atorvastatin (LIPITOR) tablet 40 mg  40 mg Oral DAILY    [Held by provider] hydrALAZINE (APRESOLINE) tablet 25 mg  25 mg Oral TID    sevelamer carbonate (RENVELA) oral powder 800 mg  800 mg Oral TID WITH MEALS    insulin lispro (HUMALOG) injection   SubCUTAneous AC&HS    glucose chewable tablet 16 g  4 Tablet Oral PRN    glucagon (GLUCAGEN) injection 1 mg  1 mg IntraMUSCular PRN    dextrose 10 % infusion 0-250 mL  0-250 mL IntraVENous PRN        Michael Lindo MD  10/17/2022

## 2022-10-17 NOTE — CONSULTS
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    Type 2 diabetes mellitus E11.9    ATN (acute tubular necrosis) (HCC) N17.0    Catheter-related bloodstream infection (CR[facility]) T80.211A    COPD (chronic obstructive pulmonary disease) (HCC) B85.7    Diastolic congestive heart failure (HCC) I50.30    SHIMON (obstructive sleep apnea) G47.33    Bradycardia R00.1         Bradycardia [R00.1]     Michael Lindo MD  [facility]  [facility]  [address] [facility], [address]  [place], [address] [facility]  Phone - [phone]         Fax - [phone] [facility]  [address] [place] [address]  Phone - [phone]        Fax - [phone]

## 2022-10-18 LAB
ALBUMIN SERPL-MCNC: 3.3 G/DL (ref 3.5–5)
ALBUMIN/GLOB SERPL: 0.8 {RATIO} (ref 1.1–2.2)
ALP SERPL-CCNC: 101 U/L (ref 45–117)
ALT SERPL-CCNC: 23 U/L (ref 12–78)
ANION GAP SERPL CALC-SCNC: 9 MMOL/L (ref 5–15)
APTT PPP: 88 SEC (ref 22.1–31)
AST SERPL-CCNC: 18 U/L (ref 15–37)
BILIRUB SERPL-MCNC: 0.8 MG/DL (ref 0.2–1)
BUN SERPL-MCNC: 14 MG/DL (ref 6–20)
BUN/CREAT SERPL: 3 (ref 12–20)
CALCIUM SERPL-MCNC: 9.6 MG/DL (ref 8.5–10.1)
CALCULATED R AXIS, ECG10: -12 DEGREES
CALCULATED R AXIS, ECG10: -14 DEGREES
CALCULATED T AXIS, ECG11: -63 DEGREES
CALCULATED T AXIS, ECG11: -83 DEGREES
CHLORIDE SERPL-SCNC: 95 MMOL/L (ref 97–108)
CO2 SERPL-SCNC: 34 MMOL/L (ref 21–32)
CREAT SERPL-MCNC: 4.44 MG/DL (ref 0.55–1.02)
DIAGNOSIS, 93000: NORMAL
DIAGNOSIS, 93000: NORMAL
ERYTHROCYTE [DISTWIDTH] IN BLOOD BY AUTOMATED COUNT: 15.9 % (ref 11.5–14.5)
GLOBULIN SER CALC-MCNC: 3.9 G/DL (ref 2–4)
GLUCOSE BLD STRIP.AUTO-MCNC: 115 MG/DL (ref 65–117)
GLUCOSE BLD STRIP.AUTO-MCNC: 133 MG/DL (ref 65–117)
GLUCOSE BLD STRIP.AUTO-MCNC: 48 MG/DL (ref 65–117)
GLUCOSE BLD STRIP.AUTO-MCNC: 53 MG/DL (ref 65–117)
GLUCOSE BLD STRIP.AUTO-MCNC: 61 MG/DL (ref 65–117)
GLUCOSE BLD STRIP.AUTO-MCNC: 61 MG/DL (ref 65–117)
GLUCOSE BLD STRIP.AUTO-MCNC: 70 MG/DL (ref 65–117)
GLUCOSE BLD STRIP.AUTO-MCNC: 95 MG/DL (ref 65–117)
GLUCOSE SERPL-MCNC: 58 MG/DL (ref 65–100)
HCT VFR BLD AUTO: 39.8 % (ref 35–47)
HGB BLD-MCNC: 12.8 G/DL (ref 11.5–16)
MCH RBC QN AUTO: 29.5 PG (ref 26–34)
MCHC RBC AUTO-ENTMCNC: 32.2 G/DL (ref 30–36.5)
MCV RBC AUTO: 91.7 FL (ref 80–99)
NRBC # BLD: 0 K/UL (ref 0–0.01)
NRBC BLD-RTO: 0 PER 100 WBC
PHOSPHATE SERPL-MCNC: 1.9 MG/DL (ref 2.6–4.7)
PLATELET # BLD AUTO: 93 K/UL (ref 150–400)
PMV BLD AUTO: 11.4 FL (ref 8.9–12.9)
POTASSIUM SERPL-SCNC: 4 MMOL/L (ref 3.5–5.1)
PROT SERPL-MCNC: 7.2 G/DL (ref 6.4–8.2)
Q-T INTERVAL, ECG07: 410 MS
Q-T INTERVAL, ECG07: 432 MS
QRS DURATION, ECG06: 80 MS
QRS DURATION, ECG06: 82 MS
QTC CALCULATION (BEZET), ECG08: 435 MS
QTC CALCULATION (BEZET), ECG08: 459 MS
RBC # BLD AUTO: 4.34 M/UL (ref 3.8–5.2)
SERVICE CMNT-IMP: ABNORMAL
SERVICE CMNT-IMP: NORMAL
SODIUM SERPL-SCNC: 138 MMOL/L (ref 136–145)
THERAPEUTIC RANGE,PTTT: ABNORMAL SECS (ref 58–77)
VENTRICULAR RATE, ECG03: 68 BPM
VENTRICULAR RATE, ECG03: 68 BPM
WBC # BLD AUTO: 4.8 K/UL (ref 3.6–11)

## 2022-10-18 PROCEDURE — 93005 ELECTROCARDIOGRAM TRACING: CPT

## 2022-10-18 PROCEDURE — 74011250637 HC RX REV CODE- 250/637: Performed by: INTERNAL MEDICINE

## 2022-10-18 PROCEDURE — 74011250637 HC RX REV CODE- 250/637: Performed by: STUDENT IN AN ORGANIZED HEALTH CARE EDUCATION/TRAINING PROGRAM

## 2022-10-18 PROCEDURE — 80053 COMPREHEN METABOLIC PANEL: CPT

## 2022-10-18 PROCEDURE — 85027 COMPLETE CBC AUTOMATED: CPT

## 2022-10-18 PROCEDURE — 65270000046 HC RM TELEMETRY

## 2022-10-18 PROCEDURE — 84100 ASSAY OF PHOSPHORUS: CPT

## 2022-10-18 PROCEDURE — 74011250636 HC RX REV CODE- 250/636: Performed by: STUDENT IN AN ORGANIZED HEALTH CARE EDUCATION/TRAINING PROGRAM

## 2022-10-18 PROCEDURE — 36415 COLL VENOUS BLD VENIPUNCTURE: CPT

## 2022-10-18 PROCEDURE — 82962 GLUCOSE BLOOD TEST: CPT

## 2022-10-18 PROCEDURE — 85730 THROMBOPLASTIN TIME PARTIAL: CPT

## 2022-10-18 RX ADMIN — SEVELAMER CARBONATE FOR ORAL SUSPENSION 800 MG: 2400 POWDER, FOR SUSPENSION ORAL at 18:26

## 2022-10-18 RX ADMIN — ATORVASTATIN CALCIUM 40 MG: 40 TABLET, FILM COATED ORAL at 08:49

## 2022-10-18 RX ADMIN — SEVELAMER CARBONATE FOR ORAL SUSPENSION 800 MG: 2400 POWDER, FOR SUSPENSION ORAL at 08:49

## 2022-10-18 RX ADMIN — HEPARIN SODIUM 11 UNITS/KG/HR: 10000 INJECTION, SOLUTION INTRAVENOUS at 12:35

## 2022-10-18 RX ADMIN — APIXABAN 2.5 MG: 2.5 TABLET, FILM COATED ORAL at 20:21

## 2022-10-18 RX ADMIN — SEVELAMER CARBONATE FOR ORAL SUSPENSION 800 MG: 2400 POWDER, FOR SUSPENSION ORAL at 12:16

## 2022-10-18 RX ADMIN — APIXABAN 2.5 MG: 2.5 TABLET, FILM COATED ORAL at 13:42

## 2022-10-18 RX ADMIN — ASPIRIN 81 MG: 81 TABLET, COATED ORAL at 08:49

## 2022-10-18 NOTE — PROGRESS NOTES
9893 Bedside and Verbal shift change report given to JUAN FRANCISCO Patiño and Leila Sellers RN (oncoming nurse) by Saurav Fofana RN (offgoing nurse). Report included the following information SBAR, Kardex, MAR, and Recent Results. Patient alert and verbal with RN.    1595 PCT reported patient blood sugar 48 at first check, recheck 53. Patient given orange juice and honorio crackers. Blood sugar recheck post orange juice and honorio crackers 61. Patient asymptomatic alert and verbal with staff.    Katheryn Mendoza Blood sugar rechecked post breakfast blood sugar 115.    1040 Several unsuccessful attempts to draw aPPT. Per Sloan Orellana MD patient to start Warfarin. Called Carlos Infante states he will reach out to cardiologist with questions regarding the start of Warfarin in relation to Eliquis. Stated will update RN. 1342 Heparin gtt stopped. STAT Apixaban given at this time. Dr. Heri Blanco at bedside. [id number] Patient noted with flutter waves on monitor. EKG completed and confirmed continued Afib. Dr. Heri Blanco, [address] made aware. 6245 [facility] called Cardiology office related to flutter waves noted on monitor to update on patient current condition per Dr. Heri Blanco MD. RN spoke with Radha Ward RN from Cardiology [facility] office to page . 56 Dr. Sloan Orellana returned call to RN and was updated of patient condition. 1900 End of Shift Note    Bedside shift change report given to Devon Flower RN and Jing Oliva RN (oncoming nurse) by Obi Stoddard RN and JUAN FRANCISCO Bardales(offgoing nurse). Report included the following information SBAR, Kardex, MAR, and Recent Results    Shift worked:  7 AM- 7 PM     Shift summary and any significant changes:    Heparin gtt discontinued. Oral anticoagulants started this shift. Possible discharge tomorrow, post dialysis session. See above note. Concerns for physician to address:  N/a     Zone phone for oncoming shift:          Activity:  Activity Level:  Up with Assistance  Number times ambulated in hallways past shift: 0  Number of times OOB to chair past shift: 2    Cardiac:   Cardiac Monitoring: Yes      Cardiac Rhythm: Atrial Fib    Access:  Current line(s): PIV     Genitourinary:   Urinary status: oliguric    Respiratory:   O2 Device: None (Room air)  Chronic home O2 use?: NO  Incentive spirometer at bedside: NO       GI:  Last Bowel Movement Date: 10/16/22  Current diet:  ADULT DIET Regular; 4 carb choices (60 gm/meal); Low Fat/Low Chol/High Fiber/MELISSA; Low Potassium (Less than 3000 mg/day); Low Phosphorus (Less than 1000 mg)  Passing flatus: YES  Tolerating current diet: YES       Pain Management:   Patient states pain is manageable on current regimen: N/A    Skin:  Mick Score: 20  Interventions: PT/OT consult and nutritional support     Patient Safety:  Fall Score:  Total Score: 4  Interventions: bed/chair alarm, gripper socks  High Fall Risk: Yes    Length of Stay:  Expected LOS: 3d 12h  Actual LOS: 35370 Nae Raya, RN

## 2022-10-18 NOTE — PROGRESS NOTES
Problem: Pressure Injury - Risk of  Goal: *Prevention of pressure injury  Description: Document Mick Scale and appropriate interventions in the flowsheet.   Outcome: Progressing Towards Goal  Note: Pressure Injury Interventions:       Moisture Interventions: Check for incontinence Q2 hours and as needed    Activity Interventions: Increase time out of bed    Mobility Interventions: Assess need for specialty bed    Nutrition Interventions: Document food/fluid/supplement intake    Friction and Shear Interventions: Minimize layers

## 2022-10-18 NOTE — PROGRESS NOTES
Cardiology Progress Note      10/18/2022 9:16 AM    Admit Date: 10/14/2022          Subjective: Currently sleeping. No further severe bradycardia          Visit Vitals  BP (!) 122/48 (BP 1 Location: Left upper arm, BP Patient Position: At rest)   Pulse 62   Temp 97.8 °F (36.6 °C)   Resp 18   Ht 4' 11\" (1.499 m)   Wt 51.3 kg (113 lb 1.6 oz)   SpO2 97%   BMI 22.84 kg/m²     10/16 1901 - 10/18 0700  In: 316.3 [I.V.:316.3]  Out: 1000         Objective:      Physical Exam:  VS as above     Data Review:   Labs:      K 4.0  Hgb 12.8  PTT 88  Hgb 12.8   Plat 93K    Telemetry: afib R 60-70      Assessment:     1. Bradycardia with persistent atrial fibrillation- improved   2. Coronary artery disease with prior coronary bypass grafting in 2016. 3.  Hypertension. 4.  Dyslipidemia. 5.  Type 2 diabetes. 6.  End-stage renal disease on hemodialysis. 7.  Mild dementia. 8.  Prior cerebrovascular accident. 9.  Hypothyroidism. 10.  Type 2 diabetes. 11. Thrombocytopenia - mild     Plan: Cont current Rx. Avoid beta blockers. Consider long term warfarin with INR 2-3 for afib.

## 2022-10-18 NOTE — PROGRESS NOTES
Nephrology Progress Note  [facility] [place]   [place], [address]  Phone - [phone]  Fax - [phone]                 Patient: Yariel Burton                   YOB: 1950        Date- 10/18/2022                      Admit Date: 10/14/2022  CC: Follow up for end-stage renal disease         IMPRESSION & PLAN:   ESRD - [facility]- DR. TINAJERO  Anemia of ckd  Sec. Hyperpara  Hypertension  Dm 2  bradycardia    PLAN-  No dialysis today  Continue renvela  CONTINUE HD MWF schedule  Hold epogen     Subjective: Interval History:   -  bp stable  S/p dialysis yesterday  Heart rate stable      Objective:   Vitals:    10/17/22 1926 10/17/22 2304 10/18/22 0334 10/18/22 0733   BP: (!) 99/44 (!) 111/44 (!) 110/49 (!) 122/48   Pulse: 79 64 67 62   Resp: 16 18 18 18   Temp: 98.2 °F (36.8 °C) 98.6 °F (37 °C) 98.1 °F (36.7 °C) 97.8 °F (36.6 °C)   TempSrc:       SpO2: 98% 100% 96% 97%   Weight:       Height:          10/17 0701 - 10/18 0700  In: 165.8 [I.V.:165.8]  Out: 1000   Last 3 Recorded Weights in this Encounter    10/14/22 0816 10/16/22 0353 10/17/22 0354   Weight: 52.4 kg (115 lb 8.3 oz) 51.2 kg (112 lb 12.8 oz) 51.3 kg (113 lb 1.6 oz)        Physical exam:  GEN: NAD  NECK:  Supple, no thyromegaly  RESP: Clear b/l, no  wheezing,   CVS: RRR,S1,S2   NEURO: Nonfocal, normal speech         Chart reviewed. Pertinent Notes reviewed. Data Review :  Recent Labs     10/18/22  0348 10/17/22  0123 10/16/22  0137    138 137   K 4.0 4.4 4.2   CL 95* 102 106   CO2 34* 29 25   BUN 14 32* 19   CREA 4.44* 7.05* 5.30*   GLU 58* 106* 170*   CA 9.6 9.2 7.8*   PHOS 1.9*  --   --        Recent Labs     10/18/22  0348 10/17/22  0123 10/16/22  0137   WBC 4.8 6.7 4.5   HGB 12.8 12.5 11.1*   HCT 39.8 39.9 35.6   PLT 93* 95* 76*       No results for input(s): FE, TIBC, PSAT, FERR in the last 72 hours.    No results found for: HBA1C, JAC2OOJG, BMT9ORYK   No results found for: MCACR, MCA1, MCA2, MCA3, MCAU, MCAU2, MCALPOCT  Lab Results   Component Value Date/Time    NT pro-BNP >35,000 (H) 08/02/2021 06:57 AM     US Results (most recent):  Medication list  reviewed  Current Facility-Administered Medications   Medication Dose Route Frequency    insulin glargine (LANTUS) injection 6 Units  6 Units SubCUTAneous QHS    nicotine (NICODERM CQ) 21 mg/24 hr patch 1 Patch  1 Patch TransDERmal DAILY    melatonin tablet 3 mg  3 mg Oral QHS PRN    DOPamine (INTROPIN) 800 mg in dextrose 5% 250 mL infusion  0-20 mcg/kg/min IntraVENous TITRATE    heparin 25,000 units in D5W 250 ml infusion  12-25 Units/kg/hr IntraVENous TITRATE    heparin (porcine) 1,000 unit/mL injection 1,570 Units  30 Units/kg IntraVENous PRN    Or    heparin (porcine) 1,000 unit/mL injection 3,140 Units  60 Units/kg IntraVENous PRN    [Held by provider] amLODIPine (NORVASC) tablet 5 mg  5 mg Oral DAILY    aspirin delayed-release tablet 81 mg  81 mg Oral DAILY    atorvastatin (LIPITOR) tablet 40 mg  40 mg Oral DAILY    [Held by provider] hydrALAZINE (APRESOLINE) tablet 25 mg  25 mg Oral TID    sevelamer carbonate (RENVELA) oral powder 800 mg  800 mg Oral TID WITH MEALS    insulin lispro (HUMALOG) injection   SubCUTAneous AC&HS    glucose chewable tablet 16 g  4 Tablet Oral PRN    glucagon (GLUCAGEN) injection 1 mg  1 mg IntraMUSCular PRN    dextrose 10 % infusion 0-250 mL  0-250 mL IntraVENous PRN        Nita King MD  10/18/2022

## 2022-10-18 NOTE — PROGRESS NOTES
1926: Bedside and Verbal shift change report given to Mallory Wallace RN (oncoming nurse) by Joe Leary RN (offgoing nurse). Report included the following information SBAR, Kardex, MAR, and Recent Results. 2042: lab called, aPTT hemolyzed. Will resend another lab. 2058: patient daughter called , wanted to see if she can have something to eat. 2159: Confirmed with pharmacy that aPTT is therapeutic.   0501: Pharm Varun Gil confirmed to hold heparin gtt for 1 hour and decreased by 3 units when it's time to resume. End of Shift Note    Bedside shift change report given to Joe Leary and [facility], RN (oncoming nurse) by Norma Barber RN (offgoing nurse). Report included the following information Recent Results    Shift worked:  [id number]     Shift summary and any significant changes:         Concerns for physician to address:       Zone phone for oncoming shift:          Activity:  Activity Level: Up with Assistance  Number times ambulated in hallways past shift: 0  Number of times OOB to chair past shift: 0    Cardiac:   Cardiac Monitoring: Yes      Cardiac Rhythm: Atrial Fib    Access:  Current line(s): PIV     Genitourinary:   Urinary status: anuric    Respiratory:   O2 Device: None (Room air)  Chronic home O2 use?: NO  Incentive spirometer at bedside: NO       GI:  Last Bowel Movement Date: 10/16/22  Current diet:  ADULT DIET Regular; 4 carb choices (60 gm/meal); Low Fat/Low Chol/High Fiber/MELISSA; Low Potassium (Less than 3000 mg/day); Low Phosphorus (Less than 1000 mg)  Passing flatus: YES  Tolerating current diet: YES       Pain Management:   Patient states pain is manageable on current regimen: YES    Skin:  Mick Score: 18  Interventions: increase time out of bed and PT/OT consult    Patient Safety:  Fall Score:  Total Score: 4  Interventions: bed/chair alarm and gripper socks  High Fall Risk: Yes    Length of Stay:  Expected LOS: 3d 12h  Actual LOS: 4      Norma Barber RN

## 2022-10-18 NOTE — PROGRESS NOTES
Hospitalist Progress Note    NAME: Abhi Sanchez   :  1950   MRN:  [id number]       Assessment / Plan:  Bradycardia POA- resolved  Atrial fibrillation POA    Off dopamine drip  Avoid av mason blockers per cardiology recc  IP Cardiology following,- recommends Warfarin for AC, IP pharamcy consulted to begin dosing today evening  No pacemaker for now  Continue heparin drip for now  TSH WNL    Hypokalemia-Resolved  ESRD HD MWF  IP Consult nephrology-- no HD today  Continue hemodialysis as per nephrology tomorrow    Hx of EColi UTI    Diabetes with Hypoglycemia noted today AM  -Patient has been hypoglycemic,   DC Lantus tonight  -Continue sliding scale Humalog     HLD   cont. PTA statin    Hypothyroidism   cont. PTA med    HTN   cont. Home meds, hold b-blocker    Hx of Seizure previously on keppra? Tobacco use disorder  --Tobacco cessation encouraged, nicotine patch ordered     IP PT/OT        18.5 - 24.9 Normal weight / Body mass index is 22.84 kg/m². Estimated discharge date: ? Barriers: BS stability, INR >1.7 on coumadin, off Heparin drip    Code status: Full  Prophylaxis:  Heparin  Recommended Disposition: Home w/Family     Subjective:     Chief Complaint / Reason for Physician Visit  Patient was seen and examined this morning. She is pleasantly demented. Denies any new complaints. Has been hypoglycemic  againthis morning. Plan for dialysis today. Denies fever, chills, chest pain, shortness of breath, abdominal pain, nausea, vomiting diarrhea. No overnight events reported by nursing staff.     Review of Systems:  Symptom Y/N Comments  Symptom Y/N Comments   Fever/Chills n   Chest Pain n    Poor Appetite    Edema     Cough    Abdominal Pain     Sputum    Joint Pain     SOB/CHURCH n   Pruritis/Rash     Nausea/vomit    Tolerating PT/OT     Diarrhea    Tolerating Diet y    Constipation    Other       Could NOT obtain due to:      Objective:     VITALS:   Last 24hrs VS reviewed since prior progress note. Most recent are:  Patient Vitals for the past 24 hrs:   Temp Pulse Resp BP SpO2   10/18/22 1051 -- 76 14 (!) 106/43 98 %   10/18/22 0733 97.8 °F (36.6 °C) 62 18 (!) 122/48 97 %   10/18/22 0334 98.1 °F (36.7 °C) 67 18 (!) 110/49 96 %   10/17/22 2304 98.6 °F (37 °C) 64 18 (!) 111/44 100 %   10/17/22 1926 98.2 °F (36.8 °C) 79 16 (!) 99/44 98 %   10/17/22 1749 97.8 °F (36.6 °C) 68 17 (!) 111/47 100 %   10/17/22 1626 97.8 °F (36.6 °C) 68 18 115/62 --   10/17/22 1615 -- 77 18 (!) 97/44 --   10/17/22 1600 -- 81 18 (!) 110/25 --   10/17/22 1545 -- 73 18 98/68 --   10/17/22 1530 -- 76 18 (!) 129/57 --   10/17/22 1515 -- 75 18 (!) 116/54 --   10/17/22 1500 -- 69 18 (!) 124/48 --   10/17/22 1445 -- 67 18 (!) 136/50 --   10/17/22 1430 -- 68 18 (!) 151/54 --   10/17/22 1415 -- 64 18 (!) 137/47 --   10/17/22 1400 -- 68 18 (!) 123/51 --   10/17/22 1345 -- 60 18 93/76 --   10/17/22 1330 -- (!) 59 18 (!) 145/48 --         Intake/Output Summary (Last 24 hours) at 10/18/2022 1322  Last data filed at 10/18/2022 1044  Gross per 24 hour   Intake 485.78 ml   Output 1000 ml   Net -514.22 ml          I had a face to face encounter and independently examined this patient on 10/18/2022, as outlined below:  PHYSICAL EXAM:  General: WD, WN. Alert, cooperative, no acute distress    EENT:  EOMI. Anicteric sclerae. MMM  Resp:  CTA bilaterally, no wheezing or rales. No accessory muscle use  CV:  Regular  rhythm,  No edema  GI:  Soft, Non distended, Non tender. +Bowel sounds  Neurologic:  Alert and oriented X 3, normal speech,   Psych:   Good insight. Not anxious nor agitated  Skin:  No rashes.   No jaundice    Reviewed most current lab test results and cultures  YES  Reviewed most current radiology test results   YES  Review and summation of old records today    NO  Reviewed patient's current orders and MAR    YES  PMH/SH reviewed - no change compared to H&P  ________________________________________________________________________  Care Plan discussed with:    Comments   Patient x    Family      RN x    Care Manager x    Consultant                       x Multidiciplinary team rounds were held today with , nursing, pharmacist and clinical coordinator. Patient's plan of care was discussed; medications were reviewed and discharge planning was addressed. ________________________________________________________________________  Total NON critical care TIME:  36   Minutes    Total CRITICAL CARE TIME Spent:   Minutes non procedure based      Comments   >50% of visit spent in counseling and coordination of care x    ________________________________________________________________________  Pablo Rios MD     Procedures: see electronic medical records for all procedures/Xrays and details which were not copied into this note but were reviewed prior to creation of Plan. LABS:  I reviewed today's most current labs and imaging studies.   Pertinent labs include:  Recent Labs     10/18/22  0348 10/17/22  0123 10/16/22  0137   WBC 4.8 6.7 4.5   HGB 12.8 12.5 11.1*   HCT 39.8 39.9 35.6   PLT 93* 95* 76*       Recent Labs     10/18/22  0348 10/17/22  0123 10/16/22  0137    138 137   K 4.0 4.4 4.2   CL 95* 102 106   CO2 34* 29 25   GLU 58* 106* 170*   BUN 14 32* 19   CREA 4.44* 7.05* 5.30*   CA 9.6 9.2 7.8*   PHOS 1.9*  --   --    ALB 3.3* 3.7  --    TBILI 0.8 0.7  --    ALT 23 27  --          Signed: Pablo Rios MD

## 2022-10-18 NOTE — PROGRESS NOTES
Transition of Care    Reason for Admission:   Brachycardia and hypotensive                  RUR Score:     16%             PCP: First and Last name:   Laina Lowery, NP     Name of Practice: [address] [place] [address]   Are you a current patient: Yes/No: Yes   Approximate date of last visit: 10/10/2022   Can you participate in a virtual visit if needed:   Yes    Do you (patient/family) have any concerns for transition/discharge? Ms. Edgar Waller is concerned about the heart rate and possible infection. Plan for utilizing home health: Ms. Kathleen Garcia is willing to use home health. Current Advanced Directive/Advance Care Plan:  Full Code  Ms. Abhi Sanchez does not have an advance care plan. She cannot complete one with her dementia. Healthcare Decision Maker:   Click here to complete [address] including selection of the Healthcare Decision Maker Relationship (ie \"Primary\")            Primary Decision MakerMowen Gardiner - Daughter - [phone]    Secondary Decision Maker: Beena Rodríguez - Daughter - [phone]    Transition of Care Plan:   Ms. Abhi Sanchez lives at [address] [place] [address]. Her caretaker is Alyssa and she stays with her 24/7 M-F. On weekends, Ms. Abhi Sanchez stayes with her daughter, Edgar Waller. Ms. Abhi Sanchez going to dialysis at [facility] by   Heather Ortega's Transportation. The transportation is arranged for  6:00 am pickup. Her chair time is 6:30 am. Ms. Abhi Sanchez needs assistance with toileting, dressing, and bathing. She needs encouragement to eat as she is easily distracted. Ms. Kathleen Garcia is able to get Mrs. Sanna Steiner's medications. Ms. Kathleen Garcia will provide transportation home at discharge. Care Management Interventions  PCP Verified by CM:  Yes  Last Visit to PCP: 10/10/22  Palliative Care Criteria Met (RRAT>21 & CHF Dx)?: No  Mode of Transport at Discharge: Self  Transition of Care Consult (CM Consult): Discharge Planning  Tigre[place] Signup: No  Discharge Durable Medical Equipment: No  Physical Therapy Consult: No  Occupational Therapy Consult: No  Speech Therapy Consult: No  Support Systems: Child(russell)  Confirm Follow Up Transport: Family  The Patient and/or Patient Representative was Provided with a Choice of Provider and Agrees with the Discharge Plan?: No  Freedom of Choice List was Provided with Basic Dialogue that Supports the Patient's Individualized Plan of Care/Goals, Treatment Preferences and Shares the Quality Data Associated with the Providers?: No  [place] Resource Information Provided?: No  Discharge Location  Patient Expects to be Discharged to[de-identified] Home    Will continue to follow for discharge planning.   Signed By: Deangelo Goode LCSW     October 18, 2022

## 2022-10-18 NOTE — PROGRESS NOTES
0700: Bedside shift change report given to JUAN FRANCISCO Patiño and Jazmin Roa RN (oncoming nurse) by Lina Borden RN (offgoing nurse). Report included the following information SBAR and Kardex.       Jamar Nichole RN will chart on this patient

## 2022-10-18 NOTE — PROGRESS NOTES
Problem: Falls - Risk of  Goal: *Absence of Falls  Description: Document Karen Brewer Fall Risk and appropriate interventions in the flowsheet.   Outcome: Progressing Towards Goal  Note: Fall Risk Interventions:  Mobility Interventions: Bed/chair exit alarm, Patient to call before getting OOB    Mentation Interventions: Adequate sleep, hydration, pain control, Door open when patient unattended    Medication Interventions: Bed/chair exit alarm, Teach patient to arise slowly, Patient to call before getting OOB    Elimination Interventions: Bed/chair exit alarm, Call light in reach              Problem: Patient Education: Go to Patient Education Activity  Goal: Patient/Family Education  Outcome: Progressing Towards Goal

## 2022-10-19 VITALS
RESPIRATION RATE: 16 BRPM | SYSTOLIC BLOOD PRESSURE: 124 MMHG | TEMPERATURE: 98 F | HEIGHT: 59 IN | BODY MASS INDEX: 22.8 KG/M2 | WEIGHT: 113.1 LBS | OXYGEN SATURATION: 96 % | DIASTOLIC BLOOD PRESSURE: 57 MMHG | HEART RATE: 79 BPM

## 2022-10-19 LAB
ALBUMIN SERPL-MCNC: 3.5 G/DL (ref 3.5–5)
ALBUMIN/GLOB SERPL: 0.9 {RATIO} (ref 1.1–2.2)
ALP SERPL-CCNC: 103 U/L (ref 45–117)
ALT SERPL-CCNC: 20 U/L (ref 12–78)
ANION GAP SERPL CALC-SCNC: 4 MMOL/L (ref 5–15)
AST SERPL-CCNC: 14 U/L (ref 15–37)
BILIRUB SERPL-MCNC: 0.8 MG/DL (ref 0.2–1)
BUN SERPL-MCNC: 24 MG/DL (ref 6–20)
BUN/CREAT SERPL: 4 (ref 12–20)
CALCIUM SERPL-MCNC: 9.8 MG/DL (ref 8.5–10.1)
CHLORIDE SERPL-SCNC: 94 MMOL/L (ref 97–108)
CO2 SERPL-SCNC: 34 MMOL/L (ref 21–32)
CREAT SERPL-MCNC: 5.65 MG/DL (ref 0.55–1.02)
ERYTHROCYTE [DISTWIDTH] IN BLOOD BY AUTOMATED COUNT: 15.9 % (ref 11.5–14.5)
GLOBULIN SER CALC-MCNC: 3.8 G/DL (ref 2–4)
GLUCOSE BLD STRIP.AUTO-MCNC: 100 MG/DL (ref 65–117)
GLUCOSE BLD STRIP.AUTO-MCNC: 115 MG/DL (ref 65–117)
GLUCOSE BLD STRIP.AUTO-MCNC: 130 MG/DL (ref 65–117)
GLUCOSE SERPL-MCNC: 77 MG/DL (ref 65–100)
HCT VFR BLD AUTO: 39.2 % (ref 35–47)
HGB BLD-MCNC: 12.2 G/DL (ref 11.5–16)
MCH RBC QN AUTO: 28.5 PG (ref 26–34)
MCHC RBC AUTO-ENTMCNC: 31.1 G/DL (ref 30–36.5)
MCV RBC AUTO: 91.6 FL (ref 80–99)
NRBC # BLD: 0 K/UL (ref 0–0.01)
NRBC BLD-RTO: 0 PER 100 WBC
PLATELET # BLD AUTO: 90 K/UL (ref 150–400)
PMV BLD AUTO: 10.6 FL (ref 8.9–12.9)
POTASSIUM SERPL-SCNC: 5.4 MMOL/L (ref 3.5–5.1)
PROT SERPL-MCNC: 7.3 G/DL (ref 6.4–8.2)
RBC # BLD AUTO: 4.28 M/UL (ref 3.8–5.2)
SERVICE CMNT-IMP: ABNORMAL
SERVICE CMNT-IMP: NORMAL
SERVICE CMNT-IMP: NORMAL
SODIUM SERPL-SCNC: 132 MMOL/L (ref 136–145)
WBC # BLD AUTO: 5.6 K/UL (ref 3.6–11)

## 2022-10-19 PROCEDURE — 36415 COLL VENOUS BLD VENIPUNCTURE: CPT

## 2022-10-19 PROCEDURE — 74011250637 HC RX REV CODE- 250/637: Performed by: INTERNAL MEDICINE

## 2022-10-19 PROCEDURE — 90935 HEMODIALYSIS ONE EVALUATION: CPT

## 2022-10-19 PROCEDURE — 74011250637 HC RX REV CODE- 250/637: Performed by: STUDENT IN AN ORGANIZED HEALTH CARE EDUCATION/TRAINING PROGRAM

## 2022-10-19 PROCEDURE — 82962 GLUCOSE BLOOD TEST: CPT

## 2022-10-19 PROCEDURE — 80053 COMPREHEN METABOLIC PANEL: CPT

## 2022-10-19 PROCEDURE — 85027 COMPLETE CBC AUTOMATED: CPT

## 2022-10-19 RX ORDER — IBUPROFEN 200 MG
1 TABLET ORAL DAILY
Qty: 30 PATCH | Refills: 0 | Status: SHIPPED | OUTPATIENT
Start: 2022-10-20 | End: 2022-11-19

## 2022-10-19 RX ADMIN — SEVELAMER CARBONATE FOR ORAL SUSPENSION 800 MG: 2400 POWDER, FOR SUSPENSION ORAL at 12:46

## 2022-10-19 RX ADMIN — SEVELAMER CARBONATE FOR ORAL SUSPENSION 800 MG: 2400 POWDER, FOR SUSPENSION ORAL at 19:19

## 2022-10-19 RX ADMIN — ATORVASTATIN CALCIUM 40 MG: 40 TABLET, FILM COATED ORAL at 10:04

## 2022-10-19 RX ADMIN — APIXABAN 2.5 MG: 2.5 TABLET, FILM COATED ORAL at 10:04

## 2022-10-19 RX ADMIN — SEVELAMER CARBONATE FOR ORAL SUSPENSION 800 MG: 2400 POWDER, FOR SUSPENSION ORAL at 13:21

## 2022-10-19 RX ADMIN — ASPIRIN 81 MG: 81 TABLET, COATED ORAL at 10:04

## 2022-10-19 NOTE — PROGRESS NOTES
10/20/22 1981 - [facility] referral has been sent for patient to be seen on 10/21/22. [facility] per service protocol. [place] Patient, Care Management Assistant    LIDA PIMENTEL follow-up PCP transitional care appointment has been scheduled with FRANCK Balderas on 11/3/22 at 1100. This is the first available appt due to limited provider availability and patient's HD schedule. PCP office does not offer alternate provider option for hospital follow up. CM [facility]. Pending patient discharge.  King William Patient, Care Management Assistant

## 2022-10-19 NOTE — DISCHARGE SUMMARY
Hospitalist Discharge Summary     Patient ID:  Siomara Wyatt  [id number]  38 y.o.  1950  10/14/2022    PCP on record: Gabriella Canales NP    Admit date: 10/14/2022  Discharge date and time: 10/19/2022    DISCHARGE DIAGNOSIS:    Bradycardia POA- resolved, no PPM per cardiology, taken off betablocker  Atrial fibrillation POA- started on low dose eliquis BID, Echo before discharge if possible otherwise outpatient followup with Dr Nataliia Malone ([address] [place] [address])  Hypokalemia-Resolved  ESRD HD MWF-- HD today before discharge home  Hx of EColi UTI- therapy completed  Diabetes with Hypoglycemia - BS stable now  HTN  HLD  Hypothyroidism  Hx of Seizure previously on keppra? Tobacco use disorder-- nicotine patch  Full code      CONSULTATIONS:  IP CONSULT TO CARDIOLOGY  IP CONSULT TO NEPHROLOGY  IP CONSULT TO NEPHROLOGY    Excerpted HPI from H&P of Low Townsend MD:  Juliane.[age] y.o.  female with a history of dementia, ESRD on HD MWF, DM, HTN who presents with low heart rate. Patient was receiving dialysis at [facility] where it was noticed that her heart rate was low and she was sent via Ambulance to the ED. Patient denies dizziness, lightheadedness or loc. Patient has baseline dementia and history was obtained by daughter at bedside     We were asked to admit for work up and evaluation of the above problems. \"    ______________________________________________________________________  DISCHARGE SUMMARY/HOSPITAL COURSE:  for full details see H&P, daily progress notes, labs, consult notes.      Bradycardia POA- resolved  Atrial fibrillation POA     Off dopamine drip  Avoid av maosn blockers per cardiology recc  IP Cardiology following,- recommends Warfarin for AC, IP pharamcy consulted to begin dosing today evening  No pacemaker for now  Continue heparin drip for now  TSH WNL     Hypokalemia-Resolved  ESRD HD MWF  IP Consult nephrology-- no HD today  Continue hemodialysis as per nephrology tomorrow     Hx of EColi UTI     Diabetes with Hypoglycemia noted today AM  -Patient has been hypoglycemic,   DC Nancie dial  -Continue sliding scale Humalog     HLD   cont. PTA statin     Hypothyroidism   cont. PTA med     HTN   cont. Home meds, hold b-blocker     Hx of Seizure previously on keppra? Tobacco use disorder  --Tobacco cessation encouraged, nicotine patch ordered     IP PT/OT        _______________________________________________________________________  Patient seen and examined by me on discharge day. Pertinent Findings:  Gen:    Not in distress  Chest: Clear lungs  CVS:   Regular rhythm. No edema  Abd:  Soft, not distended, not tender  Neuro:  Alert,   _______________________________________________________________________  DISCHARGE MEDICATIONS:   Current Discharge Medication List        START taking these medications    Details   apixaban (ELIQUIS) 2.5 mg tablet Take 1 Tablet by mouth every twelve (12) hours. Qty: 60 Tablet, Refills: 0  Start date: 10/19/2022      nicotine (NICODERM CQ) 21 mg/24 hr 1 Patch by TransDERmal route daily for 30 days. Qty: 30 Patch, Refills: 0  Start date: 10/20/2022, End date: 11/19/2022           CONTINUE these medications which have NOT CHANGED    Details   levothyroxine (SYNTHROID) 50 mcg tablet TAKE 1 TABLET BY MOUTH EVERY DAY BEFORE BREAKFAST  Qty: 30 Tablet, Refills: 1      famotidine (PEPCID) 20 mg tablet TAKE 1 TABLET BY MOUTH TWO (2) TIMES DAILY AS NEEDED FOR HEARTBURN. Qty: 60 Tablet, Refills: 1      insulin lispro protamine/insulin lispro (HUMALOG MIX 50/50) 100 unit/mL (50-50) injection 3 Units by SubCUTAneous route.      ergocalciferol (ERGOCALCIFEROL) 1,250 mcg (50,000 unit) capsule Take 50,000 Units by mouth. insulin lispro (HumaLOG KwikPen Insulin) 100 unit/mL kwikpen by SubCUTAneous route. repaglinide (PRANDIN) 2 mg tablet Take 2 mg by mouth.      sevelamer carbonate (RENVELA) 800 mg tab tab Take 1 Tablet by mouth. melatonin 5 mg cap capsule Take 1 Capsule by mouth nightly. Qty: 30 Capsule, Refills: 1    Associated Diagnoses: Primary insomnia      cefdinir (OMNICEF) 300 mg capsule 300 mg 3 times per week after hemodialysis sessions  Qty: 4 Capsule, Refills: 0      atorvastatin (LIPITOR) 40 mg tablet TAKE 1 TABLET BY MOUTH EVERY DAY  Qty: 90 Tablet, Refills: 0      lancets misc Check sugar twice daily. E11.65  Qty: 1 Each, Refills: 11    Associated Diagnoses: Controlled type 2 diabetes mellitus with chronic kidney disease on chronic dialysis, with long-term current use of insulin (HCC)      aspirin delayed-release 81 mg tablet Take  by mouth daily. brimonidine-timoloL (Combigan) 0.2-0.5 % drop ophthalmic solution 1 Drop every twelve (12) hours. Insulin Needles, Disposable, 32 gauge x 5/32\" ndle 1 Each by Does Not Apply route daily. Qty: 100 Pen Needle, Refills: 11           STOP taking these medications       carvediloL (COREG) 6.25 mg tablet Comments:   Reason for Stopping:         hydrALAZINE (APRESOLINE) 25 mg tablet Comments:   Reason for Stopping:         insulin detemir U-100 (Levemir U-100 Insulin) 100 unit/mL injection Comments:   Reason for Stopping:         metoprolol succinate (TOPROL-XL) 25 mg XL tablet Comments:   Reason for Stopping:         amLODIPine (NORVASC) 10 mg tablet Comments:   Reason for Stopping:         amLODIPine (NORVASC) 5 mg tablet Comments:   Reason for Stopping:         insulin glargine (LANTUS,BASAGLAR) 100 unit/mL (3 mL) inpn Comments:   Reason for Stopping:                 Patient Follow Up Instructions:    Activity: Activity as tolerated  Diet: Cardiac Diet, Diabetic Diet, Low fat, Low cholesterol, and Renal Diet      Follow-up with PCP in 1 week, Cardiology Dr Danuta Peralta ([address] [facility] cardiology) in 1 week      Follow-up Information       Follow up With Specialties Details Why Contact Raquel Bonds NP Nurse Practitioner   Bety Evans  [address] [address]  [phone]            ________________________________________________________________    Risk of deterioration: Low    Condition at Discharge:  Stable  __________________________________________________________________    Disposition  Home with family, no needs    ____________________________________________________________________    Code Status: Full Code  ___________________________________________________________________      Total time in minutes spent coordinating this discharge (includes going over instructions, follow-up, prescriptions, and preparing report for sign off to her PCP) :  >30 minutes    Signed:  Tammie Pedroza MD

## 2022-10-19 NOTE — PROGRESS NOTES
Problem: Falls - Risk of  Goal: *Absence of Falls  Description: Document Flor Marking Fall Risk and appropriate interventions in the flowsheet.   Outcome: Progressing Towards Goal  Note: Fall Risk Interventions:  Mobility Interventions: Bed/chair exit alarm    Mentation Interventions: Adequate sleep, hydration, pain control, Bed/chair exit alarm    Medication Interventions: Assess postural VS orthostatic hypotension, Bed/chair exit alarm    Elimination Interventions: Bed/chair exit alarm, Call light in reach

## 2022-10-19 NOTE — PROCEDURES
Hemodialysis / [phone]    Vitals Pre Post Assessment Pre Post   BP BP: (!) 115/42 (10/19/22 1427)   124/57 LOC A&Ox2 A&Ox2   HR Pulse (Heart Rate): 60 (10/19/22 1427) 79 Lungs clear clear   Resp Resp Rate: 16 (10/19/22 1427) 16 Cardiac RRR RRR   Temp Temp: 98.7 °F (37.1 °C) (10/19/22 1427) 98.0 Skin CDI CDI   Weight    Edema None noted None noted   Tele status Remote tele Remote tele Pain Pain Intensity 1: 0 (10/19/22 1144)      Orders   Duration: Start: 7477 End: 1744 Total: 3.25hrs   Dialyzer: Dialyzer/Set Up Inspection: Revaclear (10/19/22 1427)   K Bath: Dialysate K (mEq/L): 2 (10/19/22 1427)   Ca Bath: Dialysate CA (mEq/L): 2.5 (10/19/22 1427)   Na: Dialysate NA (mEq/L): 138 (10/19/22 1427)   Bicarb: Dialysate HCO3 (mEq/L): 40 (10/19/22 1427)   Target Fluid Removal: Goal/Amount of Fluid to Remove (mL): 1000 mL (10/19/22 1427)     Access   Type & Location: NANCI AVG: Pre-assessment: skin CDI. No s/s of infection. + B/T. No issues with cannulation. Running well at . Post assessment: No issues with hemostasis, + B/T remains. Dressing CDI. Pt arrived to HD suite A&Ox2. Consent signed & on file. SBAR received from Primary RN. Pt cannulated with 51H needles per policy & without issue. VSS. Dialysis Tx initiated. Comments:  + b/t. No s/s of infection noted.                                       Labs   HBsAg (Antigen) / date: Neg 10/15/22                                              HBsAb (Antibody) / date: Imm 10/15/22   Source: EPIC   Obtained/Reviewed  Critical Results Called HGB   Date Value Ref Range Status   10/19/2022 12.2 11.5 - 16.0 g/dL Final     Potassium   Date Value Ref Range Status   10/19/2022 5.4 (H) 3.5 - 5.1 mmol/L Final     Comment:     INVESTIGATED PER DELTA CHECK PROTOCOL     Calcium   Date Value Ref Range Status   10/19/2022 9.8 8.5 - 10.1 MG/DL Final     BUN   Date Value Ref Range Status   10/19/2022 24 (H) 6 - 20 MG/DL Final     Creatinine   Date Value Ref Range Status 10/19/2022 5.65 (H) 0.55 - 1.02 MG/DL Final        Meds Given   Name Dose Route   None ordered                 Adequacy / Fluid    Total Liters Process: 71.9   Net Fluid Removed: 500mL      Comments   Time Out Done:   (Time) 1422   Admitting Diagnosis: Bradycardia   Consent obtained/signed: Informed Consent Verified: Yes (10/19/22 1427)   Machine / RO # Machine Number: [id number] (10/19/22 0352)   Primary Nurse Rpt Pre: Sara Hayes RN   Primary Nurse Rpt Post: Sara Hayes RN   Pt Education: procedural   Care Plan: On going   Pts outpatient clinic: [facility]     Tx Summary  1427:  NANCI AVG: Pre-assessment: skin CDI. No s/s of infection. + B/T. No issues with cannulation. Running well at . Post assessment: No issues with hemostasis, + B/T remains. Dressing CDI. Pt arrived to HD suite A&Ox2. Consent signed & on file. SBAR received from Primary RN. Pt cannulated with 56M needles per policy & without issue. VSS. Dialysis Tx initiated. 1515:  200ns given d/t BP  1645:  Temp reduced to 35.5, UF off d/t BP  1715:  UF on  1744: Tx ended. VSS. All possible blood returned to patient. Hemostasis achieved without issue. Bed locked and in the lowest position, call bell and belongings in reach. SBAR given to Primary, RN. Patient is stable at time of their/ my departure. All Dialysis related medications have been reviewed. **Pt tolerated tx well. No issues or complaints voiced. **     Comments:   Assessment performed by RN. Procedure and documentation observed and reviewed by Sumanth Saab RN.

## 2022-10-19 NOTE — PROGRESS NOTES
Cardiology Progress Note      10/19/2022 9:50 AM    Admit Date: 10/14/2022          Subjective:  No further severe bradycardia or pauses. Feels ok. On low dose Eliquis          Visit Vitals  BP (!) 115/56   Pulse (!) 55   Temp 98 °F (36.7 °C)   Resp 17   Ht 4' 11\" (1.499 m)   Wt 51.3 kg (113 lb 1.5 oz)   SpO2 100%   BMI 22.84 kg/m²     10/17 1901 - 10/19 0700  In: 821.8 [P.O.:656; I.V.:165.8]  Out: -         Objective:      Physical Exam:  VS as above     Data Review:   Labs:    Na 132  K 5.4  Hgb 12.2    Telemetry: afib R 60       Assessment:           1. Bradycardia with persistent atrial fibrillation- improved   2. Coronary artery disease with prior coronary bypass grafting in 2016. ? EF   3. Hypertension. 4.  Dyslipidemia. 5.  Type 2 diabetes. 6.  End-stage renal disease on hemodialysis. 7.  Mild dementia. 8.  Prior cerebrovascular accident. 9.  Hypothyroidism. 10.  Type 2 diabetes. 11. Thrombocytopenia - mild   12. Hyponatremia      Plan: Cont cont Rx. Would Aavoid V node blocking drugs.  Has not had recent echo- will order

## 2022-10-19 NOTE — DISCHARGE INSTRUCTIONS
HOSPITALIST DISCHARGE INSTRUCTIONS    NAME: Emigdio Mcleod   :  1950   MRN:  [id number]     Date/Time:  10/19/2022 12:10 PM    ADMIT DATE: 10/14/2022     DISCHARGE DATE: 10/19/2022     DISCHARGE DIAGNOSIS:  Bradycardia POA- resolved, no PPM per cardiology, taken off betablocker  Atrial fibrillation POA- started on low dose eliquis BID, Echo before discharge if possible otherwise outpatient followup with [address] ([address] [place] [address])  Hypokalemia-Resolved  ESRD HD MWF-- HD today before discharge home  Hx of EColi UTI- therapy completed  Diabetes with Hypoglycemia - BS stable now  HTN  HLD  Hypothyroidism  Hx of Seizure previously on keppra? Tobacco use disorder-- nicotine patch  Full code    MEDICATIONS:  As per medication reconciliation  list  It is important that you take the medication exactly as they are prescribed. Keep your medication in the bottles provided by the pharmacist and keep a list of the medication names, dosages, and times to be taken in your wallet. Do not take other medications without consulting your doctor. Pain Management: per above medications    What to do at Home    Recommended diet:  Cardiac Diet, Diabetic Diet, Low fat, Low cholesterol, and Renal Diet    Recommended activity: Activity as tolerated    If you have questions regarding the hospital related prescriptions or hospital related issues please call at [phone]. If you experience any of the following symptoms then please call your primary care physician or return to the emergency room if you cannot get hold of your doctor:  Fever, chills, nausea, vomiting, diarrhea, change in mentation, falling, bleeding, shortness of breath,     Follow Up:  PCP you are to call and set up an appointment to see them in 7-10 days. Dr [address] ([facility]) for Afib followup      Information obtained by :  I understand that if any problems occur once I am at home I am to contact my physician.     I understand and acknowledge receipt of the instructions indicated above. Physician's or R.N.'s Signature                                                                  Date/Time                                                                                                                                              Patient or Representative Signature                                                          Date/Time     MyChart Activation    Thank you for requesting access to [employer]. Please follow the instructions below to securely access and download your online medical record. [employer] allows you to send messages to your doctor, view your test results, renew your prescriptions, schedule appointments, and more. How Do I Sign Up? In your internet browser, go to [email or web address]  Click on the First Time User? Click Here link in the Sign In box. You will be redirect to the New Member Sign Up page. Enter your [employer] Access Code exactly as it appears below. You will not need to use this code after youve completed the sign-up process. If you do not sign up before the expiration date, you must request a new code. [employer] Access Code: Activation code not generated  Current [facility] Status: Active (This is the date your [employer] access code will )    Enter the last four digits of your Social Security Number (xxxx) and Date of Birth (mm/dd/yyyy) as indicated and click Submit. You will be taken to the next sign-up page. Create a [employer] ID. This will be your [facility] login ID and cannot be changed, so think of one that is secure and easy to remember. Create a [employer] password. You can change your password at any time. Enter your Password Reset Question and Answer. This can be used at a later time if you forget your password. Enter your e-mail address.  You will receive e-mail notification when new information is available in [address] Click Sign Up. You can now view and download portions of your medical record. Click the [employer] link to download a portable copy of your medical information. Additional Information    If you have questions, please visit the Frequently Asked Questions section of the [employer] website at [email or web address] Remember, [employer] is NOT to be used for urgent needs. For medical emergencies, dial 911.

## 2022-10-19 NOTE — PROGRESS NOTES
Transition of Care     Reason for Admission:   Brachycardia and hypotensive                  RUR Score:     16%             PCP:    First and Last name:   Harmony Bonds NP                 Name of Practice: [address] [place] [address] Road              Are you a current patient: Yes/No: Yes              Approximate date of last visit: 10/10/2022              Can you participate in a virtual visit if needed:   Yes     Do you (patient/family) have any concerns for transition/discharge? Ms. Gus Negrete is concerned about the heart rate and possible infection. Plan for utilizing home health: Ms. Jodi Sánchez is willing to use home health. Current Advanced Directive/Advance Care Plan:  Full Code  Ms. Jony Rutherford does not have an advance care plan. She cannot complete one with her dementia. Healthcare Decision Maker:   Click here to complete [address] including selection of the Healthcare Decision Maker Relationship (ie \"Primary\")            Primary Decision MakerVena Sylvia - Daughter - [phone]    Secondary Decision Maker: Isabelle All Arslan Daughter - [phone]     Transition of Care Plan:   Ms. Jony Rutherford lives at [address] [place] [address]. Her caretaker is Alyssa and she stays with her 24/7 M-F. On weekends, Ms. Jony Rutherford stayes with her daughter, Gus Negrete. Ms. Jony Rutherford going to dialysis at [facility] by   medical [employer]. The transportation is arranged for  6:00 am pickup. Her chair time is 6:30 am. Ms. Jony Rutherford needs assistance with toileting, dressing, and bathing. She needs encouragement to eat as she is easily distracted. Ms. Jodi Sánchez is able to get . Sanna Steiner's medications. Ms. Jodi Sánchez will provide transportation home at discharge. PLAN:  Pt will need echo as ordered by Cardiology prior to dc but HD ran late and not able to have echo today.     Earlier today CM spoke w/ pt's santy Lorrie to discuss dc arrgmts. Santy gets off work at 6 PM and planned to come to hospital afterward. 2nd IM was explained to santy and it was verbally accepted - COPY on bedside chart to be given w/ pt's dc instructions. CM attempted to call santy again to advise of delay but only reached . CM requested that santy call CM in AM to work out transport arrmgmts.     Kelsey Platt, MSW

## 2022-10-20 NOTE — PROGRESS NOTES
Pt came back from HD in time to eat her dinner she ate 1/2 of it. Pt anxiously awaiting her daughter . Paperwork was ready and waiting . Did review discharge in detail. Opportunity to ask questions, then taken down to lobby.

## 2022-11-03 NOTE — PROGRESS NOTES
Chief Complaint   Patient presents with    Hospital Follow Up     MRM- bradycardia    Back Pain     [facility]aw [facility] yesterday and are waiting on XR results     Referral Request     Neurology - was able to get an appt in [address] [place] [address] and supplies- one touch ultra       1. \"Have you been to the ER, urgent care clinic since your last visit? Hospitalized since your last visit? \" Yes When: 10/14/22-10/19/22 Where: [facility] Reason for visit: bradycardia    2. \"Have you seen or consulted any other health care providers outside of the [address] [place] [address] since your last visit? \" No     3. For patients aged 39-70: Has the patient had a colonoscopy / FIT/ Cologuard? No      If the patient is female:    4. For patients aged 41-77: Has the patient had a mammogram within the past 2 years? No      5. For patients aged 21-65: Has the patient had a pap smear?  NA - based on age or sex

## 2022-11-03 NOTE — PROGRESS NOTES
Subjective: (As above and below)     Chief Complaint   Patient presents with    Hospital Follow Up     MRM- bradycardia    Back Pain     Saw [facility] yesterday and are waiting on XR results     Referral Request     Neurology - was able to get an appt in [address] [place] [address] Joyce and supplies- one touch ultra     Tamanna Faust is a 67y.o. year old female who presents for transition of care    Admitted from 10/14/22 to 10/19/22 for bradycardia  Beta blocker stopped, seems that hydralazine also stopped  No reccs for pacemaker at this time    BP Readings from Last 3 Encounters:   11/03/22 (!) 170/90   10/19/22 (!) 124/57   10/03/22 (!) 86/36         Appendiceal adenocarcinoma: follows w/ onc, deferring intervention    ESRD: dialysis MWF    Diabetic Review of Systems - diet controlled-needs new glucometer  Lab Results   Component Value Date/Time    Hemoglobin A1c (POC) 5.7 10/03/2022 02:33 PM        Afib: started on low dose eliqus- outpatient cardio - daughter needs to schedule    Dementia: neuro appt next year, no agitation s/s    Low back pain: midline  No falls or injury  Saw [facility] yesterday- lumbar xray ordered, results pending  Denies saddle numbness, leg weakness, falls or bowel/bladder dysfunction. Wt Readings from Last 3 Encounters:   11/03/22 108 lb (49 kg)   10/19/22 113 lb 1.5 oz (51.3 kg)   10/03/22 107 lb (48.5 kg)        Reviewed PmHx, RxHx, FmHx, SocHx, AllgHx and updated in chart. No family history on file.     Past Medical History:   Diagnosis Date    CAD (coronary artery disease)     Cancer ([address]     possible stomach cancer 2021- workup in progress    Chronic kidney disease     Marisol Paredes- M-W-F    Dementia ([address]     per daughter pt is alert and oriented x3    Diabetes ([address]     GERD (gastroesophageal reflux disease)     Hyperlipidemia     Hypertension     Seizures ([address]     possible while in hospital 8/2021    Stroke [facility])     Thyroid disease       Social History Socioeconomic History    Marital status:    Tobacco Use    Smoking status: Never    Smokeless tobacco: Never   Vaping Use    Vaping Use: Never used   Substance and Sexual Activity    Alcohol use: Not Currently    Drug use: Not Currently    Sexual activity: Yes     Partners: Female          Current Outpatient Medications   Medication Sig    tiZANidine (ZANAFLEX) 2 mg tablet Take 1 Tablet by mouth three (3) times daily as needed for Muscle Spasm(s). lidocaine (LIDODERM) 5 % 1 Patch by TransDERmal route every twelve (12) hours. Apply patch to the affected area for 12 hours a day and remove for 12 hours a day. amLODIPine (NORVASC) 5 mg tablet Take 1 Tablet by mouth daily. Blood-Glucose Meter monitoring kit E11.65 check sugar once daily fasting    lancets misc Check sugar once daily. E11.65    glucose blood VI test strips strip Check sugar once daily fasting. E11.65    famotidine (PEPCID) 20 mg tablet TAKE 1 TABLET BY MOUTH TWO (2) TIMES DAILY AS NEEDED FOR HEARTBURN.    levothyroxine (SYNTHROID) 50 mcg tablet TAKE 1 TABLET BY MOUTH EVERY DAY BEFORE BREAKFAST    apixaban (ELIQUIS) 2.5 mg tablet Take 1 Tablet by mouth every twelve (12) hours. nicotine (NICODERM CQ) 21 mg/24 hr 1 Patch by TransDERmal route daily for 30 days. repaglinide (PRANDIN) 2 mg tablet Take 2 mg by mouth.    sevelamer carbonate (RENVELA) 800 mg tab tab Take 1 Tablet by mouth.    melatonin 5 mg cap capsule Take 1 Capsule by mouth nightly. atorvastatin (LIPITOR) 40 mg tablet TAKE 1 TABLET BY MOUTH EVERY DAY    aspirin delayed-release 81 mg tablet Take  by mouth daily. brimonidine-timoloL (Combigan) 0.2-0.5 % drop ophthalmic solution 1 Drop every twelve (12) hours.     Dialyvite 800 0.8 mg tab tablet TAKE 1 TABLET BY MOUTH ONCE DAILY FOR CKD    latanoprost (XALATAN) 0.005 % ophthalmic solution INSTILL 1 DROP BY OPHTHALMIC ROUTE EVERY DAY INTO LEFT EYE AT BEDTIME    ergocalciferol (ERGOCALCIFEROL) 1,250 mcg (50,000 unit) capsule Take 50,000 Units by mouth. No current facility-administered medications for this visit. Review of Systems:   Constitutional:    Negative for fever and chills, negative diaphoresis. HEENT:              Negative for neck pain and stiffness. Eyes:                  Negative for visual disturbance, itching, redness or discharge. Respiratory:        Negative for cough and shortness of breath. Cardiovascular:  Negative for chest pain and palpitations. Gastrointestinal: Negative for nausea, vomiting, abdominal pain, diarrhea or constipation. Genitourinary:     Negative for dysuria and frequency. Musculoskeletal: Negative for falls, tenderness and swelling. Skin:                    Negative for rash, masses or lesions. Neurological:       Negative for dizzyness, seizure, loss of consciousness, weakness and numbness. Objective:     Vitals:    11/03/22 1101   BP: (!) 170/90   Pulse: 84   Resp: 18   Temp: 97.8 °F (36.6 °C)   TempSrc: Temporal   Weight: 108 lb (49 kg)   Height: 4' 11\" (1.499 m)       Gen: a0x1  nd in no distress. HEENT:    Head: normocephalic and atraumatic. Eyes:  EOM are normal. Pupils equal and round. Neck:  Normal range of motion. Neck supple. Cardiovascular: normal rate, regular rhythm and normal heart sounds. Pulmonary/Chest:  Effort normal and breath sounds normal.  No respiratory distress. No wheezes, no rales. Abdominal: soft, normal  bowel sounds. Musculoskeletal:  No edema, no tenderness. No calf tenderness or edema. Neurological:  Alert, oriented to person, place and time. Skin: skin is warm and dry. Assessment/ Plan:     Follow-up and Dispositions    Return in about 2 months (around 1/3/2023). 1. Encounter for support and coordination of transition of care      2. Essential hypertension  add  - amLODIPine (NORVASC) 5 mg tablet; Take 1 Tablet by mouth daily. Dispense: 90 Tablet; Refill: 1    3.  ESRD (end stage renal disease) ([facility]      4. Longstanding persistent atrial fibrillation (HCC)  cardio    5. Primary appendiceal adenocarcinoma ([facility]  once    6. Bradycardia      7. Moderate dementia without behavioral disturbance, psychotic disturbance, mood disturbance, or anxiety, unspecified dementia type  Neuro fu  - REFERRAL TO HOME HEALTH    8. ACP (advance care planning)    - REFERRAL TO SOCIAL WORK    9. Controlled type 2 diabetes mellitus with chronic kidney disease on chronic dialysis, with long-term current use of insulin ([facility])  Advised daily home glucose checks for goal  if high will add glipizide  - Blood-Glucose Meter monitoring kit; E11.65 check sugar once daily fasting  Dispense: 1 Kit; Refill: 0  - lancets misc; Check sugar once daily. E11.65  Dispense: 1 Each; Refill: 11  - glucose blood VI test strips strip; Check sugar once daily fasting. E11.65  Dispense: 100 Strip; Refill: 11  - AMB POC GLUCOSE BLOOD, BY GLUCOSE MONITORING DEVICE    10. Acute midline low back pain without sciatica  Xray results pending  - tiZANidine (ZANAFLEX) 2 mg tablet; Take 1 Tablet by mouth three (3) times daily as needed for Muscle Spasm(s). Dispense: 30 Tablet; Refill: 0  - REFERRAL TO HOME HEALTH  - lidocaine (LIDODERM) 5 %; 1 Patch by TransDERmal route every twelve (12) hours. Apply patch to the affected area for 12 hours a day and remove for 12 hours a day. Dispense: 30 Each; Refill: 1      I have discussed the diagnosis with the patient and the intended plan as seen in the above orders. The patient has received an after-visit summary and questions were answered concerning future plans. Pt conveyed understanding of plan. Medication Side Effects and Warnings were discussed with patient: yes  Patient Labs were reviewed: yes  Patient Past Records were reviewed:  yes    Brynn Montemayor.  Nerissa Brunson NP

## 2022-11-06 PROBLEM — I63.9 STROKE (CEREBRUM) (HCC): Status: ACTIVE | Noted: 2022-11-06

## 2022-11-06 PROBLEM — I21.4 NSTEMI (NON-ST ELEVATED MYOCARDIAL INFARCTION) (HCC): Status: ACTIVE | Noted: 2022-01-01

## 2022-11-06 NOTE — Clinical Note
Status[de-identified] INPATIENT [101]   Type of Bed: Telemetry [19]   Cardiac Monitoring Required?: Yes   Inpatient Hospitalization Certified Necessary for the Following Reasons: 9.  Other (further clarification in H&P documentation)   Admitting Diagnosis: Stroke (cerebrum) [facility]) [[id number]]   Admitting Diagnosis: NSTEMI (non-ST elevated myocardial infarction) [facility]) [[id number]]   Admitting Physician: Emeli Hodgson [[id number]]   Attending Physician: Liberty Felipe   Estimated Length of Stay: 2 Midnights   Discharge Plan[de-identified] Home with Office Follow-up

## 2022-11-06 NOTE — ED PROVIDER NOTES
EMERGENCY DEPARTMENT HISTORY AND PHYSICAL EXAM      Date: 11/6/2022  Patient Name: Gretchen Turpin  Patient Age and Sex: 67 y.o. female     History of Presenting Illness     Chief Complaint   Patient presents with    Altered mental status     X 2 days. Pt is normally responsive and talkative. She has been in bed for 2 days and now responds to pain       History Provided By: Patient    HPI: Gretchen Turpin is a 79-year-old history ESRD, CAD, dementia, hypertension, seizures, thyroid disease, prior strokes presenting with altered mental status. Per EMS the patient has been bedbound, unresponsive for the past 2 days. EMS was called who found her responsive to verbal and painful stimulus however nonverbal.  This is atypical for her she is typically talkative however demented. Patient had no complaint prior to her bedbound status. There are no other complaints, changes, or physical findings at this time. PCP: Lucina Raymond., NP    No current facility-administered medications on file prior to encounter. Current Outpatient Medications on File Prior to Encounter   Medication Sig Dispense Refill    atorvastatin (LIPITOR) 40 mg tablet Take 1 Tablet by mouth daily. 90 Tablet 2    Dialyvite 800 0.8 mg tab tablet TAKE 1 TABLET BY MOUTH ONCE DAILY FOR CKD      latanoprost (XALATAN) 0.005 % ophthalmic solution INSTILL 1 DROP BY OPHTHALMIC ROUTE EVERY DAY INTO LEFT EYE AT BEDTIME      tiZANidine (ZANAFLEX) 2 mg tablet Take 1 Tablet by mouth three (3) times daily as needed for Muscle Spasm(s). 30 Tablet 0    lidocaine (LIDODERM) 5 % 1 Patch by TransDERmal route every twelve (12) hours. Apply patch to the affected area for 12 hours a day and remove for 12 hours a day. 30 Each 1    amLODIPine (NORVASC) 5 mg tablet Take 1 Tablet by mouth daily. 90 Tablet 1    Blood-Glucose Meter monitoring kit E11.65 check sugar once daily fasting 1 Kit 0    lancets misc Check sugar once daily.  E11.65 1 Each 11    glucose blood VI test strips strip Check sugar once daily fasting. E11.65 100 Strip 11    famotidine (PEPCID) 20 mg tablet TAKE 1 TABLET BY MOUTH TWO (2) TIMES DAILY AS NEEDED FOR HEARTBURN. 60 Tablet 1    levothyroxine (SYNTHROID) 50 mcg tablet TAKE 1 TABLET BY MOUTH EVERY DAY BEFORE BREAKFAST 30 Tablet 1    apixaban (ELIQUIS) 2.5 mg tablet Take 1 Tablet by mouth every twelve (12) hours. 60 Tablet 0    nicotine (NICODERM CQ) 21 mg/24 hr 1 Patch by TransDERmal route daily for 30 days. 30 Patch 0    ergocalciferol (ERGOCALCIFEROL) 1,250 mcg (50,000 unit) capsule Take 50,000 Units by mouth. repaglinide (PRANDIN) 2 mg tablet Take 2 mg by mouth.      sevelamer carbonate (RENVELA) 800 mg tab tab Take 1 Tablet by mouth.      melatonin 5 mg cap capsule Take 1 Capsule by mouth nightly. 30 Capsule 1    [DISCONTINUED] atorvastatin (LIPITOR) 40 mg tablet TAKE 1 TABLET BY MOUTH EVERY DAY 90 Tablet 0    aspirin delayed-release 81 mg tablet Take  by mouth daily. brimonidine-timoloL (Combigan) 0.2-0.5 % drop ophthalmic solution 1 Drop every twelve (12) hours.          Past History     Past Medical History:  Past Medical History:   Diagnosis Date    CAD (coronary artery disease)     Cancer ([facility] [address]     possible stomach cancer 2021- workup in progress    Chronic kidney disease     Judith Brito- M-W-F    Dementia ([facility] [address]     per daughter pt is alert and oriented x3    Diabetes ([address]     GERD (gastroesophageal reflux disease)     Hyperlipidemia     Hypertension     Seizures ([address]     possible while in hospital 8/2021    Stroke [facility])     Thyroid disease        Past Surgical History:  Past Surgical History:   Procedure Laterality Date    HX APPENDECTOMY      08/21    HX VASCULAR ACCESS      HD catheter    IR INSERT NON TUNL CVC OVER 5 YRS  7/30/2021    IR INSERT NON TUNL CVC OVER 5 YRS  7/30/2021    IR INSERT TUNL CVC W/O PORT OVER 5 YR  8/4/2021    PA CABG, ARTERY-VEIN, FOUR      5 years ago    VASCULAR SURGERY PROCEDURE UNLIST      right arm graft Family History:  No family history on file. Social History:  Social History     Tobacco Use    Smoking status: Never    Smokeless tobacco: Never   Vaping Use    Vaping Use: Never used   Substance Use Topics    Alcohol use: Not Currently    Drug use: Not Currently       Allergies:  No Known Allergies      Review of Systems   Review of Systems   Unable to perform ROS: Mental status change      Physical Exam   Physical Exam  Vitals and nursing note reviewed. Constitutional:       General: She is not in acute distress. Appearance: She is ill-appearing. Comments: Eyes closed but responds to gentle tactile stimulus. Is oriented to self, otherwise does not answer questions   HENT:      Head: Normocephalic and atraumatic. Comments: Patient is breathing spontaneously, protecting airway     Mouth/Throat:      Mouth: Mucous membranes are moist.   Eyes:      Conjunctiva/sclera: Conjunctivae normal.   Cardiovascular:      Rate and Rhythm: Normal rate and regular rhythm. Pulses: Normal pulses. Pulmonary:      Effort: Pulmonary effort is normal.      Breath sounds: Normal breath sounds. Abdominal:      General: Abdomen is flat. Palpations: Abdomen is soft. Tenderness: There is no abdominal tenderness. Musculoskeletal:         General: No deformity. Comments: No pain with range of motion of right hip   Skin:     General: Skin is warm and dry. Comments: AV fistula is palpable bilateral upper extremities, thrill on left   Neurological:      Comments: Patient's eyes open to voice and gentle stimulus does cross midline to painful stimulus. Is able to state her name otherwise just moaning. Psychiatric:         Thought Content:  Thought content normal.        Diagnostic Study Results     Labs -     Recent Results (from the past 12 hour(s))   BLOOD GAS,CHEM8,LACTIC ACID POC    Collection Time: 11/06/22  6:36 PM   Result Value Ref Range    Calcium, ionized (POC) 1.18 1.12 - 1.32 mmol/L    BICARBONATE 27 mmol/L    Base excess (POC) 2.5 mmol/L    Sample source VENOUS BLOOD      CO2, POC 26 (H) 19 - 24 MMOL/L    Sodium,  136 - 145 MMOL/L    Potassium, POC 4.8 3.5 - 5.5 MMOL/L    Chloride,  100 - 108 MMOL/L    Glucose,  (H) 74 - 106 MG/DL    Creatinine, POC 6.2 (H) 0.6 - 1.3 MG/DL    Lactic Acid (POC) 1.48 0.40 - 2.00 mmol/L    pH, venous (POC) 7.42 7.32 - 7.42      pCO2, venous (POC) 41.7 41 - 51 MMHG    pO2, venous (POC) 40 25 - 40 mmHg   CBC WITH AUTOMATED DIFF    Collection Time: 11/06/22  6:45 PM   Result Value Ref Range    WBC 16.2 (H) 3.6 - 11.0 K/uL    RBC 3.87 3.80 - 5.20 M/uL    HGB 11.1 (L) 11.5 - 16.0 g/dL    HCT 33.8 (L) 35.0 - 47.0 %    MCV 87.3 80.0 - 99.0 FL    MCH 28.7 26.0 - 34.0 PG    MCHC 32.8 30.0 - 36.5 g/dL    RDW 16.1 (H) 11.5 - 14.5 %    PLATELET 804 (L) 480 - 400 K/uL    MPV 12.1 8.9 - 12.9 FL    NRBC 0.0 0  WBC    ABSOLUTE NRBC 0.00 0.00 - 0.01 K/uL    NEUTROPHILS 86 (H) 32 - 75 %    LYMPHOCYTES 6 (L) 12 - 49 %    MONOCYTES 7 5 - 13 %    EOSINOPHILS 0 0 - 7 %    BASOPHILS 0 0 - 1 %    IMMATURE GRANULOCYTES 1 (H) 0.0 - 0.5 %    ABS. NEUTROPHILS 14.0 (H) 1.8 - 8.0 K/UL    ABS. LYMPHOCYTES 0.9 0.8 - 3.5 K/UL    ABS. MONOCYTES 1.1 (H) 0.0 - 1.0 K/UL    ABS. EOSINOPHILS 0.0 0.0 - 0.4 K/UL    ABS. BASOPHILS 0.0 0.0 - 0.1 K/UL    ABS. IMM. GRANS. 0.2 (H) 0.00 - 0.04 K/UL    DF AUTOMATED     METABOLIC PANEL, COMPREHENSIVE    Collection Time: 11/06/22  6:45 PM   Result Value Ref Range    Sodium 138 136 - 145 mmol/L    Potassium 4.3 3.5 - 5.1 mmol/L    Chloride 99 97 - 108 mmol/L    CO2 27 21 - 32 mmol/L    Anion gap 12 5 - 15 mmol/L    Glucose 218 (H) 65 - 100 mg/dL    BUN 61 (H) 6 - 20 MG/DL    Creatinine 6.52 (H) 0.55 - 1.02 MG/DL    BUN/Creatinine ratio 9 (L) 12 - 20      eGFR 6 (L) >60 ml/min/1.73m2    Calcium 9.8 8.5 - 10.1 MG/DL    Bilirubin, total 0.9 0.2 - 1.0 MG/DL    ALT (SGPT) 73 12 - 78 U/L    AST (SGOT) 41 (H) 15 - 37 U/L    Alk. phosphatase 138 (H) 45 - 117 U/L    Protein, total 6.7 6.4 - 8.2 g/dL    Albumin 2.8 (L) 3.5 - 5.0 g/dL    Globulin 3.9 2.0 - 4.0 g/dL    A-G Ratio 0.7 (L) 1.1 - 2.2     TROPONIN-HIGH SENSITIVITY    Collection Time: 11/06/22  6:45 PM   Result Value Ref Range    Troponin-High Sensitivity 1,348 (HH) 0 - 51 ng/L   AMMONIA    Collection Time: 11/06/22  6:45 PM   Result Value Ref Range    Ammonia, plasma 29 <32 UMOL/L   DRUG SCREEN, URINE    Collection Time: 11/06/22  6:45 PM   Result Value Ref Range    AMPHETAMINES Negative NEG      BARBITURATES Negative NEG      BENZODIAZEPINES Negative NEG      COCAINE Negative NEG      METHADONE Negative NEG      OPIATES Negative NEG      PCP(PHENCYCLIDINE) Negative NEG      THC (TH-CANNABINOL) Negative NEG      Drug screen comment (NOTE)    ETHYL ALCOHOL    Collection Time: 11/06/22  6:45 PM   Result Value Ref Range    ALCOHOL(ETHYL),SERUM <10 <10 MG/DL       Radiologic Studies -   CT HEAD WO CONT   Final Result      1. New subtle hypodensity in the superior left cerebellum. May represent a small   lacunar infarct. It is age indeterminate however. If clinically indicated MR   imaging would better evaluate   2. Encephalomalacia in the left hemisphere and white matter changes not   significantly progressed otherwise              CT Results  (Last 48 hours)                 11/06/22 1944  CT HEAD WO CONT Final result    Impression:      1. New subtle hypodensity in the superior left cerebellum. May represent a small   lacunar infarct. It is age indeterminate however. If clinically indicated MR   imaging would better evaluate   2. Encephalomalacia in the left hemisphere and white matter changes not   significantly progressed otherwise               Narrative:  EXAM: CT HEAD WO CONT       INDICATION: AMS       COMPARISON: 7/31/2021. CONTRAST: None. TECHNIQUE: Unenhanced CT of the head was performed using 5 mm images. Brain and   bone windows were generated.  Coronal and sagittal reformats. CT dose reduction   was achieved through use of a standardized protocol tailored for this   examination and automatic exposure control for dose modulation. FINDINGS:   The ventricles and sulci are normal in size, shape and configuration. Sandhya [employer] Extensive   encephalomalacia in the left temporal occipital and parietal lobes. Scattered   low density in the periventricular white matter. This is not significantly   changed. New small hypodensity in the left superior cerebellum. There is no   intracranial hemorrhage, extra-axial collection, or mass effect. The basilar   cisterns are open. .       The bone windows demonstrate no abnormalities. The visualized portions of the   paranasal sinuses and mastoid air cells are clear. CXR Results  (Last 48 hours)      None              Medical Decision Making   I am the first provider for this patient. I reviewed the vital signs, available nursing notes, past medical history, past surgical history, family history and social history. Vital Signs-Reviewed the patient's vital signs. Patient Vitals for the past 12 hrs:   Temp Pulse Resp BP SpO2   11/06/22 1926 -- 66 19 (!) 129/55 99 %   11/06/22 1831 98 °F (36.7 °C) 63 23 (!) 152/87 96 %       Records Reviewed: Nursing Notes and Old Medical Records    Provider Notes (Medical Decision Making):   DDx acute infectious or metabolic encephalopathy, ICH, acute stroke    Obtain stat head CT chest x-ray stat Chem-8 VBG. Difficult IV access, IJ placed by myself in the left. We will send blood cultures lactic acid for possible underlying infection, however she is afebrile on arrival.  EKG troponin, plan admission for acute encephalopathy    She is otherwise protecting her airway    ED Course:   Initial assessment performed. The patients presenting problems have been discussed, and they are in agreement with the care plan formulated and outlined with them.   I have encouraged them to ask questions as they arise throughout their visit. ED Course as of 11/06/22 2308   Darnell Iqbal Nov 06, 2022   1838 pH joanna, pCO2 normal, K nomal, lactate 1.5, creatinine 6.2 [WB]   1839 Unremarkable VBG and Chem-8, will add on ethyl alcohol UDS ammonia [WB]   1841 Procedure Note- Peripheral IV Access  6:41 PM  Performed by: Daniele George MD  I gained IV access using  18 gauge needle because the patient had no vascular access. After cleaning the site with alcohol prep, the Left EJ vein was localized without ultrasound guidance in an anterior approach. Line confirmation was obtained by direct visualization and good blood return. No anaesthetic was used. The line was successfully flushed with normal saline and was secured with transparent tape.   Estimated blood loss: none  The procedure took 1-15 minutes, and pt tolerated well   [WB]   1904 Patient is more responsive, still nonverbal however attends me with eyes [WB]   1957 Troponin is elevated at 1300 [WB]   1957 EKG shows atrial fibrillation rate of 62 left axis with otherwise normal intervals no STEMI no peak T waves [WB]   1957 Dialysis on Friday [WB]   1958 White count 16 hemoglobin is 11.1 [WB]   1958 On chart review last troponin was 20 three weeks ago [WB]   2006 Reevaluated, stable neurologic exam, repalpated abdomen without abnormality, no recent bloody stool [WB]   2006 CT head shows possible new subtle hypodensity in the cerebellum representing potentially a small lacunar infarct, In the setting of this, will avoid blood thinners until cardiology sees [WB]   2036 Accepted for admission by hospitalist [WB]      ED Course User Index  [WB] Ike Dela Cruz MD     CRITICAL CARE NOTE :    11:07 PM    IMPENDING DETERIORATION -CNS  ASSOCIATED RISK FACTORS - CNS Decompensation  MANAGEMENT- Bedside Assessment and Supervision of Care  INTERPRETATION -  CT Scan  INTERVENTIONS - Emergent vascular access, close neurologic monitoring  CASE REVIEW - Hospitalist/Intensivist, Nursing, and Family  TREATMENT RESPONSE -Stable  PERFORMED BY - Self    NOTES   :  I have spent 45 minutes of critical care time involved in lab review, consultations with specialist, family decision- making, bedside attention and documentation. This time excludes time spent in any separate billed procedures. During this entire length of time I was immediately available to the patient . Salas Jiang MD    Disposition:  Admission Note:  Patient is being admitted to the hospital by Dr. Dante Hogue, Service: Hospitalist.  The results of their tests and reasons for their admission have been discussed with them and available family. They convey agreement and understanding for the need to be admitted and for their admission diagnosis. Diagnosis     Clinical Impression:   1. Encephalopathy    2. NSTEMI (non-ST elevated myocardial infarction) ([address]    3. Cerebrovascular accident (CVA), unspecified mechanism ([address]      Attestations:    Salas Jiang M.D. Please note that this dictation was completed with [employer], the computer voice recognition software. Quite often unanticipated grammatical, syntax, homophones, and other interpretive errors are inadvertently transcribed by the computer software. Please disregard these errors. Please excuse any errors that have escaped final proofreading. Thank you.

## 2022-11-07 PROBLEM — I65.23 BILATERAL CAROTID ARTERY STENOSIS: Status: ACTIVE | Noted: 2022-01-01

## 2022-11-07 PROBLEM — I63.341 STROKE DUE TO THROMBOSIS OF RIGHT CEREBELLAR ARTERY (HCC): Status: RESOLVED | Noted: 2022-01-01 | Resolved: 2022-01-01

## 2022-11-07 PROBLEM — E11.42 DIABETIC PERIPHERAL NEUROPATHY ASSOCIATED WITH TYPE 2 DIABETES MELLITUS (HCC): Status: ACTIVE | Noted: 2022-01-01

## 2022-11-07 PROBLEM — Z86.73 HISTORY OF STROKE: Status: ACTIVE | Noted: 2022-01-01

## 2022-11-07 PROBLEM — R55 CONVULSIVE SYNCOPE: Status: ACTIVE | Noted: 2022-01-01

## 2022-11-07 PROBLEM — R41.82 ACUTE ALTERATION IN MENTAL STATUS: Status: ACTIVE | Noted: 2022-01-01

## 2022-11-07 PROBLEM — I63.342 THROMBOTIC STROKE INVOLVING LEFT CEREBELLAR ARTERY (HCC): Status: ACTIVE | Noted: 2022-01-01

## 2022-11-07 PROBLEM — I63.341 STROKE DUE TO THROMBOSIS OF RIGHT CEREBELLAR ARTERY (HCC): Status: ACTIVE | Noted: 2022-01-01

## 2022-11-07 NOTE — PROGRESS NOTES
1200 - report received from ED nurse and received with heparin and dopamine running. See MAR for continued titrations. Urinalysis sent off, unable to get APTT due to poor venous access. 1500 - PIC team arrived, unable to gain access. Provider notified, plan to place central line. 1600 - patient reassessed and turned, no changes. Admission required documentation completed.      1800 - CVC placed by Dr. Annie Cruz

## 2022-11-07 NOTE — PROGRESS NOTES
Hospitalist Progress Note    NAME: Antonella Flor   :  1950   MRN:  [id number]            Subjective:     Chief Complaint / Reason for Physician Visit  Patient seen and evaluated at bedside, overnight events noted, patient seen during rapid response, remains confused, heart rate noted to be in the 40s, atropine given x1. Discussed with RN events overnight. Review of Systems:  Symptom Y/N Comments  Symptom Y/N Comments   Fever/Chills    Chest Pain     Poor Appetite    Edema     Cough    Abdominal Pain     Sputum    Joint Pain     SOB/CHURCH    Pruritis/Rash     Nausea/vomit    Tolerating PT/OT     Diarrhea    Tolerating Diet     Constipation    Other       Could NOT obtain due to: Limited secondary to patient's clinical status     Objective:     VITALS:   Last 24hrs VS reviewed since prior progress note. Most recent are:  Patient Vitals for the past 24 hrs:   Temp Pulse Resp BP SpO2   22 0802 -- (!) 44 -- (!) 163/51 --   22 0715 -- 65 18 (!) 167/74 97 %   22 0650 98.9 °F (37.2 °C) 77 13 (!) 179/81 97 %   22 0600 -- 77 27 (!) 144/87 100 %   22 0408 98.7 °F (37.1 °C) (!) 42 15 (!) 144/68 98 %   22 0400 -- (!) 51 14 (!) 144/68 100 %   22 0255 -- (!) 53 17 (!) 157/36 100 %   22 0245 -- (!) 42 17 (!) 151/33 --   22 0235 -- (!) 44 18 -- --   22 0105 -- (!) 49 24 (!) 160/53 99 %   22 0000 -- 65 16 (!) 125/49 100 %   22 2315 99.7 °F (37.6 °C) (!) 54 18 (!) 139/50 100 %   22 1926 -- 66 19 (!) 129/55 99 %   22 1831 98 °F (36.7 °C) 63 23 (!) 152/87 96 %     No intake or output data in the 24 hours ending 22 0830     PHYSICAL EXAM:  General: Patient appears comfortable  EENT:  EOMI. Anicteric sclerae. MMM  Resp:  CTA bilaterally, no wheezing or rales. No accessory muscle use  CV:  Irregularly irregular, S1 plus S2, no murmurs rubs or gallop no edema  GI:  Soft, Non distended, Non tender.   +Bowel sounds  Neurologic:  Limited secondary to patient's clinical status  Psych:   Limited secondary to patient's clinical status  Skin:  No rashes. No jaundice    Procedures: see electronic medical records for all procedures/Xrays and details which were not copied into this note but were reviewed prior to creation of Plan. LABS:  I reviewed today's most current labs and imaging studies. Pertinent labs include:  Recent Labs     11/07/22  0051 11/06/22  1845   WBC 18.2* 16.2*   HGB 11.4* 11.1*   HCT 34.3* 33.8*   * 141*     Recent Labs     11/07/22  0358 11/06/22  1845   NA  --  138   K  --  4.3   CL  --  99   CO2  --  27   GLU  --  218*   BUN  --  61*   CREA  --  6.52*   CA  --  9.8   MG 2.4  --    PHOS 1.7*  --    ALB  --  2.8*   TBILI  --  0.9   ALT  --  73       Signed: Dean Bray MD    CT head:IMPRESSION     1. New subtle hypodensity in the superior left cerebellum. May represent a small  lacunar infarct. It is age indeterminate however. If clinically indicated MR  imaging would better evaluate  2. Encephalomalacia in the left hemisphere and white matter changes not  significantly progressed otherwise       Reviewed most current lab test results and cultures  YES  Reviewed most current radiology test results   YES  Review and summation of old records today    NO  Reviewed patient's current orders and MAR    YES  PMH/SH reviewed - no change compared to H&P      Assessment / Plan:   Altered mental status-of note patient presents with above-mentioned symptomatology with altered mental status, unclear as to etiology, could be secondary to infectious etiologies versus CVA versus metabolic causes  Obtain urinalysis  Obtain blood cultures  Start Zosyn for presumed UTI, discontinue if urinalysis negative  Aspirin once daily  Lipitor once daily  Obtain MRI brain for further evaluation  CT head reviewed  Neurology consult further evaluation    Non-ST elevation MI-patient presented with above-mentioned symptomatology with significant troponin elevation consistent with non-ST elevation MI  Currently status post aspirin 325 mg x 1  Aspirin 81 mg once daily  Continue heparin GTT for 48 hours of anticoagulation  Trend cardiac enzymes  Obtain transthoracic echo  Obtain cardiology consult    Symptomatic bradycardia-of note patient found to have symptomatic bradycardia, does have a history of A. fib, could be secondary to A. fib with slow ventricular response, status post atropine x2 with continuous bradycardia  Start dopamine gtt. Avoid AV mason blockers  Continue heparin GTT for anticoagulation  Follow-up transthoracic echo  Follow cardiology recommendations    Hyperglycemia type 2 diabetes-insulin sliding scale during the course of this admission    Hypophosphatemia-replete phosphate and recheck phosphate    Prophylaxis-Heparin GTT  FEN-n.p.o., replete potassium and magnesium  Full code, patient surrogate decision-maker is her daughter Veronica Hurley [phone], updated via telephone, patient is full code however she will discuss [address] with her family and update care team    Disposition-pending clinical improvement, cardiology evaluation, neurology evaluation, PT OT evaluation, greater than 48 hours, to long-term care facility      18.5 - 24.9 Normal weight / Body mass index is 20.26 kg/m². Code status: Full  Prophylaxis:  heparin gtt  Recommended Disposition: SNF/LTC     ________________________________________________________________________  Care Plan discussed with:    Comments   Patient     Family  x    RN x    Care Manager x    Consultant  x                     x Multidiciplinary team rounds were held today with , nursing, pharmacist and clinical coordinator. Patient's plan of care was discussed; medications were reviewed and discharge planning was addressed.      ________________________________________________________________________  Total NON critical care TIME:  35   Minutes      Comments   >50% of visit spent in counseling and coordination of care x    ________________________________________________________________________  [facility], MD

## 2022-11-07 NOTE — ED NOTES
Upon assessment by this RN pt found with davidson in place, per offgoing RN davidson placed on dayshift, this RN contacted physician, Valeria Duffy DO advises ok to keep davidson in place at this time.

## 2022-11-07 NOTE — ED NOTES
Bedside shift change report given to Judson De La Rosa (oncoming nurse) by Barbara Ferguson (offgoing nurse). Report included the following information SBAR, ED Summary, Intake/Output, MAR and Recent Results.

## 2022-11-07 NOTE — CONSULTS
[facility] INITIAL ASSESSMENT. Name: Lazarus Chamber   : 1950   MRN: [id number]   Date: 2022        Chief Complaint   Patient presents with    Altered mental status     X 2 days. Pt is normally responsive and talkative. She has been in bed for 2 days and now responds to pain         HPI:     Ms. Pipo Godinez is 76yo female with h/o ESRD, CAD, stroke, HTN and HLD. She was brought to ED for worsening mental status and admitted under medicine team. She is found to have small possible cerebellar stroke. She is being treated for septic shock with abx. Also found to have symptomatic bradycardia. Due to worsening clinical condition she is being moved to ICU for further care. Active Problems Being Managed:     -Septic/Cardiogenic shock.  -Symptomatic bradycardia.  -Acute metabolic encephalopathy. -NSTEMI.  -DM2. -Hypothyroidism. -ESRD.  -HLD. -H/o HTN. Assessment/Plan:     Septic/cardiogenic shock.  -Unclear source of sepsis. -Optimize perfusion and pressors if needed for goal MAP >65  -Closely monitor hemodynamics and markers of end organ perfusion including mental status, UO and lactate.  -Follow up cultures and change abx accordingly. Symptomatic bradycardia.  -Continue dopamine.  -Titrate for HR >50 and for MAP >65. NSTEMI:  -Continue ASA and statin.   -Continue heparin gtt.   -Cardiology consult    Acute metabolic encephalopathy.  - Closely monitor mental status. - Delirium precautions. - Correct underlying metabolic issues. Hypothyroidism:  -Continue levothyroxine. DVT prophylaxis: Heparin gtt. SUP: Pepcid  Code status: Full code. Next of kin: Kinga Tidwell (Daughter) [phone] [facility])    I personally spent 40 minutes of critical care time. This is time spent at this critically ill patient's bedside actively involved in patient care as well as the coordination of care and discussions with the patient's family.   This does not include any procedural time which has been billed separately. Review of systems:     Review of Systems   Unable to perform ROS: Medical condition        Objective:     Vital Signs:  Visit Vitals  BP (!) 116/102   Pulse (!) 55   Temp 98.9 °F (37.2 °C)   Resp 13   Ht 4' 11\" (1.499 m)   Wt 45.5 kg (100 lb 5 oz)   SpO2 99%   BMI 20.26 kg/m²      O2 Device: None (Room air) Temp (24hrs), Av.8 °F (37.1 °C), Min:98 °F (36.7 °C), Max:99.7 °F (37.6 °C)           Intake/Output:     Intake/Output Summary (Last 24 hours) at 2022 1223  Last data filed at 2022 0901  Gross per 24 hour   Intake --   Output 10 ml   Net -10 ml       Physical Exam:  Physical Exam  Constitutional:       Comments: Altered mental status. HENT:      Head: Normocephalic and atraumatic. Eyes:      Extraocular Movements: Extraocular movements intact. Conjunctiva/sclera: Conjunctivae normal.   Cardiovascular:      Rate and Rhythm: Regular rhythm. Bradycardia present. Pulmonary:      Effort: Pulmonary effort is normal. No respiratory distress. Breath sounds: Normal breath sounds. Abdominal:      General: There is no distension. Tenderness: There is no abdominal tenderness. There is no guarding. Musculoskeletal:      Cervical back: Neck supple. Neurological:      Comments: Altered mental status. Past Medical History:        has a past medical history of CAD (coronary artery disease), Cancer ([facility] [address] Chronic kidney disease, Dementia ([facility] [address] Diabetes ([address] GERD (gastroesophageal reflux disease), Hyperlipidemia, Hypertension, Seizures ([address] Stroke ([address] and Thyroid disease.     Past Surgical History:      has a past surgical history that includes ir insert non tunl cvc over 5 yrs (2021); ir insert non tunl cvc over 5 yrs (2021); ir insert tunl cvc w/o port over 5 yr (2021); hx vascular access; pr cabg, artery-vein, four; hx appendectomy; and vascular surgery procedure unlist.      Home Medications:     Prior to Admission medications    Medication Sig Start Date End Date Taking? Authorizing Provider   atorvastatin (LIPITOR) 40 mg tablet Take 1 Tablet by mouth daily. 11/6/22   Cony Cody, NP   Dialyvite 800 0.8 mg tab tablet TAKE 1 TABLET BY MOUTH ONCE DAILY FOR CKD 10/30/22   Provider, Historical   latanoprost (XALATAN) 0.005 % ophthalmic solution INSTILL 1 DROP BY OPHTHALMIC ROUTE EVERY DAY INTO LEFT EYE AT BEDTIME 10/29/22   Provider, Historical   tiZANidine (ZANAFLEX) 2 mg tablet Take 1 Tablet by mouth three (3) times daily as needed for Muscle Spasm(s). 11/3/22   Cony Cody, NP   lidocaine (LIDODERM) 5 % 1 Patch by TransDERmal route every twelve (12) hours. Apply patch to the affected area for 12 hours a day and remove for 12 hours a day. 11/3/22   Cony Cody, NP   amLODIPine (NORVASC) 5 mg tablet Take 1 Tablet by mouth daily. 11/3/22   Cony Cody, NP   Blood-Glucose Meter monitoring kit E11.65 check sugar once daily fasting 11/3/22   Cony Rubalcava., NP   lancets misc Check sugar once daily. E11.65 11/3/22   Cony Cody NP   glucose blood VI test strips strip Check sugar once daily fasting. E11.65 11/3/22   Rob QUACH NP   famotidine (PEPCID) 20 mg tablet TAKE 1 TABLET BY MOUTH TWO (2) TIMES DAILY AS NEEDED FOR HEARTBURN. 10/31/22   Rob QUACH NP   levothyroxine (SYNTHROID) 50 mcg tablet TAKE 1 TABLET BY MOUTH EVERY DAY BEFORE BREAKFAST 10/31/22   Cony Cody, FRANCK   apixaban (ELIQUIS) 2.5 mg tablet Take 1 Tablet by mouth every twelve (12) hours. 10/19/22   Tracy Ruvalcaba MD   nicotine (NICODERM CQ) 21 mg/24 hr 1 Patch by TransDERmal route daily for 30 days. 10/20/22 11/19/22  Tracy Ruvalcaba MD   ergocalciferol (ERGOCALCIFEROL) 1,250 mcg (50,000 unit) capsule Take 50,000 Units by mouth. Provider, Historical   repaglinide (PRANDIN) 2 mg tablet Take 2 mg by mouth.     Provider, Historical   sevelamer carbonate (RENVELA) 800 mg tab tab Take 1 Tablet by mouth. Provider, Historical   melatonin 5 mg cap capsule Take 1 Capsule by mouth nightly. 10/3/22   Dunia Hills, NP   aspirin delayed-release 81 mg tablet Take  by mouth daily. Provider, Historical   brimonidine-timoloL (Combigan) 0.2-0.5 % drop ophthalmic solution 1 Drop every twelve (12) hours. Provider, Historical         Allergies/Social/Family History:     No Known Allergies   Social History     Tobacco Use    Smoking status: Never    Smokeless tobacco: Never   Substance Use Topics    Alcohol use: Not Currently      No family history on file. LABS AND  DATA:   Reviewed      Peak airway pressure:      Minute ventilation:        CRITICAL CARE CONSULTANT NOTE  I had a face to face encounter with the patient, reviewed and interpreted patient data including clinical events, labs, images, vital signs, I/O's, and examined patient. I have discussed the case and the plan and management of the patient's care with the consulting services, the bedside nurses and the respiratory therapist.      NOTE OF PERSONAL INVOLVEMENT IN CARE   This patient has a high probability of imminent, clinically significant deterioration, which requires the highest level of preparedness to intervene urgently. I participated in the decision-making and personally managed or directed the management of the following life and organ supporting interventions that required my frequent assessment to treat or prevent imminent deterioration.         Amarjit Steven MD   Pulmonary/CCM  [phone]  [phone]  11/7/2022

## 2022-11-07 NOTE — ED NOTES
Verbal shift change report given to Abimbola (oncoming nurse) by Marko Oakes (offgoing nurse). Report included the following information SBAR, ED Summary and Recent Results.

## 2022-11-07 NOTE — PROGRESS NOTES
Orders received and chart reviewed. Pt had RRT called this morning for HR dropping into the 40's. Pt wasn't responsive on admission. Pt also has active bedrest orders from 11/6/22 at 2045. Bedrest orders will need to be lifted prior to start of OT services as well. Will continue to follow.

## 2022-11-07 NOTE — PROGRESS NOTES
RAPID RESPONSE TEAM    Overhead Rapid Response paged to room # ER29/29  at  2217    Reason for Rapid Response:  Bradycardia    Initial Assessment:   Upon arrival, nursing staff were administering Atropine 0.6 mg IV per MD orders. Dr. Reese Leone at bedside. Patient did have palpable radial pulses - slow but present. BP - hypertensive, HR in the 40s - AF. Patient responds to voices, appears confused and mildly agitated when we stimulate her. Does not follow commands, moans/groans, GCS 12, NIH 14 - did not have a prior NIH documented but CTH results - new subtle hypodensity in the superior left cerebellum. May represent a small lacunar infarct. It is age indeterminate however. Moves all extremities spontaneously, localizes in all extremities - again hard to assess strength since patient does not participate in exam. . Oxygen saturations > 94% on RA - RR stable for now however I do worry about the patient aspirating given her mental status. Will start Dopamine Infusion at 2 mcg/kg/min and then titrate for HR > 60. Will have to watch BP since patient is already hypertensive. WBC 18 - MD ordered BCX/UCX and ABX. See orders. Patient is HD patient, M/W/F schedule.          Patient Vitals for the past 4 hrs:   Temp Pulse Resp BP SpO2   11/07/22 0903 -- 66 18 (!) 140/75 96 %   11/07/22 0901 -- 67 9 (!) 146/44 97 %   11/07/22 0859 -- 69 11 103/81 92 %   11/07/22 0830 -- 73 25 (!) 155/65 94 %   11/07/22 0827 -- 75 23 (!) 168/50 97 %   11/07/22 0824 -- 77 26 (!) 178/76 95 %   11/07/22 0816 -- 68 17 (!) 163/70 96 %   11/07/22 0812 -- (!) 57 19 (!) 158/51 95 %   11/07/22 0802 -- (!) 44 -- (!) 163/51 --   11/07/22 0800 -- (!) 44 13 (!) 163/51 95 %   11/07/22 0758 -- (!) 43 10 (!) 156/40 97 %   11/07/22 0756 98.9 °F (37.2 °C) (!) 44 12 (!) 118/27 97 %   11/07/22 0715 -- 65 18 (!) 167/74 97 %   11/07/22 0650 98.9 °F (37.2 °C) 77 13 (!) 179/81 97 %   11/07/22 0600 -- 77 27 (!) 144/87 100 %        Interventions:   -- Atropine 0.6 mg IV was given as I arrived into the room by primary RN  -- Dopamine infusion started at 2 mcg/kg/min and then increased to 3 mcg/kg/min    Outcome:   -- Level of Care changed to Stepdown status  -- Rest per medical team - pending Cardiology, Neurology, and Nephrology Consults      At  - Received  a call from primary nurse, patient's HR mid 40s - 50 SBP low 100s on Dopamine 5 mcg/kg/min. Patient's overall presentation has not changed but she a little more harder to arouse now compared to earlier. Still maintaining good oxygen saturations. Getting EEG now. ICU team was consulted and patient was accepted for transfer to ICU.      Please call with any questions or concerns    Alejandro Bahena, RN BSN CCRN TCRN  Rapid Response Team  Ext [phone]     Recent Results (from the past 8 hour(s))   TROPONIN-HIGH SENSITIVITY    Collection Time: 11/07/22  1:49 AM   Result Value Ref Range    Troponin-High Sensitivity 1,077 (HH) 0 - 51 ng/L   MAGNESIUM    Collection Time: 11/07/22  3:58 AM   Result Value Ref Range    Magnesium 2.4 1.6 - 2.4 mg/dL   PHOSPHORUS    Collection Time: 11/07/22  3:58 AM   Result Value Ref Range    Phosphorus 1.7 (L) 2.6 - 4.7 MG/DL   T4, FREE    Collection Time: 11/07/22  3:58 AM   Result Value Ref Range    T4, Free 1.2 0.8 - 1.5 NG/DL   TSH 3RD GENERATION    Collection Time: 11/07/22  3:58 AM   Result Value Ref Range    TSH 3.57 0.36 - 3.74 uIU/mL   HEMOGLOBIN A1C WITH EAG    Collection Time: 11/07/22  3:58 AM   Result Value Ref Range    Hemoglobin A1c 6.0 (H) 4.0 - 5.6 %    Est. average glucose 126 mg/dL   LIPID PANEL    Collection Time: 11/07/22  3:58 AM   Result Value Ref Range    Cholesterol, total 92 <200 MG/DL    Triglyceride 204 (H) <150 MG/DL    HDL Cholesterol 11 MG/DL    LDL, calculated 40.2 0 - 100 MG/DL    VLDL, calculated 40.8 MG/DL    CHOL/HDL Ratio 8.4 (H) 0.0 - 5.0     POC G3 - PUL    Collection Time: 11/07/22  5:19 AM   Result Value Ref Range    FIO2 (POC) 21 %    pH (POC) 7.48 (H) 7.35 - 7.45      pCO2 (POC) 33.0 (L) 35.0 - 45.0 MMHG    pO2 (POC) 80 80 - 100 MMHG    HCO3 (POC) 24.4 22 - 26 MMOL/L    sO2 (POC) 96.6 92 - 97 %    Base excess (POC) 1.3 mmol/L    Site LEFT RADIAL      Device: ROOM AIR      Allens test (POC) Positive      Specimen type (POC) ARTERIAL     PTT    Collection Time: 11/07/22  6:33 AM   Result Value Ref Range    aPTT 36.0 (H) 22.1 - 31.0 sec    aPTT, therapeutic range     58.0 - 77.0 SECS   TROPONIN-HIGH SENSITIVITY    Collection Time: 11/07/22  7:21 AM   Result Value Ref Range    Troponin-High Sensitivity 880 (HH) 0 - 51 ng/L   GLUCOSE, POC    Collection Time: 11/07/22  8:07 AM   Result Value Ref Range    Glucose (POC) 234 (H) 65 - 117 mg/dL    Performed by Linda Butcher PCT

## 2022-11-07 NOTE — PROGRESS NOTES
Reason for Readmission:     Stroke         RUR Score/Risk Level: 21%        PCP: First and Last name:  Malathi Sharpe MD   Name of Practice:    Are you a current patient: Yes/No:    Approximate date of last visit: 11/3/22   Can you participate in a virtual visit with your PCP:     Is a Care Conference indicated: No       Did you attend your follow up appointment (s): If not, why not:  Yes       Resources/supports as identified by patient/family: Patient lives at home with a family member that is her caregiver         Top Challenges facing patient (as identified by patient/family and CM): Finances/Medication cost?    No issues   Transportation    No issues    Support system or lack thereof? No issues   Living arrangements? No issues        Self-care/ADLs/Cognition? No isuses         Current Advanced Directive/Advance Care Plan:  FULL CODE  CM confirmed patient is a Full Code    Primary Decision Maker: Bruce Finley, [phone]           Plan for utilizing home health:   Agreeable             Transition of Care Plan:    Based on readmission, the patient's previous Plan of Care   has been evaluated and/or modified. The current Transition of Care Plan is:         Patient lives with a family member. Patient uses a walker and requires assistance from family member in care. Family member is a paid caregiver 24/7. Patient's daughter would also like  for patient at discharge.   Patient goes to [facility] on [facility] at 6:30am.  Transport picks her up at 6:00am.  Current Dispo: Home with [place]

## 2022-11-07 NOTE — PROGRESS NOTES
Pharmacist note- Zosyn dosing - Sepsis    Zosyn dose adjusted per renal dosing protocol   Ctreatine 6.5 on 11/6/22    Zosyn to 4.5gm IV x 1 then 3.375gm IV q12h

## 2022-11-07 NOTE — CONSULTS
Piter Perez         NAME:Sanna Maloney  EQI:[id number]   :1950     Pt seen  Most recent labs reviewed  Recent notes reviewed    NO INDICATION FOR EMERGENT HD TODAY  Plan for hd tomorrow    D/w family at bedside      Stroke (cerebrum) [facility]) [I63.9]  NSTEMI (non-ST elevated myocardial infarction) [facility]) [I21.4]     Luciana Sage MD  [facility]  [facility] [address] [address], [facility], [address]  Phone - [phone]         Fax - [phone] [facility] Office  [address] [place] [address]  Phone - [phone]        Fax - [phone]

## 2022-11-07 NOTE — PROGRESS NOTES
Physical Therapy    Received PT order. Chart reviewed. Pt with rapid response within the hour with HR dropping into the 40s. Was not responsive on admission. Will monitor and follow with eval when pt is stable.     Tunde Kiran, PT

## 2022-11-07 NOTE — PROGRESS NOTES
RRT called assumed care of pt due to acuity. Per previous RN Paul Bonilla pt HR 40's, pt responds to painful stimuli. Will open her eyes when her name is called. Per MD give 0.6 mg Atropine. RRT at bedside. See RRT note    [id number]  Cardiology and Neuro consults called. 10:30 AM  Daughter at bedside and updated. Daughter filling out MRI questionnaire    10:34 AM  HR upper 40's lower 50's, /36 map 56. Dopamine increased to 5 mcg. MD notified that pt will need higher level of care (CCU).

## 2022-11-07 NOTE — PROGRESS NOTES
TRANSFER - OUT REPORT:    Verbal report given to Ambar Stapleton RN(name) on Rita Ortiz  being transferred to CCU(unit) for routine progression of care       Report consisted of patients Situation, Background, Assessment and   Recommendations(SBAR). Information from the following report(s) SBAR, Kardex, MAR, and Recent Results was reviewed with the receiving nurse. Lines:   Peripheral IV 11/06/22 Anterior; Left External jugular (Active)       Peripheral IV Right Forearm (Active)        Opportunity for questions and clarification was provided.       Patient transported with:

## 2022-11-07 NOTE — CONSULTS
Consult  REFERRED BY:  Kathleen Ledesma., FRANCK    CHIEF COMPLAINT: Altered mental status for several days, and possible stroke      Subjective: Joseph Wray is a 67 y.o. right-handed -American female seen as a new patient to me, at the request of the hospitalist, for evaluation of altered mental status for the last several days, in a patient who has possible stomach cancer in the midst of a work-up, chronic kidney disease on dialysis, dementia, diabetes and diabetic neuropathy, history of possible seizures, history of multiple strokes in the past, and organic heart disease and GERD and thyroid disease, who was found to have a new MRI and CT scan of the head suggest a possible area of hypodensity in the left cerebellum of indeterminate age. Patient on heparin drip, needs MRI scan to ensure that there is no hemorrhagic transformation of her stroke, and to evaluate for possible multiple embolic strokes. She is already on Eliquis and aspirin therapy I think for history of A. fib but there does not appear to be documentation of that on the chart. Patient has not spoken much in the last several days, and may have an aphasia. She seems to be moving all of her extremities well, but may be the right side a little better than the left. She has not had any complaints of fever, meningismus, head trauma, headache, and the family says that she has not complained of any new focal weakness has not been eating or drinking well for the last week.     Past Medical History:   Diagnosis Date    CAD (coronary artery disease)     Cancer ([address]     possible stomach cancer 2021- workup in progress    Chronic kidney disease     Milagro Bodily- M-W-F    Dementia ([address]     per daughter pt is alert and oriented x3    Diabetes ([address]     GERD (gastroesophageal reflux disease)     Hyperlipidemia     Hypertension     Seizures ([address]     possible while in hospital 8/2021    Stroke [facility])     Thyroid disease       Past Surgical History:   Procedure Laterality Date    HX APPENDECTOMY      08/21    HX VASCULAR ACCESS      HD catheter    IR INSERT NON TUNL CVC OVER 5 YRS  7/30/2021    IR INSERT NON TUNL CVC OVER 5 YRS  7/30/2021    IR INSERT TUNL CVC W/O PORT OVER 5 YR  8/4/2021    ND CABG, ARTERY-VEIN, FOUR      5 years ago    VASCULAR SURGERY PROCEDURE UNLIST      right arm graft     History reviewed. No pertinent family history.    Social History     Tobacco Use    Smoking status: Never    Smokeless tobacco: Never   Substance Use Topics    Alcohol use: Not Currently         Current Facility-Administered Medications:     heparin 25,000 units in D5W 250 ml infusion, 12-25 Units/kg/hr, IntraVENous, TITRATE, Clifm Meth, Edmund, DO, Last Rate: 7.3 mL/hr at 11/07/22 0704, 16 Units/kg/hr at 11/07/22 0704    heparin (porcine) 1,000 unit/mL injection 1,370 Units, 30 Units/kg, IntraVENous, PRN **OR** heparin (porcine) 1,000 unit/mL injection 2,730 Units, 60 Units/kg, IntraVENous, PRN, Clifm Meth, Edmund, DO, 2,730 Units at 11/07/22 0703    hydrALAZINE (APRESOLINE) 20 mg/mL injection 10 mg, 10 mg, IntraVENous, Q6H PRN, Fernando, Edmund, DO    atorvastatin (LIPITOR) tablet 40 mg, 40 mg, Oral, QHS, Fernando, Edmund, DO    nitroglycerin (NITROSTAT) tablet 0.4 mg, 0.4 mg, SubLINGual, PRN, Fernando, Edmund, DO    acetaminophen (TYLENOL) tablet 650 mg, 650 mg, Oral, Q4H PRN **OR** acetaminophen (TYLENOL) solution 650 mg, 650 mg, Per NG tube, Q4H PRN **OR** acetaminophen (TYLENOL) suppository 650 mg, 650 mg, Rectal, Q4H PRN, Fernando, Edmund, DO    aspirin delayed-release tablet 81 mg, 81 mg, Oral, DAILY, Fernando, Edmund, DO    [START ON 11/11/2022] ergocalciferol capsule 50,000 Units, 50,000 Units, Oral, Q7D, Fernando, Edmund,     repaglinide (PRANDIN) tablet 2 mg, 2 mg, Oral, ACB&D, Edmund King DO    nicotine (NICODERM CQ) 21 mg/24 hr patch 1 Patch, 1 Patch, TransDERmal, DAILY, Edmund King DO    levothyroxine (SYNTHROID) tablet 50 mcg, 50 mcg, Oral, ACB, Valzay Martínez, Edmund, DO    tiZANidine (ZANAFLEX) tablet 2 mg, 2 mg, Oral, TID PRN, Sadia Batemanun, DO    lidocaine 4 % patch 1 Patch, 1 Patch, TransDERmal, DAILY PRN, Fernando, Edmund, DO    melatonin tablet 4.5 mg, 4.5 mg, Oral, QHS, Fernando, Edmund, DO    B complex-vitaminC-folic acid (NEPHROCAP) cap, 1 Capsule, Oral, DAILY, Fernando, Edmund, DO    glucose chewable tablet 16 g, 16 g, Oral, PRN, Fernando, Edmund, DO    insulin lispro (HUMALOG) injection 1-8 Units, 1-8 Units, SubCUTAneous, AC&HS, Fernando, Edmund, DO    dextrose 10% infusion 0-250 mL, 0-250 mL, IntraVENous, PRN, Fernando, Edmund, DO    DOPamine (INTROPIN) 800 mg in dextrose 5% 250 mL infusion, 0-20 mcg/kg/min, IntraVENous, TITRATE, Scott Mcmahon MD, Last Rate: 6.8 mL/hr at 11/07/22 1442, 8 mcg/kg/min at 11/07/22 1442    glucagon (GLUCAGEN) injection 1 mg, 1 mg, IntraMUSCular, PRN, Lieutenant Kelvin MD    insulin lispro (HUMALOG) injection, , SubCUTAneous, AC&HS, Lieutenant Kelvin MD, 5 Units at 11/07/22 1159    acetaminophen (TYLENOL) tablet 650 mg, 650 mg, Oral, Q4H PRN **OR** acetaminophen (TYLENOL) solution 650 mg, 650 mg, Per NG tube, Q4H PRN **OR** acetaminophen (TYLENOL) suppository 650 mg, 650 mg, Rectal, Q4H PRN, Lieutenant Kelvin MD    [COMPLETED] piperacillin-tazobactam (ZOSYN) 4.5 g in 0.9% sodium chloride (MBP/ADV) 100 mL MBP, 4.5 g, IntraVENous, NOW, Last Rate: 200 mL/hr at 11/07/22 0829, 4.5 g at 11/07/22 0829 **FOLLOWED BY** piperacillin-tazobactam (ZOSYN) 3.375 g in 0.9% sodium chloride (MBP/ADV) 100 mL MBP, 3.375 g, IntraVENous, Q12H, Stacey Mcmahon MD    sodium phosphate 20 mmol in 0.9% sodium chloride 500 mL infusion, , IntraVENous, ONCE, Stacey Mcmahon MD    [facility] ON 11/8/2022] famotidine (PF) (PEPCID) 20 mg in 0.9% sodium chloride 10 mL injection, 20 mg, IntraVENous, DAILY, Bhavana Matias MD        No Known Allergies   MRI Results (most recent):  Results from [place] encounter on 07/16/21    MRI BRAIN WO CONT    Narrative  EXAM: MRI BRAIN WO CONT    INDICATION: alt mental status    COMPARISON: 7/17/2021. CONTRAST: None. TECHNIQUE:  Multiplanar multisequence acquisition without contrast of the brain. FINDINGS:  The ventricles are normal in size and position. There is no acute infarct,  hemorrhage, extra-axial fluid collection, or mass effect. There is no cerebellar  tonsillar herniation. Diffuse encephalomalacia within the posterior left  temporal lobe and occipital lobe. Moderate high signal within the  periventricular white matter. Chronic right basal ganglia lacunar infarct. The paranasal sinuses, mastoid air cells, and middle ears are clear. The orbital  contents are within normal limits. No significant osseous or scalp lesions are  identified. Impression  Encephalomalacia and white matter disease. No acute intracranial abnormality      Results from Hospital Encounter encounter on 07/16/21    MRI BRAIN WO CONT    Narrative  EXAM: MRI BRAIN WO CONT    INDICATION: alt mental status    COMPARISON: 7/17/2021. CONTRAST: None. TECHNIQUE:  Multiplanar multisequence acquisition without contrast of the brain. FINDINGS:  The ventricles are normal in size and position. There is no acute infarct,  hemorrhage, extra-axial fluid collection, or mass effect. There is no cerebellar  tonsillar herniation. Diffuse encephalomalacia within the posterior left  temporal lobe and occipital lobe. Moderate high signal within the  periventricular white matter. Chronic right basal ganglia lacunar infarct. The paranasal sinuses, mastoid air cells, and middle ears are clear. The orbital  contents are within normal limits. No significant osseous or scalp lesions are  identified. Impression  Encephalomalacia and white matter disease.  No acute intracranial abnormality    Review of Systems:  Review of systems not obtained due to patient factors. Vitals:    11/07/22 1215 11/07/22 1300 11/07/22 1400 11/07/22 1500   BP: (!) 134/37 (!) 111/49 (!) 131/58 (!) 131/28   Pulse: (!) 54 (!) 55 (!) 52 (!) 54   Resp: 24 20 23 18   Temp: 98.7 °F (37.1 °C)      SpO2: 95% 96% 99% 97%   Weight:       Height:         Objective:     I      NEUROLOGICAL EXAM:    Appearance: The patient is poorly developed, and nourished, provides a incoherent history and is in no acute distress. Mental Status: Patient is not very verbal, does not really follow commands well, and does not speak   Cranial Nerves:   Unreliable visual fields. Fundi are benign, but poorly seen. .  Pupils anisocoric and minimally reactive, status post cataract surgery bilaterally., EOM's full, no nystagmus, no ptosis. Facial sensation is normal. Corneal reflexes are not tested. Facial movement is asymmetric. Hearing is abnormal bilaterally. Palate is midline with normal sternocleidomastoid and trapezius muscles are normal. Tongue is midline. Neck without meningismus or bruits  Temporal arteries are not tender or enlarged  TMJ areas are not tender on palpation   Motor:  3/5 strength in upper and lower proximal and distal muscles. Normal bulk and tone. No fasciculations. Rapid alternating movement is slow bilaterally   Reflexes:   Deep tendon reflexes 1+/4 and symmetrical.  No babinski or clonus present   Sensory:   Abnormal to touch, pinprick and vibration and temperature in both feet. DSS is intact   Gait:  Not testable. Tremor:   No tremor noted. Cerebellar:  Not testable abnormal cerebellar signs present on Romberg and tandem testing and finger-nose-finger exam.   Neurovascular:  Normal heart sounds and regular rhythm, peripheral pulses decreased and no carotid bruits. Assessment:       ICD-10-CM ICD-9-CM    1. Encephalopathy  G93.40 348.30       2. NSTEMI (non-ST elevated myocardial infarction) ([facility]  I21.4 410.70       3.  Cerebrovascular accident (CVA), unspecified mechanism ([facility]  I63.9 434.91         Active Problems:    Stroke (cerebrum) ([facility] (11/6/2022)      NSTEMI (non-ST elevated myocardial infarction) ([facility] (11/6/2022)      History of stroke (11/7/2022)      Stroke due to thrombosis of right cerebellar artery ([facility] (11/7/2022)      Bilateral carotid artery stenosis (11/7/2022)      Acute alteration in mental status (11/7/2022)      Convulsive syncope (11/7/2022)        Plan:     Patient with possible new stroke, and new MI, and may need an MRI scan to further evaluate her new strokes if there are some, and how many and whether there cardioembolic from A. fib despite her Eliquis. Has not been eating or drinking and this may be metabolic but it does not seem to account for her whole neurologic condition. She does have dementia, and metabolic studies were sent off to rule out treatable causes of her dementia  Her EEG just shows moderate generalized slowing. We will follow closely with you, and continue active medical care as you are are.     Signed By: Rommel Leo MD     November 7, 2022       CC: Shruthi Givens., FRANCK  FAX: [phone]

## 2022-11-07 NOTE — PROGRESS NOTES
Nephrology Progress Note  [facility], [place], [address]  Phone - [phone]  Fax - [phone]                 Patient: Suzette Musa                   YOB: 1950        Date- 11/7/2022                      Admit Date: 11/6/2022  CC: Follow up for ESRD          IMPRESSION & PLAN:   ESRD- [facility]- DR. TINAJERO  ANEMIA of ckd  Sec. Hyperpara  Hypertension  DM 2  Bradycardia  Altered mental status  hypotension        PLAN-  No indication for emergent hd today  Plan for hd today  Avoid hypotension  Hold epogen  D/w family      Subjective: Interval History:   -  Patient presented to er with confusion. She is on hd mwf at [employer]. Her last hd on Friday  Her bp is low with bradycardia  She is on dopamine gtt  Her missed hd last Monday due to pain. She had hd on Wednesday and Friday. ROS- Can't assess due to patient's current condition       Objective:   Vitals:    11/07/22 1001 11/07/22 1021 11/07/22 1031 11/07/22 1046   BP: (!) 118/58 92/65 (!) 103/36 (!) 116/37   Pulse: 60 66 (!) 51 (!) 48   Resp: 14 10 17 (!) 6   Temp:       SpO2: 91% 97% 97% 97%   Weight:       Height:          No intake/output data recorded. Last 3 Recorded Weights in this Encounter    11/06/22 1831   Weight: 45.5 kg (100 lb 5 oz)        Physical exam:    GEN: confused  NECK- no mass  RESP: No wheezing, decreased BS b/l  CVS: S1,S2  RRR  NEURO: Can't assess due to patient's current condition   EXT: right arm avg +  PSYCH: Can't assess due to patient's current condition       Chart reviewed. Pertinent Notes reviewed.      Data Review :  Recent Labs     11/07/22  0358 11/06/22  1845   NA  --  138   K  --  4.3   CL  --  99   CO2  --  27   BUN  --  61*   CREA  --  6.52*   GLU  --  218*   CA  --  9.8   MG 2.4  --    PHOS 1.7*  --      Recent Labs     11/07/22  0051 11/06/22  1845   WBC 18.2* 16.2*   HGB 11.4* 11.1*   HCT 34.3* 33.8*   * 141*     No results for input(s): FE, TIBC, PSAT, FERR in the last 72 hours.    Lab Results   Component Value Date/Time    Hemoglobin A1c 6.0 (H) 11/07/2022 03:58 AM      No results found for: MCACR, MCA1, MCA2, MCA3, MCAU, MCAU2, MCALPOCT  Lab Results   Component Value Date/Time    NT pro-BNP >35,000 (H) 08/02/2021 06:57 AM     US Results (most recent):  Medication list  reviewed  Current Facility-Administered Medications   Medication Dose Route Frequency    heparin 25,000 units in D5W 250 ml infusion  12-25 Units/kg/hr IntraVENous TITRATE    heparin (porcine) 1,000 unit/mL injection 1,370 Units  30 Units/kg IntraVENous PRN    Or    heparin (porcine) 1,000 unit/mL injection 2,730 Units  60 Units/kg IntraVENous PRN    hydrALAZINE (APRESOLINE) 20 mg/mL injection 10 mg  10 mg IntraVENous Q6H PRN    atorvastatin (LIPITOR) tablet 40 mg  40 mg Oral QHS    aspirin tablet 325 mg  325 mg Oral ONCE    nitroglycerin (NITROSTAT) tablet 0.4 mg  0.4 mg SubLINGual PRN    acetaminophen (TYLENOL) tablet 650 mg  650 mg Oral Q4H PRN    Or    acetaminophen (TYLENOL) solution 650 mg  650 mg Per NG tube Q4H PRN    Or    acetaminophen (TYLENOL) suppository 650 mg  650 mg Rectal Q4H PRN    aspirin delayed-release tablet 81 mg  81 mg Oral DAILY    [START ON 11/11/2022] ergocalciferol capsule 50,000 Units  50,000 Units Oral Q7D    repaglinide (PRANDIN) tablet 2 mg  2 mg Oral ACB&D    sevelamer carbonate (RENVELA) tab 800 mg  800 mg Oral TID WITH MEALS    nicotine (NICODERM CQ) 21 mg/24 hr patch 1 Patch  1 Patch TransDERmal DAILY    levothyroxine (SYNTHROID) tablet 50 mcg  50 mcg Oral ACB    tiZANidine (ZANAFLEX) tablet 2 mg  2 mg Oral TID PRN    lidocaine 4 % patch 1 Patch  1 Patch TransDERmal DAILY PRN    melatonin tablet 4.5 mg  4.5 mg Oral QHS    B complex-vitaminC-folic acid (NEPHROCAP) cap  1 Capsule Oral DAILY    glucose chewable tablet 16 g  16 g Oral PRN    insulin lispro (HUMALOG) injection 1-8 Units  1-8 Units SubCUTAneous AC&HS    dextrose 10% infusion 0-250 mL  0-250 mL IntraVENous PRN    DOPamine (INTROPIN) 800 mg in dextrose 5% 250 mL infusion  0-20 mcg/kg/min IntraVENous TITRATE    glucagon (GLUCAGEN) injection 1 mg  1 mg IntraMUSCular PRN    insulin lispro (HUMALOG) injection   SubCUTAneous AC&HS    acetaminophen (TYLENOL) tablet 650 mg  650 mg Oral Q4H PRN    Or    acetaminophen (TYLENOL) solution 650 mg  650 mg Per NG tube Q4H PRN    Or    acetaminophen (TYLENOL) suppository 650 mg  650 mg Rectal Q4H PRN    piperacillin-tazobactam (ZOSYN) 3.375 g in 0.9% sodium chloride (MBP/ADV) 100 mL MBP  3.375 g IntraVENous Q12H    sodium phosphate 20 mmol in 0.9% sodium chloride 500 mL infusion   IntraVENous ONCE     Current Outpatient Medications   Medication Sig    atorvastatin (LIPITOR) 40 mg tablet Take 1 Tablet by mouth daily. Dialyvite 800 0.8 mg tab tablet TAKE 1 TABLET BY MOUTH ONCE DAILY FOR CKD    latanoprost (XALATAN) 0.005 % ophthalmic solution INSTILL 1 DROP BY OPHTHALMIC ROUTE EVERY DAY INTO LEFT EYE AT BEDTIME    tiZANidine (ZANAFLEX) 2 mg tablet Take 1 Tablet by mouth three (3) times daily as needed for Muscle Spasm(s). lidocaine (LIDODERM) 5 % 1 Patch by TransDERmal route every twelve (12) hours. Apply patch to the affected area for 12 hours a day and remove for 12 hours a day. amLODIPine (NORVASC) 5 mg tablet Take 1 Tablet by mouth daily. Blood-Glucose Meter monitoring kit E11.65 check sugar once daily fasting    lancets misc Check sugar once daily. E11.65    glucose blood VI test strips strip Check sugar once daily fasting. E11.65    famotidine (PEPCID) 20 mg tablet TAKE 1 TABLET BY MOUTH TWO (2) TIMES DAILY AS NEEDED FOR HEARTBURN.    levothyroxine (SYNTHROID) 50 mcg tablet TAKE 1 TABLET BY MOUTH EVERY DAY BEFORE BREAKFAST    apixaban (ELIQUIS) 2.5 mg tablet Take 1 Tablet by mouth every twelve (12) hours.     nicotine (NICODERM CQ) 21 mg/24 hr 1 Patch by TransDERmal route daily for 30 days. ergocalciferol (ERGOCALCIFEROL) 1,250 mcg (50,000 unit) capsule Take 50,000 Units by mouth. repaglinide (PRANDIN) 2 mg tablet Take 2 mg by mouth.    sevelamer carbonate (RENVELA) 800 mg tab tab Take 1 Tablet by mouth.    melatonin 5 mg cap capsule Take 1 Capsule by mouth nightly. aspirin delayed-release 81 mg tablet Take  by mouth daily. brimonidine-timoloL (Combigan) 0.2-0.5 % drop ophthalmic solution 1 Drop every twelve (12) hours.         Derik Kumar MD  11/7/2022

## 2022-11-07 NOTE — ED NOTES
Pt daughter at bedside at this time. Per daughter, pt has a history of dementia, but at baseline is alert and oriented, ambulatory with assistance, and talkative. Daughter had concern because today pt had decreased alertness and responsiveness, and was not acting like herself. Currently on assessment, pt is alert to self, disoriented to place, time and situation. Pt is a MWF HD pt, with last dialysis on Friday.

## 2022-11-07 NOTE — CONSULTS
Pt seen and examined. Full consult dictated.     Known to me from prior admission last month    On IV dopamine; currently in afib    Will likely need pacemaker prior to d/c    Await echo

## 2022-11-07 NOTE — ADT AUTH CERT NOTES
Comments  Comment          [facility]     FACILITY NPI :[id number]  FACILITY TAX ID : [id number]     [address]  [facility] [address] [place] [id number] [facility] HOSPITAL: [phone]  FX: [phone]      Patient Name :Greg Barnes   : 1950 (72 yrs)  MRN : [id number]     Patient Mailing Address [address] [place] [address] [] , [id number]                                                             . Insurance Plan Payor: [facility] MEDICARE / [facility] / Product Type: Managed Care Medicare /      Primary Coverage Subscriber ID : [id number]     Secondary Coverage:  [address] [place] [address]     Secondary Subscriber ID :  [id number]      Current Patient Class : INPATIENT  Admit Date : 2022     REQUESTED LEVEL OF CARE: INPATIENT [101]                                                           Diagnosis : Stroke (cerebrum) (HCC);NSTEMI (non-ST elevated myocardial infarction) ([facility]                          ICD10 Code : Stroke (cerebrum) ([facility] [I63.9]  NSTEMI (non-ST elevated myocardial infarction) [facility]) [I21.4]     Current Room and Bed 2538/01     Admitting and Attending Info:  Admitting Provider : Stephen Sepulveda MD   NPI: [id number]  Admitting Provider Phone.  [phone]  Admitting Provider Address: [address] [place] [address]     Attending Provider Jerrell Aguayo MD   [id number]  Attending Provider Address:  [address] [place] [address]     Attending Provider Phone: Drea herrera phone: [phone]        Utilization Reviews       Stroke: Ischemic - Care Day 2 (2022) by Edison Godfrey       Review Entered Review Status 11/7/2022 1456 Completed      Criteria Review      Care Day: 2 Care Date: 11/7/2022 Level of Care: ICU    Guideline Day 2    Level Of Care    (X) Stroke unit, ICU, telemetry, or floor    11/7/2022 14:56:54 EST by Lonny Lemus      Transferred to ICU today    Clinical Status    ( ) * Hemodynamic stability    11/7/2022 14:56:54 EST by Candie Ramsay very bradycardic intermittantly with HR down to 42, BP labile 92//81, Temp 98.9, RR 8-26, RA sat 95%. WBC 18.2, hgb 11.4, hct 34.3, plt 143. aPTT 30.8, 36.0. phos 1.7. Trig 204. Trop 1077, 880. hgbA1c 6.0. ABG: pH 7.48, pCO2 33. bld cx x1    ( ) * Mental status at baseline or stable    ( ) * Neurologic deficits absent or stable    ( ) * Unimpaired swallowing    Activity    ( ) * Up to chair    Routes    ( ) * Oral hydration    ( ) * Oral medications    11/7/2022 14:56:54 EST by Lonny Lemus      dopamine drip at 2-8mcg/kg/min, atropine 1mg IV x2 and 0.6mg IV x1 in ED, Zosyn 4.5g IV x1 and 3.375g IV Q12H. All po meds ordered held-NPO    ( ) * Advance diet as tolerated    11/7/2022 14:56:54 EST by Lonny Lemus      NPO. Medications    (X) Antithrombotic therapy    11/7/2022 14:56:54 EST by Lonny Lemus      heparin 2730un IV x1 and 2730un IV x1 with cont heparin drip at 12-16un/kg/hr    * Milestone   Additional Notes   Assessment / Plan:    Altered mental status-of note patient presents with above-mentioned symptomatology with altered mental status, unclear as to etiology, could be secondary to infectious etiologies versus CVA versus metabolic causes   Obtain urinalysis   Obtain blood cultures   Start Zosyn for presumed UTI, discontinue if urinalysis negative   Aspirin once daily   Lipitor once daily   Obtain MRI brain for further evaluation   CT head reviewed   Neurology consult further evaluation      Non-ST elevation MI-patient presented with above-mentioned symptomatology with significant troponin elevation consistent with non-ST elevation MI   Currently status post aspirin 325 mg x 1   Aspirin 81 mg once daily   Continue heparin GTT for 48 hours of anticoagulation   Trend cardiac enzymes   Obtain transthoracic echo   Obtain cardiology consult      Symptomatic bradycardia-of note patient found to have symptomatic bradycardia, does have a history of A. fib, could be secondary to A. fib with slow ventricular response, status post atropine x2 with continuous bradycardia   Start dopamine gtt. Avoid AV mason blockers   Continue heparin GTT for anticoagulation   Follow-up transthoracic echo   Follow cardiology recommendations      Hyperglycemia type 2 diabetes-insulin sliding scale during the course of this admission      Hypophosphatemia-replete phosphate and recheck phosphate      Prophylaxis-Heparin GTT   FEN-n.p.o., replete potassium and magnesium   Full code, patient surrogate decision-maker is her daughter Ulises Corona [phone], updated via telephone, patient is full code however she will discuss [address] with her family and update care team              Stroke: Ischemic - Care Day 1 (11/6/2022) by Edison Godfrey       Review Entered Review Status   11/7/2022 1449 Completed      Criteria Review      Care Day: 1 Care Date: 11/6/2022 Level of Care: Telemetry    Guideline Day 1    Level Of Care    (X) Stroke unit, ICU, telemetry, or floor    11/7/2022 14:49:30 EST by Chayo Sam      Tele    Clinical Status    (X) * Clinical Indications met    11/7/2022 14:49:30 EST by Ronel Das old female w/PMH as doc to ED with AMS. Pt usually talkative although demented. Now bedbound, essentially unresponsive x2 days. Tmax 99.7, 54, 139/50, 23, RA sat 96%. 45.5kg.  NPO    (X) Cerebral hemorrhage excluded    11/7/2022 14:49:30 EST by Chayo Sam      CT head excludes ICH    Interventions    (X) Neurologic checks    11/7/2022 14:49:30 EST by Chayo Sam      Neurocheck per unit routine    (X) Laboratory studies    11/7/2022 14:49:30 EST by [address] WBC 16.2, hgb 11.1, hct 33.8, plt 141. Gluc 218, bun 61, creat 6.52, alb 2.8, ast 41, alk phos 138. UDS neg for all. (X) Cardiac monitoring    11/7/2022 14:49:30 EST by Baljit, [facility] tele. 12 lead EKG: atrial fib, ST and t wave abn, consider lateral ischmia, vent rate 62, atrial rate 250    (X) Imaging studies (ie, brain, intracranial vascular)    11/7/2022 14:49:30 EST by Tyra Alvarenga      CT head: new subtle hypodensity in the superior left cerebellum. May represent a small lacunar infarct. Rec MRI imaging. ENcephalomalacia in the left hemisphere and white matter changes not sig progressed otherwise. * Milestone   Additional Notes   Plan:       Acute altered mentation, possibly secondary to acute CVA   -Neurochecks   -Echocardiogram   -MRI with and without contrast-need this because I am also starting the patient on a heparin drip, and I want to make sure that there is no bleed in the morning   -Neurology consult: I wanted to start a heparin drip on the patient overnight, because my reasoning was that if the infarct is already showing up on CT head, that it must be at least a couple days old, therefore hemorrhagic transformation is unlikely; neurology agreed with me, and so I decided to put the patient on a heparin drip   -We will not allow for permissive hypertension       Acute NSTEMI   -Based upon the fact that the patient's troponins are downtrending, perhaps the patient's NSTEMI is in the resolving phase   -I am going to consult cardiology at this point to determine if the patient really needs a heparin drip. This will be important in this particular case, because the patient has a concomitant CVA      Acute bradycardia   -Difficult to tell if this is symptomatic or not   -Because of all of the patient's acute issues, I gave the patient a dose of atropine, which completely resolved her bradycardia.   She is not sustaining in the 70s for few hours now   -I am going to put transcutaneous pacers on the patient as needed for heart rate less than 40   -Cardiology consult as above      ESRD   -Patient has AV fistula   -We will consult nephrology for the patient's hemodialysis   -Reorder home ergocalciferol, [place]      Diabetes   -Sliding scale insulin   -We will hold home medications      Hypertension   -We will hold home Norvasc for the time being; I am putting on as needed hydralazine             Physical Exam   Vitals and nursing note reviewed. Constitutional:        General: She is not in acute distress. Appearance: She is ill-appearing. Comments: Eyes closed but responds to gentle tactile stimulus. Is oriented to self, otherwise does not answer questions    HENT:       Head: Normocephalic and atraumatic. Comments: Patient is breathing spontaneously, protecting airway      Mouth/Throat:       Mouth: Mucous membranes are moist.    Eyes:       Conjunctiva/sclera: Conjunctivae normal.    Cardiovascular:       Rate and Rhythm: Normal rate and regular rhythm. Pulses: Normal pulses. Pulmonary:       Effort: Pulmonary effort is normal.       Breath sounds: Normal breath sounds. Abdominal:       General: Abdomen is flat. Palpations: Abdomen is soft. Tenderness: There is no abdominal tenderness. Musculoskeletal:          General: No deformity. Comments: No pain with range of motion of right hip    Skin:      General: Skin is warm and dry. Comments: AV fistula is palpable bilateral upper extremities, thrill on left    Neurological:       Comments: Patient's eyes open to voice and gentle stimulus does cross midline to painful stimulus. Is able to state her name otherwise just moaning. Psychiatric:          Thought Content:  Thought content normal        Stroke: Ischemic - Clinical Indications for Admission to Inpatient Care by Toni NGUYEN       Review Entered Review Status   11/7/2022 9018 Completed      Criteria Review      Clinical Indications for Admission to Inpatient Care    Most Recent : Harmony Hansen Most Recent Date: 11/7/2022 14:43:01 EST    (X) Admission is indicated for  1 or more  of the following  [A] [B] (1) (3) (4) (5) (6):       (X) Acute ischemic stroke [A] with neurologic findings that warrant inpatient care, as indicated       by  1 or more  of the following :          (X) Altered mental status          11/7/2022 14:43:01 EST by Harmony Hansen            unresponsive for 2 days per family. At baseline, pt is talkative however demented. EMS found responsive to verbal and painful stimulus however nonverbal    Notes:    11/7/2022 14:43:01 EST by Harmony Hansen    Subject: Additional Clinical Information      * 73yr old female with PMH ESRD, CAD, dementia, HTN, seizures, thyroid disease, previous stroke to ED via EMS for AMS as doc. Imaging c/w new stroke. Pt also + new NSTEMI. Admit. H&P Notes     H&P by Patsy Herrmann DO at 11/06/22 2300 documented on ED to Hosp-Admission (Current) from 11/6/2022 in [facility]    Author: Patsy Herrmann DO Author Type: Physician Filed: 11/07/22 6287   Note Status: Signed Cosign: Cosign Not Required Date of Service: 11/06/22 2300   : Patsy Herrmann DO (Physician)               Expand All Collapse All  PLEASE NOTE: I HAVE GENERATED THIS NOTE WITH THE ASSISTANCE OF VOICE-RECOGNITION TECHNOLOGY. PLEASE EXCUSE ANY SPELLING, GRAMMATICAL, AND SYNTAX ERRORS YOU MAY FIND. IF YOU NEED CLARIFICATION ON ANYTHING, PLEASE FEEL FREE TO REACH OUT TO ME.  Doneta Bidding YOU          [facility]  History and Physical - Dr. Marija Manning:      67 y.o. female with pertinent medical hx of CAD, ESRD, prior strokes presented with altered mentation. Apparently, the patient has been deteriorating and becoming more more nonresponsive over the past couple of days.   Prior to this, per family, the patient has been doing just fine     VS were initially stable. However, throughout the course of the night, the patient began to become more more bradycardic. Blood pressure, oxygen saturation, respirations remained stable     Physical exam revealed extremely dry mucous membranes, patient arousable but not really responding to any questions, no focal neurological deficits     Labs revealed leukocytosis of 16.2 on admission; creatinine of 6.52; initial troponin of 1348; troponin then dropped to 1077     Imaging revealed possible infarct on CT head     Active Problems:    Stroke (cerebrum) ([facility] (11/6/2022)       NSTEMI (non-ST elevated myocardial infarction) ([facility] [address] (11/6/2022)           Plan:      Acute altered mentation, possibly secondary to acute CVA  -Neurochecks  -Echocardiogram  -MRI with and without contrast-need this because I am also starting the patient on a heparin drip, and I want to make sure that there is no bleed in the morning  -Neurology consult: I wanted to start a heparin drip on the patient overnight, because my reasoning was that if the infarct is already showing up on CT head, that it must be at least a couple days old, therefore hemorrhagic transformation is unlikely; neurology agreed with me, and so I decided to put the patient on a heparin drip  -We will not allow for permissive hypertension     Acute NSTEMI  -Based upon the fact that the patient's troponins are downtrending, perhaps the patient's NSTEMI is in the resolving phase  -I am going to consult cardiology at this point to determine if the patient really needs a heparin drip. This will be important in this particular case, because the patient has a concomitant CVA    Acute bradycardia  -Difficult to tell if this is symptomatic or not  -Because of all of the patient's acute issues, I gave the patient a dose of atropine, which completely resolved her bradycardia.   She is not sustaining in the 70s for few hours now  -I am going to put transcutaneous pacers on the patient as needed for heart rate less than 40  -Cardiology consult as above    ESRD  -Patient has AV fistula  -We will consult nephrology for the patient's hemodialysis  -Reorder home ergocalciferol, Savella Cuco    Diabetes  -Sliding scale insulin  -We will hold home medications    Hypertension  -We will hold home Norvasc for the time being; I am putting on as needed hydralazine    I am going to hold patient's home Eliquis, as have started her on a heparin drip; If code status was discussed with the patient, then code status entered as per the orders: Full                                      Subjective:   Primary Care Provider: Lenny Bal NP  Date of Service:  See Date Note Was Originated  Chief Complaint: Altered mental status     67 y.o. female presents with 2 days duration of gradual onset, gradually worsening, severe, constant, altered mentation that is not associated with her symptoms, remitted by nothing, exacerbated by nothing. Per the patient's daughter that is at bedside, patient is usually pretty responsive and talkative, but for the past couple days, has only been able to lay in bed and is not responding well. Review of Systems:  12 point ROS obtained and otherwise negative, except as per HPI and above.        Past Medical History:   Diagnosis Date    CAD (coronary artery disease)      Cancer ([address]       possible stomach cancer 2021- workup in progress    Chronic kidney disease       Orlando Tesfaye- M-W-F    Dementia ([address]       per daughter pt is alert and oriented x3    Diabetes ([address]      GERD (gastroesophageal reflux disease)      Hyperlipidemia      Hypertension      Seizures ([address]       possible while in hospital 8/2021    Stroke [facility])      Thyroid disease              Past Surgical History:   Procedure Laterality Date    HX APPENDECTOMY         08/21    HX VASCULAR ACCESS         HD catheter    IR INSERT NON TUNL CVC OVER 5 YRS   7/30/2021    IR INSERT NON TUNL CVC OVER 5 YRS   7/30/2021    IR INSERT TUNL CVC W/O PORT OVER 5 YR   8/4/2021    GA CABG, ARTERY-VEIN, FOUR         5 years ago    VASCULAR SURGERY PROCEDURE UNLIST         right arm graft              Prior to Admission medications    Medication Sig Start Date End Date Taking? Authorizing Provider   atorvastatin (LIPITOR) 40 mg tablet Take 1 Tablet by mouth daily. 11/6/22     Azaliaus Fleming., NP   Dialyvite 800 0.8 mg tab tablet TAKE 1 TABLET BY MOUTH ONCE DAILY FOR CKD 10/30/22     Provider, Historical   latanoprost (XALATAN) 0.005 % ophthalmic solution INSTILL 1 DROP BY OPHTHALMIC ROUTE EVERY DAY INTO LEFT EYE AT BEDTIME 10/29/22     Provider, Historical   tiZANidine (ZANAFLEX) 2 mg tablet Take 1 Tablet by mouth three (3) times daily as needed for Muscle Spasm(s). 11/3/22     Azalia Lonnie., NP   lidocaine (LIDODERM) 5 % 1 Patch by TransDERmal route every twelve (12) hours. Apply patch to the affected area for 12 hours a day and remove for 12 hours a day. 11/3/22     Azalia Bon., NP   amLODIPine (NORVASC) 5 mg tablet Take 1 Tablet by mouth daily. 11/3/22     Azalia Bon., NP   Blood-Glucose Meter monitoring kit E11.65 check sugar once daily fasting 11/3/22     Azalia Bon., NP   lancets misc Check sugar once daily. E11.65 11/3/22     Azalia Bon., NP   glucose blood VI test strips strip Check sugar once daily fasting. E11.65 11/3/22     Ermelinad QUACH, NP   famotidine (PEPCID) 20 mg tablet TAKE 1 TABLET BY MOUTH TWO (2) TIMES DAILY AS NEEDED FOR HEARTBURN. 10/31/22     Ermelinda LAM., NP   levothyroxine (SYNTHROID) 50 mcg tablet TAKE 1 TABLET BY MOUTH EVERY DAY BEFORE BREAKFAST 10/31/22     Azalia Fleming., NP   apixaban (ELIQUIS) 2.5 mg tablet Take 1 Tablet by mouth every twelve (12) hours. 10/19/22     Angely Herzog MD   nicotine (NICODERM CQ) 21 mg/24 hr 1 Patch by TransDERmal route daily for 30 days.  10/20/22 11/19/22 Nash Blair MD   ergocalciferol (ERGOCALCIFEROL) 1,250 mcg (50,000 unit) capsule Take 50,000 Units by mouth. Provider, Historical   repaglinide (PRANDIN) 2 mg tablet Take 2 mg by mouth. Provider, Historical   sevelamer carbonate (RENVELA) 800 mg tab tab Take 1 Tablet by mouth. Provider, Historical   melatonin 5 mg cap capsule Take 1 Capsule by mouth nightly. 10/3/22     Mar Tavarez NP   aspirin delayed-release 81 mg tablet Take  by mouth daily. Provider, Historical   brimonidine-timoloL (Combigan) 0.2-0.5 % drop ophthalmic solution 1 Drop every twelve (12) hours. Provider, Historical      No Known Allergies   No family history on file. Could not elicit this from the patient at this time, due to mentation  Social History               Socioeconomic History    Marital status:    Tobacco Use    Smoking status: Never    Smokeless tobacco: Never   Vaping Use    Vaping Use: Never used   Substance and Sexual Activity    Alcohol use: Not Currently    Drug use: Not Currently    Sexual activity: Yes       Partners: Female      . Objective:   Physical Exam:      VS as below     Const'l:                        Normal body habitus, a&o, no acute distress  Head/Neck:                 no cervical/head mass  Eyes:                           nonicteric sclera, eom intact  ENT:                            auditory acuity grossly intact either with or without device, no nasal deformity  Cardio:                        Initially regular rate regular rhythm  Pulm:                           no accessory muscle use  Abd:                             S NT ND  Derm:                          no rashes, no ulcers, no lesions  Extr:                             No edema, no cyanosis, no calf tenderness, no varicosities  Neuro:                         cn II-XII intact  Psych:                         mood cannot ascertain, judgement cannot ascertain     Data Review:    All diagnostic labs and studies have been reviewed.

## 2022-11-07 NOTE — H&P
PLEASE NOTE: I HAVE GENERATED THIS NOTE WITH THE ASSISTANCE OF VOICE-RECOGNITION TECHNOLOGY. PLEASE EXCUSE ANY SPELLING, GRAMMATICAL, AND SYNTAX ERRORS YOU MAY FIND. IF YOU NEED CLARIFICATION ON ANYTHING, PLEASE FEEL FREE TO REACH OUT TO ME.  Crescencio Fleming [facility]  History and Physical - Dr. Blanchard Model:     67 y.o. female with pertinent medical hx of CAD, ESRD, prior strokes presented with altered mentation. Apparently, the patient has been deteriorating and becoming more more nonresponsive over the past couple of days. Prior to this, per family, the patient has been doing just fine    VS were initially stable. However, throughout the course of the night, the patient began to become more more bradycardic.   Blood pressure, oxygen saturation, respirations remained stable    Physical exam revealed extremely dry mucous membranes, patient arousable but not really responding to any questions, no focal neurological deficits    Labs revealed leukocytosis of 16.2 on admission; creatinine of 6.52; initial troponin of 1348; troponin then dropped to 1077    Imaging revealed possible infarct on CT head    Active Problems:    Stroke (cerebrum) ([facility] (11/6/2022)      NSTEMI (non-ST elevated myocardial infarction) ([facility] (11/6/2022)        Plan:     Acute altered mentation, possibly secondary to acute CVA  -Neurochecks  -Echocardiogram  -MRI with and without contrast-need this because I am also starting the patient on a heparin drip, and I want to make sure that there is no bleed in the morning  -Neurology consult: I wanted to start a heparin drip on the patient overnight, because my reasoning was that if the infarct is already showing up on CT head, that it must be at least a couple days old, therefore hemorrhagic transformation is unlikely; neurology agreed with me, and so I decided to put the patient on a heparin drip  -We will not allow for permissive hypertension    Acute NSTEMI  -Based upon the fact that the patient's troponins are downtrending, perhaps the patient's NSTEMI is in the resolving phase  -I am going to consult cardiology at this point to determine if the patient really needs a heparin drip. This will be important in this particular case, because the patient has a concomitant CVA    Acute bradycardia  -Difficult to tell if this is symptomatic or not  -Because of all of the patient's acute issues, I gave the patient a dose of atropine, which completely resolved her bradycardia. She is not sustaining in the 70s for few hours now  -I am going to put transcutaneous pacers on the patient as needed for heart rate less than 40  -Cardiology consult as above    ESRD  -Patient has AV fistula  -We will consult nephrology for the patient's hemodialysis  -Reorder home ergocalciferol, Savella Superior    Diabetes  -Sliding scale insulin  -We will hold home medications    Hypertension  -We will hold home Norvasc for the time being; I am putting on as needed hydralazine    I am going to hold patient's home Eliquis, as have started her on a heparin drip; If code status was discussed with the patient, then code status entered as per the orders: Full                          Subjective:   Primary Care Provider: Eleni Toribio NP  Date of Service:  See Date Note Was Originated  Chief Complaint: Altered mental status    67 y.o. female presents with 2 days duration of gradual onset, gradually worsening, severe, constant, altered mentation that is not associated with her symptoms, remitted by nothing, exacerbated by nothing. Per the patient's daughter that is at bedside, patient is usually pretty responsive and talkative, but for the past couple days, has only been able to lay in bed and is not responding well. Review of Systems:  12 point ROS obtained and otherwise negative, except as per HPI and above.   Past Medical History:   Diagnosis Date    CAD (coronary artery disease) Cancer [facility])     possible stomach cancer 2021- workup in progress    Chronic kidney disease     Yemi Forrest- M-W-F    Dementia ([facility] [address]     per daughter pt is alert and oriented x3    Diabetes ([facility]     GERD (gastroesophageal reflux disease)     Hyperlipidemia     Hypertension     Seizures ([facility] [address]     possible while in hospital 8/2021    Stroke [facility])     Thyroid disease       Past Surgical History:   Procedure Laterality Date    HX APPENDECTOMY      08/21    HX VASCULAR ACCESS      HD catheter    IR INSERT NON TUNL CVC OVER 5 YRS  7/30/2021    IR INSERT NON TUNL CVC OVER 5 YRS  7/30/2021    IR INSERT TUNL CVC W/O PORT OVER 5 YR  8/4/2021    MA CABG, ARTERY-VEIN, FOUR      5 years ago    VASCULAR SURGERY PROCEDURE UNLIST      right arm graft     Prior to Admission medications    Medication Sig Start Date End Date Taking? Authorizing Provider   atorvastatin (LIPITOR) 40 mg tablet Take 1 Tablet by mouth daily. 11/6/22   Leslee Rasmussen NP   Dialyvite 800 0.8 mg tab tablet TAKE 1 TABLET BY MOUTH ONCE DAILY FOR CKD 10/30/22   Provider, Historical   latanoprost (XALATAN) 0.005 % ophthalmic solution INSTILL 1 DROP BY OPHTHALMIC ROUTE EVERY DAY INTO LEFT EYE AT BEDTIME 10/29/22   Provider, Historical   tiZANidine (ZANAFLEX) 2 mg tablet Take 1 Tablet by mouth three (3) times daily as needed for Muscle Spasm(s). 11/3/22   Leslee Rasmussen NP   lidocaine (LIDODERM) 5 % 1 Patch by TransDERmal route every twelve (12) hours. Apply patch to the affected area for 12 hours a day and remove for 12 hours a day. 11/3/22   Leslee Rasmussen NP   amLODIPine (NORVASC) 5 mg tablet Take 1 Tablet by mouth daily. 11/3/22   Leslee Rasmussen NP   Blood-Glucose Meter monitoring kit E11.65 check sugar once daily fasting 11/3/22   Leslee Rasmussen NP   lancets misc Check sugar once daily. E11.65 11/3/22   Leslee Rasmussen NP   glucose blood VI test strips strip Check sugar once daily fasting.  E11.65 11/3/22 Jennifer Spencer NP   famotidine (PEPCID) 20 mg tablet TAKE 1 TABLET BY MOUTH TWO (2) TIMES DAILY AS NEEDED FOR HEARTBURN. 10/31/22   Lianna QUACH NP   levothyroxine (SYNTHROID) 50 mcg tablet TAKE 1 TABLET BY MOUTH EVERY DAY BEFORE BREAKFAST 10/31/22   Jennifer Spencer NP   apixaban (ELIQUIS) 2.5 mg tablet Take 1 Tablet by mouth every twelve (12) hours. 10/19/22   Homar Camarena MD   nicotine (NICODERM CQ) 21 mg/24 hr 1 Patch by TransDERmal route daily for 30 days. 10/20/22 11/19/22  Homar Camarena MD   ergocalciferol (ERGOCALCIFEROL) 1,250 mcg (50,000 unit) capsule Take 50,000 Units by mouth. Provider, Historical   repaglinide (PRANDIN) 2 mg tablet Take 2 mg by mouth. Provider, Historical   sevelamer carbonate (RENVELA) 800 mg tab tab Take 1 Tablet by mouth. Provider, Historical   melatonin 5 mg cap capsule Take 1 Capsule by mouth nightly. 10/3/22   Jennifer Spencer NP   aspirin delayed-release 81 mg tablet Take  by mouth daily. Provider, Historical   brimonidine-timoloL (Combigan) 0.2-0.5 % drop ophthalmic solution 1 Drop every twelve (12) hours. Provider, Historical     No Known Allergies   No family history on file. Could not elicit this from the patient at this time, due to mentation  Social History     Socioeconomic History    Marital status:    Tobacco Use    Smoking status: Never    Smokeless tobacco: Never   Vaping Use    Vaping Use: Never used   Substance and Sexual Activity    Alcohol use: Not Currently    Drug use: Not Currently    Sexual activity: Yes     Partners: Female   .   Objective:   Physical Exam:     VS as below    Const'l:          Normal body habitus, a&o, no acute distress  Head/Neck:       no cervical/head mass  Eyes:     nonicteric sclera, eom intact  ENT:      auditory acuity grossly intact either with or without device, no nasal deformity  Cardio:           Initially regular rate regular rhythm  Pulm:     no accessory muscle use  Abd:       S NT ND  Derm:     no rashes, no ulcers, no lesions  Extr:      No edema, no cyanosis, no calf tenderness, no varicosities  Neuro:    cn II-XII intact  Psych:   mood cannot ascertain, judgement cannot ascertain    Data Review: All diagnostic labs and studies have been reviewed.

## 2022-11-07 NOTE — PROGRESS NOTES
Called to assist PIV placement on a dialysis patient with active Fistula on the right upper, on assessment of the left arm, unable to visualize viable vessel at the forearm and the upper arm that has old non working fistula. No veins observed that is appropriate for PIV, Midline or  Endurance. Not a candidate for PICC placement as a dialysis patient. Angel Mondragon RN informed.

## 2022-11-07 NOTE — PROGRESS NOTES
Speech pathology note  Reviewed chart and note patient just admitted to hospital; awaiting H&P. Note rapid response just called on patient as well. SLP will follow for formal evaluation when additional information available in the chart and patient medically stable. Thank you. Addendum [phone]: Discussed case with RN who recommends holding SLP evaluation at this time given RRT called this morning. SLP will follow up tomorrow as medically appropriate. Thank you.     Bin Fernandez, Shea Morris., CCC-SLP

## 2022-11-08 NOTE — PROGRESS NOTES
Called to bedside approx. 2130 for reports of excessive bleeding around central line left IJ site. PCXR reviewed with line in undesireable position and new line placement attempted on right IJ but unable to thread wire. Prior attempts also made at this site. Pressure dressing with Quick clot placed on left IJ but blood continued to leak aggressively from the site. 2 additional sutures placed at insertion site for possible overdilation but bleeding continued. Blood noted to be bright red blood and ABG obtained to verify with results consistent with arterial blood 7.42/41/81/26. quick clot applied, pressure held x 10 minutes and dressing placed. Left IJ transduced to monitor with pulsatile arterial waveform present but not accurate BP. Left femoral groin CVL placed without difficulty with return of dark blood, VBG obtained as control in comparison with results consistent with VBG with Po2 30s. Pressure held additional 20 minutes and CBC/ Coags sent. Dr. Kd Hernandez notified at [phone] and will come in to see patient. Heparin gtt has been off since approx. 2000 pm tonight for bleeding. Per chart review patient is on Eliquis and ASA at home with unknown last dose of Eliquis or ASA, appears none since admitted to hospital on 11/6/22. New labs:  CBC down to 10.4 from 11.4  Plts down to 107 from 143  INR 1.4  Fibrinogen pending.     NAN Staples-BC

## 2022-11-08 NOTE — PROGRESS NOTES
Critical Care Progress Note  Ronit Antoine MD          Date of Service:  2022  NAME:  Joseph Wray  :  1950  MRN:  [id number]      Subjective/Hospital course:      : Patient was intubated overnight to remove suspected arterial placement of central line but did not seem to be the case on exploration. She is intubated, off sedation. Still not following commands. Active Problems Being Managed:      -Septic/Cardiogenic shock.  -Symptomatic bradycardia.  -Acute metabolic encephalopathy.  -Suspected misplacement of left IJ central line and line site bleeding. Now explanted by vascular surgery. -NSTEMI.  -DM2. -Hypothyroidism. -ESRD.  -HLD. -H/o HTN. Assessment/Plan:      Septic/cardiogenic shock.  -Coag neg staph bacteremia.  -Optimize perfusion and pressors if needed for goal MAP >65  -Closely monitor hemodynamics and markers of end organ perfusion including mental status, UO and lactate.  -Follow up cultures and change abx accordingly. Symptomatic bradycardia.  -Continue dopamine, and levophed. -Titrate for HR >50 and for MAP >65. NSTEMI:  -Continue ASA and statin. -Heparin gtt on hold due to central line site bleeding.   -Cardiology consulted. Acute metabolic encephalopathy.  - Closely monitor mental status. - Delirium precautions. - Correct underlying metabolic issues. Hypothyroidism:  -Continue levothyroxine. DVT prophylaxis: Heparin gtt. SUP: Pepcid  Code status: Full code. Next of kin: Donnie Leblanc (Daughter) [phone] [facility]). Plan was discussed with family in detail. I personally spent 40 minutes of critical care time. This is time spent at this critically ill patient's bedside actively involved in patient care as well as the coordination of care and discussions with the patient's family. This does not include any procedural time which has been billed separately.       Review of Systems:   Review of Systems   Unable to perform ROS: Medical condition        Vital Signs:   Patient Vitals for the past 4 hrs:   BP Temp Temp src Pulse Resp SpO2   11/08/22 1230 (!) 108/43 -- -- 62 20 100 %   11/08/22 1215 (!) 115/44 -- -- (!) 56 18 --   11/08/22 1200 (!) 101/30 (!) 96.4 °F (35.8 °C) Axillary (!) 55 16 --   11/08/22 1129 -- -- -- 60 16 99 %   11/08/22 1100 (!) 104/17 -- -- (!) 56 18 100 %   11/08/22 1000 (!) 78/31 -- -- (!) 57 17 99 %   11/08/22 0900 (!) 46/20 -- -- (!) 47 17 99 %   11/08/22 0858 -- -- -- 60 24 98 %        Intake/Output Summary (Last 24 hours) at 11/8/2022 1235  Last data filed at 11/8/2022 0700  Gross per 24 hour   Intake 619.28 ml   Output 0 ml   Net 619.28 ml        Physical Examination:    Physical Exam  Constitutional:       Comments: Intubated   HENT:      Head: Normocephalic and atraumatic. Eyes:      Conjunctiva/sclera: Conjunctivae normal.   Cardiovascular:      Rate and Rhythm: Normal rate. Rhythm irregular. Pulmonary:      Effort: No respiratory distress. Abdominal:      General: Abdomen is flat. There is no distension. Palpations: Abdomen is soft. Tenderness: There is no abdominal tenderness. There is no guarding. Musculoskeletal:      Cervical back: Neck supple. Neurological:      Comments: Altered mental status. Labs and Imaging:   Reviewed.       Medications:     Current Facility-Administered Medications   Medication Dose Route Frequency    NOREPINephrine (LEVOPHED) 8 mg in 5% dextrose 250mL (32 mcg/mL) infusion  0.5-16 mcg/min IntraVENous TITRATE    VANCOMYCIN INFORMATION NOTE   Other Rx Dosing/Monitoring    heparin (porcine) 2,000 Units in sodium chloride irrigation 0.9 % 500 mL Irrigation    PRN    fentaNYL citrate (PF) injection 25-50 mcg  25-50 mcg IntraVENous Q1H PRN    heparin 25,000 units in D5W 250 ml infusion  12-25 Units/kg/hr IntraVENous TITRATE    heparin (porcine) 1,000 unit/mL injection 1,370 Units  30 Units/kg IntraVENous PRN    Or    heparin (porcine) 1,000 unit/mL injection 2,730 Units  60 Units/kg IntraVENous PRN    hydrALAZINE (APRESOLINE) 20 mg/mL injection 10 mg  10 mg IntraVENous Q6H PRN    atorvastatin (LIPITOR) tablet 40 mg  40 mg Oral QHS    nitroglycerin (NITROSTAT) tablet 0.4 mg  0.4 mg SubLINGual PRN    [Held by provider] aspirin delayed-release tablet 81 mg  81 mg Oral DAILY    [START ON 11/11/2022] ergocalciferol capsule 50,000 Units  50,000 Units Oral Q7D    nicotine (NICODERM CQ) 21 mg/24 hr patch 1 Patch  1 Patch TransDERmal DAILY    levothyroxine (SYNTHROID) tablet 50 mcg  50 mcg Oral ACB    tiZANidine (ZANAFLEX) tablet 2 mg  2 mg Oral TID PRN    lidocaine 4 % patch 1 Patch  1 Patch TransDERmal DAILY PRN    melatonin tablet 4.5 mg  4.5 mg Oral QHS    B complex-vitaminC-folic acid (NEPHROCAP) cap  1 Capsule Oral DAILY    glucose chewable tablet 16 g  16 g Oral PRN    dextrose 10% infusion 0-250 mL  0-250 mL IntraVENous PRN    DOPamine (INTROPIN) 800 mg in dextrose 5% 250 mL infusion  0-20 mcg/kg/min IntraVENous TITRATE    glucagon (GLUCAGEN) injection 1 mg  1 mg IntraMUSCular PRN    insulin lispro (HUMALOG) injection   SubCUTAneous AC&HS    acetaminophen (TYLENOL) tablet 650 mg  650 mg Oral Q4H PRN    Or    acetaminophen (TYLENOL) solution 650 mg  650 mg Per NG tube Q4H PRN    Or    acetaminophen (TYLENOL) suppository 650 mg  650 mg Rectal Q4H PRN    piperacillin-tazobactam (ZOSYN) 3.375 g in 0.9% sodium chloride (MBP/ADV) 100 mL MBP  3.375 g IntraVENous Q12H    famotidine (PF) (PEPCID) 20 mg in 0.9% sodium chloride 10 mL injection  20 mg IntraVENous DAILY    atropine 1 mg/mL injection 0.5 mg  0.5 mg IntraVENous PRN     ______________________________________________________________________  EXPECTED LENGTH OF STAY: - - -  ACTUAL LENGTH OF STAY:          Issa 128, MD   Pulmonary/CCM  [address]  [phone]  11/8/2022

## 2022-11-08 NOTE — PROGRESS NOTES
Occupational Therapy    Orders received, acknowledged, and patient chart reviewed up to date. Note patient with attempted central line placement yesterday evening w/ subsequent bleeding and transfer to OR for exploration of left carotid artery and line removal. Patient remains intubated at this time with most recent BP 95/18. Plan to hold evaluation and follow-up as medically stable.     Rossy May, OTR/L

## 2022-11-08 NOTE — PERIOP NOTES
Irrisept Wound Debridement and Cleansing System  Ref: Cassius Schrader: [id number] LOT: [id number] Expiration Date: 09/30/2024

## 2022-11-08 NOTE — PROGRESS NOTES
Nephrology Progress Note  [facility] / [facility] [address], Rossy Lambert  [place], [address]  Phone - [phone]  Fax - [phone]                 Patient: Doug Cowan                   YOB: 1950        Date- 11/8/2022                      Admit Date: 11/6/2022  CC: Follow up for ESRD          IMPRESSION & PLAN:   ESRD- [facility]- DR. TINAJERO  ANEMIA of ckd  Sec. Hyperpara  Hypertension  DM 2  Bradycardia  Altered mental status  Hypotension  Resp failure  S/p removal of carotid artery central venous cath          PLAN-  Hd today  No fluid removal on hd  If we can't do hd - she will need new kadie cath placement and CRRT  Continue vasopressor support  Hold epogen  D/w ICU team     Subjective: Interval History:   - she is on vent  She went to OR last night for removal of carotid artery central venous cath  Bp low requiring vasopressor support        11/7/22   Patient presented to er with confusion. She is on hd mwf at [facility]. Her last hd on Friday  Her bp is low with bradycardia  She is on dopamine gtt  Her missed hd last Monday due to pain. She had hd on Wednesday and Friday. ROS- Can't assess due to patient's current condition       Objective:   Vitals:    11/08/22 0607 11/08/22 0637 11/08/22 0652 11/08/22 0707   BP: 100/64 (!) 94/46 (!) 54/41 (!) 95/18   Pulse: 61 62 (!) 59 65   Resp: 20 18 16 19   Temp:       SpO2: 93% 91% 92% 91%   Weight:       Height:          11/07 0701 - 11/08 0700  In: 702.5 [I.V.:702.5]  Out: 25 [Urine:25]  Last 3 Recorded Weights in this Encounter    11/06/22 1831 11/07/22 1621   Weight: 45.5 kg (100 lb 5 oz) 45.4 kg (100 lb)        Physical exam:    GEN: intubated on vent  NECK-no mass, ET TUBE  RESP: Clear b/l, no wheezing  CVS: RRR,S1,S2    ABDO: soft , non tender,  EXT: right arm avg   NEURO:Can't access due to patient's current condition           Chart reviewed. Pertinent Notes reviewed. Data Review :  Recent Labs     11/08/22  0212 11/07/22  1901 11/07/22  0358 11/06/22  1845   * 137  --  138   K 4.6 4.2  --  4.3   CL 99 102  --  99   CO2 23 25  --  27   BUN 83* 76*  --  61*   CREA 7.76* 7.45*  --  6.52*   * 188*  --  218*   CA 9.6 9.5  --  9.8   MG 2.4  --  2.4  --    PHOS 2.9  --  1.7*  --        Recent Labs     11/08/22  0212 11/07/22  2330 11/07/22  0051   WBC 27.0* 21.9* 18.2*   HGB 9.9* 10.4* 11.4*   HCT 30.6* 31.1* 34.3*   * 107* 143*       No results for input(s): FE, TIBC, PSAT, FERR in the last 72 hours.    Lab Results   Component Value Date/Time    Hemoglobin A1c 6.0 (H) 11/07/2022 03:58 AM        No results found for: MCACR, MCA1, MCA2, MCA3, MCAU, MCAU2, MCALPOCT  Lab Results   Component Value Date/Time    NT pro-BNP >35,000 (H) 08/02/2021 06:57 AM     US Results (most recent):  Medication list  reviewed  Current Facility-Administered Medications   Medication Dose Route Frequency    NOREPINephrine (LEVOPHED) 8 mg in 5% dextrose 250mL (32 mcg/mL) infusion  0.5-16 mcg/min IntraVENous TITRATE    VANCOMYCIN INFORMATION NOTE   Other Rx Dosing/Monitoring    heparin (porcine) 2,000 Units in sodium chloride irrigation 0.9 % 500 mL Irrigation    PRN    fentaNYL citrate (PF) injection 25-50 mcg  25-50 mcg IntraVENous Q1H PRN    heparin 25,000 units in D5W 250 ml infusion  12-25 Units/kg/hr IntraVENous TITRATE    heparin (porcine) 1,000 unit/mL injection 1,370 Units  30 Units/kg IntraVENous PRN    Or    heparin (porcine) 1,000 unit/mL injection 2,730 Units  60 Units/kg IntraVENous PRN    hydrALAZINE (APRESOLINE) 20 mg/mL injection 10 mg  10 mg IntraVENous Q6H PRN    atorvastatin (LIPITOR) tablet 40 mg  40 mg Oral QHS    nitroglycerin (NITROSTAT) tablet 0.4 mg  0.4 mg SubLINGual PRN    [Held by provider] aspirin delayed-release tablet 81 mg  81 mg Oral DAILY    [START ON 11/11/2022] ergocalciferol capsule 50,000 Units  50,000 Units Oral Q7D repaglinide (PRANDIN) tablet 2 mg  2 mg Oral ACB&D    nicotine (NICODERM CQ) 21 mg/24 hr patch 1 Patch  1 Patch TransDERmal DAILY    levothyroxine (SYNTHROID) tablet 50 mcg  50 mcg Oral ACB    tiZANidine (ZANAFLEX) tablet 2 mg  2 mg Oral TID PRN    lidocaine 4 % patch 1 Patch  1 Patch TransDERmal DAILY PRN    melatonin tablet 4.5 mg  4.5 mg Oral QHS    B complex-vitaminC-folic acid (NEPHROCAP) cap  1 Capsule Oral DAILY    glucose chewable tablet 16 g  16 g Oral PRN    insulin lispro (HUMALOG) injection 1-8 Units  1-8 Units SubCUTAneous AC&HS    dextrose 10% infusion 0-250 mL  0-250 mL IntraVENous PRN    DOPamine (INTROPIN) 800 mg in dextrose 5% 250 mL infusion  0-20 mcg/kg/min IntraVENous TITRATE    glucagon (GLUCAGEN) injection 1 mg  1 mg IntraMUSCular PRN    insulin lispro (HUMALOG) injection   SubCUTAneous AC&HS    acetaminophen (TYLENOL) tablet 650 mg  650 mg Oral Q4H PRN    Or    acetaminophen (TYLENOL) solution 650 mg  650 mg Per NG tube Q4H PRN    Or    acetaminophen (TYLENOL) suppository 650 mg  650 mg Rectal Q4H PRN    piperacillin-tazobactam (ZOSYN) 3.375 g in 0.9% sodium chloride (MBP/ADV) 100 mL MBP  3.375 g IntraVENous Q12H    famotidine (PF) (PEPCID) 20 mg in 0.9% sodium chloride 10 mL injection  20 mg IntraVENous DAILY    atropine 0.1 mg/mL injection        atropine 1 mg/mL injection 0.5 mg  0.5 mg IntraVENous PRN    morphine injection 2 mg  2 mg IntraVENous ONCE    midazolam (VERSED) 1 mg/mL injection            Daysi Tesfaye MD  11/8/2022

## 2022-11-08 NOTE — BRIEF OP NOTE
Brief Postoperative Note    Patient: Yariel Burton  YOB: 1950  MRN: [id number]    Date of Procedure: 11/8/2022     Pre-Op Diagnosis: Central Line in left carotid artery    Post-Op Diagnosis:  Central line in left internal jugular vein       Procedure(s):  EXPLORATION OF LEFT CAROTID ARTERY, REMOVAL OF CENTRAL VENOUS CATHETER FROM LEFT INTERNAL JUGULAR VEIN    Surgeon(s):  Monie Conner MD    Surgical Assistant: Surg Asst-1: Colletta Jourdain    Anesthesia: General     Estimated Blood Loss (mL): less than 726     Complications: None    Specimens: * No specimens in log *     Implants: * No implants in log *    Drains: * No LDAs found *    Findings: Central venous catheter entered the anterior wall of the IJV with no clear arterial connection posteriorly. The carotid was definitely not involved. It was medial and not at all near the catheter. Catheter was removed and the entry site in the IJV was closed w/ 5-0 proline. No further bleeding.     Electronically Signed by Andrew Huerta MD on 11/8/2022 at 4:00 AM

## 2022-11-08 NOTE — ANESTHESIA PREPROCEDURE EVALUATION
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)      CARDIOVASCULAR   (+) Diastolic congestive heart failure (HCC)   (+) NSTEMI (non-ST elevated myocardial infarction) (HCC)      RENAL FAILURE   (+) ATN (acute tubular necrosis) (HCC)   (+) End stage renal disease (HCC)      ENDOCRINE   (+) Type 2 diabetes mellitus      PERSONAL HX & FAMILY HX OF CANCER   (+) Primary appendiceal adenocarcinoma (HCC)       Anesthetic History   No history of anesthetic complications            Review of Systems / Medical History  Patient summary reviewed and pertinent labs reviewed    Pulmonary    COPD    Sleep apnea           Neuro/Psych     seizures (recent hx in setting of encephalopathy)  CVA: no residual symptoms  TIA and dementia     Cardiovascular    Hypertension        Dysrhythmias   CAD    Exercise tolerance: <4 METS  Comments: TTE    Left Ventricle: Low normal left ventricular systolic function with a visually estimated EF of 50 - 55%. Left ventricle is dilated. Normal wall thickness. Normal wall motion. Normal diastolic function.   Left Atrium: Left atrium is dilated.   Technical qualifiers: Echo study was limited due to patient tolerance.     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) Endo/Other    Diabetes  Hypothyroidism       Other Findings              Physical Exam    Airway  Mallampati: II  TM Distance: > 6 cm  Neck ROM: normal range of motion   Mouth opening: Normal     Cardiovascular  Regular rate and rhythm,  S1 and S2 normal,  no murmur, click, rub, or gallop  Rhythm: regular  Rate: normal         Dental  No notable dental hx       Pulmonary  Breath sounds clear to auscultation               Abdominal  GI exam deferred       Other Findings            Anesthetic Plan    ASA: 4, emergent  Anesthesia type: general    Monitoring Plan: BIS    Post procedure ventilation   Induction: Intravenous  Anesthetic plan and risks discussed with: Patient      Significant left sided hematoma. No evidence of tracheal deviation.  Will leave pt intubated overnight

## 2022-11-08 NOTE — PROCEDURES
[facility]        Procedure Note - Central Venous Access:   Performed by Devon Mackay MD    Obtained informed Consent. Immediately prior to the procedure, the patient was reevaluated and found suitable for the planned procedure and any planned medications. Immediately prior to the procedure a time out was called to verify the correct patient, procedure, equipment, staff, and marking as appropriate. The site was prepped with ChloraPrep. Using Seldinger technique a tripple lumen central line was placed in the left, Internal Jugular Vein via direct cannulation with 1 number of attempts for Monitoring, Blood Drawing and IV Access. Ultrasound Guidance was utilized. There was good blood return. The following complications were encountered: None. A follow-up chest x-ray was ordered post procedure. The procedure was tolerated well.         Devon Mackay MD  Pulmonary and Critical Care

## 2022-11-08 NOTE — ROUTINE PROCESS
1900- Report received from ASHLEY MASSEY [facility], [address]- Patient assessed. See flowsheet. 1949- Chest x-ray taken to confirm line placement. 2015- Discussed PTT with pharmacist Gen Browne. The patient as been off heparin gtt due to lack of access. See MAR. Dr. Kelly Quan has recently placed a central line. Discussed whether to follow the protocol or not because the patient has been off the heparin due to lack of access. Per Gen Browne, administer the bolus and start gtt at 18 units/kg/hr. Heparin gtt started through central line due to lack of access. 2021- Heparin bolus given. GTT restarted. Central line dressing oozing. Transparent dressing and disk with chlorhexidine removed. Quick clot, 4x4, and metiplex tape applied. 2035- Patient off the floor for MRI. 2045- Heparin stopped due to oozing. Dressing reinforced. Patient agitated and unable to be still for the procedure. Discussed with Maria D. Orders received for versed. Also, discussed central line dressing oozing. Will reinforce dressing and Brittani Das will look at the line when the patient arrives upstairs and possibly place another suture. Heparin will remain off.    2100- Versed given. 2121- MRI completed. Patient will be transferred to CCU shortly. 2130- Patient back to room. Central line dressing saturated. Brittani Das will check the central line and possibly place a suture. 2200- Evening po meds held due to altered mental status, lack of following commands, and being unable to safely swallow pills. All medications are being given through the patient's PIV.    2202- Fentanyl given per Brittani Das for the patient's agitation during central line placement. 2232- Blood sample drawn and sent from the patient's LIJ central line to check to see if the sample is arterial blood. See ABG results. 2301- Versed given during central line dressing change due to the patient's agitation. 2330- Stat CBC and coags sent. 0000- Patient reassessed.   See flowsheet. CHG bath given. 0040- Received critical blood culture results. Results given to Tang Perdue NP.     0110- Levophed started. Will titrate dopamine down as tolerated to keep HR greater than 60.    0130- Dr. Kennedy Wiley talking with the patient's daughter about needing to take the patient to the OR to remove her central line. 0419- Informed consent witnessed. 0210- AM labs drawn early. 5686- Patient taken to the OR for the central line removal.    0534- Patient back from the OR. Bedside report received from anesthesia. 0600- Gown changed. Bed pad changed. Left IJ dressing changed. 1900- Report given to the oncoming shift.

## 2022-11-08 NOTE — PROGRESS NOTES
Speech Pathology Note    Reviewed chart and note patient now intubated. SLP will sign off, however please re-consult when patient extubated and medically stable. Thank you.     Morris Garcia M.S., CCC-SLP

## 2022-11-08 NOTE — PROGRESS NOTES
8180 aRRT attempted SBT with pt per NP; [id number] with increased RR and decreased VT's; increased PS-15 with improvement though not sufficient; NP stated to place back on [facility] with no ABG at this time per NP; and pt would be evaluated dayshift for next SBT.

## 2022-11-08 NOTE — CONSULTS
Vascular Surgery  Consult Note    Impression:  Patient had Lt neck central line placed this evening  Continued bleeding around line despite topical hemostatic agents  ABG sent from line looks arterial  On Eliquis PTA, heparin earlier, chronic thrombocytopenia  No hematoma, airway secure, small Rt hematoma 2/2 failed attempts  The catheter appears to be in the carotid  Not safe to pull at bedside    Plan:   Will explore carotid and remove in OR  Discussed w/ daughter    Alexia Cronin MD

## 2022-11-08 NOTE — PROGRESS NOTES
0700 - report received from Gigi Lang, [place].     0800 - shift assessment completed. Received with dopamine and levophed running. See MAR for continued titrations. 0830 - ventilator settings changed to spontaneous. 0900 - brief episodes of bradycardia down to the 30's. See dopamine titrations. 1000 - NG tube placed. 1100 - patient extubated    1200 - reassessment completed. Patients blood pressures inconsistent. Arterial line placed by Dr. Caitlyn Sharma. Started on dialysis. 1600 - reassessment completed, no changes noted. Tube feeds started at 10 mL/hr.

## 2022-11-08 NOTE — PROGRESS NOTES
Physical Therapy  Orders received, acknowledged, and patient chart reviewed up to date. Note patient with attempted central line placement yesterday evening w/ subsequent bleeding and transfer to OR for exploration of left carotid artery and line removal. Patient remains intubated at this time with most recent BP 95/18. Plan to hold evaluation and follow-up as medically stable.   Thank you,  Colonel Ross, PT

## 2022-11-08 NOTE — PROGRESS NOTES
TRANSFER - OUT REPORT:    Verbal report given to Franck Walker RN(name) on Emigdio Mcleod  being transferred to CCU(unit) for urgent transfer       Report consisted of patients Situation, Background, Assessment and   Recommendations(SBAR). Information from the following report(s) SBAR and Kardex was reviewed with the receiving nurse. Opportunity for questions and clarification was provided.       Patient transported with:   HD at bedside

## 2022-11-08 NOTE — PROCEDURES
[address]  EEG    Name:  Ender De La Garza  MR#:  [id number]  :  1950  ACCOUNT #:  [de-identified]  DATE OF SERVICE:  2022    CLINICAL INDICATION:  The patient is a 74-year-old female with a history of sudden decline for the last week. The patient is more lethargic, more encephalopathic, not talking, not eating, not drinking. The patient with altered mental status. The patient with possible stroke. EEG to rule out seizures, rule out nonconvulsive seizures, rule out encephalopathy. EEG CLASSIFICATION:  On this patient is dysrhythmia grade II, generalized. DESCRIPTION OF THE RECORD:  The background of this recording contains a posteriorly located occipital alpha rhythm of 8-9 Hz that did attenuate some with eye opening. During the recording, there was a moderate increase in some generalized 2-6 Hz activity seen without any clear areas of focal slowing. No clear spike or spike-and-wave discharges and no recorded electrographic spells of any type seen. The patient seemed to be occasionally in drowsy and states of sleep with K complexes and sleep spindles seen in the central head regions throughout the recording. Photic stimulation produced a minimal driving response in the posterior head regions. Hyperventilation was not performed. INTERPRETATION:  This is a moderately abnormal electroencephalogram due to the widespread generalized slowing seen most consistent with diffuse encephalopathy of toxic, metabolic or degenerative type. No clear focal process or spike or spike-and-wave discharges seen. Clinical correlation recommended. Joe Nicholson MD      [id number]  D:  2022 22:19  T:  2022 4:02  JOB #:  [id number]  CC:   Keenan Garcia MD

## 2022-11-08 NOTE — PROGRESS NOTES
Comprehensive Nutrition Assessment    Type and Reason for Visit: Initial, Consult, Positive nutrition screen    Nutrition Recommendations/Plan:   Initiate TF via NGT:  Start Nepro @ 10mL/h, advance rate 10mL q 12h as tolerated to Goal of 35mL/h + 50mL flush q 4h (provides [id number]/135gCHO/912mL)   Monitor lytes for refeeding syndrome     Malnutrition Assessment:  Malnutrition Status:  Severe malnutrition (11/08/22 1517)    Context:  Chronic illness     Findings of the 6 clinical characteristics of malnutrition:   Energy Intake:  Unable to assess  Weight Loss:  Greater than 10% over 6 months     Body Fat Loss:   ,     Muscle Mass Loss:  Severe muscle mass loss, Thigh (quadriceps)  Fluid Accumulation:  Unable to assess,     Strength:  Not performed     Nutrition Assessment:  Pt admitted with CVA. PMH: dementia, ESRD, HTN, DM. Chart reviewed, case discussed during CCU rounds. Pt extubated this morning. She is nonverbal at time of visit, HD running. No family in the room to speak with. MST triggered for wt loss and poor appetite. EMR confirms significant wt loss since the spring. K 4.6 and phos 2.9. NGT in place and okay to start TF per IDR discussion with Dr. Jose Alfredo Dixon. Levo at 8 and dopa at 11, should improve once pt off of HD.     Wt Readings from Last 15 Encounters:   11/07/22 45.4 kg (100 lb)   11/03/22 49 kg (108 lb)   10/19/22 51.3 kg (113 lb 1.5 oz)   10/03/22 48.5 kg (107 lb)   09/29/22 51.7 kg (114 lb)   06/13/22 58 kg (127 lb 13.9 oz)   01/06/22 58.1 kg (128 lb)   12/23/21 58.1 kg (128 lb)   10/20/21 58.1 kg (128 lb)   10/05/21 56.2 kg (124 lb)   09/23/21 57.6 kg (127 lb)   09/23/21 58.5 kg (129 lb)   08/24/21 58.8 kg (129 lb 10.1 oz)   08/05/21 55.9 kg (123 lb 4.8 oz)   05/20/21 68.9 kg (152 lb)          Nutrition Related Findings:    Meds: nephrocap, pepcid, humalog, synthroid, zosyn, vancomyin Wound Type: Surgical incision    Current Nutrition Intake & Therapies:  Average Meal Intake: NPO         Anthropometric Measures:  Height: 4' 11\" (149.9 cm)  Ideal Body Weight (IBW): 95 lbs (43 kg)     Current Body Wt:  45.4 kg (100 lb 1.4 oz), 105.4 % IBW. Bed scale  Current BMI (kg/m2): 20.2  Usual Body Weight: 58.1 kg (128 lb) (June 2022)  % Weight Change (Calculated): -21.8                    BMI Category: Underweight (BMI less than 22) age over 72    Estimated Daily Nutrient Needs:  Energy Requirements Based On: Formula     Energy (kcal/day): MSJ 1375 (865 x 1.3 +250 for wt gain)     Protein (g/day): 65g (1.5gPro/kg)  Method Used for Fluid Requirements: 1 ml/kcal  Fluid (ml/day): per MD    Nutrition Diagnosis:   Unintended weight loss related to inadequate protein-energy intake, catabolic illness as evidenced by weight loss greater than or equal to 10% in 6 months, moderate muscle loss    Nutrition Interventions:   Food and/or Nutrient Delivery: Start tube feeding  Nutrition Education/Counseling: No recommendations at this time  Coordination of Nutrition Care: Continue to monitor while inpatient, Interdisciplinary rounds       Goals:     Goals: Initiate nutrition support, Tolerate nutrition support at goal rate, by next RD assessment (with residuals <500mL and lytes WNL)       Nutrition Monitoring and Evaluation:   Behavioral-Environmental Outcomes: None identified  Food/Nutrient Intake Outcomes: Enteral nutrition intake/tolerance  Physical Signs/Symptoms Outcomes: Biochemical data, Nutrition focused physical findings, Skin, Weight, Hemodynamic status, Fluid status or edema, GI status    Discharge Planning:     Too soon to determine    Nathalie Nguyen RD, CNSC  Contact: ext [phone]

## 2022-11-08 NOTE — PROGRESS NOTES
Spiritual Care Assessment/Progress Note  [facility]      NAME: Tamanna Faust      MRN: [id number]  AGE: 67 y.o. SEX: female  Evangelical Affiliation: Unknown   Language: English     11/8/2022     Total Time (in minutes): 10     Spiritual Assessment begun in [facility] 2 through conversation with:         []Patient        [] Family    [] Friend(s)        Reason for Consult: Palliative Care, Initial/Spiritual Assessment     Spiritual beliefs: (Please include comment if needed)     [] Identifies with a nicolas tradition:         [] Supported by a nicolas community:            [] Claims no spiritual orientation:           [] Seeking spiritual identity:                [] Adheres to an individual form of spirituality:           [x] Not able to assess:                           Identified resources for coping:      [] Prayer                               [] Music                  [] Guided Imagery     [] Family/friends                 [] Pet visits     [] Devotional reading                         [] Unknown     [] Other:                                             Interventions offered during this visit: (See comments for more details)    Patient Interventions: Initial visit           Plan of Care:     [] Support spiritual and/or cultural needs    [] Support AMD and/or advance care planning process      [] Support grieving process   [] Coordinate Rites and/or Rituals    [] Coordination with community clergy   [] No spiritual needs identified at this time   [] Detailed Plan of Care below (See Comments)  [] Make referral to Music Therapy  [] Make referral to Pet Therapy     [] Make referral to Addiction services  [] Make referral to [facility]  [] Make referral to Spiritual Care Partner  [] No future visits requested        [x] Contact Spiritual Care for further referrals     Comments: Attempted initial spiritual assessment with palliative pt in 2538.  Pt appeared to awake to 's voice and empathic touch, but did not respond. Extended words of comfort and consulted staff who confirmed pt unable to respond at this time. Contact Spiritual Care for any further referrals.  Sonja Holder M.Div, [facility]   Paging Service [phone])

## 2022-11-08 NOTE — CONSULTS
Palliative Medicine Consult  Shaw: [phone])    Patient Name: Zeina Méndez  YOB: 1950    Date of Initial Consult: 11/8/2022  Reason for Consult: care decisions  Requesting Provider: Dr. Patricia Santana  Primary Care Physician: Neha Gasca, NP     SUMMARY:   Zeina Méndez is a 67 y.o. 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 Course of hospitalization: persistent bradycardia, hypotension, on dopamine and levophed. She open eyes briefly to voice, but otherwise non verbal and not following commands thus far,    Current medical issues leading to Palliative Medicine involvement include: GOC : multiple comorbidities, mult hospitalizations    Psychosocial:    PALLIATIVE DIAGNOSES:   Palliative care encounter  Altered Mental Status, unspecified  Acute CVA  Debility  ESRD on HD  Advanced care planning discussion  Goals of care   PLAN:   Prior to visit, I completed an extensive review of patient's medical records, including medical documentation, vital signs, MARs, and results of various labs and other diagnostics. I also spoke with patient's nurse Adwoa Andujar   I met with Ms. Dusty Grier, she was sleeping, woke briefly to voice, made brief eye contact but was non verbal not following commands, only movement observed was in left lower leg/foot. Advanced care planning discussion- Patient does have an AMD in EMR, per AMD, patients daughter Armaan Urbina is primary health decision maker and daughter Elda Hernandes is secondary.    Unable to reach family today, will fu tomorrow    Initial consult note routed to primary continuity provider and/or primary health care team members  Communicated plan of care with: Palliative IDT, [place] [facility] Team     GOALS OF CARE / TREATMENT PREFERENCES:     GOALS OF CARE: Pending discussion       TREATMENT PREFERENCES:   Code Status: Full Code    Advance Care Planning:  [x] [facility] Interdisciplinary Team has updated the ACP Navigator with Health Care Decision Maker and Patient Capacity      Primary Decision Maker: Babs Diaz - Daughter - [phone]    Secondary Decision Maker: Isabela Morris - Daughter - [phone]  Advance Care Planning 11/7/2022   Patient's Healthcare Decision Maker is: Legal Next of Christian [address] Directive None   Patient Would Like to Complete Advance Directive No                   Other:    As far as possible, the palliative care team has discussed with patient / health care proxy about goals of care / treatment preferences for patient. HISTORY:     History obtained from: chart, family    CHIEF COMPLAINT: AMS    HPI/SUBJECTIVE:    The patient is:   [] Verbal and participatory  [x] Non-participatory due to: minimally responsive, non verbal and not following commands      Clinical Pain Assessment (nonverbal scale for severity on nonverbal patients):          Activity (Movement): Lying quietly, normal position    Duration: for how long has pt been experiencing pain (e.g., 2 days, 1 month, years)  Frequency: how often pain is an issue (e.g., several times per day, once every few days, constant)     FUNCTIONAL ASSESSMENT:     Palliative Performance Scale (PPS):          PSYCHOSOCIAL/SPIRITUAL SCREENING:     Palliative IDT has assessed this patient for cultural preferences / practices and a referral made as appropriate to needs (Cultural Services, Patient Advocacy, Ethics, etc.)    Any spiritual / Methodist concerns:  [] Yes /  [] No    Caregiver Burnout:  [] Yes /  [] No /  [x] No Caregiver Present      Anticipatory grief assessment: unable to assess  [] Normal  / [] Maladaptive       ESAS Anxiety:      ESAS Depression:   unable to assess due to pt factors       REVIEW OF SYSTEMS:     Positive and pertinent negative findings in ROS are noted above in HPI. The following systems were [] reviewed / [x] unable to be reviewed as noted in HPI  Other findings are noted below. Systems: constitutional, ears/nose/mouth/throat, respiratory, gastrointestinal, genitourinary, musculoskeletal, integumentary, neurologic, psychiatric, endocrine. Positive findings noted below. Modified ESAS Completed by: provider                                            PHYSICAL EXAM:     From RN flowsheet:  Wt Readings from Last 3 Encounters:   11/07/22 100 lb (45.4 kg)   11/03/22 108 lb (49 kg)   10/19/22 113 lb 1.5 oz (51.3 kg)     Blood pressure (!) 95/18, pulse 60, temperature 97.2 °F (36.2 °C), resp. rate 24, height 4' 11\" (1.499 m), weight 100 lb (45.4 kg), SpO2 98 %, not currently breastfeeding.     Pain Scale 1: Behavioral Pain Scale (BPS)  Pain Intensity 1: 3                 Last bowel movement, if known:     Constitutional: unresponsive for me on initial visit, however, responsive to family, opened eyes, verbalized  Eyes: pupils equal, anicteric  ENMT: no nasal discharge, moist mucous membranes  Cardiovascular: regular rhythm, distal pulses intact  Respiratory: breathing not labored, symmetric  Gastrointestinal: soft non-tender, +bowel sounds  Musculoskeletal: left arm flaccid  Skin: warm, dry  Neurologic: not following commands, not moving all extremities,withdraws feet to stim  Psychiatric: unable to assess due to pt factors     HISTORY:     Active Problems:    Stroke (cerebrum) ([facility] [address] (11/6/2022)      NSTEMI (non-ST elevated myocardial infarction) ([facility] [address] (11/6/2022)      History of stroke (11/7/2022)      Bilateral carotid artery stenosis (11/7/2022)      Acute alteration in mental status (11/7/2022)      Convulsive syncope (11/7/2022)      Thrombotic stroke involving left cerebellar artery ([facility] [address] (11/7/2022)      Diabetic peripheral neuropathy associated with type 2 diabetes mellitus ([facility] [address] (11/7/2022)    Past Medical History:   Diagnosis Date    CAD (coronary artery disease)     Cancer ([facility] [address]     possible stomach cancer 2021- workup in progress    Chronic kidney disease     Floyce Cost- M-W-F    Dementia ([facility] [address]     per daughter pt is alert and oriented x3    Diabetes ([facility] [address]     GERD (gastroesophageal reflux disease)     Hyperlipidemia     Hypertension     Seizures ([facility] [address]     possible while in hospital 8/2021    Stroke [facility])     Thyroid disease       Past Surgical History:   Procedure Laterality Date    HX APPENDECTOMY      08/21    HX VASCULAR ACCESS      HD catheter    IR INSERT NON TUNL CVC OVER 5 YRS  7/30/2021    IR INSERT NON TUNL CVC OVER 5 YRS  7/30/2021    IR INSERT TUNL CVC W/O PORT OVER 5 YR  8/4/2021    IL CABG, ARTERY-VEIN, FOUR      5 years ago    VASCULAR SURGERY PROCEDURE UNLIST      right arm graft      History reviewed. No pertinent family history. History reviewed, no pertinent family history.   Social History     Tobacco Use    Smoking status: Never    Smokeless tobacco: Never   Substance Use Topics    Alcohol use: Not Currently     No Known Allergies   Current Facility-Administered Medications   Medication Dose Route Frequency    NOREPINephrine (LEVOPHED) 8 mg in 5% dextrose 250mL (32 mcg/mL) infusion  0.5-16 mcg/min IntraVENous TITRATE    VANCOMYCIN INFORMATION NOTE   Other Rx Dosing/Monitoring    heparin (porcine) 2,000 Units in sodium chloride irrigation 0.9 % 500 mL Irrigation    PRN    fentaNYL citrate (PF) injection 25-50 mcg  25-50 mcg IntraVENous Q1H PRN    heparin 25,000 units in D5W 250 ml infusion  12-25 Units/kg/hr IntraVENous TITRATE    heparin (porcine) 1,000 unit/mL injection 1,370 Units  30 Units/kg IntraVENous PRN    Or    heparin (porcine) 1,000 unit/mL injection 2,730 Units  60 Units/kg IntraVENous PRN    hydrALAZINE (APRESOLINE) 20 mg/mL injection 10 mg  10 mg IntraVENous Q6H PRN    atorvastatin (LIPITOR) tablet 40 mg  40 mg Oral QHS    nitroglycerin (NITROSTAT) tablet 0.4 mg  0.4 mg SubLINGual PRN    [Held by provider] aspirin delayed-release tablet 81 mg  81 mg Oral DAILY    [START ON 11/11/2022] ergocalciferol capsule 50,000 Units  50,000 Units Oral Q7D    nicotine (NICODERM CQ) 21 mg/24 hr patch 1 Patch  1 Patch TransDERmal DAILY    levothyroxine (SYNTHROID) tablet 50 mcg  50 mcg Oral ACB    tiZANidine (ZANAFLEX) tablet 2 mg  2 mg Oral TID PRN    lidocaine 4 % patch 1 Patch  1 Patch TransDERmal DAILY PRN    melatonin tablet 4.5 mg  4.5 mg Oral QHS    B complex-vitaminC-folic acid (NEPHROCAP) cap  1 Capsule Oral DAILY    glucose chewable tablet 16 g  16 g Oral PRN    dextrose 10% infusion 0-250 mL  0-250 mL IntraVENous PRN    DOPamine (INTROPIN) 800 mg in dextrose 5% 250 mL infusion  0-20 mcg/kg/min IntraVENous TITRATE    glucagon (GLUCAGEN) injection 1 mg  1 mg IntraMUSCular PRN    insulin lispro (HUMALOG) injection   SubCUTAneous AC&HS    acetaminophen (TYLENOL) tablet 650 mg  650 mg Oral Q4H PRN    Or    acetaminophen (TYLENOL) solution 650 mg  650 mg Per NG tube Q4H PRN    Or    acetaminophen (TYLENOL) suppository 650 mg  650 mg Rectal Q4H PRN    piperacillin-tazobactam (ZOSYN) 3.375 g in 0.9% sodium chloride (MBP/ADV) 100 mL MBP  3.375 g IntraVENous Q12H    famotidine (PF) (PEPCID) 20 mg in 0.9% sodium chloride 10 mL injection  20 mg IntraVENous DAILY    atropine 1 mg/mL injection 0.5 mg  0.5 mg IntraVENous PRN    morphine injection 2 mg  2 mg IntraVENous ONCE    midazolam (VERSED) 1 mg/mL injection              LAB AND IMAGING FINDINGS:     Lab Results   Component Value Date/Time    WBC 27.0 (H) 11/08/2022 02:12 AM    HGB 9.9 (L) 11/08/2022 02:12 AM    PLATELET 201 (L) 64/27/7517 02:12 AM     Lab Results   Component Value Date/Time    Sodium 135 (L) 11/08/2022 02:12 AM    Potassium 4.6 11/08/2022 02:12 AM    Chloride 99 11/08/2022 02:12 AM    CO2 23 11/08/2022 02:12 AM    BUN 83 (H) 11/08/2022 02:12 AM    Creatinine 7.76 (H) 11/08/2022 02:12 AM    Calcium 9.6 11/08/2022 02:12 AM    Magnesium 2.4 11/08/2022 02:12 AM    Phosphorus 2.9 11/08/2022 02:12 AM      Lab Results   Component Value Date/Time    Alk. phosphatase 141 (H) 11/08/2022 02:12 AM    Protein, total 7.4 11/08/2022 02:12 AM    Albumin 2.6 (L) 11/08/2022 02:12 AM    Globulin 4.8 (H) 11/08/2022 02:12 AM     Lab Results   Component Value Date/Time    INR 1.4 (H) 11/07/2022 11:30 PM    Prothrombin time 14.3 (H) 11/07/2022 11:30 PM    aPTT 31.3 (H) 11/07/2022 11:30 PM      No results found for: IRON, FE, TIBC, IBCT, PSAT, FERR   Lab Results   Component Value Date/Time    pH 7.39 11/08/2022 09:29 AM    PCO2 32 (L) 11/08/2022 09:29 AM    PO2 81 11/08/2022 09:29 AM     No components found for: [place]   Lab Results   Component Value Date/Time    CK 35 11/08/2022 02:12 AM                Total time: 35  Counseling / coordination time, spent as noted above: 30  > 50% counseling / coordination?: y  Prolonged service was provided for  []30 min   []75 min in face to face time in the presence of the patient, spent as noted above. Time Start:   Time End:   Note: this can only be billed with 26572 (initial) or 39349 (follow up). If multiple start / stop times, list each separately.

## 2022-11-08 NOTE — ANESTHESIA POSTPROCEDURE EVALUATION
Procedure(s):  EXPLORATION OF LEFT CAROTID ARTERY, REMOVAL OF CENTRAL VENOUS CATHETER FROM LEFT INTERNAL JUGULAR VEIN. general    Anesthesia Post Evaluation      Multimodal analgesia: multimodal analgesia used between 6 hours prior to anesthesia start to PACU discharge  Patient location during evaluation: bedside  Patient participation: complete - patient participated  Level of consciousness: awake  Pain management: adequate  Airway patency: patent  Anesthetic complications: no  Cardiovascular status: acceptable  Respiratory status: acceptable  Hydration status: acceptable  Post anesthesia nausea and vomiting:  controlled  Final Post Anesthesia Temperature Assessment:  Normothermia (36.0-37.5 degrees C)      INITIAL Post-op Vital signs:   Vitals Value Taken Time   BP 94/46 11/08/22 0630   Temp 2.8 °C (37 °F) 11/08/22 0422   Pulse 61 11/08/22 0634   Resp 20 11/08/22 0634   SpO2 91 % 11/08/22 0634   Vitals shown include unvalidated device data.

## 2022-11-08 NOTE — CONSULTS
[facility]    Name:  Vickie Vu  MR#:  [id number]  :  1950  ACCOUNT #:  [de-identified]  DATE OF SERVICE:  2022    REQUESTING PHYSICIAN:  Phill Ray DO    REASON FOR CONSULTATION:  Evaluate bradycardia and abnormal troponin. CHIEF COMPLAINT:  The patient voices no chief complaint. HISTORY OF PRESENT ILLNESS:  The patient is a 71-year-old female with a history of multiple cardiac problems including coronary artery disease and remote coronary artery bypass grafting in 2016 as well as a history of paroxysmal atrial fibrillation and sick sinus syndrome. She also has a history of hypertension, type 2 diabetes and end-stage renal disease. She is on chronic hemodialysis. She was brought into the emergency room yesterday after being noted by her family to be increasingly lethargic and poorly responsive. She was just discharged several weeks ago after she presented with severe bradycardia. She was on a beta-blocker at that time and this was discontinued with improvement in her heart rate. The patient does also have a baseline dementia. .  She is currently unable to answer any questions or given the history, but the history was provided by the patient's family who is at the bedside. Her initial troponin was 1348 which is subsequently decreased to 880. There is no history of any recent chest discomfort. Current ejection fraction is unknown. The patient moved to this area from [place] a couple of years ago. On admission, the patient was bradycardic and has been placed on IV dopamine and transferred to the ICU. Her heart rate was initially down in the low 40s, but is now in the 50s to 60s. She was also in atrial fibrillation. PAST MEDICAL HISTORY:  As noted above, end-stage renal disease, dementia, hypothyroidism, coronary artery disease. PAST SURGICAL HISTORY:  Prior bypass surgery, prior appendectomy, prior AV fistula placement.     CURRENT MEDICATIONS:  1.  IV dopamine. 2.  Aspirin. 3.  Nephrocaps. 4.  IV Zosyn. 5.  Lipitor. 6.  L-thyroxine. 7.  Prandin. 8.  Pepcid. 9.  Nicotine CQ.  10.  Humalog sliding scale. SOCIAL HISTORY:  The patient is a nonsmoker and nondrinker. FAMILY HISTORY:  Negative for heart disease. REVIEW OF SYSTEMS:  Not obtainable given the patient's current condition. PHYSICAL EXAMINATION:  GENERAL:  An elderly  female somewhat poorly responsive, but in no obvious distress. VITAL SIGNS:  Blood pressure 166/102, pulse 55, respirations 13, temperature 98.9. HEENT:  No obvious head trauma. The patient's eyes were closed and pupils were not examined. Moist appearing oral mucosa. NECK:  No masses or thyromegaly. No cervical or supraclavicular adenopathy. No carotid bruits. No obvious JVD. CHEST:  Clear anteriorly. SKIN:  Warm and dry. CARDIAC:  Irregularly irregular rhythm. 2/6 systolic murmur. No gallop. ABDOMEN:  Soft, nontender, no masses or organomegaly. Bowel sounds positive. EXTREMITIES:  No cyanosis, clubbing or edema. Distal pulses not well palpated. Distal extremities are warm. NEURO:  No obvious gross motor deficits. LABORATORY DATA:  White count 18.2, hemoglobin 11.4. Potassium 4.3, creatinine 6.5. Troponin most recent 880. LDL 40. EKG atrial fibrillation, rate 62 beats per minute with poor R-wave progression, nonspecific ST-T changes. IMPRESSION:  1. Bradycardia requiring IV dopamine. 2.  Intermittent atrial fibrillation. 3.  Underlying sick sinus syndrome. 4.  Coronary artery disease with prior coronary artery bypass grafting and unknown ejection fraction. 5.  Elevated troponin as noted above. 6.  Dementia. 7.  Hypothyroidism. 8.  End-stage renal disease on hemodialysis. 9.  Leukocytosis. RECOMMENDATIONS:  Continue with IV dopamine. An echocardiogram has been ordered and will be reviewed.   At this point, there is no clinical evidence for unstable angina and I suspect that the troponin elevation is a type 2 event. We will see how the patient does over the next 24 hours and we await the results of the echocardiogram.  She will likely need a permanent pacemaker prior to discharge. In the meantime, continue dopamine and the patient is also on heparin IV. Thank you for this consult.       Belinda Hunt MD      [id number]  D:  11/07/2022 15:20  T:  11/07/2022 19:37  JOB #:  [id number]  CC:  MD Kuldeep Rosas

## 2022-11-08 NOTE — PROGRESS NOTES
Cardiology Progress Note      11/8/2022 8:26 AM    Admit Date: 11/6/2022          Subjective:         Visit Vitals  BP (!) 95/18   Pulse 65   Temp 97.2 °F (36.2 °C)   Resp 19   Ht 4' 11\" (1.499 m)   Wt 45.4 kg (100 lb)   SpO2 91%   Breastfeeding No   BMI 20.20 kg/m²     11/06 1901 - 11/08 0700  In: 702.5 [I.V.:702.5]  Out: 25 [Urine:25]        Objective:      Physical Exam:  VS as above  Chest coarse BS bilat  Card RRR no gallop     Data Review:   Labs:    ESR 66  K 4.6  Hgb 9.9     Telemetry: afib R 53      Assessment:     IMPRESSION:  1. Bradycardia requiring IV dopamine. 2.  Intermittent atrial fibrillation. 3.  Underlying sick sinus syndrome. 4.  Coronary artery disease with prior coronary artery bypass grafting and unknown ejection fraction. 5.  Elevated troponin as noted above. 6.  Dementia. 7.  Hypothyroidism. 8.  End-stage renal disease on hemodialysis. 9.  Leukocytosis. Plan: Cont current Rx.  Hopefully will extubate this am. Will review echo

## 2022-11-08 NOTE — PROCEDURES
Hemodialysis / [phone]    Vitals Pre Post Assessment Pre Post   BP BP: (!) 115/44 (11/08/22 1215)   188/72 LOC Sedated/trached sedated   HR Pulse (Heart Rate): (!) 56 (11/08/22 1215)   70 Lungs clear clear   Resp Resp Rate: 18 (11/08/22 1215) 11 Cardiac Irreg rate &  rhythm Irreg rate & rhythm   Temp Temp: (!) 96.4 °F (35.8 °C) (11/08/22 1200)   96.2 ax Skin Warm dry & intact Warm dry & intact   Weight  N/a N/a Edema none None    Tele status yes yes Pain Pain Intensity 1: 3 (11/08/22 0545) 3     Orders   Duration: Start: 1200 End: 15 Total: 3.0   Dialyzer: Dialyzer/Set Up Inspection: Revaclear (11/08/22 1200)   K Bath: Dialysate K (mEq/L): 2 (11/08/22 1200)   Ca Bath: Dialysate CA (mEq/L): 2.5 (11/08/22 1200)   Na: Dialysate NA (mEq/L): 138 (11/08/22 1200)   Bicarb: Dialysate HCO3 (mEq/L): 40 (11/08/22 1200)   Target Fluid Removal: Goal/Amount of Fluid to Remove (mL): 0 mL (11/08/22 1200)     Access   Type & Location: Right upper arm AVG   Comments:  +Thrill / bruit pre and post HD. Labs   HBsAg (Antigen) / date:  Neg 10/16/22                                             HBsAb (Antibody) / date: Immune 10/15/22   Source: Epic   Obtained/Reviewed  Critical Results Called HGB   Date Value Ref Range Status   11/08/2022 9.9 (L) 11.5 - 16.0 g/dL Final     Potassium   Date Value Ref Range Status   11/08/2022 4.6 3.5 - 5.1 mmol/L Final     Calcium   Date Value Ref Range Status   11/08/2022 9.6 8.5 - 10.1 MG/DL Final     BUN   Date Value Ref Range Status   11/08/2022 83 (H) 6 - 20 MG/DL Final     Creatinine   Date Value Ref Range Status   11/08/2022 7.76 (H) 0.55 - 1.02 MG/DL Final        Meds Given   Name Dose Route                    Adequacy / Fluid    Total Liters Process: 53.0   Net Fluid Removed: 0ml      Comments   Time Out Done:   (Time) 1130   Admitting Diagnosis: ESRD- ANEMIA of ckd  Sec.  Hyperpara  Hypertension  DM 2  Bradycardia  Altered mental status  Hypotension  Resp failure  S/p removal of carotid artery central venous cath      Consent obtained/signed: Informed Consent Verified: Yes (11/08/22 1200)   Machine / RO # Machine Number: [id number] (11/08/22 1200)   Primary Nurse Rpt Pre: Tree Rios RN   Primary Nurse Rpt Post:  Tree Rios RN      Pt Education: Access care   Care Plan: Continue HD   Pts outpatient clinic: [facility]- DR. TINAJERO     Tx Summary DOM AVF: Pre-assessment: skin CDI. No s/s of infection. + B/T. No issues with cannulation. Running well at . Post assessment: No issues with hemostasis, + B/T remains. Dressing CDI. SBAR received from Primary RN. I arrived to pts room. Consent signed & on file OR Chronic consent applies. 1200: Pt cannulated with 96G needles per policy & without issue. Labs drawn per request/ order. VSS. Dialysis Tx initiated. 1230: pt. Stable,lines secure and visible  1300: pt. Resting well.   1330: pt. Stable lines secure  1330: pt. Stable, lines secure and visible  1400: pt. Jean Marie. Hd well. Lines secure and visble  1430:  1500 Tx ended. VSS. All possible blood returned to patient. Hemostasis achieved without issue. Bed locked and in the lowest position, call bell and belongings in reach. SBAR given to Primary, RN. Patient is stable at time of their/ my departure. All Dialysis related medications have been reviewed. Comments:   RN reviewed LPN assessment and completed RN assessment. RN completed patient assessment. RN reviewed technicians vital signs and procedure note. Tx completed.  Reviewed by JUAN FRANCISCO Early

## 2022-11-08 NOTE — PROCEDURES
Procedure Note - Central Venous Access:   Performed by RODOLFO Vergara  Diagnosis: NSTEMI, bradycardia, Septic shock  Insertion Date: 11/08/22  Time:12:16 AM  Obtained Consent? no; emergent   Procedure Location:  ICU. Immediately prior to the procedure, the patient was reevaluated and found suitable for the planned procedure and any planned medications. Immediately prior to the procedure a time out was called to verify the correct patient, procedure, equipment, staff, and marking as appropriate. Central line Bundle:  Full sterile barrier precautions used. 7-Step Sterility Protocol followed. (cap, mask sterile gown, sterile gloves, large sterile sheet, hand hygiene, 2% chlorhexidine for cutaneous antisepsis)  5 mL 1% Lidocaine placed at insertion site. Patient positioned in Trendelenburg?no   The site was prepped with ChloraPrep and Sterile draping. Catheter inserted into a new site. Using Seldinger technique a Arrow Triple Lumen CVC was placed in the Left, Femoral Vein via direct cannulation with 01 number of attempts for IV Access. Ultrasound Guidance was utilized. There was good dark, non-pulsatile blood return in all ports. Femoral Site? yes. If Yes, reason femoral site was chosen: Poor vasculature,both Ij's previously attempted multiple times  Catheter secured. Biopatch/CHG bio-occlusive dressing in place? yes. The following complications were encountered: None. A follow-up chest x-ray was ordered post procedure. The procedure was tolerated well.         RODOLFO Vergara  Critical Care Medicine  [facility]

## 2022-11-08 NOTE — PROGRESS NOTES
11/08/22 0858   Weaning Parameters   Spontaneous Breathing Trial Complete Yes   Resp Rate Observed 24   Ve 7.2      RSBI 82     Patient on PS 5/PEEP 5/Fi02 .40. ABG to follow. ABG within normal limits. Discussed results with Dr. Veronica Borrero and extubation of airway order placed by MD. Extubated patient to 3 LNC. BBS equal and diminished at this time.

## 2022-11-09 NOTE — PROCEDURES
[facility]      Procedure Note - Intubation:   Performed by RODOLFO Cyr . Intensivist NP    Diagnosis: Acute respiratory failure, obtunded  Insertion Date: 11/9/2022 Time:02:00   Obtained Consent? no; emergent   Procedure Location:  ICU. Immediately prior to the procedure, the patient was reevaluated and found suitable for the planned procedure and any planned medications. Immediately prior to the procedure a time out was called to verify the correct patient, procedure, equipment, staff, and marking as appropriate. Medications given were etomidate and rocuronium (Zemuron). A number 7.5 cuffed   ETT was placed to 23 cm at the teeth. Placement was evaluated by noting bilateral, symmetric breath sounds, good end-tidal CO2 detector color change , and no breath sounds over stomach. Attempts required: 1. Complications: none. RSI was used. .  The procedure was tolerated well. Dr. Lyndsey Hooks (Anesthesia on call)  present for intubation.     NAN Busby-BC

## 2022-11-09 NOTE — PROGRESS NOTES
Occupational Therapy    1430 Patient chart reviewed. Note patient is unresponsive, bradycardic with HR in 40s, and changes in heart rhythm. Patient is medically unstable. Discussed with RN who is in agreement that therapy sign off. Please re-consult if patient with improved status and able to participate in skilled services. 1000 Patient chart reviewed up to date. Note patient re-intubated overnight   due to worsening mental and respiratory status and currently not appropriate for participation in therapy evaluation. Per conversation with MD, pt for potential SAT/SBT and extubation later today. Will defer however continue to follow. Thank you.   Rossy May, OTR/L

## 2022-11-09 NOTE — PROGRESS NOTES
Problem: Falls - Risk of  Goal: *Absence of Falls  Description: Document Cherylle Cockayne Fall Risk and appropriate interventions in the flowsheet. Outcome: Progressing Towards Goal  Note: Fall Risk Interventions:  Mobility Interventions: Bed/chair exit alarm    Mentation Interventions: Adequate sleep, hydration, pain control, Bed/chair exit alarm, Door open when patient unattended, More frequent rounding, Reorient patient, Room close to nurse's station, Update white board    Medication Interventions: Bed/chair exit alarm, Evaluate medications/consider consulting pharmacy    Elimination Interventions: Bed/chair exit alarm, Call light in reach, Toileting schedule/hourly rounds     Problem: Pressure Injury - Risk of  Goal: *Prevention of pressure injury  Description: Document Mick Scale and appropriate interventions in the flowsheet. Outcome: Progressing Towards Goal  Note: Pressure Injury Interventions:  Sensory Interventions: Assess changes in LOC, Assess need for specialty bed, Discuss PT/OT consult with provider, Check visual cues for pain, Float heels, Keep linens dry and wrinkle-free, Maintain/enhance activity level, Minimize linen layers, Turn and reposition approx. every two hours (pillows and wedges if needed)    Moisture Interventions: Absorbent underpads, Apply protective barrier, creams and emollients, Assess need for specialty bed, Check for incontinence Q2 hours and as needed, Maintain skin hydration (lotion/cream), Minimize layers    Activity Interventions: Pressure redistribution bed/mattress(bed type)    Mobility Interventions: Assess need for specialty bed, Float heels, Pressure redistribution bed/mattress (bed type), Turn and reposition approx.  every two hours(pillow and wedges)    Nutrition Interventions: Document food/fluid/supplement intake    Friction and Shear Interventions: Apply protective barrier, creams and emollients, Minimize layers, Transferring/repositioning devices     Problem: Ventilator Management  Goal: *Adequate oxygenation and ventilation  Outcome: Progressing Towards Goal  Goal: *Patient maintains clear airway/free of aspiration  Outcome: Progressing Towards Goal  Goal: *Absence of infection signs and symptoms  Outcome: Progressing Towards Goal  Goal: *Normal spontaneous ventilation  Outcome: Progressing Towards Goal

## 2022-11-09 NOTE — PROGRESS NOTES
Shift report received from [facility]     Shift assessment complete, see flow sheet     2200: Maria D at the bedside to assess patient     0152: Patient found obtunded, Maria D and respiratory immediatly at the bedside    0154: Patent intubated, 20 Etom and 60 chu given per verbal order from Kodi Gibbs NP    0345: patent transported to CT, remains unarousable, no sedation infusing, Maria D NP informed     0539: Ceribell EEG started per NP order, NP at the bedside

## 2022-11-09 NOTE — PROGRESS NOTES
Cardiology Progress Note      11/9/2022 9:36 AM    Admit Date: 11/6/2022          Subjective: Reintubated last night. Remains on dopamine . Echo showed EF 60-65%         Visit Vitals  BP (!) 108/55 (BP 1 Location: Left lower arm, BP Patient Position: Lying)   Pulse (!) 48   Temp 99.5 °F (37.5 °C)   Resp 12   Ht 4' 11\" (1.499 m)   Wt 45.4 kg (100 lb)   SpO2 100%   Breastfeeding No   BMI 20.20 kg/m²     11/07 1901 - 11/09 0700  In: 1435.6 [I.V.:1115.6]  Out: 0         Objective:      Physical Exam:  VS as above  Chest Coarse BS bilat  Card Irreg irreg no gallop   Neuro sedated    Data Review:   Labs:    K 4.1  Mag 2.1  Hgb 8.4      Telemetry: afib R 48   CXR no obvious pulm edema      Assessment:     IMPRESSION:  1. Bradycardia requiring IV dopamine. 2.  persistent  atrial fibrillation with slow vent response   3. Underlying sick sinus syndrome. 4.  Coronary artery disease with prior coronary artery bypass grafting , nl EF by echo   5. Elevated troponin  6. Dementia. 7.  Hypothyroidism. 8.  End-stage renal disease on hemodialysis. 9.  Leukocytosis. 10. Acute resp failure , on vent    Plan:  Cont current Rx. Wean from vent as tolerated.  Will eventually need perm pacer if she recovers

## 2022-11-09 NOTE — PROGRESS NOTES
Consult  REFERRED BY:  Iman Epps, FRANCK    CHIEF COMPLAINT: Altered mental status for several days, and possible stroke      Subjective: Cyrus Richardson is a 67 y.o. right-handed -American female seen as a new patient to me, at the request of the hospitalist, for evaluation of altered mental status for the last several days, in a patient who has possible stomach cancer in the midst of a work-up, chronic kidney disease on dialysis, dementia, diabetes and diabetic neuropathy, history of possible seizures, history of multiple strokes in the past, and organic heart disease and GERD and thyroid disease, who was found to have a new MRI and CT scan of the head suggest a possible area of hypodensity in the left cerebellum of indeterminate age. Patient on heparin drip, needs MRI scan to ensure that there is no hemorrhagic transformation of her stroke, and to evaluate for possible multiple embolic strokes. She is already on Eliquis and aspirin therapy I think for history of A. fib but there does not appear to be documentation of that on the chart. Patient has not spoken much in the last several days, and may have an aphasia. She seems to be moving all of her extremities well, but may be the right side a little better than the left. She has not had any complaints of fever, meningismus, head trauma, headache, and the family says that she has not complained of any new focal weakness has not been eating or drinking well for the last week. Patient was able to get MRA yesterday, of the brain, which was unremarkable, but MRI for some reason was not done yet and is still on order. Patient also had surgical exploration of her carotid because of possible puncture by central line, that turned out not to be the case, and she seems stable from that standpoint.   She has not really improved, is nonverbal, moves all extremities weakly, does not follow commands and remains extremely encephalopathic and palliative care is going to see the patient, and she may not even need the MRI if she is palliative care. Past Medical History:   Diagnosis Date    CAD (coronary artery disease)     Cancer ([facility]     possible stomach cancer 2021- workup in progress    Chronic kidney disease     Melaniashweta Kimni- M-W-F    Dementia ([facility]     per daughter pt is alert and oriented x3    Diabetes ([facility]     GERD (gastroesophageal reflux disease)     Hyperlipidemia     Hypertension     Seizures ([facility]     possible while in hospital 8/2021    Stroke [facility])     Thyroid disease       Past Surgical History:   Procedure Laterality Date    HX APPENDECTOMY      08/21    HX VASCULAR ACCESS      HD catheter    IR INSERT NON TUNL CVC OVER 5 YRS  7/30/2021    IR INSERT NON TUNL CVC OVER 5 YRS  7/30/2021    IR INSERT TUNL CVC W/O PORT OVER 5 YR  8/4/2021    NC CABG, ARTERY-VEIN, FOUR      5 years ago    VASCULAR SURGERY PROCEDURE UNLIST      right arm graft     History reviewed. No pertinent family history.    Social History     Tobacco Use    Smoking status: Never    Smokeless tobacco: Never   Substance Use Topics    Alcohol use: Not Currently         Current Facility-Administered Medications:     NOREPINephrine (LEVOPHED) 8 mg in 5% dextrose 250mL (32 mcg/mL) infusion, 0.5-16 mcg/min, IntraVENous, TITRATE, RODOLFO Argueta, Stopped at 11/08/22 1743    VANCOMYCIN INFORMATION NOTE, , Other, Rx Dosing/Monitoring, RODOLFO Argueta    heparin (porcine) 2,000 Units in sodium chloride irrigation 0.9 % 500 mL Irrigation, , , PRN, Diomedes Vaca MD, 500 mL at 11/08/22 0322    fentaNYL citrate (PF) injection 25-50 mcg, 25-50 mcg, IntraVENous, Q1H PRN, RODOLFO Argueta    heparin 25,000 units in D5W 250 ml infusion, 12-25 Units/kg/hr, IntraVENous, TITRATE, Edmund Padilla DO, Stopped at 11/07/22 2045    heparin (porcine) 1,000 unit/mL injection 1,370 Units, 30 Units/kg, IntraVENous, PRN **OR** heparin (porcine) 1,000 unit/mL injection 2,730 Units, 60 Units/kg, IntraVENous, PRN, Teddy Casas, DO, 2,730 Units at 11/07/22 2021    hydrALAZINE (APRESOLINE) 20 mg/mL injection 10 mg, 10 mg, IntraVENous, Q6H PRN, Fernando, Edmund, DO    atorvastatin (LIPITOR) tablet 40 mg, 40 mg, Oral, QHS, Fernando, Edmund, DO    nitroglycerin (NITROSTAT) tablet 0.4 mg, 0.4 mg, SubLINGual, PRN, Fernando, Edmund, DO    [Held by provider] aspirin delayed-release tablet 81 mg, 81 mg, Oral, DAILY, Fernando, Edmund, DO    [START ON 11/11/2022] ergocalciferol capsule 50,000 Units, 50,000 Units, Oral, Q7D, Fernando, Edmund, DO    nicotine (NICODERM CQ) 21 mg/24 hr patch 1 Patch, 1 Patch, TransDERmal, DAILY, Fernando, Edmund, DO, 1 Patch at 11/08/22 3727    levothyroxine (SYNTHROID) tablet 50 mcg, 50 mcg, Oral, ACB, Fernando, Edmund, DO    tiZANidine (ZANAFLEX) tablet 2 mg, 2 mg, Oral, TID PRN, Latia Plasencia, Edmund, DO    lidocaine 4 % patch 1 Patch, 1 Patch, TransDERmal, DAILY PRN, Fernando, Edmund, DO    melatonin tablet 4.5 mg, 4.5 mg, Oral, QHS, Fernando, Edmund, DO    B complex-vitaminC-folic acid (NEPHROCAP) cap, 1 Capsule, Oral, DAILY, Fernando, Edmund, DO    glucose chewable tablet 16 g, 16 g, Oral, PRN, Fernando, Edmund, DO    dextrose 10% infusion 0-250 mL, 0-250 mL, IntraVENous, PRN, Fernando, Edmund, DO    DOPamine (INTROPIN) 800 mg in dextrose 5% 250 mL infusion, 0-20 mcg/kg/min, IntraVENous, TITRATE, Stacey Mcmahon MD, Last Rate: 9.4 mL/hr at 11/08/22 1650, 11 mcg/kg/min at 11/08/22 1650    glucagon (GLUCAGEN) injection 1 mg, 1 mg, IntraMUSCular, PRN, Diana Stephens MD    insulin lispro (HUMALOG) injection, , SubCUTAneous, AC&HS, Diana Stephens MD, 2 Units at 11/08/22 1743    acetaminophen (TYLENOL) tablet 650 mg, 650 mg, Oral, Q4H PRN **OR** acetaminophen (TYLENOL) solution 650 mg, 650 mg, Per NG tube, Q4H PRN **OR** acetaminophen (TYLENOL) suppository 650 mg, 650 mg, Rectal, Q4H PRN, Diana Stephens MD [COMPLETED] piperacillin-tazobactam (ZOSYN) 4.5 g in 0.9% sodium chloride (MBP/ADV) 100 mL MBP, 4.5 g, IntraVENous, NOW, Last Rate: 200 mL/hr at 11/07/22 0829, 4.5 g at 11/07/22 0829 **FOLLOWED BY** piperacillin-tazobactam (ZOSYN) 3.375 g in 0.9% sodium chloride (MBP/ADV) 100 mL MBP, 3.375 g, IntraVENous, Q12H, Stacey Mcmahon MD, Last Rate: 25 mL/hr at 11/08/22 0824, 3.375 g at 11/08/22 0824    famotidine (PF) (PEPCID) 20 mg in 0.9% sodium chloride 10 mL injection, 20 mg, IntraVENous, DAILY, Ray Basurto MD, 20 mg at 11/08/22 0824    atropine 1 mg/mL injection 0.5 mg, 0.5 mg, IntraVENous, PRN, [place] Guard, ACNP        No Known Allergies   MRI Results (most recent):  Results from [place] encounter on 11/06/22    MRA BRAIN WO CONT    Narrative  INDICATION:   CVA    COMPARISON:  None    TECHNIQUE:  3-D time-of-flight MRA of the brain was performed. Multiplanar reconstructions  were obtained. FINDINGS:  Left vertebral artery dominant vertebrobasilar system. The basilar artery and  its branches are normal. The internal carotid, anterior cerebral, and middle  cerebral arteries are patent. There is no flow-limiting intracranial stenosis. There is no aneurysm. There and no obvious bilateral posterior communicating  arteries. Impression  MRA head within normal limits. No intracranial aneurysm, large vessel occlusion or significant luminal  narrowing. Results from [place] encounter on 11/06/22    MRA BRAIN WO CONT    Narrative  INDICATION:   CVA    COMPARISON:  None    TECHNIQUE:  3-D time-of-flight MRA of the brain was performed. Multiplanar reconstructions  were obtained. FINDINGS:  Left vertebral artery dominant vertebrobasilar system. The basilar artery and  its branches are normal. The internal carotid, anterior cerebral, and middle  cerebral arteries are patent. There is no flow-limiting intracranial stenosis. There is no aneurysm.  There and no obvious bilateral posterior communicating  arteries. Impression  MRA head within normal limits. No intracranial aneurysm, large vessel occlusion or significant luminal  narrowing. Review of Systems:  Review of systems not obtained due to patient factors. Vitals:    11/08/22 1915 11/08/22 1930 11/08/22 1945 11/08/22 2000   BP:       Pulse: (!) 50 (!) 52 (!) 50 (!) 50   Resp: 18 11 16 16   Temp:    98.7 °F (37.1 °C)   TempSrc:       SpO2: 100% 99% 100% 100%   Weight:       Height:         Objective:     I      NEUROLOGICAL EXAM:    Appearance: The patient is poorly developed, and nourished, provides a incoherent history and is in no acute distress. Mental Status: Patient is not very verbal, does not really follow commands well, and does not speak   Cranial Nerves:   Unreliable visual fields. Fundi are benign, but poorly seen. .  Pupils anisocoric and minimally reactive, status post cataract surgery bilaterally., EOM's full, no nystagmus, no ptosis. Facial sensation is normal. Corneal reflexes are not tested. Facial movement is asymmetric. Hearing is abnormal bilaterally. Palate is midline with normal sternocleidomastoid and trapezius muscles are normal. Tongue is midline. Neck without meningismus or bruits  Temporal arteries are not tender or enlarged  TMJ areas are not tender on palpation   Motor:  3/5 strength in upper and lower proximal and distal muscles. Normal bulk and tone. No fasciculations. Rapid alternating movement is slow bilaterally   Reflexes:   Deep tendon reflexes 1+/4 and symmetrical.  No babinski or clonus present   Sensory:   Abnormal to touch, pinprick and vibration and temperature in both feet. DSS is intact   Gait:  Not testable. Tremor:   No tremor noted. Cerebellar:  Not testable abnormal cerebellar signs present on Romberg and tandem testing and finger-nose-finger exam.   Neurovascular:  Normal heart sounds and regular rhythm, peripheral pulses decreased and no carotid bruits. Assessment:       ICD-10-CM ICD-9-CM    1. Encephalopathy  G93.40 348.30       2. NSTEMI (non-ST elevated myocardial infarction) ([facility]  I21.4 410.70       3. Cerebrovascular accident (CVA), unspecified mechanism ([facility]  I63.9 434.91         Active Problems:    Stroke (cerebrum) ([facility] (11/6/2022)      NSTEMI (non-ST elevated myocardial infarction) ([facility] (11/6/2022)      History of stroke (11/7/2022)      Bilateral carotid artery stenosis (11/7/2022)      Acute alteration in mental status (11/7/2022)      Convulsive syncope (11/7/2022)      Thrombotic stroke involving left cerebellar artery ([facility] (11/7/2022)      Diabetic peripheral neuropathy associated with type 2 diabetes mellitus ([facility] (11/7/2022)      Plan:     Patient with possible new stroke, and new MI, and may need an MRI scan to further evaluate her new strokes if there are some, and how many and whether there cardioembolic from A. fib despite her Eliquis. Has not been eating or drinking and this may be metabolic but it does not seem to account for her whole neurologic condition. She does have dementia, and metabolic studies were sent off to rule out treatable causes of her dementia  Her EEG just shows moderate generalized slowing. Patient was able to get MRA yesterday, of the brain, which was unremarkable, but MRI for some reason was not done yet and is still on order. Patient also had surgical exploration of her carotid because of possible puncture by central line, that turned out not to be the case, and she seems stable from that standpoint. She has not really improved, is nonverbal, moves all extremities weakly, does not follow commands and remains extremely encephalopathic and palliative care is going to see the patient, and she may not even need the MRI if she is palliative care. We will follow closely with you, and continue active medical care as you are are.     Signed By: Chelsie Claros MD     November 8, 2022       CC: Corinna Mondragon, Shira Kaye., NP  FAX: [phone]

## 2022-11-09 NOTE — CONSULTS
Palliative medicine brief note- Full visit documentation pending. Chart reviewed and discussed with patients nurse Evelina Aase RN and attending Dr. Tamiko Smith. Overnight Ms. Cecy Dominguez had further decline in both her mental status and respiratory and was re intubated. Ms. Cecy Dominguez is currently unresponsive to voice, a decline since my visit with her yesterday and it it is not related to sedating meds because she is not on any. She remains bradycardic in 40s-50s despite titration up on Dopamine. Levophed has been restarted. Today she has moved from [address], to Afib and more recently abnormal QRS and ST depressions. I spoke with primary health care decision maker, daughter Gloriater Handler, provided medical update, including concerns regarding arrhythmias, that she is at [facility] for cardiac event/arrest.     Daughter stated that patient had cardiac arrest 5 years ago, and she is grateful that they had elected CPR because it gave them 5 more years with her. however, she also understands that her mother has been through a lot over the past 5 years and outcome may not lead her back to quality of life. DNR Discussion- Family still wants CPR, patient remains a Full Code. Lorrie plans to discuss CPR vs DNR with her siblings tonight, make sure they are all on the same page. Lorrie plan to come in today after work, late afternoon/early evening. Patients son and other daughter will be coming in from [place] on Friday afternoon    Please contact me via [facility] [facility] with any urgent needs questions or concerns. Thank you.

## 2022-11-09 NOTE — PROGRESS NOTES
Patient's daughter Marge Patel updated that patient was intubated overnight for prolonged apnea, etiology unclear. Relayed information that head Ct done and NEG.     Stuart Lutz, [facility]-BC

## 2022-11-09 NOTE — PROGRESS NOTES
Vascular Surgery Progress Note  Vandana Lucero ACNP-BC  11/9/2022       Subjective:     Ms. Abhi Sanchez is a 65-year-old -American female with a past medical history significant for stroke, bilateral carotid stenosis, dementia, seizures, coronary artery disease, hypertension, hyperlipidemia, diastolic congestive heart failure, sick sinus syndrome, chronic obstructive pulmonary disease, obstructive sleep apnea, diabetes mellitus, hypothyroidism, end-stage renal disease, gastrointestinal reflux, and adenocarcinoma of the appendix. She is admitted to the gram-positive septicemia secondary to urinary tract infection. Throughout her hospitalization she has had a steady decline in mental status and respiratory status. This a.m. she is intubated, unresponsive, and on a dopamine drip. We were asked to evaluate for removal of a central venous catheter due to abnormal ABGs that were drawn from the catheter. It was thought to be arterial.  She is status post exploration of the left carotid artery and removal of central venous catheter from the left internal jugular vein on 11/8/22. In addition to the above her hospitalization is complicated by atrial fibrillation with slow ventricular response, subendocardial ischemia, and thrombocytopenia. Palliative care is consulted. Her neck incision staples are dry and intact.     Nursing Data:     Patient Vitals for the past 24 hrs:   BP Temp Temp src Pulse Resp SpO2 Height   11/09/22 1145 -- 99.7 °F (37.6 °C) -- (!) 56 (!) 0 98 % --   11/09/22 1130 -- 99.9 °F (37.7 °C) -- (!) 53 21 99 % --   11/09/22 1115 -- 100.2 °F (37.9 °C) -- (!) 49 14 100 % --   11/09/22 1100 -- 100.2 °F (37.9 °C) -- (!) 51 16 99 % --   11/09/22 1045 -- 100.2 °F (37.9 °C) -- (!) 49 17 100 % --   11/09/22 1030 -- 100.2 °F (37.9 °C) -- (!) 49 13 99 % --   11/09/22 1015 -- 100.2 °F (37.9 °C) -- (!) 49 13 99 % --   11/09/22 1000 -- 100 °F (37.8 °C) -- (!) 49 12 99 % --   11/09/22 0945 -- 100 °F (37.8 °C) -- (!) 49 13 100 % --   11/09/22 0930 -- 99.9 °F (37.7 °C) -- (!) 49 14 100 % --   11/09/22 0915 -- 99.9 °F (37.7 °C) -- (!) 48 12 100 % --   11/09/22 0900 -- 99.3 °F (37.4 °C) -- (!) 48 13 100 % --   11/09/22 0845 -- 99.7 °F (37.6 °C) -- (!) 50 13 100 % --   11/09/22 0830 -- 99.5 °F (37.5 °C) -- (!) 49 12 100 % --   11/09/22 0815 -- 99.5 °F (37.5 °C) -- (!) 48 12 100 % --   11/09/22 0813 -- -- -- (!) 48 12 100 % --   11/09/22 0810 -- 99.5 °F (37.5 °C) -- (!) 48 12 100 % --   11/09/22 0805 -- 99.5 °F (37.5 °C) -- (!) 45 12 100 % --   11/09/22 0800 -- 99.5 °F (37.5 °C) -- (!) 45 12 100 % --   11/09/22 0745 -- 99.5 °F (37.5 °C) -- (!) 47 14 100 % --   11/09/22 0730 -- 99.3 °F (37.4 °C) -- (!) 46 21 100 % --   11/09/22 0715 -- 99.3 °F (37.4 °C) -- (!) 45 16 100 % --   11/09/22 0700 -- 99.3 °F (37.4 °C) -- (!) 47 18 100 % --   11/09/22 0645 -- 99.3 °F (37.4 °C) -- (!) 49 15 100 % --   11/09/22 0630 -- 99.1 °F (37.3 °C) -- (!) 49 16 100 % --   11/09/22 0615 -- 99.1 °F (37.3 °C) -- (!) 48 11 100 % --   11/09/22 0600 -- 99.3 °F (37.4 °C) -- (!) 49 14 100 % --   11/09/22 0545 -- 99.3 °F (37.4 °C) -- (!) 50 13 100 % --   11/09/22 0530 -- 99.3 °F (37.4 °C) -- (!) 49 12 100 % --   11/09/22 0515 -- 99.3 °F (37.4 °C) -- (!) 49 13 100 % --   11/09/22 0500 -- 99.3 °F (37.4 °C) -- (!) 49 14 100 % --   11/09/22 0445 -- 99.1 °F (37.3 °C) -- (!) 48 16 100 % --   11/09/22 0430 -- 99.3 °F (37.4 °C) -- (!) 47 12 100 % --   11/09/22 0424 -- -- -- (!) 47 12 100 % --   11/09/22 0415 -- 98.5 °F (36.9 °C) -- (!) 47 14 100 % --   11/09/22 0414 -- -- -- (!) 47 12 100 % --   11/09/22 0413 -- -- -- (!) 47 12 100 % --   11/09/22 0412 -- -- -- (!) 48 12 100 % --   11/09/22 0411 -- -- -- (!) 48 12 100 % --   11/09/22 0410 -- -- -- (!) 48 12 100 % --   11/09/22 0409 -- -- -- (!) 48 14 100 % --   11/09/22 0408 -- -- -- (!) 48 13 100 % --   11/09/22 0405 -- -- -- (!) 48 12 100 % --   11/09/22 0335 -- -- -- (!) 49 12 99 % --   11/09/22 0320 -- -- -- (!) 48 12 99 % --   11/09/22 0305 -- -- -- (!) 49 16 100 % --   11/09/22 0250 -- -- -- (!) 49 16 100 % --   11/09/22 0235 -- -- -- (!) 48 16 100 % --   11/09/22 0220 -- -- -- (!) 50 14 100 % --   11/09/22 0205 -- -- -- (!) 54 16 99 % --   11/09/22 0204 -- -- -- (!) 54 20 99 % --   11/09/22 0155 -- -- -- (!) 47 24 100 % --   11/09/22 0154 -- -- -- (!) 45 15 94 % --   11/09/22 0153 -- -- -- (!) 43 23 100 % --   11/09/22 0152 -- -- -- (!) 46 21 99 % --   11/09/22 0151 -- -- -- (!) 48 19 100 % --   11/09/22 0150 -- -- -- (!) 47 22 100 % --   11/09/22 0149 -- -- -- (!) 49 21 100 % --   11/09/22 0148 -- -- -- (!) 48 20 100 % --   11/09/22 0130 -- -- -- (!) 48 14 100 % --   11/09/22 0115 -- -- -- (!) 50 22 100 % --   11/09/22 0100 -- -- -- (!) 50 10 100 % --   11/09/22 0045 -- -- -- (!) 50 15 100 % --   11/09/22 0030 -- -- -- (!) 50 13 100 % --   11/09/22 0015 -- -- -- (!) 50 22 100 % --   11/09/22 0000 -- -- -- (!) 50 13 100 % --   11/08/22 2345 -- 99.7 °F (37.6 °C) -- (!) 50 13 100 % --   11/08/22 2330 -- -- -- (!) 50 16 100 % --   11/08/22 2315 -- -- -- (!) 55 16 100 % --   11/08/22 2300 -- -- -- (!) 50 22 100 % --   11/08/22 2245 -- -- -- (!) 50 22 100 % --   11/08/22 2230 -- -- -- (!) 50 12 100 % --   11/08/22 2215 -- -- -- (!) 50 14 100 % --   11/08/22 2200 -- -- -- (!) 50 21 100 % --   11/08/22 2145 -- -- -- (!) 50 19 100 % --   11/08/22 2130 -- -- -- (!) 50 14 100 % --   11/08/22 2115 -- -- -- (!) 48 12 100 % --   11/08/22 2100 -- -- -- (!) 50 17 100 % --   11/08/22 2045 -- -- -- (!) 49 14 100 % --   11/08/22 2030 -- -- -- (!) 49 14 100 % --   11/08/22 2015 -- -- -- (!) 50 13 100 % --   11/08/22 2000 -- 98.7 °F (37.1 °C) -- (!) 50 16 100 % --   11/08/22 1945 -- -- -- (!) 50 16 100 % --   11/08/22 1930 -- -- -- (!) 52 11 99 % --   11/08/22 1915 -- -- -- (!) 50 18 100 % --   11/08/22 1900 -- -- -- (!) 50 14 100 % --   11/08/22 1600 (!) 108/55 97.8 °F (36.6 °C) -- (!) 58 17 100 % --   11/08/22 1513 -- -- -- -- -- -- 4' 11\" (1.499 m)   11/08/22 1500 (!) 184/66 (!) 96.2 °F (35.7 °C) Axillary 63 21 -- --   11/08/22 1445 (!) 83/77 -- -- 66 22 -- --   11/08/22 1430 91/82 -- -- (!) 59 16 95 % --   11/08/22 1415 93/80 -- -- 66 19 (!) 74 % --   11/08/22 1400 101/86 -- -- 60 18 98 % --   11/08/22 1345 102/76 -- -- 66 13 99 % --   11/08/22 1330 95/70 -- -- (!) 59 17 100 % --   11/08/22 1315 101/61 -- -- 62 15 (!) 101 % --   11/08/22 1300 (!) 100/54 -- -- (!) 58 16 99 % --   11/08/22 1245 (!) 100/45 -- -- (!) 59 13 100 % --         Intake/Output Summary (Last 24 hours) at 11/9/2022 1240  Last data filed at 11/9/2022 0800  Gross per 24 hour   Intake 1276.43 ml   Output 0 ml   Net 1276.43 ml       Exam:     Physical Exam  Constitutional:       Appearance: She is toxic-appearing. Interventions: She is intubated. Neck:     Cardiovascular:      Rate and Rhythm: Bradycardia present. Pulmonary:      Effort: She is intubated. Neurological:      Mental Status: She is unresponsive. Lab Review:     .   Recent Results (from the past 24 hour(s))   GLUCOSE, POC    Collection Time: 11/08/22  5:24 PM   Result Value Ref Range    Glucose (POC) 141 (H) 65 - 117 mg/dL    Performed by Joann Lowry RN    GLUCOSE, POC    Collection Time: 11/08/22  9:04 PM   Result Value Ref Range    Glucose (POC) 101 65 - 117 mg/dL    Performed by Ayleen Palmer RN    CBC WITH AUTOMATED DIFF    Collection Time: 11/09/22  2:33 AM   Result Value Ref Range    WBC 18.0 (H) 3.6 - 11.0 K/uL    RBC 2.93 (L) 3.80 - 5.20 M/uL    HGB 8.4 (L) 11.5 - 16.0 g/dL    HCT 24.2 (L) 35.0 - 47.0 %    MCV 82.6 80.0 - 99.0 FL    MCH 28.7 26.0 - 34.0 PG    MCHC 34.7 30.0 - 36.5 g/dL    RDW 15.8 (H) 11.5 - 14.5 %    PLATELET 68 (L) 503 - 400 K/uL    MPV ABNORMAL 8.9 - 12.9 FL    NRBC 0.1 (H) 0  WBC    ABSOLUTE NRBC 0.02 (H) 0.00 - 0.01 K/uL    NEUTROPHILS 85 (H) 32 - 75 %    LYMPHOCYTES 5 (L) 12 - 49 %    MONOCYTES 8 5 - 13 %    EOSINOPHILS 0 0 - 7 %    BASOPHILS 0 0 - 1 %    IMMATURE GRANULOCYTES 2 (H) 0.0 - 0.5 %    ABS. NEUTROPHILS 15.3 (H) 1.8 - 8.0 K/UL    ABS. LYMPHOCYTES 0.9 0.8 - 3.5 K/UL    ABS. MONOCYTES 1.4 (H) 0.0 - 1.0 K/UL    ABS. EOSINOPHILS 0.0 0.0 - 0.4 K/UL    ABS. BASOPHILS 0.0 0.0 - 0.1 K/UL    ABS. IMM.  GRANS. 0.4 (H) 0.00 - 0.04 K/UL    DF SMEAR SCANNED      RBC COMMENTS ANISOCYTOSIS  1+        RBC COMMENTS TARGET CELLS  1+        RBC COMMENTS MICROCYTOSIS  1+       MAGNESIUM    Collection Time: 11/09/22  2:33 AM   Result Value Ref Range    Magnesium 2.1 1.6 - 2.4 mg/dL   PHOSPHORUS    Collection Time: 11/09/22  2:33 AM   Result Value Ref Range    Phosphorus 2.9 2.6 - 4.7 MG/DL   METABOLIC PANEL, BASIC    Collection Time: 11/09/22  2:33 AM   Result Value Ref Range    Sodium 137 136 - 145 mmol/L    Potassium 4.1 3.5 - 5.1 mmol/L    Chloride 99 97 - 108 mmol/L    CO2 25 21 - 32 mmol/L    Anion gap 13 5 - 15 mmol/L    Glucose 173 (H) 65 - 100 mg/dL    BUN 50 (H) 6 - 20 MG/DL    Creatinine 5.10 (H) 0.55 - 1.02 MG/DL    BUN/Creatinine ratio 10 (L) 12 - 20      eGFR 8 (L) >60 ml/min/1.73m2    Calcium 8.6 8.5 - 10.1 MG/DL   BLOOD GAS, ARTERIAL    Collection Time: 11/09/22  3:05 AM   Result Value Ref Range    pH 7.61 (HH) 7.35 - 7.45      PCO2 24 (L) 35 - 45 mmHg    PO2 530 (H) 80 - 100 mmHg    O2  (H) 92 - 97 %    BICARBONATE 24 22 - 26 mmol/L    BASE EXCESS 4.2 mmol/L   AMMONIA    Collection Time: 11/09/22  3:06 AM   Result Value Ref Range    Ammonia, plasma 33 (H) <32 UMOL/L   GLUCOSE, POC    Collection Time: 11/09/22  8:40 AM   Result Value Ref Range    Glucose (POC) 132 (H) 65 - 117 mg/dL    Performed by Reyne Compa RN    EKG, 12 LEAD, SUBSEQUENT    Collection Time: 11/09/22  8:41 AM   Result Value Ref Range    Ventricular Rate 47 BPM    Atrial Rate 389 BPM    QRS Duration 78 ms    Q-T Interval 510 ms    QTC Calculation (Bezet) 451 ms    Calculated R Axis 3 degrees    Calculated T Axis -131 degrees    Diagnosis       Atrial flutter  Low voltage QRS  Confirmed by Shweta Fong ([id number]) on 11/9/2022 12:18:40 PM     DUPLEX CAROTID BILATERAL    Collection Time: 11/09/22  8:50 AM   Result Value Ref Range    Right subclavian prox .1 cm/s    Right subclavian prox EDV 17.5 cm/s    Right cca dist sys 93.1 cm/s    Right CCA dist smith 5.1 cm/s    Right CCA prox sys 72.3 cm/s    Right CCA prox smith 0.0 cm/s    Right ICA mid sys 106.5 cm/s    Right ICA mid smith 11.2 cm/s    Right ICA prox sys 95.2 cm/s    Right ICA prox smith 7.9 cm/s    Right eca sys 64.9 cm/s    RIGHT EXTERNAL CAROTID ARTERY D 0.00 cm/s    Right vertebral sys 73.0 cm/s    RIGHT VERTEBRAL ARTERY D 8.40 cm/s    Right ICA/CCA sys 1.1 no units    Left CCA dist sys 62.4 cm/s    Left CCA dist smith 9.3 cm/s    Left CCA prox sys 82.7 cm/s    Left CCA prox smith 6.8 cm/s    Left ICA dist sys 120.1 cm/s    Left ICA dist smith 15.6 cm/s    Left ICA mid sys 106.8 cm/s    Left ICA mid smith 12.2 cm/s    Left ICA prox sys 95.0 cm/s    Left ICA prox smith 8.3 cm/s    Left ECA sys 65.4 cm/s    LEFT EXTERNAL CAROTID ARTERY D 0.00 cm/s    Left vertebral sys 81.8 cm/s    LEFT VERTEBRAL ARTERY D 5.44 cm/s    Left ICA/CCA sys 1.92 no units   GLUCOSE, POC    Collection Time: 11/09/22 11:48 AM   Result Value Ref Range    Glucose (POC) 114 65 - 117 mg/dL    Performed by Cyndi Arellano RN    EKG, 12 LEAD, SUBSEQUENT    Collection Time: 11/09/22 12:01 PM   Result Value Ref Range    Ventricular Rate 49 BPM    Atrial Rate 55 BPM    QRS Duration 134 ms    Q-T Interval 626 ms    QTC Calculation (Bezet) 565 ms    Calculated R Axis 137 degrees    Calculated T Axis -148 degrees    Diagnosis       Atrial flutter  Right bundle branch block  Confirmed by Shweta Fong ([id number]) on 11/9/2022 12:21:26 PM            Assessment/Plan:     Right neck hematoma  -small   -s/p removal of central venous cathter from IJV 11/8   -w/o evidence of extravasation   -bleeding likely secondary to thrombocytopenia  Dry dressing changes daily. Follow-up as needed in 2 weeks for staple removal.  Vascular surgery signing off. Please reconsult as needed. Metabolic encephalopathy with unresponsiveness  Mild hyperammonemia   -33  -AST and ALT w/in normal limits  History of left parietal stroke  Bilateral carotid stenosis  Vascular dementia  Seizure disorder    Septic shock/lactic acidosis  Gram-positive septicemia  -Blood cultures coag negative staph in 4/4 bottles  Urinary tract infection  -Urine culture coag negative staph  Progressive thrombocytopenia with coagulopathy  -INR 1.4    Bradycardia/history of sick sinus syndrome  -Requiring dopamine drip  Subendocardial ischemia  -Troponin 880  Coronary artery disease  Hypertensive disorder  Mixed dyslipidemia  -Triglycerides 172  -LDL 55.3  Diastolic congestive heart failure    Acute hypoxic respiratory failure  -Due to unresponsiveness  Mechanical intubation   -for airway protection  -11/8/22 =>  COPD  -Not in exacerbation  Obstructive sleep apnea    Diabetes mellitus with hyperglycemia  -Hemoglobin A1c 6.0  Hypothyroidism    End-stage renal disease  Anemia of chronic disease    Gastrointestinal reflux    History of adenocarcinoma of the appendix    VTE prophylaxis:  Subcu prohibited by thrombocytopenia  SCDs    Disposition:   Condition critical.  Palliative care consulted. Patient remains a full code.

## 2022-11-09 NOTE — PROGRESS NOTES
Chart reviewed, events noted. Pt re-intubated overnight due to worsening mental and respiratory status and currently not appropriate for participation in PT evaluation. Per conversation with MD, pt for potential SAT/SBT and extubation later today. Will defer however continue to follow. Thank you    14:30: Spoke to RN. Pt remains unresponsive, off sedation, on ventilator as well as bradycardic despite titration up of dopamine. Pt has moved from aflutter to afib and showing abnormal QRS and ST depressions. Noted pt at high risk for cardiac event/arrest. Pt not medically appropriate for participation in PT evaluation. Will complete PT order at this time however please re-consult when medical status improves and pt appropriate for active participation in PT intervention.      Erica Tadeo, PT, DPT

## 2022-11-09 NOTE — PROGRESS NOTES
Critical Care Progress Note  Jaskaran Dewey MD          Date of Service:  2022  NAME:  Tevin Rock  :  1950  MRN:  [id number]      Subjective/Hospital course:      : Patient was intubated overnight to remove suspected arterial placement of central line but did not seem to be the case on exploration. She is intubated, off sedation. Still not following commands. :Patient was reintubated overnight due to worsening mental status. Active Problems Being Managed:      -Septic/Cardiogenic shock.  -Symptomatic bradycardia.  -Acute metabolic encephalopathy.  -Suspected misplacement of left IJ central line and line site bleeding. Now explanted by vascular surgery. -NSTEMI.  -DM2. -Hypothyroidism. -ESRD.  -HLD. -H/o HTN. Assessment/Plan:      Septic/cardiogenic shock.  -Coag neg staph bacteremia. Ucx is also positive for coag neg staph and GNR. -Optimize perfusion and pressors if needed for goal MAP >65  -Closely monitor hemodynamics and markers of end organ perfusion including mental status, UO and lactate.  -Follow up cultures and change abx accordingly. Acute hypoxemic respiratory failure:  -Sec to encephalopathy.  -Continue lung protective ventilation with goal Plt pressure <30, driving pressure <30.  -Wean Fio2 for goal sats of 88-92.  -Sedation for RASS goal of 0 to -1.  -ABCDE protocol  -Daily SAT and SBT if criteria met. Symptomatic bradycardia.  -Continue dopamine, and levophed. -Titrate for HR >50 and for MAP >65. NSTEMI:  -Continue ASA and statin. -Heparin gtt on hold due to central line site bleeding.   -Cardiology consulted. Acute metabolic encephalopathy.  - Closely monitor mental status. - Delirium precautions. - Correct underlying metabolic issues. Hypothyroidism:  -Continue levothyroxine. DVT prophylaxis: Heparin gtt. SUP: Pepcid  Code status: Full code. Next of kin: Susannah Blackwell (Daughter) [phone] [facility]).  Plan was discussed with family in detail. Poor prognosis overall. I personally spent 40 minutes of critical care time. This is time spent at this critically ill patient's bedside actively involved in patient care as well as the coordination of care and discussions with the patient's family. This does not include any procedural time which has been billed separately. Review of Systems:   Review of Systems   Unable to perform ROS: Medical condition        Vital Signs:   Patient Vitals for the past 4 hrs:   Temp Pulse Resp SpO2   11/09/22 0813 -- (!) 48 12 100 %   11/09/22 0810 99.5 °F (37.5 °C) (!) 48 12 100 %   11/09/22 0805 99.5 °F (37.5 °C) (!) 45 12 100 %   11/09/22 0800 99.5 °F (37.5 °C) (!) 45 12 100 %   11/09/22 0745 99.5 °F (37.5 °C) (!) 47 14 100 %   11/09/22 0730 99.3 °F (37.4 °C) (!) 46 21 100 %   11/09/22 0715 99.3 °F (37.4 °C) (!) 45 16 100 %   11/09/22 0700 99.3 °F (37.4 °C) (!) 47 18 100 %   11/09/22 0645 99.3 °F (37.4 °C) (!) 49 15 100 %   11/09/22 0630 99.1 °F (37.3 °C) (!) 49 16 100 %   11/09/22 0615 99.1 °F (37.3 °C) (!) 48 11 100 %   11/09/22 0600 99.3 °F (37.4 °C) (!) 49 14 100 %   11/09/22 0545 99.3 °F (37.4 °C) (!) 50 13 100 %   11/09/22 0530 99.3 °F (37.4 °C) (!) 49 12 100 %          Intake/Output Summary (Last 24 hours) at 11/9/2022 0925  Last data filed at 11/9/2022 0426  Gross per 24 hour   Intake 841.15 ml   Output 0 ml   Net 841.15 ml          Physical Examination:    Physical Exam  Constitutional:       Comments: Intubated   HENT:      Head: Normocephalic and atraumatic. Eyes:      Conjunctiva/sclera: Conjunctivae normal.   Cardiovascular:      Rate and Rhythm: Normal rate. Rhythm irregular. Pulmonary:      Effort: No respiratory distress. Abdominal:      General: Abdomen is flat. There is no distension. Palpations: Abdomen is soft. Tenderness: There is no abdominal tenderness. There is no guarding. Musculoskeletal:      Cervical back: Neck supple.    Neurological: Comments: Altered mental status. Labs and Imaging:   Reviewed.       Medications:     Current Facility-Administered Medications   Medication Dose Route Frequency    propofol (DIPRIVAN) 10 mg/mL infusion  0-50 mcg/kg/min IntraVENous TITRATE    NOREPINephrine (LEVOPHED) 8 mg in 5% dextrose 250mL (32 mcg/mL) infusion  0.5-16 mcg/min IntraVENous TITRATE    VANCOMYCIN INFORMATION NOTE   Other Rx Dosing/Monitoring    heparin (porcine) 2,000 Units in sodium chloride irrigation 0.9 % 500 mL Irrigation    PRN    fentaNYL citrate (PF) injection 25-50 mcg  25-50 mcg IntraVENous Q1H PRN    heparin 25,000 units in D5W 250 ml infusion  12-25 Units/kg/hr IntraVENous TITRATE    heparin (porcine) 1,000 unit/mL injection 1,370 Units  30 Units/kg IntraVENous PRN    Or    heparin (porcine) 1,000 unit/mL injection 2,730 Units  60 Units/kg IntraVENous PRN    hydrALAZINE (APRESOLINE) 20 mg/mL injection 10 mg  10 mg IntraVENous Q6H PRN    atorvastatin (LIPITOR) tablet 40 mg  40 mg Oral QHS    nitroglycerin (NITROSTAT) tablet 0.4 mg  0.4 mg SubLINGual PRN    [Held by provider] aspirin delayed-release tablet 81 mg  81 mg Oral DAILY    [START ON 11/11/2022] ergocalciferol capsule 50,000 Units  50,000 Units Oral Q7D    nicotine (NICODERM CQ) 21 mg/24 hr patch 1 Patch  1 Patch TransDERmal DAILY    levothyroxine (SYNTHROID) tablet 50 mcg  50 mcg Oral ACB    tiZANidine (ZANAFLEX) tablet 2 mg  2 mg Oral TID PRN    lidocaine 4 % patch 1 Patch  1 Patch TransDERmal DAILY PRN    melatonin tablet 4.5 mg  4.5 mg Oral QHS    B complex-vitaminC-folic acid (NEPHROCAP) cap  1 Capsule Oral DAILY    glucose chewable tablet 16 g  16 g Oral PRN    dextrose 10% infusion 0-250 mL  0-250 mL IntraVENous PRN    DOPamine (INTROPIN) 800 mg in dextrose 5% 250 mL infusion  0-20 mcg/kg/min IntraVENous TITRATE    glucagon (GLUCAGEN) injection 1 mg  1 mg IntraMUSCular PRN    insulin lispro (HUMALOG) injection   SubCUTAneous AC&HS    acetaminophen (TYLENOL) tablet 650 mg  650 mg Oral Q4H PRN    Or    acetaminophen (TYLENOL) solution 650 mg  650 mg Per NG tube Q4H PRN    Or    acetaminophen (TYLENOL) suppository 650 mg  650 mg Rectal Q4H PRN    piperacillin-tazobactam (ZOSYN) 3.375 g in 0.9% sodium chloride (MBP/ADV) 100 mL MBP  3.375 g IntraVENous Q12H    famotidine (PF) (PEPCID) 20 mg in 0.9% sodium chloride 10 mL injection  20 mg IntraVENous DAILY    atropine 1 mg/mL injection 0.5 mg  0.5 mg IntraVENous PRN     ______________________________________________________________________  EXPECTED LENGTH OF STAY: 4d 19h  ACTUAL LENGTH OF STAY:          [address], [place]   Pulmonary/[facility]  [address]  [phone]  11/9/2022

## 2022-11-09 NOTE — PROGRESS NOTES
Nephrology Progress Note  [facility] / [facility] AND [facility], [address]  [place], [address]  Phone - [phone]  Fax - [phone]                 Patient: Tevin Rock                   YOB: 1950        Date- 11/9/2022                      Admit Date: 11/6/2022  CC: Follow up for esrd   IMPRESSION & PLAN:   Esrd-- jenny marin mwf- DR. TINAJERO  ANEMIA of ckd  Sec. Hyperpara  Hypertension  DM 2  Bradycardia  Altered mental status  Hypotension  Resp failure  S/p removal of carotid artery central venous cath          PLAN-  No dialysis today  Check bmp in am  Restart epogen       Subjective: Interval History:   - s/p hd yesterday  Hb low  On vent      11/8/22  she is on vent  She went to OR last night for removal of carotid artery central venous cath  Bp low requiring vasopressor support        11/7/22   Patient presented to er with confusion. She is on hd mwf at [facility]. Her last hd on Friday  Her bp is low with bradycardia  She is on dopamine gtt  Her missed hd last Monday due to pain. She had hd on Wednesday and Friday. ROS- Can't assess due to patient's current condition       Objective:   Vitals:    11/09/22 1230 11/09/22 1245 11/09/22 1300 11/09/22 1315   BP:       Pulse: (!) 49 (!) 47 (!) 46 (!) 48   Resp: 15 16 19 15   Temp: 99.5 °F (37.5 °C) 99.7 °F (37.6 °C) 99.7 °F (37.6 °C) 99.7 °F (37.6 °C)   TempSrc:       SpO2: 100% 99% 99% 98%   Weight:       Height:          11/08 0701 - 11/09 0700  In: 865.8 [I.V.:545.8]  Out: 0   Last 3 Recorded Weights in this Encounter    11/06/22 1831 11/07/22 1621   Weight: 45.5 kg (100 lb 5 oz) 45.4 kg (100 lb)        Physical exam:    GEN: intubated on vent  NECK-no mass, ET TUBE  RESP: clear b/l, no wheezing  CVS: RRR,S1,S2    EXT: right arm avg   NEURO:Can't access due to patient's current condition           Chart reviewed. Pertinent Notes reviewed.      Data Review :  Recent Labs     11/09/22 0233 11/08/22 0212 11/07/22  1901 11/07/22  0358    135* 137  --    K 4.1 4.6 4.2  --    CL 99 99 102  --    CO2 25 23 25  --    BUN 50* 83* 76*  --    CREA 5.10* 7.76* 7.45*  --    * 251* 188*  --    CA 8.6 9.6 9.5  --    MG 2.1 2.4  --  2.4   PHOS 2.9 2.9  --  1.7*       Recent Labs     11/09/22 0233 11/08/22 0212 11/07/22  2330   WBC 18.0* 27.0* 21.9*   HGB 8.4* 9.9* 10.4*   HCT 24.2* 30.6* 31.1*   PLT 68* 116* 107*       No results for input(s): FE, TIBC, PSAT, FERR in the last 72 hours.    Lab Results   Component Value Date/Time    Hemoglobin A1c 6.0 (H) 11/08/2022 02:12 AM    Hemoglobin A1c 6.0 (H) 11/07/2022 03:58 AM        No results found for: MCACR, MCA1, MCA2, MCA3, MCAU, MCAU2, MCALPOCT  Lab Results   Component Value Date/Time    NT pro-BNP >35,000 (H) 08/02/2021 06:57 AM     US Results (most recent):  Medication list  reviewed  Current Facility-Administered Medications   Medication Dose Route Frequency    propofol (DIPRIVAN) 10 mg/mL infusion  0-50 mcg/kg/min IntraVENous TITRATE    NOREPINephrine (LEVOPHED) 8 mg in 5% dextrose 250mL (32 mcg/mL) infusion  0.5-16 mcg/min IntraVENous TITRATE    VANCOMYCIN INFORMATION NOTE   Other Rx Dosing/Monitoring    heparin (porcine) 2,000 Units in sodium chloride irrigation 0.9 % 500 mL Irrigation    PRN    fentaNYL citrate (PF) injection 25-50 mcg  25-50 mcg IntraVENous Q1H PRN    heparin 25,000 units in D5W 250 ml infusion  12-25 Units/kg/hr IntraVENous TITRATE    heparin (porcine) 1,000 unit/mL injection 1,370 Units  30 Units/kg IntraVENous PRN    Or    heparin (porcine) 1,000 unit/mL injection 2,730 Units  60 Units/kg IntraVENous PRN    hydrALAZINE (APRESOLINE) 20 mg/mL injection 10 mg  10 mg IntraVENous Q6H PRN    atorvastatin (LIPITOR) tablet 40 mg  40 mg Oral QHS    nitroglycerin (NITROSTAT) tablet 0.4 mg  0.4 mg SubLINGual PRN    [Held by provider] aspirin delayed-release tablet 81 mg  81 mg Oral DAILY [START ON 11/11/2022] ergocalciferol capsule 50,000 Units  50,000 Units Oral Q7D    nicotine (NICODERM CQ) 21 mg/24 hr patch 1 Patch  1 Patch TransDERmal DAILY    levothyroxine (SYNTHROID) tablet 50 mcg  50 mcg Oral ACB    tiZANidine (ZANAFLEX) tablet 2 mg  2 mg Oral TID PRN    lidocaine 4 % patch 1 Patch  1 Patch TransDERmal DAILY PRN    melatonin tablet 4.5 mg  4.5 mg Oral QHS    B complex-vitaminC-folic acid (NEPHROCAP) cap  1 Capsule Oral DAILY    glucose chewable tablet 16 g  16 g Oral PRN    dextrose 10% infusion 0-250 mL  0-250 mL IntraVENous PRN    DOPamine (INTROPIN) 800 mg in dextrose 5% 250 mL infusion  0-20 mcg/kg/min IntraVENous TITRATE    glucagon (GLUCAGEN) injection 1 mg  1 mg IntraMUSCular PRN    insulin lispro (HUMALOG) injection   SubCUTAneous AC&HS    acetaminophen (TYLENOL) tablet 650 mg  650 mg Oral Q4H PRN    Or    acetaminophen (TYLENOL) solution 650 mg  650 mg Per NG tube Q4H PRN    Or    acetaminophen (TYLENOL) suppository 650 mg  650 mg Rectal Q4H PRN    piperacillin-tazobactam (ZOSYN) 3.375 g in 0.9% sodium chloride (MBP/ADV) 100 mL MBP  3.375 g IntraVENous Q12H    famotidine (PF) (PEPCID) 20 mg in 0.9% sodium chloride 10 mL injection  20 mg IntraVENous DAILY    atropine 1 mg/mL injection 0.5 mg  0.5 mg IntraVENous PRN        Augusto Stephens MD  11/9/2022

## 2022-11-09 NOTE — PROGRESS NOTES
0700: Bedside shift report received from La Ward, RN (offgoing nurse). 0800: Shift assessment completed. Pt is intubated and is not sedated. Pt withdraws extremities from pain and opens eyes to pain. Pt does not follow commands and does not track or focus with eyes. Monitor shows A-flutter. Lung sounds are diminished bilaterally. Vent settings - T/V 325, R 12, PEEP 5 and FiO2 50%. Pt's bowel sounds are hypoactive. Pt is anuric. Pt has NGT and ETT. Pt has tube feedings running @ 25 mL/hr with Q4H 50 mL water flushes. Dopamine infusing @ 14 mcg/kg/min. Restarted Levophed infusion @ 4 mcg/min for SBP < 100. Pt has two open wounds on left buttocks and sacrum, wound care consulted. Will apply Venelex, continue to turn Q2H and float heels. 5337: EKG performed at this time to assess rhythm changes. 5984: Bilateral carotid duplex completed at bedside. 1000: Dr. Brenton Sue, cardiologist, at bedside. EKG from 0841 reviewed, no new orders received. 1100: EKG completed to assess rhythm changes, showing wide QRS complexes and PVC's. Dr. Marlon Stephens made aware, was told to contact palliative care. 1200: Shift reassessment completed, no changes to previous assessment. 1202: Levo weaned, see MAR for amount. 1238: Levo weaned, see MAR for amount. 1255: Levo titrated, see MAR for amount. 1600: Shift reassessment completed, no changes to previous assessment. Spoke to MRI, MRI planned for tonight at 2000. Will advise night shift nurse. 1631: PRN Tylenol 650 mg given, temp 100.1.     1700: Tube feeding rate increased to 30 mL/hr (goal 35 mL/hr). 1900: Bedside shift report given to JUAN FRANCISCO Mederos (oncoming nurse).

## 2022-11-10 NOTE — INTERDISCIPLINARY ROUNDS
Interdisciplinary team rounds were held 11/10/2022 with the following team members:Care Management, Diabetes Treatment Specialist, Nursing, Nutrition, Pharmacy, and Physician. Plan of care discussed. See clinical pathway and/or care plan for interventions and desired outcomes. Goals of the Day: Going to MRI after this round.

## 2022-11-10 NOTE — PROCEDURES
Hemodialysis / [phone]    Vitals Pre Post Assessment Pre Post   BP BP: (!) 124/42 (11/10/22 1230)   144/38   LOC Not Sedated, responsive to pain No Change   HR Pulse (Heart Rate): (!) 35 (11/10/22 1230)   50   Lungs Fine rails on the left lower lobes, clear and present breath sounds in the upper lobes bilaterally No Change   Resp Resp Rate: 22 (11/10/22 1230) 17 Cardiac S1/S2 Regular No Change   Temp 98.9 F 97.7 Skin CDI No Change   Weight  N/a N/a Edema None noted No Change   Tele status Wired Wired Pain Pain Intensity 1: 3 (11/10/22 0800) No Change     Orders   Duration: Start: 1226 End: 1601 Total: 3.5 Hours   Dialyzer: Dialyzer/Set Up Inspection: Sindy Mathur (11/10/22 1230)   K Bath: Dialysate K (mEq/L): 2 (11/10/22 1230)   Ca Bath: Dialysate CA (mEq/L): 2.5 (11/10/22 1230)   Na: Dialysate NA (mEq/L): 140 (11/10/22 1230)   Bicarb: Dialysate HCO3 (mEq/L): 40 (11/10/22 1230)   Target Fluid Removal: Goal/Amount of Fluid to Remove (mL): 0 mL (11/10/22 1230)     Access   Type & Location: Right upper arm Graft   Comments:   No s/s of infection. + B/T. No issues with cannulation or hemostasis.  Running well at [facility]       Labs   HBsAg (Antigen) / date: Neg Ag - 11/15/22   HBsAb (Antibody) / date: Immune - 11/15/22   Source: Epic Chart Review   Obtained/Reviewed  Critical Results Called HGB   Date Value Ref Range Status   11/10/2022 8.3 (L) 11.5 - 16.0 g/dL Final     Potassium   Date Value Ref Range Status   11/10/2022 5.2 (H) 3.5 - 5.1 mmol/L Final     Comment:     INVESTIGATED PER DELTA CHECK PROTOCOL     Calcium   Date Value Ref Range Status   11/10/2022 9.0 8.5 - 10.1 MG/DL Final     BUN   Date Value Ref Range Status   11/10/2022 74 (H) 6 - 20 MG/DL Final     Creatinine   Date Value Ref Range Status   11/10/2022 6.87 (H) 0.55 - 1.02 MG/DL Final     Comment:     INVESTIGATED PER DELTA CHECK PROTOCOL        Meds Given   Name Dose Route   Albumin  25G IVP   Albumin 25G IVP          Adequacy / Fluid    Total Liters Process: 74.9 L   Net Fluid Removed: -969 mL      Comments   Time Out Done:   (Time) 1224   Admitting Diagnosis: Stroke (cerebrum) ([facility] 11/6/2022      NSTEMI (non-ST elevated myocardial infarction) ([facility] 11/6/2022      History of stroke 11/7/2022      Bilateral carotid artery stenosis 11/7/2022      Acute alteration in mental status 11/7/2022      Convulsive syncope 11/7/2022      [facility] involving [facility] artery [facility] 11/7/2022      Diabetic peripheral neuropathy associated with type 2 diabetes mellitus ([facility] 11/7/2022      Consent obtained/signed: Informed Consent Verified: Yes (11/10/22 1230)   Machine / RO # Machine Number: Yana Gray (11/10/22 1230)   Primary Nurse Rpt Pre: Lance Holland RN   Primary Nurse Rpt Post: Lance Holland RN   Pt Education: None, no family available   Care Plan: Ongoing   Pts outpatient [facility]: [facility]     Tx Summary   Comments:     Arrived in Room, pt. Sedated, chronic consent applies. SBAR received from Primary RN.     1226: Pt cannulated with 91Q needles per policy & without issue. VSS. Dialysis Tx initiated. 3059: Systolic , gave 045 mL bolus and started albumin infusion    1300: ICU nurse increased levophed    1330: BP dropped suddenly, 100 mL bolus given, levophed increased, 2nd albumin started    1400: 2nd Albumin finished, BP as noted, multiple boluses given, [employer] contacted, gave new orders for neosynephrine to primary nurse, it was initiated, BP increased    **Multiple episodes of hypotension with saline boluses given as charted, hypotension episodes stopped after starting neosynephrine    1601: Tx ended. VSS. All possible blood returned to patient. Hemostasis achieved without issue. Bed locked and in the lowest position, call bell and belongings in reach. SBAR given to Primary, RN. Patient is stable at time of my departure. All Dialysis related medications have been reviewed.

## 2022-11-10 NOTE — PROGRESS NOTES
Nephrology Progress Note  [facility] / [facility], [address]  [place], [address]  Phone - [phone]  Fax - [phone]                 Patient: Daija Miranda                   YOB: 1950        Date- 11/10/2022                      Admit Date: 11/6/2022  CC: Follow up for esrd  IMPRESSION & PLAN:   ESRD- davita laburnum mwf- DR. TINAJERO  ANEMIA of ckd  Hyperkalemia  acidosis  Sec. Hyperpara  Hypertension  DM 2  Bradycardia  Altered mental status  Hypotension-- gram +ve sepsis  Resp failure  S/p removal of carotid artery central venous cath    PLAN-  Dialysis today  Bicarb 1amp iv  Continue vasopressor support  She will be big challenge to place kadie cath-- We will try to avoid CRRT   Check bmp in am  Continue vancomycin  Continue epogen  POOR PROGNOSIS       Subjective: Interval History:   - k increased  On vent  SHE IS BRADYCARDIC  Hypotensive requiring vasopressor support      11/8/22  she is on vent  She went to OR last night for removal of carotid artery central venous cath  Bp low requiring vasopressor support        11/7/22   Patient presented to er with confusion. She is on hd mwf at [facility]. Her last hd on Friday  Her bp is low with bradycardia  She is on dopamine gtt  Her missed hd last Monday due to pain. She had hd on Wednesday and Friday.     ROS- Can't assess due to patient's current condition       Objective:   Vitals:    11/10/22 0714 11/10/22 0715 11/10/22 0730 11/10/22 0748   BP:       Pulse: (!) 41 (!) 40 (!) 40 (!) 39   Resp: 19 20 16 28   Temp:       TempSrc:       SpO2:    99%   Weight:       Height:          11/09 0701 - 11/10 0700  In: 1479.7 [I.V.:579.7]  Out: -   Last 3 Recorded Weights in this Encounter    11/06/22 1831 11/07/22 1621   Weight: 45.5 kg (100 lb 5 oz) 45.4 kg (100 lb)        Physical exam:    GEN: intubated on vent  NECK-no mass, ET TUBE  RESP: clear  b/l, no wheezing  CVS: s1,s2, emely  EXT: right arm avg   NEURO:Can't access due to patient's current condition           Chart reviewed. Pertinent Notes reviewed. Data Review :  Recent Labs     11/10/22  0324 11/09/22  0233 11/08/22  0212    137 135*   K 5.2* 4.1 4.6   CL 96* 99 99   CO2 13* 25 23   BUN 74* 50* 83*   CREA 6.87* 5.10* 7.76*   * 173* 251*   CA 9.0 8.6 9.6   MG 2.5* 2.1 2.4   PHOS 6.0* 2.9 2.9       Recent Labs     11/10/22  0324 11/09/22  0233 11/08/22  0212   WBC 29.1* 18.0* 27.0*   HGB 8.3* 8.4* 9.9*   HCT 25.5* 24.2* 30.6*   PLT 92* 68* 116*       No results for input(s): FE, TIBC, PSAT, FERR in the last 72 hours.    Lab Results   Component Value Date/Time    Hemoglobin A1c 6.0 (H) 11/08/2022 02:12 AM    Hemoglobin A1c 6.0 (H) 11/07/2022 03:58 AM        No results found for: MCACR, MCA1, MCA2, MCA3, MCAU, MCAU2, MCALPOCT  Lab Results   Component Value Date/Time    NT pro-BNP >35,000 (H) 08/02/2021 06:57 AM     US Results (most recent):  Medication list  reviewed  Current Facility-Administered Medications   Medication Dose Route Frequency    propofol (DIPRIVAN) 10 mg/mL infusion  0-50 mcg/kg/min IntraVENous TITRATE    epoetin bia-epbx (RETACRIT) injection 20,000 Units  20,000 Units SubCUTAneous Q MON, WED & FRI    balsam peru-castor oiL (VENELEX) ointment   Topical BID    NOREPINephrine (LEVOPHED) 8 mg in 5% dextrose 250mL (32 mcg/mL) infusion  0.5-16 mcg/min IntraVENous TITRATE    VANCOMYCIN INFORMATION NOTE   Other Rx Dosing/Monitoring    heparin (porcine) 2,000 Units in sodium chloride irrigation 0.9 % 500 mL Irrigation    PRN    fentaNYL citrate (PF) injection 25-50 mcg  25-50 mcg IntraVENous Q1H PRN    heparin 25,000 units in D5W 250 ml infusion  12-25 Units/kg/hr IntraVENous TITRATE    heparin (porcine) 1,000 unit/mL injection 1,370 Units  30 Units/kg IntraVENous PRN    Or    heparin (porcine) 1,000 unit/mL injection 2,730 Units  60 Units/kg IntraVENous PRN    hydrALAZINE (APRESOLINE) 20 mg/mL injection 10 mg  10 mg IntraVENous Q6H PRN    atorvastatin (LIPITOR) tablet 40 mg  40 mg Oral QHS    nitroglycerin (NITROSTAT) tablet 0.4 mg  0.4 mg SubLINGual PRN    [Held by provider] aspirin delayed-release tablet 81 mg  81 mg Oral DAILY    [START ON 11/11/2022] ergocalciferol capsule 50,000 Units  50,000 Units Oral Q7D    nicotine (NICODERM CQ) 21 mg/24 hr patch 1 Patch  1 Patch TransDERmal DAILY    levothyroxine (SYNTHROID) tablet 50 mcg  50 mcg Oral ACB    tiZANidine (ZANAFLEX) tablet 2 mg  2 mg Oral TID PRN    lidocaine 4 % patch 1 Patch  1 Patch TransDERmal DAILY PRN    melatonin tablet 4.5 mg  4.5 mg Oral QHS    B complex-vitaminC-folic acid (NEPHROCAP) cap  1 Capsule Oral DAILY    glucose chewable tablet 16 g  16 g Oral PRN    dextrose 10% infusion 0-250 mL  0-250 mL IntraVENous PRN    DOPamine (INTROPIN) 800 mg in dextrose 5% 250 mL infusion  0-20 mcg/kg/min IntraVENous TITRATE    glucagon (GLUCAGEN) injection 1 mg  1 mg IntraMUSCular PRN    insulin lispro (HUMALOG) injection   SubCUTAneous AC&HS    acetaminophen (TYLENOL) tablet 650 mg  650 mg Oral Q4H PRN    Or    acetaminophen (TYLENOL) solution 650 mg  650 mg Per NG tube Q4H PRN    Or    acetaminophen (TYLENOL) suppository 650 mg  650 mg Rectal Q4H PRN    piperacillin-tazobactam (ZOSYN) 3.375 g in 0.9% sodium chloride (MBP/ADV) 100 mL MBP  3.375 g IntraVENous Q12H    famotidine (PF) (PEPCID) 20 mg in 0.9% sodium chloride 10 mL injection  20 mg IntraVENous DAILY    atropine 1 mg/mL injection 0.5 mg  0.5 mg IntraVENous PRN        Nita King MD  11/10/2022

## 2022-11-10 NOTE — PROGRESS NOTES
Cardiology Progress Note      11/10/2022 8:56 AM    Admit Date: 11/6/2022          Subjective: Bradycardia worse this am but improved with increase in dopamine, levophed. Remains unresponsive. For ana MRI this am per nurse          Visit Vitals  BP (!) 108/55 (BP 1 Location: Left lower arm, BP Patient Position: Lying)   Pulse (!) 39   Temp 96.8 °F (36 °C)   Resp 28   Ht 4' 11\" (1.499 m)   Wt 45.4 kg (100 lb)   SpO2 99%   Breastfeeding No   BMI 20.20 kg/m²     11/08 1901 - 11/10 0700  In: 1749.7 [I.V.:579.7]  Out: -         Objective:      Physical Exam:  VS as above  Chest CTA ant  Card Irreg irreg no gallop  Neuro unresponsive     Data Review:   Labs:    K 5.2   Creat 6.9  Hgb 8.3    Telemetry: afib R 43      Assessment:     1. Bradycardia requiring IV dopamine. 2.  persistent  atrial fibrillation with slow vent response   3. Underlying sick sinus syndrome. 4.  Coronary artery disease with prior coronary artery bypass grafting , nl EF by echo   5. Elevated troponin  6. Dementia. 7.  Hypothyroidism. 8.  End-stage renal disease on hemodialysis. 9.  Leukocytosis. 10. Acute resp failure , on vent  11. ? Anoxic encephalopathy     Plan: Cont current Rx. Will reassess later today and consider temp transvenous pacer considering overall condition and prognosis. No urgent need to proceed now.

## 2022-11-10 NOTE — PROGRESS NOTES
CM completed chart review and attended IDR. Patient lives with a family member who is her paid caregiver. Patient goes to [facility] on [facility] MWF at 6:30 a.m. Palliative is following pt. Pt having an MRI today. CM will continue to follow for d/c needs.     Christina Borges, MSW  Care Management, [facility]

## 2022-11-10 NOTE — WOUND CARE
Wound Care consult for the fissure in the gluteal cleft and skin tears on the buttocks. Patient is in the CCU, admitted for CVA and possible septic emboli. Pt. Just finished dialysis and is asleep, intubated and sedated. Assessment and treatment: Confirmed a gluteal cleft fissure with some superficial bleeding tissue. Pt. Recently had a stage II pressure injury and the skin on the sacrum appears to be chronically healing in different phases with scaring and the new skin tears. Desitin cream ordered for the skin / wounds. Discontinue the Venelex on the sacrum  and just use the Desitin cream.     WOUND POA CONDITIONS    Wound Sacral/coccyx Mid;Inner Stage 2 (Active)   Wound Image   11/10/22 1647   Wound Etiology Skin Tear 11/10/22 1647   Cleansed Not cleansed 11/10/22 1647   Wound Assessment Denuded;[place]/red;Superficial 11/10/22 1647   Drainage Amount Scant 11/10/22 1647   Drainage Description Serosanguinous 11/10/22 1647   Wound Odor None 11/10/22 1647   [place]-Wound/Incision Assessment Maceration 11/10/22 1647   Edges Epibole (rolled edges) 11/10/22 1647   Wound Thickness Description Partial thickness 11/10/22 1647   Number of days: 474   Plan: Wound care orders written for the skin / wound care.    Luciana Mohr RN, BSN, [employer]

## 2022-11-10 NOTE — PROGRESS NOTES
Spiritual Care Assessment/Progress Note  [facility]      NAME: Dominique Galloway      MRN: [id number]  AGE: 67 y.o. SEX: female  Episcopalian Affiliation: Unknown   Language: English     11/10/2022     Total Time (in minutes): 5     Spiritual Assessment begun in [facility] 2 CRITICAL CARE 2 through conversation with:         []Patient        [] Family    [] Friend(s)        Reason for Consult: Palliative Care, Initial/Spiritual Assessment     Spiritual beliefs: (Please include comment if needed)     [] Identifies with a nicolas tradition:         [] Supported by a nicolas community:            [] Claims no spiritual orientation:           [] Seeking spiritual identity:                [] Adheres to an individual form of spirituality:           [x] Not able to assess:                           Identified resources for coping:      [] Prayer                               [] Music                  [] Guided Imagery     [] Family/friends                 [] Pet visits     [] Devotional reading                         [] Unknown     [] Other:                                               Interventions offered during this visit: (See comments for more details)    Patient Interventions: Initial visit           Plan of Care:     [] Support spiritual and/or cultural needs    [] Support AMD and/or advance care planning process      [] Support grieving process   [] Coordinate Rites and/or Rituals    [] Coordination with community clergy   [] No spiritual needs identified at this time   [] Detailed Plan of Care below (See Comments)  [] Make referral to Music Therapy  [] Make referral to Pet Therapy     [] Make referral to Addiction services  [] Make referral to [facility]  [] Make referral to Spiritual Care Partner  [] No future visits requested        [x] Contact Spiritual Care for further referrals     Comments: Attempted initial spiritual assessment with palliative pt in 2538 again. Pt unable to respond.  No family present. Contact Spiritual Care for any further referrals.  Edna Rosenberg M.Div, [facility]   Paging Service [phone])

## 2022-11-10 NOTE — PROGRESS NOTES
Shift report received from Rica Colvin RN     Shift assessment complete, see flow sheet     1950: Patient transported to MRI, staff unable to provide patients , HR 30s when place on the life pack, Darnell Np informed     2300: Patent continues to emely down to the 30s, dopamine infusing @ 14mcg, levophed titrated to 6mcg per Dipesh Manning NP informed     2197:  Darnell informed patients HR continues to emely down to the 30s, no interventions at this time     0400: CO2 13,  Darnell informed     Shift report given to [address] [place] [address] Rd [address]

## 2022-11-10 NOTE — PROGRESS NOTES
Critical Care Progress Note  Christina Fernandez MD          Date of Service:  11/10/2022  NAME:  Noah Knight  :  1950  MRN:  [id number]      Subjective/Hospital course:      : Patient was intubated overnight to remove suspected arterial placement of central line but did not seem to be the case on exploration. She is intubated, off sedation. Still not following commands. :Patient was reintubated overnight due to worsening mental status. 11/10: Remains intubated, minimally responsive off sedation. On 16mic of dopamine and 6 afshin of levophed. Active Problems Being Managed:      -Septic/Cardiogenic shock.  -Symptomatic bradycardia.  -Acute metabolic encephalopathy.  -Suspected misplacement of left IJ central line and line site bleeding. Now explanted by vascular surgery. -NSTEMI.  -DM2. -Hypothyroidism. -ESRD.  -HLD. -H/o HTN. Assessment/Plan:      Septic/cardiogenic shock.  -Coag neg staph bacteremia. Ucx is also positive for coag neg staph and GNR. -Optimize perfusion and pressors if needed for goal MAP >65  -Closely monitor hemodynamics and markers of end organ perfusion including mental status, UO and lactate.  -Follow up cultures and change abx accordingly. Acute hypoxemic respiratory failure:  -Sec to encephalopathy.  -Continue lung protective ventilation with goal Plt pressure <30, driving pressure <74.  -Wean Fio2 for goal sats of 88-92.  -Sedation for RASS goal of 0 to -1.  -ABCDE protocol  -Daily SAT and SBT if criteria met. Symptomatic bradycardia.  -Continue dopamine, and levophed. -Titrate for HR >50 and for MAP >65. NSTEMI:  -Continue ASA and statin. -Heparin gtt on hold due to central line site bleeding.   -Cardiology consulted. Acute metabolic encephalopathy.  - Closely monitor mental status. - Delirium precautions. - Correct underlying metabolic issues. Hypothyroidism:  -Continue levothyroxine. DVT prophylaxis: Heparin gtt.   SUP: Pepcid  Code status: Full code. Next of kin: uYan Roman (Daughter) [phone] [facility]). Poor prognosis overall. I personally spent 40 minutes of critical care time. This is time spent at this critically ill patient's bedside actively involved in patient care as well as the coordination of care and discussions with the patient's family. This does not include any procedural time which has been billed separately. Review of Systems:   Review of Systems   Unable to perform ROS: Medical condition        Vital Signs:   Patient Vitals for the past 4 hrs:   Pulse Resp SpO2   11/10/22 0748 (!) 39 28 99 %   11/10/22 0730 (!) 40 16 --   11/10/22 0715 (!) 40 20 --   11/10/22 0714 (!) 41 19 --   11/10/22 0700 (!) 39 18 --   11/10/22 0659 (!) 40 20 --   11/10/22 0644 (!) 40 21 --   11/10/22 0629 (!) 40 18 --   11/10/22 0614 (!) 40 16 --   11/10/22 0559 (!) 40 25 --          Intake/Output Summary (Last 24 hours) at 11/10/2022 0955  Last data filed at 11/10/2022 0400  Gross per 24 hour   Intake 989.4 ml   Output --   Net 989.4 ml          Physical Examination:    Physical Exam  Constitutional:       Comments: Intubated   HENT:      Head: Normocephalic and atraumatic. Eyes:      Conjunctiva/sclera: Conjunctivae normal.   Cardiovascular:      Rate and Rhythm: Normal rate. Rhythm irregular. Pulmonary:      Effort: No respiratory distress. Abdominal:      General: Abdomen is flat. There is no distension. Palpations: Abdomen is soft. Tenderness: There is no abdominal tenderness. There is no guarding. Musculoskeletal:      Cervical back: Neck supple. Neurological:      Comments: Altered mental status. Labs and Imaging:   Reviewed.       Medications:     Current Facility-Administered Medications   Medication Dose Route Frequency    sodium bicarbonate 8.4 % (1 mEq/mL) injection 50 mEq  50 mEq IntraVENous ONCE    propofol (DIPRIVAN) 10 mg/mL infusion  0-50 mcg/kg/min IntraVENous TITRATE epoetin bia-epbx (RETACRIT) injection 20,000 Units  20,000 Units SubCUTAneous Q MON, WED & FRI    balsam peru-castor oiL (VENELEX) ointment   Topical BID    NOREPINephrine (LEVOPHED) 8 mg in 5% dextrose 250mL (32 mcg/mL) infusion  0.5-16 mcg/min IntraVENous TITRATE    VANCOMYCIN INFORMATION NOTE   Other Rx Dosing/Monitoring    heparin (porcine) 2,000 Units in sodium chloride irrigation 0.9 % 500 mL Irrigation    PRN    fentaNYL citrate (PF) injection 25-50 mcg  25-50 mcg IntraVENous Q1H PRN    heparin 25,000 units in D5W 250 ml infusion  12-25 Units/kg/hr IntraVENous TITRATE    heparin (porcine) 1,000 unit/mL injection 1,370 Units  30 Units/kg IntraVENous PRN    Or    heparin (porcine) 1,000 unit/mL injection 2,730 Units  60 Units/kg IntraVENous PRN    hydrALAZINE (APRESOLINE) 20 mg/mL injection 10 mg  10 mg IntraVENous Q6H PRN    atorvastatin (LIPITOR) tablet 40 mg  40 mg Oral QHS    nitroglycerin (NITROSTAT) tablet 0.4 mg  0.4 mg SubLINGual PRN    [Held by provider] aspirin delayed-release tablet 81 mg  81 mg Oral DAILY    [START ON 11/11/2022] ergocalciferol capsule 50,000 Units  50,000 Units Oral Q7D    nicotine (NICODERM CQ) 21 mg/24 hr patch 1 Patch  1 Patch TransDERmal DAILY    levothyroxine (SYNTHROID) tablet 50 mcg  50 mcg Oral ACB    tiZANidine (ZANAFLEX) tablet 2 mg  2 mg Oral TID PRN    lidocaine 4 % patch 1 Patch  1 Patch TransDERmal DAILY PRN    melatonin tablet 4.5 mg  4.5 mg Oral QHS    B complex-vitaminC-folic acid (NEPHROCAP) cap  1 Capsule Oral DAILY    glucose chewable tablet 16 g  16 g Oral PRN    dextrose 10% infusion 0-250 mL  0-250 mL IntraVENous PRN    DOPamine (INTROPIN) 800 mg in dextrose 5% 250 mL infusion  0-20 mcg/kg/min IntraVENous TITRATE    glucagon (GLUCAGEN) injection 1 mg  1 mg IntraMUSCular PRN    insulin lispro (HUMALOG) injection   SubCUTAneous AC&HS    acetaminophen (TYLENOL) tablet 650 mg  650 mg Oral Q4H PRN    Or    acetaminophen (TYLENOL) solution 650 mg  650 mg Per NG tube Q4H PRN    Or    acetaminophen (TYLENOL) suppository 650 mg  650 mg Rectal Q4H PRN    piperacillin-tazobactam (ZOSYN) 3.375 g in 0.9% sodium chloride (MBP/ADV) 100 mL MBP  3.375 g IntraVENous Q12H    famotidine (PF) (PEPCID) 20 mg in 0.9% sodium chloride 10 mL injection  20 mg IntraVENous DAILY    atropine 1 mg/mL injection 0.5 mg  0.5 mg IntraVENous PRN     ______________________________________________________________________  EXPECTED LENGTH OF STAY: 4d 19h  ACTUAL LENGTH OF STAY:          [address] Simba, MD   Pulmonary/CCM  [phone]  [phone]  11/10/2022

## 2022-11-10 NOTE — PROGRESS NOTES
Consult  REFERRED BY:  Tori Patel, FRANCK    CHIEF COMPLAINT: Altered mental status for several days, and possible stroke      Subjective: Rosmery Wong is a 67 y.o. right-handed -American female seen as a new patient to me, at the request of the hospitalist, for evaluation of altered mental status for the last several days, in a patient who has possible stomach cancer in the midst of a work-up, chronic kidney disease on dialysis, dementia, diabetes and diabetic neuropathy, history of possible seizures, history of multiple strokes in the past, and organic heart disease and GERD and thyroid disease, who was found to have a new MRI and CT scan of the head suggest a possible area of hypodensity in the left cerebellum of indeterminate age. Patient on heparin drip, needs MRI scan to ensure that there is no hemorrhagic transformation of her stroke, and to evaluate for possible multiple embolic strokes. She is already on Eliquis and aspirin therapy I think for history of A. fib but there does not appear to be documentation of that on the chart. Patient has not spoken much in the last several days, and may have an aphasia. She seems to be moving all of her extremities well, but may be the right side a little better than the left. She has not had any complaints of fever, meningismus, head trauma, headache, and the family says that she has not complained of any new focal weakness has not been eating or drinking well for the last week. Patient was able to get MRA yesterday, of the brain, which was unremarkable, but MRI for some reason was not done yet and is still on order. Patient also had surgical exploration of her carotid because of possible puncture by central line, that turned out not to be the case, and she seems stable from that standpoint.   She has not really improved, is nonverbal, moves all extremities weakly, does not follow commands and remains extremely encephalopathic and palliative care is going to see the patient, and she may not even need the MRI if she is palliative care. Patient deteriorated further medically, not intubated chance of survival looks poor and poor each day. Past Medical History:   Diagnosis Date    CAD (coronary artery disease)     Cancer ([facility]     possible stomach cancer 2021- workup in progress    Chronic kidney disease     Jono Notice- M-W-F    Dementia ([facility]     per daughter pt is alert and oriented x3    Diabetes ([facility]     GERD (gastroesophageal reflux disease)     Hyperlipidemia     Hypertension     Seizures ([facility] [facility]     possible while in hospital 8/2021    Stroke [facility])     Thyroid disease       Past Surgical History:   Procedure Laterality Date    HX APPENDECTOMY      08/21    HX VASCULAR ACCESS      HD catheter    IR INSERT NON TUNL CVC OVER 5 YRS  7/30/2021    IR INSERT NON TUNL CVC OVER 5 YRS  7/30/2021    IR INSERT TUNL CVC W/O PORT OVER 5 YR  8/4/2021    KS CABG, ARTERY-VEIN, FOUR      5 years ago    VASCULAR SURGERY PROCEDURE UNLIST      right arm graft     History reviewed. No pertinent family history.    Social History     Tobacco Use    Smoking status: Never    Smokeless tobacco: Never   Substance Use Topics    Alcohol use: Not Currently         Current Facility-Administered Medications:     propofol (DIPRIVAN) 10 mg/mL infusion, 0-50 mcg/kg/min, IntraVENous, TITRATE, RODOLFO Heredia, Held at 11/09/22 0300    epoetin bia-epbx (RETACRIT) injection 20,000 Units, 20,000 Units, SubCUTAneous, Q MON, WED & FRI, Sommer, Perfecto Carnes MD    balsam peru-castor oiL (VENELEX) ointment, , Topical, BID, Ismael Esquivel MD    NOREPINephrine (LEVOPHED) 8 mg in 5% dextrose 250mL (32 mcg/mL) infusion, 0.5-16 mcg/min, IntraVENous, TITRATE, RODOLFO Heredia, Last Rate: 5.6 mL/hr at 11/09/22 1255, 3 mcg/min at 11/09/22 1255    VANCOMYCIN INFORMATION NOTE, , Other, Rx Dosing/Monitoring, RODOLFO Heredia    heparin (porcine) 2,000 Units in sodium chloride irrigation 0.9 % 500 mL Irrigation, , , PRN, Jhonny Navarrete MD, 500 mL at 11/08/22 0322    fentaNYL citrate (PF) injection 25-50 mcg, 25-50 mcg, IntraVENous, Q1H PRN, RODOLFO March    heparin 25,000 units in D5W 250 ml infusion, 12-25 Units/kg/hr, IntraVENous, TITRATE, Macel Lefort, Edmund, DO, Stopped at 11/07/22 2045    heparin (porcine) 1,000 unit/mL injection 1,370 Units, 30 Units/kg, IntraVENous, PRN **OR** heparin (porcine) 1,000 unit/mL injection 2,730 Units, 60 Units/kg, IntraVENous, PRN, Macel Lefort, Edmund, DO, 2,730 Units at 11/07/22 2021    hydrALAZINE (APRESOLINE) 20 mg/mL injection 10 mg, 10 mg, IntraVENous, Q6H PRN, Fernando, Edmund, DO    atorvastatin (LIPITOR) tablet 40 mg, 40 mg, Oral, QHS, Fernando, Edmund, DO, 40 mg at 11/08/22 2100    nitroglycerin (NITROSTAT) tablet 0.4 mg, 0.4 mg, SubLINGual, PRN, Aminta Ely, DO    [Held by provider] aspirin delayed-release tablet 81 mg, 81 mg, Oral, DAILY, Fernando, Edmund, DO    [START ON 11/11/2022] ergocalciferol capsule 50,000 Units, 50,000 Units, Oral, Q7D, Fernando, Edmund, DO    nicotine (NICODERM CQ) 21 mg/24 hr patch 1 Patch, 1 Patch, TransDERmal, DAILY, Fernando, Edmund, DO, 1 Patch at 11/09/22 1589    levothyroxine (SYNTHROID) tablet 50 mcg, 50 mcg, Oral, ACB, Fernando, Edmund, DO, 50 mcg at 11/09/22 0600    tiZANidine (ZANAFLEX) tablet 2 mg, 2 mg, Oral, TID PRN, Macel Lefort, Edmund, DO    lidocaine 4 % patch 1 Patch, 1 Patch, TransDERmal, DAILY PRN, Macel Lefort, Edmund, DO    melatonin tablet 4.5 mg, 4.5 mg, Oral, QHS, Fernando, Edmund, DO    B complex-vitaminC-folic acid (NEPHROCAP) cap, 1 Capsule, Oral, DAILY, Fernando, Edmund, DO, 1 Capsule at 11/09/22 0844    glucose chewable tablet 16 g, 16 g, Oral, PRN, Macel Lefort, Edmund, DO    dextrose 10% infusion 0-250 mL, 0-250 mL, IntraVENous, PRN, Fernando, Edmund, DO    DOPamine (INTROPIN) 800 mg in dextrose 5% 250 mL infusion, 0-20 mcg/kg/min, IntraVENous, TITRATE, Sandhay Sauer MD, Last Rate: 11.9 mL/hr at 11/09/22 1419, 14 mcg/kg/min at 11/09/22 1419    glucagon (GLUCAGEN) injection 1 mg, 1 mg, IntraMUSCular, PRN, Sandhya Sauer MD    insulin lispro (HUMALOG) injection, , SubCUTAneous, AC&HS, Sandhya Sauer MD, 2 Units at 11/08/22 1743    acetaminophen (TYLENOL) tablet 650 mg, 650 mg, Oral, Q4H PRN, 650 mg at 11/09/22 1631 **OR** acetaminophen (TYLENOL) solution 650 mg, 650 mg, Per NG tube, Q4H PRN **OR** acetaminophen (TYLENOL) suppository 650 mg, 650 mg, Rectal, Q4H PRN, Stacey Mcmahon MD    [COMPLETED] piperacillin-tazobactam (ZOSYN) 4.5 g in 0.9% sodium chloride (MBP/ADV) 100 mL MBP, 4.5 g, IntraVENous, NOW, Last Rate: 200 mL/hr at 11/07/22 0829, 4.5 g at 11/07/22 0829 **FOLLOWED BY** piperacillin-tazobactam (ZOSYN) 3.375 g in 0.9% sodium chloride (MBP/ADV) 100 mL MBP, 3.375 g, IntraVENous, Q12H, Stacey Mcmahon MD, Last Rate: 25 mL/hr at 11/09/22 0849, 3.375 g at 11/09/22 0849    famotidine (PF) (PEPCID) 20 mg in 0.9% sodium chloride 10 mL injection, 20 mg, IntraVENous, DAILY, Dirk Porter MD, 20 mg at 11/09/22 0847    atropine 1 mg/mL injection 0.5 mg, 0.5 mg, IntraVENous, PRN, RODOLFO Ryan        No Known Allergies   MRI Results (most recent):  Results from [place] encounter on 11/06/22    MRA BRAIN WO CONT    Narrative  INDICATION:   CVA    COMPARISON:  None    TECHNIQUE:  3-D time-of-flight MRA of the brain was performed. Multiplanar reconstructions  were obtained. FINDINGS:  Left vertebral artery dominant vertebrobasilar system. The basilar artery and  its branches are normal. The internal carotid, anterior cerebral, and middle  cerebral arteries are patent. There is no flow-limiting intracranial stenosis. There is no aneurysm. There and no obvious bilateral posterior communicating  arteries. Impression  MRA head within normal limits.   No intracranial aneurysm, large vessel occlusion or significant luminal  narrowing. Results from [place] encounter on 11/06/22    MRA BRAIN WO CONT    Narrative  INDICATION:   CVA    COMPARISON:  None    TECHNIQUE:  3-D time-of-flight MRA of the brain was performed. Multiplanar reconstructions  were obtained. FINDINGS:  Left vertebral artery dominant vertebrobasilar system. The basilar artery and  its branches are normal. The internal carotid, anterior cerebral, and middle  cerebral arteries are patent. There is no flow-limiting intracranial stenosis. There is no aneurysm. There and no obvious bilateral posterior communicating  arteries. Impression  MRA head within normal limits. No intracranial aneurysm, large vessel occlusion or significant luminal  narrowing. Review of Systems:  Review of systems not obtained due to patient factors. Vitals:    11/09/22 1815 11/09/22 1830 11/09/22 1845 11/09/22 1900   BP:       Pulse: (!) 48 (!) 48 (!) 47 (!) 47   Resp: 23 18 24 19   Temp: 99.9 °F (37.7 °C) 99.9 °F (37.7 °C) 99.9 °F (37.7 °C)    TempSrc:       SpO2: 98% 97% 97% 98%   Weight:       Height:         Objective:     I      NEUROLOGICAL EXAM:    Appearance: The patient is poorly developed, and nourished, intubated and sedated now   Mental Status: Patient is not very verbal, does not really follow commands well, intubated and sedated   Cranial Nerves:   Unreliable visual fields. Fundi are benign, but poorly seen. .  Pupils anisocoric and minimally reactive, status post cataract surgery bilaterally., EOM's full, no nystagmus, no ptosis. Facial sensation is normal. Corneal reflexes are not tested. Facial movement is asymmetric. Hearing is abnormal bilaterally. Palate is midline with normal sternocleidomastoid and trapezius muscles are normal. Tongue is midline.   Neck without meningismus or bruits  Temporal arteries are not tender or enlarged  TMJ areas are not tender on palpation   Motor:  3/5 strength in upper and lower proximal and distal muscles. Normal bulk and tone. No fasciculations. Rapid alternating movement is slow bilaterally   Reflexes:   Deep tendon reflexes 1+/4 and symmetrical.  No babinski or clonus present   Sensory:   Abnormal to touch, pinprick and vibration and temperature in both feet. DSS is intact   Gait:  Not testable. Tremor:   No tremor noted. Cerebellar:  Not testable abnormal cerebellar signs present on Romberg and tandem testing and finger-nose-finger exam.   Neurovascular:  Normal heart sounds and regular rhythm, peripheral pulses decreased and no carotid bruits. Assessment:       ICD-10-CM ICD-9-CM    1. Encephalopathy  G93.40 348.30       2. NSTEMI (non-ST elevated myocardial infarction) ([facility]  I21.4 410.70       3. Cerebrovascular accident (CVA), unspecified mechanism ([facility]  I63.9 434.91       4. Palliative care encounter  Z51.5 V66.7       5. Altered mental status, unspecified altered mental status type  R41.82 780.97       6. Advanced care planning/counseling discussion  Z71.89 V65.49         Active Problems:    Stroke (cerebrum) ([facility] (11/6/2022)      NSTEMI (non-ST elevated myocardial infarction) ([facility] (11/6/2022)      History of stroke (11/7/2022)      Bilateral carotid artery stenosis (11/7/2022)      Acute alteration in mental status (11/7/2022)      Convulsive syncope (11/7/2022)      Thrombotic stroke involving left cerebellar artery ([facility] (11/7/2022)      Diabetic peripheral neuropathy associated with type 2 diabetes mellitus ([facility] (11/7/2022)      Plan:   Patient deteriorated more medically, not intubated, and looks like palliative care will be the best way for her to go, her carotid Dopplers today showed no significant disease. Patient with possible new stroke, and new MI, and may need an MRI scan to further evaluate her new strokes if there are some, and how many and whether there cardioembolic from A. fib despite her Eliquis.   Has not been eating or drinking and this may be metabolic but it does not seem to account for her whole neurologic condition. She does have dementia, and metabolic studies were sent off to rule out treatable causes of her dementia  Her EEG just shows moderate generalized slowing. Patient was able to get MRA yesterday, of the brain, which was unremarkable, but MRI for some reason was not done yet and is still on order. Patient also had surgical exploration of her carotid because of possible puncture by central line, that turned out not to be the case, and she seems stable from that standpoint. She has not really improved, is nonverbal, moves all extremities weakly, does not follow commands and remains extremely encephalopathic and palliative care is going to see the patient, and she may not even need the MRI if she is palliative care. We will follow closely with you, and continue medical care as you are are.     Signed By: Derik Oliva MD     November 9, 2022       CC: Tobi Medrano., FRANCK  FAX: [phone]

## 2022-11-10 NOTE — PROGRESS NOTES
Palliative Medicine Consult  Colin: [phone]    Patient Name: Rosmery Wong  YOB: 1950    Date of Initial Consult: 2022  Reason for Consult: care decisions  Requesting Provider: Dr. Veronica Borrero  Primary Care Physician: Tori Wilkinson., NP     SUMMARY:   Rosmery Wong is a 67 y.o. 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 Course of hospitalization: persistent bradycardia, hypotension, on dopamine and levophed. She open eyes briefly to voice, but otherwise non verbal and not following commands thus far,    Current medical issues leading to Palliative Medicine involvement include: GOC : multiple comorbidities, mult hospitalizations, hx of MI     Psychosocial: From [place], Had 5 children, has 2 daughters and 1 son living. Her other daughter  2/2 years ago 2/2 athma attack and her son  39 days later from complication 2/2 diabetes. Her daughter who passed had been her primary CG, so Pooja Juan Jose moved patient down to [place] . PALLIATIVE DIAGNOSES:   Palliative care encounter  Altered Mental Status, unspecified  Acute CVA  Afib  Hypotension  Debility  ESRD on HD  Goals of care   PLAN:   Prior to visit, I completed chart review for updated information. I also spoke with patients attending Dr. Veronica Borrero and her nurse Samantha Cho also provided update. Overnight Ms. Ulices Marie had further decline in both her mental status and respiratory and was re intubated. I met with Ms. Rick Elaine family at bedside. Her eyes closed, she did not wake or rouse to voice, she is unable to participate in discussion. Mr. Norberto Lewis is not on sedation. She remains bradycardic in 40s-50s despite titration up on Dopamine. Levophed has also been restarted. Today she has moved from [address], to [facility] and now with abnormal QRS and ST depressions. If she survives hospitalization, she may need permanent pacemaker per cardiology. I spoke with primary health care decision maker, daughter Jeanette Martin, provided medical update, including concerns regarding arrhythmias, that she is at HR for cardiac event/arrest.     Daughter eusebio hat patient had cardiac arrest 5 years ago when living in [place], and family is grateful that she was given CPR because it gave them 5 more years with their mother. Anastacia Ballesteros also understands that her mother has been through a lot over the past 5 years, her health has not been as good as it was 5 years ago, and outcome may not lead her back to quality of life. DNR Discussion- Family still wants CPR, patient remains a Full Code. Lorrie plans to discuss CPR vs DNR with her siblings tonight, make sure they are all on the same page. Lorrie plan to come in today after work, late afternoon/early evening. Patients son and other daughter  from [place], will be coming in fFriday afternoon. Please contact me via [employer] with any urgent needs questions or concerns. Initial consult note routed to primary continuity provider and/or primary health care team members  Communicated plan of care with: Palliative [facility] Team     GOALS OF CARE / TREATMENT PREFERENCES:     GOALS OF CARE: Full Restorative tx and interventions at this time       TREATMENT PREFERENCES:   Code Status: Full Code    Advance Care Planning:  [x] [facility] Interdisciplinary Team has updated the ACP Navigator with [address] and Patient Capacity      Primary Decision Maker (Active):  Julio C Rutherford - Daughter - [phone]    Secondary Decision Maker: Flores Yelitza - Daughter - [phone]  Advance Care Planning 11/9/2022   Patient's Healthcare Decision Maker is: Named in scanned ACP document   Confirm Advance Directive Yes, on file   Patient Would Like to Complete Advance Directive Unable                   Other:    As far as possible, the palliative care team has discussed with patient / health care proxy about goals of care / treatment preferences for patient. HISTORY:     History obtained from: chart, family    CHIEF COMPLAINT: AMS    HPI/SUBJECTIVE:    The patient is:   [] Verbal and participatory  [x] Non-participatory due to:now unresponsive and orally intubated on vent. 11/9- overnight AMS and respiratory failure worsened, patient intubated. no sedation, Afib, Aflutter, ST depression QRS prolongation, on dobutamine and levophed       Clinical Pain Assessment (nonverbal scale for severity on nonverbal patients): Activity (Movement): Lying quietly, normal position    Duration: for how long has pt been experiencing pain (e.g., 2 days, 1 month, years)  Frequency: how often pain is an issue (e.g., several times per day, once every few days, constant)     FUNCTIONAL ASSESSMENT:     Palliative Performance Scale (PPS):          PSYCHOSOCIAL/SPIRITUAL SCREENING:     Palliative IDT has assessed this patient for cultural preferences / practices and a referral made as appropriate to needs (Cultural Services, Patient Advocacy, Ethics, etc.)    Any spiritual / Orthodoxy concerns:  [] Yes /  [] No    Caregiver Burnout:  [] Yes /  [] No /  [x] No Caregiver Present      Anticipatory grief assessment: unable to assess  [] Normal  / [] Maladaptive       ESAS Anxiety:      ESAS Depression:   unable to assess due to pt factors       REVIEW OF SYSTEMS:     Positive and pertinent negative findings in ROS are noted above in HPI. The following systems were [] reviewed / [x] unable to be reviewed as noted in HPI  Other findings are noted below.   Systems: constitutional, ears/nose/mouth/throat, respiratory, gastrointestinal, genitourinary, musculoskeletal, integumentary, neurologic, psychiatric, endocrine. Positive findings noted below. Modified ESAS Completed by: provider                                            PHYSICAL EXAM:     From RN flowsheet:  Wt Readings from Last 3 Encounters:   11/07/22 100 lb (45.4 kg)   11/03/22 108 lb (49 kg)   10/19/22 113 lb 1.5 oz (51.3 kg)     Blood pressure (!) 108/55, pulse 78, temperature 99.9 °F (37.7 °C), resp. rate 17, height 4' 11\" (1.499 m), weight 100 lb (45.4 kg), SpO2 100 %, not currently breastfeeding. Pain Scale 1: Behavioral Pain Scale (BPS)  Pain Intensity 1: 3                 Last bowel movement, if known:     Constitutional: unresponsive , critically ill appearing, intubated  Eyes: pupils equal, anicteric  ENMT: no nasal discharge, orally intubated.   Cardiovascular: irregularly and variable rhythm, distal pulses intact  Respiratory: breathing not labored, symmetric, intubated on vent  Gastrointestinal: soft non-tender, +bowel sounds  Musculoskeletal: left arm flaccid  Skin: cool to touch  Neurologic: unresponsive, not following commands, not moving all extremities  Psychiatric: unable to assess due to pt factors     HISTORY:     Active Problems:    Stroke (cerebrum) ([address] (11/6/2022)      NSTEMI (non-ST elevated myocardial infarction) ([address] (11/6/2022)      History of stroke (11/7/2022)      Bilateral carotid artery stenosis (11/7/2022)      Acute alteration in mental status (11/7/2022)      Convulsive syncope (11/7/2022)      Thrombotic stroke involving left cerebellar artery ([address] (11/7/2022)      Diabetic peripheral neuropathy associated with type 2 diabetes mellitus ([address] (11/7/2022)    Past Medical History:   Diagnosis Date    CAD (coronary artery disease)     Cancer ([address]     possible stomach cancer 2021- workup in progress    Chronic kidney disease     Yemi Forrest- M-W-F    Dementia ([address]     per daughter pt is alert and oriented x3    Diabetes ([address]     GERD (gastroesophageal reflux disease)     Hyperlipidemia     Hypertension     Seizures [facility]     possible while in hospital 8/2021    Stroke [facility])     Thyroid disease       Past Surgical History:   Procedure Laterality Date    HX APPENDECTOMY      08/21    HX VASCULAR ACCESS      HD catheter    IR INSERT NON TUNL CVC OVER 5 YRS  7/30/2021    IR INSERT NON TUNL CVC OVER 5 YRS  7/30/2021    IR INSERT TUNL CVC W/O PORT OVER 5 YR  8/4/2021    MS CABG, ARTERY-VEIN, FOUR      5 years ago    VASCULAR SURGERY PROCEDURE UNLIST      right arm graft      History reviewed. No pertinent family history. History reviewed, no pertinent family history.   Social History     Tobacco Use    Smoking status: Never    Smokeless tobacco: Never   Substance Use Topics    Alcohol use: Not Currently     No Known Allergies   Current Facility-Administered Medications   Medication Dose Route Frequency    propofol (DIPRIVAN) 10 mg/mL infusion  0-50 mcg/kg/min IntraVENous TITRATE    epoetin bia-epbx (RETACRIT) injection 20,000 Units  20,000 Units SubCUTAneous Q MON, WED & FRI    balsam peru-castor oiL (VENELEX) ointment   Topical BID    NOREPINephrine (LEVOPHED) 8 mg in 5% dextrose 250mL (32 mcg/mL) infusion  0.5-16 mcg/min IntraVENous TITRATE    VANCOMYCIN INFORMATION NOTE   Other Rx Dosing/Monitoring    heparin (porcine) 2,000 Units in sodium chloride irrigation 0.9 % 500 mL Irrigation    PRN    fentaNYL citrate (PF) injection 25-50 mcg  25-50 mcg IntraVENous Q1H PRN    heparin 25,000 units in D5W 250 ml infusion  12-25 Units/kg/hr IntraVENous TITRATE    heparin (porcine) 1,000 unit/mL injection 1,370 Units  30 Units/kg IntraVENous PRN    Or    heparin (porcine) 1,000 unit/mL injection 2,730 Units  60 Units/kg IntraVENous PRN    hydrALAZINE (APRESOLINE) 20 mg/mL injection 10 mg  10 mg IntraVENous Q6H PRN    atorvastatin (LIPITOR) tablet 40 mg  40 mg Oral QHS    nitroglycerin (NITROSTAT) tablet 0.4 mg  0.4 mg SubLINGual PRN    [Held by provider] aspirin delayed-release tablet 81 mg  81 mg Oral DAILY    [START ON 11/11/2022] ergocalciferol capsule 50,000 Units  50,000 Units Oral Q7D    nicotine (NICODERM CQ) 21 mg/24 hr patch 1 Patch  1 Patch TransDERmal DAILY    levothyroxine (SYNTHROID) tablet 50 mcg  50 mcg Oral ACB    tiZANidine (ZANAFLEX) tablet 2 mg  2 mg Oral TID PRN    lidocaine 4 % patch 1 Patch  1 Patch TransDERmal DAILY PRN    melatonin tablet 4.5 mg  4.5 mg Oral QHS    B complex-vitaminC-folic acid (NEPHROCAP) cap  1 Capsule Oral DAILY    glucose chewable tablet 16 g  16 g Oral PRN    dextrose 10% infusion 0-250 mL  0-250 mL IntraVENous PRN    DOPamine (INTROPIN) 800 mg in dextrose 5% 250 mL infusion  0-20 mcg/kg/min IntraVENous TITRATE    glucagon (GLUCAGEN) injection 1 mg  1 mg IntraMUSCular PRN    insulin lispro (HUMALOG) injection   SubCUTAneous AC&HS    acetaminophen (TYLENOL) tablet 650 mg  650 mg Oral Q4H PRN    Or    acetaminophen (TYLENOL) solution 650 mg  650 mg Per NG tube Q4H PRN    Or    acetaminophen (TYLENOL) suppository 650 mg  650 mg Rectal Q4H PRN    piperacillin-tazobactam (ZOSYN) 3.375 g in 0.9% sodium chloride (MBP/ADV) 100 mL MBP  3.375 g IntraVENous Q12H    famotidine (PF) (PEPCID) 20 mg in 0.9% sodium chloride 10 mL injection  20 mg IntraVENous DAILY    atropine 1 mg/mL injection 0.5 mg  0.5 mg IntraVENous PRN          LAB AND IMAGING FINDINGS:     Lab Results   Component Value Date/Time    WBC 18.0 (H) 11/09/2022 02:33 AM    HGB 8.4 (L) 11/09/2022 02:33 AM    PLATELET 68 (L) 15/26/7535 02:33 AM     Lab Results   Component Value Date/Time    Sodium 137 11/09/2022 02:33 AM    Potassium 4.1 11/09/2022 02:33 AM    Chloride 99 11/09/2022 02:33 AM    CO2 25 11/09/2022 02:33 AM    BUN 50 (H) 11/09/2022 02:33 AM    Creatinine 5.10 (H) 11/09/2022 02:33 AM    Calcium 8.6 11/09/2022 02:33 AM    Magnesium 2.1 11/09/2022 02:33 AM    Phosphorus 2.9 11/09/2022 02:33 AM      Lab Results   Component Value Date/Time    Alk.  phosphatase 141 (H) 11/08/2022 02:12 AM Protein, total 7.4 11/08/2022 02:12 AM    Albumin 2.6 (L) 11/08/2022 02:12 AM    Globulin 4.8 (H) 11/08/2022 02:12 AM     Lab Results   Component Value Date/Time    INR 1.4 (H) 11/07/2022 11:30 PM    Prothrombin time 14.3 (H) 11/07/2022 11:30 PM    aPTT 31.3 (H) 11/07/2022 11:30 PM      No results found for: IRON, FE, TIBC, IBCT, PSAT, FERR   Lab Results   Component Value Date/Time    pH 7.61 (HH) 11/09/2022 03:05 AM    PCO2 24 (L) 11/09/2022 03:05 AM    PO2 530 (H) 11/09/2022 03:05 AM     No components found for: [place]   Lab Results   Component Value Date/Time    CK 35 11/08/2022 02:12 AM                Total time: 35  Counseling / coordination time, spent as noted above: 30  > 50% counseling / coordination?: y  Prolonged service was provided for  []30 min   []75 min in face to face time in the presence of the patient, spent as noted above. Time Start:   Time End:   Note: this can only be billed with 19011 (initial) or 90421 (follow up). If multiple start / stop times, list each separately.

## 2022-11-10 NOTE — PROGRESS NOTES
0700: Bedside shift report received from JUAN FRANCISCO Lao (offgoing nurse). 80: Spoke with Dr. Cherylene Canner regarding pt's bradycardia, rate 30-35. Continue to monitor for now and he will check on pt later. 0800: Shift assessment completed. Pt is intubated and is not sedated. Pt has little to no response to pain. Pt will intermittently, spontaneously open eyes slightly. Pt does not follow any commands and does not focus or track with eyes. Monitor shows A-flutter and pt's HR is in 30's. Lung sounds are diminished bilaterally. Vent settings - T/V 325, R 12, PEEP 5, and FiO2 30%. Pt's bowel sounds are hypoactive. Pt is anuric. Pt has NGT and ETT. Tube feedings are running @ goal, 35 mL/hr w/ Q4H 50 mL water flushes. Dopamine infusing @ 16 mcg/kg/min. Levophed infusing @ 3 mcg/min.     0800: Spoke to Cristian Howard in MRI, MRI planned for 1000 this am.    0805: Spoke with Dr. Luis Keller regarding pt's low HR and going to MRI, was told to obtain MRI and monitor pt closely. 0111: Levophed titrated per order, see MAR for amount. 5597: Levophed titrated, see MAR for amount. 0830: DR. Cherylene Canner at the bedside to assess patient. Aware of bradycardia and cardiac rhythm. Was told to monitor for now and he will see patient this afternoon. 1000: Pt to MRI with 2 RN's and RT.    1045: Back to unit from MRI. 1130: Dialysis nurse @ bedside, HD started. 1200: Shift assessment completed, no changes to previous assessment. 1301: Levophed titrated, see MAR for amount. 1323: Levophed titrated, see MAR for amount. 1325: Levophed titrated, see MAR for amount. 1330: Dr. Cherylene Canner @ bedside to assess patient a second time. MRI reviewed, per MD will hold off on transvenous pacer for now. MD aware of pt's profound bradycardia (HR 29) and need for vasopressors for hypotension. 1351: Levophed titrated, see MAR for amount. 1358: Levophed titrated, see MAR for amount.      1400: Spoke with Dr. Earline Arias nephrologist, can add ALEX-synephrine for BP support, SBP goal > 100.     1403: Levophed titrated, see MAR for amount. 1405: ALEX started, infusing @ 30 mcg/min. 1411: ALEX titrated, see MAR for amount. 1600: Shift reassessment completed, see Flowsheet for changes. HD completed. 1655: ALEX weaned, see MAR for amount. 1657: Fingerstick glucose 14. 1659: Repeat fingerstick glucose 24. Pt is on 2 pressors - will repeat glucose with EPOC. 1707: Repeat glucose on EPOC 95, will use this as accurate Blood glucose. 1709: Weaning Alex per MAR. Will keep SBP above 100 as ordered. See MAR.     1730: ALEX stopped. 1736: Weaning Levophed as ordered. See MAR for titrations. 1830: Levophed at 7 mcgs. -110.     1900: Report given to Rut Jackson RN.

## 2022-11-11 NOTE — PROGRESS NOTES
Nutrition Note    Chart reviewed, case discussed during CCU rounds. Pt in cardiogenic shock, on multiple pressors this morning. Plans to try to start CVVH. Lactic >30. Very poor prognosis. If pt survives the day, will complete comprehensive assessment tomorrow. TF stopped yesterday due to escalating pressor needs which is appropriate, have canceled order.     Electronically signed by Jack Pérez RD , [address]  on 11/11/2022 at 11:08 AM    Contact: ext [phone]

## 2022-11-11 NOTE — PROGRESS NOTES
Emergency with profound low BP and slow heart rate, see VS. Family to come as patient is near death with unstable BP and VS.  Unable to get BP up, Dr. Molina Gallardo at bedside. Multiple verbal orders given and medication of Albumin and Bicarb IV push given. Code cart opened and Atropine given at 0926. New orders for higher rates of Daron and Levophed also noted. 1000  Family here. Dr. Warren Hamilton in, noted no NSTEMI on EKG done at bedside. Family also aware that Dr. Dirk Lowry came earlier.

## 2022-11-11 NOTE — CONSULTS
Palliative medicine brief note- Full visit documentation pending. Donnise Opitz remains intubated and unresponsive. She has not required any sedation. Nutrition via NGT initiated. 11/10 Brain MRI demonstrated acute ischemia in basal ganglia and brainstem, decreased perfusion in major vessels and base of brain, imaging suggestive of embolitic strokes, possibly septic emboli. Neurology reviewed imaging and evaluated patient. There is significant damage to her brain and has minimal chance for recovery. 2. Ms. Ulices Marie is rapidly declining and will likely pass despite life supporting efforts. WBC up to 35.6. Thrombocytopenia, Hgb down to 6.8, Platelets down to 49. Blood Cx + Staph and Streptococcus, urine Cx + Staph. Remains on IV abx  Respiratory failure worsening requiring increased vent support. CXR pending. Bradycardia and Hypotension worsening, requiring increased Dopamine and vasopressor support, suspect 2/2 septic cardiogenic shock. 3. I met with Ms. Steiner's daughter/primary health care decision maker. Pooja Ingram, along with her cousins. We spent time discussing medical concerns as outlined above. 3. Pooja Ingram has a good understanding of the gravity of her mothers clinical condition. 5.  GOC Discussion- At this time, Pooja Ingram wants to continue with life supportive efforts (intubation, ACLS meds, Abx etc.), but should her heart stop despite these life supportive efforts, family wants to allow her to pass peacefully, NO CPR. Pooja Ingram is open to revisiting [address] as needed. 6. DNR Discussion- Delayne Fus DNR code status. If Ms. Bell heart stops, she agrees to NO CPR. Lorrie's brother and 2 of her sisters, who live in [place]  are driving to [place] today, they are already on their way. Please contact me via [employer] with any urgent needs, questions or concerns. Thank you for allowing us to be a part of Ms. Steiner's care.

## 2022-11-11 NOTE — PROGRESS NOTES
Phone consent obtained with pts daughter, Daniel Langford for [facility] catheter placement.

## 2022-11-11 NOTE — PROGRESS NOTES
Pharmacy Antimicrobial Kinetic Dosing    Indication for Antimicrobials: bloodstream     Current Regimen of Each Antimicrobial:  Vancomycin  CRRT regimen (Start Date ; Day # 4)     Previous Antimicrobial Therapy:  Zosyn 3.375 gm iv every 12 hr (Start Date ; Day # 3)   Unasyn 11/10 -     Vancomycin Goal Level:  20    Date Dose & Interval Measured (mcg/mL) Predicted AUC/ERNIE    12L00 1000mmg IV q24h                    Dosing calculator used: CRRT protocol    Significant Positive Cultures:    pbc - GP cocci  bottles   urine pending    Conditions for Dosing Consideration:     Labs:  Recent Labs     11/11/22  0311 11/10/22  0324 22  0233   CREA 4.21* 6.87* 5.10*   BUN 38* 74* 50*     Recent Labs     11/11/22  0311 11/10/22  0324 22  0233   WBC 35.6* 29.1* 18.0*     Temp (24hrs), Av.3 °F (36.8 °C), Min:97.5 °F (36.4 °C), Max:98.9 °F (37.2 °C)    Creatinine Clearance (mL/min):   CrCl (Adjusted Body Weight): 9.4 mL/min   If actual weight < IBW: CrCl (Actual Body Weight) 10.7    Impression/Plan:   HD previously, now CRRT as of  am per renal  Adjusted Vancomycin to 1000mg IV q24h CRRT dosing  Lactic > 30; possible toxic gut per rounds  Zosyn  - 11/10; UCx GNR > 100; consider GNR coverage pending discussion w/ provider  Antimicrobial stop date pending  Duration - pending     Pharmacy will follow daily and adjust medications as appropriate for renal function and/or serum levels.     Thank you,  Artur Sloan, [facility]

## 2022-11-11 NOTE — PROGRESS NOTES
Critical Care Progress Note  Yeni Tarango MD          Date of Service:  2022  NAME:  Tamanna Faust  :  1950  MRN:  [id number]      Subjective/Hospital course:      : Patient was intubated overnight to remove suspected arterial placement of central line but did not seem to be the case on exploration. She is intubated, off sedation. Still not following commands. :Patient was reintubated overnight due to worsening mental status. 11/10: Remains intubated, minimally responsive off sedation. On 16mic of dopamine and 6 afshin of levophed. : Patient had increasing vasopressors requirement overnight. Active Problems Being Managed:      -Septic/Cardiogenic shock.  -Symptomatic bradycardia.  -Acute metabolic encephalopathy.  -Suspected misplacement of left IJ central line and line site bleeding. Now explanted by vascular surgery. -NSTEMI.  -DM2. -Hypothyroidism. -ESRD.  -HLD. -H/o HTN. Assessment/Plan:      Septic/cardiogenic shock.  -Coag neg staph bacteremia. Ucx is also positive for coag neg staph and GNR. -Optimize perfusion and pressors if needed for goal MAP >65  -Closely monitor hemodynamics and markers of end organ perfusion including mental status, UO and lactate. -Vanc and unasyn.   -Repeat Bcx.  -Will need MARISSA, TTE neg for endocarditis but now has multiple areas of stroke on MRI. Acute hypoxemic respiratory failure:  -Sec to encephalopathy.  -Continue lung protective ventilation with goal Plt pressure <30, driving pressure <34.  -Wean Fio2 for goal sats of 88-92.  -Sedation for RASS goal of 0 to -1.  -ABCDE protocol  -Daily SAT and SBT if criteria met. Symptomatic bradycardia.  -Continue dopamine, and levophed. -Titrate for HR >50 and for MAP >65. NSTEMI:  -Continue ASA and statin. -Heparin gtt on hold due to central line site bleeding.   -Cardiology consulted. Acute metabolic encephalopathy.   -MRI showed multiple strokes, could be septic emboli related in setting of coag negative staph bacteremia and possible endocarditis although TTE was negative. Will try to get MARISSA as her clinical stability allows. - Closely monitor mental status. - Delirium precautions. - Correct underlying metabolic issues. Hypothyroidism:  -Continue levothyroxine. ESRD:  -HD per nephrology. -Due to worsening metabolic acidosis and worsening hypotension, will start CRRT today. DVT prophylaxis: Heparin gtt. SUP: Pepcid  Code status: Full code. Next of kin: Kinga Tidwell (Daughter) [phone] [facility]). Poor prognosis overall. Called family and discussed the plan in detail     I personally spent 80 minutes of critical care time. This is time spent at this critically ill patient's bedside actively involved in patient care as well as the coordination of care and discussions with the patient's family. This does not include any procedural time which has been billed separately.       Review of Systems:   Review of Systems   Unable to perform ROS: Medical condition        Vital Signs:   Patient Vitals for the past 4 hrs:   BP Temp Pulse Resp SpO2   11/11/22 0821 (!) 71/14 -- 60 -- --   11/11/22 0802 (!) 100/41 -- 63 12 --   11/11/22 0800 (!) 100/41 97.7 °F (36.5 °C) 62 12 --   11/11/22 0745 (!) 87/11 -- 74 18 --   11/11/22 0734 (!) 84/11 -- 93 -- --   11/11/22 0730 (!) 84/11 -- 81 20 --   11/11/22 0715 -- -- (!) 55 21 (!) 70 %   11/11/22 0700 -- -- (!) 56 21 (!) 70 %   11/11/22 0645 -- -- (!) 57 21 (!) 69 %   11/11/22 0630 -- -- (!) 57 22 (!) 71 %   11/11/22 0615 -- -- (!) 56 23 (!) 70 %   11/11/22 0600 -- -- (!) 55 23 (!) 73 %   11/11/22 0545 -- -- (!) 58 23 --   11/11/22 0530 -- -- (!) 54 24 --   11/11/22 0515 -- -- 74 25 --          Intake/Output Summary (Last 24 hours) at 11/11/2022 0905  Last data filed at 11/11/2022 0510  Gross per 24 hour   Intake 3183.08 ml   Output -969 ml   Net 4152.08 ml          Physical Examination:    Physical Exam  Constitutional:       Comments: Intubated   HENT:      Head: Normocephalic and atraumatic. Eyes:      Conjunctiva/sclera: Conjunctivae normal.   Cardiovascular:      Rate and Rhythm: Normal rate. Rhythm irregular. Pulmonary:      Effort: No respiratory distress. Abdominal:      General: Abdomen is flat. There is no distension. Palpations: Abdomen is soft. Tenderness: There is no abdominal tenderness. There is no guarding. Musculoskeletal:      Cervical back: Neck supple. Neurological:      Comments: Altered mental status. Labs and Imaging:   Reviewed.       Medications:     Current Facility-Administered Medications   Medication Dose Route Frequency    sodium bicarbonate (8.4%) 150 mEq in 0.45% sodium chloride 1,000 mL infusion   IntraVENous CONTINUOUS    vasopressin (VASOSTRICT) 20 Units in 0.9% sodium chloride 100 mL infusion  0-0.04 Units/min IntraVENous TITRATE    hydrocortisone Sod Succ (PF) (SOLU-CORTEF) injection 100 mg  100 mg IntraVENous Q8H    chlorhexidine (ORAL CARE KIT) 0.12 % mouthwash 15 mL  15 mL Oral Q12H    0.9% sodium chloride infusion 250 mL  250 mL IntraVENous PRN    heparinized saline 2 units/mL infusion 800 Units  400 mL Irrigation ONCE    lidocaine (XYLOCAINE) 20 mg/mL (2 %) injection 360 mg  18 mL SubCUTAneous ONCE    heparin (porcine) pf 300 Units  300 Units InterCATHeter ONCE    bicarbonate dialysis (PRISMASOL) BG K 2/Ca 3.5 5000 ml solution  2,000 mL/hr Extracorporeal DIALYSIS CONTINUOUS    albumin human 25% (BUMINATE) solution 50 g  50 g IntraVENous ONCE    NOREPINephrine (LEVOPHED) 8 mg in 5% dextrose 250mL (32 mcg/mL) infusion  0.5-30 mcg/min IntraVENous TITRATE    PHENYLephrine (ALEX-SYNEPHRINE) 30 mg in 0.9% sodium chloride 250 mL infusion   mcg/min IntraVENous TITRATE    zinc oxide-cod liver oil (DESITIN) 40 % paste   Topical BID and QHS    insulin lispro (HUMALOG) injection   SubCUTAneous Q6H    propofol (DIPRIVAN) 10 mg/mL infusion  0-50 mcg/kg/min IntraVENous TITRATE    epoetin bia-epbx (RETACRIT) injection 20,000 Units  20,000 Units SubCUTAneous Q MON, WED & FRI    balsam peru-castor oiL (VENELEX) ointment   Topical BID    heparin (porcine) 2,000 Units in sodium chloride irrigation 0.9 % 500 mL Irrigation    PRN    fentaNYL citrate (PF) injection 25-50 mcg  25-50 mcg IntraVENous Q1H PRN    heparin 25,000 units in D5W 250 ml infusion  12-25 Units/kg/hr IntraVENous TITRATE    heparin (porcine) 1,000 unit/mL injection 1,370 Units  30 Units/kg IntraVENous PRN    Or    heparin (porcine) 1,000 unit/mL injection 2,730 Units  60 Units/kg IntraVENous PRN    hydrALAZINE (APRESOLINE) 20 mg/mL injection 10 mg  10 mg IntraVENous Q6H PRN    atorvastatin (LIPITOR) tablet 40 mg  40 mg Oral QHS    nitroglycerin (NITROSTAT) tablet 0.4 mg  0.4 mg SubLINGual PRN    [Held by provider] aspirin delayed-release tablet 81 mg  81 mg Oral DAILY    ergocalciferol capsule 50,000 Units  50,000 Units Oral Q7D    nicotine (NICODERM CQ) 21 mg/24 hr patch 1 Patch  1 Patch TransDERmal DAILY    levothyroxine (SYNTHROID) tablet 50 mcg  50 mcg Oral ACB    tiZANidine (ZANAFLEX) tablet 2 mg  2 mg Oral TID PRN    lidocaine 4 % patch 1 Patch  1 Patch TransDERmal DAILY PRN    melatonin tablet 4.5 mg  4.5 mg Oral QHS    B complex-vitaminC-folic acid (NEPHROCAP) cap  1 Capsule Oral DAILY    glucose chewable tablet 16 g  16 g Oral PRN    dextrose 10% infusion 0-250 mL  0-250 mL IntraVENous PRN    DOPamine (INTROPIN) 800 mg in dextrose 5% 250 mL infusion  0-20 mcg/kg/min IntraVENous TITRATE    glucagon (GLUCAGEN) injection 1 mg  1 mg IntraMUSCular PRN    acetaminophen (TYLENOL) tablet 650 mg  650 mg Oral Q4H PRN    Or    acetaminophen (TYLENOL) solution 650 mg  650 mg Per NG tube Q4H PRN    Or    acetaminophen (TYLENOL) suppository 650 mg  650 mg Rectal Q4H PRN    famotidine (PF) (PEPCID) 20 mg in 0.9% sodium chloride 10 mL injection  20 mg IntraVENous DAILY    atropine 1 mg/mL injection 0.5 mg  0.5 mg IntraVENous PRN     ______________________________________________________________________  EXPECTED LENGTH OF STAY: 4d 19h  ACTUAL LENGTH OF STAY:          [address], MD   Pulmonary/CCM  [address]  [phone]  11/11/2022

## 2022-11-11 NOTE — PROGRESS NOTES
Cardiology Progress Note      11/11/2022 8:56 AM    Admit Date: 11/6/2022          Subjective: Remains unresponsive. Increasing pressor requirements. Bicarb ggt. Visit Vitals  BP (!) 108/10   Pulse (!) 53   Temp 97.5 °F (36.4 °C)   Resp 15   Ht 4' 11\" (1.499 m)   Wt 56.9 kg (125 lb 7.1 oz)   SpO2 (!) 70%   Breastfeeding No   BMI 25.34 kg/m²     11/09 1901 - 11/11 0700  In: 4061.2 [I.V.:2751.2]  Out: -969         Objective:      Physical Exam:  VS as above  Chest CTA ant  Card Irreg irreg no gallop  Neuro unresponsive     Data Review:   Labs:    Wbc 35  Hg 6.8  Plt 49  AG 38  Increased lactate > 30    Telemetry: afib R 50      Assessment:     1. Bradycardia requiring IV dopamine. 2.  persistent  atrial fibrillation with slow vent response   3. Underlying sick sinus syndrome. 4.  Coronary artery disease with prior coronary artery bypass grafting , nl EF by echo   5. Elevated troponin  6. Dementia. 7.  Hypothyroidism. 8.  End-stage renal disease on hemodialysis. 9.  Leukocytosis. 10. Acute resp failure , on vent  11. ? Anoxic encephalopathy     Plan:     Patient with [place], declined adjuvent chemo. Polymycrobial sepsis Coag neg staph, strep. Staph and e coli in urine. Altered mental status with MRI showing significant bilateral CVA despite being on eliquis. NSTEMI. ECG does not show ST elevation. Echo with nl EF  Afib with slow HR, mainly due to acidosis, on dopamine, levophed    Currently with refractory sepsis with worsening lactic acidosis despite wide antibiotic coverage. Patient currently on maximal support intubated, on multiple pressors, poor mental status with multiple CVA. Multisystem organ failure. Very poor prognosis.

## 2022-11-11 NOTE — PROGRESS NOTES
Bedside and Verbal shift change report received from HESHAM Harman RN (offgoing nurse). Report included the following information SBAR, Kardex, ED Summary, OR Summary, Procedure Summary, Intake/Output, MAR, Accordion, Recent Results, Med Rec Status, Cardiac Rhythm ST depression, Alarm Parameters , and Quality Measures. Noted unresponsive with a minimal gag. Otherwise, she is tolerating the current mechanical ventilator settings well. Her blood pressure is being supported with Vasopressin, and Levophed. I restarted the Daron-Synephrine for added support. 0912:Events noted,  at the bedside; see emergency IVP Sodium Bicarbonate and Albumen given. 0930:Family at the bedside. 1000:Epinephrine added to support her blood pressure. 1030:Events noted, I received orders to increase the Levophed to 100 mcq/min and increase the Daron Synephrine to 300 mcq/min. Continuing to administer Sodium Bicarb, Albumin. Her family remains at the bedside. 1100: One liter of NS bolus initiated  1130:Assessment is unchanged. 1200:Her family remains at the bedside, they are waiting for more family members to come into town from [place]. Vancomycin added. 1400: at the bedside speaking with her family members, continue the current plan of care. 1600: Orders noted per , he remains in at the bedside with her family. 1715:Noted with a lowered Hgb, and Hct. Dr. Reba Amos aware; planning to redraw the sample. 1810: in at the bedside, Sodium Bicarb administered as ordered. 1840:Lab results noted, updates given to . No further blood transfusions at this time. 1930: The family is here. Bedside and Verbal shift change report given to HESHAM Harman RN (oncoming nurse) by myself (offgoing nurse).  Report included the following information SBAR, Kardex, ED Summary, Procedure Summary, Intake/Output, MAR, Accordion, Recent Results, Med Rec Status, Cardiac Rhythm afib with a controlled rated, Alarm lvm to scheduled appts    Parameters , Pre Procedure Checklist, and Quality Measures.

## 2022-11-11 NOTE — PROGRESS NOTES
Nephrology Progress Note  [facility] [address], [address]  [place], [address]  Phone - [phone]  Fax - [phone]                 Patient: Zita Howard                   YOB: 1950        Date- 11/11/2022                      Admit Date: 11/6/2022  CC: Follow up for ESRD  IMPRESSION & PLAN:   esrd- jenny marin [facility]- DR. TINAJERO  ANEMIA of ckd  Hyperkalemia  Acidosis  Lactic acidosis  Sec. Hyperpara  Hypertension  DM 2  Bradycardia  Altered mental status  Hypotension-- gram +ve sepsis  Resp failure  S/p removal of carotid artery central venous cath    PLAN-  Place kadie cath  START CRRT  IV BICARB PUSH X 4DOSE  Continue vasopressor support  Continue epogen  POOR PROGNOSIS  D/w DR. Yeni Jackson       Subjective: Interval History:   -she had hd yesterday  Lactic acid > 30  She required more vasopressor with hd  She remained acidotics  She is on vent  She is on 4 vasopressor supports      11/8/22  she is on vent  She went to OR last night for removal of carotid artery central venous cath  Bp low requiring vasopressor support        11/7/22   Patient presented to er with confusion. She is on hd mwf at [employer]. Her last hd on Friday  Her bp is low with bradycardia  She is on dopamine gtt  Her missed hd last Monday due to pain. She had hd on Wednesday and Friday.     ROS- Can't assess due to patient's current condition       Objective:   Vitals:    11/11/22 1022 11/11/22 1023 11/11/22 1024 11/11/22 1025   BP: (!) 87/42  (!) 98/11    Pulse: 60 61 (!) 56 62   Resp: 22 25 23 22   Temp:       TempSrc:       SpO2:       Weight:       Height:          11/10 0701 - 11/11 0700  In: 3821.2 [I.V.:2751.2]  Out: -969   Last 3 Recorded Weights in this Encounter    11/06/22 1831 11/07/22 1621 11/11/22 0414   Weight: 45.5 kg (100 lb 5 oz) 45.4 kg (100 lb) 56.9 kg (125 lb 7.1 oz)        Physical exam:    GEN: intubated  on vent  NECK-no mass, ET TUBE  RESP:  no wheezing  CVS: s1,s2, emely  EXT: right arm avg   NEURO:Can't access due to patient's current condition           Chart reviewed. Pertinent Notes reviewed. Data Review :  Recent Labs     11/11/22  0311 11/10/22  0324 11/09/22  0233    138 137   K 4.4 5.2* 4.1   CL 91* 96* 99   CO2 8* 13* 25   BUN 38* 74* 50*   CREA 4.21* 6.87* 5.10*   * 168* 173*   CA 9.1 9.0 8.6   MG 2.4 2.5* 2.1   PHOS 6.9* 6.0* 2.9       Recent Labs     11/11/22  0311 11/10/22  0324 11/09/22  0233   WBC 35.6* 29.1* 18.0*   HGB 6.8* 8.3* 8.4*   HCT 21.9* 25.5* 24.2*   PLT 49* 92* 68*       No results for input(s): FE, TIBC, PSAT, FERR in the last 72 hours.    Lab Results   Component Value Date/Time    Hemoglobin A1c 6.0 (H) 11/08/2022 02:12 AM    Hemoglobin A1c 6.0 (H) 11/07/2022 03:58 AM        No results found for: MCACR, MCA1, MCA2, MCA3, MCAU, MCAU2, MCALPOCT  Lab Results   Component Value Date/Time    NT pro-BNP >35,000 (H) 08/02/2021 06:57 AM     US Results (most recent):  Medication list  reviewed  Current Facility-Administered Medications   Medication Dose Route Frequency    vasopressin (VASOSTRICT) 20 Units in 0.9% sodium chloride 100 mL infusion  0-0.04 Units/min IntraVENous TITRATE    hydrocortisone Sod Succ (PF) (SOLU-CORTEF) injection 100 mg  100 mg IntraVENous Q8H    chlorhexidine (ORAL CARE KIT) 0.12 % mouthwash 15 mL  15 mL Oral Q12H    0.9% sodium chloride infusion 250 mL  250 mL IntraVENous PRN    heparinized saline 2 units/mL infusion 800 Units  400 mL Irrigation ONCE    lidocaine (XYLOCAINE) 20 mg/mL (2 %) injection 360 mg  18 mL SubCUTAneous ONCE    heparin (porcine) pf 300 Units  300 Units InterCATHeter ONCE    bicarbonate dialysis (PRISMASOL) BG K 2/Ca 3.5 5000 ml solution  2,000 mL/hr Extracorporeal DIALYSIS CONTINUOUS    sodium bicarbonate (8.4%) 150 mEq in dextrose 5% 1,000 mL infusion   IntraVENous CONTINUOUS    EPINEPHrine (ADRENALIN) 5 mg in 0.9% sodium chloride 250 mL infusion  0-20 mcg/min IntraVENous TITRATE    sodium bicarbonate 8.4 % (1 mEq/mL) injection        albumin human 5% (BUMINATE) 5 % solution        albumin human 25% (BUMINATE) solution 12.5 g  12.5 g IntraVENous Q30MIN    0.9% sodium chloride infusion  70 mL/hr IntraVENous ONCE    NOREPINephrine (LEVOPHED) 8 mg in 5% dextrose 250mL (32 mcg/mL) infusion  0.5-100 mcg/min IntraVENous TITRATE    PHENYLephrine (ALEX-SYNEPHRINE) 30 mg in 0.9% sodium chloride 250 mL infusion   mcg/min IntraVENous TITRATE    zinc oxide-cod liver oil (DESITIN) 40 % paste   Topical BID and QHS    insulin lispro (HUMALOG) injection   SubCUTAneous Q6H    propofol (DIPRIVAN) 10 mg/mL infusion  0-50 mcg/kg/min IntraVENous TITRATE    epoetin bia-epbx (RETACRIT) injection 20,000 Units  20,000 Units SubCUTAneous Q MON, WED & FRI    balsam peru-castor oiL (VENELEX) ointment   Topical BID    fentaNYL citrate (PF) injection 25-50 mcg  25-50 mcg IntraVENous Q1H PRN    heparin 25,000 units in D5W 250 ml infusion  12-25 Units/kg/hr IntraVENous TITRATE    heparin (porcine) 1,000 unit/mL injection 1,370 Units  30 Units/kg IntraVENous PRN    Or    heparin (porcine) 1,000 unit/mL injection 2,730 Units  60 Units/kg IntraVENous PRN    hydrALAZINE (APRESOLINE) 20 mg/mL injection 10 mg  10 mg IntraVENous Q6H PRN    atorvastatin (LIPITOR) tablet 40 mg  40 mg Oral QHS    nitroglycerin (NITROSTAT) tablet 0.4 mg  0.4 mg SubLINGual PRN    [Held by provider] aspirin delayed-release tablet 81 mg  81 mg Oral DAILY    ergocalciferol capsule 50,000 Units  50,000 Units Oral Q7D    nicotine (NICODERM CQ) 21 mg/24 hr patch 1 Patch  1 Patch TransDERmal DAILY    levothyroxine (SYNTHROID) tablet 50 mcg  50 mcg Oral ACB    tiZANidine (ZANAFLEX) tablet 2 mg  2 mg Oral TID PRN    lidocaine 4 % patch 1 Patch  1 Patch TransDERmal DAILY PRN    melatonin tablet 4.5 mg  4.5 mg Oral QHS    B complex-vitaminC-folic acid (NEPHROCAP) cap  1 Capsule Oral DAILY glucose chewable tablet 16 g  16 g Oral PRN    dextrose 10% infusion 0-250 mL  0-250 mL IntraVENous PRN    DOPamine (INTROPIN) 800 mg in dextrose 5% 250 mL infusion  0-20 mcg/kg/min IntraVENous TITRATE    glucagon (GLUCAGEN) injection 1 mg  1 mg IntraMUSCular PRN    acetaminophen (TYLENOL) tablet 650 mg  650 mg Oral Q4H PRN    Or    acetaminophen (TYLENOL) solution 650 mg  650 mg Per NG tube Q4H PRN    Or    acetaminophen (TYLENOL) suppository 650 mg  650 mg Rectal Q4H PRN    famotidine (PF) (PEPCID) 20 mg in 0.9% sodium chloride 10 mL injection  20 mg IntraVENous DAILY    atropine 1 mg/mL injection 0.5 mg  0.5 mg IntraVENous PRN        Luciana Sage MD  11/11/2022

## 2022-11-11 NOTE — PROGRESS NOTES
Palliative Medicine Consult  Colin: [phone] ([phone])    Patient Name: Daija Miranda  YOB: 1950    Date of Initial Consult: 2022  Reason for Consult: care decisions  Requesting Provider: Dr. Jose Alfredo Dixon  Primary Care Physician: Linda Miranda, FRANCK     SUMMARY:   Daija Miranda is a 67 y.o. 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 Course of hospitalization: persistent bradycardia, hypotension, on dopamine and levophed. She open eyes briefly to voice, but otherwise non verbal and not following commands thus far,    Current medical issues leading to Palliative Medicine involvement include: GOC : multiple comorbidities, mult hospitalizations, hx of MI     Psychosocial: From [place], Had 6 children, has 3 daughters and 1 son living. One of her daughters  2 years ago 2/2 athma attack and her son  39 days later from diabetes complications. Her daughter who passed had been her primary CG, so Ivetteludin Yolis moved patient down to [place] and assumed that role. PALLIATIVE DIAGNOSES:   Palliative care encounter  Altered Mental Status, unspecified  Acute CVA  Afib  Hypotension  Debility  ESRD on HD  Goals of care   PLAN:   Prior to visit, I completed chart review for updated information. I also spoke with patients attending Dr. Jose Alfredo Dixon regarding results of brain MRI, which reveals significant emboli in both sides of posterior fossa and brain , which explains her poor neurological status. Patient was seen, she remains intubated, unresponsive to voice or touch, despite not having been on sedation. She is unable to participate in discussion. Dr. John Barkley and I attempted to speak with daughter Barbara Weaver, she was at work and asked us to call back, which we did but received her voicemail    Patients son and other daughter  from [place], will be coming in fFriday afternoon. Please contact me via [employer] with any urgent needs questions or concerns. Initial consult note routed to primary continuity provider and/or primary health care team members  Communicated plan of care with: Palliative IDT, [facility] Team     GOALS OF CARE / TREATMENT PREFERENCES:     GOALS OF CARE: Full Restorative tx and interventions at this time       TREATMENT PREFERENCES:   Code Status: Full Code    Advance Care Planning:  [x] [facility] Team has updated the ACP Navigator with [employer] and Patient Capacity      Primary Decision Maker (Active): Maira Monahan - Daughter - [phone]    Secondary Decision Maker: Blanca Lind - Daughter - [phone]  Advance Care Planning 11/9/2022   Patient's Healthcare Decision Maker is: Named in scanned ACP document   Confirm Advance Directive Yes, on file   Patient Would Like to Complete Advance Directive Unable                   Other:    As far as possible, the palliative care team has discussed with patient / health care proxy about goals of care / treatment preferences for patient. HISTORY:     History obtained from: chart, family    CHIEF COMPLAINT: AMS    HPI/SUBJECTIVE:    The patient is:   [] Verbal and participatory  [x] Non-participatory due to:now unresponsive and orally intubated on vent. 11/9- overnight AMS and respiratory failure worsened, patient intubated. no sedation, Afib, Aflutter, ST depression QRS prolongation, on dobutamine and levophed       Clinical Pain Assessment (nonverbal scale for severity on nonverbal patients):          Activity (Movement): Lying quietly, normal position    Duration: for how long has pt been experiencing pain (e.g., 2 days, 1 month, years)  Frequency: how often pain is an issue (e.g., several times per day, once every few days, constant)     FUNCTIONAL ASSESSMENT:     Palliative Performance Scale (PPS):          PSYCHOSOCIAL/SPIRITUAL SCREENING:     Palliative IDT has assessed this patient for cultural preferences / practices and a referral made as appropriate to needs (Cultural Services, Patient Advocacy, Ethics, etc.)    Any spiritual / Alevism concerns:  [] Yes /  [] No    Caregiver Burnout:  [] Yes /  [] No /  [x] No Caregiver Present      Anticipatory grief assessment: unable to assess  [] Normal  / [] Maladaptive       ESAS Anxiety:      ESAS Depression:   unable to assess due to pt factors       REVIEW OF SYSTEMS:     Positive and pertinent negative findings in ROS are noted above in HPI. The following systems were [] reviewed / [x] unable to be reviewed as noted in HPI  Other findings are noted below. Systems: constitutional, ears/nose/mouth/throat, respiratory, gastrointestinal, genitourinary, musculoskeletal, integumentary, neurologic, psychiatric, endocrine. Positive findings noted below. Modified ESAS Completed by: provider                                            PHYSICAL EXAM:     From RN flowsheet:  Wt Readings from Last 3 Encounters:   11/07/22 100 lb (45.4 kg)   11/03/22 108 lb (49 kg)   10/19/22 113 lb 1.5 oz (51.3 kg)     Blood pressure (!) 144/38, pulse (!) 54, temperature 97.7 °F (36.5 °C), resp. rate 29, height 4' 11\" (1.499 m), weight 100 lb (45.4 kg), SpO2 98 %, not currently breastfeeding. Pain Scale 1: Behavioral Pain Scale (BPS)  Pain Intensity 1: 3                 Last bowel movement, if known:     Constitutional: unresponsive , critically ill appearing, intubated  Eyes: pupils equal, anicteric  ENMT: no nasal discharge, orally intubated.   Cardiovascular: irregularly and variable rhythm, distal pulses intact  Respiratory: breathing not labored, symmetric, intubated on vent  Gastrointestinal: soft non-tender, +bowel sounds  Musculoskeletal: left arm flaccid  Skin: cool to touch  Neurologic: unresponsive, not following commands, not moving all extremities  Psychiatric: unable to assess due to pt factors     HISTORY:     Active Problems:    Stroke (cerebrum) ([address] (11/6/2022)      NSTEMI (non-ST elevated myocardial infarction) ([address] (11/6/2022)      History of stroke (11/7/2022)      Bilateral carotid artery stenosis (11/7/2022)      Acute alteration in mental status (11/7/2022)      Convulsive syncope (11/7/2022)      Thrombotic stroke involving left cerebellar artery ([address] (11/7/2022)      Diabetic peripheral neuropathy associated with type 2 diabetes mellitus ([address] (11/7/2022)    Past Medical History:   Diagnosis Date    CAD (coronary artery disease)     Cancer ([address]     possible stomach cancer 2021- workup in progress    Chronic kidney disease     Maggie Flair- M-W-F    Dementia ([address]     per daughter pt is alert and oriented x3    Diabetes ([address]     GERD (gastroesophageal reflux disease)     Hyperlipidemia     Hypertension     Seizures ([address]     possible while in hospital 8/2021    Stroke [facility])     Thyroid disease       Past Surgical History:   Procedure Laterality Date    HX APPENDECTOMY      08/21    HX VASCULAR ACCESS      HD catheter    IR INSERT NON TUNL CVC OVER 5 YRS  7/30/2021    IR INSERT NON TUNL CVC OVER 5 YRS  7/30/2021    IR INSERT TUNL CVC W/O PORT OVER 5 YR  8/4/2021    IL CABG, ARTERY-VEIN, FOUR      5 years ago    VASCULAR SURGERY PROCEDURE UNLIST      right arm graft      History reviewed. No pertinent family history. History reviewed, no pertinent family history.   Social History     Tobacco Use    Smoking status: Never    Smokeless tobacco: Never   Substance Use Topics    Alcohol use: Not Currently     No Known Allergies   Current Facility-Administered Medications   Medication Dose Route Frequency    ampicillin-sulbactam (UNASYN) 3 g in 0.9% sodium chloride (MBP/ADV) 100 mL MBP  3 g IntraVENous Q24H    NOREPINephrine (LEVOPHED) 8 mg in 5% dextrose 250mL (32 mcg/mL) infusion  0.5-16 mcg/min IntraVENous TITRATE    PHENYLephrine (ALEX-SYNEPHRINE) 30 mg in 0.9% sodium chloride 250 mL infusion   mcg/min IntraVENous TITRATE    zinc oxide-cod liver oil (DESITIN) 40 % paste   Topical BID and QHS    [START ON 11/11/2022] insulin lispro (HUMALOG) injection   SubCUTAneous Q6H    propofol (DIPRIVAN) 10 mg/mL infusion  0-50 mcg/kg/min IntraVENous TITRATE    epoetin bia-epbx (RETACRIT) injection 20,000 Units  20,000 Units SubCUTAneous Q MON, WED & FRI    balsam peru-castor oiL (VENELEX) ointment   Topical BID    heparin (porcine) 2,000 Units in sodium chloride irrigation 0.9 % 500 mL Irrigation    PRN    fentaNYL citrate (PF) injection 25-50 mcg  25-50 mcg IntraVENous Q1H PRN    heparin 25,000 units in D5W 250 ml infusion  12-25 Units/kg/hr IntraVENous TITRATE    heparin (porcine) 1,000 unit/mL injection 1,370 Units  30 Units/kg IntraVENous PRN    Or    heparin (porcine) 1,000 unit/mL injection 2,730 Units  60 Units/kg IntraVENous PRN    hydrALAZINE (APRESOLINE) 20 mg/mL injection 10 mg  10 mg IntraVENous Q6H PRN    atorvastatin (LIPITOR) tablet 40 mg  40 mg Oral QHS    nitroglycerin (NITROSTAT) tablet 0.4 mg  0.4 mg SubLINGual PRN    [Held by provider] aspirin delayed-release tablet 81 mg  81 mg Oral DAILY    [START ON 11/11/2022] ergocalciferol capsule 50,000 Units  50,000 Units Oral Q7D    nicotine (NICODERM CQ) 21 mg/24 hr patch 1 Patch  1 Patch TransDERmal DAILY    levothyroxine (SYNTHROID) tablet 50 mcg  50 mcg Oral ACB    tiZANidine (ZANAFLEX) tablet 2 mg  2 mg Oral TID PRN    lidocaine 4 % patch 1 Patch  1 Patch TransDERmal DAILY PRN    melatonin tablet 4.5 mg  4.5 mg Oral QHS    B complex-vitaminC-folic acid (NEPHROCAP) cap  1 Capsule Oral DAILY    glucose chewable tablet 16 g  16 g Oral PRN    dextrose 10% infusion 0-250 mL  0-250 mL IntraVENous PRN    DOPamine (INTROPIN) 800 mg in dextrose 5% 250 mL infusion  0-20 mcg/kg/min IntraVENous TITRATE    glucagon (GLUCAGEN) injection 1 mg  1 mg IntraMUSCular PRN    acetaminophen (TYLENOL) tablet 650 mg  650 mg Oral Q4H PRN    Or    acetaminophen (TYLENOL) solution 650 mg  650 mg Per NG tube Q4H PRN    Or    acetaminophen (TYLENOL) suppository 650 mg  650 mg Rectal Q4H PRN    famotidine (PF) (PEPCID) 20 mg in 0.9% sodium chloride 10 mL injection  20 mg IntraVENous DAILY    atropine 1 mg/mL injection 0.5 mg  0.5 mg IntraVENous PRN          LAB AND IMAGING FINDINGS:     Lab Results   Component Value Date/Time    WBC 29.1 (H) 11/10/2022 03:24 AM    HGB 8.3 (L) 11/10/2022 03:24 AM    PLATELET 92 (L) 89/33/9507 03:24 AM     Lab Results   Component Value Date/Time    Sodium 138 11/10/2022 03:24 AM    Potassium 5.2 (H) 11/10/2022 03:24 AM    Chloride 96 (L) 11/10/2022 03:24 AM    CO2 13 (LL) 11/10/2022 03:24 AM    BUN 74 (H) 11/10/2022 03:24 AM    Creatinine 6.87 (H) 11/10/2022 03:24 AM    Calcium 9.0 11/10/2022 03:24 AM    Magnesium 2.5 (H) 11/10/2022 03:24 AM    Phosphorus 6.0 (H) 11/10/2022 03:24 AM      Lab Results   Component Value Date/Time    Alk.  phosphatase 141 (H) 11/08/2022 02:12 AM    Protein, total 7.4 11/08/2022 02:12 AM    Albumin 2.6 (L) 11/08/2022 02:12 AM    Globulin 4.8 (H) 11/08/2022 02:12 AM     Lab Results   Component Value Date/Time    INR 1.4 (H) 11/07/2022 11:30 PM    Prothrombin time 14.3 (H) 11/07/2022 11:30 PM    aPTT 31.3 (H) 11/07/2022 11:30 PM      No results found for: IRON, FE, TIBC, IBCT, PSAT, FERR   Lab Results   Component Value Date/Time    pH 7.61 (HH) 11/09/2022 03:05 AM    PCO2 24 (L) 11/09/2022 03:05 AM    PO2 530 (H) 11/09/2022 03:05 AM     No components found for: [place]   Lab Results   Component Value Date/Time    CK 35 11/08/2022 02:12 AM                Total time: 25  Counseling / coordination time, spent as noted above: 20  > 50% counseling / coordination?: y  Prolonged service was provided for  []30 min   []75 min in face to face time in the presence of the patient, spent as noted above. Time Start:   Time End:   Note: this can only be billed with 91519 (initial) or 60166 (follow up). If multiple start / stop times, list each separately.

## 2022-11-11 NOTE — PROGRESS NOTES
Consult  REFERRED BY:  Dunia Hills, FRANCK    CHIEF COMPLAINT: Altered mental status for several days, and possible stroke      Subjective: Brooklyn Mohr is a 67 y.o. right-handed -American female seen as a new patient to me, at the request of the hospitalist, for evaluation of altered mental status for the last several days, in a patient who has possible stomach cancer in the midst of a work-up, chronic kidney disease on dialysis, dementia, diabetes and diabetic neuropathy, history of possible seizures, history of multiple strokes in the past, and organic heart disease and GERD and thyroid disease, who was found to have a new MRI and CT scan of the head suggest a possible area of hypodensity in the left cerebellum of indeterminate age. Patient on heparin drip, needs MRI scan to ensure that there is no hemorrhagic transformation of her stroke, and to evaluate for possible multiple embolic strokes. She is already on Eliquis and aspirin therapy I think for history of A. fib but there does not appear to be documentation of that on the chart. Patient has not spoken much in the last several days, and may have an aphasia. She seems to be moving all of her extremities well, but may be the right side a little better than the left. She has not had any complaints of fever, meningismus, head trauma, headache, and the family says that she has not complained of any new focal weakness has not been eating or drinking well for the last week. Patient was able to get MRA yesterday, of the brain, which was unremarkable,   Patient had an MRI today that showed multiple significant emboli in both sides of the posterior fossa and brain, that caused significant damage, and her chance of recovery is very poor to any quality of life.   Patient is on heparin for her A. fib and MI, but this explains her neurologic status, and her chance of recovery is minimal.  I will follow as needed for now, hope this helps prognostically. Patient deteriorated further medically, now intubated and chance of survival looks poor and poor each day. Past Medical History:   Diagnosis Date    CAD (coronary artery disease)     Cancer ([facility]     possible stomach cancer 2021- workup in progress    Chronic kidney disease     Yuan Samanouel- DANIEL-W-F    Dementia ([facility]     per daughter pt is alert and oriented x3    Diabetes ([facility]     GERD (gastroesophageal reflux disease)     Hyperlipidemia     Hypertension     Seizures ([facility]     possible while in hospital 8/2021    Stroke [facility])     Thyroid disease       Past Surgical History:   Procedure Laterality Date    HX APPENDECTOMY      08/21    HX VASCULAR ACCESS      HD catheter    IR INSERT NON TUNL CVC OVER 5 YRS  7/30/2021    IR INSERT NON TUNL CVC OVER 5 YRS  7/30/2021    IR INSERT TUNL CVC W/O PORT OVER 5 YR  8/4/2021    TX CABG, ARTERY-VEIN, FOUR      5 years ago    VASCULAR SURGERY PROCEDURE UNLIST      right arm graft     History reviewed. No pertinent family history.    Social History     Tobacco Use    Smoking status: Never    Smokeless tobacco: Never   Substance Use Topics    Alcohol use: Not Currently         Current Facility-Administered Medications:     ampicillin-sulbactam (UNASYN) 3 g in 0.9% sodium chloride (MBP/ADV) 100 mL MBP, 3 g, IntraVENous, Q24H, Shalini Mackay MD, Last Rate: 200 mL/hr at 11/10/22 1759, 3 g at 11/10/22 1759    NOREPINephrine (LEVOPHED) 8 mg in 5% dextrose 250mL (32 mcg/mL) infusion, 0.5-16 mcg/min, IntraVENous, TITRATE, Jaylen Denson MD, Last Rate: 13.1 mL/hr at 11/10/22 1806, 7 mcg/min at 11/10/22 1806    PHENYLephrine (ALEX-SYNEPHRINE) 30 mg in 0.9% sodium chloride 250 mL infusion,  mcg/min, IntraVENous, TITRATE, Lori Pemberton MD, Stopped at 11/10/22 1730    zinc oxide-cod liver oil (DESITIN) 40 % paste, , Topical, BID and QHS, Jaylen Denson MD, Given at 11/10/22 1813    propofol (DIPRIVAN) 10 mg/mL infusion, 0-50 mcg/kg/min, IntraVENous, TITRATE, Nadeem RODOLFO Durham, Held at 11/09/22 0300    epoetin bia-epbx (RETACRIT) injection 20,000 Units, 20,000 Units, SubCUTAneous, Q MON, WED & Erica Mckeon MD, 20,000 Units at 11/09/22 2121    balsam peru-castor oiL (VENELEX) ointment, , Topical, BID, Rl Mckenzie MD, Given at 11/10/22 0834    heparin (porcine) 2,000 Units in sodium chloride irrigation 0.9 % 500 mL Irrigation, , , PRN, Racheal Guy MD, 500 mL at 11/08/22 0322    fentaNYL citrate (PF) injection 25-50 mcg, 25-50 mcg, IntraVENous, Q1H PRN, RODOLFO Conti    heparin 25,000 units in D5W 250 ml infusion, 12-25 Units/kg/hr, IntraVENous, TITRATE, Vernon Kwon Edmund, DO, Stopped at 11/07/22 2045    heparin (porcine) 1,000 unit/mL injection 1,370 Units, 30 Units/kg, IntraVENous, PRN **OR** heparin (porcine) 1,000 unit/mL injection 2,730 Units, 60 Units/kg, IntraVENous, PRN, Vernon Kwon Edmund, DO, 2,730 Units at 11/07/22 2021    hydrALAZINE (APRESOLINE) 20 mg/mL injection 10 mg, 10 mg, IntraVENous, Q6H PRN, Fernando, Edmund, DO    atorvastatin (LIPITOR) tablet 40 mg, 40 mg, Oral, QHS, Fernando, Edmund, DO, 40 mg at 11/09/22 2112    nitroglycerin (NITROSTAT) tablet 0.4 mg, 0.4 mg, SubLINGual, PRN, Gem Cannon DO    [Held by provider] aspirin delayed-release tablet 81 mg, 81 mg, Oral, DAILY, Fernando, Edmund, DO    [START ON 11/11/2022] ergocalciferol capsule 50,000 Units, 50,000 Units, Oral, Q7D, Fernando, Edmund, DO    nicotine (NICODERM CQ) 21 mg/24 hr patch 1 Patch, 1 Patch, TransDERmal, DAILY, Vernonkarissa Kwon, Edmund, DO, 1 Patch at 11/10/22 0836    levothyroxine (SYNTHROID) tablet 50 mcg, 50 mcg, Oral, ACB, Edmund King, , 50 mcg at 11/10/22 0541    tiZANidine (ZANAFLEX) tablet 2 mg, 2 mg, Oral, TID PRN, Edmund Delgado DO    lidocaine 4 % patch 1 Patch, 1 Patch, TransDERmal, DAILY PRN, Edmund Delgado DO    melatonin tablet 4.5 mg, 4.5 mg, Oral, QHS, Edmund King, DO    B complex-vitaminC-folic acid (NEPHROCAP) cap, 1 Capsule, Oral, DAILY, Kami Gui, Edmund, DO, 1 Capsule at 11/10/22 0838    glucose chewable tablet 16 g, 16 g, Oral, PRN, Kami Gui, Edmund, DO    dextrose 10% infusion 0-250 mL, 0-250 mL, IntraVENous, PRN, Fernando, Edmund, DO    DOPamine (INTROPIN) 800 mg in dextrose 5% 250 mL infusion, 0-20 mcg/kg/min, IntraVENous, TITRATE, Stacey Mcmahon MD, Last Rate: 13.7 mL/hr at 11/10/22 1809, 16 mcg/kg/min at 11/10/22 1809    glucagon (GLUCAGEN) injection 1 mg, 1 mg, IntraMUSCular, PRN, John Bryant MD    insulin lispro (HUMALOG) injection, , SubCUTAneous, AC&HS, John Bryant MD, 2 Units at 11/08/22 1743    acetaminophen (TYLENOL) tablet 650 mg, 650 mg, Oral, Q4H PRN, 650 mg at 11/09/22 1631 **OR** acetaminophen (TYLENOL) solution 650 mg, 650 mg, Per NG tube, Q4H PRN **OR** acetaminophen (TYLENOL) suppository 650 mg, 650 mg, Rectal, Q4H PRN, Stacey Mcmahon MD    famotidine (PF) (PEPCID) 20 mg in 0.9% sodium chloride 10 mL injection, 20 mg, IntraVENous, DAILY, Colletta Economy, Sajid, MD, 20 mg at 11/10/22 0843    atropine 1 mg/mL injection 0.5 mg, 0.5 mg, IntraVENous, PRN, RODOLFO Oliver        No Known Allergies   MRI Results (most recent):  Results from [place] encounter on 11/06/22    MRA BRAIN WO CONT    Narrative  INDICATION:   CVA    COMPARISON:  None    TECHNIQUE:  3-D time-of-flight MRA of the brain was performed. Multiplanar reconstructions  were obtained. FINDINGS:  Left vertebral artery dominant vertebrobasilar system. The basilar artery and  its branches are normal. The internal carotid, anterior cerebral, and middle  cerebral arteries are patent. There is no flow-limiting intracranial stenosis. There is no aneurysm. There and no obvious bilateral posterior communicating  arteries. Impression  MRA head within normal limits.   No intracranial aneurysm, large vessel occlusion or significant luminal  narrowing. Results from [place] encounter on 11/06/22    MRA BRAIN WO CONT    Narrative  INDICATION:   CVA    COMPARISON:  None    TECHNIQUE:  3-D time-of-flight MRA of the brain was performed. Multiplanar reconstructions  were obtained. FINDINGS:  Left vertebral artery dominant vertebrobasilar system. The basilar artery and  its branches are normal. The internal carotid, anterior cerebral, and middle  cerebral arteries are patent. There is no flow-limiting intracranial stenosis. There is no aneurysm. There and no obvious bilateral posterior communicating  arteries. Impression  MRA head within normal limits. No intracranial aneurysm, large vessel occlusion or significant luminal  narrowing. Review of Systems:  Review of systems not obtained due to patient factors. Vitals:    11/10/22 1900 11/10/22 1915 11/10/22 1930 11/10/22 1945   BP:       Pulse: (!) 49 (!) 48 (!) 49 (!) 50   Resp: 9 13 18 19   Temp:       TempSrc:       SpO2: 99% 99% 99% 99%   Weight:       Height:         Objective:     I      NEUROLOGICAL EXAM:    Appearance: The patient is poorly developed, and nourished, intubated and sedated now   Mental Status: Patient is not very verbal, does not really follow commands well, intubated and sedated   Cranial Nerves:   Unreliable visual fields. Fundi are benign, but poorly seen. .  Pupils anisocoric and minimally reactive, status post cataract surgery bilaterally., EOM's full, no nystagmus, no ptosis. Facial sensation is normal. Corneal reflexes are not tested. Facial movement is asymmetric. Hearing is abnormal bilaterally. Palate is midline with normal sternocleidomastoid and trapezius muscles are normal. Tongue is midline. Neck without meningismus or bruits  Temporal arteries are not tender or enlarged  TMJ areas are not tender on palpation   Motor:  3/5 strength in upper and lower proximal and distal muscles. Normal bulk and tone. No fasciculations.   Rapid alternating movement is slow bilaterally   Reflexes:   Deep tendon reflexes 1+/4 and symmetrical.  No babinski or clonus present   Sensory:   Abnormal to touch, pinprick and vibration and temperature in both feet. DSS is intact   Gait:  Not testable. Tremor:   No tremor noted. Cerebellar:  Not testable abnormal cerebellar signs present on Romberg and tandem testing and finger-nose-finger exam.   Neurovascular:  Normal heart sounds and regular rhythm, peripheral pulses decreased and no carotid bruits. Assessment:       ICD-10-CM ICD-9-CM    1. Encephalopathy  G93.40 348.30       2. NSTEMI (non-ST elevated myocardial infarction) ([facility]  I21.4 410.70       3. Cerebrovascular accident (CVA), unspecified mechanism ([facility]  I63.9 434.91       4. Palliative care encounter  Z51.5 V66.7       5. Altered mental status, unspecified altered mental status type  R41.82 780.97       6. Advanced care planning/counseling discussion  Z71.89 V65.49       7. Concern about end of life  Z71.1 V49.89         Active Problems:    Stroke (cerebrum) ([facility] (11/6/2022)      NSTEMI (non-ST elevated myocardial infarction) ([facility] (11/6/2022)      History of stroke (11/7/2022)      Bilateral carotid artery stenosis (11/7/2022)      Acute alteration in mental status (11/7/2022)      Convulsive syncope (11/7/2022)      Thrombotic stroke involving left cerebellar artery ([facility] (11/7/2022)      Diabetic peripheral neuropathy associated with type 2 diabetes mellitus ([facility] (11/7/2022)      Plan:   Patient deteriorated more medically, not intubated, and looks like palliative care will be the best way for her to go, her carotid Dopplers today showed no significant disease. Patient with possible new stroke, and new MI, and may need an MRI scan to further evaluate her new strokes if there are some, and how many and whether there cardioembolic from A. fib despite her Eliquis.   Has not been eating or drinking and this may be metabolic but it does not seem to account for her whole neurologic condition. She does have dementia, and metabolic studies were sent off to rule out treatable causes of her dementia  Her EEG just shows moderate generalized slowing. Patient was able to get MRA yesterday,   Patient had an MRI today that showed multiple significant emboli in both sides of the posterior fossa and brain, that caused significant damage, and her chance of recovery is very poor to any quality of life. Patient is on heparin for her A. fib and MI, but this explains her neurologic status, and her chance of recovery is minimal.  I will follow as needed for now, hope this helps prognostically. We will follow as needed for now, call if we can help. .    Signed By: Ariela De MD     November 10, 2022       CC: Neha Gasca, FRANCK  FAX: [phone]

## 2022-11-11 NOTE — PROGRESS NOTES
1920 Bedside shift change report given to Nati Bonilla RN (oncoming nurse) by Nicol Ríos RN (offgoing nurse). Report included the following information SBAR, Kardex, ED Summary, Procedure Summary, Intake/Output, MAR, Recent Results, and Cardiac Rhythm SB .     2030 Shift assessment completed, see flowsheets for details. Pupils very sluggishly responsive to light. Spontaneous movement noted in lower extremities. Patient tolerating vent settings at this time. 0015 Reassessment completed, cough/gag no longer noted. 0141 Daron gtt restarted. 0230 Marlena, RT at bedside with ABG results. Discussed with FRANCK Matos. Orders for bicarb.    0400 Reassessment completed, no significant changes. 0 Spoke with FRANCK Matos regarding persistent hypotension, orders for 1L LR bolus. 0430 Vaso gtt started. 3731 Bedside shift change report given to [place] (oncoming nurse) by Nati Bonilla RN (offgoing nurse).  Report included the following information SBAR, Kardex, ED Summary, Procedure Summary, Intake/Output, MAR, Recent Results, and Cardiac Rhythm SB .

## 2022-11-11 NOTE — ACP (ADVANCE CARE PLANNING)
Advance Care Planning   Healthcare Decision Maker:       Primary Decision Maker (Active): Bolling Dancer - Daughter - [phone]    Secondary Decision Maker: Kenzie Covington - Daughter - [phone]    Narrative:  Jazmin Schaefer and NP Lucina Irizarry met with daughter Soha Gallardo, who was supported by other family members and family friend, Indiana Kolb in which her mother's rapidly declining medical condition was discussed. AMD on file, signed by patient on 7/27/21, was reviewed, which names daughters as 1st and 2nd agents as above, with sections II and III marked through. LCSW provided emotional support, affirming family members care for each other and for patient, who was unresponsive. Patient's son and daughter and other family members were traveling from [place] to be with patient and family. At this time, Soha Gallardo wants to continue with life supportive efforts (intubation, ACLS meds, Abx etc.), but should her heart stop despite these life supportive efforts, family wants to allow her to pass peacefully, NO CPR. Soha Gallardo is open to revisiting [address] as needed. DNR Discussion- Zeynep Fritz DNR code status. If Ms. Browns heart stops, she agrees to NO CPR. Palliative team will continue to provide education and support as appropriate. Thank you for including Palliative team in the care of Ms. Noah Knight.     Francisco Javier Mendoza, JUAQUIN    [phone]([place])

## 2022-11-12 LAB
ABO + RH BLD: NORMAL
ATRIAL RATE: 66 BPM
BLD PROD TYP BPU: NORMAL
BLOOD GROUP ANTIBODIES SERPL: NORMAL
BPU ID: NORMAL
CALCULATED R AXIS, ECG10: 134 DEGREES
CALCULATED T AXIS, ECG11: -172 DEGREES
CROSSMATCH RESULT,%XM: NORMAL
DIAGNOSIS, 93000: NORMAL
Q-T INTERVAL, ECG07: 550 MS
QRS DURATION, ECG06: 114 MS
QTC CALCULATION (BEZET), ECG08: 526 MS
SPECIMEN EXP DATE BLD: NORMAL
STATUS OF UNIT,%ST: NORMAL
UNIT DIVISION, %UDIV: 0
VENTRICULAR RATE, ECG03: 55 BPM

## 2022-11-12 NOTE — PROGRESS NOTES
Spiritual Care Assessment/Progress Note  [facility]      NAME: Cheri Dorman      MRN: [id number]  AGE: 67 y.o. SEX: female  Jehovah's witness Affiliation: Unknown   Language: English     11/11/2022     Total Time (in minutes): 5     Spiritual Assessment begun in [facility] CRITICAL CARE 2 through conversation with:         []Patient        [x] Family    [] Friend(s)        Reason for Consult: Death, Inpatient     Spiritual beliefs: (Please include comment if needed)     [x] Identifies with a nicolas tradition:         [] Supported by a nicolas community:            [] Claims no spiritual orientation:           [] Seeking spiritual identity:                [] Adheres to an individual form of spirituality:           [] Not able to assess:                           Identified resources for coping:      [x] Prayer                               [] Music                  [] Guided Imagery     [x] Family/friends                 [] Pet visits     [] Devotional reading                         [] Unknown     [] Other:                                                Interventions offered during this visit: (See comments for more details)    Patient Interventions: Initial visit     Family/Friend(s):  Affirmation of emotions/emotional suffering, Coping skills reviewed/reinforced, Iconic (affirming the presence of God/Higher Power), Normalization of emotional/spiritual concerns, Prayer (actual), End of life issues discussed, Affirmation of nicolas, Life review/legacy, Jehovah's witness beliefs/image of God discussed     Plan of Care:     [] Support spiritual and/or cultural needs    [] Support AMD and/or advance care planning process      [] Support grieving process   [] Coordinate Rites and/or Rituals    [] Coordination with community clergy   [] No spiritual needs identified at this time   [] Detailed Plan of Care below (See Comments)  [] Make referral to Music Therapy  [] Make referral to Pet Therapy     [] Make referral to Addiction services  [] Make referral to [facility]  [] Make referral to Spiritual Care Partner  [x] No future visits requested        [] Contact Spiritual Care for further referrals     Comments:  [place] responded to clari for the death of patient Miss Yusuf Maloney in the CCU. Large family gathered by her bedside. They received  kindly when he introduced himself and role. They enggeged in life review ans story telling. Patient struggled with multiple medical issues for many years and had many near death experiences. She has a large and caring family who love her dearly. They shared theit  pain about tjeir loss but were also glad that her sufferings were over. Chiquita plays an important part in their family life.  offered condolemces and listened with empathy and affirmation. Their requested prayer was offered, ane they were provided space to process current situation. Family thanked  for the support.         Visited by: Jeffery wood: [phone]

## 2022-11-12 NOTE — DISCHARGE SUMMARY
[facility]                                                                                         Discharge Summary     Patient: Rita Ortiz       MRN: [id number]       YOB: 1950       Age: 67 y.o. Date of admission:  11/6/2022    Date of discharge:  11/11/2022    Primary care provider:  Eleni Toribio NP     Admitting provider:  Radha Treviño MD    Discharging provider(s): Ildefonso Labs, NP - Staff Intensivist - [facility]     Consultations  IP CONSULT TO HOSPITALIST  IP CONSULT TO NEUROLOGY  IP CONSULT TO NEPHROLOGY  IP CONSULT TO NEUROLOGY  IP CONSULT TO PALLIATIVE CARE - PROVIDER  IP CONSULT TO INTERVENTIONAL RADIOLOGY  IP CONSULT TO INFECTIOUS DISEASES  IP CONSULT TO CARDIOLOGY  IP CONSULT TO VASCULAR SURGERY    Procedures  Central line  EEG  Intubation    Discharge destination: [facility]. The patient is stable for discharge to the [facility]. Admission diagnosis  Stroke (cerebrum) ([facility] [I63.9]  NSTEMI (non-ST elevated myocardial infarction) ([facility] [I21.4]    Please refer to the admission history and physical for details on the presenting problem. Final discharge diagnoses and brief hospital course    Acute systolic hear failure  D/t acute hypoxic respiratory failure  D/t Septic shock  D/t BSI and UTI    Hospital course:      11/8: Patient was intubated overnight to remove suspected arterial placement of central line but did not seem to be the case on exploration. She is intubated, off sedation. Still not following commands. 11/9:Patient was reintubated overnight due to worsening mental status. 11/10: Remains intubated, minimally responsive off sedation. On 16mic of dopamine and 6 afshin of levophed. 11/11: Patient had increasing vasopressors requirement overnight.      Active Problems:      -Septic/Cardiogenic shock.  -Symptomatic bradycardia.  -Acute metabolic encephalopathy.  -Suspected misplacement of left IJ central line and line site bleeding. Now explanted by vascular surgery. -NSTEMI.  -DM2. -Hypothyroidism. -ESRD.  -HLD. -H/o HTN. Assessment/Plan:      Septic/cardiogenic shock.  -Coag neg staph bacteremia. Ucx is also positive for coag neg staph and GNR. -Optimize perfusion and pressors if needed for goal MAP >65  -Closely monitor hemodynamics and markers of end organ perfusion including mental status, UO and lactate. -Vanc and unasyn.   -Repeat Bcx.  -Will need MARISSA, TTE neg for endocarditis but now has multiple areas of stroke on MRI. Acute hypoxemic respiratory failure:  -Sec to encephalopathy.  -Continue lung protective ventilation with goal Plt pressure <30, driving pressure <06.  -Wean Fio2 for goal sats of 88-92.  -Sedation for RASS goal of 0 to -1.  -ABCDE protocol  -Daily SAT and SBT if criteria met. Symptomatic bradycardia.  -Continue dopamine, and levophed. -Titrate for HR >50 and for MAP >65. NSTEMI:  -Continue ASA and statin. -Heparin gtt on hold due to central line site bleeding.   -Cardiology consulted. Acute metabolic encephalopathy. -MRI showed multiple strokes, could be septic emboli related in setting of coag negative staph bacteremia and possible endocarditis although TTE was negative. Will try to get MARISSA as her clinical stability allows. - Closely monitor mental status. - Delirium precautions. - Correct underlying metabolic issues. Hypothyroidism:  -Continue levothyroxine. ESRD:  -HD per nephrology. -Due to worsening metabolic acidosis and worsening hypotension, will start CRRT. Physical examination at discharge  Visit Vitals  BP (!) 36/16   Pulse (!) 49   Temp (!) 96 °F (35.6 °C)   Resp 25   Ht 4' 11\" (1.499 m)   Wt 56.9 kg (125 lb 7.1 oz)   SpO2 (!) 35%   Breastfeeding No   BMI 25.34 kg/m²     Constitutional:       Comments: Intubated   HENT:      Head: Normocephalic and atraumatic.    Eyes:      Conjunctiva/sclera: Conjunctivae normal.   Cardiovascular:      Rate and Rhythm: Normal rate. Rhythm irregular. Pulmonary:      Effort: No respiratory distress. Abdominal:      General: Abdomen is flat. There is no distension. Palpations: Abdomen is soft. Tenderness: There is no abdominal tenderness. There is no guarding. Musculoskeletal:      Cervical back: Neck supple. Neurological:      Comments: Altered mental status. Recent Labs     11/11/22  1735 11/11/22  0311 11/10/22  0324   WBC 26.8* 35.6* 29.1*   HGB 4.3* 6.8* 8.3*   HCT 14.6* 21.9* 25.5*   PLT 12* 49* 92*     Recent Labs     11/11/22  1815 11/11/22 0311 11/10/22  0324 11/09/22  0233   * 137 138 137   K 4.4 4.4 5.2* 4.1   CL 87* 91* 96* 99   CO2 15* 8* 13* 25   BUN 34* 38* 74* 50*   CREA 3.68* 4.21* 6.87* 5.10*   * 113* 168* 173*   CA 7.2* 9.1 9.0 8.6   MG 2.2 2.4 2.5* 2.1   PHOS  --  6.9* 6.0* 2.9     No results for input(s): AP, TBIL, TP, ALB, GLOB, GGT, AML, LPSE in the last 72 hours. No lab exists for component: SGOT, GPT, AMYP, HLPSE  Recent Labs     11/11/22  0311   APTT 66.6*      No results for input(s): FE, TIBC, PSAT, FERR in the last 72 hours. Recent Labs     11/11/22  1650 11/11/22  1052   PH 6.93* 7.15*   PCO2 28* 32*   PO2 39* 35*     No results for input(s): CPK, CKMB in the last 72 hours. No lab exists for component: TROPONINI  No components found for: [place]    Pertinent imaging studies:    11/10/22  EXAM: MRI BRAIN WO CONT     INDICATION: ?CVA; started her on heparin gtt - check for bleed as well     COMPARISON: July 18 2021. CONTRAST: None. TECHNIQUE:    Multiplanar multisequence acquisition without contrast of the brain. FINDINGS:  Diffusion imaging shows multiple bilateral supra and infratentorial including  basal ganglia and brainstem areas of acute ischemia. There is no discrete  hemorrhage or mass effect seen at this time. There is no extra-axial fluid collection or shift of the midline.   Remote left temporal occipital infarct once again seen.  Moderate atrophy and nonspecific white matter changes. Flow voids in major vessels at the base of the brain are present. No discrete mass seen. Next     IMPRESSION  Multiple areas of acute ischemia. Multiplicity and bilaterality  suggests an embolic process.   ---------------------------------    Chronic Diagnoses:    Problem List as of 11/11/2022 Date Reviewed: 11/8/2022            Codes Class Noted - Resolved    History of stroke ICD-10-CM: Z86.73  ICD-9-CM: V12.54  11/7/2022 - Present        Bilateral carotid artery stenosis ICD-10-CM: I65.23  ICD-9-CM: 433.10, 433.30  11/7/2022 - Present        Acute alteration in mental status ICD-10-CM: R41.82  ICD-9-CM: 780.97  11/7/2022 - Present        Convulsive syncope ICD-10-CM: R55  ICD-9-CM: 780.2, 780.39  11/7/2022 - Present        Thrombotic stroke involving left cerebellar artery ([facility] ICD-10-CM: W17.768  ICD-9-CM: 434.01  11/7/2022 - Present        Diabetic peripheral neuropathy associated with type 2 diabetes mellitus ([facility] ICD-10-CM: E11.42  ICD-9-CM: 250.60, 357.2  11/7/2022 - Present        Stroke (cerebrum) ([facility] ICD-10-CM: I63.9  ICD-9-CM: 434.91  11/6/2022 - Present        NSTEMI (non-ST elevated myocardial infarction) ([facility] ICD-10-CM: I21.4  ICD-9-CM: 410.70  11/6/2022 - Present        Bradycardia ICD-10-CM: R00.1  ICD-9-CM: 427.89  10/14/2022 - Present        Type 2 diabetes mellitus ICD-10-CM: E11.9  ICD-9-CM: 250.00  3/28/2022 - Present        Primary appendiceal adenocarcinoma ([facility] ICD-10-CM: C18.1  ICD-9-CM: 153.5  9/23/2021 - Present        Acute appendicitis with localized peritonitis ICD-10-CM: K35.30  ICD-9-CM: 540.1  7/29/2021 - Present        End stage renal disease ([facility] ICD-10-CM: N18.6  ICD-9-CM: 585.6  7/29/2021 - Present        Acute appendicitis with generalized peritonitis, abscess, and gangrene ICD-10-CM: K35.21, K35.891  ICD-9-CM: 540.1  7/29/2021 - Present        Visual impairment ICD-10-CM: H54.7  ICD-9-CM: 369.9  7/22/2021 - Present        Physical debility ICD-10-CM: R53.81  ICD-9-CM: 799.3  7/22/2021 - Present        Counseling regarding advance care planning and goals of care ICD-10-CM: Z71.89  ICD-9-CM: V65.49  7/22/2021 - Present        Acute encephalopathy ICD-10-CM: G93.40  ICD-9-CM: 348.30  7/16/2021 - Present        Catheter-related bloodstream infection (CRBSI) ICD-10-CM: T58.569U  ICD-9-CM: 999.31  11/6/2017 - Present        COPD (chronic obstructive pulmonary disease) ([facility] ICD-10-CM: J44.9  ICD-9-CM: 419  11/6/2017 - Present        Diastolic congestive heart failure ([facility] ICD-10-CM: I50.30  ICD-9-CM: 428.30, 428.0  11/6/2017 - Present        SHIMON (obstructive sleep apnea) ICD-10-CM: G32.17  ICD-9-CM: 327.23  11/6/2017 - Present        ATN (acute tubular necrosis) ([facility] ICD-10-CM: N17.0  ICD-9-CM: 584.5  8/28/2017 - Present        RESOLVED: 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       Signed:      Kaz Chauhan NP   Staff Intensivist  [facility]    11/11/2022   9:26 PM     Alycia Draper MD   Attending        Cc: Lenny Tavera.FRANCK

## 2022-11-12 NOTE — PROGRESS NOTES
1912 Bedside shift change report given to Evans Hurtado RN (oncoming nurse) by JUAN FRANCISCO Liu (offgoing nurse). Report included the following information SBAR, Kardex, ED Summary, OR Summary, Procedure Summary, Intake/Output, MAR, Recent Results, and Cardiac Rhythm SB .    1945 Shift assessment completed. Patient is unresponsive to all stimuli. Pupils fixed. Family at bedside. Unable to obtain axillary temperature. 2123 FRANCK Matos at bedside. Time of death pronounced. 2126 Paged Chaplains    2135 Called [facility] to notify. Spoke to Jose David Ramírez. 2140 Chaplain Lopez at bedside.

## 2022-11-12 NOTE — PROGRESS NOTES
Spiritual Care Assessment/Progress Note  [facility]      NAME: Evens Delcid      MRN: [id number]  AGE: 67 y.o. SEX: female  Worship Affiliation: Unknown   Language: English     11/11/2022     Total Time (in minutes): 37     Spiritual Assessment begun in [facility] 2 through conversation with:         []Patient        [x] Family    [] Friend(s)        Reason for Consult: Death, Inpatient     Spiritual beliefs: (Please include comment if needed)     [x] Identifies with a nicolas tradition:         [] Supported by a nicolas community:            [] Claims no spiritual orientation:           [] Seeking spiritual identity:                [] Adheres to an individual form of spirituality:           [] Not able to assess:                           Identified resources for coping:      [x] Prayer                               [] Music                  [] Guided Imagery     [x] Family/friends                 [] Pet visits     [] Devotional reading                         [] Unknown     [] Other:                                                Interventions offered during this visit: (See comments for more details)    Patient Interventions: Initial visit     Family/Friend(s):  Affirmation of emotions/emotional suffering, Coping skills reviewed/reinforced, Iconic (affirming the presence of God/Higher Power), Normalization of emotional/spiritual concerns, Prayer (actual), End of life issues discussed, Affirmation of nicolas, Life review/legacy, Worship beliefs/image of God discussed     Plan of Care:     [] Support spiritual and/or cultural needs    [] Support AMD and/or advance care planning process      [] Support grieving process   [] Coordinate Rites and/or Rituals    [] Coordination with community clergy   [] No spiritual needs identified at this time   [] Detailed Plan of Care below (See Comments)  [] Make referral to Music Therapy  [] Make referral to Pet Therapy     [] Make referral to Addiction services  [] Make referral to [facility]  [] Make referral to Spiritual Care Partner  [x] No future visits requested        [] Contact Spiritual Care for further referrals     Comments:  [address] responded to clari for the death of patient Miss Cecy Dominguez in the CCU. Large family were gathered by her bedside. They received  kindly when he introduced himself and role and engaged in life review and story telling. Patient struggled with multiple medical issues for many years and had several near death experiences. She has a large and caring family who love her dearly. They shared their  pain about tjeir loss but indicated they were also glad that her sufferings were over. Chiquita plays an important part in their family life.  offered condolemces and listened with empathy and affirmation. Their requested prayer was offered, and they were provided space to process current situation. Family thanked  for the support.         Visited by: Minor Earing  To clari wood: [phone]  ([phone])

## 2022-11-12 NOTE — PROGRESS NOTES
2150 Connie from [address] called to release this patient for any tissue donations. 2153 Robson from [facility] called to release this patient for any eye donations.

## 2022-11-13 PROCEDURE — 74011250636 HC RX REV CODE- 250/636: Performed by: HOSPITALIST

## 2022-11-14 PROCEDURE — 74011250636 HC RX REV CODE- 250/636: Performed by: HOSPITALIST

## 2022-11-15 LAB
ARTERIAL PATENCY WRIST A: ABNORMAL
ARTERIAL PATENCY WRIST A: ABNORMAL
BASE DEFICIT BLDA-SCNC: 16.8 MMOL/L
BASE DEFICIT BLDA-SCNC: 24.2 MMOL/L
BDY SITE: ABNORMAL
BDY SITE: ABNORMAL
GLUCOSE BLD STRIP.AUTO-MCNC: NORMAL MG/DL (ref 65–117)
HCO3 BLDA-SCNC: 11 MMOL/L (ref 22–26)
HCO3 BLDA-SCNC: 4 MMOL/L (ref 22–26)
PCO2 BLDA: 16 MMHG (ref 35–45)
PCO2 BLDA: 32 MMHG (ref 35–45)
PH BLDA: 7.05 [PH] (ref 7.35–7.45)
PH BLDA: 7.15 [PH] (ref 7.35–7.45)
PO2 BLDA: 158 MMHG (ref 80–100)
PO2 BLDA: 35 MMHG (ref 80–100)
SAO2 % BLD: 53 % (ref 92–97)
SAO2 % BLD: 98 % (ref 92–97)
SAO2% DEVICE SAO2% SENSOR NAME: ABNORMAL
SAO2% DEVICE SAO2% SENSOR NAME: ABNORMAL
SERVICE CMNT-IMP: ABNORMAL
SERVICE CMNT-IMP: ABNORMAL
SERVICE CMNT-IMP: NORMAL
SPECIMEN SITE: ABNORMAL
SPECIMEN SITE: ABNORMAL

## 2022-11-17 LAB
BACTERIA SPEC CULT: NORMAL
SERVICE CMNT-IMP: NORMAL

## 2022-11-22 RX ORDER — LEVOTHYROXINE SODIUM 50 UG/1
TABLET ORAL
Qty: 30 TABLET | Refills: 1 | OUTPATIENT
Start: 2022-11-22

## 2022-11-22 RX ORDER — FOLIC ACID/VIT B COMPLEX AND C 0.8 MG
TABLET ORAL
Qty: 30 TABLET | OUTPATIENT
Start: 2022-11-22

## 2022-11-22 RX ORDER — FAMOTIDINE 20 MG/1
20 TABLET, FILM COATED ORAL
Qty: 60 TABLET | Refills: 1 | OUTPATIENT
Start: 2022-11-22

## 2022-11-28 NOTE — OP NOTES
[address]  OPERATIVE REPORT    Name:  Nusrat Rowe  MR#:  [id number]  :  1950  ACCOUNT #:  [de-identified]  DATE OF SERVICE:  2022    PREOPERATIVE DIAGNOSIS:  Central line in left carotid artery. POSTOPERATIVE DIAGNOSIS:  Central line in left internal jugular vein. PROCEDURE PERFORMED:  Exploration of left carotid artery and removal of central venous catheter from left internal jugular. SURGEON:  Lennox Allred MD    ASSISTANT:  None. ANESTHESIA:  General.    COMPLICATIONS:  None. SPECIMENS REMOVED:  None. IMPLANTS:  None. ESTIMATED BLOOD LOSS:  100 mL. DRAINS:  None. INDICATIONS:  The patient is a 76-year-old female who has end-stage renal disease and was critically ill in the intensive care unit. She had a left internal jugular venous catheter placed. There was persistent bleeding around the site of the catheter which did not respond to sutures and topical hemostatic agents. Due to the persistent bleeding, blood gas was sent both from the left neck central venous catheter and from a new central venous catheter placed in a femoral vein. The blood gas from the left neck catheter was consistent with an arterial blood gas and the blood gas from the femoral line was consistent with a venous blood gas. Because of this Vascular Surgery consultation was requested for removal of the line due to a presumed intra-arterial position. PROCEDURE:  After informed consent was obtained from the patient's next to kin, the patient was taken to the operating room. She was given intravenous antibiotics within 1 hour of the incision. After induction of adequate general anesthesia, the left neck including the central line was prepped and draped as a sterile field. There was persistent oozing from the exit site of the catheter.   A transverse incision was made at the base of the neck overlying the heads of the sternomastoid muscle just inferior to the insertion site of the catheter. Dissection was carried down between the heads of the sternomastoid muscle and the common carotid artery was identified and dissected free, but the central line did not enter the carotid artery. In fact, it was not at all tracking in the direction of the carotid artery which was significantly medial.  The vagus nerve was also identified and protected from injury. The central line entered the anterior wall of the internal jugular vein. The jugular vein was extensively explored along its posterior surface to ensure that there had not been a through-and-through puncture of the vein with inadvertent entry into a deeper arterial structure such as the left subclavian or innominate arteries. No evidence of arterial injury was found. The central venous catheter was removed from the jugular vein and the resulting defect in the anterior wall of the jugular vein was closed with 5-0 Prolene figure-of-eight suture and was hemostatic. There was no bleeding within the wound following closure of the left jugular puncture site. The wound was irrigated copiously and was hemostatic. The platysma was closed with running 3-0 Vicryl suture and skin with staples. A dry dressing was applied. The patient remained intubated and was taken back to the intensive care unit in critical condition. All counts were correct.       Naomi Waddell MD      [id number]  D:  11/28/2022 7:47  T:  11/28/2022 8:14  JOB #:  [id number]

## (undated) DEVICE — SUTURE ABSORBABLE BRAIDED 3-0 SHB 18 IN UD VICRYL + VCPB864D

## (undated) DEVICE — SUTURE PERMAHAND SZ 3-0 L30IN NONABSORBABLE BLK SILK BRAID A304H

## (undated) DEVICE — C-ARM: Brand: UNBRANDED

## (undated) DEVICE — SOL INJ SOD CL 0.9% 500ML BG --

## (undated) DEVICE — PROBE VASC 8MHZ WTRPRF

## (undated) DEVICE — SPONGE LAP 18X18IN STRL -- 5/PK

## (undated) DEVICE — GLOVE ORANGE PI 7 1/2   MSG9075

## (undated) DEVICE — AGENT HEMSTAT W4XL4IN OXIDIZED REGENERATED CELOS ABSRB SFT

## (undated) DEVICE — RING BASIN GUIDEWIRE BOWL: Brand: MEDLINE INDUSTRIES, INC.

## (undated) DEVICE — Device

## (undated) DEVICE — SYR 3ML LL TIP 1/10ML GRAD --

## (undated) DEVICE — OPTIFOAM GENTLE SA, POSTOP, 4X8: Brand: MEDLINE

## (undated) DEVICE — 3M™ TEGADERM™ TRANSPARENT FILM DRESSING FRAME STYLE, 1626W, 4 IN X 4-3/4 IN (10 CM X 12 CM), 50/CT 4CT/CASE: Brand: 3M™ TEGADERM™

## (undated) DEVICE — RADIFOCUS GLIDEWIRE: Brand: GLIDEWIRE

## (undated) DEVICE — SUT PROL 6-0 24IN BV1 DA BLU --

## (undated) DEVICE — VESSEL LOOPS,MINI, RED: Brand: DEVON

## (undated) DEVICE — FOGARTY ARTERIAL EMBOLECTOMY CATHETER 4F 80CM: Brand: FOGARTY

## (undated) DEVICE — SUTURE VCRL SZ 3-0 L27IN ABSRB UD L26MM SH 1/2 CIR J416H

## (undated) DEVICE — DERMABOND SKIN ADH 0.7ML -- DERMABOND ADVANCED 12/BX

## (undated) DEVICE — Device: Brand: GRAND SLAM

## (undated) DEVICE — RELOAD STPL L45MM H1-2.6MM MESENTERY THN TISS WHT GRIPPING

## (undated) DEVICE — 450 ML BOTTLE OF 0.05% CHLORHEXIDINE GLUCONATE IN 99.95% STERILE WATER FOR IRRIGATION, USP AND APPLICATOR.: Brand: IRRISEPT ANTIMICROBIAL WOUND LAVAGE

## (undated) DEVICE — Device: Brand: JELCO

## (undated) DEVICE — LAPAROSCOPIC TROCAR SLEEVE/SINGLE USE: Brand: KII® OPTICAL ACCESS SYSTEM

## (undated) DEVICE — NEEDLE HYPO 22GA L1.5IN BLK S STL HUB POLYPR SHLD REG BVL

## (undated) DEVICE — RELOAD STPL L45MM H1.5-3.6MM REG TISS BLU GRIPPING SURF B

## (undated) DEVICE — INTENDED FOR TISSUE SEPARATION, AND OTHER PROCEDURES THAT REQUIRE A SHARP SURGICAL BLADE TO PUNCTURE OR CUT.: Brand: BARD-PARKER ® CARBON RIB-BACK BLADES

## (undated) DEVICE — STERILE COTTON BALLS LARGE 5/P: Brand: MEDLINE

## (undated) DEVICE — SYR 10ML LUER LOK 1/5ML GRAD --

## (undated) DEVICE — COVER,MAYO STAND,XL,STERILE: Brand: MEDLINE

## (undated) DEVICE — BOWL UTIL GRAD 32OZ STRL --

## (undated) DEVICE — SUTURE PROL SZ 6-0 L24IN NONABSORBABLE BLU L13MM C-1 3/8 8726H

## (undated) DEVICE — AV FISTULA - MRMC: Brand: MEDLINE INDUSTRIES, INC.

## (undated) DEVICE — BANDAGE,GAUZE,BULKEE II,4.5"X4.1YD,STRL: Brand: MEDLINE

## (undated) DEVICE — HYPODERMIC SAFETY NEEDLE: Brand: MAGELLAN

## (undated) DEVICE — SUTURE PERMAHAND SZ 2-0 L30IN NONABSORBABLE BLK SILK W/O A305H

## (undated) DEVICE — SUTURE MCRYL SZ 4-0 L27IN ABSRB UD L19MM PS-2 1/2 CIR PRIM Y426H

## (undated) DEVICE — FOGARTY THRU-LUMEN EMBOLECTOMY CATHETER, 4F 40CM: Brand: FOGARTY

## (undated) DEVICE — SUT ETHLN 4-0 18IN PS2 BLK --

## (undated) DEVICE — SUTURE PROL SZ 5-0 L24IN NONABSORBABLE BLU RB-2 L13IN 1/2 8554H

## (undated) DEVICE — SUTURE ABSORBABLE BRAIDED 2-0 CT-1 27 IN UD VICRYL J259H

## (undated) DEVICE — GUIDEWIRE URO L150CM DIA0.035IN TAPR 3CM NIT HYDRPHLC ANG

## (undated) DEVICE — GLOVE SURG SZ 8 L12IN FNGR THK79MIL GRN LTX FREE

## (undated) DEVICE — TOWEL SURG W17XL27IN STD BLU COT NONFENESTRATED PREWASHED

## (undated) DEVICE — STAPLER SKIN H3.9MM WIRE DIA0.58MM CRWN 6.9MM 35 STPL ROT

## (undated) DEVICE — FOGARTY ARTERIAL EMBOLECTOMY CATHETER 4F 40CM: Brand: FOGARTY

## (undated) DEVICE — SUTURE VCRL SZ 3-0 L27IN ABSRB UD L36MM CT-1 1/2 CIR J258H

## (undated) DEVICE — GOWN,SIRUS,NONRNF,SETINSLV,2XL,18/CS: Brand: MEDLINE

## (undated) DEVICE — TROCAR SITE CLOSURE DEVICE: Brand: ENDO CLOSE

## (undated) DEVICE — TISSUE RETRIEVAL SYSTEM: Brand: INZII RETRIEVAL SYSTEM

## (undated) DEVICE — GLOVE SURG SZ 75 CRM LTX FREE POLYISOPRENE POLYMER BEAD ANTI

## (undated) DEVICE — SEALER ENDOSCP L37CM NANO COAT BLNT TIP LAP DIV

## (undated) DEVICE — MAGNETIC INSTR DRAPE 20X16: Brand: MEDLINE INDUSTRIES, INC.

## (undated) DEVICE — LABEL MED VASC MRMC STRL

## (undated) DEVICE — GENERAL LAPAROSCOPY-MRMC: Brand: MEDLINE INDUSTRIES, INC.

## (undated) DEVICE — DRAPE,UTILTY,TAPE,15X26, 4EA/PK: Brand: MEDLINE

## (undated) DEVICE — SOLUTION IRRIG 1000ML 0.9% SOD CHL USP POUR PLAS BTL

## (undated) DEVICE — STAPLER INT L340MM 45MM STD 12 FIRING B FRM PWR + GRIPPING

## (undated) DEVICE — INTRODUCER SHTH 6FR CANN L5.5CM DIL TIP 35MM GRN TUNGSTEN

## (undated) DEVICE — SPONGE: SPECIALTY PEANUT XR 100/CS: Brand: MEDICAL ACTION INDUSTRIES

## (undated) DEVICE — KIT,1200CC CANISTER,3/16"X6' TUBING: Brand: MEDLINE INDUSTRIES, INC.

## (undated) DEVICE — SUTURE VCRL SZ 3-0 L27IN ABSRB VLT L26MM SH 1/2 CIR J316H

## (undated) DEVICE — SUTURE NONABSORBABLE MONOFILAMENT 5-0 C-1 1X24 IN PROLENE 8725H

## (undated) DEVICE — MARYLAND JAW LAPAROSCOPIC SEALER/DIVIDER COATED: Brand: LIGASURE

## (undated) DEVICE — TROCAR: Brand: KII FIOS FIRST ENTRY

## (undated) DEVICE — CONNECTOR STRT W O BASE 1 ARM

## (undated) DEVICE — SUTURE ETHLN SZ 2-0 L18IN NONABSORBABLE BLK L19MM PS-2 PRIM 593H

## (undated) DEVICE — STOPCOCK IV 4 W TRNSPAR

## (undated) DEVICE — TROCAR: Brand: KII® SLEEVE

## (undated) DEVICE — DRAIN SURG 19FR L025IN DIA63MM SIL CHN RND FULL FLUT CLS

## (undated) DEVICE — SUTURE SZ 0 27IN 5/8 CIR UR-6  TAPER PT VIOLET ABSRB VICRYL J603H